# Patient Record
Sex: FEMALE | Race: BLACK OR AFRICAN AMERICAN | Employment: OTHER | URBAN - METROPOLITAN AREA
[De-identification: names, ages, dates, MRNs, and addresses within clinical notes are randomized per-mention and may not be internally consistent; named-entity substitution may affect disease eponyms.]

---

## 2017-01-17 ENCOUNTER — HOSPITAL ENCOUNTER (OUTPATIENT)
Dept: GENERAL RADIOLOGY | Age: 62
Discharge: HOME | End: 2017-01-17
Payer: MEDICAID

## 2017-01-17 DIAGNOSIS — Z01.818 PREOP EXAMINATION: ICD-10-CM

## 2017-01-17 PROCEDURE — 71020 XR CHEST PA LAT: CPT

## 2017-02-07 ENCOUNTER — PATIENT OUTREACH (OUTPATIENT)
Dept: CASE MANAGEMENT | Age: 62
End: 2017-02-07

## 2017-02-07 ENCOUNTER — HOSPITAL ENCOUNTER (OUTPATIENT)
Dept: LAB | Age: 62
Discharge: HOME | End: 2017-02-07
Payer: MEDICAID

## 2017-02-07 DIAGNOSIS — C50.919 MALIGNANT NEOPLASM OF FEMALE BREAST, UNSPECIFIED LATERALITY, UNSPECIFIED SITE OF BREAST: ICD-10-CM

## 2017-02-07 LAB
ALBUMIN SERPL BCP-MCNC: 3.2 G/DL (ref 3.2–4.6)
ALBUMIN/GLOB SERPL: 0.8 {RATIO} (ref 1.2–3.5)
ALP SERPL-CCNC: 99 U/L (ref 50–136)
ALT SERPL-CCNC: 20 U/L (ref 12–65)
ANION GAP BLD CALC-SCNC: 6 MMOL/L (ref 7–16)
AST SERPL W P-5'-P-CCNC: 9 U/L (ref 15–37)
BASOPHILS # BLD AUTO: 0.1 K/UL (ref 0–0.2)
BASOPHILS # BLD: 1 % (ref 0–2)
BILIRUB SERPL-MCNC: 0.2 MG/DL (ref 0.2–1.1)
BUN SERPL-MCNC: 16 MG/DL (ref 8–23)
CALCIUM SERPL-MCNC: 9.4 MG/DL (ref 8.3–10.4)
CHLORIDE SERPL-SCNC: 103 MMOL/L (ref 98–107)
CO2 SERPL-SCNC: 29 MMOL/L (ref 23–32)
CREAT SERPL-MCNC: 1 MG/DL (ref 0.6–1)
DIFFERENTIAL METHOD BLD: ABNORMAL
EOSINOPHIL # BLD: 0.8 K/UL (ref 0–0.8)
EOSINOPHIL NFR BLD: 10 % (ref 0.5–7.8)
ERYTHROCYTE [DISTWIDTH] IN BLOOD BY AUTOMATED COUNT: 13.3 % (ref 11.9–14.6)
GLOBULIN SER CALC-MCNC: 4 G/DL (ref 2.3–3.5)
GLUCOSE SERPL-MCNC: 113 MG/DL (ref 65–100)
HCT VFR BLD AUTO: 40.1 % (ref 35.8–46.3)
HGB BLD-MCNC: 12.8 G/DL (ref 11.7–15.4)
LYMPHOCYTES # BLD AUTO: 25 % (ref 13–44)
LYMPHOCYTES # BLD: 2.1 K/UL (ref 0.5–4.6)
MCH RBC QN AUTO: 27.4 PG (ref 26.1–32.9)
MCHC RBC AUTO-ENTMCNC: 31.9 G/DL (ref 31.4–35)
MCV RBC AUTO: 85.7 FL (ref 79.6–97.8)
MONOCYTES # BLD: 0.7 K/UL (ref 0.1–1.3)
MONOCYTES NFR BLD AUTO: 8 % (ref 4–12)
NEUTS SEG # BLD: 4.7 K/UL (ref 1.7–8.2)
NEUTS SEG NFR BLD AUTO: 56 % (ref 43–78)
NRBC # BLD: 0 K/UL (ref 0–0.2)
NRBC BLD-RTO: 0 PER 100 WBC (ref 0–2)
PLATELET # BLD AUTO: 515 K/UL (ref 150–450)
PMV BLD AUTO: 8.2 FL (ref 10.8–14.1)
POTASSIUM SERPL-SCNC: 3.9 MMOL/L (ref 3.5–5.1)
PROT SERPL-MCNC: 7.2 G/DL (ref 6.3–8.2)
RBC # BLD AUTO: 4.68 M/UL (ref 4.05–5.25)
SODIUM SERPL-SCNC: 138 MMOL/L (ref 136–145)
WBC # BLD AUTO: 8.4 K/UL (ref 4.3–11.1)

## 2017-02-07 PROCEDURE — 36415 COLL VENOUS BLD VENIPUNCTURE: CPT | Performed by: INTERNAL MEDICINE

## 2017-02-07 PROCEDURE — 80053 COMPREHEN METABOLIC PANEL: CPT | Performed by: INTERNAL MEDICINE

## 2017-02-07 PROCEDURE — 85025 COMPLETE CBC W/AUTO DIFF WBC: CPT | Performed by: INTERNAL MEDICINE

## 2017-02-07 NOTE — ACP (ADVANCE CARE PLANNING)
2/7/2017 Saw the patient with Dr Elissa Canseco. She had surgery approximately 3 weeks ago with Dr Sammi Tee. Dr Elissa Canseco discussed with her that we will send Oncotype Dx to see if there would be benefit from chemo. Dr Elissa Canseco would like her to take Arimidex. She requests that she be given something for hot flashes and we will send in Effexor 37.5 for one week and then increase to 75 mg. Navigation will continue to follow.

## 2017-02-14 ENCOUNTER — PATIENT OUTREACH (OUTPATIENT)
Dept: CASE MANAGEMENT | Age: 62
End: 2017-02-14

## 2017-02-28 ENCOUNTER — APPOINTMENT (OUTPATIENT)
Dept: GENERAL RADIOLOGY | Age: 62
End: 2017-02-28
Attending: EMERGENCY MEDICINE
Payer: MEDICAID

## 2017-02-28 ENCOUNTER — APPOINTMENT (OUTPATIENT)
Dept: CT IMAGING | Age: 62
End: 2017-02-28
Attending: EMERGENCY MEDICINE
Payer: MEDICAID

## 2017-02-28 ENCOUNTER — HOSPITAL ENCOUNTER (EMERGENCY)
Age: 62
Discharge: HOME | End: 2017-03-01
Attending: EMERGENCY MEDICINE
Payer: MEDICAID

## 2017-02-28 DIAGNOSIS — R07.89 CHEST WALL PAIN: Primary | ICD-10-CM

## 2017-02-28 LAB
ALBUMIN SERPL BCP-MCNC: 3.5 G/DL (ref 3.2–4.6)
ALBUMIN/GLOB SERPL: 0.7 {RATIO} (ref 1.2–3.5)
ALP SERPL-CCNC: 123 U/L (ref 50–136)
ALT SERPL-CCNC: 24 U/L (ref 12–65)
ANION GAP BLD CALC-SCNC: 10 MMOL/L (ref 7–16)
AST SERPL W P-5'-P-CCNC: 17 U/L (ref 15–37)
BASOPHILS # BLD AUTO: 0 K/UL (ref 0–0.2)
BASOPHILS # BLD: 0 % (ref 0–2)
BILIRUB SERPL-MCNC: 0.4 MG/DL (ref 0.2–1.1)
BUN SERPL-MCNC: 22 MG/DL (ref 8–23)
CALCIUM SERPL-MCNC: 9.4 MG/DL (ref 8.3–10.4)
CHLORIDE SERPL-SCNC: 102 MMOL/L (ref 98–107)
CO2 SERPL-SCNC: 29 MMOL/L (ref 21–32)
CREAT SERPL-MCNC: 1.1 MG/DL (ref 0.6–1)
DIFFERENTIAL METHOD BLD: ABNORMAL
EOSINOPHIL # BLD: 0.5 K/UL (ref 0–0.8)
EOSINOPHIL NFR BLD: 5 % (ref 0.5–7.8)
ERYTHROCYTE [DISTWIDTH] IN BLOOD BY AUTOMATED COUNT: 13.2 % (ref 11.9–14.6)
GLOBULIN SER CALC-MCNC: 4.7 G/DL (ref 2.3–3.5)
GLUCOSE SERPL-MCNC: 94 MG/DL (ref 65–100)
HCT VFR BLD AUTO: 40.5 % (ref 35.8–46.3)
HGB BLD-MCNC: 13.2 G/DL (ref 11.7–15.4)
IMM GRANULOCYTES # BLD: 0 K/UL (ref 0–0.5)
IMM GRANULOCYTES NFR BLD AUTO: 0.3 % (ref 0–5)
LYMPHOCYTES # BLD AUTO: 30 % (ref 13–44)
LYMPHOCYTES # BLD: 3 K/UL (ref 0.5–4.6)
MCH RBC QN AUTO: 27.4 PG (ref 26.1–32.9)
MCHC RBC AUTO-ENTMCNC: 32.6 G/DL (ref 31.4–35)
MCV RBC AUTO: 84.2 FL (ref 79.6–97.8)
MONOCYTES # BLD: 0.9 K/UL (ref 0.1–1.3)
MONOCYTES NFR BLD AUTO: 9 % (ref 4–12)
NEUTS SEG # BLD: 5.5 K/UL (ref 1.7–8.2)
NEUTS SEG NFR BLD AUTO: 56 % (ref 43–78)
PLATELET # BLD AUTO: 451 K/UL (ref 150–450)
PMV BLD AUTO: 9 FL (ref 10.8–14.1)
POTASSIUM SERPL-SCNC: 3.9 MMOL/L (ref 3.5–5.1)
PROT SERPL-MCNC: 8.2 G/DL (ref 6.3–8.2)
RBC # BLD AUTO: 4.81 M/UL (ref 4.05–5.25)
SODIUM SERPL-SCNC: 141 MMOL/L (ref 136–145)
TROPONIN I SERPL-MCNC: <0.02 NG/ML (ref 0.02–0.05)
WBC # BLD AUTO: 9.9 K/UL (ref 4.3–11.1)

## 2017-02-28 PROCEDURE — 71010 XR CHEST PORT: CPT

## 2017-02-28 PROCEDURE — 80053 COMPREHEN METABOLIC PANEL: CPT | Performed by: EMERGENCY MEDICINE

## 2017-02-28 PROCEDURE — 83690 ASSAY OF LIPASE: CPT | Performed by: EMERGENCY MEDICINE

## 2017-02-28 PROCEDURE — 83880 ASSAY OF NATRIURETIC PEPTIDE: CPT | Performed by: EMERGENCY MEDICINE

## 2017-02-28 PROCEDURE — 99284 EMERGENCY DEPT VISIT MOD MDM: CPT | Performed by: EMERGENCY MEDICINE

## 2017-02-28 PROCEDURE — C9113 INJ PANTOPRAZOLE SODIUM, VIA: HCPCS | Performed by: EMERGENCY MEDICINE

## 2017-02-28 PROCEDURE — 96375 TX/PRO/DX INJ NEW DRUG ADDON: CPT | Performed by: EMERGENCY MEDICINE

## 2017-02-28 PROCEDURE — 93005 ELECTROCARDIOGRAM TRACING: CPT | Performed by: EMERGENCY MEDICINE

## 2017-02-28 PROCEDURE — 84484 ASSAY OF TROPONIN QUANT: CPT | Performed by: EMERGENCY MEDICINE

## 2017-02-28 PROCEDURE — 74011636320 HC RX REV CODE- 636/320: Performed by: EMERGENCY MEDICINE

## 2017-02-28 PROCEDURE — 96374 THER/PROPH/DIAG INJ IV PUSH: CPT | Performed by: EMERGENCY MEDICINE

## 2017-02-28 PROCEDURE — 74011000258 HC RX REV CODE- 258: Performed by: EMERGENCY MEDICINE

## 2017-02-28 PROCEDURE — 74011250636 HC RX REV CODE- 250/636: Performed by: EMERGENCY MEDICINE

## 2017-02-28 PROCEDURE — 74011000250 HC RX REV CODE- 250: Performed by: EMERGENCY MEDICINE

## 2017-02-28 PROCEDURE — 74011250637 HC RX REV CODE- 250/637: Performed by: EMERGENCY MEDICINE

## 2017-02-28 PROCEDURE — 71260 CT THORAX DX C+: CPT

## 2017-02-28 PROCEDURE — 85025 COMPLETE CBC W/AUTO DIFF WBC: CPT | Performed by: EMERGENCY MEDICINE

## 2017-02-28 PROCEDURE — 84484 ASSAY OF TROPONIN QUANT: CPT

## 2017-02-28 RX ORDER — SODIUM CHLORIDE 0.9 % (FLUSH) 0.9 %
10 SYRINGE (ML) INJECTION
Status: COMPLETED | OUTPATIENT
Start: 2017-02-28 | End: 2017-02-28

## 2017-02-28 RX ORDER — LIDOCAINE HYDROCHLORIDE 20 MG/ML
15 SOLUTION OROPHARYNGEAL
Status: COMPLETED | OUTPATIENT
Start: 2017-02-28 | End: 2017-02-28

## 2017-02-28 RX ORDER — ONDANSETRON 2 MG/ML
4 INJECTION INTRAMUSCULAR; INTRAVENOUS
Status: COMPLETED | OUTPATIENT
Start: 2017-02-28 | End: 2017-02-28

## 2017-02-28 RX ORDER — GUAIFENESIN 100 MG/5ML
324 LIQUID (ML) ORAL
Status: COMPLETED | OUTPATIENT
Start: 2017-02-28 | End: 2017-02-28

## 2017-02-28 RX ADMIN — SODIUM CHLORIDE 100 ML: 900 INJECTION, SOLUTION INTRAVENOUS at 23:20

## 2017-02-28 RX ADMIN — IOVERSOL 100 ML: 741 INJECTION INTRA-ARTERIAL; INTRAVENOUS at 23:20

## 2017-02-28 RX ADMIN — SODIUM CHLORIDE 40 MG: 9 INJECTION INTRAMUSCULAR; INTRAVENOUS; SUBCUTANEOUS at 23:37

## 2017-02-28 RX ADMIN — Medication 30 ML: at 23:37

## 2017-02-28 RX ADMIN — Medication 10 ML: at 23:20

## 2017-02-28 RX ADMIN — ONDANSETRON 4 MG: 2 INJECTION INTRAMUSCULAR; INTRAVENOUS at 23:37

## 2017-02-28 RX ADMIN — LIDOCAINE HYDROCHLORIDE 15 ML: 20 SOLUTION ORAL; TOPICAL at 23:37

## 2017-02-28 RX ADMIN — ASPIRIN 81 MG 324 MG: 81 TABLET ORAL at 23:37

## 2017-03-01 ENCOUNTER — HOSPITAL ENCOUNTER (OUTPATIENT)
Dept: LAB | Age: 62
Discharge: HOME | End: 2017-03-01
Payer: MEDICAID

## 2017-03-01 VITALS
OXYGEN SATURATION: 97 % | DIASTOLIC BLOOD PRESSURE: 65 MMHG | HEART RATE: 100 BPM | WEIGHT: 288 LBS | HEIGHT: 63 IN | TEMPERATURE: 98.8 F | SYSTOLIC BLOOD PRESSURE: 130 MMHG | RESPIRATION RATE: 15 BRPM | BODY MASS INDEX: 51.03 KG/M2

## 2017-03-01 DIAGNOSIS — C50.919 MALIGNANT NEOPLASM OF FEMALE BREAST, UNSPECIFIED LATERALITY, UNSPECIFIED SITE OF BREAST: ICD-10-CM

## 2017-03-01 LAB
ALBUMIN SERPL BCP-MCNC: 3.3 G/DL (ref 3.2–4.6)
ALBUMIN/GLOB SERPL: 0.8 {RATIO} (ref 1.2–3.5)
ALP SERPL-CCNC: 118 U/L (ref 50–136)
ALT SERPL-CCNC: 23 U/L (ref 12–65)
ANION GAP BLD CALC-SCNC: 7 MMOL/L (ref 7–16)
AST SERPL W P-5'-P-CCNC: 14 U/L (ref 15–37)
ATRIAL RATE: 104 BPM
BASOPHILS # BLD AUTO: 0.1 K/UL (ref 0–0.2)
BASOPHILS # BLD: 1 % (ref 0–2)
BILIRUB SERPL-MCNC: 0.3 MG/DL (ref 0.2–1.1)
BNP SERPL-MCNC: 2 PG/ML
BUN SERPL-MCNC: 18 MG/DL (ref 8–23)
CALCIUM SERPL-MCNC: 8.8 MG/DL (ref 8.3–10.4)
CALCULATED P AXIS, ECG09: 62 DEGREES
CALCULATED R AXIS, ECG10: 27 DEGREES
CALCULATED T AXIS, ECG11: 62 DEGREES
CHLORIDE SERPL-SCNC: 100 MMOL/L (ref 98–107)
CO2 SERPL-SCNC: 33 MMOL/L (ref 23–32)
CREAT SERPL-MCNC: 0.97 MG/DL (ref 0.6–1)
DIAGNOSIS, 93000: NORMAL
DIASTOLIC BP, ECG02: NORMAL MMHG
DIFFERENTIAL METHOD BLD: ABNORMAL
EOSINOPHIL # BLD: 0.5 K/UL (ref 0–0.8)
EOSINOPHIL NFR BLD: 6 % (ref 0.5–7.8)
ERYTHROCYTE [DISTWIDTH] IN BLOOD BY AUTOMATED COUNT: 13 % (ref 11.9–14.6)
GLOBULIN SER CALC-MCNC: 4.1 G/DL (ref 2.3–3.5)
GLUCOSE SERPL-MCNC: 110 MG/DL (ref 65–100)
HCT VFR BLD AUTO: 36.5 % (ref 35.8–46.3)
HGB BLD-MCNC: 11.6 G/DL (ref 11.7–15.4)
LIPASE SERPL-CCNC: 135 U/L (ref 73–393)
LYMPHOCYTES # BLD AUTO: 32 % (ref 13–44)
LYMPHOCYTES # BLD: 2.7 K/UL (ref 0.5–4.6)
MCH RBC QN AUTO: 26.4 PG (ref 26.1–32.9)
MCHC RBC AUTO-ENTMCNC: 31.8 G/DL (ref 31.4–35)
MCV RBC AUTO: 83 FL (ref 79.6–97.8)
MONOCYTES # BLD: 0.8 K/UL (ref 0.1–1.3)
MONOCYTES NFR BLD AUTO: 10 % (ref 4–12)
NEUTS SEG # BLD: 4.2 K/UL (ref 1.7–8.2)
NEUTS SEG NFR BLD AUTO: 51 % (ref 43–78)
NRBC # BLD: 0 K/UL (ref 0–0.2)
P-R INTERVAL, ECG05: 144 MS
PLATELET # BLD AUTO: 445 K/UL (ref 150–450)
PMV BLD AUTO: 8.5 FL (ref 10.8–14.1)
POTASSIUM SERPL-SCNC: 3.5 MMOL/L (ref 3.5–5.1)
PROT SERPL-MCNC: 7.4 G/DL (ref 6.3–8.2)
Q-T INTERVAL, ECG07: 324 MS
QRS DURATION, ECG06: 70 MS
QTC CALCULATION (BEZET), ECG08: 426 MS
RBC # BLD AUTO: 4.4 M/UL (ref 4.05–5.25)
SODIUM SERPL-SCNC: 140 MMOL/L (ref 136–145)
SYSTOLIC BP, ECG01: NORMAL MMHG
TROPONIN I BLD-MCNC: 0 NG/ML (ref 0–0.08)
VENTRICULAR RATE, ECG03: 104 BPM
WBC # BLD AUTO: 8.3 K/UL (ref 4.3–11.1)

## 2017-03-01 PROCEDURE — 80053 COMPREHEN METABOLIC PANEL: CPT | Performed by: INTERNAL MEDICINE

## 2017-03-01 PROCEDURE — 36415 COLL VENOUS BLD VENIPUNCTURE: CPT | Performed by: INTERNAL MEDICINE

## 2017-03-01 PROCEDURE — 85025 COMPLETE CBC W/AUTO DIFF WBC: CPT | Performed by: INTERNAL MEDICINE

## 2017-03-01 RX ORDER — OXYCODONE AND ACETAMINOPHEN 10; 325 MG/1; MG/1
1 TABLET ORAL
Qty: 8 TAB | Refills: 0 | Status: ON HOLD | OUTPATIENT
Start: 2017-03-01 | End: 2017-03-13

## 2017-03-01 NOTE — ED TRIAGE NOTES
Pt c/o left side chest pain x 1 day. Pt states shortness of breath. Pt denies any N/V/D. Pt states she did have a left sided mastectomy last month. Pt alert and oriented x 4. Respirations are even and unlabored. Pt appears in no acute distress at this time.

## 2017-03-01 NOTE — ED NOTES
I have reviewed discharge instructions with the patient. The patient verbalized understanding. Patient walking to lobby with family. avs in chart.

## 2017-03-01 NOTE — DISCHARGE INSTRUCTIONS
Musculoskeletal Chest Pain: Care Instructions  Your Care Instructions  Chest pain is not always a sign that something is wrong with your heart or that you have another serious problem. The doctor thinks your chest pain is caused by strained muscles or ligaments, inflamed chest cartilage, or another problem in your chest, rather than by your heart. You may need more tests to find the cause of your chest pain. Follow-up care is a key part of your treatment and safety. Be sure to make and go to all appointments, and call your doctor if you are having problems. Its also a good idea to know your test results and keep a list of the medicines you take. How can you care for yourself at home? · Take pain medicines exactly as directed. ¨ If the doctor gave you a prescription medicine for pain, take it as prescribed. ¨ If you are not taking a prescription pain medicine, ask your doctor if you can take an over-the-counter medicine. · Rest and protect the sore area. · Stop, change, or take a break from any activity that may be causing your pain or soreness. · Put ice or a cold pack on the sore area for 10 to 20 minutes at a time. Try to do this every 1 to 2 hours for the next 3 days (when you are awake) or until the swelling goes down. Put a thin cloth between the ice and your skin. · After 2 or 3 days, apply a heating pad set on low or a warm cloth to the area that hurts. Some doctors suggest that you go back and forth between hot and cold. · Do not wrap or tape your ribs for support. This may cause you to take smaller breaths, which could increase your risk of lung problems. · Mentholated creams such as Bengay or Icy Hot may soothe sore muscles. Follow the instructions on the package. · Follow your doctor's instructions for exercising. · Gentle stretching and massage may help you get better faster. Stretch slowly to the point just before pain begins, and hold the stretch for at least 15 to 30 seconds.  Do this 3 or 4 times a day. Stretch just after you have applied heat. · As your pain gets better, slowly return to your normal activities. Any increased pain may be a sign that you need to rest a while longer. When should you call for help? Call 911 anytime you think you may need emergency care. For example, call if:  · You have chest pain or pressure. This may occur with:  ¨ Sweating. ¨ Shortness of breath. ¨ Nausea or vomiting. ¨ Pain that spreads from the chest to the neck, jaw, or one or both shoulders or arms. ¨ Dizziness or lightheadedness. ¨ A fast or uneven pulse. After calling 911, chew 1 adult-strength aspirin. Wait for an ambulance. Do not try to drive yourself. · You have sudden chest pain and shortness of breath, or you cough up blood. Call your doctor now or seek immediate medical care if:  · You have any trouble breathing. · Your chest pain gets worse. · Your chest pain occurs consistently with exercise and is relieved by rest.  Watch closely for changes in your health, and be sure to contact your doctor if:  · Your chest pain does not get better after 1 week. Where can you learn more? Go to http://frandy-domonique.info/. Enter V293 in the search box to learn more about \"Musculoskeletal Chest Pain: Care Instructions. \"  Current as of: May 27, 2016  Content Version: 11.1  © 6926-4660 [employer]. Care instructions adapted under license by [employer] (which disclaims liability or warranty for this information). If you have questions about a medical condition or this instruction, always ask your healthcare professional. [place] [address] any warranty or liability for your use of this information.

## 2017-03-01 NOTE — ED PROVIDER NOTES
HPI Comments: Patient with left-sided mastectomy one month ago. Had breast cancer. Is following up with Dr. Solo Murguia for chemotherapy and radiation options. Has a history of high blood pressure and congestive heart failure. Had a heart catheter in November 2014 with minimal coronary artery disease. Has history of fibromyalgia on pain medicine. Started with left-sided sharp chest pain this morning at 10 AM.  Some shortness of breath associated with it. No nausea. No numbness or diaphoresis. Patient is a 64 y.o. female presenting with chest pain. The history is provided by the patient. No  was used. Chest Pain (Angina)    This is a new problem. The current episode started 6 to 12 hours ago. The problem has not changed since onset. The problem occurs constantly. The pain is associated with normal activity. The pain is present in the left side. The pain is moderate. The quality of the pain is described as heavy and sharp. The pain does not radiate. Associated symptoms include nausea and shortness of breath. Pertinent negatives include no abdominal pain, no back pain, no cough, no diaphoresis, no dizziness, no exertional chest pressure, no fever, no headaches, no irregular heartbeat, no leg pain, no lower extremity edema, no malaise/fatigue, no near-syncope, no numbness, no palpitations, no vomiting and no weakness. She has tried nothing for the symptoms. Risk factors include hypertension. Her past medical history is significant for HTN and CHF.        Past Medical History:   Diagnosis Date    Arthritis     everywhere;  DDD    Asthma     inhalers daily  [facility]    CAD (coronary artery disease)     Dr Emmie Butler [facility])     left breast ca dx'ed this month-appt with surgeon 10/12    Chronic pain     generalized from arthritis    Complicated UTI (urinary tract infection) 12/8/2015    Diverticulosis     Heart failure ([facility] [address]     [facility] No significant valvular disease. EF 50-55%    History of prediabetes     monitored by Primary Physician    Hypertension     Shortness of breath     Sleep apnea     bi pap and o2    Thyroid cyst     cyst removed from thyroid       Past Surgical History:   Procedure Laterality Date    BREAST SURGERY PROCEDURE UNLISTED      Mastectomy left side    CARDIAC SURG PROCEDURE UNLIST  [facility]14 [facility]    Minimal CAD in the ostial circumflex and mid ramus (medical management)    HX CARPAL TUNNEL RELEASE Bilateral     HX CHOLECYSTECTOMY      HX CYST REMOVAL      from thyroid    HX LAP CHOLECYSTECTOMY      HX OTHER SURGICAL      abcess where bra strap    NEUROLOGICAL PROCEDURE UNLISTED      \"nerve running to back\"    SINUS SURGERY PROC UNLISTED           Family History:   Problem Relation Age of Onset    Heart Disease Mother     Cancer Mother      lung    Hypertension Mother     Hypertension Father     Sleep Apnea Father     Cancer Father      prostate    Sleep Apnea Brother      states also has two children with sleep apnea    Cancer Brother      pituitary    Hypertension Brother     Breast Cancer Neg Hx        Social History     Social History    Marital status:      Spouse name: N/A    Number of children: N/A    Years of education: N/A     Occupational History    Not on file. 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 ALLERGIES: Bactrim [sulfamethoprim ds]    Review of Systems   Constitutional: Negative for chills, diaphoresis, fever and malaise/fatigue. HENT: Negative for rhinorrhea and sore throat. Eyes: Negative for pain and redness. Respiratory: Positive for shortness of breath. Negative for cough, chest tightness and wheezing. Cardiovascular: Positive for chest pain. Negative for palpitations, leg swelling and near-syncope. Gastrointestinal: Positive for nausea. Negative for abdominal pain, diarrhea and vomiting. Genitourinary: Negative for dysuria and hematuria. Musculoskeletal: Negative for back pain, gait problem, neck pain and neck stiffness. Skin: Negative for color change and rash. Neurological: Negative for dizziness, weakness, numbness and headaches. Vitals:    02/28/17 2034   BP: 123/65   Pulse: (!) 104   Resp: 18   Temp: 99.2 °F (37.3 °C)   SpO2: 97%   Weight: 130.6 kg (288 lb)   Height: 5' 3\" (1.6 m)            Physical Exam   Constitutional: She is oriented to person, place, and time. She appears well-developed and well-nourished. No distress. HENT:   Head: Normocephalic and atraumatic. Neck: Normal range of motion. Neck supple. Cardiovascular: Normal rate and regular rhythm. No murmur heard. Pulmonary/Chest: Effort normal and breath sounds normal. She has no wheezes. She exhibits tenderness (left chest no swelling). Abdominal: Soft. Bowel sounds are normal. There is no tenderness. Musculoskeletal: Normal range of motion. She exhibits no edema. Neurological: She is alert and oriented to person, place, and time. Skin: Skin is warm and dry. She is not diaphoretic. Nursing note and vitals reviewed. MDM  Number of Diagnoses or Management Options  Diagnosis management comments: Chest wall pain. Will discharge. Amount and/or Complexity of Data Reviewed  Clinical lab tests: reviewed and ordered  Tests in the radiology section of CPT®: ordered and reviewed  Tests in the medicine section of CPT®: ordered and reviewed    Patient Progress  Patient progress: stable    ED Course       Procedures      EKG: normal sinus rhythm, nonspecific ST and T waves changes. Rate 104.         XR CHEST PORT (Final result) Result time: 02/28/17 21:28:31     Final result by Kerry Helton MD (02/28/17 21:28:31)     Impression:     Impression: No acute abnormality.     Narrative:     History: chest pain    Two views chest    COMPARISON: 1/17/2017    Findings: The lungs are well expanded and clear. The cardiac silhouette, and  mediastinal contour, and osseous structures are normal.             Results Include:    Recent Results (from the past 24 hour(s))   EKG, 12 LEAD, INITIAL    Collection Time: 02/28/17  8:33 PM   Result Value Ref Range    Systolic BP  mmHg    Diastolic BP  mmHg    Ventricular Rate 104 BPM    Atrial Rate 104 BPM    P-R Interval 144 ms    QRS Duration 70 ms    Q-T Interval 324 ms    QTC Calculation (Bezet) 426 ms    Calculated P Axis 62 degrees    Calculated R Axis 27 degrees    Calculated T Axis 62 degrees    Diagnosis       Sinus tachycardia  Cannot rule out Anterior infarct (cited on or before 04-FEB-2016)  Abnormal ECG  When compared with ECG of 04-FEB-2016 15:16,  No significant change was found     CBC WITH AUTOMATED DIFF    Collection Time: 02/28/17  8:41 PM   Result Value Ref Range    WBC 9.9 4.3 - 11.1 K/uL    RBC 4.81 4.05 - 5.25 M/uL    HGB 13.2 11.7 - 15.4 g/dL    HCT 40.5 35.8 - 46.3 %    MCV 84.2 79.6 - 97.8 FL    MCH 27.4 26.1 - 32.9 PG    MCHC 32.6 31.4 - 35.0 g/dL    RDW 13.2 11.9 - 14.6 %    PLATELET 019 (H) 979 - 450 K/uL    MPV 9.0 (L) 10.8 - 14.1 FL    DF AUTOMATED      NEUTROPHILS 56 43 - 78 %    LYMPHOCYTES 30 13 - 44 %    MONOCYTES 9 4.0 - 12.0 %    EOSINOPHILS 5 0.5 - 7.8 %    BASOPHILS 0 0.0 - 2.0 %    IMMATURE GRANULOCYTES 0.3 0.0 - 5.0 %    ABS. NEUTROPHILS 5.5 1.7 - 8.2 K/UL    ABS. LYMPHOCYTES 3.0 0.5 - 4.6 K/UL    ABS. MONOCYTES 0.9 0.1 - 1.3 K/UL    ABS. EOSINOPHILS 0.5 0.0 - 0.8 K/UL    ABS. BASOPHILS 0.0 0.0 - 0.2 K/UL    ABS. IMM.  GRANS. 0.0 0.0 - 0.5 K/UL   METABOLIC PANEL, COMPREHENSIVE    Collection Time: 02/28/17  8:41 PM   Result Value Ref Range    Sodium 141 136 - 145 mmol/L    Potassium 3.9 3.5 - 5.1 mmol/L    Chloride 102 98 - 107 mmol/L    CO2 29 21 - 32 mmol/L    Anion gap 10 7 - 16 mmol/L    Glucose 94 65 - 100 mg/dL    BUN 22 8 - 23 MG/DL    Creatinine 1.10 (H) 0.6 - 1.0 MG/DL    GFR est AA >60 >60 ml/min/1.73m2    GFR est non-AA 54 (L) >60 ml/min/1.73m2    Calcium 9.4 8.3 - 10.4 MG/DL    Bilirubin, total 0.4 0.2 - 1.1 MG/DL    ALT (SGPT) 24 12 - 65 U/L    AST (SGOT) 17 15 - 37 U/L    Alk. phosphatase 123 50 - 136 U/L    Protein, total 8.2 6.3 - 8.2 g/dL    Albumin 3.5 3.2 - 4.6 g/dL    Globulin 4.7 (H) 2.3 - 3.5 g/dL    A-G Ratio 0.7 (L) 1.2 - 3.5     TROPONIN I    Collection Time: 02/28/17  8:41 PM   Result Value Ref Range    Troponin-I, Qt. <0.02 (L) 0.02 - 0.05 NG/ML   LIPASE    Collection Time: 02/28/17  8:41 PM   Result Value Ref Range    Lipase 135 73 - 393 U/L   POC TROPONIN-I    Collection Time: 02/28/17 11:59 PM   Result Value Ref Range    Troponin-I (POC) 0 0.0 - 0.08 ng/ml        CT CHEST W CONT (Final result) Result time: 03/01/17 00:24:12     Final result by Marielena Parekh MD (03/01/17 00:24:12)     Impression:     IMPRESSION:    1. No pulmonary embolism to the level of the lobar pulmonary arteries. Segmental  pulmonary arteries are not well evaluated. 2. Left lower lobe bronchial thickening, possibly due to bronchitis. No  pulmonary consolidation. 3. Status post left mastectomy. Tissue expander in the left anterior chest wall. Surrounding ill-defined fluid measuring up to 7 cm, likely postoperative seroma.           Narrative:     CT Chest with contrast    INDICATION: Chest pain. Shortness of breath. Evaluate for PE. History of left  mastectomy last month. COMPARISON: None    TECHNIQUE: Contiguous axial images were obtained from the neck base through the  upper abdomen with intravenous contrast, 100 mL Isovue 370.  Radiation dose  reduction techniques were used for this study:  Our CT scanners use one or all  of the following: Automated exposure control, adjustment of the mA and/or kVp  according to patient's size, iterative reconstruction. FINDINGS:    Sensitivity is diminished secondary to image point. Segmental pulmonary arteries  are not well evaluated. There is no evidence of pulmonary embolism to the levels  of the lobar pulmonary arteries. The heart is not enlarged. There is no pericardial effusion. The thoracic aorta  is normal in course and caliber. There is an approximately 10 mm lymph node in  the anterior mediastinum (series 2, image 49). There is an approximately 10 mm  right thyroid lobe nodule. There is no endobronchial lesion. There is left lower lobe bronchial thickening,  possibly related to bronchitis. No focal consolidation, pleural effusion or  pneumothorax. No evidence of pulmonary edema. There is left breast tissue expander. There is adjacent fluid in the left  anterior chest wall, measuring up to 2.7 x 7.1 cm. This is likely postoperative  fluid. Partially visualized upper abdomen demonstrates an approximately 17 mm left  adrenal nodule. This has been stable dating back to CT of April 1, 2010 and  likely benign adenoma.  There are degenerative changes of the spine.

## 2017-03-02 ENCOUNTER — PATIENT OUTREACH (OUTPATIENT)
Dept: CASE MANAGEMENT | Age: 62
End: 2017-03-02

## 2017-03-02 NOTE — ACP (ADVANCE CARE PLANNING)
3/1/17 saw pt today with Dr. Tran Babcock for follow up breast cancer and oncotype review. Oncotype score is 5, no role for chemo. Pt already has arimidex on hand. Potential side effects discussed. Will arrange baseline bone density. She is already established with Dr. Teresa Salter for reconstruction. Survivorship care plan reviewed with pt and instructed to share with PCP. Follow up in 2 months. Navigation will sign off.

## 2017-03-06 ENCOUNTER — HOSPITAL ENCOUNTER (INPATIENT)
Age: 62
LOS: 6 days | Discharge: HOME HEALTH | DRG: 383 | End: 2017-03-13
Attending: SURGERY | Admitting: SURGERY
Payer: MEDICAID

## 2017-03-06 DIAGNOSIS — F41.9 ANXIETY: ICD-10-CM

## 2017-03-06 LAB
ANION GAP BLD CALC-SCNC: 8 MMOL/L (ref 7–16)
BASOPHILS # BLD AUTO: 0.1 K/UL (ref 0–0.2)
BASOPHILS # BLD: 1 % (ref 0–2)
BUN SERPL-MCNC: 14 MG/DL (ref 8–23)
CALCIUM SERPL-MCNC: 9.2 MG/DL (ref 8.3–10.4)
CHLORIDE SERPL-SCNC: 103 MMOL/L (ref 98–107)
CO2 SERPL-SCNC: 29 MMOL/L (ref 21–32)
CREAT SERPL-MCNC: 1.13 MG/DL (ref 0.6–1)
DIFFERENTIAL METHOD BLD: ABNORMAL
EOSINOPHIL # BLD: 0.4 K/UL (ref 0–0.8)
EOSINOPHIL NFR BLD: 3 % (ref 0.5–7.8)
ERYTHROCYTE [DISTWIDTH] IN BLOOD BY AUTOMATED COUNT: 13.2 % (ref 11.9–14.6)
GLUCOSE SERPL-MCNC: 123 MG/DL (ref 65–100)
HCT VFR BLD AUTO: 37.5 % (ref 35.8–46.3)
HGB BLD-MCNC: 12.1 G/DL (ref 11.7–15.4)
IMM GRANULOCYTES # BLD: 0 K/UL (ref 0–0.5)
IMM GRANULOCYTES NFR BLD AUTO: 0.3 % (ref 0–5)
LACTATE SERPL-SCNC: 0.6 MMOL/L (ref 0.4–2)
LYMPHOCYTES # BLD AUTO: 24 % (ref 13–44)
LYMPHOCYTES # BLD: 2.7 K/UL (ref 0.5–4.6)
MCH RBC QN AUTO: 26.8 PG (ref 26.1–32.9)
MCHC RBC AUTO-ENTMCNC: 32.3 G/DL (ref 31.4–35)
MCV RBC AUTO: 83 FL (ref 79.6–97.8)
MONOCYTES # BLD: 0.7 K/UL (ref 0.1–1.3)
MONOCYTES NFR BLD AUTO: 6 % (ref 4–12)
NEUTS SEG # BLD: 7.3 K/UL (ref 1.7–8.2)
NEUTS SEG NFR BLD AUTO: 66 % (ref 43–78)
PLATELET # BLD AUTO: 596 K/UL (ref 150–450)
PMV BLD AUTO: 9 FL (ref 10.8–14.1)
POTASSIUM SERPL-SCNC: 3.8 MMOL/L (ref 3.5–5.1)
PROCALCITONIN SERPL-MCNC: <0.1 NG/ML
RBC # BLD AUTO: 4.52 M/UL (ref 4.05–5.25)
SODIUM SERPL-SCNC: 140 MMOL/L (ref 136–145)
WBC # BLD AUTO: 11.1 K/UL (ref 4.3–11.1)

## 2017-03-06 PROCEDURE — 77030033269 HC SLV COMPR SCD KNE2 CARD -B

## 2017-03-06 PROCEDURE — 94760 N-INVAS EAR/PLS OXIMETRY 1: CPT

## 2017-03-06 PROCEDURE — 80048 BASIC METABOLIC PNL TOTAL CA: CPT | Performed by: SURGERY

## 2017-03-06 PROCEDURE — 83605 ASSAY OF LACTIC ACID: CPT | Performed by: INTERNAL MEDICINE

## 2017-03-06 PROCEDURE — 94640 AIRWAY INHALATION TREATMENT: CPT

## 2017-03-06 PROCEDURE — 74011250637 HC RX REV CODE- 250/637: Performed by: INTERNAL MEDICINE

## 2017-03-06 PROCEDURE — 74011250636 HC RX REV CODE- 250/636: Performed by: SURGERY

## 2017-03-06 PROCEDURE — 74011000250 HC RX REV CODE- 250: Performed by: INTERNAL MEDICINE

## 2017-03-06 PROCEDURE — 36415 COLL VENOUS BLD VENIPUNCTURE: CPT | Performed by: SURGERY

## 2017-03-06 PROCEDURE — 74011000258 HC RX REV CODE- 258: Performed by: SURGERY

## 2017-03-06 PROCEDURE — 85025 COMPLETE CBC W/AUTO DIFF WBC: CPT | Performed by: SURGERY

## 2017-03-06 PROCEDURE — 87040 BLOOD CULTURE FOR BACTERIA: CPT | Performed by: SURGERY

## 2017-03-06 PROCEDURE — 74011250637 HC RX REV CODE- 250/637: Performed by: SURGERY

## 2017-03-06 PROCEDURE — 94664 DEMO&/EVAL PT USE INHALER: CPT

## 2017-03-06 PROCEDURE — 84145 PROCALCITONIN (PCT): CPT | Performed by: SURGERY

## 2017-03-06 PROCEDURE — 99218 HC RM OBSERVATION: CPT

## 2017-03-06 RX ORDER — BUDESONIDE 0.5 MG/2ML
500 INHALANT ORAL
Status: DISCONTINUED | OUTPATIENT
Start: 2017-03-06 | End: 2017-03-08

## 2017-03-06 RX ORDER — HYDROCODONE BITARTRATE AND ACETAMINOPHEN 10; 325 MG/1; MG/1
1 TABLET ORAL
Status: DISCONTINUED | OUTPATIENT
Start: 2017-03-06 | End: 2017-03-06 | Stop reason: SDUPTHER

## 2017-03-06 RX ORDER — MONTELUKAST SODIUM 10 MG/1
10 TABLET ORAL
Status: DISCONTINUED | OUTPATIENT
Start: 2017-03-06 | End: 2017-03-13 | Stop reason: HOSPADM

## 2017-03-06 RX ORDER — VENLAFAXINE HYDROCHLORIDE 37.5 MG/1
37.5 CAPSULE, EXTENDED RELEASE ORAL
Status: DISCONTINUED | OUTPATIENT
Start: 2017-03-07 | End: 2017-03-13 | Stop reason: HOSPADM

## 2017-03-06 RX ORDER — OXYCODONE HYDROCHLORIDE 5 MG/1
5 TABLET ORAL
Status: DISCONTINUED | OUTPATIENT
Start: 2017-03-06 | End: 2017-03-07

## 2017-03-06 RX ORDER — SODIUM CHLORIDE 9 MG/ML
125 INJECTION, SOLUTION INTRAVENOUS CONTINUOUS
Status: DISCONTINUED | OUTPATIENT
Start: 2017-03-06 | End: 2017-03-07

## 2017-03-06 RX ORDER — ONDANSETRON 2 MG/ML
4 INJECTION INTRAMUSCULAR; INTRAVENOUS
Status: DISCONTINUED | OUTPATIENT
Start: 2017-03-06 | End: 2017-03-13 | Stop reason: HOSPADM

## 2017-03-06 RX ORDER — ALBUTEROL SULFATE 0.83 MG/ML
2.5 SOLUTION RESPIRATORY (INHALATION)
Status: DISCONTINUED | OUTPATIENT
Start: 2017-03-06 | End: 2017-03-08

## 2017-03-06 RX ORDER — LISINOPRIL 20 MG/1
20 TABLET ORAL DAILY
Status: DISCONTINUED | OUTPATIENT
Start: 2017-03-07 | End: 2017-03-13 | Stop reason: HOSPADM

## 2017-03-06 RX ORDER — VANCOMYCIN 2 GRAM/500 ML IN 0.9 % SODIUM CHLORIDE INTRAVENOUS
2000 EVERY 12 HOURS
Status: DISCONTINUED | OUTPATIENT
Start: 2017-03-06 | End: 2017-03-07

## 2017-03-06 RX ORDER — ALBUTEROL SULFATE 0.83 MG/ML
2.5 SOLUTION RESPIRATORY (INHALATION)
Status: DISCONTINUED | OUTPATIENT
Start: 2017-03-06 | End: 2017-03-08 | Stop reason: SDUPTHER

## 2017-03-06 RX ORDER — FAMOTIDINE 20 MG/1
20 TABLET, FILM COATED ORAL 2 TIMES DAILY
Status: DISCONTINUED | OUTPATIENT
Start: 2017-03-07 | End: 2017-03-13 | Stop reason: HOSPADM

## 2017-03-06 RX ORDER — DIAZEPAM 5 MG/1
5 TABLET ORAL
Status: DISCONTINUED | OUTPATIENT
Start: 2017-03-06 | End: 2017-03-13 | Stop reason: HOSPADM

## 2017-03-06 RX ORDER — ANASTROZOLE 1 MG/1
1 TABLET ORAL DAILY
Status: DISCONTINUED | OUTPATIENT
Start: 2017-03-07 | End: 2017-03-13 | Stop reason: HOSPADM

## 2017-03-06 RX ORDER — HYDROCODONE BITARTRATE AND ACETAMINOPHEN 10; 325 MG/1; MG/1
2 TABLET ORAL
Status: DISCONTINUED | OUTPATIENT
Start: 2017-03-06 | End: 2017-03-08

## 2017-03-06 RX ADMIN — HYDROCODONE BITARTRATE AND ACETAMINOPHEN 1 TABLET: 10; 325 TABLET ORAL at 20:24

## 2017-03-06 RX ADMIN — BUDESONIDE 500 MCG: 0.5 INHALANT RESPIRATORY (INHALATION) at 22:40

## 2017-03-06 RX ADMIN — MONTELUKAST SODIUM 10 MG: 10 TABLET, FILM COATED ORAL at 22:29

## 2017-03-06 RX ADMIN — VANCOMYCIN HYDROCHLORIDE 2000 MG: 10 INJECTION, POWDER, LYOPHILIZED, FOR SOLUTION INTRAVENOUS at 21:19

## 2017-03-06 RX ADMIN — PIPERACILLIN SODIUM,TAZOBACTAM SODIUM 3.38 G: 3; .375 INJECTION, POWDER, FOR SOLUTION INTRAVENOUS at 22:30

## 2017-03-06 RX ADMIN — ALBUTEROL SULFATE 2.5 MG: 2.5 SOLUTION RESPIRATORY (INHALATION) at 22:40

## 2017-03-06 RX ADMIN — SODIUM CHLORIDE 125 ML/HR: 900 INJECTION, SOLUTION INTRAVENOUS at 19:54

## 2017-03-06 NOTE — PROGRESS NOTES
Pt was a direct admit, with possible cellulitis to left upper extremity aned breast area, post masectomy. Pustule under left armpit, that is painful to touch. Some swelling to left armpit area. Pt says it is painful when she raises her arm. Lungs are clear. HR regular. Abdomen soft and non-tender, BS active X4. Some edema to BLE, +1. sAFETY MAINTAINED WITH GRIPPER SOCKS AND PT INSTRUCTED TO CALL FOR ASSISTANCE.

## 2017-03-07 PROBLEM — L03.319 CELLULITIS AND ABSCESS OF TRUNK: Status: ACTIVE | Noted: 2017-03-07

## 2017-03-07 PROBLEM — L02.219 CELLULITIS AND ABSCESS OF TRUNK: Status: ACTIVE | Noted: 2017-03-07

## 2017-03-07 LAB — BNP SERPL-MCNC: 9 PG/ML

## 2017-03-07 PROCEDURE — 74011250636 HC RX REV CODE- 250/636: Performed by: INTERNAL MEDICINE

## 2017-03-07 PROCEDURE — 74011250637 HC RX REV CODE- 250/637: Performed by: SURGERY

## 2017-03-07 PROCEDURE — 36415 COLL VENOUS BLD VENIPUNCTURE: CPT | Performed by: NURSE PRACTITIONER

## 2017-03-07 PROCEDURE — 74011250637 HC RX REV CODE- 250/637: Performed by: INTERNAL MEDICINE

## 2017-03-07 PROCEDURE — 74011250636 HC RX REV CODE- 250/636: Performed by: NURSE PRACTITIONER

## 2017-03-07 PROCEDURE — 94760 N-INVAS EAR/PLS OXIMETRY 1: CPT

## 2017-03-07 PROCEDURE — 94640 AIRWAY INHALATION TREATMENT: CPT

## 2017-03-07 PROCEDURE — 74011000258 HC RX REV CODE- 258: Performed by: NURSE PRACTITIONER

## 2017-03-07 PROCEDURE — 83880 ASSAY OF NATRIURETIC PEPTIDE: CPT | Performed by: NURSE PRACTITIONER

## 2017-03-07 PROCEDURE — 74011250636 HC RX REV CODE- 250/636: Performed by: SURGERY

## 2017-03-07 PROCEDURE — 65270000029 HC RM PRIVATE

## 2017-03-07 PROCEDURE — 74011000258 HC RX REV CODE- 258: Performed by: SURGERY

## 2017-03-07 PROCEDURE — 74011000250 HC RX REV CODE- 250: Performed by: NURSE PRACTITIONER

## 2017-03-07 PROCEDURE — 99218 HC RM OBSERVATION: CPT

## 2017-03-07 PROCEDURE — 74011000250 HC RX REV CODE- 250: Performed by: INTERNAL MEDICINE

## 2017-03-07 PROCEDURE — 77010033678 HC OXYGEN DAILY

## 2017-03-07 RX ORDER — DOCUSATE SODIUM 100 MG/1
100 CAPSULE, LIQUID FILLED ORAL 2 TIMES DAILY
Status: DISCONTINUED | OUTPATIENT
Start: 2017-03-07 | End: 2017-03-13 | Stop reason: HOSPADM

## 2017-03-07 RX ORDER — POLYETHYLENE GLYCOL 3350 17 G/17G
17 POWDER, FOR SOLUTION ORAL DAILY
Status: DISCONTINUED | OUTPATIENT
Start: 2017-03-08 | End: 2017-03-07

## 2017-03-07 RX ORDER — FACIAL-BODY WIPES
10 EACH TOPICAL DAILY PRN
Status: DISCONTINUED | OUTPATIENT
Start: 2017-03-07 | End: 2017-03-13 | Stop reason: HOSPADM

## 2017-03-07 RX ORDER — FUROSEMIDE 10 MG/ML
40 INJECTION INTRAMUSCULAR; INTRAVENOUS ONCE
Status: COMPLETED | OUTPATIENT
Start: 2017-03-07 | End: 2017-03-07

## 2017-03-07 RX ORDER — HYDROCHLOROTHIAZIDE 25 MG/1
12.5 TABLET ORAL DAILY
Status: DISCONTINUED | OUTPATIENT
Start: 2017-03-08 | End: 2017-03-13 | Stop reason: HOSPADM

## 2017-03-07 RX ORDER — HEPARIN SODIUM 5000 [USP'U]/ML
5000 INJECTION, SOLUTION INTRAVENOUS; SUBCUTANEOUS EVERY 8 HOURS
Status: DISCONTINUED | OUTPATIENT
Start: 2017-03-07 | End: 2017-03-13 | Stop reason: HOSPADM

## 2017-03-07 RX ORDER — METOCLOPRAMIDE HYDROCHLORIDE 5 MG/ML
5 INJECTION INTRAMUSCULAR; INTRAVENOUS
Status: DISCONTINUED | OUTPATIENT
Start: 2017-03-07 | End: 2017-03-13 | Stop reason: HOSPADM

## 2017-03-07 RX ORDER — ENOXAPARIN SODIUM 100 MG/ML
40 INJECTION SUBCUTANEOUS EVERY 12 HOURS
Status: DISCONTINUED | OUTPATIENT
Start: 2017-03-07 | End: 2017-03-07 | Stop reason: ALTCHOICE

## 2017-03-07 RX ORDER — POLYETHYLENE GLYCOL 3350 17 G/17G
17 POWDER, FOR SOLUTION ORAL DAILY
Status: DISCONTINUED | OUTPATIENT
Start: 2017-03-07 | End: 2017-03-13 | Stop reason: HOSPADM

## 2017-03-07 RX ORDER — OXYCODONE HYDROCHLORIDE 15 MG/1
15 TABLET ORAL
Status: DISCONTINUED | OUTPATIENT
Start: 2017-03-07 | End: 2017-03-13 | Stop reason: HOSPADM

## 2017-03-07 RX ORDER — METOPROLOL SUCCINATE 100 MG/1
100 TABLET, EXTENDED RELEASE ORAL DAILY
Status: DISCONTINUED | OUTPATIENT
Start: 2017-03-08 | End: 2017-03-13 | Stop reason: HOSPADM

## 2017-03-07 RX ADMIN — BISACODYL 10 MG: 10 SUPPOSITORY RECTAL at 18:50

## 2017-03-07 RX ADMIN — POLYETHYLENE GLYCOL 3350 17 G: 17 POWDER, FOR SOLUTION ORAL at 17:18

## 2017-03-07 RX ADMIN — METOCLOPRAMIDE 5 MG: 5 INJECTION, SOLUTION INTRAMUSCULAR; INTRAVENOUS at 18:49

## 2017-03-07 RX ADMIN — FUROSEMIDE 40 MG: 10 INJECTION, SOLUTION INTRAMUSCULAR; INTRAVENOUS at 15:38

## 2017-03-07 RX ADMIN — ALBUTEROL SULFATE 2.5 MG: 2.5 SOLUTION RESPIRATORY (INHALATION) at 13:31

## 2017-03-07 RX ADMIN — ONDANSETRON HYDROCHLORIDE 4 MG: 2 SOLUTION INTRAMUSCULAR; INTRAVENOUS at 17:53

## 2017-03-07 RX ADMIN — ONDANSETRON HYDROCHLORIDE 4 MG: 2 SOLUTION INTRAMUSCULAR; INTRAVENOUS at 12:21

## 2017-03-07 RX ADMIN — HYDROCODONE BITARTRATE AND ACETAMINOPHEN 2 TABLET: 10; 325 TABLET ORAL at 08:37

## 2017-03-07 RX ADMIN — ANASTROZOLE 1 MG: 1 TABLET, COATED ORAL at 08:29

## 2017-03-07 RX ADMIN — SODIUM CHLORIDE 125 ML/HR: 900 INJECTION, SOLUTION INTRAVENOUS at 06:08

## 2017-03-07 RX ADMIN — FAMOTIDINE 20 MG: 20 TABLET ORAL at 17:18

## 2017-03-07 RX ADMIN — LISINOPRIL 20 MG: 20 TABLET ORAL at 08:29

## 2017-03-07 RX ADMIN — OXYCODONE HYDROCHLORIDE 5 MG: 5 TABLET ORAL at 06:15

## 2017-03-07 RX ADMIN — DIAZEPAM 5 MG: 5 TABLET ORAL at 00:26

## 2017-03-07 RX ADMIN — VANCOMYCIN HYDROCHLORIDE 2000 MG: 10 INJECTION, POWDER, LYOPHILIZED, FOR SOLUTION INTRAVENOUS at 08:39

## 2017-03-07 RX ADMIN — PIPERACILLIN SODIUM,TAZOBACTAM SODIUM 3.38 G: 3; .375 INJECTION, POWDER, FOR SOLUTION INTRAVENOUS at 13:34

## 2017-03-07 RX ADMIN — VENLAFAXINE HYDROCHLORIDE 37.5 MG: 37.5 CAPSULE, EXTENDED RELEASE ORAL at 08:29

## 2017-03-07 RX ADMIN — ALBUTEROL SULFATE 2.5 MG: 2.5 SOLUTION RESPIRATORY (INHALATION) at 07:59

## 2017-03-07 RX ADMIN — PIPERACILLIN SODIUM,TAZOBACTAM SODIUM 3.38 G: 3; .375 INJECTION, POWDER, FOR SOLUTION INTRAVENOUS at 06:09

## 2017-03-07 RX ADMIN — BUDESONIDE 500 MCG: 0.5 INHALANT RESPIRATORY (INHALATION) at 07:59

## 2017-03-07 RX ADMIN — FAMOTIDINE 20 MG: 20 TABLET ORAL at 08:29

## 2017-03-07 RX ADMIN — OXYCODONE HYDROCHLORIDE 15 MG: 15 TABLET ORAL at 13:34

## 2017-03-07 RX ADMIN — OXYCODONE HYDROCHLORIDE 5 MG: 5 TABLET ORAL at 00:27

## 2017-03-07 RX ADMIN — DIAZEPAM 5 MG: 5 TABLET ORAL at 08:37

## 2017-03-07 RX ADMIN — HEPARIN SODIUM 5000 UNITS: 5000 INJECTION, SOLUTION INTRAVENOUS; SUBCUTANEOUS at 16:01

## 2017-03-07 RX ADMIN — CLINDAMYCIN PHOSPHATE 600 MG: 150 INJECTION, SOLUTION, CONCENTRATE INTRAVENOUS at 15:39

## 2017-03-07 NOTE — PROGRESS NOTES
Pt continues to be nauseous, spoke with Eulalio Woodruff NP and received order for PRN reglan. Warning about reglan and effexor administration reviewed with Cherrie from pharmacy, advised ok to give.

## 2017-03-07 NOTE — PROGRESS NOTES
Pt complaining of abd pain and cramping, advised she hasn't had a BM in 5 days. Molly Steiner NP paged as pt is requesting biscodyl.

## 2017-03-07 NOTE — PROGRESS NOTES
Care Management Interventions  PCP Verified by CM: Yes  Mode of Transport at Discharge: Other (see comment)  Transition of Care Consult (CM Consult): Discharge Planning  Current Support Network: Lives Alone  Confirm Follow Up Transport: Other (see comment)  Plan discussed with Pt/Family/Caregiver: Yes  Freedom of Choice Offered: Yes  Discharge Location  Discharge Placement: Home with home health      Referral per MD.  D/C planning. DANIELITO spoke with pt during care rounds. Pt is A&O. PTA pt living alone. Pt receives services from [facility] ( 3 hrs a day 5 days a week ). Pt admitted with Cellulitis but has hx CHF. DANIELITO discussed Yelena Lax services may be ordered by MD at d/c and pt agreeable.

## 2017-03-07 NOTE — PROGRESS NOTES
S: Did well overnight. Pain improved. No fevers. Still has swelling and stiffness in the arm. Started Vanc/Zosyn. Blood cultures NGTD. C/o constipation. A&O x3  RRR  Resp relatively clear, some exp wheezes  Abd soft  Left chest wall with warmth, diminished induration and erythema. Has a developing furuncle in the left axilla. No palpable fluid collection. Labs:  WBC 11.1 Procalc WNL BMP WNL    Cx - NGTD x 1 day    A: Left chest wall cellulitis with small furuncle unorganized in the axilla, likely source. P:  - Continue broad spectrum iv abx de-escalate based on cultures, response  - Suppository for constipation. Start scheduled colace. - Appreciate medical management by hospitalist service.

## 2017-03-07 NOTE — CONSULTS
HOSPITALIST H&P  NAME:  Izzy Pantoja   Age:  64 y.o.  :   1955   MRN:   [id number]  PCP: Dora Herring MD  Treatment Team: Attending Provider: Ledy Hampton MD; Consulting Provider: Leesa Samano DO; Consulting Provider: Melanie Decker DO    Full code  HPI:   Ms. Susie Cramer is a 65 yo female who underwent a left mastectomy approx 1 month ago. Admitted with cellulitis of left breast.  Reports left breast has been erythematous since surgery however over the last 2 weeks the erythema progressed and her left breast became very painful. Also reports fever and chaills. Hospitalist consulted for medical management. Pt has hx of DOLORES wears CPAP, HTN, Asthma which she reports controlled rarely using rescue inhaler and diastolic HF. Last echo from [facility] everywhere notes showing  EF was 36-22%, grade 1 diastolic dysfunction, moderate concentric LVH. She was started on Vanc and zosyn on admission. Pt c/o constipation. Denies n/v/d, abd pain, CP, SOB. Results summary of Diagnostic Studies/Procedures copied from within [facility] EMR:       Complete ROS done and is as stated in HPI or otherwise negative  Past Medical History:   Diagnosis Date    Arthritis     everywhere;  DDD    Asthma     inhalers daily  [facility]    CAD (coronary artery disease)     Dr Baum Yarsani [facility])     left breast ca dx'ed this month-appt with surgeon 10/12    Chronic pain     generalized from arthritis    Complicated UTI (urinary tract infection) 2015    Diverticulosis     Heart failure ([address]     History of echocardiogram 14 at [facility]    No significant valvular disease.   EF 50-55%    History of prediabetes     monitored by Primary Physician    Hypertension     Shortness of breath     Sleep apnea     bi pap and o2    Thyroid cyst     cyst removed from thyroid      Past Surgical History:   Procedure Laterality Date    BREAST SURGERY PROCEDURE UNLISTED      Mastectomy left side    CARDIAC SURG PROCEDURE UNLIST  Cath 11/18/14 [facility]    Minimal CAD in the ostial circumflex and mid ramus (medical management)    HX CARPAL TUNNEL RELEASE Bilateral     HX CHOLECYSTECTOMY      HX CYST REMOVAL      from thyroid    HX LAP CHOLECYSTECTOMY      HX OTHER SURGICAL      abcess where bra strap    NEUROLOGICAL PROCEDURE UNLISTED      \"nerve running to back\"    SINUS SURGERY PROC UNLISTED        Prior to Admission Medications   Prescriptions Last Dose Informant Patient Reported? Taking? ALPRAZolam (XANAX) 1 mg tablet   No No   Sig: Take 0.5 Tabs by mouth two (2) times daily as needed for Anxiety for up to 30 days. Max Daily Amount: 1 mg. Indications: ANXIETY   Patient taking differently: Take 0.5 mg by mouth two (2) times daily as needed for Anxiety. Ran out of  Indications: ANXIETY   Cetirizine (ZYRTEC) 10 mg cap Unknown at Unknown time  Yes No   Sig: Take  by mouth every morning. OXYGEN-AIR DELIVERY SYSTEMS   Yes No   Sig: by Does Not Apply route. 3 lpm qhs   PROAIR HFA 90 mcg/actuation inhaler   Yes No   Sig: as needed. acetaminophen (TYLENOL EXTRA STRENGTH) 500 mg tablet 2/6/2017 at Unknown time  Yes Yes   Sig: Take  by mouth every six (6) hours as needed for Pain. albuterol (PROVENTIL VENTOLIN) 2.5 mg /3 mL (0.083 %) nebulizer solution 2/6/2017 at Unknown time  No Yes   Sig: Take 3 mL by inhalation every four (4) hours as needed for Wheezing or Shortness of Breath. Patient taking differently: Take  by inhalation every four (4) hours as needed for Wheezing or Shortness of Breath. anastrozole (ARIMIDEX) 1 mg tablet Not Taking at Unknown time  No No   Sig: Take 1 Tab by mouth daily. cefUROXime (CEFTIN) 250 mg tablet Unknown at Unknown time  No No   Sig: Take 1 Tab by mouth two (2) times a day. cephALEXin (KEFLEX) 500 mg capsule Unknown at Unknown time  Yes No   Sig: Take 500 mg by mouth four (4) times daily.    cpap machine kit 3/6/2017 at Unknown time  Yes Yes   Sig: by Does Not Apply route.   diazePAM (VALIUM) 5 mg tablet 3/6/2017 at                                                                                                                                   Yes Yes   Sig: Take 5 mg by mouth every six (6) hours as needed for Anxiety. fluticasone (FLONASE) 50 mcg/actuation nasal spray 2017 at Unknown time  No Yes   Si Sprays by Both Nostrils route daily. fluticasone-salmeterol (ADVAIR DISKUS) 250-50 mcg/dose diskus inhaler   No Yes   Sig: Take 1 Puff by inhalation two (2) times a day. ibuprofen (MOTRIN) 800 mg tablet 3/6/2017 at                                                                                                                                   Yes Yes   Sig: Take  by mouth two (2) times a day. Last taken 16,   Does not always take 2 a day   lisinopril-hydroCHLOROthiazide (PRINZIDE, ZESTORETIC) 20-12.5 mg per tablet 3/5/2017 at Unknown time  No Yes   Sig: TAKE 1 TABLET BY MOUTH DAILY   metoprolol succinate (TOPROL-XL) 100 mg tablet 3/5/2017 at Unknown time  No Yes   Sig: Take 1 Tab by mouth daily. Patient taking differently: Take 100 mg by mouth two (2) times a day. montelukast (SINGULAIR) 10 mg tablet 2017 at Unknown time  No Yes   Sig: Take 1 Tab by mouth nightly. nitroglycerin (NITROSTAT) 0.4 mg SL tablet 2017 at Unknown time  Yes Yes   Sig: by SubLINGual route every five (5) minutes as needed for Chest Pain. oxyCODONE (OXYIR) 5 mg capsule 3/6/2017 at Unknown time  Yes Yes   Sig: Take 5 mg by mouth every four (4) hours as needed. oxyCODONE-acetaminophen (PERCOCET)  mg per tablet   No Yes   Sig: Take 1 Tab by mouth every six (6) hours as needed for Pain. Max Daily Amount: 4 Tabs. predniSONE (DELTASONE) 20 mg tablet   No Yes   Si tabs daily x 5 days, 1 tab daily x 2 days, and then stop   promethazine (PHENERGAN) 25 mg tablet   Yes Yes   Sig: Take 25 mg by mouth every six (6) hours as needed for Nausea.    ranitidine (ZANTAC) 150 mg tablet 3/6/2017 at Unknown time  No Yes   Sig: Take 1 Tab by mouth two (2) times a day. Indications: GASTROESOPHAGEAL REFLUX   venlafaxine-SR (EFFEXOR-XR) 37.5 mg capsule   No Yes   Sig: Take 1 capsule by mouth for 1 week and then increase to 75 mg (2 capsules) after 1 week. Facility-Administered Medications: None     Allergies   Allergen Reactions    Bactrim [Sulfamethoprim Ds] Rash     Pt gets a yeast infection on body       Social History   Substance Use Topics    Smoking status: Never Smoker    Smokeless tobacco: Never Used    Alcohol use No      Family History   Problem Relation Age of Onset    Heart Disease Mother     Cancer Mother      lung    Hypertension Mother     Hypertension Father     Sleep Apnea Father     Cancer Father      prostate    Sleep Apnea Brother      states also has two children with sleep apnea    Cancer Brother      pituitary    Hypertension Brother     Breast Cancer Neg Hx           Objective:     Visit Vitals    /65 (BP 1 Location: Right arm, BP Patient Position: At rest)    Pulse 100    Temp 98.2 °F (36.8 °C)    Resp 18    Ht 5' 2.5\" (1.588 m)    Wt 130.1 kg (286 lb 12.8 oz)    SpO2 98%    Breastfeeding No    BMI 51.62 kg/m2      Temp (24hrs), Av.6 °F (37 °C), Min:97.8 °F (36.6 °C), Max:99.9 °F (37.7 °C)    Oxygen Therapy  O2 Sat (%): 98 % (17 1430)  Pulse via Oximetry: 107 beats per minute (17 1331)  O2 Device: Room air (17 1331)  O2 Flow Rate (L/min): 0 l/min (17 1331)  FIO2 (%): 21 % (17 1331)  Physical Exam:  General:    Alert, cooperative, no distress, appears stated age. Head:   Normocephalic, without obvious abnormality, atraumatic. Nose:  Nares normal. No drainage or sinus tenderness. Lungs:   CTA, resp even and nonlabored  Heart:  S1S2 present without murmurs rubs gallops. RRR. 3+ bilateral pedal edema  Abdomen:   Soft, non-tender. Not distended. Bowel sounds normal.   Extremities: No cyanosis.   Moves ext well  Skin:     Skin to left mastectomy site with erythema, warmth. Neurologic: No focal deficits, A/O X3    Data Review:   Recent Results (from the past 24 hour(s))   CULTURE, BLOOD    Collection Time: 03/06/17  7:35 PM   Result Value Ref Range    Special Requests: LEFT ANTECUBITAL      Culture result: NO GROWTH AFTER 18 HOURS     METABOLIC PANEL, BASIC    Collection Time: 03/06/17  7:35 PM   Result Value Ref Range    Sodium 140 136 - 145 mmol/L    Potassium 3.8 3.5 - 5.1 mmol/L    Chloride 103 98 - 107 mmol/L    CO2 29 21 - 32 mmol/L    Anion gap 8 7 - 16 mmol/L    Glucose 123 (H) 65 - 100 mg/dL    BUN 14 8 - 23 MG/DL    Creatinine 1.13 (H) 0.6 - 1.0 MG/DL    GFR est AA >60 >60 ml/min/1.73m2    GFR est non-AA 52 (L) >60 ml/min/1.73m2    Calcium 9.2 8.3 - 10.4 MG/DL   CBC WITH AUTOMATED DIFF    Collection Time: 03/06/17  7:35 PM   Result Value Ref Range    WBC 11.1 4.3 - 11.1 K/uL    RBC 4.52 4.05 - 5.25 M/uL    HGB 12.1 11.7 - 15.4 g/dL    HCT 37.5 35.8 - 46.3 %    MCV 83.0 79.6 - 97.8 FL    MCH 26.8 26.1 - 32.9 PG    MCHC 32.3 31.4 - 35.0 g/dL    RDW 13.2 11.9 - 14.6 %    PLATELET 551 (H) 287 - 450 K/uL    MPV 9.0 (L) 10.8 - 14.1 FL    DF AUTOMATED      NEUTROPHILS 66 43 - 78 %    LYMPHOCYTES 24 13 - 44 %    MONOCYTES 6 4.0 - 12.0 %    EOSINOPHILS 3 0.5 - 7.8 %    BASOPHILS 1 0.0 - 2.0 %    IMMATURE GRANULOCYTES 0.3 0.0 - 5.0 %    ABS. NEUTROPHILS 7.3 1.7 - 8.2 K/UL    ABS. LYMPHOCYTES 2.7 0.5 - 4.6 K/UL    ABS. MONOCYTES 0.7 0.1 - 1.3 K/UL    ABS. EOSINOPHILS 0.4 0.0 - 0.8 K/UL    ABS. BASOPHILS 0.1 0.0 - 0.2 K/UL    ABS. IMM.  GRANS. 0.0 0.0 - 0.5 K/UL   PROCALCITONIN    Collection Time: 03/06/17  7:35 PM   Result Value Ref Range    Procalcitonin <0.1 ng/mL   CULTURE, BLOOD    Collection Time: 03/06/17  7:43 PM   Result Value Ref Range    Special Requests: RIGHT ANTECUBITAL      Culture result: NO GROWTH AFTER 19 HOURS     LACTIC ACID, PLASMA    Collection Time: 03/06/17 11:05 PM   Result Value Ref Range Lactic acid 0.6 0.4 - 2.0 MMOL/L       Assessment and Plan: Active Hospital Problems    Diagnosis Date Noted    Cellulitis and abscess of trunk 03/07/2017    Mild persistent asthma without complication 99/49/1550    Morbid obesity with BMI of 50.0-59.9, adult ([facility] 11/05/2015    Essential hypertension 09/28/2015    Chronic diastolic heart failure ([facility] 09/09/2015    DOLORES (obstructive sleep apnea) 07/12/2013     HTN: chronic, controlled, continue home regimen    Chronic diastolic HF: check BNP,  Lasix 40mg IV X1, strict I/O, continue coreg, restart metoprolol, lisinopril/hctz. Stop IVF    Asthma: controlled, continue current regimen    DOLORES: has home CPAP at bedside, use for naps and sleep at night      Time spent with pt 30 min  Notes, labs, VS, diagnostic testing reviewed  Case discussed with pt, care team, Dr. Saucedo  Thank you for this consult. We will follow along with you.     Jasmina Shabazz NP

## 2017-03-07 NOTE — CONSULTS
History and Physical    Subjective:     Marylou Knox is a 64 y.o.  female who presents with cellulitis of the breast. She is s/p left masctectomy. Has had fever and chills for the last 2 weeks. Pain in breast area. Hospitalist consulted for \"other\". Abx ordered by primary. Past Medical History:   Diagnosis Date    Arthritis     everywhere;  DDD    Asthma     inhalers daily  [facility]    CAD (coronary artery disease)     Dr [facility])     left breast ca dx'ed this month-appt with surgeon 10/12    Chronic pain     generalized from arthritis    Complicated UTI (urinary tract infection) 12/8/2015    Diverticulosis     Heart failure ([facility]     History of echocardiogram 11/17/14 at [address]    No significant valvular disease.   EF 50-55%    History of prediabetes     monitored by Primary Physician    Hypertension     Shortness of breath     Sleep apnea     bi pap and o2    Thyroid cyst     cyst removed from thyroid      Past Surgical History:   Procedure Laterality Date    BREAST SURGERY PROCEDURE UNLISTED      Mastectomy left side    CARDIAC SURG PROCEDURE UNLIST  [facility]    Minimal CAD in the ostial circumflex and mid ramus (medical management)    HX CARPAL TUNNEL RELEASE Bilateral     HX CHOLECYSTECTOMY      HX CYST REMOVAL      from thyroid    HX LAP CHOLECYSTECTOMY      HX OTHER SURGICAL      abcess where bra strap    NEUROLOGICAL PROCEDURE UNLISTED      \"nerve running to back\"    SINUS SURGERY PROC UNLISTED       Family History   Problem Relation Age of Onset    Heart Disease Mother     Cancer Mother      lung    Hypertension Mother     Hypertension Father     Sleep Apnea Father     Cancer Father      prostate    Sleep Apnea Brother      states also has two children with sleep apnea    Cancer Brother      pituitary    Hypertension Brother     Breast Cancer Neg Hx       Social History   Substance Use Topics    Smoking status: Never Smoker    Smokeless tobacco: Never Used    Alcohol use No       Prior to Admission medications    Medication Sig Start Date End Date Taking? Authorizing Provider   oxyCODONE-acetaminophen (PERCOCET)  mg per tablet Take 1 Tab by mouth every six (6) hours as needed for Pain. Max Daily Amount: 4 Tabs. 3/1/17  Yes Manuel Rubi III, MD   oxyCODONE (OXYIR) 5 mg capsule Take 5 mg by mouth every four (4) hours as needed. Yes Historical Provider   diazePAM (VALIUM) 5 mg tablet Take 5 mg by mouth every six (6) hours as needed for Anxiety. Yes Historical Provider   promethazine (PHENERGAN) 25 mg tablet Take 25 mg by mouth every six (6) hours as needed for Nausea. Yes Historical Provider   venlafaxine-SR [facility] 37.5 mg capsule Take 1 capsule by mouth for 1 week and then increase to 75 mg (2 capsules) after 1 week. 2/7/17  Yes Jason Sharma MD   lisinopril-hydroCHLOROthiazide (PRINZIDE, ZESTORETIC) 20-12.5 mg per tablet TAKE 1 TABLET BY MOUTH DAILY 2/2/17  Yes Moody Rojas MD   predniSONE (DELTASONE) 20 mg tablet 2 tabs daily x 5 days, 1 tab daily x 2 days, and then stop 1/11/17  Yes Laureen Longoria NP   albuterol (PROVENTIL VENTOLIN) 2.5 mg /3 mL (0.083 %) nebulizer solution Take 3 mL by inhalation every four (4) hours as needed for Wheezing or Shortness of Breath. Patient taking differently: Take  by inhalation every four (4) hours as needed for Wheezing or Shortness of Breath. 12/27/16  Yes Patricia Sofia NP   metoprolol succinate (TOPROL-XL) 100 mg tablet Take 1 Tab by mouth daily. Patient taking differently: Take 100 mg by mouth two (2) times a day. 10/21/16  Yes Vu Barnett MD   fluticasone [facility]) 50 mcg/actuation nasal spray 2 Sprays by Both Nostrils route daily. 10/11/16  Yes Moody Rojas MD   fluticasone-salmeterol (ADVAIR DISKUS) 250-50 mcg/dose diskus inhaler Take 1 Puff by inhalation two (2) times a day.  10/10/16  Yes Moody Rojas MD   ibuprofen (MOTRIN) 800 mg tablet Take  by mouth two (2) times a day. Last taken 9/28/16,   Does not always take 2 a day   Yes Historical Provider   acetaminophen (TYLENOL EXTRA STRENGTH) 500 mg tablet Take  by mouth every six (6) hours as needed for Pain. Yes Historical Provider   montelukast (SINGULAIR) 10 mg tablet Take 1 Tab by mouth nightly. 8/16/16  Yes Krish Tirado NP   cpap machine kit by Does Not Apply route. Yes Historical Provider   ranitidine (ZANTAC) 150 mg tablet Take 1 Tab by mouth two (2) times a day. Indications: GASTROESOPHAGEAL REFLUX 7/1/16  Yes Dolores Olmstead MD   nitroglycerin (NITROSTAT) 0.4 mg SL tablet by SubLINGual route every five (5) minutes as needed for Chest Pain. Yes Historical Provider   cephALEXin (KEFLEX) 500 mg capsule Take 500 mg by mouth four (4) times daily. Historical Provider   anastrozole (ARIMIDEX) 1 mg tablet Take 1 Tab by mouth daily. 2/7/17   Parag Acharya MD   cefUROXime (CEFTIN) 250 mg tablet Take 1 Tab by mouth two (2) times a day. 1/11/17   Krish Tirado NP   Cetirizine (ZYRTEC) 10 mg cap Take  by mouth every morning. Historical Provider   OXYGEN-AIR DELIVERY SYSTEMS by Does Not Apply route. 3 lpm qhs    Historical Provider   PROAIR HFA 90 mcg/actuation inhaler as needed. 7/7/16   Historical Provider   ALPRAZolam Brian Brisk) 1 mg tablet Take 0.5 Tabs by mouth two (2) times daily as needed for Anxiety for up to 30 days. Max Daily Amount: 1 mg. Indications: ANXIETY  Patient taking differently: Take 0.5 mg by mouth two (2) times daily as needed for Anxiety. Ran out of  Indications: ANXIETY 11/13/15 12/13/15  Dolores Olmstead MD     Allergies   Allergen Reactions    Bactrim [Sulfamethoprim Ds] Rash     Pt gets a yeast infection on body         Review of Systems:  A comprehensive review of systems was negative except for that written in the History of Present Illness. Objective:      Intake and Output:            Physical Exam:   Visit Vitals    BP (!) 142/92 (BP 1 Location: Left arm, BP Patient Position: At rest)  Comment: primary RN notified    Pulse (!) 116  Comment: Primary RN notified    Temp 99.9 °F (37.7 °C)    Resp 23    Ht 5' 2.5\" (1.588 m)    Wt 130.1 kg (286 lb 12.8 oz)    SpO2 95%    Breastfeeding No    BMI 51.62 kg/m2     General appearance: alert, cooperative, no distress, appears stated age, morbidly obese  Lungs: clear to auscultation bilaterally  Heart: regular rate and rhythm, S1, S2 normal, no murmur, click, rub or gallop  Neurologic: Grossly normal        Data Review:   Recent Results (from the past 24 hour(s))   METABOLIC PANEL, BASIC    Collection Time: 03/06/17  7:35 PM   Result Value Ref Range    Sodium 140 136 - 145 mmol/L    Potassium 3.8 3.5 - 5.1 mmol/L    Chloride 103 98 - 107 mmol/L    CO2 29 21 - 32 mmol/L    Anion gap 8 7 - 16 mmol/L    Glucose 123 (H) 65 - 100 mg/dL    BUN 14 8 - 23 MG/DL    Creatinine 1.13 (H) 0.6 - 1.0 MG/DL    GFR est AA >60 >60 ml/min/1.73m2    GFR est non-AA 52 (L) >60 ml/min/1.73m2    Calcium 9.2 8.3 - 10.4 MG/DL   CBC WITH AUTOMATED DIFF    Collection Time: 03/06/17  7:35 PM   Result Value Ref Range    WBC 11.1 4.3 - 11.1 K/uL    RBC 4.52 4.05 - 5.25 M/uL    HGB 12.1 11.7 - 15.4 g/dL    HCT 37.5 35.8 - 46.3 %    MCV 83.0 79.6 - 97.8 FL    MCH 26.8 26.1 - 32.9 PG    MCHC 32.3 31.4 - 35.0 g/dL    RDW 13.2 11.9 - 14.6 %    PLATELET 937 (H) 956 - 450 K/uL    MPV 9.0 (L) 10.8 - 14.1 FL    DF AUTOMATED      NEUTROPHILS 66 43 - 78 %    LYMPHOCYTES 24 13 - 44 %    MONOCYTES 6 4.0 - 12.0 %    EOSINOPHILS 3 0.5 - 7.8 %    BASOPHILS 1 0.0 - 2.0 %    IMMATURE GRANULOCYTES 0.3 0.0 - 5.0 %    ABS. NEUTROPHILS 7.3 1.7 - 8.2 K/UL    ABS. LYMPHOCYTES 2.7 0.5 - 4.6 K/UL    ABS. MONOCYTES 0.7 0.1 - 1.3 K/UL    ABS. EOSINOPHILS 0.4 0.0 - 0.8 K/UL    ABS. BASOPHILS 0.1 0.0 - 0.2 K/UL    ABS. IMM.  GRANS. 0.0 0.0 - 0.5 K/UL           Assessment:     Active Problems:    Chronic diastolic [facility] failure ([facility] (9/9/2015)      DOLORES (obstructive sleep apnea) (7/12/2013)      Essential hypertension (9/28/2015)      Morbid obesity with BMI of 50.0-59.9, adult ([facility] (11/5/2015)      Mild persistent asthma without complication (9/14/7780)        Plan:     Resume home meds. Abx per primary team. Will sign off.      Signed By: Alecia Ogden DO     March 6, 2017

## 2017-03-07 NOTE — PROGRESS NOTES
Shift assessment completed via flowsheet. Pt A/O x4. Able to voice needs. Respiration even and unlabored with no SOB noted. Lungs sounds clear bilaterally. HR regular with S1, S2 noted on auscultation. Abd soft with active BS on all four quadrants. C/o pain on L breast and med given. Pt has pustule under L ampit and also has swollen on BLE. Pt currently in bed with family member at bedside, bed low locked position, side rails up x3, call light within reach and instructed to call for help.

## 2017-03-07 NOTE — H&P
CC: Left chest wall cellulitis  HPI: 65 y/o s/p Left mastectomy and ALND January 2017 presented to clinic today with complaints of fever, chills, increased redness left chest wall. Associated malaise and increased pain. Past Medical History:   Diagnosis Date    Arthritis     everywhere;  DDD    Asthma     inhalers daily  [facility]    CAD (coronary artery disease)     Dr Manny Damon [facility])     left breast ca dx'ed this month-appt with surgeon 10/12    Chronic pain     generalized from arthritis    Complicated UTI (urinary tract infection) 12/8/2015    Diverticulosis     Heart failure ([address]     [facility]    No significant valvular disease. EF 50-55%    History of prediabetes     monitored by Primary Physician    Hypertension     Shortness of breath     Sleep apnea     bi pap and o2    Thyroid cyst     cyst removed from thyroid     Past Surgical History:   Procedure Laterality Date    BREAST SURGERY PROCEDURE UNLISTED      Mastectomy left side    CARDIAC SURG PROCEDURE UNLIST  [facility]    Minimal CAD in the ostial circumflex and mid ramus (medical management)    HX CARPAL TUNNEL RELEASE Bilateral     HX CHOLECYSTECTOMY      HX CYST REMOVAL      from thyroid    HX LAP CHOLECYSTECTOMY      HX OTHER SURGICAL      abcess where bra strap    NEUROLOGICAL PROCEDURE UNLISTED      \"nerve running to back\"    SINUS SURGERY PROC UNLISTED         ROS:  CONSTITUTIONAL:  No weight loss, fever, chills, weakness or fatigue. HEENT:  Eyes:  No visual loss, blurred vision, double vision or yellow sclerae. Ears, Nose, Throat:  No hearing loss, sneezing, congestion, runny nose or sore throat. SKIN:  No rash or itching. CARDIOVASCULAR:  - Chest pain apart from left chest wall pain  RESPIRATORY:  + SOB - baseline  GASTROINTESTINAL:  No anorexia, nausea, vomiting or diarrhea. No abdominal pain or blood.   GENITOURINARY:  No burning on urination. NEUROLOGICAL:  No headache, dizziness, syncope, paralysis, ataxia, numbness or tingling in the extremities. No change in bowel or bladder control. MUSCULOSKELETAL:  No muscle, back pain, joint pain or stiffness. HEMATOLOGIC:  No anemia, bleeding or bruising. LYMPHATICS:  No enlarged nodes. No history of splenectomy. PSYCHIATRIC:  No history of depression or anxiety. ENDOCRINOLOGIC:  No reports of sweating, cold or heat intolerance. No polyuria or polydipsia. ALLERGIES:  No history of asthma, hives, eczema or rhinitis. PE:  Visit Vitals    /69 (BP 1 Location: Right arm, BP Patient Position: At rest)    Pulse 100    Temp 98.6 °F (37 °C)    Resp 18    Ht 5' 2.5\" (1.588 m)    Wt 130.1 kg (286 lb 12.8 oz)    SpO2 98%    Breastfeeding No    BMI 51.62 kg/m2     A&O x3  RRR  Resp Clear  Abd Soft  Left chest wall with cellulitis. No palpable fluid but exam limited by habitus. No crepitance. Mild edema left arm    Assessment: Left chest wall cellulitis    Plan:  Admit for pain control, broad spectrum abx, will check cultures, CBC, BMP, procalcitonin. Consult Medicine for COPD, CHF, DM mgmt.

## 2017-03-07 NOTE — PROGRESS NOTES
Problem: Interdisciplinary Rounds  Goal: Interdisciplinary Rounds  Interdisciplinary team rounds were held 3/7/2017 with the following team members:Care Management, Nursing, Nurse Practitioner, Nutrition, Pastoral Care and Pharmacy and the patient. Plan of care discussed. See clinical pathway and/or care plan for interventions and desired outcomes.

## 2017-03-07 NOTE — PROGRESS NOTES
Pharmacy Note:    Spoke with pt during interdisciplinary rounds this morning. Pt currently receiving Oxycodone and Norco as needed for pain. Pt had no complaints of uncontrolled pain or nausea at that time. Pt voiced concern about continuation of home meds (Hctz and Toprol XL). Spoke with Marcelle Cogan, GARRETT and let her know pt concerns.      Thank you,    Meryle Oyster, PharmD

## 2017-03-08 PROCEDURE — 74011250637 HC RX REV CODE- 250/637: Performed by: SURGERY

## 2017-03-08 PROCEDURE — 74011000258 HC RX REV CODE- 258: Performed by: NURSE PRACTITIONER

## 2017-03-08 PROCEDURE — 74011250636 HC RX REV CODE- 250/636: Performed by: INTERNAL MEDICINE

## 2017-03-08 PROCEDURE — 74011000250 HC RX REV CODE- 250: Performed by: NURSE PRACTITIONER

## 2017-03-08 PROCEDURE — 74011250637 HC RX REV CODE- 250/637: Performed by: INTERNAL MEDICINE

## 2017-03-08 PROCEDURE — 65270000029 HC RM PRIVATE

## 2017-03-08 PROCEDURE — 74011250636 HC RX REV CODE- 250/636: Performed by: NURSE PRACTITIONER

## 2017-03-08 PROCEDURE — 74011250637 HC RX REV CODE- 250/637: Performed by: NURSE PRACTITIONER

## 2017-03-08 RX ORDER — ALBUTEROL SULFATE 0.83 MG/ML
2.5 SOLUTION RESPIRATORY (INHALATION)
Status: DISCONTINUED | OUTPATIENT
Start: 2017-03-08 | End: 2017-03-13 | Stop reason: HOSPADM

## 2017-03-08 RX ORDER — IBUPROFEN 600 MG/1
600 TABLET ORAL
Status: DISCONTINUED | OUTPATIENT
Start: 2017-03-08 | End: 2017-03-13 | Stop reason: HOSPADM

## 2017-03-08 RX ORDER — HYDROCODONE BITARTRATE AND ACETAMINOPHEN 10; 325 MG/1; MG/1
1 TABLET ORAL
Status: DISCONTINUED | OUTPATIENT
Start: 2017-03-08 | End: 2017-03-13 | Stop reason: HOSPADM

## 2017-03-08 RX ADMIN — CLINDAMYCIN PHOSPHATE 600 MG: 150 INJECTION, SOLUTION, CONCENTRATE INTRAVENOUS at 08:03

## 2017-03-08 RX ADMIN — MONTELUKAST SODIUM 10 MG: 10 TABLET, FILM COATED ORAL at 01:05

## 2017-03-08 RX ADMIN — FAMOTIDINE 20 MG: 20 TABLET ORAL at 08:45

## 2017-03-08 RX ADMIN — DOCUSATE SODIUM 100 MG: 100 CAPSULE, LIQUID FILLED ORAL at 08:46

## 2017-03-08 RX ADMIN — ONDANSETRON HYDROCHLORIDE 4 MG: 2 SOLUTION INTRAMUSCULAR; INTRAVENOUS at 21:50

## 2017-03-08 RX ADMIN — METOCLOPRAMIDE 5 MG: 5 INJECTION, SOLUTION INTRAMUSCULAR; INTRAVENOUS at 01:06

## 2017-03-08 RX ADMIN — METOPROLOL SUCCINATE 100 MG: 100 TABLET, EXTENDED RELEASE ORAL at 08:45

## 2017-03-08 RX ADMIN — ONDANSETRON HYDROCHLORIDE 4 MG: 2 SOLUTION INTRAMUSCULAR; INTRAVENOUS at 08:06

## 2017-03-08 RX ADMIN — OXYCODONE HYDROCHLORIDE 15 MG: 15 TABLET ORAL at 22:02

## 2017-03-08 RX ADMIN — ANASTROZOLE 1 MG: 1 TABLET, COATED ORAL at 07:36

## 2017-03-08 RX ADMIN — METOCLOPRAMIDE 5 MG: 5 INJECTION, SOLUTION INTRAMUSCULAR; INTRAVENOUS at 22:02

## 2017-03-08 RX ADMIN — HYDROCODONE BITARTRATE AND ACETAMINOPHEN 2 TABLET: 10; 325 TABLET ORAL at 01:16

## 2017-03-08 RX ADMIN — HEPARIN SODIUM 5000 UNITS: 5000 INJECTION, SOLUTION INTRAVENOUS; SUBCUTANEOUS at 23:34

## 2017-03-08 RX ADMIN — HEPARIN SODIUM 5000 UNITS: 5000 INJECTION, SOLUTION INTRAVENOUS; SUBCUTANEOUS at 08:00

## 2017-03-08 RX ADMIN — DOCUSATE SODIUM 100 MG: 100 CAPSULE, LIQUID FILLED ORAL at 17:27

## 2017-03-08 RX ADMIN — IBUPROFEN 600 MG: 600 TABLET, FILM COATED ORAL at 14:11

## 2017-03-08 RX ADMIN — MONTELUKAST SODIUM 10 MG: 10 TABLET, FILM COATED ORAL at 21:51

## 2017-03-08 RX ADMIN — HYDROCODONE BITARTRATE AND ACETAMINOPHEN 1 TABLET: 325; 10 TABLET ORAL at 15:07

## 2017-03-08 RX ADMIN — CLINDAMYCIN PHOSPHATE 600 MG: 150 INJECTION, SOLUTION, CONCENTRATE INTRAVENOUS at 15:07

## 2017-03-08 RX ADMIN — ONDANSETRON HYDROCHLORIDE 4 MG: 2 SOLUTION INTRAMUSCULAR; INTRAVENOUS at 01:16

## 2017-03-08 RX ADMIN — FAMOTIDINE 20 MG: 20 TABLET ORAL at 17:27

## 2017-03-08 RX ADMIN — CLINDAMYCIN PHOSPHATE 600 MG: 150 INJECTION, SOLUTION, CONCENTRATE INTRAVENOUS at 01:05

## 2017-03-08 RX ADMIN — HEPARIN SODIUM 5000 UNITS: 5000 INJECTION, SOLUTION INTRAVENOUS; SUBCUTANEOUS at 15:08

## 2017-03-08 RX ADMIN — ONDANSETRON HYDROCHLORIDE 4 MG: 2 SOLUTION INTRAMUSCULAR; INTRAVENOUS at 13:49

## 2017-03-08 RX ADMIN — POLYETHYLENE GLYCOL 3350 17 G: 17 POWDER, FOR SOLUTION ORAL at 09:00

## 2017-03-08 RX ADMIN — VENLAFAXINE HYDROCHLORIDE 37.5 MG: 37.5 CAPSULE, EXTENDED RELEASE ORAL at 08:06

## 2017-03-08 RX ADMIN — CLINDAMYCIN PHOSPHATE 600 MG: 150 INJECTION, SOLUTION, CONCENTRATE INTRAVENOUS at 23:31

## 2017-03-08 RX ADMIN — HYDROCODONE BITARTRATE AND ACETAMINOPHEN 2 TABLET: 10; 325 TABLET ORAL at 08:43

## 2017-03-08 RX ADMIN — LISINOPRIL 20 MG: 20 TABLET ORAL at 08:44

## 2017-03-08 RX ADMIN — HEPARIN SODIUM 5000 UNITS: 5000 INJECTION, SOLUTION INTRAVENOUS; SUBCUTANEOUS at 01:05

## 2017-03-08 RX ADMIN — HYDROCHLOROTHIAZIDE 12.5 MG: 25 TABLET ORAL at 08:46

## 2017-03-08 NOTE — PROGRESS NOTES
Hospitalist Progress Note    3/8/2017  Admit Date: 3/6/2017  5:40 PM   NAME: Diane Perry   :  1955   MRN:  [id number]   Attending: Shilpa Yee MD  PCP:  Cassandra Valladares MD  Treatment Team: Attending Provider: Shilpa Yee MD; Consulting Provider: Cayetano Ahumada, DO; Consulting Provider: Marta Carrasco DO    Full Code  SUBJECTIVE:   Ms. Kirstin Murphy is a 65 yo female who underwent a left mastectomy approx 1 month ago. Admitted with cellulitis of left breast. Reports left breast has been erythematous since surgery however over the last 2 weeks the erythema progressed and her left breast became very painful. Also reports fever and chaills. Hospitalist consulted for medical management. Pt has hx of DOLORES wears CPAP, HTN, Asthma which she reports controlled rarely using rescue inhaler and diastolic HF. Last echo from care everywhere notes showing  EF was 13-54%, grade 1 diastolic dysfunction, moderate concentric LVH. She was started on Vanc and zosyn on admission changed to clindamycin 3/7/17. C/o bilateral LE edema yesterday, received one dose of Lasix IV with improvement of edema. Pt c/o constipation. Denies n/v/d, abd pain, CP, SOB.        Past Medical History:   Diagnosis Date    Arthritis     everywhere;  DDD    Asthma     inhalers daily  [facility]    CAD (coronary artery disease)     Dr Derrek Doe [facility])     left breast ca dx'ed this month-appt with surgeon 10/12    Chronic pain     generalized from arthritis    Complicated UTI (urinary tract infection) 2015    Diverticulosis     Heart failure ([address]     [facility] of [facility] 14 [facility]    No significant valvular disease.   EF 50-55%    History of prediabetes     monitored by Primary Physician    Hypertension     Shortness of breath     Sleep apnea     bi pap and o2    Thyroid cyst     cyst removed from thyroid     Recent Results (from the past 24 hour(s))   BNP    Collection Time: 17  5:09 PM   Result Value Ref Range    BNP 9 pg/mL     Allergies   Allergen Reactions    Bactrim [Sulfamethoprim Ds] Rash     Pt gets a yeast infection on body      Current Facility-Administered Medications   Medication Dose Route Frequency Provider Last Rate Last Dose    albuterol (PROVENTIL VENTOLIN) nebulizer solution 2.5 mg  2.5 mg Nebulization Q6H PRN Sekou Keita DO        oxyCODONE IR (OXY-IR) immediate release tablet 15 mg  15 mg Oral Q6H PRN Devante Moore MD   15 mg at 03/07/17 1334    clindamycin phosphate (CLEOCIN) 600 mg in 0.9% sodium chloride (MBP/ADV) 100 mL ADV  600 mg IntraVENous Q8H Ana Hawk  mL/hr at 03/08/17 0803 600 mg at 03/08/17 0803    metoprolol succinate (TOPROL-XL) tablet 100 mg  100 mg Oral DAILY Ana Hawk NP   100 mg at 03/08/17 0845    hydroCHLOROthiazide (HYDRODIURIL) tablet 12.5 mg  12.5 mg Oral DAILY Ana Hawk NP   12.5 mg at 03/08/17 0846    heparin (porcine) injection 5,000 Units  5,000 Units SubCUTAneous Q8H Sawyer Greene, DO   5,000 Units at 03/08/17 0800    polyethylene glycol (MIRALAX) packet 17 g  17 g Oral DAILY [facility], DO   17 g at 03/08/17 0900    docusate sodium (COLACE) capsule 100 mg  100 mg Oral BID Maggi Mixon MD   100 mg at 03/08/17 0846    bisacodyl (DULCOLAX) suppository 10 mg  10 mg Rectal DAILY PRN Devante Moore MD   10 mg at 03/07/17 1850    metoclopramide HCl (REGLAN) injection 5 mg  5 mg IntraVENous Q6H PRN Ana Hawk NP   5 mg at 03/08/17 0106    influenza vaccine 2016-17 (36mos+)(PF) (FLUZONE/FLUARIX/FLULAVAL QUAD) injection 0.5 mL  0.5 mL IntraMUSCular PRIOR TO DISCHARGE Devante Moore MD        HYDROcodone-acetaminophen [facility])  mg tablet 2 Tab  2 Tab Oral Q4H PRN Devante Moore MD   2 Tab at 03/08/17 8355    anastrozole (ARIMIDEX) tablet 1 mg  1 mg Oral DAILY Sekou Keita DO   1 mg at 03/08/17 0736    diazePAM (VALIUM) tablet 5 mg  5 mg Oral Q6H PRN Sekou Keita DO 5 mg at 17 0837    venlafaxine-SR (EFFEXOR-XR) capsule 37.5 mg  37.5 mg Oral DAILY WITH BREAKFAST Bre Spring, DO   37.5 mg at 17 1708    lisinopril (PRINIVIL, ZESTRIL) tablet 20 mg  20 mg Oral DAILY Bre Spring, DO   20 mg at 17 0844    montelukast (SINGULAIR) tablet 10 mg  10 mg Oral QHS Bre Spring, DO   10 mg at 17 0105    famotidine (PEPCID) tablet 20 mg  20 mg Oral BID Bre Spring, DO   20 mg at 17 0845    ondansetron (ZOFRAN) injection 4 mg  4 mg IntraVENous Q4H PRN Bre Spring, DO   4 mg at 17 2697       Review of Systems negative with exception of pertinent positives noted above  PHYSICAL EXAM     Visit Vitals    /71 (BP 1 Location: Right arm, BP Patient Position: At rest)    Pulse 99    Temp 97.8 °F (36.6 °C)    Resp 18    Ht 5' 2.5\" (1.588 m)    Wt 132.6 kg (292 lb 6.4 oz)    SpO2 94%    Breastfeeding No    BMI 52.63 kg/m2      Temp (24hrs), Av.9 °F (36.6 °C), Min:97.2 °F (36.2 °C), Max:98.6 °F (37 °C)    Oxygen Therapy  O2 Sat (%): 94 % (17 0732)  Pulse via Oximetry: 107 beats per minute (17 1331)  O2 Device: CPAP mask (17 0240)  O2 Flow Rate (L/min): 0 l/min (17 1331)  FIO2 (%): 21 % (17 1331)    Intake/Output Summary (Last 24 hours) at 17 1130  Last data filed at 17 0122   Gross per 24 hour   Intake              120 ml   Output             1250 ml   Net            -1130 ml      General:   Alert, cooperative, no distress, appears stated age. Head:   Normocephalic, without obvious abnormality, atraumatic. Nose:  Nares normal. No drainage or sinus tenderness. Lungs:  CTA, resp even and nonlabored  Heart:  S1S2 present without murmurs rubs gallops. RRR. 1+ bilateral pedal edema  Abdomen:  Soft, non-tender. Not distended. Bowel sounds normal.   Extremities: No cyanosis. Moves ext well  Skin:   Skin to left mastectomy site with erythema, warmth.   Neurologic: No focal deficits, A/O X3       Results summary of Diagnostic Studies/Procedures copied from within [facility] EMR:         Rudi Rausch 96 Problems    Diagnosis Date Noted    Cellulitis and abscess of trunk 03/07/2017    Mild persistent asthma without complication 91/84/3967    Morbid obesity with BMI of 50.0-59.9, adult ([facility] 11/05/2015    Essential hypertension 09/28/2015    Chronic diastolic heart failure ([facility] 09/09/2015    DOLORES (obstructive sleep apnea) 07/12/2013     Plan:    HTN: chronic, controlled, continue home regimen     Chronic diastolic HF: strict I/O, continue coreg, restart metoprolol, lisinopril/hctz.  Hold IVF     Asthma: controlled, continue current regimen     DOLORES: has home CPAP at bedside, use for naps and sleep at night    Cellulitis: continue Clindamycin, follow BC NGTD        Time spent with pt 30 min  Notes, labs, VS, diagnostic testing reviewed    DVT Prophylaxis: heparin sq   Plan of Care Discussed with: Supervising MD Dr. Jovita Roper, care team, pt  Guillermo Mckay, NP

## 2017-03-08 NOTE — PROGRESS NOTES
Pt reported nausea and PRN 4 mg Zofran administered. Then requesting pain medication.  mg Ibuprofen given for inflammation per Gregory Trinh NP recommendation.

## 2017-03-08 NOTE — PROGRESS NOTES
Pt is A/O X 4. Lungs are clear, resp even and breathing unlabored at this time. Abdomen is soft and somewhat distended, due to constipation, although patient has had 3-4 small hard stools since yesterday., Bowel sounds active in 4/Q. Radial and Pedal pulses, palpable and regular. Heart sounds, regular S1, S2 present. No calf tenderness, edema BLE +1. Gripper socks, SCD's are on and safety measures maintained. Pain-minimal at this time. Will take some Ibuprofen after breakfast for left arm pain upon elevation and tender to touch.

## 2017-03-08 NOTE — PROGRESS NOTES
10 mg Norco given PO for pain in the Left breast rated 6/10.  4 mg Zofran given slow IVP for nausea. 5 mg Reglan given slow IVP for nausea, stomach pain. See MAR.

## 2017-03-08 NOTE — PROGRESS NOTES
Pt reported pain had gotten worse, an 8, so Ibuprofen would not work. PRN oxycodone 2 tablets, 10/325 mg given PO.

## 2017-03-08 NOTE — PROGRESS NOTES
Assessment completed via flowsheet. Patient alert and oriented x4. Heart and lung sounds clear, regular, and unlabored. Abd soft, tender with active bowel sounds x4 quadrants, patient reports constipation, receiving Miralax, Colace, and had a suppository just prior to shift change. Patient reports pain is controled at this time. Ambulating to restroom for toileting needs, voiding without difficulty. Instructed to call with needs. Bed low and locked, call light within reach.

## 2017-03-08 NOTE — PROGRESS NOTES
Attempted to given IV reglan for nausea, IV flushed and found to be infiltrated. PM RN to restart IV.   Suppository given per order and patient request.

## 2017-03-08 NOTE — PROGRESS NOTES
No complaints of nausea, stomach pain resolved. Patient resting quietly in bed. Zofran and Reglan effective.

## 2017-03-09 ENCOUNTER — APPOINTMENT (OUTPATIENT)
Dept: ULTRASOUND IMAGING | Age: 62
DRG: 383 | End: 2017-03-09
Attending: SURGERY
Payer: MEDICAID

## 2017-03-09 LAB
ALBUMIN SERPL BCP-MCNC: 2.8 G/DL (ref 3.2–4.6)
ALBUMIN/GLOB SERPL: 0.7 {RATIO} (ref 1.2–3.5)
ALP SERPL-CCNC: 112 U/L (ref 50–136)
ALT SERPL-CCNC: 28 U/L (ref 12–65)
ANION GAP BLD CALC-SCNC: 5 MMOL/L (ref 7–16)
APPEARANCE UR: CLEAR
AST SERPL W P-5'-P-CCNC: 25 U/L (ref 15–37)
BASOPHILS # BLD AUTO: 0 K/UL (ref 0–0.2)
BASOPHILS # BLD: 0 % (ref 0–2)
BILIRUB SERPL-MCNC: 0.5 MG/DL (ref 0.2–1.1)
BILIRUB UR QL: NEGATIVE
BUN SERPL-MCNC: 12 MG/DL (ref 8–23)
CALCIUM SERPL-MCNC: 8.9 MG/DL (ref 8.3–10.4)
CHLORIDE SERPL-SCNC: 103 MMOL/L (ref 98–107)
CO2 SERPL-SCNC: 30 MMOL/L (ref 21–32)
COLOR UR: YELLOW
CREAT SERPL-MCNC: 0.96 MG/DL (ref 0.6–1)
DIFFERENTIAL METHOD BLD: ABNORMAL
EOSINOPHIL # BLD: 0.4 K/UL (ref 0–0.8)
EOSINOPHIL NFR BLD: 8 % (ref 0.5–7.8)
ERYTHROCYTE [DISTWIDTH] IN BLOOD BY AUTOMATED COUNT: 13.3 % (ref 11.9–14.6)
GLOBULIN SER CALC-MCNC: 4 G/DL (ref 2.3–3.5)
GLUCOSE SERPL-MCNC: 96 MG/DL (ref 65–100)
GLUCOSE UR STRIP.AUTO-MCNC: NEGATIVE MG/DL
HCT VFR BLD AUTO: 32.9 % (ref 35.8–46.3)
HGB BLD-MCNC: 10.1 G/DL (ref 11.7–15.4)
HGB UR QL STRIP: NEGATIVE
IMM GRANULOCYTES # BLD: 0 K/UL (ref 0–0.5)
IMM GRANULOCYTES NFR BLD AUTO: 0.4 % (ref 0–5)
KETONES UR QL STRIP.AUTO: NEGATIVE MG/DL
LEUKOCYTE ESTERASE UR QL STRIP.AUTO: NEGATIVE
LYMPHOCYTES # BLD AUTO: 36 % (ref 13–44)
LYMPHOCYTES # BLD: 1.9 K/UL (ref 0.5–4.6)
MCH RBC QN AUTO: 25.9 PG (ref 26.1–32.9)
MCHC RBC AUTO-ENTMCNC: 30.7 G/DL (ref 31.4–35)
MCV RBC AUTO: 84.4 FL (ref 79.6–97.8)
MONOCYTES # BLD: 0.6 K/UL (ref 0.1–1.3)
MONOCYTES NFR BLD AUTO: 11 % (ref 4–12)
NEUTS SEG # BLD: 2.4 K/UL (ref 1.7–8.2)
NEUTS SEG NFR BLD AUTO: 45 % (ref 43–78)
NITRITE UR QL STRIP.AUTO: NEGATIVE
PH UR STRIP: 7 [PH] (ref 5–9)
PLATELET # BLD AUTO: 433 K/UL (ref 150–450)
PMV BLD AUTO: 8.9 FL (ref 10.8–14.1)
POTASSIUM SERPL-SCNC: 3.8 MMOL/L (ref 3.5–5.1)
PROT SERPL-MCNC: 6.8 G/DL (ref 6.3–8.2)
PROT UR STRIP-MCNC: NEGATIVE MG/DL
RBC # BLD AUTO: 3.9 M/UL (ref 4.05–5.25)
SODIUM SERPL-SCNC: 138 MMOL/L (ref 136–145)
SP GR UR REFRACTOMETRY: 1.01 (ref 1–1.02)
UROBILINOGEN UR QL STRIP.AUTO: 1 EU/DL (ref 0.2–1)
WBC # BLD AUTO: 5.3 K/UL (ref 4.3–11.1)

## 2017-03-09 PROCEDURE — 74011000258 HC RX REV CODE- 258: Performed by: SURGERY

## 2017-03-09 PROCEDURE — 94760 N-INVAS EAR/PLS OXIMETRY 1: CPT

## 2017-03-09 PROCEDURE — 81003 URINALYSIS AUTO W/O SCOPE: CPT | Performed by: SURGERY

## 2017-03-09 PROCEDURE — 74011250636 HC RX REV CODE- 250/636: Performed by: INTERNAL MEDICINE

## 2017-03-09 PROCEDURE — 74011250636 HC RX REV CODE- 250/636: Performed by: NURSE PRACTITIONER

## 2017-03-09 PROCEDURE — 76642 ULTRASOUND BREAST LIMITED: CPT

## 2017-03-09 PROCEDURE — 97165 OT EVAL LOW COMPLEX 30 MIN: CPT

## 2017-03-09 PROCEDURE — 74011250637 HC RX REV CODE- 250/637: Performed by: INTERNAL MEDICINE

## 2017-03-09 PROCEDURE — 85025 COMPLETE CBC W/AUTO DIFF WBC: CPT | Performed by: NURSE PRACTITIONER

## 2017-03-09 PROCEDURE — 80053 COMPREHEN METABOLIC PANEL: CPT | Performed by: NURSE PRACTITIONER

## 2017-03-09 PROCEDURE — 74011000250 HC RX REV CODE- 250: Performed by: NURSE PRACTITIONER

## 2017-03-09 PROCEDURE — 36415 COLL VENOUS BLD VENIPUNCTURE: CPT | Performed by: NURSE PRACTITIONER

## 2017-03-09 PROCEDURE — 77010033678 HC OXYGEN DAILY

## 2017-03-09 PROCEDURE — 65270000029 HC RM PRIVATE

## 2017-03-09 PROCEDURE — 74011000258 HC RX REV CODE- 258: Performed by: NURSE PRACTITIONER

## 2017-03-09 PROCEDURE — 74011250637 HC RX REV CODE- 250/637: Performed by: NURSE PRACTITIONER

## 2017-03-09 PROCEDURE — 74011250637 HC RX REV CODE- 250/637: Performed by: SURGERY

## 2017-03-09 PROCEDURE — 74011250636 HC RX REV CODE- 250/636: Performed by: SURGERY

## 2017-03-09 PROCEDURE — 87086 URINE CULTURE/COLONY COUNT: CPT | Performed by: SURGERY

## 2017-03-09 PROCEDURE — 97161 PT EVAL LOW COMPLEX 20 MIN: CPT

## 2017-03-09 RX ORDER — VANCOMYCIN 2 GRAM/500 ML IN 0.9 % SODIUM CHLORIDE INTRAVENOUS
2000 ONCE
Status: COMPLETED | OUTPATIENT
Start: 2017-03-09 | End: 2017-03-09

## 2017-03-09 RX ORDER — VANCOMYCIN 1.75 GRAM/500 ML IN 0.9 % SODIUM CHLORIDE INTRAVENOUS
1750 EVERY 12 HOURS
Status: DISCONTINUED | OUTPATIENT
Start: 2017-03-10 | End: 2017-03-13 | Stop reason: HOSPADM

## 2017-03-09 RX ADMIN — HYDROCHLOROTHIAZIDE 12.5 MG: 25 TABLET ORAL at 08:43

## 2017-03-09 RX ADMIN — ONDANSETRON HYDROCHLORIDE 4 MG: 2 SOLUTION INTRAMUSCULAR; INTRAVENOUS at 13:04

## 2017-03-09 RX ADMIN — CLINDAMYCIN PHOSPHATE 600 MG: 150 INJECTION, SOLUTION, CONCENTRATE INTRAVENOUS at 08:43

## 2017-03-09 RX ADMIN — LISINOPRIL 20 MG: 20 TABLET ORAL at 08:43

## 2017-03-09 RX ADMIN — VANCOMYCIN HYDROCHLORIDE 2000 MG: 10 INJECTION, POWDER, LYOPHILIZED, FOR SOLUTION INTRAVENOUS at 20:07

## 2017-03-09 RX ADMIN — POLYETHYLENE GLYCOL 3350 17 G: 17 POWDER, FOR SOLUTION ORAL at 08:43

## 2017-03-09 RX ADMIN — DOCUSATE SODIUM 100 MG: 100 CAPSULE, LIQUID FILLED ORAL at 08:43

## 2017-03-09 RX ADMIN — HEPARIN SODIUM 5000 UNITS: 5000 INJECTION, SOLUTION INTRAVENOUS; SUBCUTANEOUS at 08:44

## 2017-03-09 RX ADMIN — ANASTROZOLE 1 MG: 1 TABLET, COATED ORAL at 08:41

## 2017-03-09 RX ADMIN — FAMOTIDINE 20 MG: 20 TABLET ORAL at 17:21

## 2017-03-09 RX ADMIN — DIAZEPAM 5 MG: 5 TABLET ORAL at 20:32

## 2017-03-09 RX ADMIN — VENLAFAXINE HYDROCHLORIDE 37.5 MG: 37.5 CAPSULE, EXTENDED RELEASE ORAL at 08:43

## 2017-03-09 RX ADMIN — FAMOTIDINE 20 MG: 20 TABLET ORAL at 08:43

## 2017-03-09 RX ADMIN — CLINDAMYCIN PHOSPHATE 600 MG: 150 INJECTION, SOLUTION, CONCENTRATE INTRAVENOUS at 15:50

## 2017-03-09 RX ADMIN — PIPERACILLIN SODIUM,TAZOBACTAM SODIUM 3.38 G: 3; .375 INJECTION, POWDER, FOR SOLUTION INTRAVENOUS at 20:05

## 2017-03-09 RX ADMIN — HYDROCODONE BITARTRATE AND ACETAMINOPHEN 1 TABLET: 325; 10 TABLET ORAL at 21:46

## 2017-03-09 RX ADMIN — METOPROLOL SUCCINATE 100 MG: 100 TABLET, EXTENDED RELEASE ORAL at 08:44

## 2017-03-09 RX ADMIN — HEPARIN SODIUM 5000 UNITS: 5000 INJECTION, SOLUTION INTRAVENOUS; SUBCUTANEOUS at 15:51

## 2017-03-09 RX ADMIN — MONTELUKAST SODIUM 10 MG: 10 TABLET, FILM COATED ORAL at 21:42

## 2017-03-09 RX ADMIN — OXYCODONE HYDROCHLORIDE 15 MG: 15 TABLET ORAL at 13:00

## 2017-03-09 RX ADMIN — OXYCODONE HYDROCHLORIDE 15 MG: 15 TABLET ORAL at 20:32

## 2017-03-09 NOTE — PROGRESS NOTES
Assessment completed via flowsheet. Patient alert and oriented x4. Heart and lung sounds clear, regular, and unlabored. Patient uses home CPAP at night. Abd obese, soft, non-tender with active bowel sounds x4 quadrants. Patient reports constipation is improving. IV site c/d/i, flushed, patent. Post left mastectomy, pustule noted to left axilla. 1+ edema noted to BLE, pulses present and palpable. Patient denies pain at this time. Instructed to call with needs. Bed low and locked, call light within reach.

## 2017-03-09 NOTE — PROGRESS NOTES
Hospitalist Progress Note    3/9/2017  Admit Date: 3/6/2017  5:40 PM   NAME: Elizabeth Her   :  1955   MRN:  [id number]   Attending: Harris Bernal MD  PCP:  Jamal Andrade MD  Treatment Team: Attending Provider: Harris Bernal MD; Consulting Provider: Jackson Villalba DO; Consulting Provider: Ivy Penny DO      SUBJECTIVE:   Ms. Yuli Holland is a 63 yo female who underwent a left mastectomy approx 1 month ago. Admitted with cellulitis of left breast. Reports left breast has been erythematous since surgery however over the last 2 weeks the erythema progressed and her left breast became very painful. Also reports fever and chills. Hospitalist consulted for medical management. Pt has hx of DOLORES wears CPAP, HTN, Asthma which she reports controlled rarely using rescue inhaler and diastolic HF. Last echo from care everywhere notes showing  EF was 99-08%, grade 1 diastolic dysfunction, moderate concentric LVH. She was started on Vanc and zosyn on admission changed to clindamycin 3/7/17. C/o bilateral LE edema received one dose of Lasix IV with improvement of edema. Pt appears depressed, denies suicidal ideation. Denies n/v/d, abd pain, CP, SOB. Past Medical History:   Diagnosis Date    Arthritis     everywhere;  DDD    Asthma     inhalers daily  [facility]    CAD (coronary artery disease)     Dr Nito Donaldson [facility])     left breast ca dx'ed this month-appt with surgeon 10/12    Chronic pain     generalized from arthritis    Complicated UTI (urinary tract infection) 2015    Diverticulosis     Heart failure ([facility]     [facility]    No significant valvular disease.   EF 50-55%    History of prediabetes     monitored by Primary Physician    Hypertension     Shortness of breath     Sleep apnea     bi pap and o2    Thyroid cyst     cyst removed from thyroid     Recent Results (from the past 24 hour(s))   CBC WITH AUTOMATED DIFF Collection Time: 03/09/17  5:59 AM   Result Value Ref Range    WBC 5.3 4.3 - 11.1 K/uL    RBC 3.90 (L) 4.05 - 5.25 M/uL    HGB 10.1 (L) 11.7 - 15.4 g/dL    HCT 32.9 (L) 35.8 - 46.3 %    MCV 84.4 79.6 - 97.8 FL    MCH 25.9 (L) 26.1 - 32.9 PG    MCHC 30.7 (L) 31.4 - 35.0 g/dL    RDW 13.3 11.9 - 14.6 %    PLATELET 380 910 - 407 K/uL    MPV 8.9 (L) 10.8 - 14.1 FL    DF AUTOMATED      NEUTROPHILS 45 43 - 78 %    LYMPHOCYTES 36 13 - 44 %    MONOCYTES 11 4.0 - 12.0 %    EOSINOPHILS 8 (H) 0.5 - 7.8 %    BASOPHILS 0 0.0 - 2.0 %    IMMATURE GRANULOCYTES 0.4 0.0 - 5.0 %    ABS. NEUTROPHILS 2.4 1.7 - 8.2 K/UL    ABS. LYMPHOCYTES 1.9 0.5 - 4.6 K/UL    ABS. MONOCYTES 0.6 0.1 - 1.3 K/UL    ABS. EOSINOPHILS 0.4 0.0 - 0.8 K/UL    ABS. BASOPHILS 0.0 0.0 - 0.2 K/UL    ABS. IMM. GRANS. 0.0 0.0 - 0.5 K/UL   METABOLIC PANEL, COMPREHENSIVE    Collection Time: 03/09/17  5:59 AM   Result Value Ref Range    Sodium 138 136 - 145 mmol/L    Potassium 3.8 3.5 - 5.1 mmol/L    Chloride 103 98 - 107 mmol/L    CO2 30 21 - 32 mmol/L    Anion gap 5 (L) 7 - 16 mmol/L    Glucose 96 65 - 100 mg/dL    BUN 12 8 - 23 MG/DL    Creatinine 0.96 0.6 - 1.0 MG/DL    GFR est AA >60 >60 ml/min/1.73m2    GFR est non-AA >60 >60 ml/min/1.73m2    Calcium 8.9 8.3 - 10.4 MG/DL    Bilirubin, total 0.5 0.2 - 1.1 MG/DL    ALT (SGPT) 28 12 - 65 U/L    AST (SGOT) 25 15 - 37 U/L    Alk.  phosphatase 112 50 - 136 U/L    Protein, total 6.8 6.3 - 8.2 g/dL    Albumin 2.8 (L) 3.2 - 4.6 g/dL    Globulin 4.0 (H) 2.3 - 3.5 g/dL    A-G Ratio 0.7 (L) 1.2 - 3.5       Allergies   Allergen Reactions    Bactrim [Sulfamethoprim Ds] Rash     Pt gets a yeast infection on body      Current Facility-Administered Medications   Medication Dose Route Frequency Provider Last Rate Last Dose    albuterol (PROVENTIL VENTOLIN) nebulizer solution 2.5 mg  2.5 mg Nebulization Q6H PRN Leonardo Llamas DO        HYDROcodone-acetaminophen (NORCO)  mg tablet 1 Tab  1 Tab Oral Q4H PRN Katty Sinha, NP   1 Tab at 03/08/17 1507    ibuprofen (MOTRIN) tablet 600 mg  600 mg Oral Q6H PRN Jena Gross, NP   600 mg at 03/08/17 1411    oxyCODONE IR (OXY-IR) immediate release tablet 15 mg  15 mg Oral Q6H PRN Anish Kunz MD   15 mg at 03/08/17 2202    clindamycin phosphate (CLEOCIN) 600 mg in 0.9% sodium chloride (MBP/ADV) 100 mL ADV  600 mg IntraVENous Q8H Jena Gross,  mL/hr at 03/09/17 0843 600 mg at 03/09/17 0843    metoprolol succinate (TOPROL-XL) tablet 100 mg  100 mg Oral DAILY Jena Gross NP   100 mg at 03/09/17 0844    hydroCHLOROthiazide (HYDRODIURIL) tablet 12.5 mg  12.5 mg Oral DAILY Jena Gross, NP   12.5 mg at 03/09/17 0843    heparin (porcine) injection 5,000 Units  5,000 Units SubCUTAneous Q8H Vini Manners, DO   5,000 Units at 03/09/17 0844    polyethylene glycol (MIRALAX) packet 17 g  17 g Oral DAILY Vini Manners, DO   17 g at 03/09/17 8661    docusate sodium (COLACE) capsule 100 mg  100 mg Oral BID Anish Kunz MD   100 mg at 03/09/17 0843    bisacodyl (DULCOLAX) suppository 10 mg  10 mg Rectal DAILY PRN Anish Kunz MD   10 mg at 03/07/17 1850    metoclopramide HCl (REGLAN) injection 5 mg  5 mg IntraVENous Q6H PRN Jena Gross NP   5 mg at 03/08/17 2202    influenza vaccine 2016-17 (36mos+)(PF) (FLUZONE/FLUARIX/FLULAVAL QUAD) injection 0.5 mL  0.5 mL IntraMUSCular PRIOR TO DISCHARGE Anish Kunz MD        anastrozole (ARIMIDEX) tablet 1 mg  1 mg Oral DAILY Saad [place], DO   1 mg at 03/09/17 0841    diazePAM (VALIUM) tablet 5 mg  5 mg Oral Q6H PRN Saad Vogel, DO   5 mg at 03/07/17 0837    venlafaxine-SR (EFFEXOR-XR) capsule 37.5 mg  37.5 mg Oral DAILY WITH BREAKFAST Saad [place], DO   37.5 mg at 03/09/17 0843    lisinopril (PRINIVIL, ZESTRIL) tablet 20 mg  20 mg Oral DAILY Saad Vogel, DO   20 mg at 03/09/17 0843    montelukast (SINGULAIR) tablet 10 mg  10 mg Oral QHS Saad [place], DO   10 mg at 03/08/17 2158    famotidine (PEPCID) tablet 20 mg  20 mg Oral BID Cloteal Deaner, DO   20 mg at 17 0843    ondansetron (ZOFRAN) injection 4 mg  4 mg IntraVENous Q4H PRN Cloteal Deaner, DO   4 mg at 17 2150       Review of Systems negative with exception of pertinent positives noted above  PHYSICAL EXAM     Visit Vitals    /68 (BP 1 Location: Left arm, BP Patient Position: At rest;Sitting)    Pulse 91    Temp 96.8 °F (36 °C)    Resp 18    Ht 5' 2.5\" (1.588 m)    Wt 132.6 kg (292 lb 6.4 oz)    SpO2 93%    Breastfeeding No    BMI 52.63 kg/m2      Temp (24hrs), Av.8 °F (36 °C), Min:96 °F (35.6 °C), Max:97.4 °F (36.3 °C)    Oxygen Therapy  O2 Sat (%): 93 % (17)  Pulse via Oximetry: 107 beats per minute (17)  O2 Device: CPAP mask (17)  O2 Flow Rate (L/min): 0 l/min (17)  FIO2 (%): 21 % (17)    Intake/Output Summary (Last 24 hours) at 17  Last data filed at 17   Gross per 24 hour   Intake              100 ml   Output             1900 ml   Net            -1800 ml      General:  Alert, cooperative, no distress, appears stated age. Head:  Normocephalic, without obvious abnormality, atraumatic. Nose:  Nares normal. No drainage or sinus tenderness. Lungs: CTA, resp even and nonlabored  Heart: S1S2 present without murmurs rubs gallops. RRR. trace bilateral pedal edema  Abdomen:  Soft, non-tender. Not distended. Bowel sounds normal.   Extremities: No cyanosis. Moves ext well  Skin: Skin to left mastectomy site with improvement of erythema.   Neurologic: No focal deficits, A/O X3     Results summary of Diagnostic Studies/Procedures copied from within [facility] EMR:         Rudi Kuort 96 Problems    Diagnosis Date Noted    Cellulitis and abscess of trunk 2017    Mild persistent asthma without complication     Morbid [facility] with BMI of 50.0-59.9, [facility]    Essential hypertension 09/28/2015    Chronic diastolic heart failure ([facility] 09/09/2015    DOLORES (obstructive sleep apnea) 07/12/2013     Plan:    HTN: chronic, controlled, continue home regimen      Chronic diastolic HF: strict I/O, continue coreg, metoprolol, lisinopril/hctz. Hold IVF      Asthma: controlled, continue current regimen      DOLORES: has home CPAP at bedside, use for naps and sleep at night     Cellulitis: continue Clindamycin change to PO, follow BC NGTD. Will need 14 day course total abx EOT 3/20/17      Thank you for this consult. We will sign off. Call with questions.      Time spent with pt 30 min  Notes, labs, VS, diagnostic testing reviewed     DVT Prophylaxis: heparin sq   Plan of Care Discussed with: Supervising MD Dr. Karishma Gordillo, care team, pt  Jose Manuel Pickard, NP

## 2017-03-09 NOTE — PROGRESS NOTES
S: Pain diminished but still present with activity. 5/10 max. Sleeping with CPAP. Small BM. Nausea without vomitting. Taking Zofran. O:   Visit Vitals    /60 (BP 1 Location: Left arm, BP Patient Position: At rest)    Pulse 90    Temp 96 °F (35.6 °C)    Resp 18    Ht 5' 2.5\" (1.588 m)    Wt 132.6 kg (292 lb 6.4 oz)    SpO2 93%    Breastfeeding No    BMI 52.63 kg/m2     A&O x3  RRR  Resp clear  Abd soft, non tender. No rebound  Left chest wall with minimal erythema. Some induration. Tolerates deep palpation. No evident seroma or hematoma but habitus limits exam.        Cellulitis - On IV clinda. Continues to improve. Blood cultures negative. Will check U/S for occult fluid given habitus. Cellulitis improved to the point would consider aspiration. DOLORES- CPAP  Constipation - bowel reg  Activity - increase no limits PT/OT ordered  Mgmt per medicine - appreciate recommendations. Continue inpatient stay, IV abx, pain control. Still concern for implant seeding, loss.

## 2017-03-09 NOTE — PROGRESS NOTES
Problem: Mobility Impaired (Adult and Pediatric)  Goal: *Acute Goals and Plan of Care (Insert Text)  STG:  (1.)Ms. Nadja Mondragon will move from supine to sit and sit to supine with INDEPENDENCE within 1-3 day(s). (2.)Ms. Nadja Mondragon will transfer from bed to chair and chair to bed with INDEPENDENCE using the least restrictive device within 1-3 day(s). (3.)Ms. Nadja Mondragon will ambulate with MODIFIED INDEPENDENCE for 50 feet with the least restrictive device within 1-3 day(s). _________________________________________________________________________________       PHYSICAL THERAPY: INITIAL ASSESSMENT, TREATMENT DAY: DAY OF ASSESSMENT, AM 3/9/2017  INPATIENT: Hospital Day: 4  Payor: MEDICAID [facility] / Plan: [place] MEDICAID [facility] / Product Type: Medicaid /      NAME/AGE/GENDER: Junior Panda is a 64 y.o. female           PRIMARY DIAGNOSIS: cellulitis  Cellulitis and abscess of trunk Cellulitis and abscess of trunk Cellulitis and abscess of trunk        ICD-10: Treatment Diagnosis:       · Generalized Muscle Weakness (M62.81)  · Difficulty in walking, Not elsewhere classified (R26.2)   Precaution/Allergies:  Bactrim [sulfamethoprim ds]       ASSESSMENT:      Ms. Nadja Mondragon presents with generalized weakness and decreased functional mobility and decreased tolerance for out of bed activity. When she walked out of the bathroom, she needed to sit down before doing anything else. Pt at time of eval is at a gross stand by assist level for her mobility. She lives alone and has an aide that comes for 3 hours a day, 5 days a week to assist her with errands and household chores. Feel she will benefit from a few physical therapy sessions while she is here in the hospital to ensure her safety and maximize independence with mobility. This section established at most recent assessment   PROBLEM LIST (Impairments causing functional limitations):  1. Decreased ADL/Functional Activities  2.  Decreased Transfer Abilities  3. Decreased Ambulation Ability/Technique  4. Decreased Activity Tolerance  5. Decreased Pacing Skills    INTERVENTIONS PLANNED: (Benefits and precautions of physical therapy have been discussed with the patient.)  1. Bed Mobility  2. Gait Training  3. Home Exercise Program (HEP)  4. Therapeutic Activites  5. Therapeutic Exercise/Strengthening  6. Transfer Training      TREATMENT PLAN: Frequency/Duration: daily for duration of hospital stay  Rehabilitation Potential For Stated Goals: GOOD      RECOMMENDED REHABILITATION/EQUIPMENT: (at time of discharge pending progress): Continue Skilled Therapy and Home Health: Physical Therapy. HISTORY:   History of Present Injury/Illness (Reason for Referral):  PER MD NOTE: 63 y/o s/p Left mastectomy and ALND January 2017 presented to clinic today with complaints of fever, chills, increased redness left chest wall. Associated malaise and increased pain. Past Medical History/Comorbidities:   Ms. Milton Chauhan  has a past medical history of Arthritis; Asthma; CAD (coronary artery disease); Cancer ([facility] [address] Chronic pain; Complicated UTI (urinary tract infection) (12/8/2015); Diverticulosis; Heart failure ([facility] [address] History of echocardiogram (11/17/14 at [facility]); History of prediabetes; Hypertension; Shortness of breath; Sleep apnea; and Thyroid cyst.  Ms. Milton Chauhan  has a past surgical history that includes carpal tunnel release (Bilateral); cyst removal; cardiac surg procedure unlist (Cath 11/18/14 [facility]); lap cholecystectomy; sinus surgery proc unlisted; other surgical; neurological procedure unlisted; cholecystectomy; and breast surgery procedure unlisted.   Social History/Living Environment:   Home Environment: Apartment  # Steps to Enter: 6  Rails to Enter: Yes  [employer] : Bilateral  One/Two Story Residence: One story  # of Interior Steps: 6  Height of Each Step (in): 9 inches  Interior Rails: Both  Lift Chair Available: No  Living Alone: Yes  Support Systems: Child(cindy)  Patient Expects to be Discharged to[de-identified] Apartment  Current DME Used/Available at Home: Cane, straight, Commode, bedside, Shower chair  Tub or Shower Type: Tub/Shower combination  Prior Level of Function/Work/Activity:  Pt living at home, independent with mobility with a cane, independent with self care. Had an aide come to the house to assist with errands and household chores. States she did not drive. Number of Personal Factors/Comorbidities that affect the Plan of Care: 0: LOW COMPLEXITY   EXAMINATION:   Most Recent Physical Functioning:   Gross Assessment:  AROM: Within functional limits (lower extremities)  Strength: Within functional limits (lower extremities)               Posture:  Posture (WDL): Within defined limits  Balance:  Sitting: Intact  Sitting - Static: Good (unsupported)  Sitting - Dynamic: Good (unsupported)  Standing: Intact  Standing - Static: Good  Standing - Dynamic : Good Bed Mobility:  Supine to Sit:  (pt up in bathroom on contact)  Sit to Supine: Stand-by asssistance; Additional time  Scooting: Stand-by asssistance; Additional time  Wheelchair Mobility:     Transfers:  Sit to Stand: Stand-by asssistance; Additional time  Stand to Sit: Stand-by asssistance; Additional time  Gait:     Base of Support: Widened  Speed/Samantha: Pace decreased (<100 feet/min); Shuffled  Step Length: Left shortened;Right shortened  Gait Abnormalities: Decreased step clearance;Shuffling gait  Distance (ft): 15 Feet (ft) (and another 10)  Ambulation - Level of Assistance: Stand-by asssistance       Body Structures Involved:  1. Muscles Body Functions Affected:  1. Movement Related Activities and Participation Affected:  1. Mobility  2.  Self Care   Number of elements that affect the Plan of Care: 4+: HIGH COMPLEXITY   CLINICAL PRESENTATION:   Presentation: Stable and uncomplicated: LOW COMPLEXITY   CLINICAL DECISION MAKIN [facility] [address] [id number] AM-PAC 6 Clicks   Basic Mobility Inpatient Short Form  How much difficulty does the patient currently have. .. Unable A Lot A Little None   1. Turning over in bed (including adjusting bedclothes, sheets and blankets)? [ ] 1   [ ] 2   [X] 3   [ ] 4   2. Sitting down on and standing up from a chair with arms ( e.g., wheelchair, bedside commode, etc.)   [ ] 1   [ ] 2   [X] 3   [ ] 4   3. Moving from lying on back to sitting on the side of the bed? [ ] 1   [ ] 2   [X] 3   [ ] 4   How much help from another person does the patient currently need. .. Total A Lot A Little None   4. Moving to and from a bed to a chair (including a wheelchair)? [ ] 1   [ ] 2   [X] 3   [ ] 4   5. Need to walk in hospital room? [ ] 1   [ ] 2   [X] 3   [ ] 4   6. Climbing 3-5 steps with a railing? [ ] 1   [ ] 2   [X] 3   [ ] 4   © 2007, Trustees of [address] [place] [address], under license to [employer]. All rights reserved    Score:  Initial: 18 Most Recent: X (Date: -- )     Interpretation of Tool:  Represents activities that are increasingly more difficult (i.e. Bed mobility, Transfers, Gait). Score 24 23 22-20 19-15 14-10 9-7 6       Modifier CH CI CJ CK CL CM CN         · Mobility - Walking and Moving Around:               - CURRENT STATUS:    CK - 40%-59% impaired, limited or restricted               - GOAL STATUS:           CK - 40%-59% impaired, limited or restricted               - D/C STATUS:                       ---------------To be determined---------------  Payor[place] / [place] / Product Type: Medicaid /       Medical Necessity:     · Patient is expected to demonstrate progress in strength, balance and functional technique to decrease assistance required with functional mobility. Reason for Services/Other Comments:  · Patient continues to require skilled intervention due to inability to complete functional mobility independently.    Use of outcome tool(s) and clinical judgement create a POC that gives a: Clear prediction of patient's progress: LOW COMPLEXITY                 TREATMENT:   (In addition to Assessment/Re-Assessment sessions the following treatments were rendered)   Pre-treatment Symptoms/Complaints:  Some left breast pain with activity  Pain: Initial:   Pain Intensity 1: 5  Post Session:  5/10      Assessment/Reassessment only, no treatment provided today     Braces/Orthotics/Lines/Etc:   · O2 Device: Room air  Treatment/Session Assessment:    · Response to Treatment:  Pt motivated and cooperative with therapy, after eval pt got on stretcher to go to ultrasound  · Interdisciplinary Collaboration:  · Occupational Therapist  · Registered Nurse  · After treatment position/precautions:  · pt on stretcher with transport to go to ultrasound  · Compliance with Program/Exercises: compliant all of the time. · Recommendations/Intent for next treatment session: \"Next visit will focus on advancements to more challenging activities and reduction in assistance provided\".   Total Treatment Duration:  PT Patient Time In/Time Out  Time In: 0955  Time Out: [address], PT

## 2017-03-09 NOTE — PROGRESS NOTES
Problem: Self Care Deficits Care Plan (Adult)  Goal: *Acute Goals and Plan of Care (Insert Text)  1. Patient will perform grooming with supervision/mod I.  2. Patient will perform Upper body dressing with supervision/Mod I  3. Patient will perform lower body dressing with supervision/Mod I.  4. Patient will perform upper and lower body bathing with supervision/mod I.  5. Patient will perform toilet transfers with supervision/mod I.  6. Patient will perform shower transfer with supervision/mod I.  7. Patient will participate in 30 + minutes of ADL/ therapeutic exercise/therapeutic activity with mod/min rest breaks to increase activity tolerance for self care. 8. Patient will perform ADL functional mobility in room with supervision/mod I.    Goals to be achieved in 7 days      OCCUPATIONAL THERAPY: Initial Assessment and AM 3/9/2017  INPATIENT: Hospital Day: 4  Payor: [address] [address] / Plan: [address] [place] [address] / Product Type: Medicaid /      NAME/AGE/GENDER: Gabriele Childers is a 64 y.o. female           PRIMARY DIAGNOSIS:  cellulitis  Cellulitis and abscess of trunk Cellulitis and abscess of trunk Cellulitis and abscess of trunk        ICD-10: Treatment Diagnosis:        · Pain in Left Shoulder (M25.512)  · Stiffness of Left Shoulder, Not elsewhere classified (M25.612)  · Generalized Muscle Weakness (M62.81)   Precautions/Allergies:         Bactrim [sulfamethoprim ds]       ASSESSMENT:      Ms. Trina Linn presents in bathroom toileting with IV pole in tow. She completed toileting and ambulated back to bed with supervision. She reported she was fatigued after these tasks, and that she has been feeling that way prior to January. Earlier this am patient was in the bathroom and performed her sponge bath. She has used adaptive techniques to perform ADLs mod I and reported that she completed her bath and dressing without difficulty or assistance.  Ms. Trina Linn lives in an apartment alone and has a home health aide 5 x week for 3 hours a day. The aide assists her with heavy house work and takes her out for errands. Ms. Lyle Rivera would benefit from OT services to facilitate an increase in self care and functional mobility. Will follow. This section established at most recent assessment   PROBLEM LIST (Impairments causing functional limitations):  1. Decreased Strength  2. Decreased ADL/Functional Activities  3. Decreased Transfer Abilities  4. Decreased Ambulation Ability/Technique  5. Decreased Balance  6. Decreased Activity Tolerance  7. Decreased Pacing Skills  8. Decreased Work Simplification/Energy Conservation Techniques  9. Decreased Flexibility/Joint Mobility    INTERVENTIONS PLANNED: (Benefits and precautions of occupational therapy have been discussed with the patient.)  1. Activities of daily living training  2. Adaptive equipment training  3. Balance training  4. Clothing management  5. Donning&doffing training  6. [employer] tech training  7. Neuromuscular re-eduation  8. Theraputic activity  9. Theraputic exercise  10. Home safety and DME recommendations      TREATMENT PLAN: Frequency/Duration: Follow patient 2 x week to address above goals. Rehabilitation Potential For Stated Goals: GOOD      RECOMMENDED REHABILITATION/EQUIPMENT: (at time of discharge pending progress): Continue Skilled Therapy and Home Health: Physical Therapy. OCCUPATIONAL PROFILE AND HISTORY:   History of Present Injury/Illness (Reason for Referral):  Decreased self care and functional mobility, decrease L UE use due to cellulitis  Past Medical History/Comorbidities:   Ms. Lyle Rivera  has a past medical history of Arthritis; Asthma; CAD (coronary artery disease); Cancer ([facility] [address] Chronic pain; Complicated UTI (urinary tract infection) (12/8/2015); Diverticulosis; Heart failure ([facility] [address] History of echocardiogram (11/17/14 at [facility]); History of prediabetes; Hypertension;  Shortness of breath; Sleep apnea; and Thyroid cyst.  Ms. Ana M Meneses  has a past surgical history that includes carpal tunnel release (Bilateral); cyst removal; cardiac surg procedure unlist (Cath 11/18/14 [facility]); lap cholecystectomy; sinus surgery proc unlisted; other surgical; neurological procedure unlisted; cholecystectomy; and breast surgery procedure unlisted. Social History/Living Environment:   Home Environment: Apartment  # Steps to Enter: 6  Rails to Enter: Yes  [employer] : Bilateral  One/Two Story Residence: One story  # of Interior Steps: 6  Height of Each Step (in): 9 inches  Interior Rails: Both  Lift Chair Available: No  Living Alone: Yes  Support Systems: Child(cindy) (home health aide 3 hours a day 5 days a week)  Patient Expects to be Discharged to[de-identified] Apartment  Current DME Used/Available at Home: Cane, straight, Commode, bedside, Shower chair  Tub or Shower Type: Tub/Shower combination  Prior Level of Function/Work/Activity:  Mod I and lives alone, HHA 5 x week 3 hours day      Number of Personal Factors/Comorbidities that affect the Plan of Care: Brief history (0):  LOW COMPLEXITY   ASSESSMENT OF OCCUPATIONAL PERFORMANCE[de-identified]   Activities of Daily Living:           Basic ADLs (From Assessment) Complex ADLs (From Assessment)   Basic ADL  Feeding: Setup  Oral Facial Hygiene/Grooming: Supervision  Bathing: Stand-by assistance, Minimum assistance  Upper Body Dressing: Supervision  Lower Body Dressing: Stand-by assistance, Minimum assistance  Toileting: Stand by assistance     Grooming/Bathing/Dressing Activities of Daily Living     Cognitive Retraining  Safety/Judgement: Awareness of environment; Fall prevention                 Functional Transfers  Toilet Transfer : Stand-by asssistance  Shower Transfer: Stand-by asssistance                Most Recent Physical Functioning:   Gross Assessment:                  Posture:     Balance:    Bed Mobility:     Wheelchair Mobility:     Transfers:                       Patient Vitals for the past 6 hrs:       BP BP Patient Position SpO2 Pulse   17 0722 125/68 At rest;Sitting 93 % 91   17 0900 - - 93 % -        Mental Status  Neurologic State: Alert, Appropriate for age  Orientation Level: Appropriate for age  Cognition: Appropriate decision making, Appropriate for age attention/concentration, Follows commands  Perception: Appears intact  Perseveration: No perseveration noted  Safety/Judgement: Awareness of environment, Fall prevention                               Physical Skills Involved:  1. Range of Motion  2. Balance  3. Mobility  4. Endurance  5. Fine or Gross Motor Coordination Cognitive Skills Affected (resulting in the inability to perform in a timely and safe manner):  1. Problem Solving  2. Remembering Psychosocial Skills Affected:  1. Routines and Behaviors  2. Environmental Adaptations   Number of elements that affect the Plan of Care: 5+:  HIGH COMPLEXITY   CLINICAL DECISION MAKIN [facility] Inpatient Short Form  How much help from another person does the patient currently need. .. Total A Lot A Little None   1. Putting on and taking off regular lower body clothing?   [ ] 1   [ ] 2   [X] 3   [ ] 4   2. Bathing (including washing, rinsing, drying)? [ ] 1   [ ] 2   [X] 3   [ ] 4   3. Toileting, which includes using toilet, bedpan or urinal?   [ ] 1   [ ] 2   [ ] 3   [X] 4   4. Putting on and taking off regular upper body clothing?   [ ] 1   [ ] 2   [X] 3   [ ] 4   5. Taking care of personal grooming such as brushing teeth? [ ] 1   [ ] 2   [ ] 3   [X] 4   6. Eating meals? [ ] 1   [ ] 2   [ ] 3   [X] 4   © , Trustees of [address] [place] [address] [address] [address], under license to [employer]. All rights reserved    Score:  Initial: 21 Most Recent: X (Date: -- )     Interpretation of Tool:  Represents activities that are increasingly more difficult (i.e. Bed mobility, Transfers, Gait).        Score 24 23 22-20 19-15 14-10 9-7 6       Modifier CH CI CJ CK CL CM CN         · Self Care:               - CURRENT STATUS:    CJ - 20%-39% impaired, limited or restricted               - GOAL STATUS:           CI - 1%-19% impaired, limited or restricted               - D/C STATUS:                       ---------------To be determined---------------  Payor: [facility] [employer] / Plan:  [employer] / Product Type: Medicaid /       Medical Necessity:     · Patient is expected to demonstrate progress in balance, coordination and functional technique to decrease assistance required with self care and functional mobility and improve safety during self care and functional mobility. Reason for Services/Other Comments:  · Patient continues to require skilled intervention due to decreased self care and functional mobility. Use of outcome tool(s) and clinical judgement create a POC that gives a: LOW COMPLEXITY             TREATMENT:   (In addition to Assessment/Re-Assessment sessions the following treatments were rendered)      Pre-treatment Symptoms/Complaints: Tolerated well  Pain: Initial:   Pain Intensity 1: 5  Pain Location 1: Breast  Pain Orientation 1: Left  Pain Intervention(s) 1: Rest  Post Session:  5/10      Assessment/Reassessment only, no treatment provided today     Braces/Orthotics/Lines/Etc:   · O2 Device: Room air  Treatment/Session Assessment:    · Response to Treatment: cooperative  · Interdisciplinary Collaboration:  · Physical Therapist  · Occupational Therapist  · Registered Nurse  · transport came to take patient for test  · After treatment position/precautions:  · on gurney going with transport for a test  · Compliance with Program/Exercises: Will assess as treatment progresses. · Recommendations/Intent for next treatment session: \"Next visit will focus on advancements to more challenging activities and reduction in assistance provided\".   Total Treatment Duration:  OT Patient Time In/Time Out  Time In: 0945  Time Out: [address] [facility] Monica Mendoza

## 2017-03-09 NOTE — PROGRESS NOTES
15 mg Oxy IR given PO as ordered for pain in the left breast rated 6/10.  5 mg Reglan given slow IVP as requested for abd pain. 4 mg Zofran given slow IVP for nausea.      See [facility]

## 2017-03-09 NOTE — PROGRESS NOTES
Assessment done via doc flowsheet. Pt ambulatory, alert & oriented x3, resp easy & regular, denies pain. Left breast with incision dry & intact from previous mastectomy, some redness noted on left breast, axilla w/ some lesions, not draining noted. Bed low & locked, side rails x3 up with call light within reach, instruced to call for assistance as needed.

## 2017-03-09 NOTE — PROGRESS NOTES
Pharmacokinetic Consult to Pharmacist    Talha Ramo is a 64 y.o. female being treated for cellulitis with Vancomycin and Zosyn. @JULIETTE24)@  @ESTEBAN(45)@  Lab Results   Component Value Date/Time    BUN 12 03/09/2017 05:59 AM    Creatinine 0.96 03/09/2017 05:59 AM    WBC 5.3 03/09/2017 05:59 AM    Procalcitonin <0.1 03/06/2017 07:35 PM      Estimated Creatinine Clearance: 81.2 mL/min (based on Cr of 0.96). CULTURES:  All Micro Results       Procedure Component Value Units Date/Time      CULTURE, URINE [[id number]] Collected:  03/09/17 1610    Order Status:  Completed Specimen:  Urine from Clean catch Updated:  03/09/17 1619    CULTURE, BLOOD [[id number]] Collected:  03/06/17 1943    Order Status:  Completed Specimen:  Blood from Blood Updated:  03/09/17 0557     Special Requests: RIGHT ANTECUBITAL        Culture result: NO GROWTH 3 DAYS       CULTURE, BLOOD [[id number]] Collected:  03/06/17 1935    Order Status:  Completed Specimen:  Blood from Blood Updated:  03/09/17 0557     Special Requests: LEFT ANTECUBITAL        Culture result: NO GROWTH 3 DAYS                 Day 1 of vancomycin. Goal trough is 12 - 18. Vancomycin dose initiated at 2 grams IV now, then 1750 mg IV every 12 hours. Will continue to follow patient.       Thank you,  Claudette BrownD, BCPS

## 2017-03-10 ENCOUNTER — APPOINTMENT (OUTPATIENT)
Dept: ULTRASOUND IMAGING | Age: 62
DRG: 383 | End: 2017-03-10
Attending: SURGERY
Payer: MEDICAID

## 2017-03-10 ENCOUNTER — ANESTHESIA (OUTPATIENT)
Dept: SURGERY | Age: 62
DRG: 383 | End: 2017-03-10
Payer: MEDICAID

## 2017-03-10 ENCOUNTER — SURGERY (OUTPATIENT)
Age: 62
End: 2017-03-10

## 2017-03-10 ENCOUNTER — ANESTHESIA EVENT (OUTPATIENT)
Dept: SURGERY | Age: 62
DRG: 383 | End: 2017-03-10
Payer: MEDICAID

## 2017-03-10 PROCEDURE — 76210000016 HC OR PH I REC 1 TO 1.5 HR: Performed by: SURGERY

## 2017-03-10 PROCEDURE — 74011250636 HC RX REV CODE- 250/636: Performed by: ANESTHESIOLOGY

## 2017-03-10 PROCEDURE — 97110 THERAPEUTIC EXERCISES: CPT

## 2017-03-10 PROCEDURE — 77030002933 HC SUT MCRYL J&J -A: Performed by: SURGERY

## 2017-03-10 PROCEDURE — 87205 SMEAR GRAM STAIN: CPT | Performed by: SURGERY

## 2017-03-10 PROCEDURE — 74011000258 HC RX REV CODE- 258: Performed by: SURGERY

## 2017-03-10 PROCEDURE — 74011250636 HC RX REV CODE- 250/636: Performed by: NURSE PRACTITIONER

## 2017-03-10 PROCEDURE — 76010000149 HC OR TIME 1 TO 1.5 HR: Performed by: SURGERY

## 2017-03-10 PROCEDURE — 77030018846 HC SOL IRR STRL H20 ICUM -A

## 2017-03-10 PROCEDURE — 77030002987 HC SUT PROL J&J -B: Performed by: SURGERY

## 2017-03-10 PROCEDURE — 74011250636 HC RX REV CODE- 250/636: Performed by: INTERNAL MEDICINE

## 2017-03-10 PROCEDURE — C1713 ANCHOR/SCREW BN/BN,TIS/BN: HCPCS | Performed by: SURGERY

## 2017-03-10 PROCEDURE — 77030008477 HC STYL SATN SLP COVD -A: Performed by: ANESTHESIOLOGY

## 2017-03-10 PROCEDURE — 74011250636 HC RX REV CODE- 250/636: Performed by: SURGERY

## 2017-03-10 PROCEDURE — 65270000029 HC RM PRIVATE

## 2017-03-10 PROCEDURE — 87075 CULTR BACTERIA EXCEPT BLOOD: CPT | Performed by: SURGERY

## 2017-03-10 PROCEDURE — 3E01329 INTRODUCTION OF OTHER ANTI-INFECTIVE INTO SUBCUTANEOUS TISSUE, PERCUTANEOUS APPROACH: ICD-10-PCS | Performed by: SURGERY

## 2017-03-10 PROCEDURE — 74011000250 HC RX REV CODE- 250: Performed by: SURGERY

## 2017-03-10 PROCEDURE — 0W9800Z DRAINAGE OF CHEST WALL WITH DRAINAGE DEVICE, OPEN APPROACH: ICD-10-PCS | Performed by: SURGERY

## 2017-03-10 PROCEDURE — 74011250637 HC RX REV CODE- 250/637: Performed by: NURSE PRACTITIONER

## 2017-03-10 PROCEDURE — 77030012406 HC DRN WND PENRS BARD -A: Performed by: SURGERY

## 2017-03-10 PROCEDURE — 74011250637 HC RX REV CODE- 250/637: Performed by: SURGERY

## 2017-03-10 PROCEDURE — 74011250637 HC RX REV CODE- 250/637: Performed by: INTERNAL MEDICINE

## 2017-03-10 PROCEDURE — 74011250636 HC RX REV CODE- 250/636

## 2017-03-10 PROCEDURE — 94760 N-INVAS EAR/PLS OXIMETRY 1: CPT

## 2017-03-10 PROCEDURE — 74011000250 HC RX REV CODE- 250: Performed by: ANESTHESIOLOGY

## 2017-03-10 PROCEDURE — 77030008703 HC TU ET UNCUF COVD -A: Performed by: ANESTHESIOLOGY

## 2017-03-10 PROCEDURE — 77030021678 HC GLIDESCP STAT DISP VERT -B: Performed by: ANESTHESIOLOGY

## 2017-03-10 PROCEDURE — 74011000250 HC RX REV CODE- 250

## 2017-03-10 PROCEDURE — 97116 GAIT TRAINING THERAPY: CPT

## 2017-03-10 PROCEDURE — 74011250637 HC RX REV CODE- 250/637

## 2017-03-10 PROCEDURE — 77030031139 HC SUT VCRL2 J&J -A: Performed by: SURGERY

## 2017-03-10 PROCEDURE — 76060000033 HC ANESTHESIA 1 TO 1.5 HR: Performed by: SURGERY

## 2017-03-10 DEVICE — STIMULAN® RAPID CURE PROVIDED STERILE FOR SINGLE PATIENT USE. STIMULAN® RAPID CURE CONTAINS CALCIUM SULFATE POWDER AND MIXING SOLUTION IN PRE-MEASURED QUANTITIES SO THAT WHEN MIXED TOGETHER IN A STERILE MIXING BOWL, THE RESULTANT PASTE IS TO BE DIGITALLY PACKED INTO OPEN BONE VOID/GAP TO SET INSITU OR PLACED INTO THE MOULD PROVIDED, THE MIXTURE SETS TO FORM BEADS. THE BIODEGRADABLE, RADIOPAQUE BEADS ARE RESORBED IN APPROXIMATELY 30 – 60 DAYS WHEN USED IN ACCORDANCE WITH THE DEVICE LABELLING. STIMULAN® RAPID CURE IS MANUFACTURED FROM SYNTHETIC IMPLANT GRADE CALCIUM SULFATE DIHYDRATE(CASO4.2H2O) THAT RESORBS AND IS REPLACED WITH BONE DURING THE HEALING PROCESS. ALSO, AS THE BONE VOID FILLER BEADS ARE BIODEGRADABLE AND BIOCOMPATIBLE, THEY MAY BE USED AT AN INFECTED SITE.
Type: IMPLANTABLE DEVICE | Status: FUNCTIONAL
Brand: STIMULAN® RAPID CURE

## 2017-03-10 RX ORDER — MIDAZOLAM HYDROCHLORIDE 1 MG/ML
2 INJECTION, SOLUTION INTRAMUSCULAR; INTRAVENOUS ONCE
Status: CANCELLED | OUTPATIENT
Start: 2017-03-10 | End: 2017-03-10

## 2017-03-10 RX ORDER — ALBUTEROL SULFATE 90 UG/1
AEROSOL, METERED RESPIRATORY (INHALATION) AS NEEDED
Status: DISCONTINUED | OUTPATIENT
Start: 2017-03-10 | End: 2017-03-10 | Stop reason: HOSPADM

## 2017-03-10 RX ORDER — SODIUM CHLORIDE 0.9 % (FLUSH) 0.9 %
5-10 SYRINGE (ML) INJECTION AS NEEDED
Status: CANCELLED | OUTPATIENT
Start: 2017-03-10

## 2017-03-10 RX ORDER — SODIUM CHLORIDE 0.9 % (FLUSH) 0.9 %
5-10 SYRINGE (ML) INJECTION AS NEEDED
Status: DISCONTINUED | OUTPATIENT
Start: 2017-03-10 | End: 2017-03-10 | Stop reason: HOSPADM

## 2017-03-10 RX ORDER — SODIUM CHLORIDE, SODIUM LACTATE, POTASSIUM CHLORIDE, CALCIUM CHLORIDE 600; 310; 30; 20 MG/100ML; MG/100ML; MG/100ML; MG/100ML
50 INJECTION, SOLUTION INTRAVENOUS CONTINUOUS
Status: DISCONTINUED | OUTPATIENT
Start: 2017-03-10 | End: 2017-03-10 | Stop reason: HOSPADM

## 2017-03-10 RX ORDER — SODIUM CHLORIDE 0.9 % (FLUSH) 0.9 %
5-10 SYRINGE (ML) INJECTION EVERY 8 HOURS
Status: CANCELLED | OUTPATIENT
Start: 2017-03-10

## 2017-03-10 RX ORDER — FENTANYL CITRATE 50 UG/ML
INJECTION, SOLUTION INTRAMUSCULAR; INTRAVENOUS AS NEEDED
Status: DISCONTINUED | OUTPATIENT
Start: 2017-03-10 | End: 2017-03-10 | Stop reason: HOSPADM

## 2017-03-10 RX ORDER — VANCOMYCIN HYDROCHLORIDE 1 G/20ML
INJECTION, POWDER, LYOPHILIZED, FOR SOLUTION INTRAVENOUS AS NEEDED
Status: DISCONTINUED | OUTPATIENT
Start: 2017-03-10 | End: 2017-03-10 | Stop reason: HOSPADM

## 2017-03-10 RX ORDER — ROCURONIUM BROMIDE 10 MG/ML
INJECTION, SOLUTION INTRAVENOUS AS NEEDED
Status: DISCONTINUED | OUTPATIENT
Start: 2017-03-10 | End: 2017-03-10 | Stop reason: HOSPADM

## 2017-03-10 RX ORDER — ALBUTEROL SULFATE 0.83 MG/ML
2.5 SOLUTION RESPIRATORY (INHALATION) AS NEEDED
Status: DISCONTINUED | OUTPATIENT
Start: 2017-03-10 | End: 2017-03-10 | Stop reason: HOSPADM

## 2017-03-10 RX ORDER — SODIUM CHLORIDE, SODIUM LACTATE, POTASSIUM CHLORIDE, CALCIUM CHLORIDE 600; 310; 30; 20 MG/100ML; MG/100ML; MG/100ML; MG/100ML
75 INJECTION, SOLUTION INTRAVENOUS CONTINUOUS
Status: DISCONTINUED | OUTPATIENT
Start: 2017-03-10 | End: 2017-03-10 | Stop reason: HOSPADM

## 2017-03-10 RX ORDER — HYDROMORPHONE HYDROCHLORIDE 2 MG/ML
0.5 INJECTION, SOLUTION INTRAMUSCULAR; INTRAVENOUS; SUBCUTANEOUS
Status: COMPLETED | OUTPATIENT
Start: 2017-03-10 | End: 2017-03-10

## 2017-03-10 RX ORDER — MIDAZOLAM HYDROCHLORIDE 1 MG/ML
2 INJECTION, SOLUTION INTRAMUSCULAR; INTRAVENOUS
Status: DISCONTINUED | OUTPATIENT
Start: 2017-03-10 | End: 2017-03-10 | Stop reason: HOSPADM

## 2017-03-10 RX ORDER — SUCCINYLCHOLINE CHLORIDE 20 MG/ML
INJECTION INTRAMUSCULAR; INTRAVENOUS AS NEEDED
Status: DISCONTINUED | OUTPATIENT
Start: 2017-03-10 | End: 2017-03-10 | Stop reason: HOSPADM

## 2017-03-10 RX ORDER — PROPOFOL 10 MG/ML
INJECTION, EMULSION INTRAVENOUS AS NEEDED
Status: DISCONTINUED | OUTPATIENT
Start: 2017-03-10 | End: 2017-03-10 | Stop reason: HOSPADM

## 2017-03-10 RX ORDER — CELECOXIB 200 MG/1
200 CAPSULE ORAL
Status: CANCELLED | OUTPATIENT
Start: 2017-03-10

## 2017-03-10 RX ORDER — BACITRACIN 50000 [IU]/1
INJECTION, POWDER, FOR SOLUTION INTRAMUSCULAR AS NEEDED
Status: DISCONTINUED | OUTPATIENT
Start: 2017-03-10 | End: 2017-03-10 | Stop reason: HOSPADM

## 2017-03-10 RX ORDER — LIDOCAINE HYDROCHLORIDE 10 MG/ML
0.1 INJECTION INFILTRATION; PERINEURAL AS NEEDED
Status: CANCELLED | OUTPATIENT
Start: 2017-03-10

## 2017-03-10 RX ORDER — LIDOCAINE HYDROCHLORIDE 20 MG/ML
INJECTION, SOLUTION EPIDURAL; INFILTRATION; INTRACAUDAL; PERINEURAL AS NEEDED
Status: DISCONTINUED | OUTPATIENT
Start: 2017-03-10 | End: 2017-03-10 | Stop reason: HOSPADM

## 2017-03-10 RX ORDER — FENTANYL CITRATE 50 UG/ML
100 INJECTION, SOLUTION INTRAMUSCULAR; INTRAVENOUS ONCE
Status: CANCELLED | OUTPATIENT
Start: 2017-03-10 | End: 2017-03-10

## 2017-03-10 RX ORDER — OXYCODONE HYDROCHLORIDE 5 MG/1
5 TABLET ORAL
Status: DISCONTINUED | OUTPATIENT
Start: 2017-03-10 | End: 2017-03-10 | Stop reason: HOSPADM

## 2017-03-10 RX ADMIN — PIPERACILLIN SODIUM,TAZOBACTAM SODIUM 3.38 G: 3; .375 INJECTION, POWDER, FOR SOLUTION INTRAVENOUS at 11:43

## 2017-03-10 RX ADMIN — ONDANSETRON HYDROCHLORIDE 4 MG: 2 SOLUTION INTRAMUSCULAR; INTRAVENOUS at 07:22

## 2017-03-10 RX ADMIN — SODIUM CHLORIDE, SODIUM LACTATE, POTASSIUM CHLORIDE, AND CALCIUM CHLORIDE: 600; 310; 30; 20 INJECTION, SOLUTION INTRAVENOUS at 16:47

## 2017-03-10 RX ADMIN — LISINOPRIL 20 MG: 20 TABLET ORAL at 09:04

## 2017-03-10 RX ADMIN — PIPERACILLIN SODIUM,TAZOBACTAM SODIUM 3.38 G: 3; .375 INJECTION, POWDER, FOR SOLUTION INTRAVENOUS at 04:36

## 2017-03-10 RX ADMIN — PROPOFOL 170 MG: 10 INJECTION, EMULSION INTRAVENOUS at 17:00

## 2017-03-10 RX ADMIN — HYDROMORPHONE HYDROCHLORIDE 0.5 MG: 2 INJECTION, SOLUTION INTRAMUSCULAR; INTRAVENOUS; SUBCUTANEOUS at 18:50

## 2017-03-10 RX ADMIN — HYDROMORPHONE HYDROCHLORIDE 0.5 MG: 2 INJECTION, SOLUTION INTRAMUSCULAR; INTRAVENOUS; SUBCUTANEOUS at 18:30

## 2017-03-10 RX ADMIN — LIDOCAINE HYDROCHLORIDE 80 MG: 20 INJECTION, SOLUTION EPIDURAL; INFILTRATION; INTRACAUDAL; PERINEURAL at 16:59

## 2017-03-10 RX ADMIN — SODIUM CHLORIDE, SODIUM LACTATE, POTASSIUM CHLORIDE, AND CALCIUM CHLORIDE: 600; 310; 30; 20 INJECTION, SOLUTION INTRAVENOUS at 17:57

## 2017-03-10 RX ADMIN — METOPROLOL SUCCINATE 100 MG: 100 TABLET, EXTENDED RELEASE ORAL at 09:04

## 2017-03-10 RX ADMIN — FENTANYL CITRATE 100 MCG: 50 INJECTION, SOLUTION INTRAMUSCULAR; INTRAVENOUS at 16:58

## 2017-03-10 RX ADMIN — BACITRACIN 50000 UNITS: 5000 INJECTION, POWDER, FOR SOLUTION INTRAMUSCULAR at 17:50

## 2017-03-10 RX ADMIN — FAMOTIDINE 20 MG: 20 TABLET ORAL at 09:04

## 2017-03-10 RX ADMIN — VANCOMYCIN HYDROCHLORIDE 1.2 G: 1 INJECTION, POWDER, LYOPHILIZED, FOR SOLUTION INTRAVENOUS at 17:49

## 2017-03-10 RX ADMIN — ONDANSETRON HYDROCHLORIDE 4 MG: 2 SOLUTION INTRAMUSCULAR; INTRAVENOUS at 15:29

## 2017-03-10 RX ADMIN — SODIUM CHLORIDE 3.25 MG: 9 INJECTION INTRAMUSCULAR; INTRAVENOUS; SUBCUTANEOUS at 18:54

## 2017-03-10 RX ADMIN — HYDROMORPHONE HYDROCHLORIDE 0.5 MG: 2 INJECTION, SOLUTION INTRAMUSCULAR; INTRAVENOUS; SUBCUTANEOUS at 18:39

## 2017-03-10 RX ADMIN — ONDANSETRON HYDROCHLORIDE 4 MG: 2 SOLUTION INTRAMUSCULAR; INTRAVENOUS at 23:47

## 2017-03-10 RX ADMIN — HYDROMORPHONE HYDROCHLORIDE 0.5 MG: 2 INJECTION, SOLUTION INTRAMUSCULAR; INTRAVENOUS; SUBCUTANEOUS at 18:44

## 2017-03-10 RX ADMIN — HEPARIN SODIUM 5000 UNITS: 5000 INJECTION, SOLUTION INTRAVENOUS; SUBCUTANEOUS at 23:47

## 2017-03-10 RX ADMIN — ANASTROZOLE 1 MG: 1 TABLET, COATED ORAL at 08:36

## 2017-03-10 RX ADMIN — PIPERACILLIN SODIUM,TAZOBACTAM SODIUM 3.38 G: 3; .375 INJECTION, POWDER, FOR SOLUTION INTRAVENOUS at 20:38

## 2017-03-10 RX ADMIN — SUCCINYLCHOLINE CHLORIDE 160 MG: 20 INJECTION INTRAMUSCULAR; INTRAVENOUS at 17:00

## 2017-03-10 RX ADMIN — ROCURONIUM BROMIDE 10 MG: 10 INJECTION, SOLUTION INTRAVENOUS at 16:59

## 2017-03-10 RX ADMIN — ALBUTEROL SULFATE 3 PUFF: 90 AEROSOL, METERED RESPIRATORY (INHALATION) at 18:01

## 2017-03-10 RX ADMIN — VANCOMYCIN HYDROCHLORIDE 1750 MG: 10 INJECTION, POWDER, LYOPHILIZED, FOR SOLUTION INTRAVENOUS at 08:36

## 2017-03-10 RX ADMIN — OXYCODONE HYDROCHLORIDE 15 MG: 15 TABLET ORAL at 15:24

## 2017-03-10 RX ADMIN — VANCOMYCIN HYDROCHLORIDE 1 G: 1 INJECTION, POWDER, LYOPHILIZED, FOR SOLUTION INTRAVENOUS at 17:50

## 2017-03-10 RX ADMIN — HYDROCODONE BITARTRATE AND ACETAMINOPHEN 1 TABLET: 325; 10 TABLET ORAL at 21:07

## 2017-03-10 RX ADMIN — VANCOMYCIN HYDROCHLORIDE 1750 MG: 10 INJECTION, POWDER, LYOPHILIZED, FOR SOLUTION INTRAVENOUS at 20:45

## 2017-03-10 RX ADMIN — OXYCODONE HYDROCHLORIDE 15 MG: 15 TABLET ORAL at 07:22

## 2017-03-10 RX ADMIN — ONDANSETRON HYDROCHLORIDE 4 MG: 2 SOLUTION INTRAMUSCULAR; INTRAVENOUS at 11:43

## 2017-03-10 RX ADMIN — MIDAZOLAM HYDROCHLORIDE 2 MG: 1 INJECTION, SOLUTION INTRAMUSCULAR; INTRAVENOUS at 16:37

## 2017-03-10 NOTE — PROGRESS NOTES
Pt assessment completed. Alert and oriented. Lungs CTA diminished in bases with even and unlabored respirations. Heart sounds regular. Abdomen soft and non tender with active bowel sounds. IV present and infusing without difficulty. Pain needs met at this time. Pustule present under left axilla to be drained today. Pt aware to call for assistance, all needs met at this time. Will continue to monitor.

## 2017-03-10 NOTE — PROGRESS NOTES
Pt c/o 7/10 pain administered Roxicodone 15 mg PO pt also c/o nausea administered Zofran IV per MD order, will continue to monitor.

## 2017-03-10 NOTE — PROGRESS NOTES
S: Continued pain in axilla, chest wall improved from yesterday. Had BM. Decreased urinary pain, odor. No fever or chills     O:  Visit Vitals    /84 (BP 1 Location: Left arm, BP Patient Position: At rest;Supine)    Pulse 96    Temp 98.4 °F (36.9 °C)    Resp 16    Ht 5' 2.5\" (1.588 m)    Wt 132 kg (291 lb 0.1 oz)    SpO2 98%    Breastfeeding No    BMI 52.38 kg/m2        A&O x3  RRR  Resp Clear  Abd obese, soft  Left chest wall still warm, erythematous. No palpable fluid collection despite US. Habitus limiting.        UA - clear  Urine culture - pending  Blood culture NGTD  U/S loculated fluid collection    A:   Left chest wall cellulitis, seroma     P:  OR today U/S guided evac, washout, possible implant removal. Patient marked.

## 2017-03-10 NOTE — PROGRESS NOTES
Shift assessment complete. Pt alert and oriented x4, respirations present, even and unlabored, HOB elevated, pt denies any SOB at this time, S1&S2 auscultated, HR regular, abd soft, and nontender, obese, active BS in all 4 quadrants, pt is up ad ole to the bathroom, voiding, SCDs in place and functioning, IVF infusing without difficulty, No pressure ulcers or edema noted, pt has pustule under left axilla, pt rates pain 9/10 at this time, pain medication given, pt instructed to call for assistance, pt verbalizes understanding, bed low and locked, side rails x3, call light within reach.

## 2017-03-10 NOTE — PROGRESS NOTES
Problem: Nutrition Deficit  Goal: *Optimize nutritional status  Nutrition  Reason for assessment: Length of stay day 3. Assessment:   Diet order(s): 3/07-3/09:  Consistent CHO; 3/10 NPO  Food/Nutrition Patient History:  Pt s/p left mastectomy ~ 1 month ago; currently with cellulitis and abscess of trunk; s/p surgical procedure today. No nutrition risk factors identified on nursing admission malnutrition screening tool. Anthropometrics:Height: 5' 2.5\" (158.8 cm),  Weight: 132 kg (291 lb 0.1 oz), Weight source not specified, Body mass index is 52.38 kg/(m^2). BMI class of Extreme Obesity 111. Macronutrient needs:  EER:  1819-6200 kcal /day (10-15 kcal/kg BW)  EPR:  63-79 grams protein/day (1.2-1.5 grams/kg IBW-52.7 kg)  Intake/Comparative Standards: NPO status does not meet estimated kcal or protein needs. Noted 1 recorded intake of 25% when patient was on a Consistent CHO diet. Nutrition Diagnosis: Inadequate oral intake r/t decreased ability to consume sufficient oral intake as evidenced by NPO status and estimates of insufficient intake of energy or high quality protein from diet when compared to requirements (limited data based on 1 recorded intake). Intervention:  Meals and snacks: Continue NPO; advance per MD recommendations.   Omid Landeros, [address] [place] [address], MPH  [phone]

## 2017-03-10 NOTE — PROGRESS NOTES
S: U/S completed today. Continued pain in axilla, chest wall. Small BM. Additional complaint of urinary pain, urgency for the last week. O:  Visit Vitals    /53 (BP 1 Location: Right arm, BP Patient Position: At rest)    Pulse 97    Temp 98.6 °F (37 °C)    Resp 18    Ht 5' 2.5\" (1.588 m)    Wt 132 kg (291 lb 0.1 oz)    SpO2 95%    Breastfeeding No    BMI 52.38 kg/m2     A&O x3  RRR  Resp Clear  Abd obese, soft  Left chest wall still warm, erythematous. No palpable fluid collection despite US. Habitus limiting. A:   Left chest wall cellulitis    P:  OR tomorrow for U/S guided evac, washout, possible implant removal. NPO. Hold heparin.

## 2017-03-10 NOTE — PERIOP NOTES
TRANSFER - IN REPORT:    Verbal report received from Rush Nunez on David Markham  being received from med surg (unit) for routine progression of care      Report consisted of patients Situation, Background, Assessment and   Recommendations(SBAR). Information from the following report(s) SBAR, Procedure Summary, MAR and Recent Results was reviewed with the receiving nurse. Opportunity for questions and clarification was provided. Assessment completed upon patients arrival to unit and care assumed.

## 2017-03-10 NOTE — PROGRESS NOTES
Pt requested something for nausea along with her pain medication to prevent an upset stomach, administered zofran IV per MD order,will continue to monitor.

## 2017-03-10 NOTE — PROGRESS NOTES
Problem: Mobility Impaired (Adult and Pediatric)  Goal: *Acute Goals and Plan of Care (Insert Text)  STG:  (1.)Ms. Aston Rendon will move from supine to sit and sit to supine with INDEPENDENCE within 1-3 day(s). (2.)Ms. Aston Rendon will transfer from bed to chair and chair to bed with INDEPENDENCE using the least restrictive device within 1-3 day(s). (3.)Ms. Aston Rendon will ambulate with MODIFIED INDEPENDENCE for 50 feet with the least restrictive device within 1-3 day(s). _________________________________________________________________________________       PHYSICAL THERAPY: Daily Note, Treatment Day: 1st and AM 3/10/2017  INPATIENT: Hospital Day: 5  Payor: MEDICAID [place] / Plan: [place] / Product Type: Medicaid /      NAME/AGE/GENDER: Christin Garvey is a 64 y.o. female           PRIMARY DIAGNOSIS: Seroma of breast [N64.89] Cellulitis and abscess of trunk Cellulitis and abscess of trunk  Procedure(s) (LRB):  EVACUATION OF SEROMA OF LEFT BREAST - SURGEON IS AVAILABLE FROM 11-1 OR AFTER 4:30PM REQUESTING LIPOSUCTION CANNULAS AND NEEDS ULTRASOUND GUIDANCE  (Left)     ICD-10: Treatment Diagnosis:       · Generalized Muscle Weakness (M62.81)  · Difficulty in walking, Not elsewhere classified (R26.2)   Precaution/Allergies:  Bactrim [sulfamethoprim ds]       ASSESSMENT:      Ms. Aston Rendon presents with generalized weakness and decreased functional mobility and decreased tolerance for out of bed activity. When she walked out of the bathroom, she needed to sit down before doing anything else. Pt at time of eval is at a gross stand by assist level for her mobility. She lives alone and has an aide that comes for 3 hours a day, 5 days a week to assist her with errands and household chores. Feel she will benefit from a few physical therapy sessions while she is here in the hospital to ensure her safety and maximize independence with mobility.    She is in the restroom upon arrival.  She increase her distance without AD. Therapist and pt performs L shoulder exercises. This section established at most recent assessment   PROBLEM LIST (Impairments causing functional limitations):  1. Decreased ADL/Functional Activities  2. Decreased Transfer Abilities  3. Decreased Ambulation Ability/Technique  4. Decreased Activity Tolerance  5. Decreased Pacing Skills    INTERVENTIONS PLANNED: (Benefits and precautions of physical therapy have been discussed with the patient.)  1. Bed Mobility  2. Gait Training  3. Home Exercise Program (HEP)  4. Therapeutic Activites  5. Therapeutic Exercise/Strengthening  6. Transfer Training      TREATMENT PLAN: Frequency/Duration: daily for duration of hospital stay  Rehabilitation Potential For Stated Goals: GOOD      RECOMMENDED REHABILITATION/EQUIPMENT: (at time of discharge pending progress): Home Health: Physical Therapy. HISTORY:   History of Present Injury/Illness (Reason for Referral):  PER MD NOTE: 65 y/o s/p Left mastectomy and ALND January 2017 presented to clinic today with complaints of fever, chills, increased redness left chest wall. Associated malaise and increased pain. Past Medical History/Comorbidities:   Ms. Keyur Child  has a past medical history of Arthritis; Asthma; CAD (coronary artery disease); Cancer ([facility] [address] Chronic pain; Complicated UTI (urinary tract infection) (12/8/2015); Diverticulosis; Heart failure ([facility] [address] History of echocardiogram ([facility]/17/14 at [facility]); History of prediabetes; Hypertension; Shortness of breath; Sleep apnea; and Thyroid cyst.  Ms. Keyur Child  has a past surgical history that includes carpal tunnel release (Bilateral); cyst removal; cardiac surg procedure unlist (Cath 11/18/14 [facility]); lap cholecystectomy; sinus surgery proc unlisted; other surgical; neurological procedure unlisted; cholecystectomy; and breast surgery procedure unlisted.   Social History/Living Environment:   Home Environment: Apartment  # Steps to Enter: 6  Rails to Enter: Yes  Hand Rails : Bilateral  One/Two Story Residence: One story  # of Interior Steps: 6  Height of Each Step (in): 9 inches  Interior Rails: Both  Lift Chair Available: No  Living Alone: Yes  Support Systems: Child(cindy)  Patient Expects to be Discharged to[de-identified] Apartment  Current DME Used/Available at Home: Cane, straight, Commode, bedside, Shower chair  Tub or Shower Type: Tub/Shower combination  Prior Level of Function/Work/Activity:  Pt living at home, independent with mobility with a cane, independent with self care. Had an aide come to the house to assist with errands and household chores. States she did not drive. Number of Personal Factors/Comorbidities that affect the Plan of Care: 0: LOW COMPLEXITY   EXAMINATION:   Most Recent Physical Functioning:   Gross Assessment:                  Posture:     Balance:    Bed Mobility:  Sit to Supine: Stand-by asssistance  Scooting: Stand-by asssistance  Wheelchair Mobility:     Transfers:  Sit to Stand: Stand-by asssistance  Stand to Sit: Stand-by asssistance  Gait:     Base of Support: Widened  Speed/Samantha: Pace decreased (<100 feet/min)  Step Length: Left shortened;Right shortened  Gait Abnormalities: Decreased step clearance  Distance (ft): 200 Feet (ft)  Ambulation - Level of Assistance: Stand-by asssistance  Duration: 15 Minutes       Body Structures Involved:  1. Muscles Body Functions Affected:  1. Movement Related Activities and Participation Affected:  1. Mobility  2. Self Care   Number of elements that affect the Plan of Care: 4+: HIGH COMPLEXITY   CLINICAL PRESENTATION:   Presentation: Stable and uncomplicated: LOW COMPLEXITY   CLINICAL DECISION MAKIN [facility] [address] [id number] AM-PAC 6 Clicks   Basic Mobility Inpatient Short Form  How much difficulty does the patient currently have. .. Unable A Lot A Little None   1. Turning over in bed (including adjusting bedclothes, sheets and blankets)? [ ] 1   [ ] 2   [X] 3   [ ] 4   2.   Sitting down on and standing up from a chair with arms ( e.g., wheelchair, bedside commode, etc.)   [ ] 1   [ ] 2   [X] 3   [ ] 4   3. Moving from lying on back to sitting on the side of the bed? [ ] 1   [ ] 2   [X] 3   [ ] 4   How much help from another person does the patient currently need. .. Total A Lot A Little None   4. Moving to and from a bed to a chair (including a wheelchair)? [ ] 1   [ ] 2   [X] 3   [ ] 4   5. Need to walk in hospital room? [ ] 1   [ ] 2   [X] 3   [ ] 4   6. Climbing 3-5 steps with a railing? [ ] 1   [ ] 2   [X] 3   [ ] 4   © 2007, Trustees of [address] [place] [address] [address] [address], under license to [employer]. All rights reserved    Score:  Initial: 18 Most Recent: X (Date: -- )     Interpretation of Tool:  Represents activities that are increasingly more difficult (i.e. Bed mobility, Transfers, Gait). Score 24 23 22-20 19-15 14-10 9-7 6       Modifier CH CI CJ CK CL CM CN         · Mobility - Walking and Moving Around:               - CURRENT STATUS:    CK - 40%-59% impaired, limited or restricted               - GOAL STATUS:           CK - 40%-59% impaired, limited or restricted               - D/C STATUS:                       ---------------To be determined---------------  Payor: [place] / [place] / Product Type: Medicaid /       Medical Necessity:     · Patient is expected to demonstrate progress in strength, balance and functional technique to decrease assistance required with functional mobility. Reason for Services/Other Comments:  · Patient continues to require skilled intervention due to inability to complete functional mobility independently.    Use of outcome tool(s) and clinical judgement create a POC that gives a: Clear prediction of patient's progress: LOW COMPLEXITY                 TREATMENT:   (In addition to Assessment/Re-Assessment sessions the following treatments were rendered)   Pre-treatment Symptoms/Complaints:  Some left breast pain with activity  Pain: Initial:      Post Session:        Gait Training (15 Minutes):  Gait training to improve and/or restore physical functioning as related to strength. Ambulated 200 Feet (ft) with Stand-by asssistance   Therapeutic Exercise: (8 Minutes):  Exercises per grid below to improve strength. Required minimal visual cues to promote proper body alignment. Progressed resistance as indicated. Date:  3/10 Date:   Date:     Activity/Exercise Parameters Parameters Parameters   Shoulder flex/ext 6     Shoulder flex 6     Shoulder abd/add 6     Shoulder ER/IR 6                           Assessment/Reassessment only, no treatment provided today     Braces/Orthotics/Lines/Etc:   · O2 Device: Room air  Treatment/Session Assessment:    · Response to Treatment:  Pt motivated and cooperative with therapy, after eval pt got on stretcher to go to ultrasound  · Interdisciplinary Collaboration:  · Occupational Therapist  · Registered Nurse  · After treatment position/precautions:  · pt on stretcher with transport to go to ultrasound  · Compliance with Program/Exercises: compliant all of the time. · Recommendations/Intent for next treatment session: \"Next visit will focus on advancements to more challenging activities and reduction in assistance provided\".   Total Treatment Duration:  PT Patient Time In/Time Out  Time In: 1000  Time Out: Torres Rabago, MAURISIO

## 2017-03-10 NOTE — PROGRESS NOTES
TRANSFER - OUT REPORT:    Verbal report given to [facility] RN(name) on Bolivar Love  being transferred to Pre Op(unit) for ordered procedure       Report consisted of patients Situation, Background, Assessment and   Recommendations(SBAR). Information from the following report(s) SBAR, Kardex, Intake/Output, MAR and Recent Results was reviewed with the receiving nurse. Lines:   Peripheral IV 12/08/15 Right Hand (Active)       Peripheral IV 08/01/16 Right Antecubital (Active)       Peripheral IV 03/09/17 Right Wrist (Active)   Site Assessment Clean, dry, & intact 3/10/2017  7:30 AM   Phlebitis Assessment 0 3/10/2017  7:30 AM   Infiltration Assessment 0 3/10/2017  7:30 AM   Dressing Status Clean, dry, & intact 3/10/2017  7:30 AM   Dressing Type Tape;Transparent 3/10/2017  7:30 AM   Hub Color/Line Status Infusing 3/10/2017  7:30 AM   Alcohol Cap Used No 3/10/2017  3:45 AM        Opportunity for questions and clarification was provided.       Patient transported with:   SleepOut

## 2017-03-10 NOTE — PROGRESS NOTES
Pt requests medication for pain rated at 9/10, see MAR for administration of ROXICODONE 15 mg PO      Pt requests medication for her \"nerves\", see MAR for administration of VALIUM 5 mg PO

## 2017-03-10 NOTE — ANESTHESIA PREPROCEDURE EVALUATION
Anesthetic History     Increased risk of difficult airway (unsuccessful DL by CRNA and MD- used glidescope without difficulty)          Review of Systems / Medical History  Patient summary reviewed, nursing notes reviewed and pertinent labs reviewed    Pulmonary        Sleep apnea: BiPAP    Asthma : well controlled       Neuro/Psych   Within defined limits           Cardiovascular    Hypertension        Dysrhythmias : sinus tachycardia  CAD (mild non-obstructive)    Exercise tolerance: <4 METS  Comments: EF 55%   GI/Hepatic/Renal     GERD: well controlled           Endo/Other      Hypothyroidism: well controlled  Morbid obesity and arthritis     Other Findings   Comments: S/p mastectomy and expander placement in January 2017 at [address]- now with seroma on left breast           Physical Exam    Airway  Mallampati: III  TM Distance: 4 - 6 cm  Neck ROM: normal range of motion   Mouth opening: Normal     Cardiovascular  Regular rate and rhythm,  S1 and S2 normal,  no murmur, click, rub, or gallop  Rhythm: regular  Rate: normal         Dental    Dentition: Caps/crowns and Bridges     Pulmonary  Breath sounds clear to auscultation               Abdominal         Other Findings            Anesthetic Plan    ASA: 3  Anesthesia type: general          Induction: Intravenous  Anesthetic plan and risks discussed with: Patient      Plan glidescope ETT

## 2017-03-11 LAB
BACTERIA SPEC CULT: NORMAL
BACTERIA SPEC CULT: NORMAL
SERVICE CMNT-IMP: NORMAL
SERVICE CMNT-IMP: NORMAL
VANCOMYCIN TROUGH SERPL-MCNC: 17.3 UG/ML (ref 5–20)

## 2017-03-11 PROCEDURE — 74011250637 HC RX REV CODE- 250/637: Performed by: INTERNAL MEDICINE

## 2017-03-11 PROCEDURE — 97530 THERAPEUTIC ACTIVITIES: CPT

## 2017-03-11 PROCEDURE — 80202 ASSAY OF VANCOMYCIN: CPT | Performed by: SURGERY

## 2017-03-11 PROCEDURE — 97110 THERAPEUTIC EXERCISES: CPT

## 2017-03-11 PROCEDURE — 74011250636 HC RX REV CODE- 250/636: Performed by: INTERNAL MEDICINE

## 2017-03-11 PROCEDURE — 74011250636 HC RX REV CODE- 250/636: Performed by: NURSE PRACTITIONER

## 2017-03-11 PROCEDURE — 74011250636 HC RX REV CODE- 250/636: Performed by: SURGERY

## 2017-03-11 PROCEDURE — 36415 COLL VENOUS BLD VENIPUNCTURE: CPT | Performed by: SURGERY

## 2017-03-11 PROCEDURE — 74011250637 HC RX REV CODE- 250/637: Performed by: SURGERY

## 2017-03-11 PROCEDURE — 65270000029 HC RM PRIVATE

## 2017-03-11 PROCEDURE — 74011000258 HC RX REV CODE- 258: Performed by: SURGERY

## 2017-03-11 PROCEDURE — 74011250637 HC RX REV CODE- 250/637: Performed by: NURSE PRACTITIONER

## 2017-03-11 PROCEDURE — 97535 SELF CARE MNGMENT TRAINING: CPT

## 2017-03-11 RX ADMIN — HEPARIN SODIUM 5000 UNITS: 5000 INJECTION, SOLUTION INTRAVENOUS; SUBCUTANEOUS at 16:29

## 2017-03-11 RX ADMIN — POLYETHYLENE GLYCOL 3350 17 G: 17 POWDER, FOR SOLUTION ORAL at 08:45

## 2017-03-11 RX ADMIN — PIPERACILLIN SODIUM,TAZOBACTAM SODIUM 3.38 G: 3; .375 INJECTION, POWDER, FOR SOLUTION INTRAVENOUS at 04:19

## 2017-03-11 RX ADMIN — HYDROCODONE BITARTRATE AND ACETAMINOPHEN 1 TABLET: 325; 10 TABLET ORAL at 06:22

## 2017-03-11 RX ADMIN — VANCOMYCIN HYDROCHLORIDE 1750 MG: 10 INJECTION, POWDER, LYOPHILIZED, FOR SOLUTION INTRAVENOUS at 21:01

## 2017-03-11 RX ADMIN — LISINOPRIL 20 MG: 20 TABLET ORAL at 08:44

## 2017-03-11 RX ADMIN — OXYCODONE HYDROCHLORIDE 15 MG: 15 TABLET ORAL at 14:07

## 2017-03-11 RX ADMIN — PIPERACILLIN SODIUM,TAZOBACTAM SODIUM 3.38 G: 3; .375 INJECTION, POWDER, FOR SOLUTION INTRAVENOUS at 11:40

## 2017-03-11 RX ADMIN — OXYCODONE HYDROCHLORIDE 15 MG: 15 TABLET ORAL at 00:01

## 2017-03-11 RX ADMIN — MONTELUKAST SODIUM 10 MG: 10 TABLET, FILM COATED ORAL at 00:01

## 2017-03-11 RX ADMIN — VENLAFAXINE HYDROCHLORIDE 37.5 MG: 37.5 CAPSULE, EXTENDED RELEASE ORAL at 08:44

## 2017-03-11 RX ADMIN — FAMOTIDINE 20 MG: 20 TABLET ORAL at 17:47

## 2017-03-11 RX ADMIN — ANASTROZOLE 1 MG: 1 TABLET, COATED ORAL at 08:45

## 2017-03-11 RX ADMIN — HYDROCHLOROTHIAZIDE 12.5 MG: 25 TABLET ORAL at 08:44

## 2017-03-11 RX ADMIN — ONDANSETRON HYDROCHLORIDE 4 MG: 2 SOLUTION INTRAMUSCULAR; INTRAVENOUS at 21:11

## 2017-03-11 RX ADMIN — FAMOTIDINE 20 MG: 20 TABLET ORAL at 08:45

## 2017-03-11 RX ADMIN — VANCOMYCIN HYDROCHLORIDE 1750 MG: 10 INJECTION, POWDER, LYOPHILIZED, FOR SOLUTION INTRAVENOUS at 09:20

## 2017-03-11 RX ADMIN — ONDANSETRON HYDROCHLORIDE 4 MG: 2 SOLUTION INTRAMUSCULAR; INTRAVENOUS at 06:30

## 2017-03-11 RX ADMIN — HYDROCODONE BITARTRATE AND ACETAMINOPHEN 1 TABLET: 325; 10 TABLET ORAL at 21:11

## 2017-03-11 RX ADMIN — PIPERACILLIN SODIUM,TAZOBACTAM SODIUM 3.38 G: 3; .375 INJECTION, POWDER, FOR SOLUTION INTRAVENOUS at 21:00

## 2017-03-11 RX ADMIN — ONDANSETRON HYDROCHLORIDE 4 MG: 2 SOLUTION INTRAMUSCULAR; INTRAVENOUS at 14:07

## 2017-03-11 RX ADMIN — METOPROLOL SUCCINATE 100 MG: 100 TABLET, EXTENDED RELEASE ORAL at 08:45

## 2017-03-11 RX ADMIN — HEPARIN SODIUM 5000 UNITS: 5000 INJECTION, SOLUTION INTRAVENOUS; SUBCUTANEOUS at 08:45

## 2017-03-11 RX ADMIN — MONTELUKAST SODIUM 10 MG: 10 TABLET, FILM COATED ORAL at 22:32

## 2017-03-11 NOTE — PERIOP NOTES
TRANSFER - OUT REPORT:    Verbal report given to [facility], operated by [facility] RN on Bolivar Love  being transferred to [facility] surg room 344 for routine progression of care       Report consisted of patients Situation, Background, Assessment and   Recommendations(SBAR). Information from the following report(s) SBAR, OR Summary and MAR was reviewed with the receiving nurse. Opportunity for questions and clarification was provided.       Patient transported with:   O2 @ 3 liters  Registered Nurse

## 2017-03-11 NOTE — PROGRESS NOTES
Shift assessment complete via doc flow sheet. Patient is alert and oriented x 4. Returned to unit from surgery. Respirations even and unlabored on oxygen at 3 L/min via nasal cannula. Lung sounds CTA bilaterally. Heart sounds S1, S2 auscultated and regular. Abdomen soft and non tender. Bowel sounds active to all 4 quadrants. Patient reported last BM was this morning. IV fluids infusing without difficulty. Patient ambulates to bathroom as needed with minimal assistance. Patient has been voided large amount of urine post surgery. Patient c/o left breast pain that she rated at 7/10. No other needs voiced at this time. Bed is locked and in low position. Bed rails x 3. Patient is encouraged to call for assistance. Call light within reach.

## 2017-03-11 NOTE — ANESTHESIA POSTPROCEDURE EVALUATION
Post-Anesthesia Evaluation and Assessment    Patient: Dago Pablo MRN: [id number]  SSN: [id number]    YOB: 1955  Age: 64 y.o. Sex: female       Cardiovascular Function/Vital Signs  Visit Vitals    /77 (BP 1 Location: Right arm, BP Patient Position: At rest)    Pulse 91    Temp 36.3 °C (97.4 °F)    Resp 18    Ht 5' 2.5\" (1.588 m)    Wt 132 kg (291 lb 0.1 oz)    SpO2 97%    Breastfeeding No    BMI 52.38 kg/m2       Patient is status post general anesthesia for Procedure(s):  EVACUATION OF SEROMA OF LEFT BREAST . Nausea/Vomiting: None    Postoperative hydration reviewed and adequate. Pain:  Pain Scale 1: Numeric (0 - 10) (03/10/17 1851)  Pain Intensity 1: 7 (03/10/17 1851)   Managed    Neurological Status:   Neuro (WDL): Exceptions to WDL (03/10/17 1811)  Neuro  Neurologic State: Drowsy; Eyes open to voice (03/10/17 1811)  Orientation Level: Oriented to person;Oriented to place;Oriented to situation (03/10/17 1811)  Cognition: Follows commands (03/10/17 1811)  Speech: Clear (03/10/17 1811)  LUE Motor Response: Purposeful (03/10/17 1811)  LLE Motor Response: Purposeful (03/10/17 1811)  RUE Motor Response: Purposeful (03/10/17 1811)  RLE Motor Response: Purposeful (03/10/17 1811)   At baseline    Mental Status and Level of Consciousness: Arousable    Pulmonary Status:   O2 Device: Nasal cannula (03/10/17 1851)   Adequate oxygenation and airway patent    Complications related to anesthesia: None    Post-anesthesia assessment completed.  No concerns    Signed By: Jah Alfred MD     March 10, 2017

## 2017-03-11 NOTE — PROGRESS NOTES
Pt c/o 9/10 pain administered roxicodone 15 mg PO per MD order, pt also c/o nausea administered zofran IV per MD order, will continue to monitor.

## 2017-03-11 NOTE — OP NOTES
[facility] 65   OPERATIVE REPORT       Name:  Sharon Seaman   MR#:  [id number]   :  1955   Account #:  [de-identified]   Date of Adm:  2017       DATE OF SURGERY: 03/10/2017    PREOPERATIVE DIAGNOSIS: Left chest wall cellulitis and seroma. POSTOPERATIVE DIAGNOSES: Left chest wall cellulitis and   seroma. NAME OF PROCEDURE   1. Incision and drainage of left chest wall seroma with   ultrasound guidance. 2. Placement of drug-eluting antibiotic beads, left chest wall. ANESTHESIA: General.    INDICATIONS FOR PROCEDURE: Ms. Melvina Luciano is a 40-year-old lady with   previous history of left breast cancer, status post mastectomy   and tissue expander placement. She was noted on ultrasound to   have fluid collection with overlying cellulitis and presents for   drainage. DESCRIPTION OF PROCEDURE: Prior to the procedure, the patient   was met in her room. Risks, benefits, and alternatives were   discussed with her again, including the possibility of needing   to remove her expander. She agreed to proceed with surgery. She was taken to the operating room and surgical time-out was   performed. General endotracheal anesthesia was induced. The left   chest wall was prepped with Betadine and draped in the usual   sterile fashion. The extent of the seroma, as well as fluid   collection were marked using ultrasound. With ultrasound   guidance, the fluid was noted to be above the pectoralis muscle,   distinct from the pericapsular cavity. A linear incision was   made through a previous scar directly over this fluid collection   and blunt dissection used to access the fluid. The fluid was   noted to be straw-colored without sign of previous hematoma and   approximately 5 mL of this was sent for culture and sensitivity. The cavity was then copiously irrigated. There were some fine   loculations that were taken down.  After irrigation with   antibiotic solution tobramycin and gentamicin antibiotic beads   were prepared and these were similarly placed in the cavity. A   10 flat BISI drain was placed and taken out laterally. Superficial   closure was completed with interrupted 3-0 Monocryl and running   4-0 nylon in the skin. A compressive dressing was applied over   this. ESTIMATED BLOOD LOSS: 5 mL. COMPLICATIONS: None.         MD LIYAH See / UTE   D:  03/11/2017   11:46   T:  03/11/2017   14:02   Job #:  [id number]

## 2017-03-11 NOTE — PROGRESS NOTES
S: Incisional pain at the axilla but overall chest wall pain improved. No sequelae from surgery. Still some nausea, improving.     3/10 - OR for evacuation of seroma. Implant unaffected. Seroma in prepectoral space with intact implant coverage. Antibiotic beads placed as well. O:  Visit Vitals    /51 (BP 1 Location: Right arm, BP Patient Position: At rest)    Pulse 93    Temp 97.9 °F (36.6 °C)    Resp 18    Ht 5' 2.5\" (1.588 m)    Wt 132 kg (291 lb 0.1 oz)    SpO2 99%    Breastfeeding No    BMI 52.38 kg/m2       A&O x3  RRR  Resp Clear  Abd obese, soft  Left chest wall mildly  warm, erythematous. Drain serous.          UA - clear  Urine culture - pending  Blood culture NGTD  U/S loculated fluid collection  Fluid cultures - pending     A:   Left chest wall cellulitis, seroma      P:  Continue broad spectrum abx pending OR cultures transition to PO based on OR cultures. Likely D/C tomorrow or Monday depending on cultures  Nausea - post surgical. Continue zofran. Bowel Regimen  PT - No limitations on activity. OOBTC, walk.   DVT prophy - SCD's, resume anticoagulation

## 2017-03-11 NOTE — PROGRESS NOTES
Problem: Mobility Impaired (Adult and Pediatric)  Goal: *Acute Goals and Plan of Care (Insert Text)  STG:  (1.)Ms. Juan Brannon will move from supine to sit and sit to supine with INDEPENDENCE within 1-3 day(s). (2.)Ms. Juan Brannon will transfer from bed to chair and chair to bed with INDEPENDENCE using the least restrictive device within 1-3 day(s). (3.)Ms. Juan Brannon will ambulate with MODIFIED INDEPENDENCE for 50 feet with the least restrictive device within 1-3 day(s). _________________________________________________________________________________       PHYSICAL THERAPY: Daily Note, Treatment Day: 2nd and AM 3/11/2017  INPATIENT: Hospital Day: 6  Payor: MEDICAID OF [place] / Plan: [place] / Product Type: Medicaid /      NAME/AGE/GENDER: Talha Ralph is a 64 y.o. female           PRIMARY DIAGNOSIS: Seroma of breast [N64.89] Cellulitis and abscess of trunk Cellulitis and abscess of trunk  Procedure(s) (LRB):  EVACUATION OF SEROMA OF LEFT BREAST  (Left)  1 Day Post-Op  ICD-10: Treatment Diagnosis:       · Generalized Muscle Weakness (M62.81)  · Difficulty in walking, Not elsewhere classified (R26.2)   Precaution/Allergies:  Bactrim [sulfamethoprim ds]       ASSESSMENT:      Ms. Juan Brannon presents with generalized weakness and decreased functional mobility and decreased tolerance for out of bed activity. When she walked out of the bathroom, she needed to sit down before doing anything else. Pt at time of eval is at a gross stand by assist level for her mobility. She lives alone and has an aide that comes for 3 hours a day, 5 days a week to assist her with errands and household chores. Feel she will benefit from a few physical therapy sessions while she is here in the hospital to ensure her safety and maximize independence with mobility. Patient able to ambulate increased distance today with supervision only.   Patient's mobility limited prior to surgery by CHF and orthopaedics issues (back, hip, and knee pain). Unsure of any ROM restrictions with L UE secondary to drain placement. Patient demonstrating good shoulder abduction/flexion to 90-degrees. Patient already has a referral to outpatient oncology rehab but unable to schedule when contacted secondary to hospitalization. Encouraged patient to follow-up with referral at discharge. Will clarify ROM restrictions prior to more aggressive UE exercises. Plan to follow patient for one additional visit. MD clarified no restrictions following session. This section established at most recent assessment   PROBLEM LIST (Impairments causing functional limitations):  1. Decreased ADL/Functional Activities  2. Decreased Transfer Abilities  3. Decreased Ambulation Ability/Technique  4. Decreased Activity Tolerance  5. Decreased Pacing Skills    INTERVENTIONS PLANNED: (Benefits and precautions of physical therapy have been discussed with the patient.)  1. Bed Mobility  2. Gait Training  3. Home Exercise Program (HEP)  4. Therapeutic Activites  5. Therapeutic Exercise/Strengthening  6. Transfer Training      TREATMENT PLAN: Frequency/Duration: daily for duration of hospital stay  Rehabilitation Potential For Stated Goals: GOOD      RECOMMENDED REHABILITATION/EQUIPMENT: (at time of discharge pending progress): Home Health: Physical Therapy. HISTORY:   History of Present Injury/Illness (Reason for Referral):  PER MD NOTE: 65 y/o s/p Left mastectomy and ALND January 2017 presented to clinic today with complaints of fever, chills, increased redness left chest wall. Associated malaise and increased pain. Past Medical History/Comorbidities:   Ms. Gris Chaudhary  has a past medical history of Arthritis; Asthma; CAD (coronary artery disease); Cancer ([facility] [address] Chronic pain; Complicated UTI (urinary tract infection) (12/8/2015); Diverticulosis; Heart failure ([address] History of echocardiogram (11/17/14 at [facility]); History of prediabetes;  Hypertension; Shortness of breath; Sleep apnea; and Thyroid cyst.  Ms. Melvina Luciano  has a past surgical history that includes carpal tunnel release (Bilateral); cyst removal; cardiac surg procedure unlist (Cath 11/18/14 [facility]); lap cholecystectomy; sinus surgery proc unlisted; other surgical; neurological procedure unlisted; cholecystectomy; and breast surgery procedure unlisted. Social History/Living Environment:   Home Environment: Apartment  # Steps to Enter: 6  Rails to Enter: Yes  [employer] : Bilateral  One/Two Story Residence: One story  # of Interior Steps: 6  Height of Each Step (in): 9 inches  Interior Rails: Both  Lift Chair Available: No  Living Alone: Yes  Support Systems: Child(cindy)  Patient Expects to be Discharged to[de-identified] Apartment  Current DME Used/Available at Home: Cane, straight, Commode, bedside, Shower chair  Tub or Shower Type: Tub/Shower combination  Prior Level of Function/Work/Activity:  Pt living at home, independent with mobility with a cane, independent with self care. Had an aide come to the house to assist with errands and household chores. States she did not drive. Number of Personal Factors/Comorbidities that affect the Plan of Care: 0: LOW COMPLEXITY   EXAMINATION:   Most Recent Physical Functioning:   Gross Assessment:  AROM: Generally decreased, functional (R UE and B LEs)  Strength: Generally decreased, functional (R UE and B LEs)               Posture:     Balance:  Sitting: Intact  Standing - Static: Good  Standing - Dynamic : Fair Bed Mobility:     Wheelchair Mobility:     Transfers:  Sit to Stand: Supervision  Stand to Sit: Supervision  Bed to Chair: Supervision  Gait:     Base of Support: Widened  Speed/Samantha: Pace decreased (<100 feet/min)  Step Length: Left shortened;Right shortened  Gait Abnormalities: Trunk sway increased  Distance (ft): 120 Feet (ft) (x 2)  Ambulation - Level of Assistance: Supervision       Body Structures Involved:  1. Muscles Body Functions Affected:  1.  Movement Related Activities and Participation Affected:  1. Mobility  2. Self Care   Number of elements that affect the Plan of Care: 4+: HIGH COMPLEXITY   CLINICAL PRESENTATION:   Presentation: Stable and uncomplicated: LOW COMPLEXITY   CLINICAL DECISION MAKIN [facility] [address] [id number] AM-PAC 6 Clicks   Basic Mobility Inpatient Short Form  How much difficulty does the patient currently have. .. Unable A Lot A Little None   1. Turning over in bed (including adjusting bedclothes, sheets and blankets)? [ ] 1   [ ] 2   [X] 3   [ ] 4   2. Sitting down on and standing up from a chair with arms ( e.g., wheelchair, bedside commode, etc.)   [ ] 1   [ ] 2   [X] 3   [ ] 4   3. Moving from lying on back to sitting on the side of the bed? [ ] 1   [ ] 2   [X] 3   [ ] 4   How much help from another person does the patient currently need. .. Total A Lot A Little None   4. Moving to and from a bed to a chair (including a wheelchair)? [ ] 1   [ ] 2   [X] 3   [ ] 4   5. Need to walk in hospital room? [ ] 1   [ ] 2   [X] 3   [ ] 4   6. Climbing 3-5 steps with a railing? [ ] 1   [ ] 2   [X] 3   [ ] 4   © , Trustees of [address] [place] [address] [address] [address], under license to [employer]. All rights reserved    Score:  Initial: 18 Most Recent: X (Date: -- )     Interpretation of Tool:  Represents activities that are increasingly more difficult (i.e. Bed mobility, Transfers, Gait).        Score 24 23 22-20 19-15 14-10 9-7 6       Modifier CH CI CJ CK CL CM CN         · Mobility - Walking and Moving Around:               - CURRENT STATUS:    CK - 40%-59% impaired, limited or restricted               - GOAL STATUS:           CK - 40%-59% impaired, limited or restricted               - D/C STATUS:                       ---------------To be determined---------------  Payor[place] / [place] / Product Type: Medicaid /       Medical Necessity:     · Patient is expected to demonstrate progress in strength, balance and functional technique to decrease assistance required with functional mobility. Reason for Services/Other Comments:  · Patient continues to require skilled intervention due to inability to complete functional mobility independently. Use of outcome tool(s) and clinical judgement create a POC that gives a: Clear prediction of patient's progress: LOW COMPLEXITY                 TREATMENT:   (In addition to Assessment/Re-Assessment sessions the following treatments were rendered)   Pre-treatment Symptoms/Complaints:  Patient with no new complaints and agreeable to therapy. Pain: Initial:   Pain Intensity 1: 5  Pain Location 1: Breast  Pain Orientation 1: Left  Post Session:        Therapeutic Activity: (    20 minutes): Therapeutic activities including Bed transfers, Chair transfers and Ambulation on level ground to improve mobility, strength, balance and coordination. Required minimal verbal cues   to promote static and dynamic balance in standing. Date:  3/10 Date:   Date:     Activity/Exercise Parameters Parameters Parameters   Shoulder flex/ext 6     Shoulder flex 6     Shoulder abd/add 6     Shoulder ER/IR 6                         Braces/Orthotics/Lines/Etc:   · drain  Treatment/Session Assessment:    · Response to Treatment:  Patient tolerated well. Increased mobility today. Will follow for one additional visit. · Interdisciplinary Collaboration:  · Registered Nurse  · After treatment position/precautions:  · Up in chair, Bed/Chair-wheels locked and Call light within reach  · Compliance with Program/Exercises: compliant all of the time. · Recommendations/Intent for next treatment session: \"Next visit will focus on advancements to more challenging activities and reduction in assistance provided\".   Total Treatment Duration:  PT Patient Time In/Time Out  Time In: 1030  Time Out: [facility]

## 2017-03-11 NOTE — PROGRESS NOTES
Problem: Self Care Deficits Care Plan (Adult)  Goal: *Acute Goals and Plan of Care (Insert Text)  1. Patient will perform grooming with supervision/mod I.  2. Patient will perform Upper body dressing with supervision/Mod I  3. Patient will perform lower body dressing with supervision/Mod I.  4. Patient will perform upper and lower body bathing with supervision/mod I.  5. Patient will perform toilet transfers with supervision/mod I.  6. Patient will perform shower transfer with supervision/mod I.  7. Patient will participate in 30 + minutes of ADL/ therapeutic exercise/therapeutic activity with mod/min rest breaks to increase activity tolerance for self care. 8. Patient will perform ADL functional mobility in room with supervision/mod I.    Goals to be achieved in 7 days      OCCUPATIONAL THERAPY: Daily Note, Treatment Day: 1st and PM 3/11/2017  INPATIENT: Hospital Day: 6  Payor: [id number] / Plan: [address] [place] [address] / Product Type: Medicaid /      NAME/AGE/GENDER: Kash Carney is a 64 y.o. female           PRIMARY DIAGNOSIS:  Seroma of breast [N64.89] Cellulitis and abscess of trunk Cellulitis and abscess of trunk  Procedure(s) (LRB):  EVACUATION OF SEROMA OF LEFT BREAST  (Left)  1 Day Post-Op  ICD-10: Treatment Diagnosis:        · Pain in Left Shoulder (M25.512)  · Stiffness of Left Shoulder, Not elsewhere classified (M25.612)  · Generalized Muscle Weakness (M62.81)   Precautions/Allergies:         Bactrim [sulfamethoprim ds]       ASSESSMENT:      Ms. Jose David Gallegos presents sitting on edge of bed finishing lunch. Pt completed functional mobility in room and hallway with supervision. Pt sat edge of bed and completed the exercises below on B UE's. Pt completed grooming and toileting with the assistance listed below. Ms. Jose David Gallegos would benefit from OT services to facilitate an increase in self care and functional mobility. Will follow.       This section established at most recent assessment PROBLEM LIST (Impairments causing functional limitations):  1. Decreased Strength  2. Decreased ADL/Functional Activities  3. Decreased Transfer Abilities  4. Decreased Ambulation Ability/Technique  5. Decreased Balance  6. Decreased Activity Tolerance  7. Decreased Pacing Skills  8. Decreased Work Simplification/Energy Conservation Techniques  9. Decreased Flexibility/Joint Mobility    INTERVENTIONS PLANNED: (Benefits and precautions of occupational therapy have been discussed with the patient.)  1. Activities of daily living training  2. Adaptive equipment training  3. Balance training  4. Clothing management  5. Donning&doffing training  6. Candido tech training  7. Neuromuscular re-eduation  8. Theraputic activity  9. Theraputic exercise  10. Home safety and DME recommendations      TREATMENT PLAN: Frequency/Duration: Follow patient 2 x week to address above goals. Rehabilitation Potential For Stated Goals: GOOD      RECOMMENDED REHABILITATION/EQUIPMENT: (at time of discharge pending progress): Continue Skilled Therapy and Home Health: Physical Therapy. OCCUPATIONAL PROFILE AND HISTORY:   History of Present Injury/Illness (Reason for Referral):  Decreased self care and functional mobility, decrease L UE use due to cellulitis  Past Medical History/Comorbidities:   Ms. Beba Garsia  has a past medical history of Arthritis; Asthma; CAD (coronary artery disease); Cancer ([facility] [address] Chronic pain; Complicated UTI (urinary tract infection) (12/8/2015); Diverticulosis; Heart failure ([address] History of echocardiogram (11/17/14 at [facility]); History of prediabetes; Hypertension;  Shortness of breath; Sleep apnea; and Thyroid cyst.  Ms. Beba Garsia  has a past surgical history that includes carpal tunnel release (Bilateral); cyst removal; cardiac surg procedure unlist (Cath 11/18/14 [facility]); lap cholecystectomy; sinus surgery proc unlisted; other surgical; neurological procedure unlisted; cholecystectomy; and breast surgery procedure unlisted.   Social History/Living Environment:   Home Environment: Apartment  # Steps to Enter: 6  Rails to Enter: Yes  [employer] : Bilateral  One/Two Story Residence: One story  # of Interior Steps: 6  Height of Each Step (in): 9 inches  Interior Rails: Both  Lift Chair Available: No  Living Alone: Yes  Support Systems: Child(cindy)  Patient Expects to be Discharged to[de-identified] Apartment  Current DME Used/Available at Home: Cane, straight, Commode, bedside, Shower chair  Tub or Shower Type: Tub/Shower combination  Prior Level of Function/Work/Activity:  Mod I and lives alone, HHA 5 x week 3 hours day      Number of Personal Factors/Comorbidities that affect the Plan of Care: Brief history (0):  LOW COMPLEXITY   ASSESSMENT OF OCCUPATIONAL PERFORMANCE[de-identified]   Activities of Daily Living:           Basic ADLs (From Assessment) Complex ADLs (From Assessment)   Basic ADL  Feeding: Setup  Oral Facial Hygiene/Grooming: Supervision  Bathing: Stand-by assistance, Minimum assistance  Upper Body Dressing: Supervision  Lower Body Dressing: Stand-by assistance, Minimum assistance  Toileting: Stand by assistance     Grooming/Bathing/Dressing Activities of Daily Living   Grooming  Washing Hands: Supervision/set-up Cognitive Retraining  Safety/Judgement: Fall prevention           Toileting  Toileting Assistance: Modified independent  Bladder Hygiene: Independent  Adaptive Equipment: Elevated seat     Functional Transfers  Toilet Transfer : Supervision     Bed/Mat Mobility  Sit to Stand: Supervision          Most Recent Physical Functioning:   Gross Assessment:                  Posture:  Posture (WDL): Within defined limits  Balance:  Sitting: Intact  Standing: Intact Bed Mobility:     Wheelchair Mobility:     Transfers:  Sit to Stand: Supervision                    Patient Vitals for the past 6 hrs:   BP BP Patient Position SpO2 O2 Flow Rate (L/min) Pulse   03/11/17 0738 - - - 3 l/min -   03/11/17 1114 127/51 At rest 99 % - 93 Mental Status  Neurologic State: Alert  Orientation Level: Oriented X4  Cognition: Appropriate decision making, Appropriate for age attention/concentration  Perception: Appears intact  Perseveration: No perseveration noted  Safety/Judgement: Fall prevention                               Physical Skills Involved:  1. Range of Motion  2. Balance  3. Mobility  4. Endurance  5. Fine or Gross Motor Coordination Cognitive Skills Affected (resulting in the inability to perform in a timely and safe manner):  1. Problem Solving  2. Remembering Psychosocial Skills Affected:  1. Routines and Behaviors  2. Environmental Adaptations   Number of elements that affect the Plan of Care: 5+:  HIGH COMPLEXITY   CLINICAL DECISION MAKIN [facility] Mobility Inpatient Short Form  How much help from another person does the patient currently need. .. Total A Lot A Little None   1. Putting on and taking off regular lower body clothing?   [ ] 1   [ ] 2   [X] 3   [ ] 4   2. Bathing (including washing, rinsing, drying)? [ ] 1   [ ] 2   [X] 3   [ ] 4   3. Toileting, which includes using toilet, bedpan or urinal?   [ ] 1   [ ] 2   [ ] 3   [X] 4   4. Putting on and taking off regular upper body clothing?   [ ] 1   [ ] 2   [X] 3   [ ] 4   5. Taking care of personal grooming such as brushing teeth? [ ] 1   [ ] 2   [ ] 3   [X] 4   6. Eating meals? [ ] 1   [ ] 2   [ ] 3   [X] 4   © , Trustees of [address] [place] [address], under license to [employer]. All rights reserved    Score:  Initial: 21 Most Recent: X (Date: -- )     Interpretation of Tool:  Represents activities that are increasingly more difficult (i.e. Bed mobility, Transfers, Gait).        Score 24 23 22-20 19-15 14-10 9-7 6       Modifier CH CI CJ CK CL CM CN         · Self Care:               - CURRENT STATUS:    CJ - 20%-39% impaired, limited or restricted               - GOAL STATUS:           CI - 1%-19% impaired, limited or restricted               - D/C STATUS:                       ---------------To be determined---------------  Payor: [address] [place], [place] [address] / Plan: [place] MEDICA[place] [facility] / Product Type: Medicaid /       Medical Necessity:     · Patient is expected to demonstrate progress in balance, coordination and functional technique to decrease assistance required with self care and functional mobility and improve safety during self care and functional mobility. Reason for Services/Other Comments:  · Patient continues to require skilled intervention due to decreased self care and functional mobility. Use of outcome tool(s) and clinical judgement create a POC that gives a: LOW COMPLEXITY             TREATMENT:   (In addition to Assessment/Re-Assessment sessions the following treatments were rendered)      Pre-treatment Symptoms/Complaints: Tolerated well  Pain: Initial:   Pain Intensity 1: 3 (before and after treatment )  Pain Location 1: Shoulder, Breast  Pain Intervention(s) 1: Repositioned  Post Session:  5/10      Self Care: (15): Procedure(s) (per grid) utilized to improve and/or restore self-care/home management as related to toileting and grooming. Required min verbal cueing to facilitate activities of daily living skills. Therapeutic Exercise: ( 15):  Exercises per grid below to improve mobility, strength and balance. Required min verbal cues to promote proper body posture and promote proper body mechanics. Progressed repetitions as indicated.     ROM on B UE's with limited range on L UE   Date:  1/11/17 Date:   Date:     Activity/Exercise   Parameters Parameters Parameters   Shoulder flexion/extension  10 reps      Shoulder abduction/adduction  10 reps     Elbow flexion/extenions  10 reps     Punches  10 reps                              Braces/Orthotics/Lines/Etc:   · IV  Treatment/Session Assessment:    · Response to Treatment: cooperative  · Interdisciplinary Collaboration:  · Physical Therapist, Certified Occupational Therapy Assistant and Registered Nurse  · After treatment position/precautions:  · Up in chair  · Compliance with Program/Exercises: Will assess as treatment progresses. · Recommendations/Intent for next treatment session: \"Next visit will focus on advancements to more challenging activities and reduction in assistance provided\".   Total Treatment Duration:  OT Patient Time In/Time Out  Time In: 1250  Time Out: [address]

## 2017-03-11 NOTE — PROGRESS NOTES
TRANSFER - IN REPORT:    Verbal report received from [address] [place] [address] (name) on Talha Ralph  being received from PACU (unit) for routine progression of care      Report consisted of patients Situation, Background, Assessment and   Recommendations(SBAR). Information from the following report(s) SBAR, OR Summary, Intake/Output and MAR was reviewed with the receiving nurse. Opportunity for questions and clarification was provided. Assessment completed upon patients arrival to unit and care assumed.

## 2017-03-11 NOTE — PROGRESS NOTES
Pt assessment completed. Alert and oriented. Lungs CTA diminished in bases with even and unlabored respirations. Heart sounds regular. Abdomen soft and non tender with active bowel sounds. IV present and infusing without difficulty. Dry dressing present to left breast, clean dry and intact. BISI drain present to left breast, patent and draining. All needs met at this time. will continue to monitor.

## 2017-03-12 LAB
BACTERIA SPEC CULT: NORMAL
SERVICE CMNT-IMP: NORMAL

## 2017-03-12 PROCEDURE — 74011000258 HC RX REV CODE- 258: Performed by: SURGERY

## 2017-03-12 PROCEDURE — 74011250637 HC RX REV CODE- 250/637: Performed by: INTERNAL MEDICINE

## 2017-03-12 PROCEDURE — 76937 US GUIDE VASCULAR ACCESS: CPT

## 2017-03-12 PROCEDURE — 74011250636 HC RX REV CODE- 250/636: Performed by: NURSE PRACTITIONER

## 2017-03-12 PROCEDURE — 02HV33Z INSERTION OF INFUSION DEVICE INTO SUPERIOR VENA CAVA, PERCUTANEOUS APPROACH: ICD-10-PCS | Performed by: SURGERY

## 2017-03-12 PROCEDURE — 77030018786 HC NDL GD F/USND BARD -B

## 2017-03-12 PROCEDURE — 74011250636 HC RX REV CODE- 250/636: Performed by: SURGERY

## 2017-03-12 PROCEDURE — 74011250636 HC RX REV CODE- 250/636: Performed by: INTERNAL MEDICINE

## 2017-03-12 PROCEDURE — 74011250637 HC RX REV CODE- 250/637: Performed by: NURSE PRACTITIONER

## 2017-03-12 PROCEDURE — C1751 CATH, INF, PER/CENT/MIDLINE: HCPCS

## 2017-03-12 PROCEDURE — 97530 THERAPEUTIC ACTIVITIES: CPT

## 2017-03-12 PROCEDURE — 74011250637 HC RX REV CODE- 250/637: Performed by: SURGERY

## 2017-03-12 PROCEDURE — 77030018719 HC DRSG PTCH ANTIMIC J&J -A

## 2017-03-12 PROCEDURE — 65270000029 HC RM PRIVATE

## 2017-03-12 PROCEDURE — 36569 INSJ PICC 5 YR+ W/O IMAGING: CPT | Performed by: SURGERY

## 2017-03-12 RX ORDER — HEPARIN 100 UNIT/ML
300 SYRINGE INTRAVENOUS AS NEEDED
Status: DISCONTINUED | OUTPATIENT
Start: 2017-03-12 | End: 2017-03-13 | Stop reason: HOSPADM

## 2017-03-12 RX ORDER — SODIUM CHLORIDE 0.9 % (FLUSH) 0.9 %
10 SYRINGE (ML) INJECTION EVERY 8 HOURS
Status: DISCONTINUED | OUTPATIENT
Start: 2017-03-12 | End: 2017-03-13 | Stop reason: HOSPADM

## 2017-03-12 RX ORDER — SODIUM CHLORIDE 0.9 % (FLUSH) 0.9 %
10 SYRINGE (ML) INJECTION AS NEEDED
Status: DISCONTINUED | OUTPATIENT
Start: 2017-03-12 | End: 2017-03-13 | Stop reason: HOSPADM

## 2017-03-12 RX ORDER — HEPARIN 100 UNIT/ML
300 SYRINGE INTRAVENOUS EVERY 8 HOURS
Status: DISCONTINUED | OUTPATIENT
Start: 2017-03-12 | End: 2017-03-13 | Stop reason: HOSPADM

## 2017-03-12 RX ADMIN — VENLAFAXINE HYDROCHLORIDE 37.5 MG: 37.5 CAPSULE, EXTENDED RELEASE ORAL at 08:16

## 2017-03-12 RX ADMIN — HEPARIN SODIUM 5000 UNITS: 5000 INJECTION, SOLUTION INTRAVENOUS; SUBCUTANEOUS at 08:18

## 2017-03-12 RX ADMIN — METOPROLOL SUCCINATE 100 MG: 100 TABLET, EXTENDED RELEASE ORAL at 08:16

## 2017-03-12 RX ADMIN — FAMOTIDINE 20 MG: 20 TABLET ORAL at 17:40

## 2017-03-12 RX ADMIN — PIPERACILLIN SODIUM,TAZOBACTAM SODIUM 3.38 G: 3; .375 INJECTION, POWDER, FOR SOLUTION INTRAVENOUS at 12:05

## 2017-03-12 RX ADMIN — FAMOTIDINE 20 MG: 20 TABLET ORAL at 08:18

## 2017-03-12 RX ADMIN — PIPERACILLIN SODIUM,TAZOBACTAM SODIUM 3.38 G: 3; .375 INJECTION, POWDER, FOR SOLUTION INTRAVENOUS at 19:54

## 2017-03-12 RX ADMIN — HYDROCODONE BITARTRATE AND ACETAMINOPHEN 1 TABLET: 325; 10 TABLET ORAL at 08:15

## 2017-03-12 RX ADMIN — HYDROCODONE BITARTRATE AND ACETAMINOPHEN 1 TABLET: 325; 10 TABLET ORAL at 21:01

## 2017-03-12 RX ADMIN — ONDANSETRON HYDROCHLORIDE 4 MG: 2 SOLUTION INTRAMUSCULAR; INTRAVENOUS at 16:04

## 2017-03-12 RX ADMIN — Medication 300 UNITS: at 18:00

## 2017-03-12 RX ADMIN — VANCOMYCIN HYDROCHLORIDE 1750 MG: 10 INJECTION, POWDER, LYOPHILIZED, FOR SOLUTION INTRAVENOUS at 08:21

## 2017-03-12 RX ADMIN — Medication 10 ML: at 21:01

## 2017-03-12 RX ADMIN — ONDANSETRON HYDROCHLORIDE 4 MG: 2 SOLUTION INTRAMUSCULAR; INTRAVENOUS at 08:16

## 2017-03-12 RX ADMIN — VANCOMYCIN HYDROCHLORIDE 1750 MG: 10 INJECTION, POWDER, LYOPHILIZED, FOR SOLUTION INTRAVENOUS at 21:01

## 2017-03-12 RX ADMIN — ONDANSETRON HYDROCHLORIDE 4 MG: 2 SOLUTION INTRAMUSCULAR; INTRAVENOUS at 21:01

## 2017-03-12 RX ADMIN — PIPERACILLIN SODIUM,TAZOBACTAM SODIUM 3.38 G: 3; .375 INJECTION, POWDER, FOR SOLUTION INTRAVENOUS at 04:34

## 2017-03-12 RX ADMIN — OXYCODONE HYDROCHLORIDE 15 MG: 15 TABLET ORAL at 00:12

## 2017-03-12 RX ADMIN — HEPARIN SODIUM 5000 UNITS: 5000 INJECTION, SOLUTION INTRAVENOUS; SUBCUTANEOUS at 00:07

## 2017-03-12 RX ADMIN — HYDROCHLOROTHIAZIDE 12.5 MG: 25 TABLET ORAL at 08:16

## 2017-03-12 RX ADMIN — ANASTROZOLE 1 MG: 1 TABLET, COATED ORAL at 08:13

## 2017-03-12 RX ADMIN — LISINOPRIL 20 MG: 20 TABLET ORAL at 08:16

## 2017-03-12 RX ADMIN — POLYETHYLENE GLYCOL 3350 17 G: 17 POWDER, FOR SOLUTION ORAL at 08:18

## 2017-03-12 RX ADMIN — MONTELUKAST SODIUM 10 MG: 10 TABLET, FILM COATED ORAL at 21:22

## 2017-03-12 RX ADMIN — HEPARIN SODIUM 5000 UNITS: 5000 INJECTION, SOLUTION INTRAVENOUS; SUBCUTANEOUS at 16:04

## 2017-03-12 RX ADMIN — HYDROCODONE BITARTRATE AND ACETAMINOPHEN 1 TABLET: 325; 10 TABLET ORAL at 16:04

## 2017-03-12 NOTE — PROGRESS NOTES
Problem: Mobility Impaired (Adult and Pediatric)  Goal: *Acute Goals and Plan of Care (Insert Text)  STG:  (1.)Ms. Ana M Meneses will move from supine to sit and sit to supine with INDEPENDENCE within 1-3 day(s). Met 3/12  (2.)Ms. Ana M Meneses will transfer from bed to chair and chair to bed with INDEPENDENCE using the least restrictive device within 1-3 day(s). (3.)Ms. Ana M Meneses will ambulate with MODIFIED INDEPENDENCE for 50 feet with the least restrictive device within 1-3 day(s). _________________________________________________________________________________       PHYSICAL THERAPY: Daily Note, Discharge and AM 3/12/2017  INPATIENT: Hospital Day: 7  Payor: [address] [place], [place] [address]y [address] / Plan: [address] [place] [address] / Product Type: Medicaid /      NAME/AGE/GENDER: Ebonie Garcia is a 64 y.o. female           PRIMARY DIAGNOSIS: Seroma of breast [N64.89] Cellulitis and abscess of trunk Cellulitis and abscess of trunk  Procedure(s) (LRB):  EVACUATION OF SEROMA OF LEFT BREAST  (Left)  2 Days Post-Op  ICD-10: Treatment Diagnosis:       · Generalized Muscle Weakness (M62.81)  · Difficulty in walking, Not elsewhere classified (R26.2)   Precaution/Allergies:  Bactrim [sulfamethoprim ds]       ASSESSMENT:      Ms. Ana M Meneses presents with generalized weakness and decreased functional mobility and decreased tolerance for out of bed activity. When she walked out of the bathroom, she needed to sit down before doing anything else. Pt at time of eval is at a gross stand by assist level for her mobility. She lives alone and has an aide that comes for 3 hours a day, 5 days a week to assist her with errands and household chores. Feel she will benefit from a few physical therapy sessions while she is here in the hospital to ensure her safety and maximize independence with mobility. Patient awoken for therapy but agreeable.   Patient able to transfer self to sitting on the edge of the be and to the bathroom without assistance with IV dior.  Patient then instructed in L UE ROM exercises as no ROM restrictions per Dr. Fiona Cosetllo. Patient able to perform correctly and HEP left in room. Patient does not require additional skilled therapy while in the hospital and she is able to perform transfers/mobility without assistance. Patient is appropriate for outpatient oncology rehab and there is already an order in the system. Patient was contacted while in the hospital and stressed the importance of following up with referral once discharged. Contact information for oncology rehab provided. This section established at most recent assessment   PROBLEM LIST (Impairments causing functional limitations):  1. Decreased ADL/Functional Activities  2. Decreased Transfer Abilities  3. Decreased Ambulation Ability/Technique  4. Decreased Activity Tolerance  5. Decreased Pacing Skills    INTERVENTIONS PLANNED: (Benefits and precautions of physical therapy have been discussed with the patient.)  1. Bed Mobility  2. Gait Training  3. Home Exercise Program (HEP)  4. Therapeutic Activites  5. Therapeutic Exercise/Strengthening  6. Transfer Training      TREATMENT PLAN: Frequency/Duration: daily for duration of hospital stay  Rehabilitation Potential For Stated Goals: GOOD      RECOMMENDED REHABILITATION/EQUIPMENT: (at time of discharge pending progress): Outpatient: Physical Therapy and oncology rehab. HISTORY:   History of Present Injury/Illness (Reason for Referral):  PER MD NOTE: 63 y/o s/p Left mastectomy and ALND January 2017 presented to clinic today with complaints of fever, chills, increased redness left chest wall. Associated malaise and increased pain. Past Medical History/Comorbidities:   Ms. TRUJILLO [facility]  has a past medical history of Arthritis; Asthma; CAD (coronary artery disease); Cancer ([facility] [address] Chronic pain; Complicated UTI (urinary tract infection) (12/8/2015); Diverticulosis; Heart failure ([facility] [address]  History of echocardiogram ([facility]/17/14 at [facility]); History of prediabetes; Hypertension; Shortness of breath; Sleep apnea; and Thyroid cyst.  Ms. Bk Hampton  has a past surgical history that includes carpal tunnel release (Bilateral); cyst removal; cardiac surg procedure unlist (Cath 11/18/14 [facility]); lap cholecystectomy; sinus surgery proc unlisted; other surgical; neurological procedure unlisted; cholecystectomy; and breast surgery procedure unlisted. Social History/Living Environment:   Home Environment: Apartment  # Steps to Enter: 6  Rails to Enter: Yes  [employer] : Bilateral  One/Two Story Residence: One story  # of Interior Steps: 6  Height of Each Step (in): 9 inches  Interior Rails: Both  Lift Chair Available: No  Living Alone: Yes  Support Systems: Child(cindy)  Patient Expects to be Discharged to[de-identified] Apartment  Current DME Used/Available at Home: Cane, straight, Commode, bedside, Shower chair  Tub or Shower Type: Tub/Shower combination  Prior Level of Function/Work/Activity:  Pt living at home, independent with mobility with a cane, independent with self care. Had an aide come to the house to assist with errands and household chores. States she did not drive. Number of Personal Factors/Comorbidities that affect the Plan of Care: 0: LOW COMPLEXITY   EXAMINATION:   Most Recent Physical Functioning:   Gross Assessment:  AROM: Generally decreased, functional (L UE and B LEs)  Strength: Generally decreased, functional (L UE and B LEs)               Posture:     Balance:  Sitting: Intact  Standing: Intact Bed Mobility:  Supine to Sit: Independent  Sit to Supine: Independent  Wheelchair Mobility:     Transfers:  Sit to Stand: Independent  Stand to Sit: Independent  Bed to Chair: Modified independent  Gait:     Base of Support: Widened  Speed/Samantha: Pace decreased (<100 feet/min)  Step Length: Left shortened;Right shortened  Distance (ft): 20 Feet (ft)  Ambulation - Level of Assistance: Modified independent       Body Structures Involved:  1.  Muscles Body Functions Affected:  1. Movement Related Activities and Participation Affected:  1. Mobility  2. Self Care   Number of elements that affect the Plan of Care: 4+: HIGH COMPLEXITY   CLINICAL PRESENTATION:   Presentation: Stable and uncomplicated: LOW COMPLEXITY   CLINICAL DECISION MAKIN [facility] [address] [address] AM-PAC 6 Clicks   Basic Mobility Inpatient Short Form  How much difficulty does the patient currently have. .. Unable A Lot A Little None   1. Turning over in bed (including adjusting bedclothes, sheets and blankets)? [ ] 1   [ ] 2   [X] 3   [ ] 4   2. Sitting down on and standing up from a chair with arms ( e.g., wheelchair, bedside commode, etc.)   [ ] 1   [ ] 2   [X] 3   [ ] 4   3. Moving from lying on back to sitting on the side of the bed? [ ] 1   [ ] 2   [X] 3   [ ] 4   How much help from another person does the patient currently need. .. Total A Lot A Little None   4. Moving to and from a bed to a chair (including a wheelchair)? [ ] 1   [ ] 2   [X] 3   [ ] 4   5. Need to walk in hospital room? [ ] 1   [ ] 2   [X] 3   [ ] 4   6. Climbing 3-5 steps with a railing? [ ] 1   [ ] 2   [X] 3   [ ] 4   © , Trustees of [address] [facility] [address] [address], under license to [employer]. All rights reserved    Score:  Initial: 18 Most Recent: X (Date: -- )     Interpretation of Tool:  Represents activities that are increasingly more difficult (i.e. Bed mobility, Transfers, Gait).        Score 24 23 22-20 19-15 14-10 9-7 6       Modifier CH CI CJ CK CL CM CN         · Mobility - Walking and Moving Around:               - CURRENT STATUS:    CK - 40%-59% impaired, limited or restricted               - GOAL STATUS:           CK - 40%-59% impaired, limited or restricted               - D/C STATUS:                       ---------------To be determined---------------  Pay[place] / Plan: [place] / Product Type: Medicaid /       Medical Necessity:     · Patient is expected to demonstrate progress in strength, balance and functional technique to decrease assistance required with functional mobility. Reason for Services/Other Comments:  · Patient continues to require skilled intervention due to inability to complete functional mobility independently. Use of outcome tool(s) and clinical judgement create a POC that gives a: Clear prediction of patient's progress: LOW COMPLEXITY                 TREATMENT:   (In addition to Assessment/Re-Assessment sessions the following treatments were rendered)   Pre-treatment Symptoms/Complaints:  Patient with no new complaints and agreeable to therapy. Pain: Initial:   Pain Intensity 1: 6  Pain Location 1: Breast  Pain Orientation 1: Left  Post Session:  6/10      Therapeutic Activity: (    15 minutes): Therapeutic activities including Bed transfers, Chair transfers and Ambulation on level ground to improve mobility, strength, balance and coordination. Required minimal verbal cues   to promote static and dynamic balance in standing. Date:  3/10 Date:  3/12 Date:     Activity/Exercise Parameters Parameters Parameters   Shoulder flex/ext 6 5 L with 5 sec hold at door    Shoulder flex 6     Shoulder abd/add 6 5 L with 5 sec hold at door    Shoulder ER/IR 6 5 with 5 sec hold with hands behind head and lower trunk rotation to R                        Braces/Orthotics/Lines/Etc:   · IV and drain  Treatment/Session Assessment:    · Response to Treatment:  Patient tolerated well. Performing out of bed activities without assistance. Able to demonstrate HEP correctly and copy left in the room. · Interdisciplinary Collaboration:  · Registered Nurse  · After treatment position/precautions:  · Bed/Chair-wheels locked, Call light within reach and sitting on side of bed  · Compliance with Program/Exercises: compliant all of the time.   · Recommendations/Intent for next treatment session:  Patient to be discharged from skilled therapy at this time.  Total Treatment Duration:  PT Patient Time In/Time Out  Time In: 1050  Time Out: 100 [facility] [place]

## 2017-03-12 NOTE — PROGRESS NOTES
PICC Placement Note    PRE-PROCEDURE VERIFICATION  Correct Procedure: yes  Correct Site:  yes  Temperature: Temp: 97.8 °F (36.6 °C), Temperature Source: Temp Source: Oral  No results for input(s): BUN, CREA, PLT, INR, WBC, PLTEXT in the last 72 hours. No lab exists for component: APTHR, INREXT  Allergies: Bactrim [sulfamethoprim ds]    PROCEDURE DETAIL  A single lumen PICC line was started for antibiotic therapy. The following documentation is in addition to the PICC properties in the lines/airways flowsheet :  Lot #: [id number]  xylocaine used: yes  Mid-Arm Circumference: 44 (cm)  Internal Catheter Length: 41 (cm)  Internal Catheter Total Length: 41 (cm)  Vein Selection for PICC:right cephalic  Central Line Bundle followed yes  Complication Related to Insertion: none    The placement was verified by ECG SAPIENS: yes. The ECG SAPIENS results state the tip location is on the right side and the tip overlies the lower superior vena cava.      Line is okay to use: yes    Debbie Dalal RN

## 2017-03-12 NOTE — PROGRESS NOTES
Shift assessment complete via doc flow sheet. Patient is alert and oriented x 4. Respirations even and unlabored on oxygen at 3 L/min via nasal cannula. Lung sounds CTA bilaterally. Heart sounds S1, S2 auscultated and regular. Abdomen soft but obese. Bowel sounds active to all 4 quadrants. Patient reported several BM today. IV fluids infiltrated. Patient ambulates to bathroom ad ole. Patient c/o left breast pain that she rated at 8/10. No other needs voiced at this time. Bed is locked and in low position. Bed rails x 3. Patient is encouraged to call for assistance.  Call light within reach.

## 2017-03-12 NOTE — PROGRESS NOTES
Assessment done via doc flow sheet. Pt sitting up in bed, alert & oriented x3, resp easy & regular. Dressing to left breast intact, hemovac patent, draining serosanguinous output. Pt c/o pain to left breast, 8/10 and nausea. Norco 10 mg 1 tab po given for pain and Zofran 4 mg IVP for nausea.

## 2017-03-12 NOTE — PROGRESS NOTES
S: Incisional pain at the axilla but overall chest wall pain improved. Upset due to IV issues. Nausea improving.      3/10 - OR for evacuation of seroma. Implant unaffected. Seroma in prepectoral space with intact implant coverage. Antibiotic beads placed as well.      O:  Visit Vitals    /78 (BP 1 Location: Right arm, BP Patient Position: At rest;Supine)    Pulse 92    Temp 97.8 °F (36.6 °C)    Resp 18    Ht 5' 2.5\" (1.588 m)    Wt 131.4 kg (289 lb 9.6 oz)    SpO2 95%    Breastfeeding No    BMI 52.12 kg/m2        A&O x3  RRR  Resp Clear  Abd obese, soft  Left chest wall mildly  warm, erythematous. Drain serous.         UA - clear  Urine culture - pending  Blood culture NGTD  U/S loculated fluid collection  Fluid cultures - pending, 0-15 wbc's      A:   Left chest wall cellulitis, seroma      P:  Wound - Continued cellulitis. De-escalating to oral abx risks implant loss so will plan to place PICC line for home IV abx. Plan  For D/C tomorrow. Nausea - post surgical. Continue zofran. Bowel Regimen  PT - No limitations on activity. OOBTC, walk.   DVT prophy - SCD's, resume anticoagulation

## 2017-03-13 ENCOUNTER — HOME HEALTH ADMISSION (OUTPATIENT)
Dept: HOME HEALTH SERVICES | Facility: HOME HEALTH | Age: 62
End: 2017-03-13
Payer: MEDICAID

## 2017-03-13 ENCOUNTER — HOME CARE VISIT (OUTPATIENT)
Dept: SCHEDULING | Facility: HOME HEALTH | Age: 62
End: 2017-03-13
Payer: MEDICAID

## 2017-03-13 VITALS
OXYGEN SATURATION: 92 % | RESPIRATION RATE: 18 BRPM | DIASTOLIC BLOOD PRESSURE: 70 MMHG | BODY MASS INDEX: 51.31 KG/M2 | TEMPERATURE: 98.1 F | WEIGHT: 289.6 LBS | HEIGHT: 63 IN | SYSTOLIC BLOOD PRESSURE: 165 MMHG | HEART RATE: 82 BPM

## 2017-03-13 VITALS
OXYGEN SATURATION: 98 % | DIASTOLIC BLOOD PRESSURE: 70 MMHG | RESPIRATION RATE: 15 BRPM | HEART RATE: 66 BPM | TEMPERATURE: 98 F | SYSTOLIC BLOOD PRESSURE: 140 MMHG

## 2017-03-13 LAB
EST. AVERAGE GLUCOSE BLD GHB EST-MCNC: 140 MG/DL
HBA1C MFR BLD: 6.5 % (ref 4.8–6)

## 2017-03-13 PROCEDURE — 74011250636 HC RX REV CODE- 250/636: Performed by: INTERNAL MEDICINE

## 2017-03-13 PROCEDURE — 74011000258 HC RX REV CODE- 258: Performed by: SURGERY

## 2017-03-13 PROCEDURE — 400013 HH SOC

## 2017-03-13 PROCEDURE — 94760 N-INVAS EAR/PLS OXIMETRY 1: CPT

## 2017-03-13 PROCEDURE — G0299 HHS/HOSPICE OF RN EA 15 MIN: HCPCS

## 2017-03-13 PROCEDURE — 74011250636 HC RX REV CODE- 250/636: Performed by: NURSE PRACTITIONER

## 2017-03-13 PROCEDURE — 74011250637 HC RX REV CODE- 250/637: Performed by: INTERNAL MEDICINE

## 2017-03-13 PROCEDURE — 90471 IMMUNIZATION ADMIN: CPT

## 2017-03-13 PROCEDURE — 74011250636 HC RX REV CODE- 250/636: Performed by: SURGERY

## 2017-03-13 PROCEDURE — 90686 IIV4 VACC NO PRSV 0.5 ML IM: CPT | Performed by: SURGERY

## 2017-03-13 PROCEDURE — 74011250637 HC RX REV CODE- 250/637: Performed by: NURSE PRACTITIONER

## 2017-03-13 PROCEDURE — 83036 HEMOGLOBIN GLYCOSYLATED A1C: CPT | Performed by: SURGERY

## 2017-03-13 RX ORDER — FACIAL-BODY WIPES
10 EACH TOPICAL 2 TIMES DAILY
Qty: 60 EACH | Refills: 2 | Status: SHIPPED | OUTPATIENT
Start: 2017-03-13 | End: 2017-05-08

## 2017-03-13 RX ORDER — POLYETHYLENE GLYCOL 3350 17 G/17G
17 POWDER, FOR SOLUTION ORAL DAILY
Qty: 30 PACKET | Refills: 2 | Status: SHIPPED | OUTPATIENT
Start: 2017-03-13 | End: 2017-05-08

## 2017-03-13 RX ORDER — OXYCODONE AND ACETAMINOPHEN 10; 325 MG/1; MG/1
1 TABLET ORAL
Qty: 60 TAB | Refills: 0 | Status: SHIPPED | OUTPATIENT
Start: 2017-03-13 | End: 2018-01-25 | Stop reason: ALTCHOICE

## 2017-03-13 RX ORDER — DIAZEPAM 5 MG/1
5 TABLET ORAL
Qty: 60 TAB | Refills: 0 | Status: SHIPPED | OUTPATIENT
Start: 2017-03-13 | End: 2017-05-08

## 2017-03-13 RX ORDER — VANCOMYCIN 1.75 GRAM/500 ML IN 0.9 % SODIUM CHLORIDE INTRAVENOUS
1750 EVERY 12 HOURS
Qty: 5000 ML | Refills: 1 | Status: ON HOLD | OUTPATIENT
Start: 2017-03-13 | End: 2017-04-30

## 2017-03-13 RX ADMIN — PIPERACILLIN SODIUM,TAZOBACTAM SODIUM 3.38 G: 3; .375 INJECTION, POWDER, FOR SOLUTION INTRAVENOUS at 07:49

## 2017-03-13 RX ADMIN — HYDROCODONE BITARTRATE AND ACETAMINOPHEN 1 TABLET: 325; 10 TABLET ORAL at 08:02

## 2017-03-13 RX ADMIN — Medication 300 UNITS: at 05:02

## 2017-03-13 RX ADMIN — HYDROCODONE BITARTRATE AND ACETAMINOPHEN 1 TABLET: 325; 10 TABLET ORAL at 03:16

## 2017-03-13 RX ADMIN — HEPARIN SODIUM 5000 UNITS: 5000 INJECTION, SOLUTION INTRAVENOUS; SUBCUTANEOUS at 01:04

## 2017-03-13 RX ADMIN — HYDROCODONE BITARTRATE AND ACETAMINOPHEN 1 TABLET: 325; 10 TABLET ORAL at 14:06

## 2017-03-13 RX ADMIN — VANCOMYCIN HYDROCHLORIDE 1750 MG: 10 INJECTION, POWDER, LYOPHILIZED, FOR SOLUTION INTRAVENOUS at 08:52

## 2017-03-13 RX ADMIN — POLYETHYLENE GLYCOL 3350 17 G: 17 POWDER, FOR SOLUTION ORAL at 09:00

## 2017-03-13 RX ADMIN — VENLAFAXINE HYDROCHLORIDE 37.5 MG: 37.5 CAPSULE, EXTENDED RELEASE ORAL at 07:52

## 2017-03-13 RX ADMIN — HEPARIN SODIUM 5000 UNITS: 5000 INJECTION, SOLUTION INTRAVENOUS; SUBCUTANEOUS at 07:49

## 2017-03-13 RX ADMIN — HYDROCHLOROTHIAZIDE 12.5 MG: 25 TABLET ORAL at 08:05

## 2017-03-13 RX ADMIN — Medication 300 UNITS: at 14:09

## 2017-03-13 RX ADMIN — FAMOTIDINE 20 MG: 20 TABLET ORAL at 08:05

## 2017-03-13 RX ADMIN — LISINOPRIL 20 MG: 20 TABLET ORAL at 08:05

## 2017-03-13 RX ADMIN — ANASTROZOLE 1 MG: 1 TABLET, COATED ORAL at 08:02

## 2017-03-13 RX ADMIN — ONDANSETRON HYDROCHLORIDE 4 MG: 2 SOLUTION INTRAMUSCULAR; INTRAVENOUS at 14:06

## 2017-03-13 RX ADMIN — ONDANSETRON HYDROCHLORIDE 4 MG: 2 SOLUTION INTRAMUSCULAR; INTRAVENOUS at 03:17

## 2017-03-13 RX ADMIN — PIPERACILLIN SODIUM,TAZOBACTAM SODIUM 3.38 G: 3; .375 INJECTION, POWDER, FOR SOLUTION INTRAVENOUS at 03:02

## 2017-03-13 RX ADMIN — ONDANSETRON HYDROCHLORIDE 4 MG: 2 SOLUTION INTRAMUSCULAR; INTRAVENOUS at 08:02

## 2017-03-13 RX ADMIN — METOPROLOL SUCCINATE 100 MG: 100 TABLET, EXTENDED RELEASE ORAL at 08:05

## 2017-03-13 RX ADMIN — INFLUENZA VIRUS VACCINE 0.5 ML: 15; 15; 15; 15 SUSPENSION INTRAMUSCULAR at 12:24

## 2017-03-13 RX ADMIN — Medication 10 ML: at 05:02

## 2017-03-13 NOTE — PROGRESS NOTES
Per MD pt stable for d/c.  MD Serena Mixon) orders IV ABX. DANIELITO spoke with pt who is A&O. Pt was aware of MD plans. SW explained process of receiving IV ABX at home. Pt offered choices of HH, infusion provider. Pt agreeable to [facility] and [facility]. DANIELITO made referral to [facility] with [facility]. DANIELITO faxed clinical, orders and spoke with Jacqueline Martinez at [facility]. Jacqueline Martinez came to the hospital to provide pt education. Pt will be seen at home tonight by either [facility] or [facility], they are coordinating amongst themselves. DANIELITO spoke with [facility] with [facility] and she will f/u with [facility].

## 2017-03-13 NOTE — DISCHARGE INSTRUCTIONS
DISCHARGE SUMMARY from Nurse    The following personal items are in your possession at time of discharge:    Dental Appliances: None  Visual Aid: None     Home Medications: None  Jewelry: None  Clothing: At bedside  Other Valuables: Cell Phone  Personal Items Sent to Safe: none          PATIENT INSTRUCTIONS:    After general anesthesia or intravenous sedation, for 24 hours or while taking prescription Narcotics:  · Limit your activities  · Do not drive and operate hazardous machinery  · Do not make important personal or business decisions  · Do  not drink alcoholic beverages  · If you have not urinated within 8 hours after discharge, please contact your surgeon on call. Report the following to your surgeon:  · Excessive pain, swelling, redness or odor of or around the surgical area  · Temperature over 100.5  · Nausea and vomiting lasting longer than 4 hours or if unable to take medications  · Any signs of decreased circulation or nerve impairment to extremity: change in color, persistent  numbness, tingling, coldness or increase pain  · Any questions        What to do at Home:  Recommended activity: Activity as tolerated, per MD    If you experience any of the following symptoms fever>101, pain unrelieved with medication, nausea/vomiting, shortness of breath, dizziness/fainting, chest pain. , please follow up with your doctor. *  Please give a list of your current medications to your Primary Care Provider. *  Please update this list whenever your medications are discontinued, doses are      changed, or new medications (including over-the-counter products) are added. *  Please carry medication information at all times in case of emergency situations. These are general instructions for a healthy lifestyle:    No smoking/ No tobacco products/ Avoid exposure to second hand smoke    Surgeon General's Warning:  Quitting smoking now greatly reduces serious risk to your health.     Obesity, smoking, and sedentary lifestyle greatly increases your risk for illness    A healthy diet, regular physical exercise & weight monitoring are important for maintaining a healthy lifestyle    You may be retaining fluid if you have a history of heart failure or if you experience any of the following symptoms:  Weight gain of 3 pounds or more overnight or 5 pounds in a week, increased swelling in our hands or feet or shortness of breath while lying flat in bed. Please call your doctor as soon as you notice any of these symptoms; do not wait until your next office visit. Recognize signs and symptoms of STROKE:    F-face looks uneven    A-arms unable to move or move unevenly    S-speech slurred or non-existent    T-time-call 911 as soon as signs and symptoms begin-DO NOT go       Back to bed or wait to see if you get better-TIME IS BRAIN. Warning Signs of HEART ATTACK     Call 911 if you have these symptoms:   Chest discomfort. Most heart attacks involve discomfort in the center of the chest that lasts more than a few minutes, or that goes away and comes back. It can feel like uncomfortable pressure, squeezing, fullness, or pain.  Discomfort in other areas of the upper body. Symptoms can include pain or discomfort in one or both arms, the back, neck, jaw, or stomach.  Shortness of breath with or without chest discomfort.  Other signs may include breaking out in a cold sweat, nausea, or lightheadedness. Don't wait more than five minutes to call 911 - MINUTES MATTER! Fast action can save your life. Calling 911 is almost always the fastest way to get lifesaving treatment. Emergency Medical Services staff can begin treatment when they arrive -- up to an hour sooner than if someone gets to the hospital by car. The discharge information has been reviewed with the patient. The patient verbalized understanding.     Discharge medications reviewed with the patient and appropriate educational materials and side effects teaching were provided. Heart Failure: Care Instructions  Your Care Instructions    Heart failure occurs when your heart does not pump as much blood as the body needs. Failure does not mean that the heart has stopped pumping but rather that it is not pumping as well as it should. Over time, this causes fluid buildup in your lungs and other parts of your body. Fluid buildup can cause shortness of breath, fatigue, swollen ankles, and other problems. By taking medicines regularly, reducing sodium (salt) in your diet, checking your weight every day, and making lifestyle changes, you can feel better and live longer. Follow-up care is a key part of your treatment and safety. Be sure to make and go to all appointments, and call your doctor if you are having problems. It's also a good idea to know your test results and keep a list of the medicines you take. How can you care for yourself at home? Medicines  · Be safe with medicines. Take your medicines exactly as prescribed. Call your doctor if you think you are having a problem with your medicine. · Do not take any vitamins, over-the-counter medicine, or herbal products without talking to your doctor first. Ammy Viveros not take ibuprofen (Advil or Motrin) and naproxen (Aleve) without talking to your doctor first. They could make your heart failure worse. · You may be taking some of the following medicine. ¨ Beta-blockers can slow heart rate, decrease blood pressure, and improve your condition. Taking a beta-blocker may lower your chance of needing to be hospitalized. ¨ Angiotensin-converting enzyme inhibitors (ACEIs) reduce the heart's workload, lower blood pressure, and reduce swelling. Taking an ACEI may lower your chance of needing to be hospitalized again. ¨ Angiotensin II receptor blockers (ARBs) work like ACEIs. Your doctor may prescribe them instead of ACEIs. ¨ Diuretics, also called water pills, reduce swelling.   ¨ Potassium supplements replace this important mineral, which is sometimes lost with diuretics. ¨ Aspirin and other blood thinners prevent blood clots, which can cause a stroke or heart attack. You will get more details on the specific medicines your doctor prescribes. Diet  · Your doctor may suggest that you limit sodium to 2,000 milligrams (mg) a day or less. That is less than 1 teaspoon of salt a day, including all the salt you eat in cooking or in packaged foods. People get most of their sodium from processed foods. Fast food and restaurant meals also tend to be very high in sodium. · Ask your doctor how much liquid you can drink each day. You may have to limit liquids. Weight  · Weigh yourself without clothing at the same time each day. Record your weight. Call your doctor if you gain more than 3 pounds in 2 to 3 days. A sudden weight gain may mean that your heart failure is getting worse. Activity level  · Start light exercise (if your doctor says it is okay). Even if you can only do a small amount, exercise will help you get stronger, have more energy, and manage your weight and your stress. Walking is an easy way to get exercise. Start out by walking a little more than you did before. Bit by bit, increase the amount you walk. · When you exercise, watch for signs that your heart is working too hard. You are pushing yourself too hard if you cannot talk while you are exercising. If you become short of breath or dizzy or have chest pain, stop, sit down, and rest.  · If you feel \"wiped out\" the day after you exercise, walk slower or for a shorter distance until you can work up to a better pace. · Get enough rest at night. Sleeping with 1 or 2 pillows under your upper body and head may help you breathe easier. Lifestyle changes  · Do not smoke. Smoking can make a heart condition worse. If you need help quitting, talk to your doctor about stop-smoking programs and medicines. These can increase your chances of quitting for good.  Quitting smoking may be the most important step you can take to protect your heart. · Limit alcohol to 2 drinks a day for men and 1 drink a day for women. Too much alcohol can cause health problems. · Avoid getting sick from colds and the flu. Get a pneumococcal vaccine shot. If you have had one before, ask your doctor whether you need another dose. Get a flu shot each year. If you must be around people with colds or the flu, wash your hands often. When should you call for help? Call 911 if you have symptoms of sudden heart failure such as:  · You have severe trouble breathing. · You cough up pink, foamy mucus. · You have a new irregular or rapid heartbeat. Call your doctor now or seek immediate medical care if:  · You have new or increased shortness of breath. · You are dizzy or lightheaded, or you feel like you may faint. · You have sudden weight gain, such as 3 pounds or more in 2 to 3 days. · You have increased swelling in your legs, ankles, or feet. · You are suddenly so tired or weak that you cannot do your usual activities. Watch closely for changes in your health, and be sure to contact your doctor if:  · You develop new symptoms. Where can you learn more? Go to http://frandy-domonique.info/. Enter F307 in the search box to learn more about \"Heart Failure: Care Instructions. \"  Current as of: January 27, 2016  Content Version: 11.1  © 8268-1848 [employer]. Care instructions adapted under license by [employer] (which disclaims liability or warranty for this information). If you have questions about a medical condition or this instruction, always ask your healthcare professional. [place] [address] any warranty or liability for your use of this information. Cellulitis: Care Instructions  Your Care Instructions    Cellulitis is a skin infection.  It often occurs after a break in the skin from a scrape, cut, bite, or puncture, or after a rash.  The doctor has checked you carefully, but problems can develop later. If you notice any problems or new symptoms, get medical treatment right away. Follow-up care is a key part of your treatment and safety. Be sure to make and go to all appointments, and call your doctor if you are having problems. It's also a good idea to know your test results and keep a list of the medicines you take. How can you care for yourself at home? · Take your antibiotics as directed. Do not stop taking them just because you feel better. You need to take the full course of antibiotics. · Prop up the infected area on pillows to reduce pain and swelling. Try to keep the area above the level of your heart as often as you can. · If your doctor told you how to care for your wound, follow your doctor's instructions. If you did not get instructions, follow this general advice:  ¨ Wash the wound with clean water 2 times a day. Don't use hydrogen peroxide or alcohol, which can slow healing. ¨ You may cover the wound with a thin layer of petroleum jelly, such as Vaseline, and a nonstick bandage. ¨ Apply more petroleum jelly and replace the bandage as needed. · Be safe with medicines. Take pain medicines exactly as directed. ¨ If the doctor gave you a prescription medicine for pain, take it as prescribed. ¨ If you are not taking a prescription pain medicine, ask your doctor if you can take an over-the-counter medicine. To prevent cellulitis in the future  · Try to prevent cuts, scrapes, or other injuries to your skin. Cellulitis most often occurs where there is a break in the skin. · If you get a scrape, cut, mild burn, or bite, wash the wound with clean water as soon as you can to help avoid infection. Don't use hydrogen peroxide or alcohol, which can slow healing. · If you have swelling in your legs (edema), support stockings and good skin care may help prevent leg sores and cellulitis.   · Take care of your feet, especially if you have diabetes or other conditions that increase the risk of infection. Wear shoes and socks. Do not go barefoot. If you have athlete's foot or other skin problems on your feet, talk to your doctor about how to treat them. When should you call for help? Call your doctor now or seek immediate medical care if:  · You have signs that your infection is getting worse, such as:  ¨ Increased pain, swelling, warmth, or redness. ¨ Red streaks leading from the area. ¨ Pus draining from the area. ¨ A fever. · You get a rash. Watch closely for changes in your health, and be sure to contact your doctor if:  · You are not getting better after 1 day (24 hours). · You do not get better as expected. Where can you learn more? Go to http://frandy-domonique.info/. Mickey Vargas in the search box to learn more about \"Cellulitis: Care Instructions. \"  Current as of: February 5, 2016  Content Version: 11.1  © [id number] [employer]. Care instructions adapted under license by [employer] (which disclaims liability or warranty for this information). If you have questions about a medical condition or this instruction, always ask your healthcare professional. [address] any warranty or liability for your use of this information.

## 2017-03-13 NOTE — PROGRESS NOTES
[address]  Face to Face Encounter    Patients Name: Junior Panda    YOB: 1955    Ordering Physician:  Reilly Verduzco    Primary Diagnosis: Seroma of breast [N64.89], CHF    Date of Face to Face:   3/13/2017                                  Face to Face Encounter findings are related to primary reason for home care:   yes. 1. I certify that the patient needs intermittent care as follows: skilled nursing care:  PICC care and antibiotic administration    2. I certify that this patient is homebound, that is: 1) patient requires the use of a walker device, special transportation, or assistance of another to leave the home; or 2) patient's condition makes leaving the home medically contraindicated; and 3) patient has a normal inability to leave the home and leaving the home requires considerable and taxing effort. Patient may leave the home for infrequent and short duration for medical reasons, and occasional absences for non-medical reasons. Homebound status is due to the following functional limitations: Patient currently under activity restrictions secondary to recent surgical procedure, this hinders their ability to safely leave the home. 3. I certify that this patient is under my care and that I, or a nurse practitioner or  [id number], or clinical nurse specialist, or certified nurse midwife, working with me, had a Face-to-Face Encounter that meets the physician Face-to-Face Encounter requirements. The following are the clinical findings from the [address] [place] [address] encounter that support the need for skilled services and is a summary of the encounter:   See Progress Notes     See attached progess note      Jessica Heredia, JENAE  3/13/2017      THE FOLLOWING TO BE COMPLETED BY THE COMMUNITY PHYSICIAN:    I concur with the findings described above from the [facility] encounter that this patient is homebound and in need of a skilled service.     Certifying Physician: _____________________________________      Printed Certifying Physician Name: _____________________________________    Date: _________________

## 2017-03-13 NOTE — PROGRESS NOTES
Pt assessment completed. Alert and oriented. Lungs CTA diminished in bases. Heart sounds regular. Abdomen obese and non tender with active bowel sounds. Right PICC line single lumen present and infusing without difficulty, dressing clean dry and intact. Dry dressing present to left breast with hemovac, clean dry and intact. All needs met at this time. Will continue to monitor.

## 2017-03-13 NOTE — PROGRESS NOTES
Pharmacokinetic Consult to Pharmacist    Lupillo Cuauhtemoc is a 64 y.o. female being treated for cellulitis with Vancomycin and Zosyn. Height: 5' 2.5\" (158.8 cm)  Weight: 131.4 kg (289 lb 9.6 oz)  Lab Results   Component Value Date/Time    BUN 12 03/09/2017 05:59 AM    Creatinine 0.96 03/09/2017 05:59 AM    WBC 5.3 03/09/2017 05:59 AM    Procalcitonin <0.1 03/06/2017 07:35 PM      Estimated Creatinine Clearance: 80.9 mL/min (based on Cr of 0.96). CULTURES:  All Micro Results     Procedure Component Value Units Date/Time    CULTURE, BODY FLUID Jasmine Jock STAIN [[id number]] Collected:  03/10/17 1740    Order Status:  Completed Specimen:  Fluid Updated:  03/12/17 1232     Special Requests: LEFT BREAST WALL SEROME     GRAM STAIN 0 TO 15 WBC'S/OIF      NO DEFINITE ORGANISM SEEN        Culture result:         NO GROWTH AFTER SHORT PERIOD OF INCUBATION. FURTHER RESULTS TO FOLLOW AFTER OVERNIGHT INCUBATION. CULTURE, URINE [[id number]] Collected:  03/09/17 1610    Order Status:  Completed Specimen:  Urine from Clean catch Updated:  03/12/17 0806     Special Requests: NO SPECIAL REQUESTS        Culture result: NO GROWTH 2 DAYS       CULTURE, BLOOD [[id number]] Collected:  03/06/17 1935    Order Status:  Completed Specimen:  Blood from Blood Updated:  03/11/17 1031     Special Requests: LEFT ANTECUBITAL        Culture result: NO GROWTH 5 DAYS       CULTURE, BLOOD [[id number]] Collected:  03/06/17 1943    Order Status:  Completed Specimen:  Blood from Blood Updated:  03/11/17 1031     Special Requests: RIGHT ANTECUBITAL        Culture result: NO GROWTH 5 DAYS       CULTURE, ANAEROBIC [[id number]] Collected:  03/10/17 1740    Order Status:  Completed Specimen: Body fld Updated:  03/11/17 0111            Lab Results   Component Value Date/Time    Vancomycin,trough 17.3 03/11/2017 07:05 AM       Day 7 of vancomycin. Would continue Vancomycin 1750 mg IV every 12 hours for a total of 14 days. Will continue to follow patient. Thank you,  Asad Roland PharmD, BCPS

## 2017-03-13 NOTE — PROGRESS NOTES
PT c/o 9/10 pain and nausea, administered norco 10 mg PO and zofran IV per MD order, will continue to monitor.

## 2017-03-13 NOTE — PROGRESS NOTES
Pt has discharge orders. Awaiting Dr. Michael Bell office to call with an appt. Her nurse needs to draw some blood from PICC line before she can leave.

## 2017-03-14 LAB
BACTERIA SPEC CULT: NORMAL
GRAM STN SPEC: NORMAL
GRAM STN SPEC: NORMAL
SERVICE CMNT-IMP: NORMAL

## 2017-03-15 ENCOUNTER — HOME CARE VISIT (OUTPATIENT)
Dept: SCHEDULING | Facility: HOME HEALTH | Age: 62
End: 2017-03-15
Payer: MEDICAID

## 2017-03-15 VITALS — HEART RATE: 107 BPM | TEMPERATURE: 100.2 F | RESPIRATION RATE: 20 BRPM

## 2017-03-15 PROCEDURE — G0299 HHS/HOSPICE OF RN EA 15 MIN: HCPCS

## 2017-03-16 ENCOUNTER — HOME CARE VISIT (OUTPATIENT)
Dept: SCHEDULING | Facility: HOME HEALTH | Age: 62
End: 2017-03-16
Payer: MEDICAID

## 2017-03-16 ENCOUNTER — HOSPITAL ENCOUNTER (OUTPATIENT)
Dept: LAB | Age: 62
Discharge: HOME | End: 2017-03-16
Payer: MEDICAID

## 2017-03-16 VITALS — RESPIRATION RATE: 20 BRPM | HEART RATE: 99 BPM | TEMPERATURE: 99.8 F

## 2017-03-16 LAB
CREAT SERPL-MCNC: 2.13 MG/DL (ref 0.6–1)
VANCOMYCIN TROUGH SERPL-MCNC: 34.6 UG/ML (ref 5–20)

## 2017-03-16 PROCEDURE — 82565 ASSAY OF CREATININE: CPT | Performed by: SURGERY

## 2017-03-16 PROCEDURE — G0299 HHS/HOSPICE OF RN EA 15 MIN: HCPCS

## 2017-03-16 PROCEDURE — 80202 ASSAY OF VANCOMYCIN: CPT | Performed by: SURGERY

## 2017-03-17 ENCOUNTER — HOSPITAL ENCOUNTER (OUTPATIENT)
Dept: LAB | Age: 62
Discharge: HOME | End: 2017-03-17
Payer: MEDICAID

## 2017-03-17 ENCOUNTER — HOME CARE VISIT (OUTPATIENT)
Dept: SCHEDULING | Facility: HOME HEALTH | Age: 62
End: 2017-03-17
Payer: MEDICAID

## 2017-03-17 VITALS — TEMPERATURE: 97.4 F | HEART RATE: 82 BPM

## 2017-03-17 LAB
ANION GAP BLD CALC-SCNC: 11 MMOL/L (ref 7–16)
APPEARANCE UR: ABNORMAL
BACTERIA URNS QL MICRO: ABNORMAL /HPF
BILIRUB UR QL: NEGATIVE
BUN SERPL-MCNC: 21 MG/DL (ref 8–23)
CALCIUM SERPL-MCNC: 8.3 MG/DL (ref 8.3–10.4)
CASTS URNS QL MICRO: ABNORMAL /LPF
CHLORIDE SERPL-SCNC: 105 MMOL/L (ref 98–107)
CO2 SERPL-SCNC: 28 MMOL/L (ref 21–32)
COLOR UR: YELLOW
CREAT SERPL-MCNC: 2.75 MG/DL (ref 0.6–1)
EPI CELLS #/AREA URNS HPF: ABNORMAL /HPF
GLUCOSE SERPL-MCNC: 93 MG/DL (ref 65–100)
GLUCOSE UR STRIP.AUTO-MCNC: NEGATIVE MG/DL
HGB UR QL STRIP: NEGATIVE
KETONES UR QL STRIP.AUTO: ABNORMAL MG/DL
LEUKOCYTE ESTERASE UR QL STRIP.AUTO: ABNORMAL
MUCOUS THREADS URNS QL MICRO: ABNORMAL /LPF
NITRITE UR QL STRIP.AUTO: NEGATIVE
OTHER OBSERVATIONS,UCOM: ABNORMAL
PH UR STRIP: 5.5 [PH] (ref 5–9)
POTASSIUM SERPL-SCNC: 4.1 MMOL/L (ref 3.5–5.1)
PROT UR STRIP-MCNC: ABNORMAL MG/DL
RBC #/AREA URNS HPF: ABNORMAL /HPF
SODIUM SERPL-SCNC: 144 MMOL/L (ref 136–145)
SP GR UR REFRACTOMETRY: 1.02 (ref 1–1.02)
UROBILINOGEN UR QL STRIP.AUTO: 0.2 EU/DL (ref 0.2–1)
VANCOMYCIN SERPL-MCNC: 28.6 UG/ML
WBC URNS QL MICRO: ABNORMAL /HPF

## 2017-03-17 PROCEDURE — G0299 HHS/HOSPICE OF RN EA 15 MIN: HCPCS

## 2017-03-17 PROCEDURE — 80048 BASIC METABOLIC PNL TOTAL CA: CPT | Performed by: SURGERY

## 2017-03-17 PROCEDURE — 80202 ASSAY OF VANCOMYCIN: CPT | Performed by: SURGERY

## 2017-03-17 PROCEDURE — 81001 URINALYSIS AUTO W/SCOPE: CPT | Performed by: SURGERY

## 2017-03-17 PROCEDURE — 87086 URINE CULTURE/COLONY COUNT: CPT | Performed by: SURGERY

## 2017-03-18 NOTE — DISCHARGE SUMMARY
Physician Discharge Summary     Patient ID:  Bolivar Love  [id number]  88 y.o.  1955    Admit date: 3/6/2017    Discharge date: 3/18/2017      Admitting Physician: Kaitlyn Pond MD     Discharge Physician: Kaitlyn Pond MD    * Admission Diagnoses: Seroma of breast [N64.89]    * Discharge Diagnoses:   Hospital Problems as of 3/13/2017  Date Reviewed: 3/7/2017          Codes Class Noted - Resolved POA    * (Principal)Cellulitis and abscess of trunk ICD-10-CM: L03.319, L02.219  ICD-9-CM: 682.2  3/7/2017 - Present Yes        Mild persistent asthma without complication (Chronic) MUM-80-CD: J45.30  ICD-9-CM: 493.90  8/16/2016 - Present Yes        Morbid obesity with BMI of 50.0-59.9, adult ([facility] [address] ICD-10-CM: E66.01, Z68.43  ICD-9-CM: 278.01, V85.43  11/5/2015 - Present Yes        Essential hypertension ICD-10-CM: I10  ICD-9-CM: 401.9  9/28/2015 - Present Yes        Chronic diastolic heart failure (HCC) (Chronic) ICD-10-CM: I50.32  ICD-9-CM: 428.32  9/9/2015 - Present Yes        DOLORES (obstructive sleep apnea) (Chronic) ICD-10-CM: G47.33  ICD-9-CM: 327.23  7/12/2013 - Present Yes              * Procedures for this admission: Procedure(s):  EVACUATION OF SEROMA OF LEFT BREAST       * Discharged Condition: Good    [facility] Course: Admitted from office for left chest wall cellulitis and implant salvage. Started on broad spectrum abx. Medicine consulted for management of multiple medical issues. U/S left chest showed seroma and was taken to OR for evacuation under ultrasound guidance. OR  Procedure showed intact implant capsule and seroma in the pre pectoral space. Evacuated and drain placed. Continue to improve clinically on IV abx. PICC line placed for home IV abx and discharged home. Blood, urine and seroma cultures negative throughout stay. Consults: Medicine    Significant Diagnostic Studies: U/S left breast        Discharge Exam:   A&O x3  RRR  Resp Clear  Abd soft  Left chest wall soft, no crepitance. Drain serous    * Disposition: home    Discharge Medications:   Cannot display discharge medications since this patient is not currently admitted. * Follow-up Care/Patient Instructions:   Activity: As tolerated  Diet: Regular      Follow-up Information     Follow up With Details Comments Ul. Tete Calvillo MD On 3/16/2017 @[address] [place] [id number]  [phone]      Cherly Harada, [address] [facility] [address]  [phone]            Signed:  Lauro Schulz MD  3/18/2017  1:21 PM

## 2017-03-20 ENCOUNTER — HOME CARE VISIT (OUTPATIENT)
Dept: SCHEDULING | Facility: HOME HEALTH | Age: 62
End: 2017-03-20
Payer: MEDICAID

## 2017-03-20 LAB
BACTERIA SPEC CULT: NORMAL
BACTERIA SPEC CULT: NORMAL
SERVICE CMNT-IMP: NORMAL
SERVICE CMNT-IMP: NORMAL

## 2017-03-20 PROCEDURE — G0299 HHS/HOSPICE OF RN EA 15 MIN: HCPCS

## 2017-03-21 ENCOUNTER — HOSPITAL ENCOUNTER (OUTPATIENT)
Dept: MAMMOGRAPHY | Age: 62
Discharge: HOME | End: 2017-03-21
Attending: INTERNAL MEDICINE
Payer: MEDICAID

## 2017-03-21 VITALS — RESPIRATION RATE: 18 BRPM | TEMPERATURE: 97.9 F | HEART RATE: 90 BPM

## 2017-03-21 DIAGNOSIS — C50.912 MALIGNANT NEOPLASM OF LEFT FEMALE BREAST, UNSPECIFIED SITE OF BREAST: ICD-10-CM

## 2017-03-21 PROCEDURE — 77080 DXA BONE DENSITY AXIAL: CPT

## 2017-03-23 ENCOUNTER — HOME CARE VISIT (OUTPATIENT)
Dept: HOME HEALTH SERVICES | Facility: HOME HEALTH | Age: 62
End: 2017-03-23
Payer: MEDICAID

## 2017-03-24 ENCOUNTER — HOME CARE VISIT (OUTPATIENT)
Dept: HOME HEALTH SERVICES | Facility: HOME HEALTH | Age: 62
End: 2017-03-24

## 2017-03-24 ENCOUNTER — HOME CARE VISIT (OUTPATIENT)
Dept: SCHEDULING | Facility: HOME HEALTH | Age: 62
End: 2017-03-24
Payer: MEDICAID

## 2017-03-24 PROCEDURE — G0299 HHS/HOSPICE OF RN EA 15 MIN: HCPCS

## 2017-03-25 VITALS
RESPIRATION RATE: 18 BRPM | HEART RATE: 87 BPM | TEMPERATURE: 98 F | SYSTOLIC BLOOD PRESSURE: 109 MMHG | DIASTOLIC BLOOD PRESSURE: 90 MMHG

## 2017-03-28 ENCOUNTER — HOME CARE VISIT (OUTPATIENT)
Dept: SCHEDULING | Facility: HOME HEALTH | Age: 62
End: 2017-03-28
Payer: MEDICAID

## 2017-03-28 VITALS
SYSTOLIC BLOOD PRESSURE: 128 MMHG | RESPIRATION RATE: 18 BRPM | DIASTOLIC BLOOD PRESSURE: 80 MMHG | HEART RATE: 94 BPM | TEMPERATURE: 98 F

## 2017-03-28 PROCEDURE — G0299 HHS/HOSPICE OF RN EA 15 MIN: HCPCS

## 2017-03-31 ENCOUNTER — HOSPITAL ENCOUNTER (OUTPATIENT)
Dept: LAB | Age: 62
Discharge: HOME | End: 2017-03-31
Payer: MEDICAID

## 2017-03-31 ENCOUNTER — HOME CARE VISIT (OUTPATIENT)
Dept: SCHEDULING | Facility: HOME HEALTH | Age: 62
End: 2017-03-31
Payer: MEDICAID

## 2017-03-31 VITALS
TEMPERATURE: 98 F | SYSTOLIC BLOOD PRESSURE: 101 MMHG | HEART RATE: 76 BPM | DIASTOLIC BLOOD PRESSURE: 60 MMHG | RESPIRATION RATE: 20 BRPM

## 2017-03-31 LAB — CREAT SERPL-MCNC: 1.35 MG/DL (ref 0.6–1)

## 2017-03-31 PROCEDURE — G0299 HHS/HOSPICE OF RN EA 15 MIN: HCPCS

## 2017-03-31 PROCEDURE — 82565 ASSAY OF CREATININE: CPT | Performed by: SURGERY

## 2017-04-04 ENCOUNTER — HOME CARE VISIT (OUTPATIENT)
Dept: HOME HEALTH SERVICES | Facility: HOME HEALTH | Age: 62
End: 2017-04-04
Payer: MEDICAID

## 2017-04-11 ENCOUNTER — HOME CARE VISIT (OUTPATIENT)
Dept: HOME HEALTH SERVICES | Facility: HOME HEALTH | Age: 62
End: 2017-04-11
Payer: MEDICAID

## 2017-04-11 VITALS
HEART RATE: 80 BPM | SYSTOLIC BLOOD PRESSURE: 138 MMHG | DIASTOLIC BLOOD PRESSURE: 85 MMHG | TEMPERATURE: 98.5 F | RESPIRATION RATE: 18 BRPM

## 2017-04-11 PROCEDURE — G0299 HHS/HOSPICE OF RN EA 15 MIN: HCPCS

## 2017-04-18 ENCOUNTER — HOSPITAL ENCOUNTER (OUTPATIENT)
Dept: ONCOLOGY | Age: 62
Discharge: HOME | End: 2017-04-18

## 2017-04-18 ENCOUNTER — HOME CARE VISIT (OUTPATIENT)
Dept: SCHEDULING | Facility: HOME HEALTH | Age: 62
End: 2017-04-18
Payer: MEDICAID

## 2017-04-18 VITALS
SYSTOLIC BLOOD PRESSURE: 127 MMHG | DIASTOLIC BLOOD PRESSURE: 89 MMHG | RESPIRATION RATE: 18 BRPM | TEMPERATURE: 97.9 F | HEART RATE: 89 BPM

## 2017-04-18 PROCEDURE — G0299 HHS/HOSPICE OF RN EA 15 MIN: HCPCS

## 2017-04-25 ENCOUNTER — HOME CARE VISIT (OUTPATIENT)
Dept: SCHEDULING | Facility: HOME HEALTH | Age: 62
End: 2017-04-25
Payer: MEDICAID

## 2017-04-25 PROCEDURE — G0299 HHS/HOSPICE OF RN EA 15 MIN: HCPCS

## 2017-04-26 VITALS
DIASTOLIC BLOOD PRESSURE: 75 MMHG | HEART RATE: 110 BPM | RESPIRATION RATE: 20 BRPM | TEMPERATURE: 97.6 F | SYSTOLIC BLOOD PRESSURE: 121 MMHG

## 2017-04-29 ENCOUNTER — APPOINTMENT (OUTPATIENT)
Dept: CT IMAGING | Age: 62
DRG: 248 | End: 2017-04-29
Attending: EMERGENCY MEDICINE
Payer: MEDICAID

## 2017-04-29 ENCOUNTER — HOSPITAL ENCOUNTER (INPATIENT)
Age: 62
LOS: 9 days | Discharge: HOME | DRG: 248 | End: 2017-05-08
Attending: EMERGENCY MEDICINE | Admitting: HOSPITALIST
Payer: MEDICAID

## 2017-04-29 DIAGNOSIS — K57.32 DIVERTICULITIS OF LARGE INTESTINE WITHOUT PERFORATION OR ABSCESS WITHOUT BLEEDING: Primary | ICD-10-CM

## 2017-04-29 DIAGNOSIS — N39.0 URINARY TRACT INFECTION WITHOUT HEMATURIA, SITE UNSPECIFIED: ICD-10-CM

## 2017-04-29 DIAGNOSIS — R11.2 INTRACTABLE VOMITING WITH NAUSEA, UNSPECIFIED VOMITING TYPE: ICD-10-CM

## 2017-04-29 PROBLEM — K57.92 ACUTE DIVERTICULITIS: Status: ACTIVE | Noted: 2017-04-29

## 2017-04-29 LAB
ALBUMIN SERPL BCP-MCNC: 3.2 G/DL (ref 3.2–4.6)
ALBUMIN/GLOB SERPL: 0.7 {RATIO} (ref 1.2–3.5)
ALP SERPL-CCNC: 127 U/L (ref 50–136)
ALT SERPL-CCNC: 95 U/L (ref 12–65)
ANION GAP BLD CALC-SCNC: 7 MMOL/L (ref 7–16)
AST SERPL W P-5'-P-CCNC: 51 U/L (ref 15–37)
BILIRUB SERPL-MCNC: 0.5 MG/DL (ref 0.2–1.1)
BUN SERPL-MCNC: 13 MG/DL (ref 8–23)
CALCIUM SERPL-MCNC: 8.9 MG/DL (ref 8.3–10.4)
CHLORIDE SERPL-SCNC: 108 MMOL/L (ref 98–107)
CO2 SERPL-SCNC: 26 MMOL/L (ref 21–32)
CREAT SERPL-MCNC: 0.8 MG/DL (ref 0.6–1)
ERYTHROCYTE [DISTWIDTH] IN BLOOD BY AUTOMATED COUNT: 14.7 % (ref 11.9–14.6)
GLOBULIN SER CALC-MCNC: 4.9 G/DL (ref 2.3–3.5)
GLUCOSE SERPL-MCNC: 133 MG/DL (ref 65–100)
HCT VFR BLD AUTO: 34.5 % (ref 35.8–46.3)
HGB BLD-MCNC: 11.4 G/DL (ref 11.7–15.4)
MCH RBC QN AUTO: 26.3 PG (ref 26.1–32.9)
MCHC RBC AUTO-ENTMCNC: 33 G/DL (ref 31.4–35)
MCV RBC AUTO: 79.5 FL (ref 79.6–97.8)
PLATELET # BLD AUTO: 487 K/UL (ref 150–450)
PMV BLD AUTO: 8.8 FL (ref 10.8–14.1)
POTASSIUM SERPL-SCNC: 4.3 MMOL/L (ref 3.5–5.1)
PROT SERPL-MCNC: 8.1 G/DL (ref 6.3–8.2)
RBC # BLD AUTO: 4.34 M/UL (ref 4.05–5.25)
SODIUM SERPL-SCNC: 141 MMOL/L (ref 136–145)
WBC # BLD AUTO: 11.5 K/UL (ref 4.3–11.1)

## 2017-04-29 PROCEDURE — 65270000029 HC RM PRIVATE

## 2017-04-29 PROCEDURE — 74011636320 HC RX REV CODE- 636/320: Performed by: EMERGENCY MEDICINE

## 2017-04-29 PROCEDURE — 74177 CT ABD & PELVIS W/CONTRAST: CPT

## 2017-04-29 PROCEDURE — 96361 HYDRATE IV INFUSION ADD-ON: CPT | Performed by: EMERGENCY MEDICINE

## 2017-04-29 PROCEDURE — 99285 EMERGENCY DEPT VISIT HI MDM: CPT | Performed by: EMERGENCY MEDICINE

## 2017-04-29 PROCEDURE — 96374 THER/PROPH/DIAG INJ IV PUSH: CPT | Performed by: EMERGENCY MEDICINE

## 2017-04-29 PROCEDURE — 80053 COMPREHEN METABOLIC PANEL: CPT | Performed by: EMERGENCY MEDICINE

## 2017-04-29 PROCEDURE — 74011250636 HC RX REV CODE- 250/636: Performed by: EMERGENCY MEDICINE

## 2017-04-29 PROCEDURE — 81003 URINALYSIS AUTO W/O SCOPE: CPT | Performed by: EMERGENCY MEDICINE

## 2017-04-29 PROCEDURE — 96375 TX/PRO/DX INJ NEW DRUG ADDON: CPT | Performed by: EMERGENCY MEDICINE

## 2017-04-29 PROCEDURE — 74011000258 HC RX REV CODE- 258: Performed by: EMERGENCY MEDICINE

## 2017-04-29 PROCEDURE — 74011250637 HC RX REV CODE- 250/637: Performed by: EMERGENCY MEDICINE

## 2017-04-29 PROCEDURE — 85027 COMPLETE CBC AUTOMATED: CPT | Performed by: EMERGENCY MEDICINE

## 2017-04-29 RX ORDER — PROMETHAZINE HYDROCHLORIDE 25 MG/1
25 TABLET ORAL
Status: COMPLETED | OUTPATIENT
Start: 2017-04-29 | End: 2017-04-29

## 2017-04-29 RX ORDER — HYDROMORPHONE HYDROCHLORIDE 1 MG/ML
1 INJECTION, SOLUTION INTRAMUSCULAR; INTRAVENOUS; SUBCUTANEOUS
Status: COMPLETED | OUTPATIENT
Start: 2017-04-29 | End: 2017-04-29

## 2017-04-29 RX ORDER — MORPHINE SULFATE 2 MG/ML
2 INJECTION, SOLUTION INTRAMUSCULAR; INTRAVENOUS
Status: DISCONTINUED | OUTPATIENT
Start: 2017-04-29 | End: 2017-05-08 | Stop reason: HOSPADM

## 2017-04-29 RX ORDER — IBUPROFEN 200 MG
1 TABLET ORAL
Status: DISCONTINUED | OUTPATIENT
Start: 2017-04-29 | End: 2017-05-08 | Stop reason: HOSPADM

## 2017-04-29 RX ORDER — DIPHENHYDRAMINE HCL 25 MG
25 CAPSULE ORAL
Status: DISCONTINUED | OUTPATIENT
Start: 2017-04-29 | End: 2017-05-04

## 2017-04-29 RX ORDER — DIAZEPAM 5 MG/1
5 TABLET ORAL
Status: DISCONTINUED | OUTPATIENT
Start: 2017-04-29 | End: 2017-05-08 | Stop reason: HOSPADM

## 2017-04-29 RX ORDER — HYDROCODONE BITARTRATE AND ACETAMINOPHEN 10; 325 MG/1; MG/1
1 TABLET ORAL
Status: COMPLETED | OUTPATIENT
Start: 2017-04-29 | End: 2017-04-29

## 2017-04-29 RX ORDER — SODIUM CHLORIDE 0.9 % (FLUSH) 0.9 %
5-10 SYRINGE (ML) INJECTION AS NEEDED
Status: DISCONTINUED | OUTPATIENT
Start: 2017-04-29 | End: 2017-05-08 | Stop reason: HOSPADM

## 2017-04-29 RX ORDER — SODIUM CHLORIDE 0.9 % (FLUSH) 0.9 %
5-10 SYRINGE (ML) INJECTION EVERY 8 HOURS
Status: DISCONTINUED | OUTPATIENT
Start: 2017-04-30 | End: 2017-05-08 | Stop reason: HOSPADM

## 2017-04-29 RX ORDER — SODIUM CHLORIDE 9 MG/ML
100 INJECTION, SOLUTION INTRAVENOUS CONTINUOUS
Status: DISPENSED | OUTPATIENT
Start: 2017-04-30 | End: 2017-05-01

## 2017-04-29 RX ORDER — METOPROLOL SUCCINATE 100 MG/1
100 TABLET, EXTENDED RELEASE ORAL 2 TIMES DAILY
Status: DISCONTINUED | OUTPATIENT
Start: 2017-04-30 | End: 2017-05-08 | Stop reason: HOSPADM

## 2017-04-29 RX ORDER — ENOXAPARIN SODIUM 100 MG/ML
40 INJECTION SUBCUTANEOUS EVERY 12 HOURS
Status: DISCONTINUED | OUTPATIENT
Start: 2017-04-30 | End: 2017-05-04

## 2017-04-29 RX ORDER — HYDRALAZINE HYDROCHLORIDE 20 MG/ML
10 INJECTION INTRAMUSCULAR; INTRAVENOUS
Status: DISCONTINUED | OUTPATIENT
Start: 2017-04-29 | End: 2017-05-08 | Stop reason: HOSPADM

## 2017-04-29 RX ORDER — HYDROCODONE BITARTRATE AND ACETAMINOPHEN 7.5; 325 MG/1; MG/1
1 TABLET ORAL
Status: DISCONTINUED | OUTPATIENT
Start: 2017-04-29 | End: 2017-05-08 | Stop reason: HOSPADM

## 2017-04-29 RX ORDER — SODIUM CHLORIDE 0.9 % (FLUSH) 0.9 %
10 SYRINGE (ML) INJECTION
Status: COMPLETED | OUTPATIENT
Start: 2017-04-29 | End: 2017-04-29

## 2017-04-29 RX ORDER — ONDANSETRON 2 MG/ML
4 INJECTION INTRAMUSCULAR; INTRAVENOUS
Status: COMPLETED | OUTPATIENT
Start: 2017-04-29 | End: 2017-04-29

## 2017-04-29 RX ORDER — VENLAFAXINE HYDROCHLORIDE 37.5 MG/1
37.5 CAPSULE, EXTENDED RELEASE ORAL
Status: DISCONTINUED | OUTPATIENT
Start: 2017-04-30 | End: 2017-05-08 | Stop reason: HOSPADM

## 2017-04-29 RX ORDER — IPRATROPIUM BROMIDE AND ALBUTEROL SULFATE 2.5; .5 MG/3ML; MG/3ML
3 SOLUTION RESPIRATORY (INHALATION)
Status: DISCONTINUED | OUTPATIENT
Start: 2017-04-29 | End: 2017-05-08 | Stop reason: HOSPADM

## 2017-04-29 RX ORDER — ACETAMINOPHEN 325 MG/1
650 TABLET ORAL
Status: DISCONTINUED | OUTPATIENT
Start: 2017-04-29 | End: 2017-05-08 | Stop reason: HOSPADM

## 2017-04-29 RX ORDER — METRONIDAZOLE 500 MG/100ML
500 INJECTION, SOLUTION INTRAVENOUS EVERY 8 HOURS
Status: DISCONTINUED | OUTPATIENT
Start: 2017-04-30 | End: 2017-04-30

## 2017-04-29 RX ORDER — ANASTROZOLE 1 MG/1
1 TABLET ORAL DAILY
Status: DISCONTINUED | OUTPATIENT
Start: 2017-04-30 | End: 2017-05-08 | Stop reason: HOSPADM

## 2017-04-29 RX ORDER — METRONIDAZOLE 500 MG/1
500 TABLET ORAL
Status: COMPLETED | OUTPATIENT
Start: 2017-04-29 | End: 2017-04-29

## 2017-04-29 RX ORDER — CLONIDINE HYDROCHLORIDE 0.2 MG/1
0.2 TABLET ORAL
Status: DISCONTINUED | OUTPATIENT
Start: 2017-04-29 | End: 2017-05-04

## 2017-04-29 RX ORDER — PROMETHAZINE HYDROCHLORIDE 25 MG/1
25 TABLET ORAL
Status: DISCONTINUED | OUTPATIENT
Start: 2017-04-29 | End: 2017-05-02 | Stop reason: SDUPTHER

## 2017-04-29 RX ORDER — CIPROFLOXACIN 500 MG/1
500 TABLET ORAL ONCE
Status: COMPLETED | OUTPATIENT
Start: 2017-04-29 | End: 2017-04-29

## 2017-04-29 RX ADMIN — HYDROCODONE BITARTRATE AND ACETAMINOPHEN 1 TABLET: 325; 10 TABLET ORAL at 21:28

## 2017-04-29 RX ADMIN — SODIUM CHLORIDE 1000 ML: 900 INJECTION, SOLUTION INTRAVENOUS at 20:21

## 2017-04-29 RX ADMIN — METRONIDAZOLE 500 MG: 500 TABLET ORAL at 21:28

## 2017-04-29 RX ADMIN — IOVERSOL 100 ML: 741 INJECTION INTRA-ARTERIAL; INTRAVENOUS at 20:30

## 2017-04-29 RX ADMIN — SODIUM CHLORIDE 100 ML: 900 INJECTION, SOLUTION INTRAVENOUS at 20:30

## 2017-04-29 RX ADMIN — Medication 10 ML: at 20:30

## 2017-04-29 RX ADMIN — CEFTRIAXONE SODIUM 1 G: 1 INJECTION, POWDER, FOR SOLUTION INTRAMUSCULAR; INTRAVENOUS at 23:14

## 2017-04-29 RX ADMIN — CIPROFLOXACIN HYDROCHLORIDE 500 MG: 500 TABLET, FILM COATED ORAL at 21:28

## 2017-04-29 RX ADMIN — HYDROMORPHONE HYDROCHLORIDE 1 MG: 1 INJECTION, SOLUTION INTRAMUSCULAR; INTRAVENOUS; SUBCUTANEOUS at 20:21

## 2017-04-29 RX ADMIN — PROMETHAZINE HYDROCHLORIDE 25 MG: 25 TABLET ORAL at 21:28

## 2017-04-29 RX ADMIN — ONDANSETRON 4 MG: 2 INJECTION INTRAMUSCULAR; INTRAVENOUS at 20:21

## 2017-04-29 RX ADMIN — SODIUM CHLORIDE 1000 ML: 900 INJECTION, SOLUTION INTRAVENOUS at 23:14

## 2017-04-29 NOTE — IP AVS SNAPSHOT
Current Discharge Medication List  
  
START taking these medications Dose & Instructions Dispensing Information Comments Morning Noon Evening Bedtime  
 nicotine 21 mg/24 hr  
Commonly known as:  Jerl Frame Your next dose is:  5/9 Dose:  1 Patch 1 Patch by TransDERmal route daily as needed for Other (patient request) for up to 30 days. Quantity:  30 Patch Refills:  0  
     
   
   
   
  
 ondansetron 4 mg disintegrating tablet Commonly known as:  ZOFRAN ODT Your next dose is:  As needed Dose:  4 mg Take 1 Tab by mouth every six (6) hours as needed for Nausea. Quantity:  60 Tab Refills:  0 Saccharomyces boulardii 250 mg capsule Commonly known as:  Jamison Rebolledo Your next dose is: Take today with supper Dose:  250 mg Take 1 Cap by mouth two (2) times a day for 7 days. Quantity:  30 Cap Refills:  0  
     
   
   
  
   
  
 vancomycin 50 mg/mL oral solution (compounded) Your next dose is: Take today at 6pm  
   
 Dose:  500 mg Take 10 mL by mouth every six (6) hours for 9 days. Quantity:  360 mL Refills:  0 CONTINUE these medications which have CHANGED Dose & Instructions Dispensing Information Comments Morning Noon Evening Bedtime  
 albuterol 2.5 mg /3 mL (0.083 %) nebulizer solution Commonly known as:  PROVENTIL VENTOLIN What changed:   
- how much to take - Another medication with the same name was removed. Continue taking this medication, and follow the directions you see here. Your next dose is:  As needed Dose:  2.5 mg Take 3 mL by inhalation every four (4) hours as needed for Wheezing or Shortness of Breath. Quantity:  24 Each Refills:  5  
     
   
   
   
  
 metoprolol succinate 100 mg tablet Commonly known as:  TOPROL-XL What changed:  when to take this Your next dose is:  5/9 Dose:  100 mg Take 1 Tab by mouth daily.   
 Quantity: 30 Tab Refills:  11 VALIUM 5 mg tablet Generic drug:  diazePAM  
What changed:  Another medication with the same name was removed. Continue taking this medication, and follow the directions you see here. Your next dose is:  As needed Dose:  5 mg Take 5 mg by mouth every six (6) hours as needed for Anxiety. Refills:  0 CONTINUE these medications which have NOT CHANGED Dose & Instructions Dispensing Information Comments Morning Noon Evening Bedtime  
 anastrozole 1 mg tablet Commonly known as:  ARIMIDEX Your next dose is:  5/9 Dose:  1 mg Take 1 Tab by mouth daily. Quantity:  30 Tab Refills:  2  
     
   
   
   
  
 cpap machine kit  
   
 by Does Not Apply route. Refills:  0  
     
   
   
   
  
 fluticasone 50 mcg/actuation nasal spray Commonly known as:  Katherne Fix Your next dose is:  5/9 Dose:  2 Spray 2 Sprays by Both Nostrils route daily. Quantity:  1 Bottle Refills:  2  
     
   
   
   
  
 fluticasone-salmeterol 250-50 mcg/dose diskus inhaler Commonly known as:  ADVAIR DISKUS Your next dose is: Today before supper Dose:  1 Puff Take 1 Puff by inhalation two (2) times a day. Quantity:  1 Inhaler Refills:  11  
     
   
   
  
   
  
 montelukast 10 mg tablet Commonly known as:  SINGULAIR Your next dose is: Today before bedtime Dose:  10 mg Take 1 Tab by mouth nightly. Quantity:  30 Tab Refills:  5  
     
   
   
   
  
  
 nitroglycerin 0.4 mg SL tablet Commonly known as:  NITROSTAT Your next dose is:  As needed  
   
 by SubLINGual route every five (5) minutes as needed for Chest Pain. Refills:  0  
     
   
   
   
  
 oxyCODONE-acetaminophen  mg per tablet Commonly known as:  PERCOCET Your next dose is:  As needed Dose:  1 Tab Take 1 Tab by mouth every six (6) hours as needed for Pain. Max Daily Amount: 4 Tabs.   
 Quantity: 60 Tab Refills:  0 OXYGEN-AIR DELIVERY SYSTEMS  
   
 by Does Not Apply route. 3 lpm qhs Refills:  0  
     
   
   
   
  
 raNITIdine 150 mg tablet Commonly known as:  ZANTAC Your next dose is: Today before supper Dose:  150 mg Take 1 Tab by mouth two (2) times a day. Indications: GASTROESOPHAGEAL REFLUX Quantity:  60 Tab Refills:  3  
     
   
   
  
   
  
 TYLENOL EXTRA STRENGTH 500 mg tablet Generic drug:  acetaminophen Your next dose is:  As needed Take  by mouth every six (6) hours as needed for Pain. Refills:  0  
     
   
   
   
  
 venlafaxine-SR 37.5 mg capsule Commonly known as:  EFFEXOR-XR Your next dose is:  5/9 Take 1 capsule by mouth for 1 week and then increase to 75 mg (2 capsules) after 1 week. Quantity:  60 Cap Refills:  0 ZyrTEC 10 mg Cap Generic drug:  Cetirizine Your next dose is:  5/9 Take  by mouth every morning. Refills:  0 STOP taking these medications   
 bisacodyl 10 mg suppository Commonly known as:  DULCOLAX  
   
  
 ibuprofen 800 mg tablet Commonly known as:  MOTRIN  
   
  
 lisinopril-hydroCHLOROthiazide 20-12.5 mg per tablet Commonly known as:  PRINZIDE, ZESTORETIC  
   
  
 polyethylene glycol 17 gram packet Commonly known as:  MIRALAX  
   
  
 predniSONE 20 mg tablet Commonly known as:  DELTASONE  
   
  
 promethazine 25 mg tablet Commonly known as:  PHENERGAN Where to Get Your Medications Information on where to get these meds will be given to you by the nurse or doctor. ! Ask your nurse or doctor about these medications  
  nicotine 21 mg/24 hr  
 ondansetron 4 mg disintegrating tablet Saccharomyces boulardii 250 mg capsule  
 vancomycin 50 mg/mL oral solution (compounded)

## 2017-04-29 NOTE — IP AVS SNAPSHOT
Marvin Haynes 
 
 
 [address]  [address] [facility] 
[phone] Patient: Efraín Chacon MRN: [id number] LTP:[id number] You are allergic to the following Allergen Reactions Bactrim (Sulfamethoprim Ds) Rash Pt gets a yeast infection on body Recent Documentation Height Weight BMI OB Status Smoking Status 1.588 m 128.4 kg 50.94 kg/m2 Postmenopausal Never Smoker Emergency Contacts Name Discharge Info Relation Home Work Mobile Jey Martinez DISCHARGE CAREGIVER [3] Son [22] [phone] About your hospitalization You were admitted on:  April 29, 2017 You last received care in the:  [facility] 6 MED SURG You were discharged on: May 8, 2017 Unit phone number:  [phone] Why you were hospitalized Your primary diagnosis was:  C. Difficile Colitis Your diagnoses also included:  Acute Diverticulitis, Acute Lower Uti, Morbid Obesity With Bmi Of 50.0-59.9, Adult ([facility]) Providers Seen During Your Hospitalizations Provider Role Specialty Primary office phone Linda Whitfield MD Attending Provider Emergency Medicine [phone] Merlinda Piano, MD Attending Provider Internal Medicine [phone] Your Primary Care Physician (PCP) Primary Care Physician Office Phone Office Fax Kelley Solomon [phone] Follow-up Information Follow up With Details Comments Contact Info [address] [address] 
[phone] Dahlia Anderson MD On 5/16/2017 at 2:00 [address] [place] [id number] 
[phone] Your Appointments Tuesday May 16, 2017  2:00 PM EDT Follow Up with Dahlia Anderson MD  
[address] [place] ([address]) [address] [place] [id number] [phone] Current Discharge Medication List  
  
START taking these medications Dose & Instructions Dispensing Information Comments Morning Noon Evening Bedtime  
 nicotine 21 mg/24 hr  
Commonly known as:  Lona Sethi Your next dose is:  5/9 Dose:  1 Patch 1 Patch by TransDERmal route daily as needed for Other (patient request) for up to 30 days. Quantity:  30 Patch Refills:  0  
     
   
   
   
  
 ondansetron 4 mg disintegrating tablet Commonly known as:  ZOFRAN ODT Your next dose is:  As needed Dose:  4 mg Take 1 Tab by mouth every six (6) hours as needed for Nausea. Quantity:  60 Tab Refills:  0 Saccharomyces boulardii 250 mg capsule Commonly known as:  Jamison Rebolledo Your next dose is: Take today with supper Dose:  250 mg Take 1 Cap by mouth two (2) times a day for 7 days. Quantity:  30 Cap Refills:  0  
     
   
   
  
   
  
 vancomycin 50 mg/mL oral solution (compounded) Your next dose is: Take today at 6pm  
   
 Dose:  500 mg Take 10 mL by mouth every six (6) hours for 9 days. Quantity:  360 mL Refills:  0 CONTINUE these medications which have CHANGED Dose & Instructions Dispensing Information Comments Morning Noon Evening Bedtime  
 albuterol 2.5 mg /3 mL (0.083 %) nebulizer solution Commonly known as:  PROVENTIL VENTOLIN What changed:   
- how much to take - Another medication with the same name was removed. Continue taking this medication, and follow the directions you see here. Your next dose is:  As needed Dose:  2.5 mg Take 3 mL by inhalation every four (4) hours as needed for Wheezing or Shortness of Breath. Quantity:  24 Each Refills:  5  
     
   
   
   
  
 metoprolol succinate 100 mg tablet Commonly known as:  TOPROL-XL What changed:  when to take this Your next dose is:  5/9 Dose:  100 mg Take 1 Tab by mouth daily.   
 Quantity: 30 Tab Refills:  11 VALIUM 5 mg tablet Generic drug:  diazePAM  
What changed:  Another medication with the same name was removed. Continue taking this medication, and follow the directions you see here. Your next dose is:  As needed Dose:  5 mg Take 5 mg by mouth every six (6) hours as needed for Anxiety. Refills:  0 CONTINUE these medications which have NOT CHANGED Dose & Instructions Dispensing Information Comments Morning Noon Evening Bedtime  
 anastrozole 1 mg tablet Commonly known as:  ARIMIDEX Your next dose is:  5/9 Dose:  1 mg Take 1 Tab by mouth daily. Quantity:  30 Tab Refills:  2  
     
   
   
   
  
 cpap machine kit  
   
 by Does Not Apply route. Refills:  0  
     
   
   
   
  
 fluticasone 50 mcg/actuation nasal spray Commonly known as:  Malathi Tran Your next dose is:  5/9 Dose:  2 Spray 2 Sprays by Both Nostrils route daily. Quantity:  1 Bottle Refills:  2  
     
   
   
   
  
 fluticasone-salmeterol 250-50 mcg/dose diskus inhaler Commonly known as:  ADVAIR DISKUS Your next dose is: Today before supper Dose:  1 Puff Take 1 Puff by inhalation two (2) times a day. Quantity:  1 Inhaler Refills:  11  
     
   
   
  
   
  
 montelukast 10 mg tablet Commonly known as:  SINGULAIR Your next dose is: Today before bedtime Dose:  10 mg Take 1 Tab by mouth nightly. Quantity:  30 Tab Refills:  5  
     
   
   
   
  
  
 nitroglycerin 0.4 mg SL tablet Commonly known as:  NITROSTAT Your next dose is:  As needed  
   
 by SubLINGual route every five (5) minutes as needed for Chest Pain. Refills:  0  
     
   
   
   
  
 oxyCODONE-acetaminophen  mg per tablet Commonly known as:  PERCOCET Your next dose is:  As needed Dose:  1 Tab Take 1 Tab by mouth every six (6) hours as needed for Pain. Max Daily Amount: 4 Tabs.   
 Quantity: 60 Tab Refills:  0 OXYGEN-AIR DELIVERY SYSTEMS  
   
 by Does Not Apply route. 3 lpm qhs Refills:  0  
     
   
   
   
  
 raNITIdine 150 mg tablet Commonly known as:  ZANTAC Your next dose is: Today before supper Dose:  150 mg Take 1 Tab by mouth two (2) times a day. Indications: GASTROESOPHAGEAL REFLUX Quantity:  60 Tab Refills:  3  
     
   
   
  
   
  
 TYLENOL EXTRA STRENGTH 500 mg tablet Generic drug:  acetaminophen Your next dose is:  As needed Take  by mouth every six (6) hours as needed for Pain. Refills:  0  
     
   
   
   
  
 venlafaxine-SR 37.5 mg capsule Commonly known as:  EFFEXOR-XR Your next dose is:  5/9 Take 1 capsule by mouth for 1 week and then increase to 75 mg (2 capsules) after 1 week. Quantity:  60 Cap Refills:  0 ZyrTEC 10 mg Cap Generic drug:  Cetirizine Your next dose is:  5/9 Take  by mouth every morning. Refills:  0 STOP taking these medications   
 bisacodyl 10 mg suppository Commonly known as:  DULCOLAX  
   
  
 ibuprofen 800 mg tablet Commonly known as:  MOTRIN  
   
  
 lisinopril-hydroCHLOROthiazide 20-12.5 mg per tablet Commonly known as:  PRINZIDE, ZESTORETIC  
   
  
 polyethylene glycol 17 gram packet Commonly known as:  MIRALAX  
   
  
 predniSONE 20 mg tablet Commonly known as:  DELTASONE  
   
  
 promethazine 25 mg tablet Commonly known as:  PHENERGAN Where to Get Your Medications Information on where to get these meds will be given to you by the nurse or doctor. ! Ask your nurse or doctor about these medications  
  nicotine 21 mg/24 hr  
 ondansetron 4 mg disintegrating tablet Saccharomyces boulardii 250 mg capsule  
 vancomycin 50 mg/mL oral solution (compounded) Discharge Instructions DISCHARGE SUMMARY from Nurse The following personal items are in your possession at time of discharge: 
 
Dental Appliances: None Home Medications: None Jewelry: None Clothing: Shirt, Pants, Undergarments, Footwear Other Valuables: Cell Phone PATIENT INSTRUCTIONS: 
 
 
F-face looks uneven A-arms unable to move or move unevenly S-speech slurred or non-existent T-time-call 911 as soon as signs and symptoms begin-DO NOT go Back to bed or wait to see if you get better-TIME IS BRAIN. Warning Signs of HEART ATTACK Call 911 if you have these symptoms:  Chest discomfort. Most heart attacks involve discomfort in the center of the chest that lasts more than a few minutes, or that goes away and comes back. It can feel like uncomfortable pressure, squeezing, fullness, or pain.  Discomfort in other areas of the upper body. Symptoms can include pain or discomfort in one or both arms, the back, neck, jaw, or stomach.  Shortness of breath with or without chest discomfort.  Other signs may include breaking out in a cold sweat, nausea, or lightheadedness. Don't wait more than five minutes to call [address]! Fast action can save your life. Calling 911 is almost always the fastest way to get lifesaving treatment. Emergency Medical Services staff can begin treatment when they arrive  up to an hour sooner than if someone gets to the hospital by car. The discharge information has been reviewed with the patient. The patient verbalized understanding. Discharge medications reviewed with the patient and appropriate educational materials and side effects teaching were provided. Clostridium Difficile Colitis: Care Instructions Your Care Instructions Clostridium difficile (also called C. difficile) are bacteria that can cause swelling and irritation of the large intestine, or colon. This inflammation is also called colitis. It can cause diarrhea, fever, and belly cramps. You may get C. difficile colitis if you take antibiotics. The infection is most common in people who are taking antibiotics while in the hospital. It is also common in older people in hospitals and nursing homes. Severe disease could cause the colon to swell to many times its normal size (toxic megacolon). This can cause death and needs emergency treatment. You may have a swollen belly that is painful or tender, a rapid heartbeat, and a fever. Follow-up care is a key part of your treatment and safety. Be sure to make and go to all appointments, and call your doctor if you are having problems. It's also a good idea to know your test results and keep a list of the medicines you take. How can you care for yourself at home? · Your doctor may give you antibiotics to treat C. difficile colitis. If your doctor prescribes an antibiotic, he or she will give you a different antibiotic than the one that caused your infection. Take your antibiotics as directed. Do not stop taking them just because you feel better. You need to take the full course of antibiotics. · To prevent dehydration, drink plenty of fluids, enough so that your urine is light yellow or clear like water. Choose water and other caffeine-free clear liquids until you feel better. If you have kidney, heart, or liver disease and have to limit fluids, talk with your doctor before you increase the amount of fluids you drink. · Begin eating small amounts of mild foods, if you feel like it. Try yogurt that has live cultures of lactobacillus (check the label). ¨ Avoid spicy foods, fruits, alcohol, and caffeine until 48 hours after all symptoms go away. ¨ Avoid chewing gum that contains sorbitol.  
¨ Avoid dairy products (except for yogurt with lactobacillus) while you have diarrhea and for 3 days after symptoms go away. · To prevent the spread of C. difficile, practice good hygiene. Keep your hands clean by washing them well and often with soap and clean, running water. Alcohol-based hand sanitizers do not kill C. difficile. When should you call for help? Call 911 if: 
· You passed out (lost consciousness). Call your doctor now or seek immediate medical care if: 
· You have a fever over 101°F or shaking chills. · You feel lightheaded or have a fast heart rate. · You pass stools that are almost always bloody. · You have signs of needing more fluids. You have sunken eyes and a dry mouth, and you pass only a little dark urine. · You have severe belly pain with or without bloating. · You have severe vomiting and cannot keep down liquids. · You are not passing any stools or gas. Watch closely for changes in your health, and be sure to contact your doctor if: 
· You do not get better as expected. Where can you learn more? Go to http://frandy-domonique.info/. Enter ([phone] 7384-4737 in the search box to learn more about \"Clostridium Difficile Colitis: Care Instructions. \" Current as of: May 24, 2016 Content Version: 11.2 © 0066-2548 [employer]. Care instructions adapted under license by [employer] (which disclaims liability or warranty for this information). If you have questions about a medical condition or this instruction, always ask your healthcare professional. [place] [address] any warranty or liability for your use of this information. Discharge Orders None [facility] Announcement We are excited to announce that we are making your provider's discharge notes available to you in [employer]. You will see these notes when they are completed and signed by the physician that discharged you from your recent hospital stay.   If you have any questions or concerns about any information you see in [employer], please call the Health Information Department where you were seen or reach out to your Primary Care Provider for more information about your plan of care. Introducing [facility]! Dear Boy Jefferson: 
Thank you for requesting a [employer] account. Our records indicate that you already have an active [employer] account. You can access your account anytime at [email or web address] Did you know that you can access your hospital and ER discharge instructions at any time in [employer]? You can also review all of your test results from your hospital stay or ER visit. Additional Information If you have questions, please visit the Frequently Asked Questions section of the [employer] website at [email or web address] Remember, [employer] is NOT to be used for urgent needs. For medical emergencies, dial 911. Now available from your iPhone and Android! General Information Please provide this summary of care documentation to your next provider. Patient Signature:  ____________________________________________________________ Date:  ____________________________________________________________  
  
Baron Starr Provider Signature:  ____________________________________________________________ Date:  ____________________________________________________________

## 2017-04-29 NOTE — ED TRIAGE NOTES
Pt arrive via EMS coming from [facility] urgent care c/o LLQ abd pain x2 weeks with n/v since this morning. Urgent care gave 4mg zofran IM and 40 mg depo-medrol IM @1703. Pt currently being treated for UTI x2 weeks. Hx of diverticulitis. /108, , temp 99.6, O2 sat 97, .

## 2017-04-30 PROBLEM — R19.7 DIARRHEA OF PRESUMED INFECTIOUS ORIGIN: Status: ACTIVE | Noted: 2017-04-30

## 2017-04-30 PROBLEM — A04.72 C. DIFFICILE COLITIS: Status: ACTIVE | Noted: 2017-04-30

## 2017-04-30 LAB
BACTERIA SPEC CULT: ABNORMAL
BACTERIA SPEC CULT: ABNORMAL
BACTERIA SPEC CULT: POSITIVE
GLUCOSE BLD STRIP.AUTO-MCNC: 103 MG/DL (ref 65–100)
GLUCOSE BLD STRIP.AUTO-MCNC: 106 MG/DL (ref 65–100)
GLUCOSE BLD STRIP.AUTO-MCNC: 121 MG/DL (ref 65–100)
GLUCOSE BLD STRIP.AUTO-MCNC: 127 MG/DL (ref 65–100)
SERVICE CMNT-IMP: ABNORMAL

## 2017-04-30 PROCEDURE — 87493 C DIFF AMPLIFIED PROBE: CPT | Performed by: INTERNAL MEDICINE

## 2017-04-30 PROCEDURE — 74011250637 HC RX REV CODE- 250/637: Performed by: INTERNAL MEDICINE

## 2017-04-30 PROCEDURE — 65270000029 HC RM PRIVATE

## 2017-04-30 PROCEDURE — 74011000250 HC RX REV CODE- 250: Performed by: INTERNAL MEDICINE

## 2017-04-30 PROCEDURE — 74011250636 HC RX REV CODE- 250/636: Performed by: INTERNAL MEDICINE

## 2017-04-30 PROCEDURE — 74011250636 HC RX REV CODE- 250/636: Performed by: HOSPITALIST

## 2017-04-30 PROCEDURE — 74011000250 HC RX REV CODE- 250: Performed by: HOSPITALIST

## 2017-04-30 PROCEDURE — 74011000258 HC RX REV CODE- 258: Performed by: HOSPITALIST

## 2017-04-30 PROCEDURE — 74011250637 HC RX REV CODE- 250/637: Performed by: HOSPITALIST

## 2017-04-30 PROCEDURE — 82962 GLUCOSE BLOOD TEST: CPT

## 2017-04-30 RX ORDER — METRONIDAZOLE 500 MG/100ML
500 INJECTION, SOLUTION INTRAVENOUS EVERY 8 HOURS
Status: DISCONTINUED | OUTPATIENT
Start: 2017-04-30 | End: 2017-05-06

## 2017-04-30 RX ORDER — ONDANSETRON 2 MG/ML
4 INJECTION INTRAMUSCULAR; INTRAVENOUS
Status: DISCONTINUED | OUTPATIENT
Start: 2017-04-30 | End: 2017-04-30

## 2017-04-30 RX ORDER — SAME BUTANEDISULFONATE/BETAINE 400-600 MG
250 POWDER IN PACKET (EA) ORAL 2 TIMES DAILY
Status: DISCONTINUED | OUTPATIENT
Start: 2017-04-30 | End: 2017-05-08 | Stop reason: HOSPADM

## 2017-04-30 RX ADMIN — HYDROCODONE BITARTRATE AND ACETAMINOPHEN 1 TABLET: 7.5; 325 TABLET ORAL at 08:07

## 2017-04-30 RX ADMIN — VANCOMYCIN HYDROCHLORIDE 125 MG: 1 INJECTION, POWDER, LYOPHILIZED, FOR SOLUTION INTRAVENOUS at 14:33

## 2017-04-30 RX ADMIN — Medication 10 ML: at 00:00

## 2017-04-30 RX ADMIN — SODIUM CHLORIDE 100 ML/HR: 900 INJECTION, SOLUTION INTRAVENOUS at 00:00

## 2017-04-30 RX ADMIN — PROMETHAZINE HYDROCHLORIDE 25 MG: 25 TABLET ORAL at 03:53

## 2017-04-30 RX ADMIN — ENOXAPARIN SODIUM 40 MG: 40 INJECTION SUBCUTANEOUS at 08:07

## 2017-04-30 RX ADMIN — HYDROCODONE BITARTRATE AND ACETAMINOPHEN 1 TABLET: 7.5; 325 TABLET ORAL at 14:25

## 2017-04-30 RX ADMIN — METRONIDAZOLE 500 MG: 500 INJECTION, SOLUTION INTRAVENOUS at 06:46

## 2017-04-30 RX ADMIN — Medication 2 MG: at 22:21

## 2017-04-30 RX ADMIN — METRONIDAZOLE 500 MG: 500 INJECTION, SOLUTION INTRAVENOUS at 17:11

## 2017-04-30 RX ADMIN — Medication 10 ML: at 14:25

## 2017-04-30 RX ADMIN — HYDROCODONE BITARTRATE AND ACETAMINOPHEN 1 TABLET: 7.5; 325 TABLET ORAL at 03:50

## 2017-04-30 RX ADMIN — PROMETHAZINE HYDROCHLORIDE 25 MG: 25 TABLET ORAL at 09:59

## 2017-04-30 RX ADMIN — RDII 250 MG CAPSULE 250 MG: at 17:11

## 2017-04-30 RX ADMIN — Medication 10 ML: at 21:48

## 2017-04-30 RX ADMIN — Medication 2 MG: at 15:22

## 2017-04-30 RX ADMIN — SODIUM CHLORIDE 100 ML/HR: 900 INJECTION, SOLUTION INTRAVENOUS at 17:11

## 2017-04-30 RX ADMIN — Medication 2 MG: at 11:16

## 2017-04-30 RX ADMIN — Medication 10 ML: at 06:00

## 2017-04-30 RX ADMIN — ENOXAPARIN SODIUM 40 MG: 40 INJECTION SUBCUTANEOUS at 21:47

## 2017-04-30 RX ADMIN — SODIUM CHLORIDE 25 MG: 9 INJECTION INTRAMUSCULAR; INTRAVENOUS; SUBCUTANEOUS at 22:21

## 2017-04-30 RX ADMIN — SODIUM CHLORIDE 25 MG: 9 INJECTION INTRAMUSCULAR; INTRAVENOUS; SUBCUTANEOUS at 17:12

## 2017-04-30 RX ADMIN — METOPROLOL SUCCINATE 100 MG: 100 TABLET, EXTENDED RELEASE ORAL at 17:11

## 2017-04-30 RX ADMIN — ONDANSETRON 4 MG: 2 INJECTION INTRAMUSCULAR; INTRAVENOUS at 14:25

## 2017-04-30 RX ADMIN — CEFTRIAXONE 2 G: 2 INJECTION, POWDER, FOR SOLUTION INTRAMUSCULAR; INTRAVENOUS at 08:09

## 2017-04-30 NOTE — PROGRESS NOTES
Notified RT, Amandeep June, of orders for pt to be on bi-pap. RT states she is in the middle of transferring a pt to ICU but will come hook pt up to bi-pap afterwards. PT never hooked up to bi-pap but O2 SAT is 98-99 on 3L O2 via n/c. Will continue to monitor.

## 2017-04-30 NOTE — PROGRESS NOTES
Zofran given for nausea pt has not vomited since this am pt states she vomited about, \"6 or 7 Ounces\".   Alternating Morphine 2mg and Narco for pain

## 2017-04-30 NOTE — ED NOTES
TRANSFER - OUT REPORT:    Verbal report given to KATHY Moreno(name) on Ari Jerome  being transferred to [facility](unit) for routine progression of care       Report consisted of patients Situation, Background, Assessment and   Recommendations(SBAR). Information from the following report(s) SBAR, ED Summary, [facility] and Recent Results was reviewed with the receiving nurse. Lines:                     Peripheral IV 04/28/17 Right Antecubital (Active)        Opportunity for questions and clarification was provided.

## 2017-04-30 NOTE — PROGRESS NOTES
TRANSFER - IN REPORT:    Verbal report received from Trina Preston RN(name) on Asa Koroma  being received from ED(unit) for routine progression of care      Report consisted of patients Situation, Background, Assessment and   Recommendations(SBAR). Information from the following report(s) SBAR, Kardex, [facility] and Recent Results was reviewed with the receiving nurse. Opportunity for questions and clarification was provided. Assessment completed upon patients arrival to unit and care assumed.

## 2017-04-30 NOTE — H&P
INTERNAL MEDICINE H&P/CONSULT    Subjective:     Patient is a 64years old female with history of diverticulosis presents with RLQ pain, vomiting and diarrhea w/ white stools for last 3-4 days. No fever or bleeding per rectum. She was treated twice in last week for UTI w/ Rocephin iv in clinic. No urinary spx reported. Past Medical History:   Diagnosis Date    Arthritis     everywhere;  DDD    Asthma     inhalers daily  [facility]    CAD (coronary artery disease)     Dr [facility])     left breast ca dx'ed this month-appt with surgeon 10/12    Chronic pain     generalized from arthritis    Complicated UTI (urinary tract infection) 12/8/2015    Diverticulosis     Heart failure ([address]     [facility]    No significant valvular disease. EF 50-55%    History of prediabetes     monitored by Primary Physician    Hypertension     Shortness of breath     Sleep apnea     bi pap and o2    Thyroid cyst     cyst removed from thyroid      Past Surgical History:   Procedure Laterality Date    BREAST SURGERY PROCEDURE UNLISTED      Mastectomy left side    CARDIAC SURG PROCEDURE UNLIST  [facility]    Minimal CAD in the ostial circumflex and mid ramus (medical management)    HX CARPAL TUNNEL RELEASE Bilateral     HX CHOLECYSTECTOMY      HX CYST REMOVAL      from thyroid    HX LAP CHOLECYSTECTOMY      HX OTHER SURGICAL      abcess where bra strap    NEUROLOGICAL PROCEDURE UNLISTED      \"nerve running to back\"    SINUS SURGERY PROC UNLISTED        Prior to Admission medications    Medication Sig Start Date End Date Taking? Authorizing Provider   lisinopril-hydroCHLOROthiazide (PRINZIDE, ZESTORETIC) 20-12.5 mg per tablet Take 1 Tab by mouth daily. 4/6/17  Yes Sangeetha Ren MD   diazePAM (VALIUM) 5 mg tablet Take 1 Tab by mouth every six (6) hours as needed for Anxiety.  Max Daily Amount: 20 mg. 3/13/17  Yes Kailee Sheldon MD oxyCODONE-acetaminophen (PERCOCET)  mg per tablet Take 1 Tab by mouth every six (6) hours as needed for Pain. Max Daily Amount: 4 Tabs. 3/13/17  Yes Destinee Yoo MD   polyethylene glycol (MIRALAX) 17 gram packet Take 1 Packet by mouth daily. 3/13/17  Yes Destinee Yoo MD   bisacodyl (DULCOLAX) 10 mg suppository Insert 10 mg into rectum two (2) times a day. 3/13/17  Yes Destinee Yoo MD   diazePAM (VALIUM) 5 mg tablet Take 5 mg by mouth every six (6) hours as needed for Anxiety. Yes Historical Provider   promethazine (PHENERGAN) 25 mg tablet Take 25 mg by mouth every six (6) hours as needed for Nausea. Yes Historical Provider   anastrozole (ARIMIDEX) 1 mg tablet Take 1 Tab by mouth daily. 2/7/17  Yes Vanessa Espinoza MD   albuterol (PROVENTIL VENTOLIN) 2.5 mg /3 mL (0.083 %) nebulizer solution Take 3 mL by inhalation every four (4) hours as needed for Wheezing or Shortness of Breath. Patient taking differently: Take  by inhalation every four (4) hours as needed for Wheezing or Shortness of Breath. 12/27/16  Yes Jnonie Subramanian NP   metoprolol succinate (TOPROL-XL) 100 mg tablet Take 1 Tab by mouth daily. Patient taking differently: Take 100 mg by mouth two (2) times a day. 10/21/16  Yes Carlotta Torres MD   fluticasone [facility]) 50 mcg/actuation nasal spray 2 Sprays by Both Nostrils route daily. 10/11/16  Yes Honey Tong MD   fluticasone-salmeterol (ADVAIR DISKUS) 250-50 mcg/dose diskus inhaler Take 1 Puff by inhalation two (2) times a day. 10/10/16  Yes Honey Tong MD   ibuprofen (MOTRIN) 800 mg tablet Take  by mouth two (2) times a day. Last taken 9/28/16,   Does not always take 2 a day   Yes Historical Provider   Cetirizine (ZYRTEC) 10 mg cap Take  by mouth every morning. Yes Historical Provider   acetaminophen (TYLENOL EXTRA STRENGTH) 500 mg tablet Take  by mouth every six (6) hours as needed for Pain.    Yes Historical Provider   montelukast (SINGULAIR) 10 mg tablet Take 1 Tab by mouth nightly. 8/16/16  Yes Vena Saliva, NP   OXYGEN-AIR DELIVERY SYSTEMS by Does Not Apply route. 3 lpm qhs   Yes Historical Provider   ranitidine (ZANTAC) 150 mg tablet Take 1 Tab by mouth two (2) times a day. Indications: GASTROESOPHAGEAL REFLUX 7/1/16  Yes Kristen Steiner MD   nitroglycerin (NITROSTAT) 0.4 mg SL tablet by SubLINGual route every five (5) minutes as needed for Chest Pain. Yes Historical Provider   vancomycin in 0.9% Sodium Cl (VANCOCIN) 1.75 gram/500 mL soln 500 mL by IntraVENous route every twelve (12) hours every twelve (12) hours. 3/13/17   Renetta Pandya MD   venlafaxine-SR [facility] 37.5 mg capsule Take 1 capsule by mouth for 1 week and then increase to 75 mg (2 capsules) after 1 week. 2/7/17   Jade Tipton MD   predniSONE (DELTASONE) 20 mg tablet 2 tabs daily x 5 days, 1 tab daily x 2 days, and then stop 1/11/17   Malena Saliva, NP   cpap machine kit by Does Not Apply route. Historical Provider   PROAIR HFA 90 mcg/actuation inhaler as needed. 7/7/16   Historical Provider     Allergies   Allergen Reactions    Bactrim [Sulfamethoprim Ds] Rash     Pt gets a yeast infection on body       Social History   Substance Use Topics    Smoking status: Never Smoker    Smokeless tobacco: Never Used    Alcohol use No        Family History:  HTN    Review of Systems   A comprehensive review of systems was negative except for that written in the HPI. Objective: Intake / Output:          Physical Exam:  Visit Vitals    /79    Pulse 99    Temp 98 °F (36.7 °C)    Resp 18    Ht 5' 2.5\" (1.588 m)    Wt 128.4 kg (283 lb)    SpO2 99%    BMI 50.94 kg/m2     General appearance: awake, alert, cooperative, moderate distress, appears stated age -Dry mucous membranes, obese  Head: Normocephalic, without obvious abnormality, atraumatic  Back: symmetric, no curvature. ROM normal. No CVA tenderness.   Lungs: clear to auscultation bilaterally   Heart: regular rate and rhythm, S1, S2 normal, no murmur, click, rub or gallop. Abdomen: soft, RLQ tenderness, no distension, normal bowel sound, no masses, no organomegaly  Extremities: atraumatic, no cyanosis - Bilateral lower limbs edema none. Skin: No rashes or ulceration. Neurologic: Grossly intact     ECG: sinus rhythm     Data Review (Labs):   Recent Results (from the past 24 hour(s))   METABOLIC PANEL, COMPREHENSIVE    Collection Time: 04/29/17  5:45 PM   Result Value Ref Range    Sodium 141 136 - 145 mmol/L    Potassium 4.3 3.5 - 5.1 mmol/L    Chloride 108 (H) 98 - 107 mmol/L    CO2 26 21 - 32 mmol/L    Anion gap 7 7 - 16 mmol/L    Glucose 133 (H) 65 - 100 mg/dL    BUN 13 8 - 23 MG/DL    Creatinine 0.80 0.6 - 1.0 MG/DL    GFR est AA >60 >60 ml/min/1.73m2    GFR est non-AA >60 >60 ml/min/1.73m2    Calcium 8.9 8.3 - 10.4 MG/DL    Bilirubin, total 0.5 0.2 - 1.1 MG/DL    ALT (SGPT) 95 (H) 12 - 65 U/L    AST (SGOT) 51 (H) 15 - 37 U/L    Alk. phosphatase 127 50 - 136 U/L    Protein, total 8.1 6.3 - 8.2 g/dL    Albumin 3.2 3.2 - 4.6 g/dL    Globulin 4.9 (H) 2.3 - 3.5 g/dL    A-G Ratio 0.7 (L) 1.2 - 3.5     CBC W/O DIFF    Collection Time: 04/29/17  7:48 PM   Result Value Ref Range    WBC 11.5 (H) 4.3 - 11.1 K/uL    RBC 4.34 4.05 - 5.25 M/uL    HGB 11.4 (L) 11.7 - 15.4 g/dL    HCT 34.5 (L) 35.8 - 46.3 %    MCV 79.5 (L) 79.6 - 97.8 FL    MCH 26.3 26.1 - 32.9 PG    MCHC 33.0 31.4 - 35.0 g/dL    RDW 14.7 (H) 11.9 - 14.6 %    PLATELET 846 (H) 098 - 450 K/uL    MPV 8.8 (L) 10.8 - 14.1 FL       Assessment:     Principal Problem:    Acute diverticulitis (4/29/2017)    Active Problems:    Acute lower UTI (11/29/2015) grew Morganella    Vomiting, diarrhea and dehydration    Plan:     Rocephin + Flagyl IV  Follow cultures  IVF hydration  Blood pressure control  AM lab as needed  Continue essential home medications. Patient is full code. Further management depends on patient progress.   Thank you for the oppourtinity to contribute in the care of your patient. Will follow the patient with you as needed. Time 35 minutes.     Signed By: Agatha Norman MD     April 30, 2017

## 2017-04-30 NOTE — PROGRESS NOTES
Hospitalist Progress Note    2017  Admit Date: 2017  7:36 PM   NAME: Starr Del Rio   :  1955   MRN:  [id number]   Attending: Pineda Anne MD  PCP:  Honey Tong MD    SUBJECTIVE:   Audra Brown is a 63 yo F admitted 17 with diverticulitis and UTI. She had been on several outpatient antibiotics over the last month for a UTI. Today, she reports significant abdominal pain and nausea. She has had 5 episodes of diarrhea/day since Tuesday. Has had a few episodes of diarrhea since being here. Review of Systems negative with exception of pertinent positives noted above  PHYSICAL EXAM     Visit Vitals    /67 (BP 1 Location: Right arm, BP Patient Position: At rest)    Pulse (!) 108    Temp 98.6 °F (37 °C)    Resp 20    Ht 5' 2.5\" (1.588 m)    Wt 128.4 kg (283 lb)    SpO2 98%    BMI 50.94 kg/m2      Temp (24hrs), Av.6 °F (37 °C), Min:98 °F (36.7 °C), Max:99.7 °F (37.6 °C)    Oxygen Therapy  O2 Sat (%): 98 % (17 0742)  Pulse via Oximetry: 108 beats per minute (17 2202)  O2 Device: Room air (17 1742)  No intake or output data in the 24 hours ending 17 0914   General: Appears uncomfortable, morbidly obese   Lungs:  CTA Bilaterally.    Heart:  Regular rate and rhythm,  No murmur, rub, or gallop  Abdomen: + lower abdominal tenderness to palpation  Extremities: No cyanosis, clubbing or edema  Neurologic:  No focal deficits    Recent Results (from the past 24 hour(s))   METABOLIC PANEL, COMPREHENSIVE    Collection Time: 17  5:45 PM   Result Value Ref Range    Sodium 141 136 - 145 mmol/L    Potassium 4.3 3.5 - 5.1 mmol/L    Chloride 108 (H) 98 - 107 mmol/L    CO2 26 21 - 32 mmol/L    Anion gap 7 7 - 16 mmol/L    Glucose 133 (H) 65 - 100 mg/dL    BUN 13 8 - 23 MG/DL    Creatinine 0.80 0.6 - 1.0 MG/DL    GFR est AA >60 >60 ml/min/1.73m2    GFR est non-AA >60 >60 ml/min/1.73m2    Calcium 8.9 8.3 - 10.4 MG/DL    Bilirubin, total 0.5 0.2 - 1.1 MG/DL ALT (SGPT) 95 (H) 12 - 65 U/L    AST (SGOT) 51 (H) 15 - 37 U/L    Alk. phosphatase 127 50 - 136 U/L    Protein, total 8.1 6.3 - 8.2 g/dL    Albumin 3.2 3.2 - 4.6 g/dL    Globulin 4.9 (H) 2.3 - 3.5 g/dL    A-G Ratio 0.7 (L) 1.2 - 3.5     CBC W/O DIFF    Collection Time: 04/29/17  7:48 PM   Result Value Ref Range    WBC 11.5 (H) 4.3 - 11.1 K/uL    RBC 4.34 4.05 - 5.25 M/uL    HGB 11.4 (L) 11.7 - 15.4 g/dL    HCT 34.5 (L) 35.8 - 46.3 %    MCV 79.5 (L) 79.6 - 97.8 FL    MCH 26.3 26.1 - 32.9 PG    MCHC 33.0 31.4 - 35.0 g/dL    RDW 14.7 (H) 11.9 - 14.6 %    PLATELET 697 (H) 539 - 450 K/uL    MPV 8.8 (L) 10.8 - 14.1 FL   GLUCOSE, POC    Collection Time: 04/30/17  7:41 AM   Result Value Ref Range    Glucose (POC) 106 (H) 65 - 100 mg/dL     CT ABD PELV W CONT   Final Result   IMPRESSION:      1. Positive for sigmoid diverticulitis. No associated abscess or free air. 2. Hepatomegaly, stable. ASSESSMENT      Active Hospital Problems    Diagnosis Date Noted    Diarrhea of presumed infectious origin 04/30/2017    Acute diverticulitis 04/29/2017    Acute lower UTI 11/29/2015    Morbid obesity with BMI of 50.0-59.9, adult ([facility] [address] 11/05/2015     Plan:  · Diverticulitis- continue rocephin and flagyl for now. Due to frequent episodes of diarrhea and recent abx use, will check C diff. · UTI- rocephin. DVT Prophylaxis: Lovenox    Signed By: Arabella Holbrook MD     April 30, 2017

## 2017-04-30 NOTE — ED PROVIDER NOTES
HPI Comments: Patient presents with abdominal pain for 2 weeks, acutely worse today with nausea and vomiting. Patient tried by mouth Phenergan, continue vomiting. Went to the urgent care and received IM Zofran. Patient currently being treated for UTI, her culture on April 19 grew out Morganella morganii which was multidrug resistant . Patient relates she got a shot of Rocephin on Monday, and on Wednesday, but none in between, and none since. She states she asked them why she did not get anything on Tuesday, patient states that the nurse said \"that shot last a week\". Patient with increasing abdominal pain today, she has a history of diverticulosis. Sounds like she's had bleeding complications from this in the past, and is not entirely sure if it is never progressed to diverticulitis before. Patient is a 64 y.o. female presenting with abdominal pain. The history is provided by the patient. Abdominal Pain    This is a new problem. Associated symptoms include nausea, vomiting, dysuria, frequency and back pain. Pertinent negatives include no fever, no headaches and no chest pain. Past Medical History:   Diagnosis Date    Arthritis     everywhere;  DDD    Asthma     inhalers daily  [facility]    CAD (coronary artery disease)     [facility])     left breast ca dx'ed this month-appt with surgeon 10/12    Chronic pain     generalized from arthritis    Complicated UTI (urinary tract infection) 12/8/2015    Diverticulosis     Heart failure ([address]     [facility]    No significant valvular disease.   EF 50-55%    History of prediabetes     monitored by Primary Physician    Hypertension     Shortness of breath     Sleep apnea     bi pap and o2    Thyroid cyst     cyst removed from thyroid       Past Surgical History:   Procedure Laterality Date    BREAST SURGERY PROCEDURE UNLISTED      Mastectomy left side    CARDIAC SURG PROCEDURE UNLIST  [facility]    Minimal CAD in the ostial circumflex and mid ramus (medical management)    HX CARPAL TUNNEL RELEASE Bilateral     HX CHOLECYSTECTOMY      HX CYST REMOVAL      from thyroid    HX LAP CHOLECYSTECTOMY      HX OTHER SURGICAL      abcess where bra strap    NEUROLOGICAL PROCEDURE UNLISTED      \"nerve running to back\"    SINUS SURGERY PROC UNLISTED           Family History:   Problem Relation Age of Onset    Heart Disease Mother     Cancer Mother      lung    Hypertension Mother     Hypertension Father     Sleep Apnea Father     Cancer Father      prostate    Sleep Apnea Brother      states also has two children with sleep apnea    Cancer Brother      pituitary    Hypertension Brother     Breast Cancer Neg Hx        Social History     Social History    Marital status:      Spouse name: N/A    Number of children: N/A    Years of education: N/A     Occupational History    Not on file. 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 ALLERGIES: Bactrim [sulfamethoprim ds]    Review of Systems   Constitutional: Positive for chills and fatigue. Negative for fever. HENT: Negative for rhinorrhea and sore throat. Eyes: Negative for discharge and redness. Respiratory: Negative for cough and shortness of breath. Cardiovascular: Negative for chest pain. Gastrointestinal: Positive for abdominal pain, nausea and vomiting. Genitourinary: Positive for dysuria and frequency. Musculoskeletal: Positive for back pain. Skin: Negative for rash. Neurological: Negative for dizziness and headaches. All other systems reviewed and are negative.       Vitals:    04/29/17 1742 04/29/17 2054   BP: (!) 178/92    Pulse: (!) 119 (!) 111 Resp: 24    Temp: 99.7 °F (37.6 °C)    SpO2: 98% 98%   Weight: 128.4 kg (283 lb)    Height: 5' 2.5\" (1.588 m)             Physical Exam   Constitutional: She is oriented to person, place, and time. She appears well-developed and well-nourished. She appears distressed. Uncomfortable, morbidly obese   HENT:   Head: Normocephalic and atraumatic. Eyes: Conjunctivae are normal. Pupils are equal, round, and reactive to light. Right eye exhibits no discharge. Left eye exhibits no discharge. No scleral icterus. Neck: Normal range of motion. Neck supple. Cardiovascular: Normal rate, regular rhythm and normal heart sounds. Exam reveals no gallop. No murmur heard. Pulmonary/Chest: Effort normal and breath sounds normal. No respiratory distress. She has no wheezes. She has no rales. Abdominal: Soft. Bowel sounds are normal. There is tenderness in the right upper quadrant, suprapubic area and left lower quadrant. There is no guarding. Musculoskeletal: Normal range of motion. She exhibits no edema. Neurological: She is alert and oriented to person, place, and time. She exhibits normal muscle tone. cni 2-12 grossly   Skin: Skin is warm and dry. She is not diaphoretic. Psychiatric: She has a normal mood and affect. Her behavior is normal.   Nursing note and vitals reviewed. MDM  Number of Diagnoses or Management Options  Diverticulitis of large intestine without perforation or abscess without bleeding:   Intractable vomiting with nausea, unspecified vomiting type:   Urinary tract infection without hematuria, site unspecified:   Diagnosis management comments: Medical decision making note:  Diverticulitis on CAT scan  Multidrug-resistant urinary tract infection  Ongoing nausea vomiting despite Phenergan at home and Zofran IM at the clinic  She warrants admission  D/w hospitalist  This concludes the \"medical decision making note\" part of this emergency department visit note.       ED Course Procedures

## 2017-05-01 LAB
ALBUMIN SERPL BCP-MCNC: 2.8 G/DL (ref 3.2–4.6)
ALBUMIN/GLOB SERPL: 0.7 {RATIO} (ref 1.2–3.5)
ALP SERPL-CCNC: 104 U/L (ref 50–136)
ALT SERPL-CCNC: 51 U/L (ref 12–65)
ANION GAP BLD CALC-SCNC: 11 MMOL/L (ref 7–16)
AST SERPL W P-5'-P-CCNC: 15 U/L (ref 15–37)
BASOPHILS # BLD AUTO: 0.1 K/UL (ref 0–0.2)
BASOPHILS # BLD: 1 % (ref 0–2)
BILIRUB SERPL-MCNC: 0.2 MG/DL (ref 0.2–1.1)
BUN SERPL-MCNC: 10 MG/DL (ref 8–23)
CALCIUM SERPL-MCNC: 8.3 MG/DL (ref 8.3–10.4)
CHLORIDE SERPL-SCNC: 109 MMOL/L (ref 98–107)
CO2 SERPL-SCNC: 24 MMOL/L (ref 21–32)
CREAT SERPL-MCNC: 0.73 MG/DL (ref 0.6–1)
DIFFERENTIAL METHOD BLD: ABNORMAL
EOSINOPHIL # BLD: 0.1 K/UL (ref 0–0.8)
EOSINOPHIL NFR BLD: 1 % (ref 0.5–7.8)
ERYTHROCYTE [DISTWIDTH] IN BLOOD BY AUTOMATED COUNT: 14.7 % (ref 11.9–14.6)
GLOBULIN SER CALC-MCNC: 4.2 G/DL (ref 2.3–3.5)
GLUCOSE SERPL-MCNC: 105 MG/DL (ref 65–100)
HCT VFR BLD AUTO: 31.1 % (ref 35.8–46.3)
HGB BLD-MCNC: 10.2 G/DL (ref 11.7–15.4)
IMM GRANULOCYTES # BLD: 0.4 K/UL (ref 0–0.5)
IMM GRANULOCYTES NFR BLD AUTO: 4.6 % (ref 0–5)
LYMPHOCYTES # BLD AUTO: 24 % (ref 13–44)
LYMPHOCYTES # BLD: 2.3 K/UL (ref 0.5–4.6)
MCH RBC QN AUTO: 25.9 PG (ref 26.1–32.9)
MCHC RBC AUTO-ENTMCNC: 32.8 G/DL (ref 31.4–35)
MCV RBC AUTO: 78.9 FL (ref 79.6–97.8)
MONOCYTES # BLD: 1.4 K/UL (ref 0.1–1.3)
MONOCYTES NFR BLD AUTO: 15 % (ref 4–12)
NEUTS SEG # BLD: 5.1 K/UL (ref 1.7–8.2)
NEUTS SEG NFR BLD AUTO: 54 % (ref 43–78)
PLATELET # BLD AUTO: 477 K/UL (ref 150–450)
PMV BLD AUTO: 8.4 FL (ref 10.8–14.1)
POTASSIUM SERPL-SCNC: 3.6 MMOL/L (ref 3.5–5.1)
PROT SERPL-MCNC: 7 G/DL (ref 6.3–8.2)
RBC # BLD AUTO: 3.94 M/UL (ref 4.05–5.25)
SODIUM SERPL-SCNC: 144 MMOL/L (ref 136–145)
WBC # BLD AUTO: 9.3 K/UL (ref 4.3–11.1)

## 2017-05-01 PROCEDURE — 85025 COMPLETE CBC W/AUTO DIFF WBC: CPT | Performed by: INTERNAL MEDICINE

## 2017-05-01 PROCEDURE — 74011250637 HC RX REV CODE- 250/637: Performed by: HOSPITALIST

## 2017-05-01 PROCEDURE — 74011250636 HC RX REV CODE- 250/636: Performed by: HOSPITALIST

## 2017-05-01 PROCEDURE — 77030018846 HC SOL IRR STRL H20 ICUM -A

## 2017-05-01 PROCEDURE — 65270000029 HC RM PRIVATE

## 2017-05-01 PROCEDURE — 36415 COLL VENOUS BLD VENIPUNCTURE: CPT | Performed by: INTERNAL MEDICINE

## 2017-05-01 PROCEDURE — 80053 COMPREHEN METABOLIC PANEL: CPT | Performed by: INTERNAL MEDICINE

## 2017-05-01 PROCEDURE — 74011250637 HC RX REV CODE- 250/637: Performed by: INTERNAL MEDICINE

## 2017-05-01 PROCEDURE — 74011000250 HC RX REV CODE- 250: Performed by: INTERNAL MEDICINE

## 2017-05-01 PROCEDURE — 74011250636 HC RX REV CODE- 250/636: Performed by: INTERNAL MEDICINE

## 2017-05-01 RX ADMIN — METRONIDAZOLE 500 MG: 500 INJECTION, SOLUTION INTRAVENOUS at 17:00

## 2017-05-01 RX ADMIN — Medication 2 MG: at 13:54

## 2017-05-01 RX ADMIN — SODIUM CHLORIDE 25 MG: 9 INJECTION INTRAMUSCULAR; INTRAVENOUS; SUBCUTANEOUS at 03:46

## 2017-05-01 RX ADMIN — METOPROLOL SUCCINATE 100 MG: 100 TABLET, EXTENDED RELEASE ORAL at 18:38

## 2017-05-01 RX ADMIN — SODIUM CHLORIDE 100 ML/HR: 900 INJECTION, SOLUTION INTRAVENOUS at 21:00

## 2017-05-01 RX ADMIN — VENLAFAXINE HYDROCHLORIDE 37.5 MG: 37.5 CAPSULE, EXTENDED RELEASE ORAL at 11:12

## 2017-05-01 RX ADMIN — Medication 10 ML: at 14:05

## 2017-05-01 RX ADMIN — METRONIDAZOLE 500 MG: 500 INJECTION, SOLUTION INTRAVENOUS at 11:12

## 2017-05-01 RX ADMIN — ENOXAPARIN SODIUM 40 MG: 40 INJECTION SUBCUTANEOUS at 21:42

## 2017-05-01 RX ADMIN — RDII 250 MG CAPSULE 250 MG: at 11:12

## 2017-05-01 RX ADMIN — Medication 2 MG: at 09:28

## 2017-05-01 RX ADMIN — ANASTROZOLE 1 MG: 1 TABLET, COATED ORAL at 11:12

## 2017-05-01 RX ADMIN — ENOXAPARIN SODIUM 40 MG: 40 INJECTION SUBCUTANEOUS at 11:12

## 2017-05-01 RX ADMIN — SODIUM CHLORIDE 25 MG: 9 INJECTION INTRAMUSCULAR; INTRAVENOUS; SUBCUTANEOUS at 23:00

## 2017-05-01 RX ADMIN — Medication 2 MG: at 21:42

## 2017-05-01 RX ADMIN — Medication 10 ML: at 03:47

## 2017-05-01 RX ADMIN — SODIUM CHLORIDE 25 MG: 9 INJECTION INTRAMUSCULAR; INTRAVENOUS; SUBCUTANEOUS at 13:54

## 2017-05-01 RX ADMIN — Medication 2 MG: at 03:46

## 2017-05-01 RX ADMIN — DIAZEPAM 5 MG: 5 TABLET ORAL at 00:54

## 2017-05-01 RX ADMIN — Medication 10 ML: at 21:43

## 2017-05-01 RX ADMIN — METOPROLOL SUCCINATE 100 MG: 100 TABLET, EXTENDED RELEASE ORAL at 11:12

## 2017-05-01 RX ADMIN — HYDROCODONE BITARTRATE AND ACETAMINOPHEN 1 TABLET: 7.5; 325 TABLET ORAL at 16:52

## 2017-05-01 RX ADMIN — HYDROCODONE BITARTRATE AND ACETAMINOPHEN 1 TABLET: 7.5; 325 TABLET ORAL at 06:17

## 2017-05-01 RX ADMIN — SODIUM CHLORIDE 100 ML/HR: 900 INJECTION, SOLUTION INTRAVENOUS at 06:11

## 2017-05-01 RX ADMIN — METRONIDAZOLE 500 MG: 500 INJECTION, SOLUTION INTRAVENOUS at 00:42

## 2017-05-01 RX ADMIN — PROMETHAZINE HYDROCHLORIDE 25 MG: 25 TABLET ORAL at 16:52

## 2017-05-01 NOTE — PROGRESS NOTES
Hospitalist Progress Note    2017  Admit Date: 2017  7:36 PM   NAME: Jose Loomis   :  1955   MRN:  [id number]   Attending: Kalani Mao MD  PCP:  Ella Thomson MD    SUBJECTIVE:   Katty Lowery is a 65 yo F admitted 17 with diverticulitis and UTI. She had been on several outpatient antibiotics over the last month for a UTI. Today, she reports significant abdominal pain and nausea. She has had 5 episodes of diarrhea/day since Tuesday. Has had a few episodes of diarrhea since being here. 17- sitting in chair on evaluation. She reports she had a pretty rough night last night, but feels better this morning. Still with nausea. ~4 episodes of diarrhea over the last 24 hours. Last dose of phenergan at 3 AM.  Still has required several doses of morphine/norco.  Tolerated clear liquids so far today. Review of Systems negative with exception of pertinent positives noted above  PHYSICAL EXAM     Visit Vitals    /84 (BP 1 Location: Right arm, BP Patient Position: Sitting)    Pulse 97    Temp 97.9 °F (36.6 °C)    Resp 18    Ht 5' 2.5\" (1.588 m)    Wt 128.4 kg (283 lb)    SpO2 98%    BMI 50.94 kg/m2      Temp (24hrs), Av °F (36.7 °C), Min:97.5 °F (36.4 °C), Max:98.9 °F (37.2 °C)    Oxygen Therapy  O2 Sat (%): 98 % (17 1139)  Pulse via Oximetry: 108 beats per minute (17 2202)  O2 Device: Room air (17 1742)  No intake or output data in the 24 hours ending 17 1202   General: Appears uncomfortable, morbidly obese   Lungs:  CTA Bilaterally.    Heart:  Regular rate and rhythm,  No murmur, rub, or gallop  Abdomen: Mild tenderness but overall improved, + bowel sounds  Extremities: No cyanosis, clubbing or edema  Neurologic:  No focal deficits    Recent Results (from the past 24 hour(s))   GLUCOSE, POC    Collection Time: 17  4:23 PM   Result Value Ref Range    Glucose (POC) 121 (H) 65 - 100 mg/dL   GLUCOSE, POC    Collection Time: 17 8:49 PM   Result Value Ref Range    Glucose (POC) 127 (H) 65 - 100 mg/dL     CT ABD PELV W CONT   Final Result   IMPRESSION:      1. Positive for sigmoid diverticulitis. No associated abscess or free air. 2. Hepatomegaly, stable. ASSESSMENT      Active Hospital Problems    Diagnosis Date Noted    C. difficile colitis 04/30/2017    Acute diverticulitis 04/29/2017    Acute lower UTI 11/29/2015    Morbid obesity with BMI of 50.0-59.9, adult ([facility] [address] 11/05/2015     Plan:  · C diff colitis- improving with flagyl. Continue IV flagyl for now. Rocephin discontinued yesterday. Diarrhea slowing down. · Nausea/vomiting- treat with phenergan. · Abdominal pain- controlled with pain meds. · UTI- has had over 3 days of rocephin. Discontinued yesterday. Dispo- Hopefully home 5/1/17 if she continues to improve. DVT Prophylaxis: Lovenox    Signed By: Dayron Reeves.  Jamel Flores MD     May 1, 2017

## 2017-05-01 NOTE — PROGRESS NOTES
Pt has required Morphine and phenergan throughout the night. When pt was awake, she continually moaned and cried out to \"GOD\". No emesis noted.   Stated she had one BM during the night

## 2017-05-01 NOTE — INTERDISCIPLINARY ROUNDS
Interdisciplinary team rounds were held 5/1/2017 with the following team members:Care Management, Nursing, Pastoral Care and Physician. Plan of care discussed. See clinical pathway and/or care plan for interventions and desired outcomes. Anticipating discharge to home tomorrow.

## 2017-05-02 ENCOUNTER — HOME CARE VISIT (OUTPATIENT)
Dept: HOME HEALTH SERVICES | Facility: HOME HEALTH | Age: 62
End: 2017-05-02
Payer: MEDICAID

## 2017-05-02 LAB
BASOPHILS # BLD AUTO: 0.1 K/UL (ref 0–0.2)
BASOPHILS # BLD: 1 % (ref 0–2)
DIFFERENTIAL METHOD BLD: ABNORMAL
EOSINOPHIL # BLD: 0.2 K/UL (ref 0–0.8)
EOSINOPHIL NFR BLD: 2 % (ref 0.5–7.8)
ERYTHROCYTE [DISTWIDTH] IN BLOOD BY AUTOMATED COUNT: 14.2 % (ref 11.9–14.6)
HCT VFR BLD AUTO: 33.8 % (ref 35.8–46.3)
HGB BLD-MCNC: 10.7 G/DL (ref 11.7–15.4)
IMM GRANULOCYTES # BLD: 0.2 K/UL (ref 0–0.5)
LYMPHOCYTES # BLD AUTO: 23 % (ref 13–44)
LYMPHOCYTES # BLD: 1.8 K/UL (ref 0.5–4.6)
MCH RBC QN AUTO: 25.4 PG (ref 26.1–32.9)
MCHC RBC AUTO-ENTMCNC: 31.7 G/DL (ref 31.4–35)
MCV RBC AUTO: 80.3 FL (ref 79.6–97.8)
MONOCYTES # BLD: 0.9 K/UL (ref 0.1–1.3)
MONOCYTES NFR BLD AUTO: 12 % (ref 4–12)
NEUTS SEG # BLD: 4.9 K/UL (ref 1.7–8.2)
NEUTS SEG NFR BLD AUTO: 62 % (ref 43–78)
PLATELET # BLD AUTO: 520 K/UL (ref 150–450)
PLATELET COMMENTS,PCOM: SLIGHT
PMV BLD AUTO: 8.7 FL (ref 10.8–14.1)
RBC # BLD AUTO: 4.21 M/UL (ref 4.05–5.25)
RBC MORPH BLD: ABNORMAL
WBC # BLD AUTO: 7.9 K/UL (ref 4.3–11.1)
WBC MORPH BLD: ABNORMAL

## 2017-05-02 PROCEDURE — 74011250636 HC RX REV CODE- 250/636: Performed by: INTERNAL MEDICINE

## 2017-05-02 PROCEDURE — 36415 COLL VENOUS BLD VENIPUNCTURE: CPT | Performed by: INTERNAL MEDICINE

## 2017-05-02 PROCEDURE — 74011250636 HC RX REV CODE- 250/636: Performed by: HOSPITALIST

## 2017-05-02 PROCEDURE — 85025 COMPLETE CBC W/AUTO DIFF WBC: CPT | Performed by: INTERNAL MEDICINE

## 2017-05-02 PROCEDURE — 74011000258 HC RX REV CODE- 258: Performed by: INTERNAL MEDICINE

## 2017-05-02 PROCEDURE — 74011250637 HC RX REV CODE- 250/637: Performed by: HOSPITALIST

## 2017-05-02 PROCEDURE — 74011000250 HC RX REV CODE- 250: Performed by: INTERNAL MEDICINE

## 2017-05-02 PROCEDURE — 74011250637 HC RX REV CODE- 250/637: Performed by: INTERNAL MEDICINE

## 2017-05-02 PROCEDURE — 65270000029 HC RM PRIVATE

## 2017-05-02 RX ORDER — SODIUM CHLORIDE 450 MG/100ML
25 INJECTION, SOLUTION INTRAVENOUS CONTINUOUS
Status: DISCONTINUED | OUTPATIENT
Start: 2017-05-02 | End: 2017-05-06

## 2017-05-02 RX ORDER — DICYCLOMINE HYDROCHLORIDE 20 MG/1
20 TABLET ORAL
Status: DISCONTINUED | OUTPATIENT
Start: 2017-05-02 | End: 2017-05-04

## 2017-05-02 RX ORDER — PROMETHAZINE HYDROCHLORIDE 25 MG/1
25 TABLET ORAL
Status: DISCONTINUED | OUTPATIENT
Start: 2017-05-02 | End: 2017-05-02

## 2017-05-02 RX ORDER — ONDANSETRON 2 MG/ML
8 INJECTION INTRAMUSCULAR; INTRAVENOUS
Status: DISCONTINUED | OUTPATIENT
Start: 2017-05-02 | End: 2017-05-03

## 2017-05-02 RX ADMIN — Medication 2 MG: at 13:29

## 2017-05-02 RX ADMIN — ENOXAPARIN SODIUM 40 MG: 40 INJECTION SUBCUTANEOUS at 08:54

## 2017-05-02 RX ADMIN — DICYCLOMINE HYDROCHLORIDE 20 MG: 20 TABLET ORAL at 18:09

## 2017-05-02 RX ADMIN — Medication 2 MG: at 08:54

## 2017-05-02 RX ADMIN — Medication 2 MG: at 21:39

## 2017-05-02 RX ADMIN — Medication 2 MG: at 03:57

## 2017-05-02 RX ADMIN — Medication 10 ML: at 14:00

## 2017-05-02 RX ADMIN — SODIUM CHLORIDE 25 MG: 9 INJECTION INTRAMUSCULAR; INTRAVENOUS; SUBCUTANEOUS at 03:57

## 2017-05-02 RX ADMIN — DICYCLOMINE HYDROCHLORIDE 20 MG: 20 TABLET ORAL at 21:41

## 2017-05-02 RX ADMIN — ENOXAPARIN SODIUM 40 MG: 40 INJECTION SUBCUTANEOUS at 21:39

## 2017-05-02 RX ADMIN — HYDROCODONE BITARTRATE AND ACETAMINOPHEN 1 TABLET: 7.5; 325 TABLET ORAL at 06:39

## 2017-05-02 RX ADMIN — Medication 2 MG: at 00:47

## 2017-05-02 RX ADMIN — PROMETHAZINE HYDROCHLORIDE 25 MG: 25 TABLET ORAL at 13:29

## 2017-05-02 RX ADMIN — METRONIDAZOLE 500 MG: 500 INJECTION, SOLUTION INTRAVENOUS at 00:47

## 2017-05-02 RX ADMIN — METOPROLOL SUCCINATE 100 MG: 100 TABLET, EXTENDED RELEASE ORAL at 08:53

## 2017-05-02 RX ADMIN — Medication 2 MG: at 18:09

## 2017-05-02 RX ADMIN — METOPROLOL SUCCINATE 100 MG: 100 TABLET, EXTENDED RELEASE ORAL at 18:09

## 2017-05-02 RX ADMIN — Medication 10 ML: at 06:39

## 2017-05-02 RX ADMIN — SODIUM CHLORIDE 75 ML/HR: 450 INJECTION, SOLUTION INTRAVENOUS at 18:09

## 2017-05-02 RX ADMIN — METRONIDAZOLE 500 MG: 500 INJECTION, SOLUTION INTRAVENOUS at 18:09

## 2017-05-02 RX ADMIN — METRONIDAZOLE 500 MG: 500 INJECTION, SOLUTION INTRAVENOUS at 08:55

## 2017-05-02 RX ADMIN — ONDANSETRON 8 MG: 2 INJECTION INTRAMUSCULAR; INTRAVENOUS at 19:17

## 2017-05-02 RX ADMIN — Medication 10 ML: at 21:39

## 2017-05-02 NOTE — PROGRESS NOTES
Pt requiring IV medication for nausea and pain. Continually moaning and crying out during the night.

## 2017-05-02 NOTE — PROGRESS NOTES
Pt's IV infiltrated. Pt requesting a PICC line because she is tired of being stuck. Dr. Angi Giordano aware. New orders for peripheral IV by vascular access team.  If this does not work, a midline or PICC may be placed. Notified vascular access team.      Pt has new IV to R arm. Pt requesting nausea medication. Pt's IV access is limited and phenergan IV on backorder, per nightshift RN, so avoiding IV phenergan. Spoke with Dr. Angi Giordano, new orders for IV zofran. Pt given zofran.

## 2017-05-02 NOTE — PROGRESS NOTES
Hospitalist Progress Note    2017  Admit Date: 2017  7:36 PM   NAME: Loy Almanza   :  1955   MRN:  [id number]   Attending: Santiago Louie MD  PCP:  Mignon Mensah MD    SUBJECTIVE:   Modesto Mahoney is a 65 yo F admitted 17 with diverticulitis and UTI. She had been on several outpatient antibiotics over the last month for a UTI. Today, she reports significant abdominal pain and nausea. She has had 5 episodes of diarrhea/day since Tuesday. Has had a few episodes of diarrhea since being here. 17- sitting in chair on evaluation. She reports she had a pretty rough night last night, but feels better this morning. Still with nausea. ~4 episodes of diarrhea over the last 24 hours. Last dose of phenergan at 3 AM.  Still has required several doses of morphine/norco.  Tolerated clear liquids so far today. 17- still reporting significant abdominal cramping and nausea. Has vomited a small amount a few times after eating or meds. Diarrhea slowing and has had 2 episodes over last 24 hours. Review of Systems negative with exception of pertinent positives noted above  PHYSICAL EXAM     Visit Vitals    /70 (BP 1 Location: Right arm, BP Patient Position: At rest)    Pulse 75    Temp 97.9 °F (36.6 °C)    Resp 18    Ht 5' 2.5\" (1.588 m)    Wt 128.4 kg (283 lb)    SpO2 96%    BMI 50.94 kg/m2      Temp (24hrs), Av.8 °F (36.6 °C), Min:96.9 °F (36.1 °C), Max:98.2 °F (36.8 °C)    Oxygen Therapy  O2 Sat (%): 96 % (17 1150)  Pulse via Oximetry: 108 beats per minute (17 2202)  O2 Device: Room air (17 1742)    Intake/Output Summary (Last 24 hours) at 17 1614  Last data filed at 17 0404   Gross per 24 hour   Intake             2688 ml   Output                0 ml   Net             2688 ml      General: Appears uncomfortable, morbidly obese   Lungs:  CTA Bilaterally.    Heart:  Regular rate and rhythm,  No murmur, rub, or gallop  Abdomen: Mild tenderness but overall improved, + bowel sounds  Extremities: No cyanosis, clubbing or edema  Neurologic:  No focal deficits    Recent Results (from the past 24 hour(s))   CBC WITH AUTOMATED DIFF    Collection Time: 05/02/17  7:41 AM   Result Value Ref Range    WBC 7.9 4.3 - 11.1 K/uL    RBC 4.21 4.05 - 5.25 M/uL    HGB 10.7 (L) 11.7 - 15.4 g/dL    HCT 33.8 (L) 35.8 - 46.3 %    MCV 80.3 79.6 - 97.8 FL    MCH 25.4 (L) 26.1 - 32.9 PG    MCHC 31.7 31.4 - 35.0 g/dL    RDW 14.2 11.9 - 14.6 %    PLATELET 369 (H) 310 - 450 K/uL    MPV 8.7 (L) 10.8 - 14.1 FL    NEUTROPHILS 62 43 - 78 %    LYMPHOCYTES 23 13 - 44 %    MONOCYTES 12 4.0 - 12.0 %    EOSINOPHILS 2 0.5 - 7.8 %    BASOPHILS 1 0.0 - 2.0 %    ABS. NEUTROPHILS 4.9 1.7 - 8.2 K/UL    ABS. LYMPHOCYTES 1.8 0.5 - 4.6 K/UL    ABS. MONOCYTES 0.9 0.1 - 1.3 K/UL    ABS. EOSINOPHILS 0.2 0.0 - 0.8 K/UL    ABS. BASOPHILS 0.1 0.0 - 0.2 K/UL    ABS. IMM. GRANS. 0.2 0.0 - 0.5 K/UL    RBC COMMENTS SLIGHT  ANISOCYTOSIS        WBC COMMENTS Result Confirmed By Smear      PLATELET COMMENTS SLIGHT      DF AUTOMATED       CT ABD PELV W CONT   Final Result   IMPRESSION:      1. Positive for sigmoid diverticulitis. No associated abscess or free air. 2. Hepatomegaly, stable. ASSESSMENT      Active Hospital Problems    Diagnosis Date Noted    C. difficile colitis 04/30/2017    Acute diverticulitis 04/29/2017    Acute lower UTI 11/29/2015    Morbid obesity with BMI of 50.0-59.9, adult ([facility] [address] 11/05/2015     Plan:  · C diff colitis- improving with flagyl. Continue IV flagyl for now as she is not tolerating PO consistently. Rocephin discontinued 4/30/17. Diarrhea slowing down. · Nausea/vomiting- treat with phenergan. As it is continuing, make her NPO for now. Start 0.45 NS fluid. · Abdominal pain- cramping in nature. Try Bentyl and continue prn pain medication. · UTI- received over 3 days of rocephin. Discontinued 4/30/17.     Dispo- Hopefully home in the next few days when nausea/abdominal discomfort controlled and she can tolerate PO. DVT Prophylaxis: Lovenox    Signed By: Renee Thompson.  Tony Macdonald MD     May 2, 2017

## 2017-05-03 LAB
BASOPHILS # BLD AUTO: 0.1 K/UL (ref 0–0.2)
BASOPHILS # BLD: 1 % (ref 0–2)
DIFFERENTIAL METHOD BLD: ABNORMAL
EOSINOPHIL # BLD: 0.2 K/UL (ref 0–0.8)
EOSINOPHIL NFR BLD: 3 % (ref 0.5–7.8)
ERYTHROCYTE [DISTWIDTH] IN BLOOD BY AUTOMATED COUNT: 14.3 % (ref 11.9–14.6)
HCT VFR BLD AUTO: 34.1 % (ref 35.8–46.3)
HGB BLD-MCNC: 11.2 G/DL (ref 11.7–15.4)
IMM GRANULOCYTES # BLD: 0.3 K/UL (ref 0–0.5)
IMM GRANULOCYTES NFR BLD AUTO: 4.1 % (ref 0–5)
LYMPHOCYTES # BLD AUTO: 34 % (ref 13–44)
LYMPHOCYTES # BLD: 2.3 K/UL (ref 0.5–4.6)
MCH RBC QN AUTO: 25.6 PG (ref 26.1–32.9)
MCHC RBC AUTO-ENTMCNC: 32.8 G/DL (ref 31.4–35)
MCV RBC AUTO: 78 FL (ref 79.6–97.8)
MONOCYTES # BLD: 0.8 K/UL (ref 0.1–1.3)
MONOCYTES NFR BLD AUTO: 12 % (ref 4–12)
NEUTS SEG # BLD: 3.1 K/UL (ref 1.7–8.2)
NEUTS SEG NFR BLD AUTO: 46 % (ref 43–78)
PLATELET # BLD AUTO: 456 K/UL (ref 150–450)
PMV BLD AUTO: 8.6 FL (ref 10.8–14.1)
RBC # BLD AUTO: 4.37 M/UL (ref 4.05–5.25)
WBC # BLD AUTO: 6.6 K/UL (ref 4.3–11.1)

## 2017-05-03 PROCEDURE — 77030018846 HC SOL IRR STRL H20 ICUM -A

## 2017-05-03 PROCEDURE — 74011000250 HC RX REV CODE- 250: Performed by: PHYSICIAN ASSISTANT

## 2017-05-03 PROCEDURE — 85025 COMPLETE CBC W/AUTO DIFF WBC: CPT | Performed by: INTERNAL MEDICINE

## 2017-05-03 PROCEDURE — 74011250637 HC RX REV CODE- 250/637: Performed by: PHYSICIAN ASSISTANT

## 2017-05-03 PROCEDURE — 74011250636 HC RX REV CODE- 250/636: Performed by: HOSPITALIST

## 2017-05-03 PROCEDURE — 74011250636 HC RX REV CODE- 250/636: Performed by: PHYSICIAN ASSISTANT

## 2017-05-03 PROCEDURE — 74011250636 HC RX REV CODE- 250/636: Performed by: INTERNAL MEDICINE

## 2017-05-03 PROCEDURE — 74011250637 HC RX REV CODE- 250/637: Performed by: INTERNAL MEDICINE

## 2017-05-03 PROCEDURE — 74011000258 HC RX REV CODE- 258: Performed by: INTERNAL MEDICINE

## 2017-05-03 PROCEDURE — 65270000029 HC RM PRIVATE

## 2017-05-03 PROCEDURE — 74011000250 HC RX REV CODE- 250: Performed by: INTERNAL MEDICINE

## 2017-05-03 PROCEDURE — 74011250637 HC RX REV CODE- 250/637: Performed by: HOSPITALIST

## 2017-05-03 PROCEDURE — 36415 COLL VENOUS BLD VENIPUNCTURE: CPT | Performed by: INTERNAL MEDICINE

## 2017-05-03 RX ORDER — FAMOTIDINE 10 MG/ML
20 INJECTION INTRAVENOUS EVERY 12 HOURS
Status: DISCONTINUED | OUTPATIENT
Start: 2017-05-03 | End: 2017-05-08 | Stop reason: HOSPADM

## 2017-05-03 RX ORDER — ONDANSETRON 2 MG/ML
6 INJECTION INTRAMUSCULAR; INTRAVENOUS
Status: DISCONTINUED | OUTPATIENT
Start: 2017-05-03 | End: 2017-05-08 | Stop reason: HOSPADM

## 2017-05-03 RX ADMIN — METRONIDAZOLE 500 MG: 500 INJECTION, SOLUTION INTRAVENOUS at 08:50

## 2017-05-03 RX ADMIN — FAMOTIDINE 20 MG: 10 INJECTION, SOLUTION INTRAVENOUS at 13:04

## 2017-05-03 RX ADMIN — Medication 2 MG: at 08:52

## 2017-05-03 RX ADMIN — ENOXAPARIN SODIUM 40 MG: 40 INJECTION SUBCUTANEOUS at 08:50

## 2017-05-03 RX ADMIN — Medication 2 MG: at 13:04

## 2017-05-03 RX ADMIN — Medication 2 MG: at 01:30

## 2017-05-03 RX ADMIN — Medication 10 ML: at 14:00

## 2017-05-03 RX ADMIN — Medication 10 ML: at 06:39

## 2017-05-03 RX ADMIN — METRONIDAZOLE 500 MG: 500 INJECTION, SOLUTION INTRAVENOUS at 18:00

## 2017-05-03 RX ADMIN — DICYCLOMINE HYDROCHLORIDE 20 MG: 20 TABLET ORAL at 06:39

## 2017-05-03 RX ADMIN — Medication 2 MG: at 23:00

## 2017-05-03 RX ADMIN — METRONIDAZOLE 500 MG: 500 INJECTION, SOLUTION INTRAVENOUS at 01:30

## 2017-05-03 RX ADMIN — ONDANSETRON 6 MG: 2 INJECTION INTRAMUSCULAR; INTRAVENOUS at 18:01

## 2017-05-03 RX ADMIN — ONDANSETRON 8 MG: 2 INJECTION INTRAMUSCULAR; INTRAVENOUS at 04:30

## 2017-05-03 RX ADMIN — ONDANSETRON 4 MG: 2 INJECTION INTRAMUSCULAR; INTRAVENOUS at 13:03

## 2017-05-03 RX ADMIN — METOPROLOL SUCCINATE 100 MG: 100 TABLET, EXTENDED RELEASE ORAL at 08:50

## 2017-05-03 RX ADMIN — SODIUM CHLORIDE 75 ML/HR: 450 INJECTION, SOLUTION INTRAVENOUS at 15:48

## 2017-05-03 RX ADMIN — ENOXAPARIN SODIUM 40 MG: 40 INJECTION SUBCUTANEOUS at 22:45

## 2017-05-03 RX ADMIN — Medication 10 ML: at 23:33

## 2017-05-03 RX ADMIN — FAMOTIDINE 20 MG: 10 INJECTION, SOLUTION INTRAVENOUS at 22:45

## 2017-05-03 RX ADMIN — VANCOMYCIN HYDROCHLORIDE 500 MG: 1 INJECTION, POWDER, LYOPHILIZED, FOR SOLUTION INTRAVENOUS at 18:00

## 2017-05-03 NOTE — CONSULTS
[facility] Consult Note       Consulting GI Physician: Sarah Jimenez MD  Referring Physician:  Sam Nur MD    Consult Date:  5/3/2017  Admit Date:  4/29/2017    Chief Complaint:  C diff & diverticulitis. Subjective:     History of Present Illness:  Patient is a 64 y.o. female with PMHx outlined below who is seen in consultation at the request of Dr. Angi Giordano for further recommendation / management of C diff, diverticulitis, & persistent N/V. Patient presented to the ED on 4/29 with a 4 day hx of N/V/D & diffuse abdominal pain, more prominent in the lower abdomen. In the ER, she was found to have an elevated WBC of 11k, A/P CT scan showed acute, uncomplicated sigmoid diverticulitis. (This is her 1st episode of diverticulitis). Stools studies were positive for C diff on 4/30 & was started on IV Flagyl 500mg q8 hrs. No prior hx of C diff. Patient has a hx hx of recurrent UTIs & has been treated recently with Rocephin & remains on this medication here in the hospital. N/V & diarrhea improved somewhat; however, today she has had worsening diarrhea (5 nonbloody loose stools this morning) & states that nausea is worse with oral intake & unable to tolerate any oral intake. Nausea responds to IV Zofran, but requires this around the clock. Abdominal cramping has improved with Bentyl. She reports worsening indigestion since admission. EGD in 9/2016 with Dr. Moni Davila for nausea was normal. Screening colonoscopy with Dr. Esme Castellanos in 2013 showed pan diverticulosis & ascending TA colon polyp. She was told to have a repeat colonoscopy in 2018. No fever or chills.      PMH:  Past Medical History:   Diagnosis Date    Arthritis     everywhere;  DDD    Asthma     inhalers daily  [facility]    CAD (coronary artery disease)     Dr Stephan Lundborg [facility])     left breast ca dx'ed this month-appt with surgeon 10/12    Chronic pain     generalized from arthritis    Complicated UTI (urinary tract infection) 12/8/2015    Diverticulosis     Heart failure ([facility] [address]     [facility] of echocardi[facility]17/14 [facility]    No significant valvular disease. EF 50-55%    History of prediabetes     monitored by Primary Physician    Hypertension     Shortness of breath     Sleep apnea     bi pap and o2    Thyroid cyst     cyst removed from thyroid       PSH:  Past Surgical History:   Procedure Laterality Date    BREAST SURGERY PROCEDURE UNLISTED      Mastectomy left side    CARDIAC SURG PROCEDURE UNLIST  Cath [facility]/14 [facility]    Minimal CAD in the ostial circumflex and mid ramus (medical management)    HX CARPAL TUNNEL RELEASE Bilateral     HX CHOLECYSTECTOMY      HX CYST REMOVAL      from thyroid    HX LAP CHOLECYSTECTOMY      HX OTHER SURGICAL      abcess where bra strap    NEUROLOGICAL PROCEDURE UNLISTED      \"nerve running to back\"    SINUS SURGERY PROC UNLISTED         Allergies: Allergies   Allergen Reactions    Bactrim [Sulfamethoprim Ds] Rash     Pt gets a yeast infection on body        Home Medications:  Prior to Admission medications    Medication Sig Start Date End Date Taking? Authorizing Provider   lisinopril-hydroCHLOROthiazide (PRINZIDE, ZESTORETIC) 20-12.5 mg per tablet Take 1 Tab by mouth daily. 4/6/17  Yes Nancy Corley MD   diazePAM (VALIUM) 5 mg tablet Take 1 Tab by mouth every six (6) hours as needed for Anxiety. Max Daily Amount: 20 mg. 3/13/17  Yes Matt Johnston MD   oxyCODONE-acetaminophen (PERCOCET)  mg per tablet Take 1 Tab by mouth every six (6) hours as needed for Pain. Max Daily Amount: 4 Tabs. 3/13/17  Yes Matt Johnston MD   polyethylene glycol (MIRALAX) 17 gram packet Take 1 Packet by mouth daily. 3/13/17  Yes Matt Johnston MD   bisacodyl (DULCOLAX) 10 mg suppository Insert 10 mg into rectum two (2) times a day. 3/13/17  Yes Matt Johnston MD   diazePAM (VALIUM) 5 mg tablet Take 5 mg by mouth every six (6) hours as needed for Anxiety.    Yes Historical Provider promethazine (PHENERGAN) 25 mg tablet Take 25 mg by mouth every six (6) hours as needed for Nausea. Yes Historical Provider   anastrozole (ARIMIDEX) 1 mg tablet Take 1 Tab by mouth daily. 2/7/17  Yes Yazmin Ortiz MD   albuterol (PROVENTIL VENTOLIN) 2.5 mg /3 mL (0.083 %) nebulizer solution Take 3 mL by inhalation every four (4) hours as needed for Wheezing or Shortness of Breath. Patient taking differently: Take  by inhalation every four (4) hours as needed for Wheezing or Shortness of Breath. 12/27/16  Yes Sunday GARRETT Bailey   metoprolol succinate (TOPROL-XL) 100 mg tablet Take 1 Tab by mouth daily. Patient taking differently: Take 100 mg by mouth two (2) times a day. 10/21/16  Yes Daphnie Mak MD   fluticasone [facility]) 50 mcg/actuation nasal spray 2 Sprays by Both Nostrils route daily. 10/11/16  Yes Sangeetha Ren MD   fluticasone-salmeterol (ADVAIR DISKUS) 250-50 mcg/dose diskus inhaler Take 1 Puff by inhalation two (2) times a day. 10/10/16  Yes Sangeetha Ren MD   ibuprofen (MOTRIN) 800 mg tablet Take  by mouth two (2) times a day. Last taken 9/28/16,   Does not always take 2 a day   Yes Historical Provider   Cetirizine (ZYRTEC) 10 mg cap Take  by mouth every morning. Yes Historical Provider   acetaminophen (TYLENOL EXTRA STRENGTH) 500 mg tablet Take  by mouth every six (6) hours as needed for Pain. Yes Historical Provider   montelukast (SINGULAIR) 10 mg tablet Take 1 Tab by mouth nightly. 8/16/16  Yes Dafne Gruber NP   OXYGEN-AIR DELIVERY SYSTEMS by Does Not Apply route. 3 lpm qhs   Yes Historical Provider   ranitidine (ZANTAC) 150 mg tablet Take 1 Tab by mouth two (2) times a day. Indications: GASTROESOPHAGEAL REFLUX 7/1/16  Yes Sangeetha Ren MD   nitroglycerin (NITROSTAT) 0.4 mg SL tablet by SubLINGual route every five (5) minutes as needed for Chest Pain.    Yes Historical Provider   venlafaxine-SR [facility]) 37.5 mg capsule Take 1 capsule by mouth for 1 week and then increase to 75 mg (2 capsules) after 1 week. 2/7/17   Diane Bah MD   predniSONE (DELTASONE) 20 mg tablet 2 tabs daily x 5 days, 1 tab daily x 2 days, and then stop 1/11/17   Julio Hazel NP   cpap machine kit by Does Not Apply route. Historical Provider   PROAIR HFA 90 mcg/actuation inhaler as needed.  7/7/16   Historical Provider       Hospital Medications:  Current Facility-Administered Medications   Medication Dose Route Frequency    0.45% sodium chloride infusion  75 mL/hr IntraVENous CONTINUOUS    dicyclomine (BENTYL) tablet 20 mg  20 mg Oral AC&HS    ondansetron (ZOFRAN) injection 8 mg  8 mg IntraVENous Q8H PRN    Saccharomyces boulardii (FLORASTOR) capsule 250 mg  250 mg Oral BID    metroNIDAZOLE (FLAGYL) IVPB premix 500 mg  500 mg IntraVENous Q8H    anastrozole (ARIMIDEX) tablet 1 mg  1 mg Oral DAILY    diazePAM (VALIUM) tablet 5 mg  5 mg Oral Q6H PRN    metoprolol succinate (TOPROL-XL) tablet 100 mg  100 mg Oral BID    venlafaxine-SR (EFFEXOR-XR) capsule 37.5 mg  37.5 mg Oral DAILY WITH BREAKFAST    sodium chloride (NS) flush 5-10 mL  5-10 mL IntraVENous Q8H    sodium chloride (NS) flush 5-10 mL  5-10 mL IntraVENous PRN    acetaminophen (TYLENOL) tablet 650 mg  650 mg Oral Q4H PRN    HYDROcodone-acetaminophen (NORCO) 7.5-325 mg per tablet 1 Tab  1 Tab Oral Q4H PRN    morphine injection 2 mg  2 mg IntraVENous Q4H PRN    diphenhydrAMINE (BENADRYL) capsule 25 mg  25 mg Oral Q4H PRN    nicotine (NICODERM CQ) 21 mg/24 hr patch 1 Patch  1 Patch TransDERmal DAILY PRN    enoxaparin (LOVENOX) injection 40 mg  40 mg SubCUTAneous Q12H    albuterol-ipratropium (DUO-NEB) 2.5 MG-0.5 MG/3 ML  3 mL Nebulization Q4H PRN    hydrALAZINE (APRESOLINE) 20 mg/mL injection 10 mg  10 mg IntraVENous Q6H PRN    cloNIDine HCl (CATAPRES) tablet 0.2 mg  0.2 mg Oral BID PRN       Social History:  Social History   Substance Use Topics    Smoking status: Never Smoker    Smokeless tobacco: Never Used    Alcohol use No         Family History:  Family History   Problem Relation Age of Onset    Heart Disease Mother     Cancer Mother      lung    Hypertension Mother     Hypertension Father     Sleep Apnea Father     Cancer Father      prostate    Sleep Apnea Brother      states also has two children with sleep apnea    Cancer Brother      pituitary    Hypertension Brother     Breast Cancer Neg Hx        Review of Systems:  A detailed 10 system ROS is obtained, with pertinent positives as listed above. All others are negative. Diet:  NPO except for sips of clear    Objective:     Physical Exam:  Vitals:  Visit Vitals    /84    Pulse 93    Temp 98.3 °F (36.8 °C)    Resp 18    Ht 5' 2.5\" (1.588 m)    Wt 128.4 kg (283 lb)    SpO2 100%    BMI 50.94 kg/m2     Gen:  Pt is alert, cooperative, no acute distress  Skin:  Extremities and face reveal no rashes. HEENT: Sclerae anicteric. Extra-occular muscles are intact. No oral ulcers. No abnormal pigmentation of the lips. The neck is supple. Cardiovascular: Regular rate and rhythm. No murmurs, gallops, or rubs. Respiratory:  Comfortable breathing with no accessory muscle use. Clear breath sounds anteriorly with no wheezes, rales, or rhonchi. GI:  Abdomen is OBESE, ND with NABS. TTP DIFFUSELY but greater in th LOWER ABD. Rectal:  Deferred  Musculoskeletal:  No pitting edema of the lower legs. Neurological:  Gross memory appears intact. Patient is alert and oriented. Psychiatric:  Mood appears appropriate with judgement intact. Lymphatic:  No cervical or supraclavicular adenopathy.     Laboratory:    Recent Labs      05/03/17   0726  05/02/17   0741  05/01/17   1350   WBC  6.6  7.9  9.3   HGB  11.2*  10.7*  10.2*   HCT  34.1*  33.8*  31.1*   PLT  456*  520*  477*   MCV  78.0*  80.3  78.9*   NA   --    --   144   K   --    --   3.6   CL   --    --   109*   CO2   --    --   24   BUN   --    --   10   CREA   --    --   0.73   CA   --    -- 8.3   GLU   --    --   105*   AP   --    --   104   SGOT   --    --   15   ALT   --    --   51   TBILI   --    --   0.2   ALB   --    --   2.8*   TP   --    --   7.0          Assessment:     Principal Problem:  C. difficile colitis (4/30/2017)    Active Problems: Morbid obesity with BMI of 50.0-59.9, adult ([facility] [address] (11/5/2015)  Acute lower UTI (11/29/2015)  Acute diverticulitis (4/29/2017)    63 y/o female w/ MMP outlined above who presents with recent acute onset of N/V/D & abdominal pain. A/P CT scan on 4/29 showed acute, uncomplicated sigmoid diverticulitis. C diff positive 4/30 & started on IV Flagyl 500mg q8. She remains of Rocephin for recurrent UTI. She has had persistent N/V, abdominal pain, & diarrhea since admission / starting therapy mentioned above. She had a normal EGD in 9/2016 for nausea. Colonoscopy in 2013 with Dr. Stephanie Espinoza showed pantics & TA colon polyps. Plan:     Patient on adequate treatment for acute diverticulitis - continue IV Flagyl q8 & IV Rocephin. Needs C diff coverage -- ADD oral Vanc 500 q 6 hr. To be timed with IV Zofran. Defer EGD for now. Monitor response to treatment. If sxs persist, may consider EGD for further evaluation. Reports increased indigestion since admission. Defer PPI for now since she has acute C diff. Instead, IV Pepcid 20mg bid. Following. Patient is seen and examined in collaboration with Dr. Rena Givens. Assessment and plan as per Dr. Sandee Tao. Patricia Miles PA-C    Patient seen and examined. Agree with above. Discussed assessment and plans with KALEB and patient. Kevin Tao MD

## 2017-05-03 NOTE — PROGRESS NOTES
Hospitalist Progress Note    5/3/2017  Admit Date: 2017  7:36 PM   NAME: Silas Callejas   :  1955   MRN:  [id number]   Attending: Eduardo Reyes MD  PCP:  Vaibhav Capone MD    SUBJECTIVE:   Khris Whitman is a 65 yo F admitted 17 with diverticulitis and UTI. She had been on several outpatient antibiotics over the last month for a UTI. Today, she reports significant abdominal pain and nausea. She has had 5 episodes of diarrhea/day since Tuesday. Has had a few episodes of diarrhea since being here. 17- sitting in chair on evaluation. She reports she had a pretty rough night last night, but feels better this morning. Still with nausea. ~4 episodes of diarrhea over the last 24 hours. Last dose of phenergan at 3 AM.  Still has required several doses of morphine/norco.  Tolerated clear liquids so far today. 17- still reporting significant abdominal cramping and nausea. Has vomited a small amount a few times after eating or meds. Diarrhea slowing and has had 2 episodes over last 24 hours. 5/3/17- 5 episodes of diarrhea over last 24 hours and still complaining of persistent nausea. States bentyl helped with the abdominal pain/cramping, but made her 'feel full.'  She was tried on oral vanco earlier in hospitalization due to C diff, but she vomited immediately after taking and was transitioned back to IV flagyl.       Review of Systems negative with exception of pertinent positives noted above  PHYSICAL EXAM     Visit Vitals    /69    Pulse 84    Temp 98.2 °F (36.8 °C)    Resp 18    Ht 5' 2.5\" (1.588 m)    Wt 128.4 kg (283 lb)    SpO2 100%    BMI 50.94 kg/m2      Temp (24hrs), Av.1 °F (36.7 °C), Min:97.1 °F (36.2 °C), Max:98.9 °F (37.2 °C)    Oxygen Therapy  O2 Sat (%): 100 % (17 0746)  Pulse via Oximetry: 99 beats per minute (17 0035)  O2 Device: CPAP mask (17)  No intake or output data in the 24 hours ending 17 1058   General: Appears uncomfortable, morbidly obese   Lungs:  CTA Bilaterally. Heart:  Regular rate and rhythm,  No murmur, rub, or gallop  Abdomen: Mild tenderness but overall improved, + bowel sounds  Extremities: No cyanosis, clubbing or edema  Neurologic:  No focal deficits    Recent Results (from the past 24 hour(s))   CBC WITH AUTOMATED DIFF    Collection Time: 05/03/17  7:26 AM   Result Value Ref Range    WBC 6.6 4.3 - 11.1 K/uL    RBC 4.37 4.05 - 5.25 M/uL    HGB 11.2 (L) 11.7 - 15.4 g/dL    HCT 34.1 (L) 35.8 - 46.3 %    MCV 78.0 (L) 79.6 - 97.8 FL    MCH 25.6 (L) 26.1 - 32.9 PG    MCHC 32.8 31.4 - 35.0 g/dL    RDW 14.3 11.9 - 14.6 %    PLATELET 428 (H) 534 - 450 K/uL    MPV 8.6 (L) 10.8 - 14.1 FL    DF AUTOMATED      NEUTROPHILS 46 43 - 78 %    LYMPHOCYTES 34 13 - 44 %    MONOCYTES 12 4.0 - 12.0 %    EOSINOPHILS 3 0.5 - 7.8 %    BASOPHILS 1 0.0 - 2.0 %    IMMATURE GRANULOCYTES 4.1 0.0 - 5.0 %    ABS. NEUTROPHILS 3.1 1.7 - 8.2 K/UL    ABS. LYMPHOCYTES 2.3 0.5 - 4.6 K/UL    ABS. MONOCYTES 0.8 0.1 - 1.3 K/UL    ABS. EOSINOPHILS 0.2 0.0 - 0.8 K/UL    ABS. BASOPHILS 0.1 0.0 - 0.2 K/UL    ABS. IMM. GRANS. 0.3 0.0 - 0.5 K/UL     CT ABD PELV W CONT   Final Result   IMPRESSION:      1. Positive for sigmoid diverticulitis. No associated abscess or free air. 2. Hepatomegaly, stable. ASSESSMENT      Active Hospital Problems    Diagnosis Date Noted    C. difficile colitis 04/30/2017    Acute diverticulitis 04/29/2017    Acute lower UTI 11/29/2015    Morbid obesity with BMI of 50.0-59.9, adult ([facility] [address] 11/05/2015     Plan:  · C diff colitis- improving with flagyl. Continue IV flagyl for now as she is not tolerating PO consistently. Rocephin discontinued 4/30/17. Diarrhea slowing down. Attempted to use PO vancomycin a few days ago, but she immediately vomited afterwards. · Nausea/vomiting- Continue NPO. IV zofran.   I question if nausea is related to the flagyl, but she did not tolerate the oral vancomycin either. Will consult GI for further recs and possible EGD to assess for other cause of nausea/vomiting. Start 0.45 NS fluid. · Abdominal pain- cramping in nature. Improved with Bentyl, but she states Bentyl made her 'feel full.' Continue prn pain meds. · UTI- received over 3 days of rocephin. Discontinued 4/30/17. Dispo- Hopefully home in next few days pending clinical course. DVT Prophylaxis: Lovenox    Signed By: Alicia Gray.  Raj Keita MD     May 3, 2017

## 2017-05-04 PROCEDURE — 74011250637 HC RX REV CODE- 250/637: Performed by: PHYSICIAN ASSISTANT

## 2017-05-04 PROCEDURE — 74011000250 HC RX REV CODE- 250: Performed by: INTERNAL MEDICINE

## 2017-05-04 PROCEDURE — 65270000029 HC RM PRIVATE

## 2017-05-04 PROCEDURE — 74011250636 HC RX REV CODE- 250/636: Performed by: PHYSICIAN ASSISTANT

## 2017-05-04 PROCEDURE — 74011000250 HC RX REV CODE- 250: Performed by: PHYSICIAN ASSISTANT

## 2017-05-04 PROCEDURE — 74011250636 HC RX REV CODE- 250/636: Performed by: HOSPITALIST

## 2017-05-04 PROCEDURE — 74011000258 HC RX REV CODE- 258: Performed by: INTERNAL MEDICINE

## 2017-05-04 PROCEDURE — 74011250637 HC RX REV CODE- 250/637: Performed by: INTERNAL MEDICINE

## 2017-05-04 PROCEDURE — 74011250637 HC RX REV CODE- 250/637: Performed by: HOSPITALIST

## 2017-05-04 RX ORDER — ENOXAPARIN SODIUM 100 MG/ML
40 INJECTION SUBCUTANEOUS EVERY 12 HOURS
Status: DISCONTINUED | OUTPATIENT
Start: 2017-05-05 | End: 2017-05-08 | Stop reason: HOSPADM

## 2017-05-04 RX ADMIN — METRONIDAZOLE 500 MG: 500 INJECTION, SOLUTION INTRAVENOUS at 17:00

## 2017-05-04 RX ADMIN — METRONIDAZOLE 500 MG: 500 INJECTION, SOLUTION INTRAVENOUS at 09:00

## 2017-05-04 RX ADMIN — VANCOMYCIN HYDROCHLORIDE 500 MG: 1 INJECTION, POWDER, LYOPHILIZED, FOR SOLUTION INTRAVENOUS at 01:07

## 2017-05-04 RX ADMIN — ONDANSETRON 6 MG: 2 INJECTION INTRAMUSCULAR; INTRAVENOUS at 06:11

## 2017-05-04 RX ADMIN — VANCOMYCIN HYDROCHLORIDE 500 MG: 1 INJECTION, POWDER, LYOPHILIZED, FOR SOLUTION INTRAVENOUS at 13:11

## 2017-05-04 RX ADMIN — ENOXAPARIN SODIUM 40 MG: 40 INJECTION SUBCUTANEOUS at 08:59

## 2017-05-04 RX ADMIN — RDII 250 MG CAPSULE 250 MG: at 09:00

## 2017-05-04 RX ADMIN — Medication 10 ML: at 14:00

## 2017-05-04 RX ADMIN — SODIUM CHLORIDE 75 ML/HR: 450 INJECTION, SOLUTION INTRAVENOUS at 12:30

## 2017-05-04 RX ADMIN — DICYCLOMINE HYDROCHLORIDE 20 MG: 20 TABLET ORAL at 09:00

## 2017-05-04 RX ADMIN — Medication 10 ML: at 21:43

## 2017-05-04 RX ADMIN — Medication 2 MG: at 06:10

## 2017-05-04 RX ADMIN — ONDANSETRON 6 MG: 2 INJECTION INTRAMUSCULAR; INTRAVENOUS at 00:22

## 2017-05-04 RX ADMIN — VENLAFAXINE HYDROCHLORIDE 37.5 MG: 37.5 CAPSULE, EXTENDED RELEASE ORAL at 09:00

## 2017-05-04 RX ADMIN — RDII 250 MG CAPSULE 250 MG: at 17:08

## 2017-05-04 RX ADMIN — METRONIDAZOLE 500 MG: 500 INJECTION, SOLUTION INTRAVENOUS at 01:42

## 2017-05-04 RX ADMIN — ONDANSETRON 6 MG: 2 INJECTION INTRAMUSCULAR; INTRAVENOUS at 18:19

## 2017-05-04 RX ADMIN — ONDANSETRON 6 MG: 2 INJECTION INTRAMUSCULAR; INTRAVENOUS at 12:21

## 2017-05-04 RX ADMIN — VANCOMYCIN HYDROCHLORIDE 500 MG: 1 INJECTION, POWDER, LYOPHILIZED, FOR SOLUTION INTRAVENOUS at 18:54

## 2017-05-04 RX ADMIN — VANCOMYCIN HYDROCHLORIDE 500 MG: 1 INJECTION, POWDER, LYOPHILIZED, FOR SOLUTION INTRAVENOUS at 07:19

## 2017-05-04 RX ADMIN — Medication 10 ML: at 05:41

## 2017-05-04 RX ADMIN — FAMOTIDINE 20 MG: 10 INJECTION, SOLUTION INTRAVENOUS at 09:00

## 2017-05-04 RX ADMIN — METOPROLOL SUCCINATE 100 MG: 100 TABLET, EXTENDED RELEASE ORAL at 17:08

## 2017-05-04 RX ADMIN — CLONIDINE HYDROCHLORIDE 0.2 MG: 0.2 TABLET ORAL at 06:11

## 2017-05-04 RX ADMIN — METOPROLOL SUCCINATE 100 MG: 100 TABLET, EXTENDED RELEASE ORAL at 09:00

## 2017-05-04 NOTE — PROGRESS NOTES
PROGRESS NOTE   Lenin Livingston   MRN # [id number]   64 y.o. female   Date of service:05/04/17   Hospital day # LOS: 5 days      Subjective      PATIENT SUMMARY     SUBJECTIVE   Chief Complaint: follow up for C. Diff Colitis     HPI  She feels her diarrhea has improved significantly since admission     ROS Denies chest pain and dyspnea     Objective    INPATIENT MEDS     Current Facility-Administered Medications:     [START ON 5/5/2017] enoxaparin (LOVENOX) injection 40 mg, 40 mg, SubCUTAneous, Q12H, Bethany Grider MD    vancomycin 50 mg/mL oral solution (compounded) 500 mg, 500 mg, Oral, Q6H, CHET Evans, 500 mg at 05/04/17 1311    famotidine (PF) (PEPCID) injection 20 mg, 20 mg, IntraVENous, Q12H, CHET Evans, 20 mg at 05/04/17 0900    ondansetron (ZOFRAN) injection 6 mg, 6 mg, IntraVENous, Q6H PRN, CHET Ley, 6 mg at 05/04/17 1221    0.45% sodium chloride infusion, 25 mL/hr, IntraVENous, CONTINUOUS, Bethany Grider MD, Last Rate: 25 mL/hr at 05/04/17 1248, 25 mL/hr at 05/04/17 1248    Saccharomyces boulardii (FLORASTOR) capsule 250 mg, 250 mg, Oral, BID, Kylie Jessica. Palmira Alaniz MD, 250 mg at 05/04/17 0900    metroNIDAZOLE (FLAGYL) IVPB premix 500 mg, 500 mg, IntraVENous, Q8H, Kylie Alaniz MD, Last Rate: 100 mL/hr at 05/04/17 0900, 500 mg at 05/04/17 0900    anastrozole (ARIMIDEX) tablet 1 mg, 1 mg, Oral, DAILY, Kathy Chan MD, Stopped at 05/04/17 0900    diazePAM (VALIUM) tablet 5 mg, 5 mg, Oral, Q6H PRN, Kathy Chan MD, 5 mg at 05/01/17 0054    metoprolol succinate (TOPROL-XL) tablet 100 mg, 100 mg, Oral, BID, Kathy Chan MD, 100 mg at 05/04/17 0900    venlafaxine-SR (EFFEXOR-XR) capsule 37.5 mg, 37.5 mg, Oral, DAILY WITH BREAKFAST, Kathy Chan MD, 37.5 mg at 05/04/17 0900    sodium chloride (NS) flush 5-10 mL, 5-10 mL, IntraVENous, Q8H, Kathy Chan MD, 10 mL at 05/04/17 1400    sodium chloride (NS) flush 5-10 mL, 5-10 mL, IntraVENous, PRN, Kathy Chan, MD    acetaminophen (TYLENOL) tablet 650 mg, 650 mg, Oral, Q4H PRN, Miguel Dias MD    HYDROcodone-acetaminophen [facility]) 7.5-325 mg per tablet 1 Tab, 1 Tab, Oral, Q4H PRN, Miguel Dias MD, 1 Tab at 05/02/17 6945    morphine injection 2 mg, 2 mg, IntraVENous, Q4H PRN, Miguel Dias MD, 2 mg at 05/04/17 0610    nicotine (NICODERM CQ) 21 mg/24 hr patch 1 Patch, 1 Patch, TransDERmal, DAILY PRN, Miguel Dias MD    albuterol-ipratropium (DUO-NEB) 2.5 MG-0.5 MG/3 ML, 3 mL, Nebulization, Q4H PRN, Miguel Dias MD    hydrALAZINE (APRESOLINE) 20 mg/mL injection 10 mg, 10 mg, IntraVENous, Q6H PRN, Miguel Dias MD  PHYSICAL EXAM   Vitals:    05/04/17 0427 05/04/17 0741 05/04/17 1316 05/04/17 1537   BP: 165/78 124/71 120/75 107/61   Pulse: (!) 108 98 91 77   Resp: 18 20 18 20   Temp: 97.4 °F (36.3 °C) 98.5 °F (36.9 °C) 98.5 °F (36.9 °C) 97.9 °F (36.6 °C)   SpO2:  100% 97% 99%   Weight:       Height:          No intake or output data in the 24 hours ending 05/04/17 1614     General appearance: NAD, conversant   Lungs: Clear to auscultation bilaterally, Negative for use of accessory muscles   CV: RRR, no MRGs   Abdomen: Soft, nontender, normal active bowel sounds   Extremities: No peripheral edema or cyanosis   Skin: negative for rash     LABS   Recent Labs      05/03/17   0726  05/02/17   0741   WBC  6.6  7.9   HGB  11.2*  10.7*   HCT  34.1*  33.8*   PLT  456*  520*      No results for input(s): NA, K, CL, CO2, BUN in the last 72 hours. No lab exists for component: CREAT, GLUC   Lab Results   Component Value Date    MG 1.6 (L) 09/15/2015      No results for input(s): ALT, ALBUMIN in the last 72 hours. No lab exists for component: BILITOT, ALKPHOS, AST   No components found for: TROPONIN   No results for input(s): BNP in the last 72 hours.    Lab Results   Component Value Date/Time    INR 1.0 04/01/2010 12:40 PM      No components found for: HGBA1C   Lab Results   Component Value Date/Time    WBC 10-20 03/17/2017 06:37 PM        ASSESSMENT/PLAN    IMPRESSION   Principal Problem:    C. difficile colitis     Active Problems:    Acute diverticulitis     Nausea and Vomiting     Acute cystitis without hematuria     Morbid obesity with BMI of 50.0-59.9, adult     PLAN   -GI is managing her antibiotics   -d/c bentyl avoid meds that can slow down gi transit time in the setting of c.diff   -prn zofran   -currently on liquid diet, advance as tolerated   -completed treatment with rocephin     DVT prophlaxis - lovenox     Heydi Allison MD 5/4/2017 4:14 PM

## 2017-05-04 NOTE — PROGRESS NOTES
Problem: Nutrition Deficit  Goal: *Optimize nutritional status  Nutrition:  Day 5 NPO/Clear liquid diet status identified as per standard of care monitoring  Assessment:   Diet order(s): Clear Liquids  Food/Nutrition Patient History:  Patient with no nutrition risk factors identified per admission malnutrition screening tool. NPO/Clear liquid diet status is r/t C. Diff and diverticulitis. Patient reports that she feels much better and is ready to eat. Patient denies any po difficulties at this time. Patient does reports that she did have some nausea and abdominal pain prior to admission. States that her diarrhea has improved and that her last BM was yesterday. Patient denies any recent weight loss and reports a UBW of around 285 lbs. Anthropometrics:Height: 5' 2.5\" (158.8 cm),  Weight: 128.4 kg (283 lb),  Body mass index is 50.94 kg/(m^2). BMI class of morbid obesity class III. Macronutrient needs:  EER:  3714-1692 kcal /day (12-14 kcal/kg actual BW)  EPR:   grams protein/day (1.3-1.5 grams/kg IBW)  Intake/Comparative Standards: Current Clear liquid diet does not meet estimated needs even when consumed at 100%     Nutrition Diagnosis: Inadequate oral intake related to altered gi function as evidenced by patient currently with C. Diff and diverticulitis and current clear liquid diet order does not meet estimated needs. Intervention:  Meals and snacks: Advance diet as medically appropriate. It is reasonable to wait 7-10 days before initiating TPN in a patient with no or low nutrition risk. Expect po diet will be able to be started within this timeframe. Will await start or progression of po diet as medical condition dictates. Otherwise, F/U will be per standard of care or by MD consult if prior. Nutrition Supplement Therapy: Patient declines at this time. Leonora Garcia González [address], [address],  [phone]

## 2017-05-04 NOTE — PROGRESS NOTES
Pt rested throughout the night. Pt was able to tolerate Vancomycin after administering Zofran 6mg iv first.  Needs met at this time.

## 2017-05-04 NOTE — PROGRESS NOTES
GI DAILY PROGRESS NOTE    Admit Date:  4/29/2017    Today's Date:  5/4/2017    CC:  C diff & diverticulitis    Subjective:     Patient feel a little bit better today. Reports mild nausea this morning. No vomiting. Abdominal pain much improved. No BMs this morning, states that she had a tiny loose stool last night.   NPO. Wants to eat.      Medications:   Current Facility-Administered Medications   Medication Dose Route Frequency    vancomycin 50 mg/mL oral solution (compounded) 500 mg  500 mg Oral Q6H    famotidine (PF) (PEPCID) injection 20 mg  20 mg IntraVENous Q12H    ondansetron (ZOFRAN) injection 6 mg  6 mg IntraVENous Q6H PRN    0.45% sodium chloride infusion  75 mL/hr IntraVENous CONTINUOUS    dicyclomine (BENTYL) tablet 20 mg  20 mg Oral AC&HS    Saccharomyces boulardii (FLORASTOR) capsule 250 mg  250 mg Oral BID    metroNIDAZOLE (FLAGYL) IVPB premix 500 mg  500 mg IntraVENous Q8H    anastrozole (ARIMIDEX) tablet 1 mg  1 mg Oral DAILY    diazePAM (VALIUM) tablet 5 mg  5 mg Oral Q6H PRN    metoprolol succinate (TOPROL-XL) tablet 100 mg  100 mg Oral BID    venlafaxine-SR (EFFEXOR-XR) capsule 37.5 mg  37.5 mg Oral DAILY WITH BREAKFAST    sodium chloride (NS) flush 5-10 mL  5-10 mL IntraVENous Q8H    sodium chloride (NS) flush 5-10 mL  5-10 mL IntraVENous PRN    acetaminophen (TYLENOL) tablet 650 mg  650 mg Oral Q4H PRN    HYDROcodone-acetaminophen (NORCO) 7.5-325 mg per tablet 1 Tab  1 Tab Oral Q4H PRN    morphine injection 2 mg  2 mg IntraVENous Q4H PRN    diphenhydrAMINE (BENADRYL) capsule 25 mg  25 mg Oral Q4H PRN    nicotine (NICODERM CQ) 21 mg/24 hr patch 1 Patch  1 Patch TransDERmal DAILY PRN    enoxaparin (LOVENOX) injection 40 mg  40 mg SubCUTAneous Q12H    albuterol-ipratropium (DUO-NEB) 2.5 MG-0.5 MG/3 ML  3 mL Nebulization Q4H PRN    hydrALAZINE (APRESOLINE) 20 mg/mL injection 10 mg  10 mg IntraVENous Q6H PRN    cloNIDine HCl (CATAPRES) tablet 0.2 mg  0.2 mg Oral BID PRN Review of Systems:  ROS was obtained, with pertinent positives as listed above. No chest pain or SOB. Diet:  NPO    Objective:   Vitals:  Visit Vitals    /71    Pulse 98    Temp 98.5 °F (36.9 °C)    Resp 20    Ht 5' 2.5\" (1.588 m)    Wt 128.4 kg (283 lb)    SpO2 100%    BMI 50.94 kg/m2     Intake/Output:        Exam:  General appearance: alert, cooperative, NAD  Lungs: CTA ant  Heart: RRR  Abdomen: Soft, OBESE, ND with HYPOACTIVE BOWEL SOUNDS. NONTENDER. Extremities: extremities normal, atraumatic, no cyanosis or edema  Neuro:  alert and oriented    Data Review (Labs):    Recent Labs      05/03/17   0726  05/02/17   0741  05/01/17   1350   WBC  6.6  7.9  9.3   HGB  11.2*  10.7*  10.2*   HCT  34.1*  33.8*  31.1*   PLT  456*  520*  477*   MCV  78.0*  80.3  78.9*   NA   --    --   144   K   --    --   3.6   CL   --    --   109*   CO2   --    --   24   BUN   --    --   10   CREA   --    --   0.73   CA   --    --   8.3   GLU   --    --   105*   AP   --    --   104   SGOT   --    --   15   ALT   --    --   51   TBILI   --    --   0.2   ALB   --    --   2.8*   TP   --    --   7.0       Assessment:     Principal Problem:  C. difficile colitis (4/30/2017)    Active Problems: Morbid obesity with BMI of 50.0-59.9, adult ([facility] [address] (11/5/2015)  Acute lower UTI (11/29/2015)  Acute diverticulitis (4/29/2017)        65 y/o female w/ MMP outlined above who presents with recent acute onset of N/V/D & abdominal pain. A/P CT scan on 4/29 showed acute, uncomplicated sigmoid diverticulitis. C diff positive 4/30 & started on IV Flagyl 500mg q8. She remains of Rocephin for recurrent UTI. She has had persistent N/V, abdominal pain, & diarrhea since admission / starting therapy mentioned above. She had a normal EGD in 9/2016 for nausea. Colonoscopy in 2013 with Dr. Viv Chapin showed pantics & TA colon polyps. N/V/D & diarrhea - improving.      Plan:      Continue IV Flagyl q8 & IV Rocephin for acute diverticulitis.   Continue Vanc 500 q 6 hr for C diff. To be timed with IV Zofran. Defer EGD for now. Monitor response to treatment. If sxs persist, may consider EGD for further evaluation. Continue IV Pepcid 20mg bid for indigestion. CLD.     Nuno Lisa PA-C

## 2017-05-04 NOTE — PROGRESS NOTES
Pt requested note be placed in chart stating she was on Vancomycin in March and it made her Creatinine levels increase.

## 2017-05-05 LAB
ANION GAP BLD CALC-SCNC: 10 MMOL/L (ref 7–16)
BUN SERPL-MCNC: 11 MG/DL (ref 8–23)
CALCIUM SERPL-MCNC: 8.8 MG/DL (ref 8.3–10.4)
CHLORIDE SERPL-SCNC: 107 MMOL/L (ref 98–107)
CO2 SERPL-SCNC: 27 MMOL/L (ref 21–32)
CREAT SERPL-MCNC: 0.76 MG/DL (ref 0.6–1)
GLUCOSE BLD STRIP.AUTO-MCNC: 86 MG/DL (ref 65–100)
GLUCOSE SERPL-MCNC: 85 MG/DL (ref 65–100)
MAGNESIUM SERPL-MCNC: 1.9 MG/DL (ref 1.8–2.4)
POTASSIUM SERPL-SCNC: 3 MMOL/L (ref 3.5–5.1)
SODIUM SERPL-SCNC: 144 MMOL/L (ref 136–145)

## 2017-05-05 PROCEDURE — 82962 GLUCOSE BLOOD TEST: CPT

## 2017-05-05 PROCEDURE — 74011250637 HC RX REV CODE- 250/637: Performed by: INTERNAL MEDICINE

## 2017-05-05 PROCEDURE — 77030018846 HC SOL IRR STRL H20 ICUM -A

## 2017-05-05 PROCEDURE — 74011250637 HC RX REV CODE- 250/637: Performed by: HOSPITALIST

## 2017-05-05 PROCEDURE — 74011250636 HC RX REV CODE- 250/636: Performed by: INTERNAL MEDICINE

## 2017-05-05 PROCEDURE — 74011250637 HC RX REV CODE- 250/637: Performed by: PHYSICIAN ASSISTANT

## 2017-05-05 PROCEDURE — 83735 ASSAY OF MAGNESIUM: CPT | Performed by: INTERNAL MEDICINE

## 2017-05-05 PROCEDURE — 80048 BASIC METABOLIC PNL TOTAL CA: CPT | Performed by: INTERNAL MEDICINE

## 2017-05-05 PROCEDURE — 65270000029 HC RM PRIVATE

## 2017-05-05 PROCEDURE — 36415 COLL VENOUS BLD VENIPUNCTURE: CPT | Performed by: INTERNAL MEDICINE

## 2017-05-05 PROCEDURE — 74011250636 HC RX REV CODE- 250/636: Performed by: PHYSICIAN ASSISTANT

## 2017-05-05 PROCEDURE — 77030018836 HC SOL IRR NACL ICUM -A

## 2017-05-05 PROCEDURE — 74011000250 HC RX REV CODE- 250: Performed by: INTERNAL MEDICINE

## 2017-05-05 RX ORDER — POTASSIUM CHLORIDE 20 MEQ/1
20 TABLET, EXTENDED RELEASE ORAL ONCE
Status: COMPLETED | OUTPATIENT
Start: 2017-05-05 | End: 2017-05-05

## 2017-05-05 RX ORDER — POTASSIUM CHLORIDE 20 MEQ/1
40 TABLET, EXTENDED RELEASE ORAL
Status: COMPLETED | OUTPATIENT
Start: 2017-05-05 | End: 2017-05-05

## 2017-05-05 RX ADMIN — Medication 10 ML: at 22:17

## 2017-05-05 RX ADMIN — ONDANSETRON 6 MG: 2 INJECTION INTRAMUSCULAR; INTRAVENOUS at 01:03

## 2017-05-05 RX ADMIN — VANCOMYCIN HYDROCHLORIDE 500 MG: 1 INJECTION, POWDER, LYOPHILIZED, FOR SOLUTION INTRAVENOUS at 08:24

## 2017-05-05 RX ADMIN — Medication 10 ML: at 05:31

## 2017-05-05 RX ADMIN — METRONIDAZOLE 500 MG: 500 INJECTION, SOLUTION INTRAVENOUS at 01:45

## 2017-05-05 RX ADMIN — ONDANSETRON 6 MG: 2 INJECTION INTRAMUSCULAR; INTRAVENOUS at 13:06

## 2017-05-05 RX ADMIN — VANCOMYCIN HYDROCHLORIDE 500 MG: 1 INJECTION, POWDER, LYOPHILIZED, FOR SOLUTION INTRAVENOUS at 13:28

## 2017-05-05 RX ADMIN — RDII 250 MG CAPSULE 250 MG: at 08:27

## 2017-05-05 RX ADMIN — METRONIDAZOLE 500 MG: 500 INJECTION, SOLUTION INTRAVENOUS at 08:28

## 2017-05-05 RX ADMIN — VANCOMYCIN HYDROCHLORIDE 500 MG: 1 INJECTION, POWDER, LYOPHILIZED, FOR SOLUTION INTRAVENOUS at 01:29

## 2017-05-05 RX ADMIN — ENOXAPARIN SODIUM 40 MG: 40 INJECTION SUBCUTANEOUS at 08:26

## 2017-05-05 RX ADMIN — Medication: at 16:07

## 2017-05-05 RX ADMIN — RDII 250 MG CAPSULE 250 MG: at 18:36

## 2017-05-05 RX ADMIN — VANCOMYCIN HYDROCHLORIDE 500 MG: 1 INJECTION, POWDER, LYOPHILIZED, FOR SOLUTION INTRAVENOUS at 19:04

## 2017-05-05 RX ADMIN — METRONIDAZOLE 500 MG: 500 INJECTION, SOLUTION INTRAVENOUS at 16:36

## 2017-05-05 RX ADMIN — METOPROLOL SUCCINATE 100 MG: 100 TABLET, EXTENDED RELEASE ORAL at 18:36

## 2017-05-05 RX ADMIN — ONDANSETRON 6 MG: 2 INJECTION INTRAMUSCULAR; INTRAVENOUS at 18:30

## 2017-05-05 RX ADMIN — POTASSIUM CHLORIDE 20 MEQ: 20 TABLET, EXTENDED RELEASE ORAL at 16:08

## 2017-05-05 RX ADMIN — METOPROLOL SUCCINATE 100 MG: 100 TABLET, EXTENDED RELEASE ORAL at 08:26

## 2017-05-05 RX ADMIN — ENOXAPARIN SODIUM 40 MG: 40 INJECTION SUBCUTANEOUS at 22:12

## 2017-05-05 RX ADMIN — Medication 10 ML: at 13:28

## 2017-05-05 RX ADMIN — ONDANSETRON 6 MG: 2 INJECTION INTRAMUSCULAR; INTRAVENOUS at 07:21

## 2017-05-05 RX ADMIN — POTASSIUM CHLORIDE 40 MEQ: 20 TABLET, EXTENDED RELEASE ORAL at 10:37

## 2017-05-05 NOTE — PROGRESS NOTES
PROGRESS NOTE   Chayito Hall   MRN # [id number]   64 y.o. female   Date of [address] day # LOS: 6 days      Subjective      PATIENT SUMMARY     SUBJECTIVE   Chief Complaint: follow up for C. Diff Colitis     HPI  Still having diarrhea but says abdominal pain is improving      ROS Denies chest pain and dyspnea     Objective    INPATIENT MEDS     Current Facility-Administered Medications:     potassium chloride (K-DUR, KLOR-CON) SR tablet 20 mEq, 20 mEq, Oral, ONCE, April Chavira MD    lip protectant (BLISTEX) ointment, , Topical, PRN, April Chavira MD    enoxaparin (LOVENOX) injection 40 mg, 40 mg, SubCUTAneous, Q12H, April Chavira MD, 40 mg at 05/05/17 0826    vancomycin 50 mg/mL oral solution (compounded) 500 mg, 500 mg, Oral, Q6H, CHET Suazo, 500 mg at 05/05/17 1328    famotidine (PF) (PEPCID) injection 20 mg, 20 mg, IntraVENous, Q12H, Buckner Bernheim, PA, Stopped at 05/05/17 0900    ondansetron (ZOFRAN) injection 6 mg, 6 mg, IntraVENous, Q6H PRN, Buckner Bernheim, PA, 6 mg at 05/05/17 1306    0.45% sodium chloride infusion, 25 mL/hr, IntraVENous, CONTINUOUS, April Chavira MD, Last Rate: 25 mL/hr at 05/04/17 1248, 25 mL/hr at 05/04/17 1248    Saccharomyces boulardii (FLORASTOR) capsule 250 mg, 250 mg, Oral, BID, Ghada Lizet. Dora Garrido MD, 250 mg at 05/05/17 0827    metroNIDAZOLE (FLAGYL) IVPB premix 500 mg, 500 mg, IntraVENous, Q8H, Ghada Hinds.  Dora Garrido MD, Last Rate: 100 mL/hr at 05/05/17 0828, 500 mg at 05/05/17 2969    anastrozole (ARIMIDEX) tablet 1 mg, 1 mg, Oral, DAILY, Addy Craig MD, Stopped at 05/04/17 0900    diazePAM (VALIUM) tablet 5 mg, 5 mg, Oral, Q6H PRN, Addy Craig MD, 5 mg at 05/01/17 0054    metoprolol succinate (TOPROL-XL) tablet 100 mg, 100 mg, Oral, BID, Addy Craig MD, 100 mg at 05/05/17 9971    venlafaxine-SR (EFFEXOR-XR) capsule 37.5 mg, 37.5 mg, Oral, DAILY WITH BREAKFAST, Addy Craig MD, Stopped at 05/05/17 0800    sodium chloride (NS) flush 5-10 mL, 5-10 mL, IntraVENous, Q8H, Amber Murray MD, 10 mL at 05/05/17 1328    sodium chloride (NS) flush 5-10 mL, 5-10 mL, IntraVENous, PRN, Amber Murray MD    acetaminophen (TYLENOL) tablet 650 mg, 650 mg, Oral, Q4H PRN, Amber Murray MD    HYDROcodone-acetaminophen [facility]) 7.5-325 mg per tablet 1 Tab, 1 Tab, Oral, Q4H PRN, Amber Murray MD, 1 Tab at 05/02/17 5551    morphine injection 2 mg, 2 mg, IntraVENous, Q4H PRN, Amber Murray MD, 2 mg at 05/04/17 0610    nicotine (NICODERM CQ) 21 mg/24 hr patch 1 Patch, 1 Patch, TransDERmal, DAILY PRN, Amber Murray MD    albuterol-ipratropium (DUO-NEB) 2.5 MG-0.5 MG/3 ML, 3 mL, Nebulization, Q4H PRN, Amber Murray MD    hydrALAZINE (APRESOLINE) 20 mg/mL injection 10 mg, 10 mg, IntraVENous, Q6H PRN, Amber Murray MD     PHYSICAL EXAM   Vitals:    05/04/17 2321 05/05/17 0343 05/05/17 0743 05/05/17 1531   BP: 121/67 121/68 112/62 111/69   Pulse: 84 74 78 84   Resp: 18 18 20 20   Temp: 98.3 °F (36.8 °C) 98 °F (36.7 °C) 98.2 °F (36.8 °C) 98.8 °F (37.1 °C)   SpO2: 97% 97% 95%    Weight:       Height:            Intake/Output Summary (Last 24 hours) at 05/05/17 1553  Last data filed at 05/05/17 1357   Gross per 24 hour   Intake              480 ml   Output                0 ml   Net              480 ml        General appearance: NAD, conversant   Lungs: Clear to auscultation bilaterally, Negative for use of accessory muscles   CV: RRR, no MRGs   Abdomen: Soft, nontender, normal active bowel sounds   Extremities: No peripheral edema or cyanosis   Skin: negative for rash     LABS   Recent Labs      05/03/17   0726   WBC  6.6   HGB  11.2*   HCT  34.1*   PLT  456*      Recent Labs      05/05/17   0747   NA  144   K  3.0*   CL  107   CO2  27   BUN  11      Lab Results   Component Value Date    MG 1.9 05/05/2017      No results for input(s): ALT, ALBUMIN in the last 72 hours.     No lab exists for component: BILITOT, ALKPHOS, AST   No components found for: TROPONIN No results for input(s): BNP in the last 72 hours.    Lab Results   Component Value Date/Time    INR 1.0 04/01/2010 12:40 PM      No components found for: HGBA1C   Lab Results   Component Value Date/Time    WBC 10-20 03/17/2017 06:37 PM        ASSESSMENT/PLAN    IMPRESSION   Principal Problem:    C. difficile colitis     Active Problems:    Acute diverticulitis     Nausea and Vomiting     Acute cystitis without hematuria     Morbid obesity with BMI of 50.0-59.9, adult     PLAN   -GI is managing her antibiotics   -prn zofran   -advance to mech soft diet   -completed treatment with rocephin     DVT prophlaxis - lovenox     Britton Kennedy MD 5/5/2017 4:14 PM

## 2017-05-05 NOTE — PROGRESS NOTES
GI DAILY PROGRESS NOTE    Admit Date:  4/29/2017    Today's Date:  5/5/2017    CC:  C diff & diverticulitis    Subjective: Woke up nauseated this morning. No vomiting. Zofran still helps with N/V. Tolerates CLD. Wants to advance diet. Abdominal pain resolved. Reports 2 loose BMs this morning. Feels that her diarrhea is overall improved. Medications:   Current Facility-Administered Medications   Medication Dose Route Frequency    potassium chloride (K-DUR, KLOR-CON) SR tablet 20 mEq  20 mEq Oral ONCE    enoxaparin (LOVENOX) injection 40 mg  40 mg SubCUTAneous Q12H    vancomycin 50 mg/mL oral solution (compounded) 500 mg  500 mg Oral Q6H    famotidine (PF) (PEPCID) injection 20 mg  20 mg IntraVENous Q12H    ondansetron (ZOFRAN) injection 6 mg  6 mg IntraVENous Q6H PRN    0.45% sodium chloride infusion  25 mL/hr IntraVENous CONTINUOUS    Saccharomyces boulardii (FLORASTOR) capsule 250 mg  250 mg Oral BID    metroNIDAZOLE (FLAGYL) IVPB premix 500 mg  500 mg IntraVENous Q8H    anastrozole (ARIMIDEX) tablet 1 mg  1 mg Oral DAILY    diazePAM (VALIUM) tablet 5 mg  5 mg Oral Q6H PRN    metoprolol succinate (TOPROL-XL) tablet 100 mg  100 mg Oral BID    venlafaxine-SR (EFFEXOR-XR) capsule 37.5 mg  37.5 mg Oral DAILY WITH BREAKFAST    sodium chloride (NS) flush 5-10 mL  5-10 mL IntraVENous Q8H    sodium chloride (NS) flush 5-10 mL  5-10 mL IntraVENous PRN    acetaminophen (TYLENOL) tablet 650 mg  650 mg Oral Q4H PRN    HYDROcodone-acetaminophen (NORCO) 7.5-325 mg per tablet 1 Tab  1 Tab Oral Q4H PRN    morphine injection 2 mg  2 mg IntraVENous Q4H PRN    nicotine (NICODERM CQ) 21 mg/24 hr patch 1 Patch  1 Patch TransDERmal DAILY PRN    albuterol-ipratropium (DUO-NEB) 2.5 MG-0.5 MG/3 ML  3 mL Nebulization Q4H PRN    hydrALAZINE (APRESOLINE) 20 mg/mL injection 10 mg  10 mg IntraVENous Q6H PRN       Review of Systems:  ROS was obtained, with pertinent positives as listed above.   No chest pain or SOB.    Diet:  CLD. Objective:   Vitals:  Visit Vitals    /62    Pulse 78    Temp 98.2 °F (36.8 °C)    Resp 20    Ht 5' 2.5\" (1.588 m)    Wt 128.4 kg (283 lb)    SpO2 95%    BMI 50.94 kg/m2     Intake/Output:     05/03 1901 - 05/05 0700  In: 360 [P.O.:360]  Out: -   Exam:  General appearance: alert, cooperative, NAD  Lungs: CTA ant  Heart: RRR  Abdomen: Soft, OBESE, ND with HYPOACTIVE BOWEL SOUNDS. NONTENDER. Extremities: extremities normal, atraumatic, no cyanosis or edema  Neuro:  alert and oriented    Data Review (Labs):    Recent Labs      05/05/17   0747  05/03/17   0726   WBC   --   6.6   HGB   --   11.2*   HCT   --   34.1*   PLT   --   456*   MCV   --   78.0*   NA  144   --    K  3.0*   --    CL  107   --    CO2  27   --    BUN  11   --    CREA  0.76   --    CA  8.8   --    MG  1.9   --    GLU  85   --        Assessment:     Principal Problem:  C. difficile colitis (4/30/2017)    Active Problems: Morbid obesity with BMI of 50.0-59.9, adult ([facility] [address] (11/5/2015)  Acute lower UTI (11/29/2015)  Acute diverticulitis (4/29/2017)        63 y/o female w/ MMP outlined above who presents with recent acute onset of N/V/D & abdominal pain. A/P CT scan on 4/29 showed acute, uncomplicated sigmoid diverticulitis. C diff positive 4/30 & started on IV Flagyl 500mg q8. She remains of Rocephin for recurrent UTI. She has had persistent N/V, abdominal pain, & diarrhea since admission / starting therapy mentioned above. She had a normal EGD in 9/2016 for nausea. Colonoscopy in 2013 with Dr. Lennox Diamond showed pantics & TA colon polyps. N/V/D & diarrhea - improving.      Plan:      Continue IV Flagyl q8 & IV Rocephin for acute diverticulitis. Continue Vanc 500 q 6 hr for C diff. To be timed with IV Zofran. Will ask Dr. Criss Allred on length of abx therapy. No plans for EGD unless N/V worsens. Continue IV Pepcid 20mg bid for indigestion. Full liquid diet. Advance as tolerated.    Hypo K+ - will defer to primary to replete    Carmelo Finders PA-C

## 2017-05-06 LAB
ANION GAP BLD CALC-SCNC: 11 MMOL/L (ref 7–16)
BUN SERPL-MCNC: 7 MG/DL (ref 8–23)
CALCIUM SERPL-MCNC: 9.1 MG/DL (ref 8.3–10.4)
CHLORIDE SERPL-SCNC: 111 MMOL/L (ref 98–107)
CO2 SERPL-SCNC: 25 MMOL/L (ref 21–32)
CREAT SERPL-MCNC: 0.74 MG/DL (ref 0.6–1)
GLUCOSE SERPL-MCNC: 92 MG/DL (ref 65–100)
MAGNESIUM SERPL-MCNC: 1.8 MG/DL (ref 1.8–2.4)
POTASSIUM SERPL-SCNC: 3.4 MMOL/L (ref 3.5–5.1)
SODIUM SERPL-SCNC: 147 MMOL/L (ref 136–145)

## 2017-05-06 PROCEDURE — 74011250637 HC RX REV CODE- 250/637: Performed by: INTERNAL MEDICINE

## 2017-05-06 PROCEDURE — 74011000250 HC RX REV CODE- 250: Performed by: PHYSICIAN ASSISTANT

## 2017-05-06 PROCEDURE — 76937 US GUIDE VASCULAR ACCESS: CPT

## 2017-05-06 PROCEDURE — 83735 ASSAY OF MAGNESIUM: CPT | Performed by: INTERNAL MEDICINE

## 2017-05-06 PROCEDURE — 80048 BASIC METABOLIC PNL TOTAL CA: CPT | Performed by: INTERNAL MEDICINE

## 2017-05-06 PROCEDURE — 74011250637 HC RX REV CODE- 250/637: Performed by: PHYSICIAN ASSISTANT

## 2017-05-06 PROCEDURE — 36415 COLL VENOUS BLD VENIPUNCTURE: CPT | Performed by: INTERNAL MEDICINE

## 2017-05-06 PROCEDURE — C1751 CATH, INF, PER/CENT/MIDLINE: HCPCS

## 2017-05-06 PROCEDURE — 74011250637 HC RX REV CODE- 250/637: Performed by: HOSPITALIST

## 2017-05-06 PROCEDURE — 77030018786 HC NDL GD F/USND BARD -B

## 2017-05-06 PROCEDURE — 74011000258 HC RX REV CODE- 258: Performed by: INTERNAL MEDICINE

## 2017-05-06 PROCEDURE — 74011000250 HC RX REV CODE- 250: Performed by: INTERNAL MEDICINE

## 2017-05-06 PROCEDURE — 74011250636 HC RX REV CODE- 250/636: Performed by: PHYSICIAN ASSISTANT

## 2017-05-06 PROCEDURE — 74011250636 HC RX REV CODE- 250/636: Performed by: INTERNAL MEDICINE

## 2017-05-06 PROCEDURE — 65270000029 HC RM PRIVATE

## 2017-05-06 RX ORDER — SODIUM CHLORIDE 0.9 % (FLUSH) 0.9 %
10 SYRINGE (ML) INJECTION AS NEEDED
Status: DISCONTINUED | OUTPATIENT
Start: 2017-05-06 | End: 2017-05-08 | Stop reason: HOSPADM

## 2017-05-06 RX ORDER — ONDANSETRON 4 MG/1
4 TABLET, ORALLY DISINTEGRATING ORAL ONCE
Status: COMPLETED | OUTPATIENT
Start: 2017-05-06 | End: 2017-05-06

## 2017-05-06 RX ORDER — POTASSIUM CHLORIDE 20MEQ/15ML
20 LIQUID (ML) ORAL
Status: COMPLETED | OUTPATIENT
Start: 2017-05-06 | End: 2017-05-06

## 2017-05-06 RX ORDER — DEXTROSE MONOHYDRATE 50 MG/ML
25 INJECTION, SOLUTION INTRAVENOUS CONTINUOUS
Status: DISCONTINUED | OUTPATIENT
Start: 2017-05-06 | End: 2017-05-08

## 2017-05-06 RX ORDER — SODIUM CHLORIDE 0.9 % (FLUSH) 0.9 %
10 SYRINGE (ML) INJECTION EVERY 8 HOURS
Status: DISCONTINUED | OUTPATIENT
Start: 2017-05-06 | End: 2017-05-08 | Stop reason: HOSPADM

## 2017-05-06 RX ADMIN — ONDANSETRON 6 MG: 2 INJECTION INTRAMUSCULAR; INTRAVENOUS at 11:48

## 2017-05-06 RX ADMIN — SODIUM CHLORIDE 25 ML/HR: 450 INJECTION, SOLUTION INTRAVENOUS at 06:28

## 2017-05-06 RX ADMIN — DEXTROSE MONOHYDRATE 25 ML/HR: 5 INJECTION, SOLUTION INTRAVENOUS at 15:47

## 2017-05-06 RX ADMIN — VANCOMYCIN HYDROCHLORIDE 500 MG: 1 INJECTION, POWDER, LYOPHILIZED, FOR SOLUTION INTRAVENOUS at 18:00

## 2017-05-06 RX ADMIN — ENOXAPARIN SODIUM 40 MG: 40 INJECTION SUBCUTANEOUS at 09:00

## 2017-05-06 RX ADMIN — METRONIDAZOLE 500 MG: 500 INJECTION, SOLUTION INTRAVENOUS at 00:44

## 2017-05-06 RX ADMIN — ONDANSETRON 6 MG: 2 INJECTION INTRAMUSCULAR; INTRAVENOUS at 17:57

## 2017-05-06 RX ADMIN — ONDANSETRON 4 MG: 4 TABLET, ORALLY DISINTEGRATING ORAL at 15:41

## 2017-05-06 RX ADMIN — METOPROLOL SUCCINATE 100 MG: 100 TABLET, EXTENDED RELEASE ORAL at 17:57

## 2017-05-06 RX ADMIN — METRONIDAZOLE 500 MG: 500 INJECTION, SOLUTION INTRAVENOUS at 08:45

## 2017-05-06 RX ADMIN — HYDROCODONE BITARTRATE AND ACETAMINOPHEN 1 TABLET: 7.5; 325 TABLET ORAL at 15:41

## 2017-05-06 RX ADMIN — METOPROLOL SUCCINATE 100 MG: 100 TABLET, EXTENDED RELEASE ORAL at 08:45

## 2017-05-06 RX ADMIN — ONDANSETRON 6 MG: 2 INJECTION INTRAMUSCULAR; INTRAVENOUS at 06:22

## 2017-05-06 RX ADMIN — FAMOTIDINE 20 MG: 10 INJECTION, SOLUTION INTRAVENOUS at 09:44

## 2017-05-06 RX ADMIN — POTASSIUM CHLORIDE 20 MEQ: 20 SOLUTION ORAL at 15:41

## 2017-05-06 RX ADMIN — Medication 10 ML: at 18:00

## 2017-05-06 RX ADMIN — VANCOMYCIN HYDROCHLORIDE 500 MG: 1 INJECTION, POWDER, LYOPHILIZED, FOR SOLUTION INTRAVENOUS at 00:29

## 2017-05-06 RX ADMIN — ONDANSETRON 6 MG: 2 INJECTION INTRAMUSCULAR; INTRAVENOUS at 00:38

## 2017-05-06 RX ADMIN — RDII 250 MG CAPSULE 250 MG: at 17:57

## 2017-05-06 RX ADMIN — VANCOMYCIN HYDROCHLORIDE 500 MG: 1 INJECTION, POWDER, LYOPHILIZED, FOR SOLUTION INTRAVENOUS at 12:00

## 2017-05-06 RX ADMIN — VANCOMYCIN HYDROCHLORIDE 500 MG: 1 INJECTION, POWDER, LYOPHILIZED, FOR SOLUTION INTRAVENOUS at 06:32

## 2017-05-06 RX ADMIN — Medication 10 ML: at 07:05

## 2017-05-06 RX ADMIN — RDII 250 MG CAPSULE 250 MG: at 08:45

## 2017-05-06 NOTE — PROGRESS NOTES
GI DAILY PROGRESS / SIGN-OFF NOTE    Admit Date:  4/29/2017    Today's Date:  5/6/2017    CC:  C diff & diverticulitis    Subjective:     Zofran helps with N/V. Tolerating diet and po Vanco without vomiting. Abdominal pain resolved. Reports 2 loose BMs this morning. Feels that her diarrhea is slowly improving. Medications:   Current Facility-Administered Medications   Medication Dose Route Frequency    dextrose 5% infusion  25 mL/hr IntraVENous CONTINUOUS    lip protectant (BLISTEX) ointment   Topical PRN    enoxaparin (LOVENOX) injection 40 mg  40 mg SubCUTAneous Q12H    vancomycin 50 mg/mL oral solution (compounded) 500 mg  500 mg Oral Q6H    famotidine (PF) (PEPCID) injection 20 mg  20 mg IntraVENous Q12H    ondansetron (ZOFRAN) injection 6 mg  6 mg IntraVENous Q6H PRN    Saccharomyces boulardii (FLORASTOR) capsule 250 mg  250 mg Oral BID    anastrozole (ARIMIDEX) tablet 1 mg  1 mg Oral DAILY    diazePAM (VALIUM) tablet 5 mg  5 mg Oral Q6H PRN    metoprolol succinate (TOPROL-XL) tablet 100 mg  100 mg Oral BID    venlafaxine-SR (EFFEXOR-XR) capsule 37.5 mg  37.5 mg Oral DAILY WITH BREAKFAST    sodium chloride (NS) flush 5-10 mL  5-10 mL IntraVENous Q8H    sodium chloride (NS) flush 5-10 mL  5-10 mL IntraVENous PRN    acetaminophen (TYLENOL) tablet 650 mg  650 mg Oral Q4H PRN    HYDROcodone-acetaminophen (NORCO) 7.5-325 mg per tablet 1 Tab  1 Tab Oral Q4H PRN    morphine injection 2 mg  2 mg IntraVENous Q4H PRN    nicotine (NICODERM CQ) 21 mg/24 hr patch 1 Patch  1 Patch TransDERmal DAILY PRN    albuterol-ipratropium (DUO-NEB) 2.5 MG-0.5 MG/3 ML  3 mL Nebulization Q4H PRN    hydrALAZINE (APRESOLINE) 20 mg/mL injection 10 mg  10 mg IntraVENous Q6H PRN       Review of Systems:  ROS was obtained, with pertinent positives as listed above. No chest pain or SOB.     Diet:  GI soft    Objective:   Vitals:  Visit Vitals    /73    Pulse 78    Temp 98.5 °F (36.9 °C)    Resp 18    Ht 5' 2.5\" (1.588 m)    Wt 128.4 kg (283 lb)    SpO2 97%    BMI 50.94 kg/m2     Intake/Output:  05/06 0701 - 05/06 1900  In: 2836 [P.O.:360; I.V.:2476]  Out: -   05/04 1901 - 05/06 0700  In: 480 [P.O.:480]  Out: -   Exam:  General appearance: alert, cooperative, NAD, obese  Lungs: CTA ant  Heart: RRR  Abdomen: Soft, OBESE, non-distended with normal bowel sounds. NONTENDER. Extremities: no cyanosis or edema  Neuro:  alert and oriented    Data Review (Labs):    Recent Labs      05/06/17   1113  05/05/17   0747   NA  147*  144   K  3.4*  3.0*   CL  111*  107   CO2  25  27   BUN  7*  11   CREA  0.74  0.76   CA  9.1  8.8   MG  1.8  1.9   GLU  92  85       Assessment:     Principal Problem:  C. difficile colitis (4/30/2017)    Active Problems: Morbid obesity with BMI of 50.0-59.9, adult ([facility] [address] (11/5/2015)  Acute lower UTI (11/29/2015)  Acute diverticulitis (4/29/2017)    63 y/o female w/ MMP outlined above who presents with recent acute onset of N/V/D & abdominal pain. A/P CT scan on 4/29 showed acute, uncomplicated sigmoid diverticulitis. C diff positive 4/30 & started on IV Flagyl 500mg q8. She has completed Rocephin for UTI. She had a normal EGD in 9/2016 for nausea. Colonoscopy in 2013 with Dr. Arturo Nation showed pantics & TA colon polyps. N/V controlled with Zofran & diarrhea - improving on current therapy with PO Vanco started 5/3/17.      Plan:      Continue Vanc 500 q 6 hr for C diff for total 14 days till 5/17/17. To be timed with IV / po Zofran as needed for nausea. Can stop iv Flagyl - uncertain if she had diverticulitis and is completley non-tender on today's exam.  Continue IV or po Pepcid 20mg bid for indigestion. Diet advanced to regular. Home soon with continued improvement. A/P discussed with patient including risk of relapse and need to avoid unnecessary antibiotics. Will sign off. Please call or re-consult as needed.      Leda Kong MD

## 2017-05-06 NOTE — PROGRESS NOTES
PROGRESS NOTE   Starr Del Rio   MRN # [id number]   64 y.o. female   Date of service:05/06/17   Hospital day # LOS: 7 days      Subjective      PATIENT SUMMARY     SUBJECTIVE   Chief Complaint: follow up for C. Diff Colitis     HPI  Diarrhea is getting better but stools are still not formed. ROS Denies chest pain and dyspnea     Objective    INPATIENT MEDS     Current Facility-Administered Medications:     lip protectant (BLISTEX) ointment, , Topical, PRN, Shay Orr MD    enoxaparin (LOVENOX) injection 40 mg, 40 mg, SubCUTAneous, Q12H, Shay Orr MD, 40 mg at 05/06/17 0900    vancomycin 50 mg/mL oral solution (compounded) 500 mg, 500 mg, Oral, Q6H, CHET Multani, 500 mg at 05/06/17 7675    famotidine (PF) (PEPCID) injection 20 mg, 20 mg, IntraVENous, Q12H, CHET Multani, 20 mg at 05/06/17 0944    ondansetron (ZOFRAN) injection 6 mg, 6 mg, IntraVENous, Q6H PRN, CHET Anand, 6 mg at 05/06/17 0622    0.45% sodium chloride infusion, 25 mL/hr, IntraVENous, CONTINUOUS, Shay Orr MD, Last Rate: 25 mL/hr at 05/06/17 0628, 25 mL/hr at 05/06/17 0628    Saccharomyces boulardii (FLORASTOR) capsule 250 mg, 250 mg, Oral, BID, Catia Meza. Delgado Suarez MD, 250 mg at 05/06/17 0845    metroNIDAZOLE (FLAGYL) IVPB premix 500 mg, 500 mg, IntraVENous, Q8H, Catia Meza.  Delgado Suarez MD, Last Rate: 100 mL/hr at 05/06/17 0845, 500 mg at 05/06/17 0845    anastrozole (ARIMIDEX) tablet 1 mg, 1 mg, Oral, DAILY, Pineda Anne MD, Stopped at 05/04/17 0900    diazePAM (VALIUM) tablet 5 mg, 5 mg, Oral, Q6H PRN, Pineda Anne MD, 5 mg at 05/01/17 0054    metoprolol succinate (TOPROL-XL) tablet 100 mg, 100 mg, Oral, BID, Pineda Anne MD, 100 mg at 05/06/17 0845    venlafaxine-SR (EFFEXOR-XR) capsule 37.5 mg, 37.5 mg, Oral, DAILY WITH BREAKFAST, Pineda Anne MD, Stopped at 05/05/17 0800    sodium chloride (NS) flush 5-10 mL, 5-10 mL, IntraVENous, Q8H, Pineda Anne MD, 10 mL at 05/06/17 0705    sodium chloride (NS) flush 5-10 mL, 5-10 mL, IntraVENous, PRN, Julian Blevins MD    acetaminophen (TYLENOL) tablet 650 mg, 650 mg, Oral, Q4H PRN, Julian Blevins MD    HYDROcodone-acetaminophen [facility]) 7.5-325 mg per tablet 1 Tab, 1 Tab, Oral, Q4H PRN, Julian Blevins MD, 1 Tab at 05/02/17 2384    morphine injection 2 mg, 2 mg, IntraVENous, Q4H PRN, Julian Blevins MD, 2 mg at 05/04/17 0610    nicotine (NICODERM CQ) 21 mg/24 hr patch 1 Patch, 1 Patch, TransDERmal, DAILY PRN, Julian Blevins MD    albuterol-ipratropium (DUO-NEB) 2.5 MG-0.5 MG/3 ML, 3 mL, Nebulization, Q4H PRN, Julian Blevins MD    hydrALAZINE (APRESOLINE) 20 mg/mL injection 10 mg, 10 mg, IntraVENous, Q6H PRN, Julian Blevins MD     PHYSICAL EXAM   Vitals:    05/05/17 2329 05/06/17 0347 05/06/17 0703 05/06/17 1106   BP: 117/64 125/68 131/69 123/73   Pulse: 87 83 82 78   Resp: 22 18 18 18   Temp: 98.3 °F (36.8 °C) 97.5 °F (36.4 °C) 98.5 °F (36.9 °C) 98.5 °F (36.9 °C)   SpO2: 96% 98% 98% 97%   Weight:       Height:            Intake/Output Summary (Last 24 hours) at 05/06/17 1114  Last data filed at 05/06/17 5814   Gross per 24 hour   Intake              360 ml   Output                0 ml   Net              360 ml        General appearance: NAD, conversant   Lungs: Clear to auscultation bilaterally, Negative for use of accessory muscles   CV: RRR, no MRGs   Abdomen: Soft, nontender, normal active bowel sounds   Extremities: No peripheral edema or cyanosis   Skin: negative for rash     LABS   No results for input(s): WBC, HGB, HCT, PLT, HGBEXT, HCTEXT, PLTEXT, HGBEXT, HCTEXT, PLTEXT in the last 72 hours. Recent Labs      05/05/17   0747   NA  144   K  3.0*   CL  107   CO2  27   BUN  11      Lab Results   Component Value Date    MG 1.9 05/05/2017      No results for input(s): ALT, ALBUMIN in the last 72 hours. No lab exists for component: BILITOT, ALKPHOS, AST   No components found for: TROPONIN   No results for input(s): BNP in the last 72 hours.    Lab Results   Component Value Date/Time    INR 1.0 04/01/2010 12:40 PM      No components found for: HGBA1C   Lab Results   Component Value Date/Time    WBC 10-20 03/17/2017 06:37 PM        ASSESSMENT/PLAN    IMPRESSION   Principal Problem:    C. difficile colitis     Active Problems:    Acute diverticulitis     Nausea and Vomiting     Acute cystitis without hematuria     Morbid obesity with BMI of 50.0-59.9, adult     PLAN   -GI is managing her antibiotics   -prn zofran   -advanced to mech soft diet   -completed treatment with rocephin     DVT prophlaxis - lovenox     Noelle Brumfield MD 5/6/2017 4:14 PM

## 2017-05-06 NOTE — PROGRESS NOTES
MIDLINE Placement Note    PRE-PROCEDURE VERIFICATION  PROCEDURE DETAIL  Time out completed all person present in agreement with time out. Rudy Valdes rn in agreement with time out. A single lumen Midline was started for vascular access. The following documentation is in addition to the Midline properties in the lines/airways flowsheet :  Lot #: [id number]  Xylocaine used: yes  Mid-Arm Circumference: 47 (cm)  Internal Catheter Length: 10 (cm)  Internal Catheter Total Length: 10 (cm)  Vein Selection for Midline:right cephalic    Line is okay to use: yes.

## 2017-05-07 LAB
ANION GAP BLD CALC-SCNC: 9 MMOL/L (ref 7–16)
BUN SERPL-MCNC: 6 MG/DL (ref 8–23)
CALCIUM SERPL-MCNC: 9.1 MG/DL (ref 8.3–10.4)
CHLORIDE SERPL-SCNC: 111 MMOL/L (ref 98–107)
CO2 SERPL-SCNC: 25 MMOL/L (ref 21–32)
CREAT SERPL-MCNC: 0.78 MG/DL (ref 0.6–1)
GLUCOSE BLD STRIP.AUTO-MCNC: 112 MG/DL (ref 65–100)
GLUCOSE SERPL-MCNC: 104 MG/DL (ref 65–100)
POTASSIUM SERPL-SCNC: 4.2 MMOL/L (ref 3.5–5.1)
SODIUM SERPL-SCNC: 145 MMOL/L (ref 136–145)

## 2017-05-07 PROCEDURE — 74011250637 HC RX REV CODE- 250/637: Performed by: PHYSICIAN ASSISTANT

## 2017-05-07 PROCEDURE — 82962 GLUCOSE BLOOD TEST: CPT

## 2017-05-07 PROCEDURE — 74011000250 HC RX REV CODE- 250: Performed by: PHYSICIAN ASSISTANT

## 2017-05-07 PROCEDURE — 80048 BASIC METABOLIC PNL TOTAL CA: CPT | Performed by: INTERNAL MEDICINE

## 2017-05-07 PROCEDURE — 74011250637 HC RX REV CODE- 250/637: Performed by: INTERNAL MEDICINE

## 2017-05-07 PROCEDURE — 65270000029 HC RM PRIVATE

## 2017-05-07 PROCEDURE — 74011250637 HC RX REV CODE- 250/637: Performed by: HOSPITALIST

## 2017-05-07 PROCEDURE — 74011250636 HC RX REV CODE- 250/636: Performed by: PHYSICIAN ASSISTANT

## 2017-05-07 PROCEDURE — 74011250636 HC RX REV CODE- 250/636: Performed by: INTERNAL MEDICINE

## 2017-05-07 RX ADMIN — Medication 10 ML: at 00:25

## 2017-05-07 RX ADMIN — ONDANSETRON 6 MG: 2 INJECTION INTRAMUSCULAR; INTRAVENOUS at 06:22

## 2017-05-07 RX ADMIN — ONDANSETRON 6 MG: 2 INJECTION INTRAMUSCULAR; INTRAVENOUS at 13:04

## 2017-05-07 RX ADMIN — ENOXAPARIN SODIUM 40 MG: 40 INJECTION SUBCUTANEOUS at 10:50

## 2017-05-07 RX ADMIN — ONDANSETRON 6 MG: 2 INJECTION INTRAMUSCULAR; INTRAVENOUS at 00:18

## 2017-05-07 RX ADMIN — VANCOMYCIN HYDROCHLORIDE 500 MG: 1 INJECTION, POWDER, LYOPHILIZED, FOR SOLUTION INTRAVENOUS at 18:27

## 2017-05-07 RX ADMIN — METOPROLOL SUCCINATE 100 MG: 100 TABLET, EXTENDED RELEASE ORAL at 10:48

## 2017-05-07 RX ADMIN — Medication 10 ML: at 14:00

## 2017-05-07 RX ADMIN — FAMOTIDINE 20 MG: 10 INJECTION, SOLUTION INTRAVENOUS at 21:32

## 2017-05-07 RX ADMIN — ONDANSETRON 6 MG: 2 INJECTION INTRAMUSCULAR; INTRAVENOUS at 18:05

## 2017-05-07 RX ADMIN — FAMOTIDINE 20 MG: 10 INJECTION, SOLUTION INTRAVENOUS at 10:49

## 2017-05-07 RX ADMIN — ENOXAPARIN SODIUM 40 MG: 40 INJECTION SUBCUTANEOUS at 21:32

## 2017-05-07 RX ADMIN — Medication 10 ML: at 21:32

## 2017-05-07 RX ADMIN — VANCOMYCIN HYDROCHLORIDE 500 MG: 1 INJECTION, POWDER, LYOPHILIZED, FOR SOLUTION INTRAVENOUS at 06:42

## 2017-05-07 RX ADMIN — Medication 10 ML: at 10:50

## 2017-05-07 RX ADMIN — VANCOMYCIN HYDROCHLORIDE 500 MG: 1 INJECTION, POWDER, LYOPHILIZED, FOR SOLUTION INTRAVENOUS at 00:25

## 2017-05-07 RX ADMIN — ENOXAPARIN SODIUM 40 MG: 40 INJECTION SUBCUTANEOUS at 01:30

## 2017-05-07 RX ADMIN — VANCOMYCIN HYDROCHLORIDE 500 MG: 1 INJECTION, POWDER, LYOPHILIZED, FOR SOLUTION INTRAVENOUS at 13:37

## 2017-05-07 RX ADMIN — RDII 250 MG CAPSULE 250 MG: at 18:05

## 2017-05-07 RX ADMIN — METOPROLOL SUCCINATE 100 MG: 100 TABLET, EXTENDED RELEASE ORAL at 18:05

## 2017-05-07 RX ADMIN — RDII 250 MG CAPSULE 250 MG: at 10:47

## 2017-05-07 NOTE — PROGRESS NOTES
PROGRESS NOTE   Zach Kauffman   MRN # [id number]   64 y.o. female   Date of service:05/07/17   Hospital day # LOS: 8 days      Subjective      PATIENT SUMMARY   Patient presented with nausea, vomiting, diarrhea and abdominal pain. Workup revealed diverticulitis and UTI. She also tested positive for c. Diff. GI was consulted, they helped with her antibiotic management. She completed treatment for her diverticulitis and uti. She is currently on oral vancomycin. SUBJECTIVE   Chief Complaint: follow up for C. Diff Colitis     HPI  Diarrhea is getting better but stools are still not formed. Appetite is fair. ROS Denies chest pain and dyspnea     Objective    INPATIENT MEDS     Current Facility-Administered Medications:     dextrose 5% infusion, 25 mL/hr, IntraVENous, CONTINUOUS, Anant Crawley MD, Last Rate: 25 mL/hr at 05/06/17 1547, 25 mL/hr at 05/06/17 1547    sodium chloride (NS) flush 10 mL, 10 mL, InterCATHeter, Q8H, Kevin Cavanaugh MD, 10 mL at 05/07/17 1050    sodium chloride (NS) flush 10 mL, 10 mL, InterCATHeter, PRN, Jessy Trotter MD    lip protectant (BLISTEX) ointment, , Topical, PRN, Anant Crawley MD    enoxaparin (LOVENOX) injection 40 mg, 40 mg, SubCUTAneous, Q12H, Anant Crawley MD, 40 mg at 05/07/17 1050    vancomycin 50 mg/mL oral solution (compounded) 500 mg, 500 mg, Oral, Q6H, CHET Nagy, 500 mg at 05/07/17 0642    famotidine (PF) (PEPCID) injection 20 mg, 20 mg, IntraVENous, Q12H, CHET Nagy, 20 mg at 05/07/17 1049    ondansetron (ZOFRAN) injection 6 mg, 6 mg, IntraVENous, Q6H PRN, CHET Welch, 6 mg at 05/07/17 0622    Saccharomyces boulardii (FLORASTOR) capsule 250 mg, 250 mg, Oral, BID, Crane Kida.  Annamaria Jaquez MD, 250 mg at 05/07/17 1047    anastrozole (ARIMIDEX) tablet 1 mg, 1 mg, Oral, DAILY, Kelsey Erickson MD, Stopped at 05/04/17 0900    diazePAM (VALIUM) tablet 5 mg, 5 mg, Oral, Q6H PRN, Kelsey Erickson MD, 5 mg at 05/01/17 0054    metoprolol succinate (TOPROL-XL) tablet 100 mg, 100 mg, Oral, BID, Kelsey Erickson MD, 100 mg at 05/07/17 1048    venlafaxine-SR (EFFEXOR-XR) capsule 37.5 mg, 37.5 mg, Oral, DAILY WITH BREAKFAST, Kelsey Erickson MD, Stopped at 05/05/17 0800    sodium chloride (NS) flush 5-10 mL, 5-10 mL, IntraVENous, Q8H, Kelsey Erickson MD, 10 mL at 05/07/17 1050    sodium chloride (NS) flush 5-10 mL, 5-10 mL, IntraVENous, PRN, Kelsey Erickson MD    acetaminophen (TYLENOL) tablet 650 mg, 650 mg, Oral, Q4H PRN, Kelsey Erickson MD    HYDROcodone-acetaminophen [facility]) 7.5-325 mg per tablet 1 Tab, 1 Tab, Oral, Q4H PRN, Kelsey Erickson MD, 1 Tab at 05/06/17 1541    morphine injection 2 mg, 2 mg, IntraVENous, Q4H PRN, Kelsey Erickson MD, 2 mg at 05/04/17 0610    nicotine (NICODERM CQ) 21 mg/24 hr patch 1 Patch, 1 Patch, TransDERmal, DAILY PRN, Kelsey Erickson MD    albuterol-ipratropium (DUO-NEB) 2.5 MG-0.5 MG/3 ML, 3 mL, Nebulization, Q4H PRN, Kelsey Erickson MD    hydrALAZINE (APRESOLINE) 20 mg/mL injection 10 mg, 10 mg, IntraVENous, Q6H PRN, Kelsey Erickson MD     PHYSICAL EXAM   Vitals:    05/06/17 2358 05/07/17 0343 05/07/17 0738 05/07/17 1149   BP: 115/52 129/70 139/68 154/74   Pulse: 71 79 79 88   Resp: 20 20 18 17   Temp: 97.8 °F (36.6 °C) 98.7 °F (37.1 °C) 98 °F (36.7 °C) 98.4 °F (36.9 °C)   SpO2: 100% 99% 96% 93%   Weight:       Height:            Intake/Output Summary (Last 24 hours) at 05/07/17 1154  Last data filed at 05/06/17 1759   Gross per 24 hour   Intake             2956 ml   Output                0 ml   Net             2956 ml        General appearance: NAD, conversant   Lungs: Clear to auscultation bilaterally, Negative for use of accessory muscles   CV: RRR, no MRGs   Abdomen: Soft, nontender, normal active bowel sounds   Extremities: No peripheral edema or cyanosis   Skin: negative for rash     LABS   No results for input(s): WBC, HGB, HCT, PLT, HGBEXT, HCTEXT, PLTEXT, HGBEXT, HCTEXT, PLTEXT in the last 72 hours.    Recent Labs 05/06/17   1113  05/05/17   0747   NA  147*  144   K  3.4*  3.0*   CL  111*  107   CO2  25  27   BUN  7*  11      Lab Results   Component Value Date    MG 1.8 05/06/2017      No results for input(s): ALT, ALBUMIN in the last 72 hours. No lab exists for component: BILITOT, ALKPHOS, AST   No components found for: TROPONIN   No results for input(s): BNP in the last 72 hours. Lab Results   Component Value Date/Time    INR 1.0 04/01/2010 12:40 PM      No components found for: HGBA1C   Lab Results   Component Value Date/Time    WBC 10-20 03/17/2017 06:37 PM        ASSESSMENT/PLAN    IMPRESSION   Principal Problem:    C. difficile colitis     Active Problems:    Acute diverticulitis     Nausea and Vomiting     Acute cystitis without hematuria     Morbid obesity with BMI of 50.0-59.9, adult     Hypernatremia     Hypokalemia     PLAN   -GI is managing her antibiotics (on vancomycin only, completed diverticulitis and uti directed antibiotics)  -correct electrolytes (hypernatremia and hypokalemia)  -prn zofran   -advanced to mech soft diet     DVT prophlaxis - lovenox   Dispo:d/c in the next 24-48 hours pending further improvement in her diarrhea.      Umang Portillo MD 5/7/2017 4:14 PM

## 2017-05-08 VITALS
SYSTOLIC BLOOD PRESSURE: 160 MMHG | OXYGEN SATURATION: 98 % | HEIGHT: 63 IN | WEIGHT: 283 LBS | TEMPERATURE: 99.4 F | DIASTOLIC BLOOD PRESSURE: 86 MMHG | HEART RATE: 81 BPM | RESPIRATION RATE: 18 BRPM | BODY MASS INDEX: 50.14 KG/M2

## 2017-05-08 PROCEDURE — 74011250637 HC RX REV CODE- 250/637: Performed by: INTERNAL MEDICINE

## 2017-05-08 PROCEDURE — 74011000250 HC RX REV CODE- 250: Performed by: PHYSICIAN ASSISTANT

## 2017-05-08 PROCEDURE — 74011250637 HC RX REV CODE- 250/637: Performed by: HOSPITALIST

## 2017-05-08 PROCEDURE — 74011250637 HC RX REV CODE- 250/637: Performed by: PHYSICIAN ASSISTANT

## 2017-05-08 PROCEDURE — 74011250636 HC RX REV CODE- 250/636: Performed by: INTERNAL MEDICINE

## 2017-05-08 PROCEDURE — 74011250636 HC RX REV CODE- 250/636: Performed by: PHYSICIAN ASSISTANT

## 2017-05-08 RX ORDER — ONDANSETRON 4 MG/1
4 TABLET, ORALLY DISINTEGRATING ORAL
Status: DISCONTINUED | OUTPATIENT
Start: 2017-05-08 | End: 2017-05-08 | Stop reason: HOSPADM

## 2017-05-08 RX ORDER — ONDANSETRON 4 MG/1
4 TABLET, ORALLY DISINTEGRATING ORAL
Qty: 60 TAB | Refills: 0 | Status: SHIPPED | OUTPATIENT
Start: 2017-05-08 | End: 2018-01-25 | Stop reason: ALTCHOICE

## 2017-05-08 RX ORDER — IBUPROFEN 200 MG
1 TABLET ORAL
Qty: 30 PATCH | Refills: 0 | Status: SHIPPED | OUTPATIENT
Start: 2017-05-08 | End: 2017-06-07

## 2017-05-08 RX ORDER — SAME BUTANEDISULFONATE/BETAINE 400-600 MG
250 POWDER IN PACKET (EA) ORAL 2 TIMES DAILY
Qty: 30 CAP | Refills: 0 | Status: SHIPPED | OUTPATIENT
Start: 2017-05-08 | End: 2017-05-15

## 2017-05-08 RX ADMIN — ONDANSETRON 6 MG: 2 INJECTION INTRAMUSCULAR; INTRAVENOUS at 06:48

## 2017-05-08 RX ADMIN — ENOXAPARIN SODIUM 40 MG: 40 INJECTION SUBCUTANEOUS at 08:03

## 2017-05-08 RX ADMIN — ONDANSETRON 6 MG: 2 INJECTION INTRAMUSCULAR; INTRAVENOUS at 00:53

## 2017-05-08 RX ADMIN — VANCOMYCIN HYDROCHLORIDE 500 MG: 1 INJECTION, POWDER, LYOPHILIZED, FOR SOLUTION INTRAVENOUS at 06:49

## 2017-05-08 RX ADMIN — VANCOMYCIN HYDROCHLORIDE 500 MG: 1 INJECTION, POWDER, LYOPHILIZED, FOR SOLUTION INTRAVENOUS at 11:54

## 2017-05-08 RX ADMIN — VANCOMYCIN HYDROCHLORIDE 500 MG: 1 INJECTION, POWDER, LYOPHILIZED, FOR SOLUTION INTRAVENOUS at 00:47

## 2017-05-08 RX ADMIN — RDII 250 MG CAPSULE 250 MG: at 08:03

## 2017-05-08 RX ADMIN — ONDANSETRON 4 MG: 4 TABLET, ORALLY DISINTEGRATING ORAL at 11:54

## 2017-05-08 RX ADMIN — METOPROLOL SUCCINATE 100 MG: 100 TABLET, EXTENDED RELEASE ORAL at 08:03

## 2017-05-08 RX ADMIN — DEXTROSE MONOHYDRATE 25 ML/HR: 5 INJECTION, SOLUTION INTRAVENOUS at 09:35

## 2017-05-08 RX ADMIN — Medication 10 ML: at 05:59

## 2017-05-08 RX ADMIN — FAMOTIDINE 20 MG: 10 INJECTION, SOLUTION INTRAVENOUS at 08:03

## 2017-05-08 NOTE — PROGRESS NOTES
*ATTENTION:  This note has been created by a medical student for educational purposes only. Please do not refer to the content of this note for clinical decision-making, billing, or other purposes. Please see attending physicians note to obtain clinical information on this patient. *     Hospitalist Progress Note    2017 9:47 AM  Admit Date: 2017  7:36 PM   NAME: Dayana Villegas   :  1955   MRN:  [id number]   Attending: Denise Carbajal MD  PCP:  Jose C Mesa MD  Treatment Team: Attending Provider: Denise Carbajal MD; Care Manager: Azalia Bamberger; Utilization Review: Rusty Franco RN  SUBJECTIVE:   Patient is a 57 yo female who presented with nausea, vomiting, diarrhea and abdominal pain. Workup revealed diverticulitis and UTI. She also tested positive for c. Diff. GI was consulted, they helped with her antibiotic management. She completed treatment for her diverticulitis and uti. She is currently on oral vancomycin. :  Diarrhea is getting better and stools are loosely formed.       ROS:  Denies CP and SOB  Denies any abdominal pain or fullness      Recent Results (from the past 24 hour(s))   METABOLIC PANEL, BASIC    Collection Time: 17 11:45 AM   Result Value Ref Range    Sodium 145 136 - 145 mmol/L    Potassium 4.2 3.5 - 5.1 mmol/L    Chloride 111 (H) 98 - 107 mmol/L    CO2 25 21 - 32 mmol/L    Anion gap 9 7 - 16 mmol/L    Glucose 104 (H) 65 - 100 mg/dL    BUN 6 (L) 8 - 23 MG/DL    Creatinine 0.78 0.6 - 1.0 MG/DL    GFR est AA >60 >60 ml/min/1.73m2    GFR est non-AA >60 >60 ml/min/1.73m2    Calcium 9.1 8.3 - 10.4 MG/DL       Allergies   Allergen Reactions    Bactrim [Sulfamethoprim Ds] Rash     Pt gets a yeast infection on body      Current Facility-Administered Medications   Medication Dose Route Frequency Provider Last Rate Last Dose    dextrose 5% infusion  25 mL/hr IntraVENous CONTINUOUS Vini Obregon MD 25 mL/hr at 17 0935 25 mL/hr at 17 0935    sodium chloride (NS) flush 10 mL  10 mL InterCATHeter Q8H Kevin Cavanaugh MD   10 mL at 05/07/17 1400    sodium chloride (NS) flush 10 mL  10 mL InterCATHeter PRN Meghana Valiente MD        lip protectant (BLISTEX) ointment   Topical PRN Shay Orr MD        enoxaparin (LOVENOX) injection 40 mg  40 mg SubCUTAneous Q12H Shay Orr MD   40 mg at 05/08/17 0803    vancomycin 50 mg/mL oral solution (compounded) 500 mg  500 mg Oral Q6H Cheli PeoCHET Gant   500 mg at 05/08/17 0649    famotidine (PF) (PEPCID) injection 20 mg  20 mg IntraVENous Q12H CHET Anand   20 mg at 05/08/17 0803    ondansetron (ZOFRAN) injection 6 mg  6 mg IntraVENous Q6H PRN CHET Anand   6 mg at 05/08/17 0293    Saccharomyces boulardii (FLORASTOR) capsule 250 mg  250 mg Oral BID Catia Meza.  Delgado Suarez MD   250 mg at 05/08/17 0803    anastrozole (ARIMIDEX) tablet 1 mg  1 mg Oral DAILY Pineda Anne MD   Stopped at 05/04/17 0900    diazePAM (VALIUM) tablet 5 mg  5 mg Oral Q6H PRN Pineda Anne MD   5 mg at 05/01/17 0054    metoprolol succinate (TOPROL-XL) tablet 100 mg  100 mg Oral BID Pineda Anne MD   100 mg at 05/08/17 0803    venlafaxine-SR (EFFEXOR-XR) capsule 37.5 mg  37.5 mg Oral DAILY WITH BREAKFAST Pineda Anne MD   Stopped at 05/05/17 0800    sodium chloride (NS) flush 5-10 mL  5-10 mL IntraVENous Q8H Pineda Anne MD   10 mL at 05/08/17 0559    sodium chloride (NS) flush 5-10 mL  5-10 mL IntraVENous PRN Pineda Anne MD        acetaminophen (TYLENOL) tablet 650 mg  650 mg Oral Q4H PRN Pineda Anne MD        HYDROcodone-acetaminophen [facility]) 7.5-325 mg per tablet 1 Tab  1 Tab Oral Q4H PRN Pineda Anne MD   1 Tab at 05/06/17 1541    morphine injection 2 mg  2 mg IntraVENous Q4H PRN Pineda Anne MD   2 mg at 05/04/17 0610    nicotine (NICODERM CQ) 21 mg/24 hr patch 1 Patch  1 Patch TransDERmal DAILY PRN Pineda Anne MD        albuterol-ipratropium (DUO-NEB) 2.5 MG-0.5 MG/3 ML  3 mL Nebulization Q4H PRN Bala Petersen MD        hydrALAZINE (APRESOLINE) 20 mg/mL injection 10 mg  10 mg IntraVENous Q6H PRN Bala Petersen MD               Review of Systems as noted above in HPI   PHYSICAL EXAM     Visit Vitals    /59    Pulse 73    Temp 98.1 °F (36.7 °C)    Resp 18    Ht 5' 2.5\" (1.588 m)    Wt 128.4 kg (283 lb)    SpO2 97%    BMI 50.94 kg/m2      Temp (24hrs), Av.8 °F (36.6 °C), Min:96.4 °F (35.8 °C), Max:98.6 °F (37 °C)    Oxygen Therapy  O2 Sat (%): 97 % (17 07)  Pulse via Oximetry: 94 beats per minute (17)  O2 Device: Room air (17)    Intake/Output Summary (Last 24 hours) at 17 0946  Last data filed at 17 0850   Gross per 24 hour   Intake             1177 ml   Output                0 ml   Net             1177 ml      General: No acute distress,conversive  Lungs:  CTAB, good effort   Heart:  Regular rate and rhythm, no murmur, no edema   Abdomen: Soft, nontender and nondistended, diminished bowel sounds x4  Extremities: Warm and dry         DIAGNOSTIC STUDIES      Labs and Studies from previous 24 hours have been personally reviewed by myself           Full Code    ASSESSMENT / Dalila Thomas 169 Problems    Diagnosis Date Noted    C. difficile colitis 2017    Acute diverticulitis 2017    Acute lower UTI 2015    Morbid obesity with BMI of 50.0-59.9, adult ([facility] [address] 2015         FEN:  Hypernatremia and hypokalemia have resolved. Pt tolerating mechanical soft diet. DVT Prophylaxis: Levenox  Disposition:  Consider discharge today or tomorrow as bowels continue to form.   -GI is managing her antibiotics (on vancomycin only, completed diverticulitis and uti directed antibiotics)  -prn zofran to take with the vancomycin      Signed By: Blake Martinez     May 8, 2017

## 2017-05-08 NOTE — PROGRESS NOTES
Had company much of the day. Was sitting up on side of bed eating potato chips. Requested zofran 'at least' 15 mins prior to oral vanc administration. Reported 1 BM at start of shift that she reported as brown and semi-formed (not loose).

## 2017-05-08 NOTE — DISCHARGE INSTRUCTIONS
DISCHARGE SUMMARY from Nurse    The following personal items are in your possession at time of discharge:    Dental Appliances: None        Home Medications: None  Jewelry: None  Clothing: Shirt, Pants, Undergarments, Footwear  Other Valuables: Cell Phone             PATIENT INSTRUCTIONS:    After general anesthesia or intravenous sedation, for 24 hours or while taking prescription Narcotics:  · Limit your activities  · Do not drive and operate hazardous machinery  · Do not make important personal or business decisions  · Do  not drink alcoholic beverages  · If you have not urinated within 8 hours after discharge, please contact your surgeon on call. Report the following to your surgeon:  · Excessive pain, swelling, redness or odor of or around the surgical area  · Temperature over 100.5  · Nausea and vomiting lasting longer than 4 hours or if unable to take medications  · Any signs of decreased circulation or nerve impairment to extremity: change in color, persistent  numbness, tingling, coldness or increase pain  · Any questions        What to do at Home:  Recommended activity: Activity as tolerated, regular diet    If you experience any of the following symptoms increased diarrhea, fever greater than 101, or nausea/vomiting, please follow up with your primary care physician. *  Please give a list of your current medications to your Primary Care Provider. *  Please update this list whenever your medications are discontinued, doses are      changed, or new medications (including over-the-counter products) are added. *  Please carry medication information at all times in case of emergency situations. These are general instructions for a healthy lifestyle:    No smoking/ No tobacco products/ Avoid exposure to second hand smoke    Surgeon General's Warning:  Quitting smoking now greatly reduces serious risk to your health.     Obesity, smoking, and sedentary lifestyle greatly increases your risk for illness    A healthy diet, regular physical exercise & weight monitoring are important for maintaining a healthy lifestyle    You may be retaining fluid if you have a history of heart failure or if you experience any of the following symptoms:  Weight gain of 3 pounds or more overnight or 5 pounds in a week, increased swelling in our hands or feet or shortness of breath while lying flat in bed. Please call your doctor as soon as you notice any of these symptoms; do not wait until your next office visit. Recognize signs and symptoms of STROKE:    F-face looks uneven    A-arms unable to move or move unevenly    S-speech slurred or non-existent    T-time-call 911 as soon as signs and symptoms begin-DO NOT go       Back to bed or wait to see if you get better-TIME IS BRAIN. Warning Signs of HEART ATTACK     Call 911 if you have these symptoms:   Chest discomfort. Most heart attacks involve discomfort in the center of the chest that lasts more than a few minutes, or that goes away and comes back. It can feel like uncomfortable pressure, squeezing, fullness, or pain.  Discomfort in other areas of the upper body. Symptoms can include pain or discomfort in one or both arms, the back, neck, jaw, or stomach.  Shortness of breath with or without chest discomfort.  Other signs may include breaking out in a cold sweat, nausea, or lightheadedness. Don't wait more than five minutes to call 911 - MINUTES MATTER! Fast action can save your life. Calling 911 is almost always the fastest way to get lifesaving treatment. Emergency Medical Services staff can begin treatment when they arrive -- up to an hour sooner than if someone gets to the hospital by car. The discharge information has been reviewed with the patient. The patient verbalized understanding. Discharge medications reviewed with the patient and appropriate educational materials and side effects teaching were provided. Clostridium Difficile Colitis: Care Instructions  Your Care Instructions  Clostridium difficile (also called C. difficile) are bacteria that can cause swelling and irritation of the large intestine, or colon. This inflammation is also called colitis. It can cause diarrhea, fever, and belly cramps. You may get C. difficile colitis if you take antibiotics. The infection is most common in people who are taking antibiotics while in the hospital. It is also common in older people in hospitals and nursing homes. Severe disease could cause the colon to swell to many times its normal size (toxic megacolon). This can cause death and needs emergency treatment. You may have a swollen belly that is painful or tender, a rapid heartbeat, and a fever. Follow-up care is a key part of your treatment and safety. Be sure to make and go to all appointments, and call your doctor if you are having problems. It's also a good idea to know your test results and keep a list of the medicines you take. How can you care for yourself at home? · Your doctor may give you antibiotics to treat C. difficile colitis. If your doctor prescribes an antibiotic, he or she will give you a different antibiotic than the one that caused your infection. Take your antibiotics as directed. Do not stop taking them just because you feel better. You need to take the full course of antibiotics. · To prevent dehydration, drink plenty of fluids, enough so that your urine is light yellow or clear like water. Choose water and other caffeine-free clear liquids until you feel better. If you have kidney, heart, or liver disease and have to limit fluids, talk with your doctor before you increase the amount of fluids you drink. · Begin eating small amounts of mild foods, if you feel like it. Try yogurt that has live cultures of lactobacillus (check the label). ¨ Avoid spicy foods, fruits, alcohol, and caffeine until 48 hours after all symptoms go away.   ¨ Avoid chewing gum that contains sorbitol. ¨ Avoid dairy products (except for yogurt with lactobacillus) while you have diarrhea and for 3 days after symptoms go away. · To prevent the spread of C. difficile, practice good hygiene. Keep your hands clean by washing them well and often with soap and clean, running water. Alcohol-based hand sanitizers do not kill C. difficile. When should you call for help? Call 911 if:  · You passed out (lost consciousness). Call your doctor now or seek immediate medical care if:  · You have a fever over 101°F or shaking chills. · You feel lightheaded or have a fast heart rate. · You pass stools that are almost always bloody. · You have signs of needing more fluids. You have sunken eyes and a dry mouth, and you pass only a little dark urine. · You have severe belly pain with or without bloating. · You have severe vomiting and cannot keep down liquids. · You are not passing any stools or gas. Watch closely for changes in your health, and be sure to contact your doctor if:  · You do not get better as expected. Where can you learn more? Go to http://frandy-domonique.info/. Enter ([phone] in the search box to learn more about \"Clostridium Difficile Colitis: Care Instructions. \"  Current as of: May 24, 2016  Content Version: 11.2  © 6106-2523 [employer]. Care instructions adapted under license by [employer] (which disclaims liability or warranty for this information). If you have questions about a medical condition or this instruction, always ask your healthcare professional. [place] [address] any warranty or liability for your use of this information.

## 2017-05-08 NOTE — DISCHARGE SUMMARY
Physician Discharge Summary     Patient: Jessica Feng MRN: [id number]  SSN: [id number]    YOB: 1955  Age: 58 y.o. Sex: female       Admit Date: 4/29/2017    Discharge Date: 5/8/2017    Admission Diagnoses: Acute diverticulitis    Discharge Diagnoses:   Problem List as of 5/8/2017  Date Reviewed: 4/19/2017          Codes Class Noted - Resolved    * (Principal)C. difficile colitis ICD-10-CM: A04.7  ICD-9-CM: 008.45  4/30/2017 - Present        Acute diverticulitis ICD-10-CM: K57.92  ICD-9-CM: 562.11  4/29/2017 - Present        Cellulitis and abscess of trunk ICD-10-CM: L03.319, L02.219  ICD-9-CM: 682.2  3/7/2017 - Present        Preop cardiovascular exam ICD-10-CM: Z01.810  ICD-9-CM: V72.81  10/21/2016 - Present        Arthritis ICD-10-CM: M19.90  ICD-9-CM: 716.90  Unknown - Present    Overview Signed 10/18/2016  9:16 AM by Jenniffer Raza     everywhere;  DDD             Mild persistent asthma without complication (Chronic) [id number]: J45.30  ICD-9-CM: 493.90  8/16/2016 - Present        Skin lesions ICD-10-CM: L98.9  ICD-9-CM: 709.9  5/24/2016 - Present        Palpitations ICD-10-CM: R00.2  ICD-9-CM: 785.1  12/22/2015 - Present    Overview Addendum 10/18/2016  8:56 AM by Jakub Hdz     Event recorder rare PVC. Symptoms correlate with NSR, rate 90's  Patient still feels her heart beating fast at times. I gave her some diastolic. She cannot recall if it helped. She ended up being hospitalized soon after she started with a urinary tract infection. She currently takes uncertain dose of carvedilol once a day. She was referred to the ER last week. She went to her primary care office. They thought her heart was fast and did an EKG and referred her to the ER. Previous monitoring we did not detect any arrhythmias other than sinus tachycardia.                  Tachycardia ICD-10-CM: R00.0  ICD-9-CM: 785.0  11/29/2015 - Present        Acute lower UTI ICD-10-CM: N39.0  ICD-9-CM: 599.0 11/29/2015 - Present        Anxiety ICD-10-CM: F41.9  ICD-9-CM: 300.00  11/13/2015 - Present        Morbid obesity with BMI of 50.0-59.9, adult [facility]) ICD-10-CM: E66.01, Z68.43  ICD-9-CM: 278.01, V85.43  11/5/2015 - Present        Gastroesophageal reflux disease without esophagitis (Chronic) ICD-10-CM: K21.9  ICD-9-CM: 530.81  10/7/2015 - Present        Essential hypertension ICD-10-CM: I10  ICD-9-CM: 401.9  9/28/2015 - Present        Chronic diastolic heart failure (HCC) (Chronic) ICD-10-CM: I50.32  ICD-9-CM: 428.32  9/9/2015 - Present        Hyperlipidemia (Chronic) ICD-10-CM: E78.5  ICD-9-CM: 272.4  9/9/2015 - Present        DOLORES (obstructive sleep apnea) (Chronic) ICD-10-CM: G47.33  ICD-9-CM: 327.23  7/12/2013 - Present        RESOLVED: Chest pain ICD-10-CM: R07.9  ICD-9-CM: 786.50  12/22/2015 - 6/3/2016        RESOLVED: Nausea ICD-10-CM: R11.0  ICD-9-CM: 787.02  12/16/2015 - 4/11/2016        RESOLVED: Complicated UTI (urinary tract infection) ICD-10-CM: N39.0  ICD-9-CM: 599.0  12/8/2015 - 1/17/2017        RESOLVED: Vomiting ICD-10-CM: R11.10  ICD-9-CM: 787.03  12/8/2015 - 12/16/2015        RESOLVED: Dehydration ICD-10-CM: E86.0  ICD-9-CM: 276.51  12/8/2015 - 12/16/2015        RESOLVED: Abdominal pain ICD-10-CM: R10.9  ICD-9-CM: 789.00  12/8/2015 - 4/11/2016        RESOLVED: Hypokalemia ICD-10-CM: E87.6  ICD-9-CM: 276.8  12/8/2015 - 12/16/2015        RESOLVED: Dysuria ICD-10-CM: R30.0  ICD-9-CM: 788.1  11/29/2015 - 12/16/2015        RESOLVED: Right flank pain ICD-10-CM: R10.9  ICD-9-CM: 789.09  11/29/2015 - 12/16/2015        RESOLVED: Hypoxemia ICD-10-CM: R09.02  ICD-9-CM: 799.02  11/5/2015 - 12/16/2015        RESOLVED: Type 2 diabetes mellitus ([facility] ICD-10-CM: E11.9  ICD-9-CM: 250.00  9/9/2015 - 10/7/2015    Overview Signed 9/28/2015  1:35 PM by Miguel Grant MD     Last Assessment & Plan:   Pt. Has not seen her PCP in 1.5 years.   She needs to be seen ASAP by [facility] for folllow up of her numerous medical problems. RESOLVED: Morbid obesity ([facility] ICD-10-CM: E66.01  ICD-9-CM: 278.01  9/9/2015 - 11/5/2015        RESOLVED: Benign essential hypertension ICD-10-CM: I10  ICD-9-CM: 401.1  7/12/2013 - 4/28/2016    Overview Signed 3/18/2016  2:06 PM by Libertad Lobato MD     Last Assessment & Plan:   Pt. Is uncontrolled today. Pt. Previously on 2 medications to control  Stressed the importance of better control. Will add coreg. Today. Pt. Needs to follow up with PCP ASAP. Discharge Condition: Stable    Hospital Course:  Ms. Rey Castrejon is a 57 yo AAF with PMH of asthma, CAD, tobacco use, breast cancer, admitted with UTI diagnosed prior to arrival and managed with IM rocephin x 2 as well as diarrhea. Diagnosed with CTAP showing sigmoid diverticulitis and cdiff, on day 5/14 oral vancomycin that was chosen for intolerance to flagyl. She has some nausea with oral vancomycin and is prescribed zofran prior to taking medication. Otherwise she is now with 2-3 loose stools daily and diet tolerant. She currently lacks UTI complaints. She was seen by GI who agrees with present management. Plans are for ongoing antibiotics for 14 days total and PCP followup. Primary Care Physician:  Vaibhav Capone MD     Consults: GI    Significant Diagnostic Studies:     CT abdomen and pelvis with contrast      COMPARISON: July 31.     INDICATION: Left lower quadrant abdominal pain for 2 weeks. History of  diverticulosis. .     TECHNIQUE: CT imaging was performed of the abdomen and pelvis following the  uncomplicated administration of intravenous contrast (Isovue 370, 100 mL). Oral  contrast was administered.  Radiation dose reduction techniques were used for  this study: Our CT scanners use one or all of the following: Automated exposure  control, adjustment of the mA and/or kVp according to patient's size, iterative  reconstruction.     FINDINGS:     Lung bases are unremarkable.     Abdomen findings:     Liver is enlarged measuring up to 20 cm in the clinical information. Gallbladder  is surgically absent. The pancreas, spleen, right adrenal gland, and the kidneys  are unremarkable. There is a 13 mm left adrenal nodule, stable dating back to  2010 study and most likely benign adenoma. No hydronephrosis. Abdominal aorta is  normal in course and caliber with some atherosclerotic calcifications.     Stomach is normal in contour. Small bowel loops are of normal caliber. No small  bowel obstruction. No evidence of lymphadenopathy.     Pelvic findings:     There is sigmoid diverticulosis. There is mild inflammatory stranding  surrounding the sigmoid colon, consistent with diverticulitis. No associated  abscess or evidence of perforation. No large bowel obstruction. No evidence of  appendicitis. Urinary bladder is underdistended.     There is no free air or free fluid. There are degenerative changes of the spine.     IMPRESSION  IMPRESSION:     1. Positive for sigmoid diverticulitis. No associated abscess or free air.     2. Hepatomegaly, stable.         Discharge Exam:  Visit Vitals    /59    Pulse 73    Temp 98.1 °F (36.7 °C)    Resp 18    Ht 5' 2.5\" (1.588 m)    Wt 128.4 kg (283 lb)    SpO2 97%    BMI 50.94 kg/m2     General appearance: alert, cooperative, no distress, appears stated age  Lungs: clear to auscultation bilaterally  Heart: regular rate and rhythm, S1, S2 normal, no murmur, click, rub or gallop, no edema  Abdomen: soft, non-tender. Bowel sounds decreased ,  No masses,  no organomegaly  Skin: Skin color, texture, turgor normal. No rashes or lesions    Disposition: Home    Discharge Medications:   Current Discharge Medication List      START taking these medications    Details   nicotine (NICODERM CQ) 21 mg/24 hr 1 Patch by TransDERmal route daily as needed for Other (patient request) for up to 30 days.   Qty: 30 Patch, Refills: 0      Saccharomyces boulardii (FLORASTOR) 250 mg capsule Take 1 Cap by mouth two (2) times a day for 7 days. Qty: 30 Cap, Refills: 0      vancomycin 50 mg/mL oral solution (compounded) Take 10 mL by mouth every six (6) hours for 9 days. Qty: 360 mL, Refills: 0      ondansetron (ZOFRAN ODT) 4 mg disintegrating tablet Take 1 Tab by mouth every six (6) hours as needed for Nausea. Qty: 60 Tab, Refills: 0         CONTINUE these medications which have NOT CHANGED    Details   oxyCODONE-acetaminophen (PERCOCET)  mg per tablet Take 1 Tab by mouth every six (6) hours as needed for Pain. Max Daily Amount: 4 Tabs. Qty: 60 Tab, Refills: 0      diazePAM (VALIUM) 5 mg tablet Take 5 mg by mouth every six (6) hours as needed for Anxiety. Associated Diagnoses: Malignant neoplasm of female breast, unspecified laterality, unspecified site of breast (HCC)      anastrozole (ARIMIDEX) 1 mg tablet Take 1 Tab by mouth daily. Qty: 30 Tab, Refills: 2    Associated Diagnoses: Malignant neoplasm of female breast, unspecified laterality, unspecified site of breast (HCC)      albuterol (PROVENTIL VENTOLIN) 2.5 mg /3 mL (0.083 %) nebulizer solution Take 3 mL by inhalation every four (4) hours as needed for Wheezing or Shortness of Breath. Qty: 24 Each, Refills: 5    Associated Diagnoses: Shortness of breath; Wheezing; Mild persistent asthma without complication      metoprolol succinate (TOPROL-XL) 100 mg tablet Take 1 Tab by mouth daily. Qty: 30 Tab, Refills: 11      fluticasone (FLONASE) 50 mcg/actuation nasal spray 2 Sprays by Both Nostrils route daily. Qty: 1 Bottle, Refills: 2      fluticasone-salmeterol (ADVAIR DISKUS) 250-50 mcg/dose diskus inhaler Take 1 Puff by inhalation two (2) times a day. Qty: 1 Inhaler, Refills: 11    Associated Diagnoses: Mild intermittent asthma without complication      Cetirizine (ZYRTEC) 10 mg cap Take  by mouth every morning.       acetaminophen (TYLENOL EXTRA STRENGTH) 500 mg tablet Take  by mouth every six (6) hours as needed for Pain.      montelukast (SINGULAIR) 10 mg tablet Take 1 Tab by mouth nightly. Qty: 30 Tab, Refills: 5    Associated Diagnoses: Mild persistent asthma without complication      OXYGEN-AIR DELIVERY SYSTEMS by Does Not Apply route. 3 lpm qhs      ranitidine (ZANTAC) 150 mg tablet Take 1 Tab by mouth two (2) times a day. Indications: GASTROESOPHAGEAL REFLUX  Qty: 60 Tab, Refills: 3    Associated Diagnoses: Gastroesophageal reflux disease without esophagitis      nitroglycerin (NITROSTAT) 0.4 mg SL tablet by SubLINGual route every five (5) minutes as needed for Chest Pain. venlafaxine-SR (EFFEXOR-XR) 37.5 mg capsule Take 1 capsule by mouth for 1 week and then increase to 75 mg (2 capsules) after 1 week. Qty: 60 Cap, Refills: 0    Associated Diagnoses: Malignant neoplasm of female breast, unspecified laterality, unspecified site of breast ([facility] [address] [facility]      cpap machine kit by Does Not Apply route. STOP taking these medications       lisinopril-hydroCHLOROthiazide (PRINZIDE, ZESTORETIC) 20-12.5 mg per tablet Comments:   Reason for Stopping:         polyethylene glycol (MIRALAX) 17 gram packet Comments:   Reason for Stopping:         bisacodyl (DULCOLAX) 10 mg suppository Comments:   Reason for Stopping:         promethazine (PHENERGAN) 25 mg tablet Comments:   Reason for Stopping:         ibuprofen (MOTRIN) 800 mg tablet Comments:   Reason for Stopping:         predniSONE (DELTASONE) 20 mg tablet Comments:   Reason for Stopping:         PROAIR HFA 90 mcg/actuation inhaler Comments:   Reason for Stopping:               Activity: Activity as tolerated    Diet: Regular Diet    Wound Care: None needed    Follow-up Appointments   Procedures    FOLLOW UP VISIT Appointment in: One Week 1 week PCP thanks     1 week PCP thanks     Standing Status:   Standing     Number of Occurrences:   1     Order Specific Question:   Appointment in     Answer:    One Week        Time to discharge: 30 minutes     Signed By: Dov Jimenez MD     May 8, 2017

## 2017-05-09 ENCOUNTER — HOME CARE VISIT (OUTPATIENT)
Dept: HOME HEALTH SERVICES | Facility: HOME HEALTH | Age: 62
End: 2017-05-09
Payer: MEDICAID

## 2017-05-09 PROCEDURE — G0299 HHS/HOSPICE OF RN EA 15 MIN: HCPCS

## 2017-05-09 NOTE — PROGRESS NOTES
SW called patient this morning who advised SW that she was unable to obtain her vancomycin at the [address] [place] [address] at [address] [facility]. Upon further research it was determined that physician will need to complete and fax prior authorization form for Medicaid in order for patient to be able to obtain medication. SW submitted copy of form for Medicaid that needs to be completed by physician, along with copy of Medicaid Card/Face Sheet to the [facility] staff this date who agreed to have physician covering for D/C physician is off call to complete. SW will remain available if any additional assistance is needed.

## 2017-05-10 NOTE — PROGRESS NOTES
Prior Authorization for Vancomycin Solution faxed to [employer]/ [phone] this date.

## 2017-05-30 ENCOUNTER — HOSPITAL ENCOUNTER (EMERGENCY)
Age: 62
Discharge: HOME | End: 2017-05-30
Attending: EMERGENCY MEDICINE
Payer: MEDICAID

## 2017-05-30 VITALS
TEMPERATURE: 98.4 F | OXYGEN SATURATION: 99 % | SYSTOLIC BLOOD PRESSURE: 140 MMHG | HEART RATE: 82 BPM | HEIGHT: 63 IN | WEIGHT: 274 LBS | RESPIRATION RATE: 18 BRPM | DIASTOLIC BLOOD PRESSURE: 62 MMHG | BODY MASS INDEX: 48.55 KG/M2

## 2017-05-30 DIAGNOSIS — A09 DIARRHEA OF INFECTIOUS ORIGIN: Primary | ICD-10-CM

## 2017-05-30 LAB
ALBUMIN SERPL BCP-MCNC: 3.2 G/DL (ref 3.2–4.6)
ALBUMIN/GLOB SERPL: 0.7 {RATIO} (ref 1.2–3.5)
ALP SERPL-CCNC: 93 U/L (ref 50–136)
ALT SERPL-CCNC: 35 U/L (ref 12–65)
ANION GAP BLD CALC-SCNC: 10 MMOL/L (ref 7–16)
AST SERPL W P-5'-P-CCNC: 29 U/L (ref 15–37)
BASOPHILS # BLD AUTO: 0 K/UL (ref 0–0.2)
BASOPHILS # BLD: 1 % (ref 0–2)
BILIRUB SERPL-MCNC: 0.6 MG/DL (ref 0.2–1.1)
BUN SERPL-MCNC: 12 MG/DL (ref 8–23)
CALCIUM SERPL-MCNC: 9.4 MG/DL (ref 8.3–10.4)
CHLORIDE SERPL-SCNC: 109 MMOL/L (ref 98–107)
CO2 SERPL-SCNC: 25 MMOL/L (ref 21–32)
CREAT SERPL-MCNC: 0.79 MG/DL (ref 0.6–1)
DIFFERENTIAL METHOD BLD: ABNORMAL
EOSINOPHIL # BLD: 0.2 K/UL (ref 0–0.8)
EOSINOPHIL NFR BLD: 4 % (ref 0.5–7.8)
ERYTHROCYTE [DISTWIDTH] IN BLOOD BY AUTOMATED COUNT: 16.7 % (ref 11.9–14.6)
GLOBULIN SER CALC-MCNC: 4.8 G/DL (ref 2.3–3.5)
GLUCOSE SERPL-MCNC: 86 MG/DL (ref 65–100)
HCT VFR BLD AUTO: 38.2 % (ref 35.8–46.3)
HGB BLD-MCNC: 12.2 G/DL (ref 11.7–15.4)
IMM GRANULOCYTES # BLD: 0 K/UL (ref 0–0.5)
IMM GRANULOCYTES NFR BLD AUTO: 0.4 % (ref 0–5)
LYMPHOCYTES # BLD AUTO: 36 % (ref 13–44)
LYMPHOCYTES # BLD: 1.7 K/UL (ref 0.5–4.6)
MCH RBC QN AUTO: 26 PG (ref 26.1–32.9)
MCHC RBC AUTO-ENTMCNC: 32 G/DL (ref 31.4–35)
MCV RBC AUTO: 81.3 FL (ref 79.6–97.8)
MONOCYTES # BLD: 0.4 K/UL (ref 0.1–1.3)
MONOCYTES NFR BLD AUTO: 8 % (ref 4–12)
NEUTS SEG # BLD: 2.4 K/UL (ref 1.7–8.2)
NEUTS SEG NFR BLD AUTO: 51 % (ref 43–78)
PLATELET # BLD AUTO: 380 K/UL (ref 150–450)
PMV BLD AUTO: 9.1 FL (ref 10.8–14.1)
POTASSIUM SERPL-SCNC: 4.8 MMOL/L (ref 3.5–5.1)
PROT SERPL-MCNC: 8 G/DL (ref 6.3–8.2)
RBC # BLD AUTO: 4.7 M/UL (ref 4.05–5.25)
SODIUM SERPL-SCNC: 144 MMOL/L (ref 136–145)
WBC # BLD AUTO: 5.2 K/UL (ref 4.3–11.1)

## 2017-05-30 PROCEDURE — 80053 COMPREHEN METABOLIC PANEL: CPT | Performed by: PHYSICIAN ASSISTANT

## 2017-05-30 PROCEDURE — 85025 COMPLETE CBC W/AUTO DIFF WBC: CPT | Performed by: PHYSICIAN ASSISTANT

## 2017-05-30 PROCEDURE — 99283 EMERGENCY DEPT VISIT LOW MDM: CPT | Performed by: EMERGENCY MEDICINE

## 2017-05-30 NOTE — ED PROVIDER NOTES
HPI Comments: 70-year-old -American female was recently admitted to Hospital secondary to C. Difficile infection. She was discharged home on vancomycin which she has finished.  3 days ago the diarrhea returned. At approximately 20-25 bowel movements within the first 24 hours. She did resume taking probiotics and overall she says the diarrhea does seem to be improving. No vomiting or fever. No abdominal pain. She believes the C. Difficile infections related to being on multiple antibiotics following breast surgery in January of this year. Patient is a 58 y.o. female presenting with diarrhea. The history is provided by the patient. Diarrhea    Associated symptoms include diarrhea. Pertinent negatives include no fever, no nausea, no vomiting, no dysuria, no headaches, no chest pain and no back pain. Past Medical History:   Diagnosis Date    Arthritis     everywhere;  DDD    Asthma     inhalers daily  [facility]    CAD (coronary artery disease)     Dr [facility])     left breast ca dx'ed this month-appt with surgeon 10/12    Chronic pain     generalized from arthritis    Complicated UTI (urinary tract infection) 12/8/2015    Diverticulosis     Heart failure ([address]     [facility]    No significant valvular disease.   EF 50-55%    History of prediabetes     monitored by Primary Physician    Hypertension     Shortness of breath     Sleep apnea     bi pap and o2    Thyroid cyst     cyst removed from thyroid       Past Surgical History:   Procedure Laterality Date    BREAST SURGERY PROCEDURE UNLISTED      Mastectomy left side    CARDIAC SURG PROCEDURE UNLIST  Cath 11/18/14 [facility]    Minimal CAD in the ostial circumflex and mid ramus (medical management)    HX CARPAL TUNNEL RELEASE Bilateral     HX CHOLECYSTECTOMY      HX CYST REMOVAL      from thyroid    HX LAP CHOLECYSTECTOMY      HX OTHER SURGICAL      abcess where bra strap    NEUROLOGICAL PROCEDURE UNLISTED      \"nerve running to back\"    SINUS SURGERY PROC UNLISTED           Family History:   Problem Relation Age of Onset    Heart Disease Mother     Cancer Mother      lung    Hypertension Mother     Hypertension Father     Sleep Apnea Father     Cancer Father      prostate    Sleep Apnea Brother      states also has two children with sleep apnea    Cancer Brother      pituitary    Hypertension Brother     Breast Cancer Neg Hx        Social History     Social History    Marital status:      Spouse name: N/A    Number of children: N/A    Years of education: N/A     Occupational History    Not on file. 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 ALLERGIES: Bactrim [sulfamethoprim ds]    Review of Systems   Constitutional: Negative for fever. HENT: Negative for congestion. Respiratory: Negative for cough and shortness of breath. Cardiovascular: Negative for chest pain. Gastrointestinal: Positive for diarrhea. Negative for abdominal pain, nausea and vomiting. Genitourinary: Negative for dysuria. Musculoskeletal: Negative for back pain and neck pain. Skin: Negative for rash. Neurological: Negative for headaches. Vitals:    05/30/17 1308   BP: 142/64   Pulse: 88   Resp: 16   Temp: 98.7 °F (37.1 °C)   SpO2: 98%   Weight: 124.3 kg (274 lb)   Height: 5' 2.5\" (1.588 m)            Physical Exam   Constitutional: She is oriented to person, place, and time. She appears well-developed and well-nourished. No distress. HENT:   Head: Normocephalic and atraumatic. Mouth/Throat: Oropharynx is clear and moist.   Eyes: Conjunctivae are normal. Pupils are equal, round, and reactive to light.    Neck: Normal range of motion. Neck supple. Cardiovascular: Normal rate and regular rhythm. Pulmonary/Chest: Effort normal and breath sounds normal.   Abdominal: Soft. She exhibits no distension. There is no tenderness. Musculoskeletal: Normal range of motion. She exhibits no edema. Neurological: She is alert and oriented to person, place, and time. Skin: Skin is warm and dry. Psychiatric: She has a normal mood and affect. Nursing note and vitals reviewed. MDM  Number of Diagnoses or Management Options  Diarrhea of infectious origin:   Diagnosis management comments: Blood work is unremarkable. Vital signs are unremarkable. She has a nonsurgical abdominal exam.  As she was recently diagnosed with C. Difficile I'm concerned that this may be a recurrence. We will resume oral vancomycin for 1 more week. Patient is to follow up with primary care later this week for recheck.        Amount and/or Complexity of Data Reviewed  Clinical lab tests: ordered and reviewed    Risk of Complications, Morbidity, and/or Mortality  Presenting problems: moderate  Diagnostic procedures: moderate  Management options: moderate      ED Course       Procedures

## 2017-05-30 NOTE — ED TRIAGE NOTES
Pt. Discharged from the hospital on May 8 for cdiff. States she took all of her meds as directed. Pt. States Saturday she began to have diarrhea again and believes her cdiff has come back.

## 2017-05-30 NOTE — DISCHARGE INSTRUCTIONS
Clostridium Difficile Colitis: Care Instructions  Your Care Instructions  Clostridium difficile (also called C. difficile) are bacteria that can cause swelling and irritation of the large intestine, or colon. This inflammation is also called colitis. It can cause diarrhea, fever, and belly cramps. You may get C. difficile colitis if you take antibiotics. The infection is most common in people who are taking antibiotics while in the hospital. It is also common in older people in hospitals and nursing homes. Severe disease could cause the colon to swell to many times its normal size (toxic megacolon). This can cause death and needs emergency treatment. You may have a swollen belly that is painful or tender, a rapid heartbeat, and a fever. Follow-up care is a key part of your treatment and safety. Be sure to make and go to all appointments, and call your doctor if you are having problems. It's also a good idea to know your test results and keep a list of the medicines you take. How can you care for yourself at home? · Your doctor may give you antibiotics to treat C. difficile colitis. If your doctor prescribes an antibiotic, he or she will give you a different antibiotic than the one that caused your infection. Take your antibiotics as directed. Do not stop taking them just because you feel better. You need to take the full course of antibiotics. · To prevent dehydration, drink plenty of fluids, enough so that your urine is light yellow or clear like water. Choose water and other caffeine-free clear liquids until you feel better. If you have kidney, heart, or liver disease and have to limit fluids, talk with your doctor before you increase the amount of fluids you drink. · Begin eating small amounts of mild foods, if you feel like it. Try yogurt that has live cultures of lactobacillus (check the label). ¨ Avoid spicy foods, fruits, alcohol, and caffeine until 48 hours after all symptoms go away.   ¨ Avoid chewing gum that contains sorbitol. ¨ Avoid dairy products (except for yogurt with lactobacillus) while you have diarrhea and for 3 days after symptoms go away. · To prevent the spread of C. difficile, practice good hygiene. Keep your hands clean by washing them well and often with soap and clean, running water. Alcohol-based hand sanitizers do not kill C. difficile. When should you call for help? Call 911 if:  · You passed out (lost consciousness). Call your doctor now or seek immediate medical care if:  · You have a fever over 101°F or shaking chills. · You feel lightheaded or have a fast heart rate. · You pass stools that are almost always bloody. · You have signs of needing more fluids. You have sunken eyes and a dry mouth, and you pass only a little dark urine. · You have severe belly pain with or without bloating. · You have severe vomiting and cannot keep down liquids. · You are not passing any stools or gas. Watch closely for changes in your health, and be sure to contact your doctor if:  · You do not get better as expected. Where can you learn more? Go to http://frandy-domonique.info/. Enter ([phone] in the search box to learn more about \"Clostridium Difficile Colitis: Care Instructions. \"  Current as of: May 24, 2016  Content Version: 11.2  © 9151-7418 [employer]. Care instructions adapted under license by [employer] (which disclaims liability or warranty for this information). If you have questions about a medical condition or this instruction, always ask your healthcare professional. [place] [address] any warranty or liability for your use of this information.

## 2017-06-07 ENCOUNTER — HOSPITAL ENCOUNTER (OUTPATIENT)
Dept: LAB | Age: 62
Discharge: HOME | End: 2017-06-07
Payer: MEDICAID

## 2017-06-07 DIAGNOSIS — C50.919 MALIGNANT NEOPLASM OF FEMALE BREAST, UNSPECIFIED LATERALITY, UNSPECIFIED SITE OF BREAST: ICD-10-CM

## 2017-06-07 LAB
ALBUMIN SERPL BCP-MCNC: 3.7 G/DL (ref 3.2–4.6)
ALBUMIN/GLOB SERPL: 0.9 {RATIO} (ref 1.2–3.5)
ALP SERPL-CCNC: 96 U/L (ref 50–136)
ALT SERPL-CCNC: 34 U/L (ref 12–65)
ANION GAP BLD CALC-SCNC: 8 MMOL/L (ref 7–16)
AST SERPL W P-5'-P-CCNC: 16 U/L (ref 15–37)
BASOPHILS # BLD AUTO: 0.1 K/UL (ref 0–0.2)
BASOPHILS # BLD: 1 % (ref 0–2)
BILIRUB SERPL-MCNC: 0.4 MG/DL (ref 0.2–1.1)
BUN SERPL-MCNC: 17 MG/DL (ref 8–23)
CALCIUM SERPL-MCNC: 9.3 MG/DL (ref 8.3–10.4)
CHLORIDE SERPL-SCNC: 109 MMOL/L (ref 98–107)
CO2 SERPL-SCNC: 25 MMOL/L (ref 21–32)
CREAT SERPL-MCNC: 0.72 MG/DL (ref 0.6–1)
DIFFERENTIAL METHOD BLD: ABNORMAL
EOSINOPHIL # BLD: 0.2 K/UL (ref 0–0.8)
EOSINOPHIL NFR BLD: 3 % (ref 0.5–7.8)
ERYTHROCYTE [DISTWIDTH] IN BLOOD BY AUTOMATED COUNT: 16.7 % (ref 11.9–14.6)
GLOBULIN SER CALC-MCNC: 4.2 G/DL (ref 2.3–3.5)
GLUCOSE SERPL-MCNC: 95 MG/DL (ref 65–100)
HCT VFR BLD AUTO: 37.9 % (ref 35.8–46.3)
HGB BLD-MCNC: 12.2 G/DL (ref 11.7–15.4)
LYMPHOCYTES # BLD AUTO: 27 % (ref 13–44)
LYMPHOCYTES # BLD: 2.2 K/UL (ref 0.5–4.6)
MCH RBC QN AUTO: 26.5 PG (ref 26.1–32.9)
MCHC RBC AUTO-ENTMCNC: 32.2 G/DL (ref 31.4–35)
MCV RBC AUTO: 82.4 FL (ref 79.6–97.8)
MONOCYTES # BLD: 0.8 K/UL (ref 0.1–1.3)
MONOCYTES NFR BLD AUTO: 9 % (ref 4–12)
NEUTS SEG # BLD: 5.1 K/UL (ref 1.7–8.2)
NEUTS SEG NFR BLD AUTO: 61 % (ref 43–78)
NRBC # BLD: 0 K/UL (ref 0–0.2)
PLATELET # BLD AUTO: 405 K/UL (ref 150–450)
PMV BLD AUTO: 8.6 FL (ref 10.8–14.1)
POTASSIUM SERPL-SCNC: 4.1 MMOL/L (ref 3.5–5.1)
PROT SERPL-MCNC: 7.9 G/DL (ref 6.3–8.2)
RBC # BLD AUTO: 4.6 M/UL (ref 4.05–5.25)
SODIUM SERPL-SCNC: 142 MMOL/L (ref 136–145)
WBC # BLD AUTO: 8.4 K/UL (ref 4.3–11.1)

## 2017-06-07 PROCEDURE — 80053 COMPREHEN METABOLIC PANEL: CPT | Performed by: INTERNAL MEDICINE

## 2017-06-07 PROCEDURE — 85025 COMPLETE CBC W/AUTO DIFF WBC: CPT | Performed by: INTERNAL MEDICINE

## 2017-06-07 PROCEDURE — 36415 COLL VENOUS BLD VENIPUNCTURE: CPT | Performed by: INTERNAL MEDICINE

## 2017-06-13 ENCOUNTER — HOSPITAL ENCOUNTER (OUTPATIENT)
Dept: PHYSICAL THERAPY | Age: 62
Discharge: HOME | End: 2017-06-13
Payer: MEDICAID

## 2017-06-13 PROCEDURE — G8990 OTHER PT/OT CURRENT STATUS: HCPCS

## 2017-06-13 PROCEDURE — 97166 OT EVAL MOD COMPLEX 45 MIN: CPT

## 2017-06-13 PROCEDURE — G8991 OTHER PT/OT GOAL STATUS: HCPCS

## 2017-06-13 PROCEDURE — 97140 MANUAL THERAPY 1/> REGIONS: CPT

## 2017-06-13 NOTE — PROGRESS NOTES
Ambulatory/Rehab Services H2 Model Falls Risk Assessment    Risk Factor Pts. ·   Confusion/Disorientation/Impulsivity  []    4 ·   Symptomatic Depression  []   2 ·   Altered Elimination  []   1 ·   Dizziness/Vertigo  []   1 ·   Gender (Male)  []   1 ·   Any administered antiepileptics (anticonvulsants):  []   2 ·   Any administered benzodiazepines:  []   1 ·   Visual Impairment (specify):  []   1 ·   Portable Oxygen Use  []   1 ·   Orthostatic ? BP  []   1 ·   History of Recent Falls (within 3 mos.)  []   5     Ability to Rise from Chair (choose one) Pts. ·   Ability to rise in a single movement  []   0 ·   Pushes up, successful in one attempt  [x]   1 ·   Multiple attempts, but successful  []   3 ·   Unable to rise without assistance  []   4   Total: (5 or greater = High Risk) 1     Falls Prevention Plan:   []                Physical Limitations to Exercise (specify):   []                Mobility Assistance Device (type):   []                Exercise/Equipment Adaptation (specify):    ©2010 [facility] [address] Patent #[id number].  Federal Law prohibits the replication, distribution or use without written permission from [facility] [employer]

## 2017-06-13 NOTE — PROGRESS NOTES
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 5 = High Risk)  MD Orders: Evaluate and treat left arm lymphedema, s/p mastectomy MEDICAL/REFERRING DIAGNOSIS:   Lymphedema, not elsewhere classified [I89.0]   DATE OF ONSET: 2017   REFERRING PHYSICIAN: Chayo Mixon MD  RETURN PHYSICIAN APPOINTMENT: 2 weeks     INITIAL ASSESSMENT:  Ms. Walter Reis presents with swelling in her left arm and breast, s/p left mastectomy in 2017. Pt has expander in place. She reports having had C-diff 2x, requiring 9 day hospitalization with first incident. Pt reports shooting pain from armpit to sternum across breast/chest at times 9/10, limb heaviness, pitting edema elbow to shoulder, surgical scar adhesions, decreased active range of motion left shoulder with upper trapezius pain at end ranges. PLAN OF CARE:   PROBLEM LIST:  1. Decreased Strength  2. Increased Pain  3. Decreased Flexibility/Joint Mobility  4. Edema/Girth INTERVENTIONS PLANNED:  1. Hygiene training; skin integrity management  2. Manual therapy training; manual lymphatic drainage  3. Therapeutic exercise  4. Multilayer bandaging  5. Compression management  6. Kineseotaping; compression pump; prn   TREATMENT PLAN:  Effective Dates: 17 TO 17. Frequency/Duration:  three times a week for 12 weeks; upon reassessment will adjust frequency and duration as necessary. GOALS: (Goals have been discussed and agreed upon with patient.)  Short-Term Functional Goals: Time Frame: 6 weeks  1. Patient will verbalize understanding of .longlymphedema precautions  2.   Patient will be independent in skin care to reduce risk of cellulitis  3. Patient will be independent with home exercise program  4. Patient will tolerate multilayer compression bandaging to aid in edema reduction  Discharge Goals: Time Frame: 12 weeks  1. Pt will have reduction and stabilization of circumferencial volumetric graph measurements left upper extremity  3. Patient is independent with donning and doffing compression garments for long term management  3. Patient is independent with home management of lymphedema  Rehabilitation Potential For Stated Goals: Good  Regarding Tery Lesch Cannon's therapy, I certify that the treatment plan above will be carried out by a therapist or under their direction. Thank you for this referral,  Zehra Ortiz OT     Referring Physician Signature: José Miguel Welch MD _________________________  Date _________            The information in this section was collected on 6/13/2017   (except where otherwise noted). OCCUPATIONAL PROFILE & HISTORY:   History of Present Injury/Illness (Reason for Referral):   Ms. Neville Brannon presents with swelling in her left arm and breast, s/p left mastectomy in January 2017. Pt has expander in place. She reports having had C-diff 2x, requiring 9 day hospitalization with first incident. Pt reports shooting pain from armpit to sternum across breast/chest at times 9/10, limb heaviness, pitting edema elbow to shoulder, surgical scar adhesions, decreased active range of motion left shoulder with upper trapezius pain at end ranges. Pt was referred to occupational therapy for education, management and reduction of swelling left upper extremity. Past Medical History/Comorbidities:   Ms. Neville Brannon  has a past medical history of Arthritis; Asthma; CAD (coronary artery disease); Cancer ([address] Chronic pain; Complicated UTI (urinary tract infection) (12/8/2015); Diverticulosis; Heart failure ([address] History of echocardiogram (11/17/14 at [facility]); History of prediabetes; Hypertension;  Shortness of breath; Sleep apnea; and Thyroid cyst.  Ms. Jennifer Yusuf  has a past surgical history that includes carpal tunnel release (Bilateral); cyst removal; cardiac surg procedure unlist (Cath 11/18/14 [facility]); lap cholecystectomy; sinus surgery proc unlisted; other surgical; neurological procedure unlisted; cholecystectomy; and breast surgery procedure unlisted. Social History/Living Environment:    Pt lives alone  Prior Level of Function/Work/Activity:  Pt is retired. Current Medications:    Current Outpatient Prescriptions:     letrozole (FEMARA) 2.5 mg tablet, Take 1 Tab by mouth daily. , Disp: 30 Tab, Rfl: 0    lisinopril-hydroCHLOROthiazide (PRINZIDE, ZESTORETIC) 20-12.5 mg per tablet, TK 1 T PO D, Disp: , Rfl: 3    ondansetron (ZOFRAN ODT) 4 mg disintegrating tablet, Take 1 Tab by mouth every six (6) hours as needed for Nausea., Disp: 60 Tab, Rfl: 0    oxyCODONE-acetaminophen (PERCOCET)  mg per tablet, Take 1 Tab by mouth every six (6) hours as needed for Pain. Max Daily Amount: 4 Tabs., Disp: 60 Tab, Rfl: 0    diazePAM (VALIUM) 5 mg tablet, Take 5 mg by mouth every six (6) hours as needed for Anxiety. , Disp: , Rfl:     venlafaxine-SR (EFFEXOR-XR) 37.5 mg capsule, Take 1 capsule by mouth for 1 week and then increase to 75 mg (2 capsules) after 1 week., Disp: 60 Cap, Rfl: 0    albuterol (PROVENTIL VENTOLIN) 2.5 mg /3 mL (0.083 %) nebulizer solution, Take 3 mL by inhalation every four (4) hours as needed for Wheezing or Shortness of Breath. (Patient taking differently: Take  by inhalation every four (4) hours as needed for Wheezing or Shortness of Breath.), Disp: 24 Each, Rfl: 5    metoprolol succinate (TOPROL-XL) 100 mg tablet, Take 1 Tab by mouth daily. (Patient taking differently: Take 100 mg by mouth two (2) times a day.), Disp: 30 Tab, Rfl: 11    fluticasone (FLONASE) 50 mcg/actuation nasal spray, 2 Sprays by Both Nostrils route daily. , Disp: 1 Bottle, Rfl: 2    fluticasone-salmeterol (ADVAIR DISKUS) 250-50 mcg/dose diskus inhaler, Take 1 Puff by inhalation two (2) times a day., Disp: 1 Inhaler, Rfl: 11    Cetirizine (ZYRTEC) 10 mg cap, Take  by mouth every morning., Disp: , Rfl:     acetaminophen (TYLENOL EXTRA STRENGTH) 500 mg tablet, Take  by mouth every six (6) hours as needed for Pain., Disp: , Rfl:     montelukast (SINGULAIR) 10 mg tablet, Take 1 Tab by mouth nightly., Disp: 30 Tab, Rfl: 5    cpap machine kit, by Does Not Apply route., Disp: , Rfl:     OXYGEN-AIR DELIVERY SYSTEMS, by Does Not Apply route. 3 lpm qhs, Disp: , Rfl:     ranitidine (ZANTAC) 150 mg tablet, Take 1 Tab by mouth two (2) times a day. Indications: GASTROESOPHAGEAL REFLUX, Disp: 60 Tab, Rfl: 3    nitroglycerin (NITROSTAT) 0.4 mg SL tablet, by SubLINGual route every five (5) minutes as needed for Chest Pain., Disp: , Rfl:             Date Last Reviewed:  6/13/2017     Complexity Level : Expanded review of therapy/medical records (1-2):  MODERATE COMPLEXITY   ASSESSMENT OF OCCUPATIONAL PERFORMANCE:   Palpation:          Pitting edema with skin folds elbow to shoulder. Elbow to hand minimal (1-/5)   Expander in place left breast.  Pt with scar adhesions lateral breast.  Pocket of edema below axilla on chest wall. Skin Integrity:          Skin integrity intact; no signs of cellulitis or fibrosis. Edema/Girth:  2+ and pitting    Left Right    Initial Most Recent Initial Most Recent   Upper  Extremity  217.0 cm  (190.0 cm graph)         Lower  Extremity               Physical Skills Involved:  1. Range of Motion  2. Pain (acute)  3. Edema  4. Skin Integrity Cognitive Skills Affected (resulting in the inability to perform in a timely and safe manner):  1. Perception Psychosocial Skills Affected:  1. Habits/Routines   Number of elements that affect the Plan of Care: 3-5:  MODERATE COMPLEXITY   CLINICAL DECISION MAKING:   Outcome Measure:    Tool Used: Lymphedema Life Impact Scale   Score:  Initial: 41 Most Recent: X (Date: -- ) Interpretation of Score: The Lymphedema Life Impact Scale (LLIS) is a validated instrument that measures the physical, functional, and psychosocial concerns pertinent to patients with extremity lymphedema. The Scale's questionnaire is administered to patients to gauge impairments, activity limitations, and participation restrictions resulting from their lymphedema. Score 0 1-13 14-26 27-40 41-54 55-67 68   Modifier CH CI CJ CK CL CM CN     ? Other PT/OT Primary Functional Limitations:     - CURRENT STATUS: CL - 60%-79% impaired, limited or restricted    - GOAL STATUS: CK - 40%-59% impaired, limited or restricted    - D/C STATUS:  ---------------To be determined---------------  Medical Necessity:   · Patient is expected to demonstrate progress in lymphedema management to decrease pain, swelling, reduce scar adhesions, self-manage lymphedema. Reason for Services/Other Comments:  · Patient continues to require skilled intervention due to left arm/chest lymphedema. Clinical Decision-Making Assessment:  Patient referred to OT for post mastectomy lymphedema management. Pt has histrory of chronic back pain which reduces mobility. Pt will need home exercise program to regain full AROM and improve strength and endurance in addition to lymphedema management/complete decongestive therapy. Assessment process, impact of co-morbidities, assessment modification\need for assistance, and selection of interventions: Analytical Complexity:MODERATE COMPLEXITY   TREATMENT:   (In addition to Assessment/Re-Assessment sessions the following treatments were rendered)    Pre-treatment Symptoms/Complaints:   Ms. Tommy Manuel presents with swelling in her left arm and breast, s/p left mastectomy in January 2017. Pt has expander in place. Pain: Initial:   Pain Intensity 1: 5  Post Session:  4   Occupational Therapy Evaluation (x) OT eval completed.   Pt received information on lymphedema and risk reduction/self management practices as outlined by the National Lymphedema Network. Therapeutic Exercise: ( ):      Manual Therapy: (30 min): Therapist performed scar massage and instructed patient in self scar massage to mobilize tissue and decrease adhesions. Pt with increased adhesions below axilla on lateral breast/chest wall. Decreased adhesions over expander. Pt with upper trapezius pain with shoulder flexion and adduction; 75% active supine ROM and tolerates full AROM with assistance. Fascial stretching left neck and upper trapezius to decrease pain. Pt is holding arm in protective positioning and is not actively using arm due to pain and swelling. Multilayer bandaging using 5\" surgigrip, 3 short stretch bandages from hand to shoulder. Pt instructed to leave bandages on as long as able. When removing bandages, replace with surgigrip until next treatment session. Instructed in AROM shoulder flexion, extension, circumduction, horizontal abd/adduction 5-10 reps each. Treatment/Session Assessment:    · Response to Treatment:  Tolerated treatment without complication. · Compliance with Program/Exercises: Will assess as treatment progresses. · Recommendations/Intent for next treatment session: \"Next visit will focus on complete decongestive therapy\".   Total Treatment Duration:  OT Patient Time In/Time Out  Time In: 0300  Time Out: Popeye Jack OT

## 2017-06-22 VITALS — RESPIRATION RATE: 18 BRPM | SYSTOLIC BLOOD PRESSURE: 116 MMHG | HEART RATE: 70 BPM | DIASTOLIC BLOOD PRESSURE: 70 MMHG

## 2017-06-26 ENCOUNTER — HOSPITAL ENCOUNTER (OUTPATIENT)
Dept: PHYSICAL THERAPY | Age: 62
Discharge: HOME | End: 2017-06-26
Payer: MEDICAID

## 2017-06-26 PROCEDURE — 97140 MANUAL THERAPY 1/> REGIONS: CPT

## 2017-06-26 PROCEDURE — 97110 THERAPEUTIC EXERCISES: CPT

## 2017-06-26 NOTE — PROGRESS NOTES
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 5 = High Risk)  MD Orders: Evaluate and treat left arm lymphedema, s/p mastectomy MEDICAL/REFERRING DIAGNOSIS:   Lymphedema, not elsewhere classified [I89.0]   DATE OF ONSET: 2017   REFERRING PHYSICIAN: Moister, Gilford Fothergill, MD  RETURN PHYSICIAN APPOINTMENT: 2 weeks     INITIAL ASSESSMENT:  Ms. Valentine Farrell presents with swelling in her left arm and breast, s/p left mastectomy in 2017. Pt has expander in place. She reports having had C-diff 2x, requiring 9 day hospitalization with first incident. Pt reports shooting pain from armpit to sternum across breast/chest at times 9/10, limb heaviness, pitting edema elbow to shoulder, surgical scar adhesions, decreased active range of motion left shoulder with upper trapezius pain at end ranges. PLAN OF CARE:   PROBLEM LIST:  1. Decreased Strength  2. Increased Pain  3. Decreased Flexibility/Joint Mobility  4. Edema/Girth INTERVENTIONS PLANNED:  1. Hygiene training; skin integrity management  2. Manual therapy training; manual lymphatic drainage  3. Therapeutic exercise  4. Multilayer bandaging  5. Compression management  6. Kineseotaping; compression pump; prn   TREATMENT PLAN:  Effective Dates: 17 TO 17. Frequency/Duration:  three times a week for 12 weeks; upon reassessment will adjust frequency and duration as necessary. GOALS: (Goals have been discussed and agreed upon with patient.)  Short-Term Functional Goals: Time Frame: 6 weeks  1. Patient will verbalize understanding of .longlymphedema precautions  2.   Patient will be independent in skin care to reduce risk of cellulitis  3. Patient will be independent with home exercise program  4. Patient will tolerate multilayer compression bandaging to aid in edema reduction  Discharge Goals: Time Frame: 12 weeks  1. Pt will have reduction and stabilization of circumferencial volumetric graph measurements left upper extremity  3. Patient is independent with donning and doffing compression garments for long term management  3. Patient is independent with home management of lymphedema  Rehabilitation Potential For Stated Goals: Good  Regarding Charley Lovett's therapy, I certify that the treatment plan above will be carried out by a therapist or under their direction. Thank you for this referral,  Billy Boothe, OT     Referring Physician Signature: Yumi Faustin MD _________________________  Date _________            The information in this section was collected on 6/26/2017   (except where otherwise noted). OCCUPATIONAL PROFILE & HISTORY:   History of Present Injury/Illness (Reason for Referral):   Ms. Orlin Quispe presents with swelling in her left arm and breast, s/p left mastectomy in January 2017. Pt has expander in place. She reports having had C-diff 2x, requiring 9 day hospitalization with first incident. Pt reports shooting pain from armpit to sternum across breast/chest at times 9/10, limb heaviness, pitting edema elbow to shoulder, surgical scar adhesions, decreased active range of motion left shoulder with upper trapezius pain at end ranges. Pt was referred to occupational therapy for education, management and reduction of swelling left upper extremity. Past Medical History/Comorbidities:   Ms. Orlin Quispe  has a past medical history of Arthritis; Asthma; CAD (coronary artery disease); Cancer ([address] Chronic pain; Complicated UTI (urinary tract infection) (12/8/2015); Diverticulosis; Heart failure ([address] History of echocardiogram (11/17/14 at [address]); History of prediabetes; Hypertension;  Shortness of breath; Sleep apnea; and Thyroid cyst.  Ms. Valentine Farrell  has a past surgical history that includes carpal tunnel release (Bilateral); cyst removal; cardiac surg procedure unlist (Cath 11/18/14 [facility]); lap cholecystectomy; sinus surgery proc unlisted; other surgical; neurological procedure unlisted; cholecystectomy; and breast surgery procedure unlisted. Social History/Living Environment:    Pt lives alone  Prior Level of Function/Work/Activity:  Pt is retired. Current Medications:    Current Outpatient Prescriptions:     metoprolol succinate (TOPROL-XL) 100 mg tablet, Take 1 Tab by mouth daily. , Disp: 30 Tab, Rfl: 11    letrozole (FEMARA) 2.5 mg tablet, Take 1 Tab by mouth daily. , Disp: 30 Tab, Rfl: 0    lisinopril-hydroCHLOROthiazide (PRINZIDE, ZESTORETIC) 20-12.5 mg per tablet, TK 1 T PO D, Disp: , Rfl: 3    ondansetron (ZOFRAN ODT) 4 mg disintegrating tablet, Take 1 Tab by mouth every six (6) hours as needed for Nausea., Disp: 60 Tab, Rfl: 0    oxyCODONE-acetaminophen (PERCOCET)  mg per tablet, Take 1 Tab by mouth every six (6) hours as needed for Pain. Max Daily Amount: 4 Tabs., Disp: 60 Tab, Rfl: 0    diazePAM (VALIUM) 5 mg tablet, Take 5 mg by mouth every six (6) hours as needed for Anxiety. , Disp: , Rfl:     venlafaxine-SR (EFFEXOR-XR) 37.5 mg capsule, Take 1 capsule by mouth for 1 week and then increase to 75 mg (2 capsules) after 1 week., Disp: 60 Cap, Rfl: 0    albuterol (PROVENTIL VENTOLIN) 2.5 mg /3 mL (0.083 %) nebulizer solution, Take 3 mL by inhalation every four (4) hours as needed for Wheezing or Shortness of Breath. (Patient taking differently: Take  by inhalation every four (4) hours as needed for Wheezing or Shortness of Breath.), Disp: 24 Each, Rfl: 5    fluticasone (FLONASE) 50 mcg/actuation nasal spray, 2 Sprays by Both Nostrils route daily. , Disp: 1 Bottle, Rfl: 2    fluticasone-salmeterol (ADVAIR DISKUS) 250-50 mcg/dose diskus inhaler, Take 1 Puff by inhalation two (2) times a day., Disp: 1 Inhaler, Rfl: 11    Cetirizine (ZYRTEC) 10 mg cap, Take  by mouth every morning., Disp: , Rfl:     acetaminophen (TYLENOL EXTRA STRENGTH) 500 mg tablet, Take  by mouth every six (6) hours as needed for Pain., Disp: , Rfl:     montelukast (SINGULAIR) 10 mg tablet, Take 1 Tab by mouth nightly., Disp: 30 Tab, Rfl: 5    cpap machine kit, by Does Not Apply route., Disp: , Rfl:     OXYGEN-AIR DELIVERY SYSTEMS, by Does Not Apply route. 3 lpm qhs, Disp: , Rfl:     ranitidine (ZANTAC) 150 mg tablet, Take 1 Tab by mouth two (2) times a day. Indications: GASTROESOPHAGEAL REFLUX, Disp: 60 Tab, Rfl: 3    nitroglycerin (NITROSTAT) 0.4 mg SL tablet, by SubLINGual route every five (5) minutes as needed for Chest Pain., Disp: , Rfl:             Date Last Reviewed:  6/26/2017     Complexity Level : Expanded review of therapy/medical records (1-2):  MODERATE COMPLEXITY   ASSESSMENT OF OCCUPATIONAL PERFORMANCE:   Palpation:          Pitting edema with skin folds elbow to shoulder. Elbow to hand minimal (1-/5)   Expander in place left breast.  Pt with scar adhesions lateral breast.  Pocket of edema below axilla on chest wall. Skin Integrity:          Skin integrity intact; no signs of cellulitis or fibrosis. Edema/Girth:  2+ and pitting    Left Right    Initial Most Recent Initial Most Recent   Upper  Extremity  217.0 cm  (190.0 cm graph)         Lower  Extremity               Physical Skills Involved:  1. Range of Motion  2. Pain (acute)  3. Edema  4. Skin Integrity Cognitive Skills Affected (resulting in the inability to perform in a timely and safe manner):  1. Perception Psychosocial Skills Affected:  1. Habits/Routines   Number of elements that affect the Plan of Care: 3-5:  MODERATE COMPLEXITY   CLINICAL DECISION MAKING:   Outcome Measure: Tool Used: Lymphedema Life Impact Scale   Score:  Initial: 41 Most Recent: X (Date: -- )   Interpretation of Score:  The Lymphedema Life Impact Scale (LLIS) is a validated instrument that measures the physical, functional, and psychosocial concerns pertinent to patients with extremity lymphedema. The Scale's questionnaire is administered to patients to gauge impairments, activity limitations, and participation restrictions resulting from their lymphedema. Score 0 1-13 14-26 27-40 41-54 55-67 68   Modifier CH CI CJ CK CL CM CN     ? Other PT/OT Primary Functional Limitations:     - CURRENT STATUS: CL - 60%-79% impaired, limited or restricted    - GOAL STATUS: CK - 40%-59% impaired, limited or restricted    - D/C STATUS:  ---------------To be determined---------------  Medical Necessity:   · Patient is expected to demonstrate progress in lymphedema management to decrease pain, swelling, reduce scar adhesions, self-manage lymphedema. Reason for Services/Other Comments:  · Patient continues to require skilled intervention due to left arm/chest lymphedema. Clinical Decision-Making Assessment:  Patient referred to OT for post mastectomy lymphedema management. Pt has histrory of chronic back pain which reduces mobility. Pt will need home exercise program to regain full AROM and improve strength and endurance in addition to lymphedema management/complete decongestive therapy. Assessment process, impact of co-morbidities, assessment modification\need for assistance, and selection of interventions: Analytical Complexity:MODERATE COMPLEXITY   TREATMENT:   (In addition to Assessment/Re-Assessment sessions the following treatments were rendered)    Pre-treatment Symptoms/Complaints:   Ms. Jesus Haines presents with swelling in her left arm and breast, s/p left mastectomy in January 2017. Pt has expander in place. Patient reports bandages stayed in place for 2-3 days and her arm felt better with them on. Pain: Initial:   Pain Intensity 1: 5  Post Session:  4   Occupational Therapy Evaluation (x) OT eval completed.   Pt received information on lymphedema and risk reduction/self management practices as outlined by the National Lymphedema Network. Therapeutic Exercise: ( :15 minutes):  PROM/stretching shoulder flexion/ABD/IR/ER in supine followed by dowel exercises for flexion for 20 reps. Patient instructed on lymphatic exercises with bandages on to aid in promoting lymphatic flow - grasp/release, wrist/elbow flexion/extension and shoulder flexion. She will perform daily. Manual Therapy: (45 min): Therapist performed scar massage and instructed patient in self scar massage to mobilize tissue and decrease adhesions - she reports she is performing most days. MLD to [place] after stimulation of cervical,axillary, abdominal and inguinal nodes. Multilayer bandaging using  3 short stretch bandages from hand to shoulder. Pt instructed to leave bandages on as long as able. Contact made with [employer] for funding for a custom compression sleeve. They will most likely cover cost of sleeve once estimate for cost reviewed. Treatment/Session Assessment:    · Response to Treatment:  Tolerated treatment without complication. · Compliance with Program/Exercises: Will assess as treatment progresses, but seems good to date  · Recommendations/Intent for next treatment session: \"Next visit will focus on complete decongestive therapy\".   Total Treatment Duration:  OT Patient Time In/Time Out  Time In: 1100  Time Out: [address],

## 2017-06-30 ENCOUNTER — HOSPITAL ENCOUNTER (OUTPATIENT)
Dept: PHYSICAL THERAPY | Age: 62
Discharge: HOME | End: 2017-06-30
Payer: MEDICAID

## 2017-06-30 PROCEDURE — 97140 MANUAL THERAPY 1/> REGIONS: CPT

## 2017-06-30 PROCEDURE — 97110 THERAPEUTIC EXERCISES: CPT

## 2017-06-30 NOTE — PROGRESS NOTES
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 5 = High Risk)  MD Orders: Evaluate and treat left arm lymphedema, s/p mastectomy MEDICAL/REFERRING DIAGNOSIS:   Lymphedema, not elsewhere classified [I89.0]   DATE OF ONSET: 2017   REFERRING PHYSICIAN: Alexis Mixon MD  RETURN PHYSICIAN APPOINTMENT: 2 weeks     INITIAL ASSESSMENT:  Ms. Rogelio Snow presents with swelling in her left arm and breast, s/p left mastectomy in 2017. Pt has expander in place. She reports having had C-diff 2x, requiring 9 day hospitalization with first incident. Pt reports shooting pain from armpit to sternum across breast/chest at times 9/10, limb heaviness, pitting edema elbow to shoulder, surgical scar adhesions, decreased active range of motion left shoulder with upper trapezius pain at end ranges. PLAN OF CARE:   PROBLEM LIST:  1. Decreased Strength  2. Increased Pain  3. Decreased Flexibility/Joint Mobility  4. Edema/Girth INTERVENTIONS PLANNED:  1. Hygiene training; skin integrity management  2. Manual therapy training; manual lymphatic drainage  3. Therapeutic exercise  4. Multilayer bandaging  5. Compression management  6. Kineseotaping; compression pump; prn   TREATMENT PLAN:  Effective Dates: 17 TO 17. Frequency/Duration:  three times a week for 12 weeks; upon reassessment will adjust frequency and duration as necessary. GOALS: (Goals have been discussed and agreed upon with patient.)  Short-Term Functional Goals: Time Frame: 6 weeks  1. Patient will verbalize understanding of .longlymphedema precautions  2.   Patient will be independent in skin care to reduce risk of cellulitis  3. Patient will be independent with home exercise program  4. Patient will tolerate multilayer compression bandaging to aid in edema reduction  Discharge Goals: Time Frame: 12 weeks  1. Pt will have reduction and stabilization of circumferencial volumetric graph measurements left upper extremity  3. Patient is independent with donning and doffing compression garments for long term management  3. Patient is independent with home management of lymphedema  Rehabilitation Potential For Stated Goals: Good  Regarding Mandy Lovett's therapy, I certify that the treatment plan above will be carried out by a therapist or under their direction. Thank you for this referral,  Momo Stock OT     Referring Physician Signature: Valere Bernheim, MD _________________________  Date _________            The information in this section was collected on 6/30/2017   (except where otherwise noted). OCCUPATIONAL PROFILE & HISTORY:   History of Present Injury/Illness (Reason for Referral):   Ms. Pacheco Leahy presents with swelling in her left arm and breast, s/p left mastectomy in January 2017. Pt has expander in place. She reports having had C-diff 2x, requiring 9 day hospitalization with first incident. Pt reports shooting pain from armpit to sternum across breast/chest at times 9/10, limb heaviness, pitting edema elbow to shoulder, surgical scar adhesions, decreased active range of motion left shoulder with upper trapezius pain at end ranges. Pt was referred to occupational therapy for education, management and reduction of swelling left upper extremity. Past Medical History/Comorbidities:   Ms. Pacheco Leahy  has a past medical history of Arthritis; Asthma; CAD (coronary artery disease); Cancer ([address] Chronic pain; Complicated UTI (urinary tract infection) (12/8/2015); Diverticulosis; Heart failure ([address] History of echocardiogram (11/17/14 at [facility]); History of prediabetes; Hypertension;  Shortness of breath; Sleep apnea; and Thyroid cyst.  Ms. Miner Brothmonty  has a past surgical history that includes carpal tunnel release (Bilateral); cyst removal; cardiac surg procedure unlist (Cath 11/18/14 [facility]); lap cholecystectomy; sinus surgery proc unlisted; other surgical; neurological procedure unlisted; cholecystectomy; and breast surgery procedure unlisted. Social History/Living Environment:    Pt lives alone  Prior Level of Function/Work/Activity:  Pt is retired. Current Medications:    Current Outpatient Prescriptions:     metoprolol succinate (TOPROL-XL) 100 mg tablet, Take 1 Tab by mouth daily. , Disp: 30 Tab, Rfl: 11    letrozole (FEMARA) 2.5 mg tablet, Take 1 Tab by mouth daily. , Disp: 30 Tab, Rfl: 0    lisinopril-hydroCHLOROthiazide (PRINZIDE, ZESTORETIC) 20-12.5 mg per tablet, TK 1 T PO D, Disp: , Rfl: 3    ondansetron (ZOFRAN ODT) 4 mg disintegrating tablet, Take 1 Tab by mouth every six (6) hours as needed for Nausea., Disp: 60 Tab, Rfl: 0    oxyCODONE-acetaminophen (PERCOCET)  mg per tablet, Take 1 Tab by mouth every six (6) hours as needed for Pain. Max Daily Amount: 4 Tabs., Disp: 60 Tab, Rfl: 0    diazePAM (VALIUM) 5 mg tablet, Take 5 mg by mouth every six (6) hours as needed for Anxiety. , Disp: , Rfl:     venlafaxine-SR (EFFEXOR-XR) 37.5 mg capsule, Take 1 capsule by mouth for 1 week and then increase to 75 mg (2 capsules) after 1 week., Disp: 60 Cap, Rfl: 0    albuterol (PROVENTIL VENTOLIN) 2.5 mg /3 mL (0.083 %) nebulizer solution, Take 3 mL by inhalation every four (4) hours as needed for Wheezing or Shortness of Breath. (Patient taking differently: Take  by inhalation every four (4) hours as needed for Wheezing or Shortness of Breath.), Disp: 24 Each, Rfl: 5    fluticasone (FLONASE) 50 mcg/actuation nasal spray, 2 Sprays by Both Nostrils route daily. , Disp: 1 Bottle, Rfl: 2    fluticasone-salmeterol (ADVAIR DISKUS) 250-50 mcg/dose diskus inhaler, Take 1 Puff by inhalation two (2) times a day., Disp: 1 Inhaler, Rfl: 11    Cetirizine (ZYRTEC) 10 mg cap, Take  by mouth every morning., Disp: , Rfl:     acetaminophen (TYLENOL EXTRA STRENGTH) 500 mg tablet, Take  by mouth every six (6) hours as needed for Pain., Disp: , Rfl:     montelukast (SINGULAIR) 10 mg tablet, Take 1 Tab by mouth nightly., Disp: 30 Tab, Rfl: 5    cpap machine kit, by Does Not Apply route., Disp: , Rfl:     OXYGEN-AIR DELIVERY SYSTEMS, by Does Not Apply route. 3 lpm qhs, Disp: , Rfl:     ranitidine (ZANTAC) 150 mg tablet, Take 1 Tab by mouth two (2) times a day. Indications: GASTROESOPHAGEAL REFLUX, Disp: 60 Tab, Rfl: 3    nitroglycerin (NITROSTAT) 0.4 mg SL tablet, by SubLINGual route every five (5) minutes as needed for Chest Pain., Disp: , Rfl:             Date Last Reviewed:  6/30/2017     Complexity Level : Expanded review of therapy/medical records (1-2):  MODERATE COMPLEXITY   ASSESSMENT OF OCCUPATIONAL PERFORMANCE:   Palpation:          Pitting edema with skin folds elbow to shoulder. Elbow to hand minimal (1-/5)   Expander in place left breast.  Pt with scar adhesions lateral breast.  Pocket of edema below axilla on chest wall. Skin Integrity:          Skin integrity intact; no signs of cellulitis or fibrosis. Edema/Girth:  2+ and pitting    Left Right    Initial Most Recent Initial Most Recent   Upper  Extremity  217.0 cm  (190.0 cm graph)         Lower  Extremity               Physical Skills Involved:  1. Range of Motion  2. Pain (acute)  3. Edema  4. Skin Integrity Cognitive Skills Affected (resulting in the inability to perform in a timely and safe manner):  1. Perception Psychosocial Skills Affected:  1. Habits/Routines   Number of elements that affect the Plan of Care: 3-5:  MODERATE COMPLEXITY   CLINICAL DECISION MAKING:   Outcome Measure: Tool Used: Lymphedema Life Impact Scale   Score:  Initial: 41 Most Recent: X (Date: -- )   Interpretation of Score:  The Lymphedema Life Impact Scale (LLIS) is a validated instrument that measures the physical, functional, and psychosocial concerns pertinent to patients with extremity lymphedema. The Scale's questionnaire is administered to patients to gauge impairments, activity limitations, and participation restrictions resulting from their lymphedema. Score 0 1-13 14-26 27-40 41-54 55-67 68   Modifier CH CI CJ CK CL CM CN     ? Other PT/OT Primary Functional Limitations:     - CURRENT STATUS: CL - 60%-79% impaired, limited or restricted    - GOAL STATUS: CK - 40%-59% impaired, limited or restricted    - D/C STATUS:  ---------------To be determined---------------  Medical Necessity:   · Patient is expected to demonstrate progress in lymphedema management to decrease pain, swelling, reduce scar adhesions, self-manage lymphedema. Reason for Services/Other Comments:  · Patient continues to require skilled intervention due to left arm/chest lymphedema. Clinical Decision-Making Assessment:  Patient referred to OT for post mastectomy lymphedema management. Pt has histrory of chronic back pain which reduces mobility. Pt will need home exercise program to regain full AROM and improve strength and endurance in addition to lymphedema management/complete decongestive therapy. Assessment process, impact of co-morbidities, assessment modification\need for assistance, and selection of interventions: Analytical Complexity:MODERATE COMPLEXITY   TREATMENT:   (In addition to Assessment/Re-Assessment sessions the following treatments were rendered)    Pre-treatment Symptoms/Complaints:   Ms. Nadiya Grider presents with swelling in her left arm and breast, s/p left mastectomy in January 2017. Pt has expander in place. Patient reports bandages stayed in place for 2-3 days and her arm felt better with them on. Pain: Initial:   Pain Intensity 1: 3  Post Session:  4   Occupational Therapy Evaluation (x) OT eval completed.   Pt received information on lymphedema and risk reduction/self management practices as outlined by the National Lymphedema Network. Therapeutic Exercise: ( :15 minutes): Instructed and pt returned demonstration of abdominal breathing and neck, shoulder, hand AROM, and dowel exercises. She will perform daily. Manual Therapy: (45 min): Therapist performed scar massage and instructed patient in self scar massage to mobilize tissue and decrease adhesions - she reports she is performing most days. MLD to [place] after stimulation of cervical,axillary, abdominal and inguinal nodes, AAI, TONO, AAA anastamosies, facilitation of vaso vasorum. Multilayer bandaging using  4 short stretch bandages from hand to shoulder. Pt instructed to leave bandages on as long as able. Contact made with [employer] [place] for funding for a custom compression sleeve. They will most likely cover cost of sleeve once estimate for cost reviewed. Treatment/Session Assessment:    · Response to Treatment:  Tolerated treatment without complication. · Compliance with Program/Exercises: Will assess as treatment progresses, but seems good to date  · Recommendations/Intent for next treatment session: \"Next visit will focus on complete decongestive therapy\".   Total Treatment Duration:  OT Patient Time In/Time Out  Time In: 0100  Time Out: 3[facility], OT

## 2017-07-06 ENCOUNTER — HOSPITAL ENCOUNTER (OUTPATIENT)
Dept: PHYSICAL THERAPY | Age: 62
Discharge: HOME | End: 2017-07-06
Payer: MEDICAID

## 2017-07-06 PROCEDURE — 97140 MANUAL THERAPY 1/> REGIONS: CPT

## 2017-07-06 NOTE — PROGRESS NOTES
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 5 = High Risk)  MD Orders: Evaluate and treat left arm lymphedema, s/p mastectomy MEDICAL/REFERRING DIAGNOSIS:   Lymphedema, not elsewhere classified [I89.0]   DATE OF ONSET: 2017   REFERRING PHYSICIAN: Willam Mixon MD  RETURN PHYSICIAN APPOINTMENT: 2 weeks     INITIAL ASSESSMENT:  Ms. Isela Resendiz presents with swelling in her left arm and breast, s/p left mastectomy in 2017. Pt has expander in place. She reports having had C-diff 2x, requiring 9 day hospitalization with first incident. Pt reports shooting pain from armpit to sternum across breast/chest at times 9/10, limb heaviness, pitting edema elbow to shoulder, surgical scar adhesions, decreased active range of motion left shoulder with upper trapezius pain at end ranges. PLAN OF CARE:   PROBLEM LIST:  1. Decreased Strength  2. Increased Pain  3. Decreased Flexibility/Joint Mobility  4. Edema/Girth INTERVENTIONS PLANNED:  1. Hygiene training; skin integrity management  2. Manual therapy training; manual lymphatic drainage  3. Therapeutic exercise  4. Multilayer bandaging  5. Compression management  6. Kineseotaping; compression pump; prn   TREATMENT PLAN:  Effective Dates: 17 TO 17. Frequency/Duration:  three times a week for 12 weeks; upon reassessment will adjust frequency and duration as necessary. GOALS: (Goals have been discussed and agreed upon with patient.)  Short-Term Functional Goals: Time Frame: 6 weeks  1. Patient will verbalize understanding of .longlymphedema precautions  2.   Patient will be independent in skin care to reduce risk of cellulitis  3. Patient will be independent with home exercise program  4. Patient will tolerate multilayer compression bandaging to aid in edema reduction  Discharge Goals: Time Frame: 12 weeks  1. Pt will have reduction and stabilization of circumferencial volumetric graph measurements left upper extremity  3. Patient is independent with donning and doffing compression garments for long term management  3. Patient is independent with home management of lymphedema  Rehabilitation Potential For Stated Goals: Good  Regarding Nicolasa Lovett's therapy, I certify that the treatment plan above will be carried out by a therapist or under their direction. Thank you for this referral,  Dominick Louis OT     Referring Physician Signature: Елена Gaspar MD _________________________  Date _________            The information in this section was collected on 7/6/2017   (except where otherwise noted). OCCUPATIONAL PROFILE & HISTORY:   History of Present Injury/Illness (Reason for Referral):   Ms. Rogelio Snow presents with swelling in her left arm and breast, s/p left mastectomy in January 2017. Pt has expander in place. She reports having had C-diff 2x, requiring 9 day hospitalization with first incident. Pt reports shooting pain from armpit to sternum across breast/chest at times 9/10, limb heaviness, pitting edema elbow to shoulder, surgical scar adhesions, decreased active range of motion left shoulder with upper trapezius pain at end ranges. Pt was referred to occupational therapy for education, management and reduction of swelling left upper extremity. Past Medical History/Comorbidities:   Ms. Rogelio Snow  has a past medical history of Arthritis; Asthma; CAD (coronary artery disease); Cancer ([address] Chronic pain; Complicated UTI (urinary tract infection) (12/8/2015); Diverticulosis; Heart failure ([address] History of echocardiogram (11/17/14 at [address]); History of prediabetes; Hypertension;  Shortness of breath; Sleep apnea; and Thyroid cyst.  Ms. Aracelis Jeter  has a past surgical history that includes carpal tunnel release (Bilateral); cyst removal; cardiac surg procedure unlist (Cath 11/18/14 [facility]); lap cholecystectomy; sinus surgery proc unlisted; other surgical; neurological procedure unlisted; cholecystectomy; and breast surgery procedure unlisted. Social History/Living Environment:    Pt lives alone  Prior Level of Function/Work/Activity:  Pt is retired. Current Medications:    Current Outpatient Prescriptions:     metoprolol succinate (TOPROL-XL) 100 mg tablet, Take 1 Tab by mouth daily. , Disp: 30 Tab, Rfl: 11    letrozole (FEMARA) 2.5 mg tablet, Take 1 Tab by mouth daily. , Disp: 30 Tab, Rfl: 0    lisinopril-hydroCHLOROthiazide (PRINZIDE, ZESTORETIC) 20-12.5 mg per tablet, TK 1 T PO D, Disp: , Rfl: 3    ondansetron (ZOFRAN ODT) 4 mg disintegrating tablet, Take 1 Tab by mouth every six (6) hours as needed for Nausea., Disp: 60 Tab, Rfl: 0    oxyCODONE-acetaminophen (PERCOCET)  mg per tablet, Take 1 Tab by mouth every six (6) hours as needed for Pain. Max Daily Amount: 4 Tabs., Disp: 60 Tab, Rfl: 0    diazePAM (VALIUM) 5 mg tablet, Take 5 mg by mouth every six (6) hours as needed for Anxiety. , Disp: , Rfl:     venlafaxine-SR (EFFEXOR-XR) 37.5 mg capsule, Take 1 capsule by mouth for 1 week and then increase to 75 mg (2 capsules) after 1 week., Disp: 60 Cap, Rfl: 0    albuterol (PROVENTIL VENTOLIN) 2.5 mg /3 mL (0.083 %) nebulizer solution, Take 3 mL by inhalation every four (4) hours as needed for Wheezing or Shortness of Breath. (Patient taking differently: Take  by inhalation every four (4) hours as needed for Wheezing or Shortness of Breath.), Disp: 24 Each, Rfl: 5    fluticasone (FLONASE) 50 mcg/actuation nasal spray, 2 Sprays by Both Nostrils route daily. , Disp: 1 Bottle, Rfl: 2    fluticasone-salmeterol (ADVAIR DISKUS) 250-50 mcg/dose diskus inhaler, Take 1 Puff by inhalation two (2) times a day., Disp: 1 Inhaler, Rfl: 11    Cetirizine (ZYRTEC) 10 mg cap, Take  by mouth every morning., Disp: , Rfl:     acetaminophen (TYLENOL EXTRA STRENGTH) 500 mg tablet, Take  by mouth every six (6) hours as needed for Pain., Disp: , Rfl:     montelukast (SINGULAIR) 10 mg tablet, Take 1 Tab by mouth nightly., Disp: 30 Tab, Rfl: 5    cpap machine kit, by Does Not Apply route., Disp: , Rfl:     OXYGEN-AIR DELIVERY SYSTEMS, by Does Not Apply route. 3 lpm qhs, Disp: , Rfl:     ranitidine (ZANTAC) 150 mg tablet, Take 1 Tab by mouth two (2) times a day. Indications: GASTROESOPHAGEAL REFLUX, Disp: 60 Tab, Rfl: 3    nitroglycerin (NITROSTAT) 0.4 mg SL tablet, by SubLINGual route every five (5) minutes as needed for Chest Pain., Disp: , Rfl:             Date Last Reviewed:  7/6/2017     Complexity Level : Expanded review of therapy/medical records (1-2):  MODERATE COMPLEXITY   ASSESSMENT OF OCCUPATIONAL PERFORMANCE:   Palpation:          Pitting edema with skin folds elbow to shoulder. Elbow to hand minimal (1-/5)   Expander in place left breast.  Pt with scar adhesions lateral breast.  Pocket of edema below axilla on chest wall. Skin Integrity:          Skin integrity intact; no signs of cellulitis or fibrosis. Edema/Girth:  2+ and pitting    Left Right    Initial Most Recent Initial Most Recent   Upper  Extremity  217.0 cm  (190.0 cm graph)         Lower  Extremity               Physical Skills Involved:  1. Range of Motion  2. Pain (acute)  3. Edema  4. Skin Integrity Cognitive Skills Affected (resulting in the inability to perform in a timely and safe manner):  1. Perception Psychosocial Skills Affected:  1. Habits/Routines   Number of elements that affect the Plan of Care: 3-5:  MODERATE COMPLEXITY   CLINICAL DECISION MAKING:   Outcome Measure: Tool Used: Lymphedema Life Impact Scale   Score:  Initial: 41 Most Recent: X (Date: -- )   Interpretation of Score:  The Lymphedema Life Impact Scale (LLIS) is a validated instrument that measures the physical, functional, and psychosocial concerns pertinent to patients with extremity lymphedema. The Scale's questionnaire is administered to patients to gauge impairments, activity limitations, and participation restrictions resulting from their lymphedema. Score 0 1-13 14-26 27-40 41-54 55-67 68   Modifier CH CI CJ CK CL CM CN     ? Other PT/OT Primary Functional Limitations:     - CURRENT STATUS: CL - 60%-79% impaired, limited or restricted    - GOAL STATUS: CK - 40%-59% impaired, limited or restricted    - D/C STATUS:  ---------------To be determined---------------  Medical Necessity:   · Patient is expected to demonstrate progress in lymphedema management to decrease pain, swelling, reduce scar adhesions, self-manage lymphedema. Reason for Services/Other Comments:  · Patient continues to require skilled intervention due to left arm/chest lymphedema. Clinical Decision-Making Assessment:  Patient referred to OT for post mastectomy lymphedema management. Pt has histrory of chronic back pain which reduces mobility. Pt will need home exercise program to regain full AROM and improve strength and endurance in addition to lymphedema management/complete decongestive therapy. Assessment process, impact of co-morbidities, assessment modification\need for assistance, and selection of interventions: Analytical Complexity:MODERATE COMPLEXITY   TREATMENT:   (In addition to Assessment/Re-Assessment sessions the following treatments were rendered)    Pre-treatment Symptoms/Complaints:   Ms. Brant Kaur presents with swelling in her left arm and breast, s/p left mastectomy in January 2017. Pt has expander in place. Patient reports bandages stayed in place for 2-3 days and her arm felt better with them on. She also reports she can wash \"under L arm (axilla) with greater ease. \"   Pain: Initial:   Pain Intensity 1: 3  Post Session:  4   Occupational Therapy Evaluation (x) OT eval completed. Pt received information on lymphedema and risk reduction/self management practices as outlined by the National Lymphedema Network. Therapeutic Exercise: ( : minutes): Instructed and pt returned demonstration of abdominal breathing and neck, shoulder, hand AROM, and dowel exercises. She will perform daily. Manual Therapy: (60 min): Therapist performed scar massage and instructed patient in self scar massage to mobilize tissue and decrease adhesions - she reports she is performing most days. MLD to Jhonatananthtiffaniecarlosn after stimulation of cervical,axillary, abdominal and inguinal nodes, AAI, TONO, AAA anastamosies, facilitation of vaso vasorum. Multilayer bandaging using  4 short stretch bandages from hand to shoulder. Pt instructed to leave bandages on as long as able. Contact made with [employer] [place] for funding for a custom compression sleeve. They will most likely cover cost of sleeve once estimate for cost reviewed. Prior to bandaging LUE measured from the palm to axilla and since therapy began she has lost 5.5cm in LUE limb size. Tissue is softening up in upper arm. Discussed wearing a compression bra to provide pressure at L breast to decrease fibrotic tissue. She will explore. Treatment/Session Assessment:    · Response to Treatment:  Tolerated treatment without complication. She is responding to MLD and multi layer bandaging as evidenced by 5.5cm loss in LUE limb size. She also reports she can wash \"under L arm (axilla) with greater ease. \"   · Compliance with Program/Exercises:  good to date  · Recommendations/Intent for next treatment session: \"Next visit will focus on complete decongestive therapy\".   Total Treatment Duration:  OT Patient Time In/Time Out  Time In: 0200  Time Out: [id number] Snehal Hernandez OT

## 2017-07-10 ENCOUNTER — HOSPITAL ENCOUNTER (OUTPATIENT)
Dept: PHYSICAL THERAPY | Age: 62
Discharge: HOME | End: 2017-07-10
Payer: MEDICAID

## 2017-07-10 PROCEDURE — 97110 THERAPEUTIC EXERCISES: CPT

## 2017-07-10 PROCEDURE — 97140 MANUAL THERAPY 1/> REGIONS: CPT

## 2017-07-10 NOTE — PROGRESS NOTES
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 5 = High Risk)  MD Orders: Evaluate and treat left arm lymphedema, s/p mastectomy MEDICAL/REFERRING DIAGNOSIS:   Lymphedema, not elsewhere classified [I89.0]   DATE OF ONSET: 2017   REFERRING PHYSICIAN: Melany Mixon MD  RETURN PHYSICIAN APPOINTMENT: 2 weeks     INITIAL ASSESSMENT:  Ms. Broderick Gandhi presents with swelling in her left arm and breast, s/p left mastectomy in 2017. Pt has expander in place. She reports having had C-diff 2x, requiring 9 day hospitalization with first incident. Pt reports shooting pain from armpit to sternum across breast/chest at times 9/10, limb heaviness, pitting edema elbow to shoulder, surgical scar adhesions, decreased active range of motion left shoulder with upper trapezius pain at end ranges. PLAN OF CARE:   PROBLEM LIST:  1. Decreased Strength  2. Increased Pain  3. Decreased Flexibility/Joint Mobility  4. Edema/Girth INTERVENTIONS PLANNED:  1. Hygiene training; skin integrity management  2. Manual therapy training; manual lymphatic drainage  3. Therapeutic exercise  4. Multilayer bandaging  5. Compression management  6. Kineseotaping; compression pump; prn   TREATMENT PLAN:  Effective Dates: 17 TO 17. Frequency/Duration:  three times a week for 12 weeks; upon reassessment will adjust frequency and duration as necessary. GOALS: (Goals have been discussed and agreed upon with patient.)  Short-Term Functional Goals: Time Frame: 6 weeks  1. Patient will verbalize understanding of .longlymphedema precautions  2.   Patient will be independent in skin care to reduce risk of cellulitis  3. Patient will be independent with home exercise program  4. Patient will tolerate multilayer compression bandaging to aid in edema reduction  Discharge Goals: Time Frame: 12 weeks  1. Pt will have reduction and stabilization of circumferencial volumetric graph measurements left upper extremity  3. Patient is independent with donning and doffing compression garments for long term management  3. Patient is independent with home management of lymphedema  Rehabilitation Potential For Stated Goals: Good  Regarding Bishop Lovett's therapy, I certify that the treatment plan above will be carried out by a therapist or under their direction. Thank you for this referral,  Ayaka Nolasco OT     Referring Physician Signature: Gayle Engle MD _________________________  Date _________            The information in this section was collected on 7/10/2017   (except where otherwise noted). OCCUPATIONAL PROFILE & HISTORY:   History of Present Injury/Illness (Reason for Referral):   Ms. Raghu Wilkins presents with swelling in her left arm and breast, s/p left mastectomy in January 2017. Pt has expander in place. She reports having had C-diff 2x, requiring 9 day hospitalization with first incident. Pt reports shooting pain from armpit to sternum across breast/chest at times 9/10, limb heaviness, pitting edema elbow to shoulder, surgical scar adhesions, decreased active range of motion left shoulder with upper trapezius pain at end ranges. Pt was referred to occupational therapy for education, management and reduction of swelling left upper extremity. Past Medical History/Comorbidities:   Ms. Raghu Wilkins  has a past medical history of Arthritis; Asthma; CAD (coronary artery disease); Cancer ([address] Chronic pain; Complicated UTI (urinary tract infection) (12/8/2015); Diverticulosis; Heart failure ([address] History of echocardiogram (11/17/14 at [facility]); History of prediabetes; Hypertension;  Shortness of breath; Sleep apnea; and Thyroid cyst.  Ms. Qiana Hills  has a past surgical history that includes carpal tunnel release (Bilateral); cyst removal; cardiac surg procedure unlist (Cath 11/18/14 [facility]); lap cholecystectomy; sinus surgery proc unlisted; other surgical; neurological procedure unlisted; cholecystectomy; and breast surgery procedure unlisted. Social History/Living Environment:    Pt lives alone  Prior Level of Function/Work/Activity:  Pt is retired. Current Medications:    Current Outpatient Prescriptions:     metoprolol succinate (TOPROL-XL) 100 mg tablet, Take 1 Tab by mouth daily. , Disp: 30 Tab, Rfl: 11    letrozole (FEMARA) 2.5 mg tablet, Take 1 Tab by mouth daily. , Disp: 30 Tab, Rfl: 0    lisinopril-hydroCHLOROthiazide (PRINZIDE, ZESTORETIC) 20-12.5 mg per tablet, TK 1 T PO D, Disp: , Rfl: 3    ondansetron (ZOFRAN ODT) 4 mg disintegrating tablet, Take 1 Tab by mouth every six (6) hours as needed for Nausea., Disp: 60 Tab, Rfl: 0    oxyCODONE-acetaminophen (PERCOCET)  mg per tablet, Take 1 Tab by mouth every six (6) hours as needed for Pain. Max Daily Amount: 4 Tabs., Disp: 60 Tab, Rfl: 0    diazePAM (VALIUM) 5 mg tablet, Take 5 mg by mouth every six (6) hours as needed for Anxiety. , Disp: , Rfl:     venlafaxine-SR (EFFEXOR-XR) 37.5 mg capsule, Take 1 capsule by mouth for 1 week and then increase to 75 mg (2 capsules) after 1 week., Disp: 60 Cap, Rfl: 0    albuterol (PROVENTIL VENTOLIN) 2.5 mg /3 mL (0.083 %) nebulizer solution, Take 3 mL by inhalation every four (4) hours as needed for Wheezing or Shortness of Breath. (Patient taking differently: Take  by inhalation every four (4) hours as needed for Wheezing or Shortness of Breath.), Disp: 24 Each, Rfl: 5    fluticasone (FLONASE) 50 mcg/actuation nasal spray, 2 Sprays by Both Nostrils route daily. , Disp: 1 Bottle, Rfl: 2    fluticasone-salmeterol (ADVAIR DISKUS) 250-50 mcg/dose diskus inhaler, Take 1 Puff by inhalation two (2) times a day., Disp: 1 Inhaler, Rfl: 11    Cetirizine (ZYRTEC) 10 mg cap, Take  by mouth every morning., Disp: , Rfl:     acetaminophen (TYLENOL EXTRA STRENGTH) 500 mg tablet, Take  by mouth every six (6) hours as needed for Pain., Disp: , Rfl:     montelukast (SINGULAIR) 10 mg tablet, Take 1 Tab by mouth nightly., Disp: 30 Tab, Rfl: 5    cpap machine kit, by Does Not Apply route., Disp: , Rfl:     OXYGEN-AIR DELIVERY SYSTEMS, by Does Not Apply route. 3 lpm qhs, Disp: , Rfl:     ranitidine (ZANTAC) 150 mg tablet, Take 1 Tab by mouth two (2) times a day. Indications: GASTROESOPHAGEAL REFLUX, Disp: 60 Tab, Rfl: 3    nitroglycerin (NITROSTAT) 0.4 mg SL tablet, by SubLINGual route every five (5) minutes as needed for Chest Pain., Disp: , Rfl:             Date Last Reviewed:  7/10/2017     Complexity Level : Expanded review of therapy/medical records (1-2):  MODERATE COMPLEXITY   ASSESSMENT OF OCCUPATIONAL PERFORMANCE:   Palpation:          Pitting edema with skin folds elbow to shoulder. Elbow to hand minimal (1-/5)   Expander in place left breast.  Pt with scar adhesions lateral breast.  Pocket of edema below axilla on chest wall. Skin Integrity:          Skin integrity intact; no signs of cellulitis or fibrosis. Edema/Girth:  2+ and pitting    Left Right    Initial Most Recent Initial Most Recent   Upper  Extremity  217.0 cm  (190.0 cm graph)         Lower  Extremity               Physical Skills Involved:  1. Range of Motion  2. Pain (acute)  3. Edema  4. Skin Integrity Cognitive Skills Affected (resulting in the inability to perform in a timely and safe manner):  1. Perception Psychosocial Skills Affected:  1. Habits/Routines   Number of elements that affect the Plan of Care: 3-5:  MODERATE COMPLEXITY   CLINICAL DECISION MAKING:   Outcome Measure: Tool Used: Lymphedema Life Impact Scale   Score:  Initial: 41 Most Recent: X (Date: -- )   Interpretation of Score:  The Lymphedema Life Impact Scale (LLIS) is a validated instrument that measures the physical, functional, and psychosocial concerns pertinent to patients with extremity lymphedema. The Scale's questionnaire is administered to patients to gauge impairments, activity limitations, and participation restrictions resulting from their lymphedema. Score 0 1-13 14-26 27-40 41-54 55-67 68   Modifier CH CI CJ CK CL CM CN     ? Other PT/OT Primary Functional Limitations:     - CURRENT STATUS: CL - 60%-79% impaired, limited or restricted    - GOAL STATUS: CK - 40%-59% impaired, limited or restricted    - D/C STATUS:  ---------------To be determined---------------  Medical Necessity:   · Patient is expected to demonstrate progress in lymphedema management to decrease pain, swelling, reduce scar adhesions, self-manage lymphedema. Reason for Services/Other Comments:  · Patient continues to require skilled intervention due to left arm/chest lymphedema. Clinical Decision-Making Assessment:  Patient referred to OT for post mastectomy lymphedema management. Pt has histrory of chronic back pain which reduces mobility. Pt will need home exercise program to regain full AROM and improve strength and endurance in addition to lymphedema management/complete decongestive therapy. Assessment process, impact of co-morbidities, assessment modification\need for assistance, and selection of interventions: Analytical Complexity:MODERATE COMPLEXITY   TREATMENT:   (In addition to Assessment/Re-Assessment sessions the following treatments were rendered)    Pre-treatment Symptoms/Complaints:   Ms. Gadiel Mckay presents with swelling in her left arm and breast, s/p left mastectomy in January 2017. Pt has expander in place. Patient reports bandages stayed in place for 2-3 days and her arm felt better with them on. She also reports she can wash \"under L arm (axilla) with greater ease. \" Pt with increased pain over expander on left breast and felt she has more swelling in left axilla area today.   Pain: Initial:   Pain Intensity 1: 5  Post Session:  4   Occupational Therapy Evaluation (x) OT eval completed. Pt received information on lymphedema and risk reduction/self management practices as outlined by the National Lymphedema Network. Therapeutic Exercise: ( 20 minutes): Instructed and pt returned demonstration of UE Exercises including abdominal breathing and neck, shoulder, hand AROM, ball and dowel exercises. She will perform daily. Provided written HEP with instructions for all exercises to be completed 5 reps each. Manual Therapy: (60 min): Therapist performed scar massage and instructed patient in self scar massage to mobilize tissue and decrease adhesions - she reports she is performing most days. MLD to [place] after stimulation of cervical,axillary, abdominal and inguinal nodes, AAI, TONO, AAA anastamosies, facilitation of vaso vasorum. Multilayer bandaging using  4 short stretch bandages from hand to shoulder and lymphsoft foam pad at radial wrist and thenar eminance due to increased pain and sensitivity in that area. Pt instructed to leave bandages on as long as able. Contact made with [employer] [place] for funding for a custom compression sleeve. They will most likely cover cost of sleeve once estimate for cost reviewed. Prior to bandaging LUE measured from the palm to axilla and since therapy began she has lost 5.5cm in LUE limb size. Tissue is softening up in upper arm. Discussed wearing a compression bra to provide pressure at L breast to decrease fibrotic tissue. She will explore. Treatment/Session Assessment:    · Response to Treatment:  Tolerated treatment without complication. She is responding to MLD and multi layer bandaging as evidenced by 5.5cm loss in LUE limb size. She also reports she can wash \"under L arm (axilla) with greater ease. \"   · Compliance with Program/Exercises:  good to date  · Recommendations/Intent for next treatment session:  \"Next visit will focus on complete decongestive therapy\".   Total Treatment Duration:  OT Patient Time In/Time Out  Time In: 0200  Time Out: 345 Primo Alex, OT

## 2017-07-13 ENCOUNTER — APPOINTMENT (OUTPATIENT)
Dept: PHYSICAL THERAPY | Age: 62
End: 2017-07-13
Payer: MEDICAID

## 2017-07-18 ENCOUNTER — HOSPITAL ENCOUNTER (OUTPATIENT)
Dept: PHYSICAL THERAPY | Age: 62
Discharge: HOME | End: 2017-07-18
Payer: MEDICAID

## 2017-07-18 PROCEDURE — 97140 MANUAL THERAPY 1/> REGIONS: CPT

## 2017-07-18 NOTE — PROGRESS NOTES
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 5 = High Risk)  MD Orders: Evaluate and treat left arm lymphedema, s/p mastectomy MEDICAL/REFERRING DIAGNOSIS:   Lymphedema, not elsewhere classified [I89.0]   DATE OF ONSET: 2017   REFERRING PHYSICIAN: Dada Mixon MD  RETURN PHYSICIAN APPOINTMENT: 2 weeks     INITIAL ASSESSMENT:  Ms. Preethi Pena presents with swelling in her left arm and breast, s/p left mastectomy in 2017. Pt has expander in place. She reports having had C-diff 2x, requiring 9 day hospitalization with first incident. Pt reports shooting pain from armpit to sternum across breast/chest at times 9/10, limb heaviness, pitting edema elbow to shoulder, surgical scar adhesions, decreased active range of motion left shoulder with upper trapezius pain at end ranges. PLAN OF CARE:   PROBLEM LIST:  1. Decreased Strength  2. Increased Pain  3. Decreased Flexibility/Joint Mobility  4. Edema/Girth INTERVENTIONS PLANNED:  1. Hygiene training; skin integrity management  2. Manual therapy training; manual lymphatic drainage  3. Therapeutic exercise  4. Multilayer bandaging  5. Compression management  6. Kineseotaping; compression pump; prn   TREATMENT PLAN:  Effective Dates: 17 TO 17. Frequency/Duration:  three times a week for 12 weeks; upon reassessment will adjust frequency and duration as necessary. GOALS: (Goals have been discussed and agreed upon with patient.)  Short-Term Functional Goals: Time Frame: 6 weeks  1. Patient will verbalize understanding of .longlymphedema precautions  2.   Patient will be independent in skin care to reduce risk of cellulitis  3. Patient will be independent with home exercise program  4. Patient will tolerate multilayer compression bandaging to aid in edema reduction  Discharge Goals: Time Frame: 12 weeks  1. Pt will have reduction and stabilization of circumferencial volumetric graph measurements left upper extremity  3. Patient is independent with donning and doffing compression garments for long term management  3. Patient is independent with home management of lymphedema  Rehabilitation Potential For Stated Goals: Good  Regarding Phuc Lovett's therapy, I certify that the treatment plan above will be carried out by a therapist or under their direction. Thank you for this referral,  Zoraida Russell, OT     Referring Physician Signature: Agustin Garces MD _________________________  Date _________            The information in this section was collected on 7/18/2017   (except where otherwise noted). OCCUPATIONAL PROFILE & HISTORY:   History of Present Injury/Illness (Reason for Referral):   Ms. Gonzalo Chowdhury presents with swelling in her left arm and breast, s/p left mastectomy in January 2017. Pt has expander in place. She reports having had C-diff 2x, requiring 9 day hospitalization with first incident. Pt reports shooting pain from armpit to sternum across breast/chest at times 9/10, limb heaviness, pitting edema elbow to shoulder, surgical scar adhesions, decreased active range of motion left shoulder with upper trapezius pain at end ranges. Pt was referred to occupational therapy for education, management and reduction of swelling left upper extremity. Past Medical History/Comorbidities:   Ms. Gonzalo Chowdhury  has a past medical history of Arthritis; Asthma; CAD (coronary artery disease); Cancer ([address] Chronic pain; Complicated UTI (urinary tract infection) (12/8/2015); Diverticulosis; Heart failure ([address] History of echocardiogram (11/17/14 at [facility]); History of prediabetes; Hypertension;  Shortness of breath; Sleep apnea; and Thyroid cyst.  Ms. Marcelo   has a past surgical history that includes carpal tunnel release (Bilateral); cyst removal; cardiac surg procedure unlist (Cath 14 [facility]); lap cholecystectomy; sinus surgery proc unlisted; other surgical; neurological procedure unlisted; cholecystectomy; and breast surgery procedure unlisted. Social History/Living Environment:    Pt lives alone  Prior Level of Function/Work/Activity:  Pt is retired. Current Medications:    Current Outpatient Prescriptions:     letrozole (FEMARA) 2.5 mg tablet, Take 1 Tab by mouth daily. , Disp: 30 Tab, Rfl: 12    metoprolol succinate (TOPROL-XL) 100 mg tablet, Take 1 Tab by mouth daily. , Disp: 30 Tab, Rfl: 11    lisinopril-hydroCHLOROthiazide (PRINZIDE, ZESTORETIC) 20-12.5 mg per tablet, TK 1 T PO D, Disp: , Rfl: 3    ondansetron (ZOFRAN ODT) 4 mg disintegrating tablet, Take 1 Tab by mouth every six (6) hours as needed for Nausea., Disp: 60 Tab, Rfl: 0    oxyCODONE-acetaminophen (PERCOCET)  mg per tablet, Take 1 Tab by mouth every six (6) hours as needed for Pain. Max Daily Amount: 4 Tabs., Disp: 60 Tab, Rfl: 0    diazePAM (VALIUM) 5 mg tablet, Take 5 mg by mouth every six (6) hours as needed for Anxiety. , Disp: , Rfl:     venlafaxine-SR (EFFEXOR-XR) 37.5 mg capsule, Take 1 capsule by mouth for 1 week and then increase to 75 mg (2 capsules) after 1 week., Disp: 60 Cap, Rfl: 0    albuterol (PROVENTIL VENTOLIN) 2.5 mg /3 mL (0.083 %) nebulizer solution, Take 3 mL by inhalation every four (4) hours as needed for Wheezing or Shortness of Breath. (Patient taking differently: Take  by inhalation every four (4) hours as needed for Wheezing or Shortness of Breath.), Disp: 24 Each, Rfl: 5    fluticasone (FLONASE) 50 mcg/actuation nasal spray, 2 Sprays by Both Nostrils route daily. , Disp: 1 Bottle, Rfl: 2    fluticasone-salmeterol (ADVAIR DISKUS) 250-50 mcg/dose diskus inhaler, Take 1 Puff by inhalation two (2) times a day., Disp: 1 Inhaler, Rfl: 11    Cetirizine (ZYRTEC) 10 mg cap, Take  by mouth every morning., Disp: , Rfl:     acetaminophen (TYLENOL EXTRA STRENGTH) 500 mg tablet, Take  by mouth every six (6) hours as needed for Pain., Disp: , Rfl:     montelukast (SINGULAIR) 10 mg tablet, Take 1 Tab by mouth nightly., Disp: 30 Tab, Rfl: 5    cpap machine kit, by Does Not Apply route., Disp: , Rfl:     OXYGEN-AIR DELIVERY SYSTEMS, by Does Not Apply route. 3 lpm qhs, Disp: , Rfl:     ranitidine (ZANTAC) 150 mg tablet, Take 1 Tab by mouth two (2) times a day. Indications: GASTROESOPHAGEAL REFLUX, Disp: 60 Tab, Rfl: 3    nitroglycerin (NITROSTAT) 0.4 mg SL tablet, by SubLINGual route every five (5) minutes as needed for Chest Pain., Disp: , Rfl:             Date Last Reviewed:  7/18/2017     Complexity Level : Expanded review of therapy/medical records (1-2):  MODERATE COMPLEXITY   ASSESSMENT OF OCCUPATIONAL PERFORMANCE:   Palpation:          Pitting edema with skin folds elbow to shoulder. Elbow to hand minimal (1-/5)   Expander in place left breast.  Pt with scar adhesions lateral breast.  Pocket of edema below axilla on chest wall. Skin Integrity:          Skin integrity intact; no signs of cellulitis or fibrosis. Edema/Girth:  2+ and pitting    Left Right    Initial Most Recent Initial Most Recent   Upper  Extremity  217.0 cm  (190.0 cm graph)         Lower  Extremity               Physical Skills Involved:  1. Range of Motion  2. Pain (acute)  3. Edema  4. Skin Integrity Cognitive Skills Affected (resulting in the inability to perform in a timely and safe manner):  1. Perception Psychosocial Skills Affected:  1. Habits/Routines   Number of elements that affect the Plan of Care: 3-5:  MODERATE COMPLEXITY   CLINICAL DECISION MAKING:   Outcome Measure: Tool Used: Lymphedema Life Impact Scale   Score:  Initial: 41 Most Recent: X (Date: -- )   Interpretation of Score:  The Lymphedema Life Impact Scale (LLIS) is a validated instrument that measures the physical, functional, and psychosocial concerns pertinent to patients with extremity lymphedema. The Scale's questionnaire is administered to patients to gauge impairments, activity limitations, and participation restrictions resulting from their lymphedema. Score 0 1-13 14-26 27-40 41-54 55-67 68   Modifier CH CI CJ CK CL CM CN     ? Other PT/OT Primary Functional Limitations:     - CURRENT STATUS: CL - 60%-79% impaired, limited or restricted    - GOAL STATUS: CK - 40%-59% impaired, limited or restricted    - D/C STATUS:  ---------------To be determined---------------  Medical Necessity:   · Patient is expected to demonstrate progress in lymphedema management to decrease pain, swelling, reduce scar adhesions, self-manage lymphedema. Reason for Services/Other Comments:  · Patient continues to require skilled intervention due to left arm/chest lymphedema. Clinical Decision-Making Assessment:  Patient referred to OT for post mastectomy lymphedema management. Pt has histrory of chronic back pain which reduces mobility. Pt will need home exercise program to regain full AROM and improve strength and endurance in addition to lymphedema management/complete decongestive therapy. Assessment process, impact of co-morbidities, assessment modification\need for assistance, and selection of interventions: Analytical Complexity:MODERATE COMPLEXITY   TREATMENT:   (In addition to Assessment/Re-Assessment sessions the following treatments were rendered)    Pre-treatment Symptoms/Complaints:   Ms. Preethi Pena presents with swelling in her left arm and breast, s/p left mastectomy in January 2017. Pt has expander in place. Patient reports bandages stayed in place for 1 day and her arm felt better with them on. She also reports she can wash \"under L arm (axilla) with greater ease. \" Pt with increased pain over expander on left breast and felt she has more swelling in left axilla area today.   She also reports an increased feeling of SOB and that her \"chest feels swimmy over the last few days. \"  Pain: Initial:   Pain Intensity 1: 6  Post Session:  6   Occupational Therapy Evaluation (x) OT abbie completed. Pt received information on lymphedema and risk reduction/self management practices as outlined by the National Lymphedema Network. Therapeutic Exercise: (  minutes): Instructed and pt returned demonstration of UE Exercises including abdominal breathing and neck, shoulder, hand AROM, ball and dowel exercises. She will perform daily. Provided written HEP with instructions for all exercises to be completed 5 reps each. Manual Therapy: (55 min): Patient arrived today with increased SOB and reports chest to feel \"swimmy\". /100, O2 98% and HR 85. Contacted MD and he expressed best for patient to go to Urgent Care and not perform MLD today. Patient did request LUE to be bandaged due to it making LUE feel better. Multilayer bandaging using  3 short stretch bandages from hand to shoulder and lymphsoft foam pad at radial wrist and thenar eminance due to increased pain and sensitivity in that area. Pt instructed to leave bandages on as long as able. Contact made with [employer] [place] for funding for a custom compression sleeve. They will most likely cover cost of sleeve once estimate for cost reviewed. Prior to bandaging LUE measured from the palm to axilla and since therapy began she has lost 5.5cm in LUE limb size. Tissue is softening up in upper arm. Discussed wearing a compression bra to provide pressure at L breast to decrease fibrotic tissue. She will explore. Treatment/Session Assessment:    · Response to Treatment:    She is responding to MLD and multi layer bandaging as evidenced by 5.5cm loss in LUE limb size. She also reports she can wash \"under L arm (axilla) with greater ease. \"   No MLD performed today per MD request secondary to increasing SOB and heavy feeling in patient's chest.  She left to go to Urgent Cre after this appointment. · Compliance with Program/Exercises:  good to date  · Recommendations/Intent for next treatment session: \"Next visit will focus on complete decongestive therapy\".   Total Treatment Duration:  OT Patient Time In/Time Out  Time In: 0200  Time Out: Eloise 60, OT

## 2017-07-20 ENCOUNTER — HOSPITAL ENCOUNTER (OUTPATIENT)
Dept: PHYSICAL THERAPY | Age: 62
Discharge: HOME | End: 2017-07-20
Payer: MEDICAID

## 2017-07-20 NOTE — PROGRESS NOTES
OUTPATIENT DAILY NOTE    NAME/AGE/GENDER: Kia Aly is a 58 y.o. female. DATE: 7/20/2017    Patient cancelled for appointment today due to still not feeling well. Per patient her BP remains high and she was going to Urgent Care today. .  Will plan to follow up on next scheduled visit.     Abida Terrell OT

## 2017-07-25 ENCOUNTER — HOSPITAL ENCOUNTER (OUTPATIENT)
Dept: PHYSICAL THERAPY | Age: 62
Discharge: HOME | End: 2017-07-25
Payer: MEDICAID

## 2017-07-28 ENCOUNTER — HOSPITAL ENCOUNTER (OUTPATIENT)
Dept: PHYSICAL THERAPY | Age: 62
Discharge: HOME | End: 2017-07-28
Payer: MEDICAID

## 2017-07-28 PROCEDURE — 97110 THERAPEUTIC EXERCISES: CPT

## 2017-07-28 PROCEDURE — 97140 MANUAL THERAPY 1/> REGIONS: CPT

## 2017-07-28 NOTE — PROGRESS NOTES
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 5 = High Risk)  MD Orders: Evaluate and treat left arm lymphedema, s/p mastectomy MEDICAL/REFERRING DIAGNOSIS:   Lymphedema, not elsewhere classified [I89.0]   DATE OF ONSET: 2017   REFERRING PHYSICIAN: Alexis Mixon MD  RETURN PHYSICIAN APPOINTMENT: 2 weeks     INITIAL ASSESSMENT:  Ms. Rogelio Snow presents with swelling in her left arm and breast, s/p left mastectomy in 2017. Pt has expander in place. She reports having had C-diff 2x, requiring 9 day hospitalization with first incident. Pt reports shooting pain from armpit to sternum across breast/chest at times 9/10, limb heaviness, pitting edema elbow to shoulder, surgical scar adhesions, decreased active range of motion left shoulder with upper trapezius pain at end ranges. PLAN OF CARE:   PROBLEM LIST:  1. Decreased Strength  2. Increased Pain  3. Decreased Flexibility/Joint Mobility  4. Edema/Girth INTERVENTIONS PLANNED:  1. Hygiene training; skin integrity management  2. Manual therapy training; manual lymphatic drainage  3. Therapeutic exercise  4. Multilayer bandaging  5. Compression management  6. Kineseotaping; compression pump; prn   TREATMENT PLAN:  Effective Dates: 17 TO 17. Frequency/Duration:  three times a week for 12 weeks; upon reassessment will adjust frequency and duration as necessary. GOALS: (Goals have been discussed and agreed upon with patient.)  Short-Term Functional Goals: Time Frame: 6 weeks  1. Patient will verbalize understanding of .longlymphedema precautions  2.   Patient will be independent in skin care to reduce risk of cellulitis  3. Patient will be independent with home exercise program  4. Patient will tolerate multilayer compression bandaging to aid in edema reduction  Discharge Goals: Time Frame: 12 weeks  1. Pt will have reduction and stabilization of circumferencial volumetric graph measurements left upper extremity  3. Patient is independent with donning and doffing compression garments for long term management  3. Patient is independent with home management of lymphedema  Rehabilitation Potential For Stated Goals: Good  Regarding Carlen Schirmer Cannon's therapy, I certify that the treatment plan above will be carried out by a therapist or under their direction. Thank you for this referral,  Panfilo Amezcua OT     Referring Physician Signature: Mitchell Joaquin MD _________________________  Date _________            The information in this section was collected on 7/28/2017   (except where otherwise noted). OCCUPATIONAL PROFILE & HISTORY:   History of Present Injury/Illness (Reason for Referral):   Ms. Alia Gutierrez presents with swelling in her left arm and breast, s/p left mastectomy in January 2017. Pt has expander in place. She reports having had C-diff 2x, requiring 9 day hospitalization with first incident. Pt reports shooting pain from armpit to sternum across breast/chest at times 9/10, limb heaviness, pitting edema elbow to shoulder, surgical scar adhesions, decreased active range of motion left shoulder with upper trapezius pain at end ranges. Pt was referred to occupational therapy for education, management and reduction of swelling left upper extremity. Past Medical History/Comorbidities:   Ms. Alia Gutierrez  has a past medical history of Arthritis; Asthma; CAD (coronary artery disease); Cancer ([address] Chronic pain; Complicated UTI (urinary tract infection) (12/8/2015); Diverticulosis; Heart failure ([address] History of echocardiogram (11/17/14 at [facility]); History of prediabetes; Hypertension;  Shortness of breath; Sleep apnea; and Thyroid cyst.  Ms. Ivana Montgomery  has a past surgical history that includes carpal tunnel release (Bilateral); cyst removal; cardiac surg procedure unlist (Cath 11/18/14 [facility]); lap cholecystectomy; sinus surgery proc unlisted; other surgical; neurological procedure unlisted; cholecystectomy; and breast surgery procedure unlisted. Social History/Living Environment:    Pt lives alone  Prior Level of Function/Work/Activity:  Pt is retired. Current Medications:    Current Outpatient Prescriptions:     letrozole (FEMARA) 2.5 mg tablet, Take 1 Tab by mouth daily. , Disp: 30 Tab, Rfl: 12    metoprolol succinate (TOPROL-XL) 100 mg tablet, Take 1 Tab by mouth daily. , Disp: 30 Tab, Rfl: 11    lisinopril-hydroCHLOROthiazide (PRINZIDE, ZESTORETIC) 20-12.5 mg per tablet, TK 1 T PO D, Disp: , Rfl: 3    ondansetron (ZOFRAN ODT) 4 mg disintegrating tablet, Take 1 Tab by mouth every six (6) hours as needed for Nausea., Disp: 60 Tab, Rfl: 0    oxyCODONE-acetaminophen (PERCOCET)  mg per tablet, Take 1 Tab by mouth every six (6) hours as needed for Pain. Max Daily Amount: 4 Tabs., Disp: 60 Tab, Rfl: 0    diazePAM (VALIUM) 5 mg tablet, Take 5 mg by mouth every six (6) hours as needed for Anxiety. , Disp: , Rfl:     venlafaxine-SR (EFFEXOR-XR) 37.5 mg capsule, Take 1 capsule by mouth for 1 week and then increase to 75 mg (2 capsules) after 1 week., Disp: 60 Cap, Rfl: 0    albuterol (PROVENTIL VENTOLIN) 2.5 mg /3 mL (0.083 %) nebulizer solution, Take 3 mL by inhalation every four (4) hours as needed for Wheezing or Shortness of Breath. (Patient taking differently: Take  by inhalation every four (4) hours as needed for Wheezing or Shortness of Breath.), Disp: 24 Each, Rfl: 5    fluticasone (FLONASE) 50 mcg/actuation nasal spray, 2 Sprays by Both Nostrils route daily. , Disp: 1 Bottle, Rfl: 2    fluticasone-salmeterol (ADVAIR DISKUS) 250-50 mcg/dose diskus inhaler, Take 1 Puff by inhalation two (2) times a day., Disp: 1 Inhaler, Rfl: 11    Cetirizine (ZYRTEC) 10 mg cap, Take  by mouth every morning., Disp: , Rfl:     acetaminophen (TYLENOL EXTRA STRENGTH) 500 mg tablet, Take  by mouth every six (6) hours as needed for Pain., Disp: , Rfl:     montelukast (SINGULAIR) 10 mg tablet, Take 1 Tab by mouth nightly., Disp: 30 Tab, Rfl: 5    cpap machine kit, by Does Not Apply route., Disp: , Rfl:     OXYGEN-AIR DELIVERY SYSTEMS, by Does Not Apply route. 3 lpm qhs, Disp: , Rfl:     ranitidine (ZANTAC) 150 mg tablet, Take 1 Tab by mouth two (2) times a day. Indications: GASTROESOPHAGEAL REFLUX, Disp: 60 Tab, Rfl: 3    nitroglycerin (NITROSTAT) 0.4 mg SL tablet, by SubLINGual route every five (5) minutes as needed for Chest Pain., Disp: , Rfl:             Date Last Reviewed:  7/28/2017     Complexity Level : Expanded review of therapy/medical records (1-2):  MODERATE COMPLEXITY   ASSESSMENT OF OCCUPATIONAL PERFORMANCE:   Palpation:          Pitting edema with skin folds elbow to shoulder. Elbow to hand minimal (1-/5)   Expander in place left breast.  Pt with scar adhesions lateral breast.  Pocket of edema below axilla on chest wall. Skin Integrity:          Skin integrity intact; no signs of cellulitis or fibrosis. Edema/Girth:  2+ and pitting    Left Right    Initial Most Recent Initial Most Recent   Upper  Extremity  217.0 cm  (190.0 cm graph)         Lower  Extremity               Physical Skills Involved:  1. Range of Motion  2. Pain (acute)  3. Edema  4. Skin Integrity Cognitive Skills Affected (resulting in the inability to perform in a timely and safe manner):  1. Perception Psychosocial Skills Affected:  1. Habits/Routines   Number of elements that affect the Plan of Care: 3-5:  MODERATE COMPLEXITY   CLINICAL DECISION MAKING:   Outcome Measure: Tool Used: Lymphedema Life Impact Scale   Score:  Initial: 41 Most Recent: X (Date: -- )   Interpretation of Score:  The Lymphedema Life Impact Scale (LLIS) is a validated instrument that measures the physical, functional, and psychosocial concerns pertinent to patients with extremity lymphedema. The Scale's questionnaire is administered to patients to gauge impairments, activity limitations, and participation restrictions resulting from their lymphedema. Score 0 1-13 14-26 27-40 41-54 55-67 68   Modifier CH CI CJ CK CL CM CN     ? Other PT/OT Primary Functional Limitations:     - CURRENT STATUS: CL - 60%-79% impaired, limited or restricted    - GOAL STATUS: CK - 40%-59% impaired, limited or restricted    - D/C STATUS:  ---------------To be determined---------------  Medical Necessity:   · Patient is expected to demonstrate progress in lymphedema management to decrease pain, swelling, reduce scar adhesions, self-manage lymphedema. Reason for Services/Other Comments:  · Patient continues to require skilled intervention due to left arm/chest lymphedema. Clinical Decision-Making Assessment:  Patient referred to OT for post mastectomy lymphedema management. Pt has histrory of chronic back pain which reduces mobility. Pt will need home exercise program to regain full AROM and improve strength and endurance in addition to lymphedema management/complete decongestive therapy. Assessment process, impact of co-morbidities, assessment modification\need for assistance, and selection of interventions: Analytical Complexity:MODERATE COMPLEXITY   TREATMENT:   (In addition to Assessment/Re-Assessment sessions the following treatments were rendered)    Pre-treatment Symptoms/Complaints:  Patient missed last appointments secondary to issues with BP and SOB. She went to MD and issues have resolved. Swelling at L expander site remain persistent. Pain: Initial:   Pain Intensity 1: 5  Post Session:  5   Occupational Therapy Evaluation (x) OT eval completed.   Pt received information on lymphedema and risk reduction/self management practices as outlined by the National Lymphedema Network. Therapeutic Exercise: (  15 minutes): ROM exercises in supine for shoulder flexion/IR/ER for 10 reps each followed by dowel exercises - patient will perform 2x/day. Reviewed additional UE Exercises including abdominal breathing and neck, shoulder, hand AROM, ball and dowel exercises. .    Manual Therapy: (60 min): Patient arrived with increased swelling noted at expander site. She received MLD to [place] with emphasis on L breast and upper ar. Following MLD she received trial of flexitouch pump for full cycle. Following MLD and use of flexitouch tissue at expander site was much softer. Multilayer bandaging using  4 short stretch bandages from hand to shoulder and lymphsoft foam pad at radial wrist and thenar eminance due to increased pain and sensitivity in that area. Pt instructed to leave bandages on as long as able. Contact made with [employer] [place] for funding for a custom compression sleeve. They will most likely cover cost of sleeve once estimate for cost reviewed. Since therapy began she has lost 5.5cm in LUE limb size. Tissue is softening up in upper arm. Discussed wearing a compression bra to provide pressure at L breast to decrease fibrotic tissue. She will explore. Treatment/Session Assessment:    · Response to Treatment:    She is responding to MLD and multi layer bandaging as evidenced by 5.5cm loss in LUE limb size. She also reports she can wash \"under L arm (axilla) with greater ease. \"     · Compliance with Program/Exercises:  good to date  · Recommendations/Intent for next treatment session: \"Next visit will focus on complete decongestive therapy\".   Total Treatment Duration:  OT Patient Time In/Time Out  Time In: 1100  Time Out: [address] [facility],

## 2017-08-01 ENCOUNTER — HOSPITAL ENCOUNTER (OUTPATIENT)
Dept: PHYSICAL THERAPY | Age: 62
Discharge: HOME | End: 2017-08-01
Payer: MEDICAID

## 2017-08-01 PROCEDURE — 97140 MANUAL THERAPY 1/> REGIONS: CPT

## 2017-08-01 NOTE — PROGRESS NOTES
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 5 = High Risk)  MD Orders: Evaluate and treat left arm lymphedema, s/p mastectomy MEDICAL/REFERRING DIAGNOSIS:   Lymphedema, not elsewhere classified [I89.0]   DATE OF ONSET: 2017   REFERRING PHYSICIAN: Chanelle Mixon MD  RETURN PHYSICIAN APPOINTMENT: 2 weeks     INITIAL ASSESSMENT:  Ms. Nadiya Grider presents with swelling in her left arm and breast, s/p left mastectomy in 2017. Pt has expander in place. She reports having had C-diff 2x, requiring 9 day hospitalization with first incident. Pt reports shooting pain from armpit to sternum across breast/chest at times 9/10, limb heaviness, pitting edema elbow to shoulder, surgical scar adhesions, decreased active range of motion left shoulder with upper trapezius pain at end ranges. PLAN OF CARE:   PROBLEM LIST:  1. Decreased Strength  2. Increased Pain  3. Decreased Flexibility/Joint Mobility  4. Edema/Girth INTERVENTIONS PLANNED:  1. Hygiene training; skin integrity management  2. Manual therapy training; manual lymphatic drainage  3. Therapeutic exercise  4. Multilayer bandaging  5. Compression management  6. Kineseotaping; compression pump; prn   TREATMENT PLAN:  Effective Dates: 17 TO 17. Frequency/Duration:  three times a week for 12 weeks; upon reassessment will adjust frequency and duration as necessary. GOALS: (Goals have been discussed and agreed upon with patient.)  Short-Term Functional Goals: Time Frame: 6 weeks  1. Patient will verbalize understanding of .longlymphedema precautions  2.   Patient will be independent in skin care to reduce risk of cellulitis  3. Patient will be independent with home exercise program  4. Patient will tolerate multilayer compression bandaging to aid in edema reduction  Discharge Goals: Time Frame: 12 weeks  1. Pt will have reduction and stabilization of circumferencial volumetric graph measurements left upper extremity  3. Patient is independent with donning and doffing compression garments for long term management  3. Patient is independent with home management of lymphedema  Rehabilitation Potential For Stated Goals: Good  Regarding Tabitha Lovett's therapy, I certify that the treatment plan above will be carried out by a therapist or under their direction. Thank you for this referral,  Jose Angel Grigsby OT     Referring Physician Signature: Jeanine Espinoza MD _________________________  Date _________            The information in this section was collected on 8/1/2017   (except where otherwise noted). OCCUPATIONAL PROFILE & HISTORY:   History of Present Injury/Illness (Reason for Referral):   Ms. Amauri Gonzalez presents with swelling in her left arm and breast, s/p left mastectomy in January 2017. Pt has expander in place. She reports having had C-diff 2x, requiring 9 day hospitalization with first incident. Pt reports shooting pain from armpit to sternum across breast/chest at times 9/10, limb heaviness, pitting edema elbow to shoulder, surgical scar adhesions, decreased active range of motion left shoulder with upper trapezius pain at end ranges. Pt was referred to occupational therapy for education, management and reduction of swelling left upper extremity. Past Medical History/Comorbidities:   Ms. Amauri Gonzalez  has a past medical history of Arthritis; Asthma; CAD (coronary artery disease); Cancer ([address] Chronic pain; Complicated UTI (urinary tract infection) (12/8/2015); Diverticulosis; Heart failure ([address] History of echocardiogram (11/17/14 at [facility]); History of prediabetes; Hypertension;  Shortness of breath; Sleep apnea; and Thyroid cyst.  Ms. Rogelio Snow  has a past surgical history that includes carpal tunnel release (Bilateral); cyst removal; cardiac surg procedure unlist (Cath 11/18/14 [facility]); lap cholecystectomy; sinus surgery proc unlisted; other surgical; neurological procedure unlisted; cholecystectomy; and breast surgery procedure unlisted. Social History/Living Environment:    Pt lives alone  Prior Level of Function/Work/Activity:  Pt is retired. Current Medications:    Current Outpatient Prescriptions:     letrozole (FEMARA) 2.5 mg tablet, Take 1 Tab by mouth daily. , Disp: 30 Tab, Rfl: 12    metoprolol succinate (TOPROL-XL) 100 mg tablet, Take 1 Tab by mouth daily. , Disp: 30 Tab, Rfl: 11    lisinopril-hydroCHLOROthiazide (PRINZIDE, ZESTORETIC) 20-12.5 mg per tablet, TK 1 T PO D, Disp: , Rfl: 3    ondansetron (ZOFRAN ODT) 4 mg disintegrating tablet, Take 1 Tab by mouth every six (6) hours as needed for Nausea., Disp: 60 Tab, Rfl: 0    oxyCODONE-acetaminophen (PERCOCET)  mg per tablet, Take 1 Tab by mouth every six (6) hours as needed for Pain. Max Daily Amount: 4 Tabs., Disp: 60 Tab, Rfl: 0    diazePAM (VALIUM) 5 mg tablet, Take 5 mg by mouth every six (6) hours as needed for Anxiety. , Disp: , Rfl:     venlafaxine-SR (EFFEXOR-XR) 37.5 mg capsule, Take 1 capsule by mouth for 1 week and then increase to 75 mg (2 capsules) after 1 week., Disp: 60 Cap, Rfl: 0    albuterol (PROVENTIL VENTOLIN) 2.5 mg /3 mL (0.083 %) nebulizer solution, Take 3 mL by inhalation every four (4) hours as needed for Wheezing or Shortness of Breath. (Patient taking differently: Take  by inhalation every four (4) hours as needed for Wheezing or Shortness of Breath.), Disp: 24 Each, Rfl: 5    fluticasone (FLONASE) 50 mcg/actuation nasal spray, 2 Sprays by Both Nostrils route daily. , Disp: 1 Bottle, Rfl: 2    fluticasone-salmeterol (ADVAIR DISKUS) 250-50 mcg/dose diskus inhaler, Take 1 Puff by inhalation two (2) times a day., Disp: 1 Inhaler, Rfl: 11    Cetirizine (ZYRTEC) 10 mg cap, Take  by mouth every morning., Disp: , Rfl:     acetaminophen (TYLENOL EXTRA STRENGTH) 500 mg tablet, Take  by mouth every six (6) hours as needed for Pain., Disp: , Rfl:     montelukast (SINGULAIR) 10 mg tablet, Take 1 Tab by mouth nightly., Disp: 30 Tab, Rfl: 5    cpap machine kit, by Does Not Apply route., Disp: , Rfl:     OXYGEN-AIR DELIVERY SYSTEMS, by Does Not Apply route. 3 lpm qhs, Disp: , Rfl:     ranitidine (ZANTAC) 150 mg tablet, Take 1 Tab by mouth two (2) times a day. Indications: GASTROESOPHAGEAL REFLUX, Disp: 60 Tab, Rfl: 3    nitroglycerin (NITROSTAT) 0.4 mg SL tablet, by SubLINGual route every five (5) minutes as needed for Chest Pain., Disp: , Rfl:             Date Last Reviewed:  8/1/2017     Complexity Level : Expanded review of therapy/medical records (1-2):  MODERATE COMPLEXITY   ASSESSMENT OF OCCUPATIONAL PERFORMANCE:   Palpation:          Pitting edema with skin folds elbow to shoulder. Elbow to hand minimal (1-/5)   Expander in place left breast.  Pt with scar adhesions lateral breast.  Pocket of edema below axilla on chest wall. Skin Integrity:          Skin integrity intact; no signs of cellulitis or fibrosis. Edema/Girth:  2+ and pitting    Left Right    Initial Most Recent Initial Most Recent   Upper  Extremity  217.0 cm  (190.0 cm graph)         Lower  Extremity               Physical Skills Involved:  1. Range of Motion  2. Pain (acute)  3. Edema  4. Skin Integrity Cognitive Skills Affected (resulting in the inability to perform in a timely and safe manner):  1. Perception Psychosocial Skills Affected:  1. Habits/Routines   Number of elements that affect the Plan of Care: 3-5:  MODERATE COMPLEXITY   CLINICAL DECISION MAKING:   Outcome Measure: Tool Used: Lymphedema Life Impact Scale   Score:  Initial: 41 Most Recent: X (Date: -- )   Interpretation of Score:  The Lymphedema Life Impact Scale (LLIS) is a validated instrument that measures the physical, functional, and psychosocial concerns pertinent to patients with extremity lymphedema. The Scale's questionnaire is administered to patients to gauge impairments, activity limitations, and participation restrictions resulting from their lymphedema. Score 0 1-13 14-26 27-40 41-54 55-67 68   Modifier CH CI CJ CK CL CM CN     ? Other PT/OT Primary Functional Limitations:     - CURRENT STATUS: CL - 60%-79% impaired, limited or restricted    - GOAL STATUS: CK - 40%-59% impaired, limited or restricted    - D/C STATUS:  ---------------To be determined---------------  Medical Necessity:   · Patient is expected to demonstrate progress in lymphedema management to decrease pain, swelling, reduce scar adhesions, self-manage lymphedema. Reason for Services/Other Comments:  · Patient continues to require skilled intervention due to left arm/chest lymphedema. Clinical Decision-Making Assessment:  Patient referred to OT for post mastectomy lymphedema management. Pt has histrory of chronic back pain which reduces mobility. Pt will need home exercise program to regain full AROM and improve strength and endurance in addition to lymphedema management/complete decongestive therapy. Assessment process, impact of co-morbidities, assessment modification\need for assistance, and selection of interventions: Analytical Complexity:MODERATE COMPLEXITY   TREATMENT:   (In addition to Assessment/Re-Assessment sessions the following treatments were rendered)    Pre-treatment Symptoms/Complaints:  Patient has no new complaints. Swelling at L expander site remains persistent. Pain: Initial:   Pain Intensity 1: 5  Post Session:  5   Occupational Therapy Evaluation (x) OT eval completed. Pt received information on lymphedema and risk reduction/self management practices as outlined by the National Lymphedema Network.     Therapeutic Exercise: (   minutes): ROM exercises in supine for shoulder flexion/IR/ER for 10 reps each followed by dowel exercises - patient will perform 2x/day. Reviewed additional UE Exercises including abdominal breathing and neck, shoulder, hand AROM, ball and dowel exercises. .    Manual Therapy: (75 min): Patient arrived with increased swelling noted at expander site. She received MLD to [place] with emphasis on L breast and upper ar. Following MLD she received trial of flexitouch pump for full cycle. Following MLD and use of flexitouch tissue at expander site was much softer. Multilayer bandaging using  4 short stretch bandages from hand to shoulder and lymphsoft foam pad at radial wrist and thenar eminance due to increased pain and sensitivity in that area. Pt instructed to leave bandages on as long as able. Contact made with [employer] [place] for funding for a custom compression sleeve. They will most likely cover cost of sleeve once estimate for cost reviewed. Since therapy began she has lost 5.5cm in LUE limb size. Tissue is softening up in upper arm. Discussed wearing a compression bra to provide pressure at L breast to decrease fibrotic tissue. She will explore. Treatment/Session Assessment:    · Response to Treatment:    She is responding to MLD and multi layer bandaging as evidenced by 5.5cm loss in LUE limb size. She also reports she can wash \"under L arm (axilla) with greater ease. \"     · Compliance with Program/Exercises:  good to date  · Recommendations/Intent for next treatment session: \"Next visit will focus on complete decongestive therapy\".   Total Treatment Duration:  OT Patient Time In/Time Out  Time In: 1000  Time Out: [address], OT

## 2017-08-04 ENCOUNTER — HOSPITAL ENCOUNTER (OUTPATIENT)
Dept: PHYSICAL THERAPY | Age: 62
Discharge: HOME | End: 2017-08-04
Payer: MEDICAID

## 2017-08-04 PROCEDURE — 97140 MANUAL THERAPY 1/> REGIONS: CPT

## 2017-08-04 NOTE — PROGRESS NOTES
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 5 = High Risk)  MD Orders: Evaluate and treat left arm lymphedema, s/p mastectomy MEDICAL/REFERRING DIAGNOSIS:   Lymphedema, not elsewhere classified [I89.0]   DATE OF ONSET: 2017   REFERRING PHYSICIAN: Nico Mixon MD  RETURN PHYSICIAN APPOINTMENT: 2 weeks     INITIAL ASSESSMENT:  Ms. Damari Galdamez presents with swelling in her left arm and breast, s/p left mastectomy in 2017. Pt has expander in place. She reports having had C-diff 2x, requiring 9 day hospitalization with first incident. Pt reports shooting pain from armpit to sternum across breast/chest at times 9/10, limb heaviness, pitting edema elbow to shoulder, surgical scar adhesions, decreased active range of motion left shoulder with upper trapezius pain at end ranges. PLAN OF CARE:   PROBLEM LIST:  1. Decreased Strength  2. Increased Pain  3. Decreased Flexibility/Joint Mobility  4. Edema/Girth INTERVENTIONS PLANNED:  1. Hygiene training; skin integrity management  2. Manual therapy training; manual lymphatic drainage  3. Therapeutic exercise  4. Multilayer bandaging  5. Compression management  6. Kineseotaping; compression pump; prn   TREATMENT PLAN:  Effective Dates: 17 TO 17. Frequency/Duration:  three times a week for 12 weeks; upon reassessment will adjust frequency and duration as necessary. GOALS: (Goals have been discussed and agreed upon with patient.)  Short-Term Functional Goals: Time Frame: 6 weeks  1. Patient will verbalize understanding of .longlymphedema precautions  2.   Patient will be independent in skin care to reduce risk of cellulitis  3. Patient will be independent with home exercise program  4. Patient will tolerate multilayer compression bandaging to aid in edema reduction  Discharge Goals: Time Frame: 12 weeks  1. Pt will have reduction and stabilization of circumferencial volumetric graph measurements left upper extremity  3. Patient is independent with donning and doffing compression garments for long term management  3. Patient is independent with home management of lymphedema  Rehabilitation Potential For Stated Goals: Good  Regarding Alphia Nissen Cannon's therapy, I certify that the treatment plan above will be carried out by a therapist or under their direction. Thank you for this referral,  Eda Sousa OT     Referring Physician Signature: Laxmi Fairchild MD _________________________  Date _________            The information in this section was collected on 8/4/2017   (except where otherwise noted). OCCUPATIONAL PROFILE & HISTORY:   History of Present Injury/Illness (Reason for Referral):   Ms. Pauline Hayes presents with swelling in her left arm and breast, s/p left mastectomy in January 2017. Pt has expander in place. She reports having had C-diff 2x, requiring 9 day hospitalization with first incident. Pt reports shooting pain from armpit to sternum across breast/chest at times 9/10, limb heaviness, pitting edema elbow to shoulder, surgical scar adhesions, decreased active range of motion left shoulder with upper trapezius pain at end ranges. Pt was referred to occupational therapy for education, management and reduction of swelling left upper extremity. Past Medical History/Comorbidities:   Ms. Pauline Hayes  has a past medical history of Arthritis; Asthma; CAD (coronary artery disease); Cancer ([address] Chronic pain; Complicated UTI (urinary tract infection) (12/8/2015); Diverticulosis; Heart failure ([address] History of echocardiogram (11/17/14 at [facility]); History of prediabetes; Hypertension;  Shortness of breath; Sleep apnea; and Thyroid cyst.  Ms. Pattie Albert  has a past surgical history that includes carpal tunnel release (Bilateral); cyst removal; cardiac surg procedure unlist (Cath 11/18/14 [facility]); lap cholecystectomy; sinus surgery proc unlisted; other surgical; neurological procedure unlisted; cholecystectomy; and breast surgery procedure unlisted. Social History/Living Environment:    Pt lives alone  Prior Level of Function/Work/Activity:  Pt is retired. Current Medications:    Current Outpatient Prescriptions:     albuterol (PROAIR HFA) 90 mcg/actuation inhaler, Take 1 Puff by inhalation every six (6) hours as needed for Wheezing., Disp: 1 Inhaler, Rfl: 2    lisinopril-hydroCHLOROthiazide (PRINZIDE, ZESTORETIC) 20-12.5 mg per tablet, TAKE 1 TABLET BY MOUTH DAILY, Disp: 30 Tab, Rfl: 0    letrozole (FEMARA) 2.5 mg tablet, Take 1 Tab by mouth daily. , Disp: 30 Tab, Rfl: 12    metoprolol succinate (TOPROL-XL) 100 mg tablet, Take 1 Tab by mouth daily. , Disp: 30 Tab, Rfl: 11    ondansetron (ZOFRAN ODT) 4 mg disintegrating tablet, Take 1 Tab by mouth every six (6) hours as needed for Nausea., Disp: 60 Tab, Rfl: 0    oxyCODONE-acetaminophen (PERCOCET)  mg per tablet, Take 1 Tab by mouth every six (6) hours as needed for Pain. Max Daily Amount: 4 Tabs., Disp: 60 Tab, Rfl: 0    diazePAM (VALIUM) 5 mg tablet, Take 5 mg by mouth every six (6) hours as needed for Anxiety. , Disp: , Rfl:     venlafaxine-SR (EFFEXOR-XR) 37.5 mg capsule, Take 1 capsule by mouth for 1 week and then increase to 75 mg (2 capsules) after 1 week., Disp: 60 Cap, Rfl: 0    fluticasone (FLONASE) 50 mcg/actuation nasal spray, 2 Sprays by Both Nostrils route daily. , Disp: 1 Bottle, Rfl: 2    fluticasone-salmeterol (ADVAIR DISKUS) 250-50 mcg/dose diskus inhaler, Take 1 Puff by inhalation two (2) times a day., Disp: 1 Inhaler, Rfl: 11    Cetirizine (ZYRTEC) 10 mg cap, Take  by mouth every morning., Disp: , Rfl:     acetaminophen (TYLENOL EXTRA STRENGTH) 500 mg tablet, Take  by mouth every six (6) hours as needed for Pain., Disp: , Rfl:     montelukast (SINGULAIR) 10 mg tablet, Take 1 Tab by mouth nightly., Disp: 30 Tab, Rfl: 5    cpap machine kit, by Does Not Apply route., Disp: , Rfl:     OXYGEN-AIR DELIVERY SYSTEMS, by Does Not Apply route. 3 lpm qhs, Disp: , Rfl:     ranitidine (ZANTAC) 150 mg tablet, Take 1 Tab by mouth two (2) times a day. Indications: GASTROESOPHAGEAL REFLUX, Disp: 60 Tab, Rfl: 3    nitroglycerin (NITROSTAT) 0.4 mg SL tablet, by SubLINGual route every five (5) minutes as needed for Chest Pain., Disp: , Rfl:             Date Last Reviewed:  8/4/2017     Complexity Level : Expanded review of therapy/medical records (1-2):  MODERATE COMPLEXITY   ASSESSMENT OF OCCUPATIONAL PERFORMANCE:   Palpation:          Pitting edema with skin folds elbow to shoulder. Elbow to hand minimal (1-/5)   Expander in place left breast.  Pt with scar adhesions lateral breast.  Pocket of edema below axilla on chest wall. Skin Integrity:          Skin integrity intact; no signs of cellulitis or fibrosis. Edema/Girth:  2+ and pitting    Left Right    Initial Most Recent Initial Most Recent   Upper  Extremity  217.0 cm  (190.0 cm graph)         Lower  Extremity               Physical Skills Involved:  1. Range of Motion  2. Pain (acute)  3. Edema  4. Skin Integrity Cognitive Skills Affected (resulting in the inability to perform in a timely and safe manner):  1. Perception Psychosocial Skills Affected:  1. Habits/Routines   Number of elements that affect the Plan of Care: 3-5:  MODERATE COMPLEXITY   CLINICAL DECISION MAKING:   Outcome Measure: Tool Used: Lymphedema Life Impact Scale   Score:  Initial: 41 Most Recent: X (Date: -- )   Interpretation of Score: The Lymphedema Life Impact Scale (LLIS) is a validated instrument that measures the physical, functional, and psychosocial concerns pertinent to patients with extremity lymphedema.   The Scale's questionnaire is administered to patients to gauge impairments, activity limitations, and participation restrictions resulting from their lymphedema. Score 0 1-13 14-26 27-40 41-54 55-67 68   Modifier CH CI CJ CK CL CM CN     ? Other PT/OT Primary Functional Limitations:     - CURRENT STATUS: CL - 60%-79% impaired, limited or restricted    - GOAL STATUS: CK - 40%-59% impaired, limited or restricted    - D/C STATUS:  ---------------To be determined---------------  Medical Necessity:   · Patient is expected to demonstrate progress in lymphedema management to decrease pain, swelling, reduce scar adhesions, self-manage lymphedema. Reason for Services/Other Comments:  · Patient continues to require skilled intervention due to left arm/chest lymphedema. Clinical Decision-Making Assessment:  Patient referred to OT for post mastectomy lymphedema management. Pt has histrory of chronic back pain which reduces mobility. Pt will need home exercise program to regain full AROM and improve strength and endurance in addition to lymphedema management/complete decongestive therapy. Assessment process, impact of co-morbidities, assessment modification\need for assistance, and selection of interventions: Analytical Complexity:MODERATE COMPLEXITY   TREATMENT:   (In addition to Assessment/Re-Assessment sessions the following treatments were rendered)    Pre-treatment Symptoms/Complaints:  Patient has no new complaints. Swelling at L expander site seems softer and more mobile since her last session. Pain: Initial:   Pain Intensity 1: 5  Post Session:  5   Occupational Therapy Evaluation (x) OT eval completed. Pt received information on lymphedema and risk reduction/self management practices as outlined by the National Lymphedema Network. Therapeutic Exercise: (   minutes): ROM exercises in supine for shoulder flexion/IR/ER for 10 reps each followed by dowel exercises - patient will perform 2x/day.    Reviewed additional UE Exercises including abdominal breathing and neck, shoulder, hand AROM, ball and dowel exercises. .    Manual Therapy: (75 min):   Patient received MLD to [place] with emphasis on L breast and upper arm. Following MLD she received trial of flexitouch pump for full cycle. Following MLD and use of flexitouch tissue at expander site was much softer. Multilayer bandaging using  4 short stretch bandages from hand to shoulder and lymphsoft foam pad at radial wrist and thenar eminance due to increased pain and sensitivity in that area. Pt instructed to leave bandages on as long as able. Contact made with [employer] [place] for funding for a custom compression sleeve. They will most likely cover cost of sleeve once estimate for cost reviewed - patient has an appt with [employer] representative at this office on 8/17/2017 for fitting of garment. Since therapy began she has lost 5.5cm in LUE limb size. Tissue is softening up in upper arm. Discussed wearing a compression bra to provide pressure at L breast to decrease fibrotic tissue. She will explore. Treatment/Session Assessment:    · Response to Treatment:    She is responding to MLD and multi layer bandaging as evidenced by 5.5cm loss in LUE limb size. She also reports she can wash \"under L arm (axilla) with greater ease. \"   She has a fitting for a custom sleeve on 8/17/2017 with [employer] representative. · Compliance with Program/Exercises:  good to date  · Recommendations/Intent for next treatment session: \"Next visit will focus on complete decongestive therapy\".   Total Treatment Duration:  OT Patient Time In/Time Out  Time In: 0100  Time Out: [facility],

## 2017-08-08 ENCOUNTER — HOSPITAL ENCOUNTER (OUTPATIENT)
Dept: PHYSICAL THERAPY | Age: 62
Discharge: HOME | End: 2017-08-08
Payer: MEDICAID

## 2017-08-08 PROCEDURE — 97140 MANUAL THERAPY 1/> REGIONS: CPT

## 2017-08-08 NOTE — PROGRESS NOTES
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 5 = High Risk)  MD Orders: Evaluate and treat left arm lymphedema, s/p mastectomy MEDICAL/REFERRING DIAGNOSIS:   Lymphedema, not elsewhere classified [I89.0]   DATE OF ONSET: 2017   REFERRING PHYSICIAN: Willam Mixon MD  RETURN PHYSICIAN APPOINTMENT: 2 weeks     INITIAL ASSESSMENT:  Ms. Isela Resendiz presents with swelling in her left arm and breast, s/p left mastectomy in 2017. Pt has expander in place. She reports having had C-diff 2x, requiring 9 day hospitalization with first incident. Pt reports shooting pain from armpit to sternum across breast/chest at times 9/10, limb heaviness, pitting edema elbow to shoulder, surgical scar adhesions, decreased active range of motion left shoulder with upper trapezius pain at end ranges. PLAN OF CARE:   PROBLEM LIST:  1. Decreased Strength  2. Increased Pain  3. Decreased Flexibility/Joint Mobility  4. Edema/Girth INTERVENTIONS PLANNED:  1. Hygiene training; skin integrity management  2. Manual therapy training; manual lymphatic drainage  3. Therapeutic exercise  4. Multilayer bandaging  5. Compression management  6. Kineseotaping; compression pump; prn   TREATMENT PLAN:  Effective Dates: 17 TO 17. Frequency/Duration:  three times a week for 12 weeks; upon reassessment will adjust frequency and duration as necessary. GOALS: (Goals have been discussed and agreed upon with patient.)  Short-Term Functional Goals: Time Frame: 6 weeks  1. Patient will verbalize understanding of .longlymphedema precautions  2.   Patient will be independent in skin care to reduce risk of cellulitis  3. Patient will be independent with home exercise program  4. Patient will tolerate multilayer compression bandaging to aid in edema reduction  Discharge Goals: Time Frame: 12 weeks  1. Pt will have reduction and stabilization of circumferencial volumetric graph measurements left upper extremity  3. Patient is independent with donning and doffing compression garments for long term management  3. Patient is independent with home management of lymphedema  Rehabilitation Potential For Stated Goals: Good  Regarding Mandy Lovett's therapy, I certify that the treatment plan above will be carried out by a therapist or under their direction. Thank you for this referral,  Momo Stock OT     Referring Physician Signature: Valere Bernheim, MD _________________________  Date _________            The information in this section was collected on 8/8/2017   (except where otherwise noted). OCCUPATIONAL PROFILE & HISTORY:   History of Present Injury/Illness (Reason for Referral):   Ms. Pacheco Leahy presents with swelling in her left arm and breast, s/p left mastectomy in January 2017. Pt has expander in place. She reports having had C-diff 2x, requiring 9 day hospitalization with first incident. Pt reports shooting pain from armpit to sternum across breast/chest at times 9/10, limb heaviness, pitting edema elbow to shoulder, surgical scar adhesions, decreased active range of motion left shoulder with upper trapezius pain at end ranges. Pt was referred to occupational therapy for education, management and reduction of swelling left upper extremity. Past Medical History/Comorbidities:   Ms. Pacheco Leahy  has a past medical history of Arthritis; Asthma; CAD (coronary artery disease); Cancer ([address] Chronic pain; Complicated UTI (urinary tract infection) (12/8/2015); Diverticulosis; Heart failure ([address] History of echocardiogram (11/17/14 at [facility]); History of prediabetes; Hypertension;  Shortness of breath; Sleep apnea; and Thyroid cyst.  Ms. Kailyn laurent  has a past surgical history that includes carpal tunnel release (Bilateral); cyst removal; cardiac surg procedure unlist (Cath 11/18/14 [facility]); lap cholecystectomy; sinus surgery proc unlisted; other surgical; neurological procedure unlisted; cholecystectomy; and breast surgery procedure unlisted. Social History/Living Environment:    Pt lives alone  Prior Level of Function/Work/Activity:  Pt is retired. Current Medications:    Current Outpatient Prescriptions:     albuterol (PROAIR HFA) 90 mcg/actuation inhaler, Take 1 Puff by inhalation every six (6) hours as needed for Wheezing., Disp: 1 Inhaler, Rfl: 2    lisinopril-hydroCHLOROthiazide (PRINZIDE, ZESTORETIC) 20-12.5 mg per tablet, TAKE 1 TABLET BY MOUTH DAILY, Disp: 30 Tab, Rfl: 0    letrozole (FEMARA) 2.5 mg tablet, Take 1 Tab by mouth daily. , Disp: 30 Tab, Rfl: 12    metoprolol succinate (TOPROL-XL) 100 mg tablet, Take 1 Tab by mouth daily. , Disp: 30 Tab, Rfl: 11    ondansetron (ZOFRAN ODT) 4 mg disintegrating tablet, Take 1 Tab by mouth every six (6) hours as needed for Nausea., Disp: 60 Tab, Rfl: 0    oxyCODONE-acetaminophen (PERCOCET)  mg per tablet, Take 1 Tab by mouth every six (6) hours as needed for Pain. Max Daily Amount: 4 Tabs., Disp: 60 Tab, Rfl: 0    diazePAM (VALIUM) 5 mg tablet, Take 5 mg by mouth every six (6) hours as needed for Anxiety. , Disp: , Rfl:     venlafaxine-SR (EFFEXOR-XR) 37.5 mg capsule, Take 1 capsule by mouth for 1 week and then increase to 75 mg (2 capsules) after 1 week., Disp: 60 Cap, Rfl: 0    fluticasone (FLONASE) 50 mcg/actuation nasal spray, 2 Sprays by Both Nostrils route daily. , Disp: 1 Bottle, Rfl: 2    fluticasone-salmeterol (ADVAIR DISKUS) 250-50 mcg/dose diskus inhaler, Take 1 Puff by inhalation two (2) times a day., Disp: 1 Inhaler, Rfl: 11    Cetirizine (ZYRTEC) 10 mg cap, Take  by mouth every morning., Disp: , Rfl:     acetaminophen (TYLENOL EXTRA STRENGTH) 500 mg tablet, Take  by mouth every six (6) hours as needed for Pain., Disp: , Rfl:     montelukast (SINGULAIR) 10 mg tablet, Take 1 Tab by mouth nightly., Disp: 30 Tab, Rfl: 5    cpap machine kit, by Does Not Apply route., Disp: , Rfl:     OXYGEN-AIR DELIVERY SYSTEMS, by Does Not Apply route. 3 lpm qhs, Disp: , Rfl:     ranitidine (ZANTAC) 150 mg tablet, Take 1 Tab by mouth two (2) times a day. Indications: GASTROESOPHAGEAL REFLUX, Disp: 60 Tab, Rfl: 3    nitroglycerin (NITROSTAT) 0.4 mg SL tablet, by SubLINGual route every five (5) minutes as needed for Chest Pain., Disp: , Rfl:             Date Last Reviewed:  8/8/2017     Complexity Level : Expanded review of therapy/medical records (1-2):  MODERATE COMPLEXITY   ASSESSMENT OF OCCUPATIONAL PERFORMANCE:   Palpation:          Pitting edema with skin folds elbow to shoulder. Elbow to hand minimal (1-/5)   Expander in place left breast.  Pt with scar adhesions lateral breast.  Pocket of edema below axilla on chest wall. Skin Integrity:          Skin integrity intact; no signs of cellulitis or fibrosis. Edema/Girth:  2+ and pitting    Left Right    Initial Most Recent Initial Most Recent   Upper  Extremity  217.0 cm  (190.0 cm graph)         Lower  Extremity               Physical Skills Involved:  1. Range of Motion  2. Pain (acute)  3. Edema  4. Skin Integrity Cognitive Skills Affected (resulting in the inability to perform in a timely and safe manner):  1. Perception Psychosocial Skills Affected:  1. Habits/Routines   Number of elements that affect the Plan of Care: 3-5:  MODERATE COMPLEXITY   CLINICAL DECISION MAKING:   Outcome Measure: Tool Used: Lymphedema Life Impact Scale   Score:  Initial: 41 Most Recent: X (Date: -- )   Interpretation of Score: The Lymphedema Life Impact Scale (LLIS) is a validated instrument that measures the physical, functional, and psychosocial concerns pertinent to patients with extremity lymphedema.   The Scale's questionnaire is administered to patients to gauge impairments, activity limitations, and participation restrictions resulting from their lymphedema. Score 0 1-13 14-26 27-40 41-54 55-67 68   Modifier CH CI CJ CK CL CM CN     ? Other PT/OT Primary Functional Limitations:     - CURRENT STATUS: CL - 60%-79% impaired, limited or restricted    - GOAL STATUS: CK - 40%-59% impaired, limited or restricted    - D/C STATUS:  ---------------To be determined---------------  Medical Necessity:   · Patient is expected to demonstrate progress in lymphedema management to decrease pain, swelling, reduce scar adhesions, self-manage lymphedema. Reason for Services/Other Comments:  · Patient continues to require skilled intervention due to left arm/chest lymphedema. Clinical Decision-Making Assessment:  Patient referred to OT for post mastectomy lymphedema management. Pt has histrory of chronic back pain which reduces mobility. Pt will need home exercise program to regain full AROM and improve strength and endurance in addition to lymphedema management/complete decongestive therapy. Assessment process, impact of co-morbidities, assessment modification\need for assistance, and selection of interventions: Analytical Complexity:MODERATE COMPLEXITY   TREATMENT:   (In addition to Assessment/Re-Assessment sessions the following treatments were rendered)    Pre-treatment Symptoms/Complaints:  Patient with recurrent diarrhea. To have fecal implant due to chronic C-diff August 25th. Deferred pump today due to diarrhea. Pain: Initial:   Pain Intensity 1: 5  Post Session:  5   Occupational Therapy Evaluation (x) OT eval completed. Pt received information on lymphedema and risk reduction/self management practices as outlined by the National Lymphedema Network. Therapeutic Exercise: (   minutes): ROM exercises in supine for shoulder flexion/IR/ER for 10 reps each followed by dowel exercises - patient will perform 2x/day.    Reviewed additional UE Exercises including abdominal breathing and neck, shoulder, hand AROM, ball and dowel exercises. .    Manual Therapy: (60 min):   Patient received MLD to [place] with emphasis on L breast and upper arm. Multilayer bandaging using  3 short stretch bandages from hand to shoulder without lymphsoft foam pad as patient forgot it at home. Pt instructed to leave bandages on as long as able. Contact made with [employer] [place] for funding for a custom compression sleeve. They will most likely cover cost of sleeve once estimate for cost reviewed - patient has an appt with [facility] representative at this office on 8/17/2017 for fitting of garment; therapist emailed the [employer] rep to confirm the appointment. Since therapy began she has lost 5.5cm in LUE limb size. Tissue is softening up in upper arm. Discussed wearing a compression bra to provide pressure at L breast to decrease fibrotic tissue. She will explore. Treatment/Session Assessment:    · Response to Treatment:    She is responding to MLD and multi layer bandaging as evidenced by 5.5cm loss in LUE limb size. She also reports she can wash \"under L arm (axilla) with greater ease. \"   She has a fitting for a custom sleeve on 8/17/2017 with [facility] representative. · Compliance with Program/Exercises:  good to date  · Recommendations/Intent for next treatment session: \"Next visit will focus on complete decongestive therapy\".   Total Treatment Duration:  OT Patient Time In/Time Out  Time In: 0100  Time Out: [address],

## 2017-08-10 ENCOUNTER — HOSPITAL ENCOUNTER (OUTPATIENT)
Dept: PHYSICAL THERAPY | Age: 62
Discharge: HOME | End: 2017-08-10
Payer: MEDICAID

## 2017-08-10 PROCEDURE — 97140 MANUAL THERAPY 1/> REGIONS: CPT

## 2017-08-10 NOTE — PROGRESS NOTES
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 5 = High Risk)  MD Orders: Evaluate and treat left arm lymphedema, s/p mastectomy MEDICAL/REFERRING DIAGNOSIS:   Lymphedema, not elsewhere classified [I89.0]   DATE OF ONSET: 2017   REFERRING PHYSICIAN: Deyanira Mixon MD  RETURN PHYSICIAN APPOINTMENT: 2 weeks     INITIAL ASSESSMENT:  Ms. Marnie Coto presents with swelling in her left arm and breast, s/p left mastectomy in 2017. Pt has expander in place. She reports having had C-diff 2x, requiring 9 day hospitalization with first incident. Pt reports shooting pain from armpit to sternum across breast/chest at times 9/10, limb heaviness, pitting edema elbow to shoulder, surgical scar adhesions, decreased active range of motion left shoulder with upper trapezius pain at end ranges. PLAN OF CARE:   PROBLEM LIST:  1. Decreased Strength  2. Increased Pain  3. Decreased Flexibility/Joint Mobility  4. Edema/Girth INTERVENTIONS PLANNED:  1. Hygiene training; skin integrity management  2. Manual therapy training; manual lymphatic drainage  3. Therapeutic exercise  4. Multilayer bandaging  5. Compression management  6. Kineseotaping; compression pump; prn   TREATMENT PLAN:  Effective Dates: 17 TO 17. Frequency/Duration:  three times a week for 12 weeks; upon reassessment will adjust frequency and duration as necessary. GOALS: (Goals have been discussed and agreed upon with patient.)  Short-Term Functional Goals: Time Frame: 6 weeks  1. Patient will verbalize understanding of .longlymphedema precautions  2.   Patient will be independent in skin care to reduce risk of cellulitis  3. Patient will be independent with home exercise program  4. Patient will tolerate multilayer compression bandaging to aid in edema reduction  Discharge Goals: Time Frame: 12 weeks  1. Pt will have reduction and stabilization of circumferencial volumetric graph measurements left upper extremity  3. Patient is independent with donning and doffing compression garments for long term management  3. Patient is independent with home management of lymphedema  Rehabilitation Potential For Stated Goals: Good  Regarding Tabitha Lovett's therapy, I certify that the treatment plan above will be carried out by a therapist or under their direction. Thank you for this referral,  Rebeca Ma OT     Referring Physician Signature: Jeanine Espinoza MD _________________________  Date _________            The information in this section was collected on 8/10/2017   (except where otherwise noted). OCCUPATIONAL PROFILE & HISTORY:   History of Present Injury/Illness (Reason for Referral):   Ms. Amauri Gonzalez presents with swelling in her left arm and breast, s/p left mastectomy in January 2017. Pt has expander in place. She reports having had C-diff 2x, requiring 9 day hospitalization with first incident. Pt reports shooting pain from armpit to sternum across breast/chest at times 9/10, limb heaviness, pitting edema elbow to shoulder, surgical scar adhesions, decreased active range of motion left shoulder with upper trapezius pain at end ranges. Pt was referred to occupational therapy for education, management and reduction of swelling left upper extremity. Past Medical History/Comorbidities:   Ms. Amauri Gonzalez  has a past medical history of Arthritis; Asthma; CAD (coronary artery disease); Cancer ([address] Chronic pain; Complicated UTI (urinary tract infection) (12/8/2015); Diverticulosis; Heart failure ([address] History of echocardiogram (11/17/14 at [facility]); History of prediabetes; Hypertension;  Shortness of breath; Sleep apnea; and Thyroid cyst.  Ms. Isela Resendiz  has a past surgical history that includes carpal tunnel release (Bilateral); cyst removal; cardiac surg procedure unlist (Cath 11/18/14 [facility]); lap cholecystectomy; sinus surgery proc unlisted; other surgical; neurological procedure unlisted; cholecystectomy; and breast surgery procedure unlisted. Social History/Living Environment:    Pt lives alone  Prior Level of Function/Work/Activity:  Pt is retired. Current Medications:    Current Outpatient Prescriptions:     albuterol (PROAIR HFA) 90 mcg/actuation inhaler, Take 1 Puff by inhalation every six (6) hours as needed for Wheezing., Disp: 1 Inhaler, Rfl: 2    lisinopril-hydroCHLOROthiazide (PRINZIDE, ZESTORETIC) 20-12.5 mg per tablet, TAKE 1 TABLET BY MOUTH DAILY, Disp: 30 Tab, Rfl: 0    letrozole (FEMARA) 2.5 mg tablet, Take 1 Tab by mouth daily. , Disp: 30 Tab, Rfl: 12    metoprolol succinate (TOPROL-XL) 100 mg tablet, Take 1 Tab by mouth daily. , Disp: 30 Tab, Rfl: 11    ondansetron (ZOFRAN ODT) 4 mg disintegrating tablet, Take 1 Tab by mouth every six (6) hours as needed for Nausea., Disp: 60 Tab, Rfl: 0    oxyCODONE-acetaminophen (PERCOCET)  mg per tablet, Take 1 Tab by mouth every six (6) hours as needed for Pain. Max Daily Amount: 4 Tabs., Disp: 60 Tab, Rfl: 0    diazePAM (VALIUM) 5 mg tablet, Take 5 mg by mouth every six (6) hours as needed for Anxiety. , Disp: , Rfl:     venlafaxine-SR (EFFEXOR-XR) 37.5 mg capsule, Take 1 capsule by mouth for 1 week and then increase to 75 mg (2 capsules) after 1 week., Disp: 60 Cap, Rfl: 0    fluticasone (FLONASE) 50 mcg/actuation nasal spray, 2 Sprays by Both Nostrils route daily. , Disp: 1 Bottle, Rfl: 2    fluticasone-salmeterol (ADVAIR DISKUS) 250-50 mcg/dose diskus inhaler, Take 1 Puff by inhalation two (2) times a day., Disp: 1 Inhaler, Rfl: 11    Cetirizine (ZYRTEC) 10 mg cap, Take  by mouth every morning., Disp: , Rfl:     acetaminophen (TYLENOL EXTRA STRENGTH) 500 mg tablet, Take  by mouth every six (6) hours as needed for Pain., Disp: , Rfl:     montelukast (SINGULAIR) 10 mg tablet, Take 1 Tab by mouth nightly., Disp: 30 Tab, Rfl: 5    cpap machine kit, by Does Not Apply route., Disp: , Rfl:     OXYGEN-AIR DELIVERY SYSTEMS, by Does Not Apply route. 3 lpm qhs, Disp: , Rfl:     ranitidine (ZANTAC) 150 mg tablet, Take 1 Tab by mouth two (2) times a day. Indications: GASTROESOPHAGEAL REFLUX, Disp: 60 Tab, Rfl: 3    nitroglycerin (NITROSTAT) 0.4 mg SL tablet, by SubLINGual route every five (5) minutes as needed for Chest Pain., Disp: , Rfl:             Date Last Reviewed:  8/10/2017     Complexity Level : Expanded review of therapy/medical records (1-2):  MODERATE COMPLEXITY   ASSESSMENT OF OCCUPATIONAL PERFORMANCE:   Palpation:          Pitting edema with skin folds elbow to shoulder. Elbow to hand minimal (1-/5)   Expander in place left breast.  Pt with scar adhesions lateral breast.  Pocket of edema below axilla on chest wall. Skin Integrity:          Skin integrity intact; no signs of cellulitis or fibrosis. Edema/Girth:  2+ and pitting    Left Right    Initial Most Recent Initial Most Recent   Upper  Extremity  217.0 cm  (190.0 cm graph)         Lower  Extremity               Physical Skills Involved:  1. Range of Motion  2. Pain (acute)  3. Edema  4. Skin Integrity Cognitive Skills Affected (resulting in the inability to perform in a timely and safe manner):  1. Perception Psychosocial Skills Affected:  1. Habits/Routines   Number of elements that affect the Plan of Care: 3-5:  MODERATE COMPLEXITY   CLINICAL DECISION MAKING:   Outcome Measure: Tool Used: Lymphedema Life Impact Scale   Score:  Initial: 41 Most Recent: X (Date: -- )   Interpretation of Score: The Lymphedema Life Impact Scale (LLIS) is a validated instrument that measures the physical, functional, and psychosocial concerns pertinent to patients with extremity lymphedema.   The Scale's questionnaire is administered to patients to gauge impairments, activity limitations, and participation restrictions resulting from their lymphedema. Score 0 1-13 14-26 27-40 41-54 55-67 68   Modifier CH CI CJ CK CL CM CN     ? Other PT/OT Primary Functional Limitations:     - CURRENT STATUS: CL - 60%-79% impaired, limited or restricted    - GOAL STATUS: CK - 40%-59% impaired, limited or restricted    - D/C STATUS:  ---------------To be determined---------------  Medical Necessity:   · Patient is expected to demonstrate progress in lymphedema management to decrease pain, swelling, reduce scar adhesions, self-manage lymphedema. Reason for Services/Other Comments:  · Patient continues to require skilled intervention due to left arm/chest lymphedema. Clinical Decision-Making Assessment:  Patient referred to OT for post mastectomy lymphedema management. Pt has histrory of chronic back pain which reduces mobility. Pt will need home exercise program to regain full AROM and improve strength and endurance in addition to lymphedema management/complete decongestive therapy. Assessment process, impact of co-morbidities, assessment modification\need for assistance, and selection of interventions: Analytical Complexity:MODERATE COMPLEXITY   TREATMENT:   (In addition to Assessment/Re-Assessment sessions the following treatments were rendered)    Pre-treatment Symptoms/Complaints:  Patient with recurrent diarrhea. To have fecal implant due to chronic C-diff August 25th. Deferred pump again today due to diarrhea otherwise no new problems. Continues to tolerate bandages well and aid assists with adjusting/donning as needed. Pain: Initial:   Pain Intensity 1: 5  Post Session:  5   Occupational Therapy Evaluation (x) OT eval completed. Pt received information on lymphedema and risk reduction/self management practices as outlined by the National Lymphedema Network.     Therapeutic Exercise: (   minutes): ROM exercises in supine for shoulder flexion/IR/ER for 10 reps each followed by dowel exercises - patient will perform 2x/day. Reviewed additional UE Exercises including abdominal breathing and neck, shoulder, hand AROM, ball and dowel exercises. .    Manual Therapy: (60 min):   Patient received MLD to [place] with emphasis on L breast and upper arm. Multilayer bandaging using  3 short stretch bandages from hand to shoulder without lymphsoft foam pad as patient forgot it at home. Pt instructed to leave bandages on as long as able. Contact made with [employer] for funding for a custom compression sleeve. They will most likely cover cost of sleeve once estimate for cost reviewed - patient has an appt with [facility] representative at this office on 8/17/2017 for fitting of garment; therapist emailed the [employer] rep to confirm the appointment. Since therapy began she has lost 5.5cm in LUE limb size. Tissue is softening up in upper arm. Discussed wearing a compression bra to provide pressure at L breast to decrease fibrotic tissue. She will explore -she states she tried a sports bra but did not feel it gave her enough support so went back to wearing normal bra. Treatment/Session Assessment:    · Response to Treatment:    She is responding to MLD and multi layer bandaging as evidenced by 5.5cm loss in LUE limb size. She also reports she can wash \"under L arm (axilla) with greater ease. \"   She has a fitting for a custom sleeve on 8/17/2017 with [employer] representative. Pt has good support system in home health aid. · Compliance with Program/Exercises:  good to date  · Recommendations/Intent for next treatment session: \"Next visit will focus on complete decongestive therapy\".   Total Treatment Duration:  OT Patient Time In/Time Out  Time In: 0100  Time Out: [address], OT

## 2017-08-14 ENCOUNTER — HOSPITAL ENCOUNTER (OUTPATIENT)
Dept: PHYSICAL THERAPY | Age: 62
Discharge: HOME | End: 2017-08-14
Payer: MEDICAID

## 2017-08-14 ENCOUNTER — HOSPITAL ENCOUNTER (OUTPATIENT)
Dept: LAB | Age: 62
Discharge: HOME | End: 2017-08-14
Payer: MEDICAID

## 2017-08-14 DIAGNOSIS — C50.919 MALIGNANT NEOPLASM OF FEMALE BREAST, UNSPECIFIED LATERALITY, UNSPECIFIED SITE OF BREAST: ICD-10-CM

## 2017-08-14 LAB
ALBUMIN SERPL BCP-MCNC: 3.6 G/DL (ref 3.2–4.6)
ALBUMIN/GLOB SERPL: 0.8 {RATIO} (ref 1.2–3.5)
ALP SERPL-CCNC: 152 U/L (ref 50–136)
ALT SERPL-CCNC: 67 U/L (ref 12–65)
ANION GAP BLD CALC-SCNC: 4 MMOL/L (ref 7–16)
AST SERPL W P-5'-P-CCNC: 34 U/L (ref 15–37)
BASOPHILS # BLD AUTO: 0.1 K/UL (ref 0–0.2)
BASOPHILS # BLD: 1 % (ref 0–2)
BILIRUB SERPL-MCNC: 0.4 MG/DL (ref 0.2–1.1)
BUN SERPL-MCNC: 21 MG/DL (ref 8–23)
CALCIUM SERPL-MCNC: 9.3 MG/DL (ref 8.3–10.4)
CHLORIDE SERPL-SCNC: 105 MMOL/L (ref 98–107)
CO2 SERPL-SCNC: 28 MMOL/L (ref 21–32)
CREAT SERPL-MCNC: 1.03 MG/DL (ref 0.6–1)
DIFFERENTIAL METHOD BLD: ABNORMAL
EOSINOPHIL # BLD: 0.1 K/UL (ref 0–0.8)
EOSINOPHIL NFR BLD: 2 % (ref 0.5–7.8)
ERYTHROCYTE [DISTWIDTH] IN BLOOD BY AUTOMATED COUNT: 13.5 % (ref 11.9–14.6)
GLOBULIN SER CALC-MCNC: 4.3 G/DL (ref 2.3–3.5)
GLUCOSE SERPL-MCNC: 93 MG/DL (ref 65–100)
HCT VFR BLD AUTO: 39.8 % (ref 35.8–46.3)
HGB BLD-MCNC: 12.7 G/DL (ref 11.7–15.4)
LYMPHOCYTES # BLD AUTO: 31 % (ref 13–44)
LYMPHOCYTES # BLD: 1.9 K/UL (ref 0.5–4.6)
MCH RBC QN AUTO: 26.3 PG (ref 26.1–32.9)
MCHC RBC AUTO-ENTMCNC: 31.9 G/DL (ref 31.4–35)
MCV RBC AUTO: 82.6 FL (ref 79.6–97.8)
MONOCYTES # BLD: 0.5 K/UL (ref 0.1–1.3)
MONOCYTES NFR BLD AUTO: 8 % (ref 4–12)
NEUTS SEG # BLD: 3.5 K/UL (ref 1.7–8.2)
NEUTS SEG NFR BLD AUTO: 58 % (ref 43–78)
NRBC # BLD: 0 K/UL (ref 0–0.2)
PLATELET # BLD AUTO: 380 K/UL (ref 150–450)
PMV BLD AUTO: 8.8 FL (ref 10.8–14.1)
POTASSIUM SERPL-SCNC: 4.2 MMOL/L (ref 3.5–5.1)
PROT SERPL-MCNC: 7.9 G/DL (ref 6.3–8.2)
RBC # BLD AUTO: 4.82 M/UL (ref 4.05–5.25)
SODIUM SERPL-SCNC: 137 MMOL/L (ref 136–145)
WBC # BLD AUTO: 6 K/UL (ref 4.3–11.1)

## 2017-08-14 PROCEDURE — 97140 MANUAL THERAPY 1/> REGIONS: CPT

## 2017-08-14 PROCEDURE — 97110 THERAPEUTIC EXERCISES: CPT

## 2017-08-14 PROCEDURE — 80053 COMPREHEN METABOLIC PANEL: CPT | Performed by: INTERNAL MEDICINE

## 2017-08-14 PROCEDURE — 85025 COMPLETE CBC W/AUTO DIFF WBC: CPT | Performed by: INTERNAL MEDICINE

## 2017-08-14 PROCEDURE — 36415 COLL VENOUS BLD VENIPUNCTURE: CPT | Performed by: INTERNAL MEDICINE

## 2017-08-14 NOTE — PROGRESS NOTES
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 5 = High Risk)  MD Orders: Evaluate and treat left arm lymphedema, s/p mastectomy MEDICAL/REFERRING DIAGNOSIS:   Lymphedema, not elsewhere classified [I89.0]   DATE OF ONSET: 2017   REFERRING PHYSICIAN: Shana Mixon MD  RETURN PHYSICIAN APPOINTMENT: 2 weeks     INITIAL ASSESSMENT:  Ms. Aracelis Jeter presents with swelling in her left arm and breast, s/p left mastectomy in 2017. Pt has expander in place. She reports having had C-diff 2x, requiring 9 day hospitalization with first incident. Pt reports shooting pain from armpit to sternum across breast/chest at times 9/10, limb heaviness, pitting edema elbow to shoulder, surgical scar adhesions, decreased active range of motion left shoulder with upper trapezius pain at end ranges. PLAN OF CARE:   PROBLEM LIST:  1. Decreased Strength  2. Increased Pain  3. Decreased Flexibility/Joint Mobility  4. Edema/Girth INTERVENTIONS PLANNED:  1. Hygiene training; skin integrity management  2. Manual therapy training; manual lymphatic drainage  3. Therapeutic exercise  4. Multilayer bandaging  5. Compression management  6. Kineseotaping; compression pump; prn   TREATMENT PLAN:  Effective Dates: 17 TO 17. Frequency/Duration:  three times a week for 12 weeks; upon reassessment will adjust frequency and duration as necessary. GOALS: (Goals have been discussed and agreed upon with patient.)  Short-Term Functional Goals: Time Frame: 6 weeks  1. Patient will verbalize understanding of .longlymphedema precautions  2.   Patient will be independent in skin care to reduce risk of cellulitis  3. Patient will be independent with home exercise program  4. Patient will tolerate multilayer compression bandaging to aid in edema reduction  Discharge Goals: Time Frame: 12 weeks  1. Pt will have reduction and stabilization of circumferencial volumetric graph measurements left upper extremity  3. Patient is independent with donning and doffing compression garments for long term management  3. Patient is independent with home management of lymphedema  Rehabilitation Potential For Stated Goals: Good  Regarding Venkat Lovett's therapy, I certify that the treatment plan above will be carried out by a therapist or under their direction. Thank you for this referral,  Aaron Apodaca, REENA     Referring Physician Signature: Adali Canseco MD _________________________  Date _________            The information in this section was collected on 8/14/2017   (except where otherwise noted). OCCUPATIONAL PROFILE & HISTORY:   History of Present Injury/Illness (Reason for Referral):   Ms. Keron Hernandez presents with swelling in her left arm and breast, s/p left mastectomy in January 2017. Pt has expander in place. She reports having had C-diff 2x, requiring 9 day hospitalization with first incident. Pt reports shooting pain from armpit to sternum across breast/chest at times 9/10, limb heaviness, pitting edema elbow to shoulder, surgical scar adhesions, decreased active range of motion left shoulder with upper trapezius pain at end ranges. Pt was referred to occupational therapy for education, management and reduction of swelling left upper extremity. Past Medical History/Comorbidities:   Ms. Keron Hernandez  has a past medical history of Arthritis; Asthma; CAD (coronary artery disease); Cancer ([address] Chronic pain; Complicated UTI (urinary tract infection) (12/8/2015); Diverticulosis; Heart failure ([address] History of echocardiogram (11/17/14 at [facility]); History of prediabetes; Hypertension;  Shortness of breath; Sleep apnea; and Thyroid cyst.  Ms. Joaquin Morjeon  has a past surgical history that includes carpal tunnel release (Bilateral); cyst removal; cardiac surg procedure unlist (Cath 11/18/14 [facility]); lap cholecystectomy; sinus surgery proc unlisted; other surgical; neurological procedure unlisted; cholecystectomy; and breast surgery procedure unlisted. Social History/Living Environment:    Pt lives alone  Prior Level of Function/Work/Activity:  Pt is retired. Current Medications:    Current Outpatient Prescriptions:     diazePAM (VALIUM) 5 mg tablet, Take 1 Tab by mouth every six (6) hours as needed for Anxiety. Max Daily Amount: 20 mg., Disp: 60 Tab, Rfl: 0    albuterol (PROAIR HFA) 90 mcg/actuation inhaler, Take 1 Puff by inhalation every six (6) hours as needed for Wheezing., Disp: 1 Inhaler, Rfl: 2    lisinopril-hydroCHLOROthiazide (PRINZIDE, ZESTORETIC) 20-12.5 mg per tablet, TAKE 1 TABLET BY MOUTH DAILY, Disp: 30 Tab, Rfl: 0    letrozole (FEMARA) 2.5 mg tablet, Take 1 Tab by mouth daily. , Disp: 30 Tab, Rfl: 12    metoprolol succinate (TOPROL-XL) 100 mg tablet, Take 1 Tab by mouth daily. , Disp: 30 Tab, Rfl: 11    ondansetron (ZOFRAN ODT) 4 mg disintegrating tablet, Take 1 Tab by mouth every six (6) hours as needed for Nausea., Disp: 60 Tab, Rfl: 0    oxyCODONE-acetaminophen (PERCOCET)  mg per tablet, Take 1 Tab by mouth every six (6) hours as needed for Pain. Max Daily Amount: 4 Tabs., Disp: 60 Tab, Rfl: 0    venlafaxine-SR (EFFEXOR-XR) 37.5 mg capsule, Take 1 capsule by mouth for 1 week and then increase to 75 mg (2 capsules) after 1 week., Disp: 60 Cap, Rfl: 0    fluticasone (FLONASE) 50 mcg/actuation nasal spray, 2 Sprays by Both Nostrils route daily. , Disp: 1 Bottle, Rfl: 2    fluticasone-salmeterol (ADVAIR DISKUS) 250-50 mcg/dose diskus inhaler, Take 1 Puff by inhalation two (2) times a day., Disp: 1 Inhaler, Rfl: 11    Cetirizine (ZYRTEC) 10 mg cap, Take  by mouth every morning., Disp: , Rfl:    acetaminophen (TYLENOL EXTRA STRENGTH) 500 mg tablet, Take  by mouth every six (6) hours as needed for Pain., Disp: , Rfl:     montelukast (SINGULAIR) 10 mg tablet, Take 1 Tab by mouth nightly., Disp: 30 Tab, Rfl: 5    cpap machine kit, by Does Not Apply route., Disp: , Rfl:     OXYGEN-AIR DELIVERY SYSTEMS, by Does Not Apply route. 3 lpm qhs, Disp: , Rfl:     ranitidine (ZANTAC) 150 mg tablet, Take 1 Tab by mouth two (2) times a day. Indications: GASTROESOPHAGEAL REFLUX, Disp: 60 Tab, Rfl: 3    nitroglycerin (NITROSTAT) 0.4 mg SL tablet, by SubLINGual route every five (5) minutes as needed for Chest Pain., Disp: , Rfl:             Date Last Reviewed:  8/14/2017     Complexity Level : Expanded review of therapy/medical records (1-2):  MODERATE COMPLEXITY   ASSESSMENT OF OCCUPATIONAL PERFORMANCE:   Palpation:          Pitting edema with skin folds elbow to shoulder. Elbow to hand minimal (1-/5)   Expander in place left breast.  Pt with scar adhesions lateral breast.  Pocket of edema below axilla on chest wall. Skin Integrity:          Skin integrity intact; no signs of cellulitis or fibrosis. Edema/Girth:  2+ and pitting    Left Right    Initial Most Recent Initial Most Recent   Upper  Extremity  217.0 cm  (190.0 cm graph)         Lower  Extremity               Physical Skills Involved:  1. Range of Motion  2. Pain (acute)  3. Edema  4. Skin Integrity Cognitive Skills Affected (resulting in the inability to perform in a timely and safe manner):  1. Perception Psychosocial Skills Affected:  1. Habits/Routines   Number of elements that affect the Plan of Care: 3-5:  MODERATE COMPLEXITY   CLINICAL DECISION MAKING:   Outcome Measure: Tool Used: Lymphedema Life Impact Scale   Score:  Initial: 41 Most Recent: X (Date: -- )   Interpretation of Score:  The Lymphedema Life Impact Scale (LLIS) is a validated instrument that measures the physical, functional, and psychosocial concerns pertinent to patients with extremity lymphedema. The Scale's questionnaire is administered to patients to gauge impairments, activity limitations, and participation restrictions resulting from their lymphedema. Score 0 1-13 14-26 27-40 41-54 55-67 68   Modifier CH CI CJ CK CL CM CN     ? Other PT/OT Primary Functional Limitations:     - CURRENT STATUS: CL - 60%-79% impaired, limited or restricted    - GOAL STATUS: CK - 40%-59% impaired, limited or restricted    - D/C STATUS:  ---------------To be determined---------------  Medical Necessity:   · Patient is expected to demonstrate progress in lymphedema management to decrease pain, swelling, reduce scar adhesions, self-manage lymphedema. Reason for Services/Other Comments:  · Patient continues to require skilled intervention due to left arm/chest lymphedema. Clinical Decision-Making Assessment:  Patient referred to OT for post mastectomy lymphedema management. Pt has histrory of chronic back pain which reduces mobility. Pt will need home exercise program to regain full AROM and improve strength and endurance in addition to lymphedema management/complete decongestive therapy. Assessment process, impact of co-morbidities, assessment modification\need for assistance, and selection of interventions: Analytical Complexity:MODERATE COMPLEXITY   TREATMENT:   (In addition to Assessment/Re-Assessment sessions the following treatments were rendered)    Pre-treatment Symptoms/Complaints:  Patient with recurrent diarrhea. To have fecal implant due to chronic C-diff August 25th. Deferred pump again today due to diarrhea otherwise no new problems. Continues to tolerate bandages well and aid assists with adjusting/donning as needed. Pain: Initial:   Pain Intensity 1: 5  Post Session:  5   Occupational Therapy Evaluation (x) OT eval completed. Pt received information on lymphedema and risk reduction/self management practices as outlined by the National Lymphedema Network.     Therapeutic Exercise: ( 15  minutes): ROM exercises in supine for shoulder flexion/IR/ER for 10 reps each followed by dowel exercises - patient will perform 2x/day. Reviewed additional UE Exercises including abdominal breathing and neck, shoulder, hand AROM, ball and dowel exercises, completing 3-5 reps each sitting up in chair. Manual Therapy: (45 min):   Patient received MLD to [place] with emphasis on L breast and upper arm. Multilayer bandaging using  3 short stretch bandages from hand to shoulder without lymphsoft foam pad as patient forgot it at home. Pt instructed to leave bandages on as long as able. Contact made with [employer] [place] for funding for a custom compression sleeve. They will most likely cover cost of sleeve once estimate for cost reviewed - patient has an appt with [facility] representative at this office on 8/17/2017 for fitting of garment; therapist emailed the [employer] rep to confirm the appointment. Since therapy began she has lost 5.5cm in LUE limb size. Tissue is softening up in upper arm. Discussed wearing a compression bra to provide pressure at L breast to decrease fibrotic tissue. She will explore -she states she tried a sports bra but did not feel it gave her enough support so went back to wearing normal bra. Treatment/Session Assessment:    · Response to Treatment:    She is responding to MLD and multi layer bandaging as evidenced by 5.5cm loss in LUE limb size. She also reports she can wash \"under L arm (axilla) with greater ease. \"   She has a fitting for a custom sleeve on 8/17/2017 with [employer] representative. Pt has good support system in home health aid. · Compliance with Program/Exercises:  good to date  · Recommendations/Intent for next treatment session: \"Next visit will focus on complete decongestive therapy\".   Total Treatment Duration:  OT Patient Time In/Time Out  Time In: 0200  Time Out: [address], OT

## 2017-08-17 ENCOUNTER — HOSPITAL ENCOUNTER (OUTPATIENT)
Dept: PHYSICAL THERAPY | Age: 62
Discharge: HOME | End: 2017-08-17
Payer: MEDICAID

## 2017-08-17 PROCEDURE — 97140 MANUAL THERAPY 1/> REGIONS: CPT

## 2017-08-17 NOTE — PROGRESS NOTES
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 5 = High Risk)  MD Orders: Evaluate and treat left arm lymphedema, s/p mastectomy MEDICAL/REFERRING DIAGNOSIS:   Lymphedema, not elsewhere classified [I89.0]   DATE OF ONSET: 2017   REFERRING PHYSICIAN: Norma Mixon MD  RETURN PHYSICIAN APPOINTMENT: 2 weeks     INITIAL ASSESSMENT:  Ms. Andi Anderson presents with swelling in her left arm and breast, s/p left mastectomy in 2017. Pt has expander in place. She reports having had C-diff 2x, requiring 9 day hospitalization with first incident. Pt reports shooting pain from armpit to sternum across breast/chest at times 9/10, limb heaviness, pitting edema elbow to shoulder, surgical scar adhesions, decreased active range of motion left shoulder with upper trapezius pain at end ranges. PLAN OF CARE:   PROBLEM LIST:  1. Decreased Strength  2. Increased Pain  3. Decreased Flexibility/Joint Mobility  4. Edema/Girth INTERVENTIONS PLANNED:  1. Hygiene training; skin integrity management  2. Manual therapy training; manual lymphatic drainage  3. Therapeutic exercise  4. Multilayer bandaging  5. Compression management  6. Kineseotaping; compression pump; prn   TREATMENT PLAN:  Effective Dates: 17 TO 17. Frequency/Duration:  three times a week for 12 weeks; upon reassessment will adjust frequency and duration as necessary. GOALS: (Goals have been discussed and agreed upon with patient.)  Short-Term Functional Goals: Time Frame: 6 weeks  1. Patient will verbalize understanding of .longlymphedema precautions  2.   Patient will be independent in skin care to reduce risk of cellulitis  3. Patient will be independent with home exercise program  4. Patient will tolerate multilayer compression bandaging to aid in edema reduction  Discharge Goals: Time Frame: 12 weeks  1. Pt will have reduction and stabilization of circumferencial volumetric graph measurements left upper extremity  3. Patient is independent with donning and doffing compression garments for long term management  3. Patient is independent with home management of lymphedema  Rehabilitation Potential For Stated Goals: Good  Regarding Alphia Nissen Cannon's therapy, I certify that the treatment plan above will be carried out by a therapist or under their direction. Thank you for this referral,  Eda Sousa OT     Referring Physician Signature: Laxmi Fairchild MD _________________________  Date _________            The information in this section was collected on 8/17/2017   (except where otherwise noted). OCCUPATIONAL PROFILE & HISTORY:   History of Present Injury/Illness (Reason for Referral):   Ms. Pauline Hayes presents with swelling in her left arm and breast, s/p left mastectomy in January 2017. Pt has expander in place. She reports having had C-diff 2x, requiring 9 day hospitalization with first incident. Pt reports shooting pain from armpit to sternum across breast/chest at times 9/10, limb heaviness, pitting edema elbow to shoulder, surgical scar adhesions, decreased active range of motion left shoulder with upper trapezius pain at end ranges. Pt was referred to occupational therapy for education, management and reduction of swelling left upper extremity. Past Medical History/Comorbidities:   Ms. Pauline Hayes  has a past medical history of Arthritis; Asthma; CAD (coronary artery disease); Cancer ([address] Chronic pain; Complicated UTI (urinary tract infection) (12/8/2015); Diverticulosis; Heart failure ([address] History of echocardiogram (11/17/14 at [facility]); History of prediabetes; Hypertension;  Shortness of breath; Sleep apnea; and Thyroid cyst.  Ms. Mak Sharma  has a past surgical history that includes carpal tunnel release (Bilateral); cyst removal; cardiac surg procedure unlist (Cath 11/18/14 [facility]); lap cholecystectomy; sinus surgery proc unlisted; other surgical; neurological procedure unlisted; cholecystectomy; and breast surgery procedure unlisted. Social History/Living Environment:    Pt lives alone  Prior Level of Function/Work/Activity:  Pt is retired. Current Medications:    Current Outpatient Prescriptions:     diazePAM (VALIUM) 5 mg tablet, Take 1 Tab by mouth every six (6) hours as needed for Anxiety. Max Daily Amount: 20 mg., Disp: 60 Tab, Rfl: 0    albuterol (PROAIR HFA) 90 mcg/actuation inhaler, Take 1 Puff by inhalation every six (6) hours as needed for Wheezing., Disp: 1 Inhaler, Rfl: 2    lisinopril-hydroCHLOROthiazide (PRINZIDE, ZESTORETIC) 20-12.5 mg per tablet, TAKE 1 TABLET BY MOUTH DAILY, Disp: 30 Tab, Rfl: 0    letrozole (FEMARA) 2.5 mg tablet, Take 1 Tab by mouth daily. , Disp: 30 Tab, Rfl: 12    metoprolol succinate (TOPROL-XL) 100 mg tablet, Take 1 Tab by mouth daily. , Disp: 30 Tab, Rfl: 11    ondansetron (ZOFRAN ODT) 4 mg disintegrating tablet, Take 1 Tab by mouth every six (6) hours as needed for Nausea., Disp: 60 Tab, Rfl: 0    oxyCODONE-acetaminophen (PERCOCET)  mg per tablet, Take 1 Tab by mouth every six (6) hours as needed for Pain. Max Daily Amount: 4 Tabs., Disp: 60 Tab, Rfl: 0    venlafaxine-SR (EFFEXOR-XR) 37.5 mg capsule, Take 1 capsule by mouth for 1 week and then increase to 75 mg (2 capsules) after 1 week., Disp: 60 Cap, Rfl: 0    fluticasone (FLONASE) 50 mcg/actuation nasal spray, 2 Sprays by Both Nostrils route daily. , Disp: 1 Bottle, Rfl: 2    fluticasone-salmeterol (ADVAIR DISKUS) 250-50 mcg/dose diskus inhaler, Take 1 Puff by inhalation two (2) times a day., Disp: 1 Inhaler, Rfl: 11    Cetirizine (ZYRTEC) 10 mg cap, Take  by mouth every morning., Disp: , Rfl:    acetaminophen (TYLENOL EXTRA STRENGTH) 500 mg tablet, Take  by mouth every six (6) hours as needed for Pain., Disp: , Rfl:     montelukast (SINGULAIR) 10 mg tablet, Take 1 Tab by mouth nightly., Disp: 30 Tab, Rfl: 5    cpap machine kit, by Does Not Apply route., Disp: , Rfl:     OXYGEN-AIR DELIVERY SYSTEMS, by Does Not Apply route. 3 lpm qhs, Disp: , Rfl:     ranitidine (ZANTAC) 150 mg tablet, Take 1 Tab by mouth two (2) times a day. Indications: GASTROESOPHAGEAL REFLUX, Disp: 60 Tab, Rfl: 3    nitroglycerin (NITROSTAT) 0.4 mg SL tablet, by SubLINGual route every five (5) minutes as needed for Chest Pain., Disp: , Rfl:             Date Last Reviewed:  8/17/2017     Complexity Level : Expanded review of therapy/medical records (1-2):  MODERATE COMPLEXITY   ASSESSMENT OF OCCUPATIONAL PERFORMANCE:   Palpation:          Pitting edema with skin folds elbow to shoulder. Elbow to hand minimal (1-/5)   Expander in place left breast.  Pt with scar adhesions lateral breast.  Pocket of edema below axilla on chest wall. Skin Integrity:          Skin integrity intact; no signs of cellulitis or fibrosis. Edema/Girth:  2+ and pitting    Left Right    Initial Most Recent Initial Most Recent   Upper  Extremity  217.0 cm  (190.0 cm graph)         Lower  Extremity               Physical Skills Involved:  1. Range of Motion  2. Pain (acute)  3. Edema  4. Skin Integrity Cognitive Skills Affected (resulting in the inability to perform in a timely and safe manner):  1. Perception Psychosocial Skills Affected:  1. Habits/Routines   Number of elements that affect the Plan of Care: 3-5:  MODERATE COMPLEXITY   CLINICAL DECISION MAKING:   Outcome Measure: Tool Used: Lymphedema Life Impact Scale   Score:  Initial: 41 Most Recent: X (Date: -- )   Interpretation of Score:  The Lymphedema Life Impact Scale (LLIS) is a validated instrument that measures the physical, functional, and psychosocial concerns pertinent to patients with extremity lymphedema. The Scale's questionnaire is administered to patients to gauge impairments, activity limitations, and participation restrictions resulting from their lymphedema. Score 0 1-13 14-26 27-40 41-54 55-67 68   Modifier CH CI CJ CK CL CM CN     ? Other PT/OT Primary Functional Limitations:     - CURRENT STATUS: CL - 60%-79% impaired, limited or restricted    - GOAL STATUS: CK - 40%-59% impaired, limited or restricted    - D/C STATUS:  ---------------To be determined---------------  Medical Necessity:   · Patient is expected to demonstrate progress in lymphedema management to decrease pain, swelling, reduce scar adhesions, self-manage lymphedema. Reason for Services/Other Comments:  · Patient continues to require skilled intervention due to left arm/chest lymphedema. Clinical Decision-Making Assessment:  Patient referred to OT for post mastectomy lymphedema management. Pt has histrory of chronic back pain which reduces mobility. Pt will need home exercise program to regain full AROM and improve strength and endurance in addition to lymphedema management/complete decongestive therapy. Assessment process, impact of co-morbidities, assessment modification\need for assistance, and selection of interventions: Analytical Complexity:MODERATE COMPLEXITY   TREATMENT:   (In addition to Assessment/Re-Assessment sessions the following treatments were rendered)    Pre-treatment Symptoms/Complaints:  Patient with recurrent diarrhea. To have fecal implant due to chronic C-diff August 25th. Deferred pump again today due to diarrhea otherwise no new problems. Continues to tolerate bandages well and aid assists with adjusting/donning as needed. Pain: Initial:   Pain Intensity 1: 5  Post Session:  5   Occupational Therapy Evaluation (x) OT eval completed. Pt received information on lymphedema and risk reduction/self management practices as outlined by the National Lymphedema Network.     Therapeutic Exercise: (   minutes): ROM exercises in supine for shoulder flexion/IR/ER for 10 reps each followed by dowel exercises - patient will perform 2x/day. Reviewed additional UE Exercises including abdominal breathing and neck, shoulder, hand AROM, ball and dowel exercises, completing 3-5 reps each sitting up in chair. Manual Therapy: (60 min):   Patient measured today for custom arm sleeve/gauntlet with assist from michelle gates [facility] [employer]) and providing [employer] in hopes they can assist with payment, also requesting a breast pad to aid in softening up of fibrotic breast tissue. Contact made with [employer] [place] for funding for a custom compression sleeve. They will most likely cover cost of sleeve once estimate for cost reviewed - patient has an appt with [employer] representative at this office on 8/17/2017 for fitting of garment; therapist emailed the Med[employer] rep to confirm the appointment. Since therapy began she has lost 5.5cm in LUE limb size. Tissue is softening up in upper arm. Discussed wearing a compression bra to provide pressure at L breast to decrease fibrotic tissue. She will explore -she states she tried a sports bra but did not feel it gave her enough support so went back to wearing normal bra. Fabricated a breast pad to wear in bra to aid in breaking fibrotic tissue of breast up. LUE multi layer bandaged using 3 short stretch bandages from hand to axilla. Treatment/Session Assessment:    · Response to Treatment:    She is responding to MLD and multi layer bandaging as evidenced by 5.5cm loss in LUE limb size. She also reports she can wash \"under L arm (axilla) with greater ease. \"   She had a fitting for a custom sleeve today with michelle talbert. Pt has good support system in home health aid. See manual section above. · Compliance with Program/Exercises:  good to date  · Recommendations/Intent for next treatment session:  \"Next visit will focus on complete decongestive therapy\".   Total Treatment Duration:  OT Patient Time In/Time Out  Time In: 0200  Time Out: [address], OT

## 2017-08-22 ENCOUNTER — HOSPITAL ENCOUNTER (OUTPATIENT)
Dept: PHYSICAL THERAPY | Age: 62
Discharge: HOME | End: 2017-08-22
Payer: MEDICAID

## 2017-08-22 PROCEDURE — 97110 THERAPEUTIC EXERCISES: CPT

## 2017-08-22 PROCEDURE — 97140 MANUAL THERAPY 1/> REGIONS: CPT

## 2017-08-22 NOTE — PROGRESS NOTES
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 5 = High Risk)  MD Orders: Evaluate and treat left arm lymphedema, s/p mastectomy MEDICAL/REFERRING DIAGNOSIS:   Lymphedema, not elsewhere classified [I89.0]   DATE OF ONSET: 2017   REFERRING PHYSICIAN: Delgado Mixon MD  RETURN PHYSICIAN APPOINTMENT: 2 weeks     INITIAL ASSESSMENT:  Ms. Gadiel Mckay presents with swelling in her left arm and breast, s/p left mastectomy in 2017. Pt has expander in place. She reports having had C-diff 2x, requiring 9 day hospitalization with first incident. Pt reports shooting pain from armpit to sternum across breast/chest at times 9/10, limb heaviness, pitting edema elbow to shoulder, surgical scar adhesions, decreased active range of motion left shoulder with upper trapezius pain at end ranges. PLAN OF CARE:   PROBLEM LIST:  1. Decreased Strength  2. Increased Pain  3. Decreased Flexibility/Joint Mobility  4. Edema/Girth INTERVENTIONS PLANNED:  1. Hygiene training; skin integrity management  2. Manual therapy training; manual lymphatic drainage  3. Therapeutic exercise  4. Multilayer bandaging  5. Compression management  6. Kineseotaping; compression pump; prn   TREATMENT PLAN:  Effective Dates: 17 TO 17. Frequency/Duration:  three times a week for 12 weeks; upon reassessment will adjust frequency and duration as necessary. GOALS: (Goals have been discussed and agreed upon with patient.)  Short-Term Functional Goals: Time Frame: 6 weeks  1. Patient will verbalize understanding of .longlymphedema precautions  2.   Patient will be independent in skin care to reduce risk of cellulitis  3. Patient will be independent with home exercise program  4. Patient will tolerate multilayer compression bandaging to aid in edema reduction  Discharge Goals: Time Frame: 12 weeks  1. Pt will have reduction and stabilization of circumferencial volumetric graph measurements left upper extremity  3. Patient is independent with donning and doffing compression garments for long term management  3. Patient is independent with home management of lymphedema  Rehabilitation Potential For Stated Goals: Good  Regarding Judah Lovett's therapy, I certify that the treatment plan above will be carried out by a therapist or under their direction. Thank you for this referral,  Isaac Gray OT     Referring Physician Signature: Mylene Davies MD _________________________  Date _________            The information in this section was collected on 8/22/2017   (except where otherwise noted). OCCUPATIONAL PROFILE & HISTORY:   History of Present Injury/Illness (Reason for Referral):   Ms. Mak Sharma presents with swelling in her left arm and breast, s/p left mastectomy in January 2017. Pt has expander in place. She reports having had C-diff 2x, requiring 9 day hospitalization with first incident. Pt reports shooting pain from armpit to sternum across breast/chest at times 9/10, limb heaviness, pitting edema elbow to shoulder, surgical scar adhesions, decreased active range of motion left shoulder with upper trapezius pain at end ranges. Pt was referred to occupational therapy for education, management and reduction of swelling left upper extremity. Past Medical History/Comorbidities:   Ms. Mak Sharma  has a past medical history of Arthritis; Asthma; CAD (coronary artery disease); Cancer ([address] Chronic pain; Complicated UTI (urinary tract infection) (12/8/2015); Diverticulosis; Heart failure ([address] History of echocardiogram (11/17/14 at [facility]); History of prediabetes; Hypertension;  Shortness of breath; Sleep apnea; and Thyroid cyst.  Ms. Damari Galdamez  has a past surgical history that includes carpal tunnel release (Bilateral); cyst removal; cardiac surg procedure unlist (Cath 11/18/14 [facility]); lap cholecystectomy; sinus surgery proc unlisted; other surgical; neurological procedure unlisted; cholecystectomy; and breast surgery procedure unlisted. Social History/Living Environment:    Pt lives alone  Prior Level of Function/Work/Activity:  Pt is retired. Current Medications:    Current Outpatient Prescriptions:     diazePAM (VALIUM) 5 mg tablet, Take 1 Tab by mouth every six (6) hours as needed for Anxiety. Max Daily Amount: 20 mg., Disp: 60 Tab, Rfl: 0    albuterol (PROAIR HFA) 90 mcg/actuation inhaler, Take 1 Puff by inhalation every six (6) hours as needed for Wheezing., Disp: 1 Inhaler, Rfl: 2    lisinopril-hydroCHLOROthiazide (PRINZIDE, ZESTORETIC) 20-12.5 mg per tablet, TAKE 1 TABLET BY MOUTH DAILY, Disp: 30 Tab, Rfl: 0    letrozole (FEMARA) 2.5 mg tablet, Take 1 Tab by mouth daily. , Disp: 30 Tab, Rfl: 12    metoprolol succinate (TOPROL-XL) 100 mg tablet, Take 1 Tab by mouth daily. , Disp: 30 Tab, Rfl: 11    ondansetron (ZOFRAN ODT) 4 mg disintegrating tablet, Take 1 Tab by mouth every six (6) hours as needed for Nausea., Disp: 60 Tab, Rfl: 0    oxyCODONE-acetaminophen (PERCOCET)  mg per tablet, Take 1 Tab by mouth every six (6) hours as needed for Pain. Max Daily Amount: 4 Tabs., Disp: 60 Tab, Rfl: 0    venlafaxine-SR (EFFEXOR-XR) 37.5 mg capsule, Take 1 capsule by mouth for 1 week and then increase to 75 mg (2 capsules) after 1 week., Disp: 60 Cap, Rfl: 0    fluticasone (FLONASE) 50 mcg/actuation nasal spray, 2 Sprays by Both Nostrils route daily. , Disp: 1 Bottle, Rfl: 2    fluticasone-salmeterol (ADVAIR DISKUS) 250-50 mcg/dose diskus inhaler, Take 1 Puff by inhalation two (2) times a day., Disp: 1 Inhaler, Rfl: 11    Cetirizine (ZYRTEC) 10 mg cap, Take  by mouth every morning., Disp: , Rfl:    acetaminophen (TYLENOL EXTRA STRENGTH) 500 mg tablet, Take  by mouth every six (6) hours as needed for Pain., Disp: , Rfl:     montelukast (SINGULAIR) 10 mg tablet, Take 1 Tab by mouth nightly., Disp: 30 Tab, Rfl: 5    cpap machine kit, by Does Not Apply route., Disp: , Rfl:     OXYGEN-AIR DELIVERY SYSTEMS, by Does Not Apply route. 3 lpm qhs, Disp: , Rfl:     ranitidine (ZANTAC) 150 mg tablet, Take 1 Tab by mouth two (2) times a day. Indications: GASTROESOPHAGEAL REFLUX, Disp: 60 Tab, Rfl: 3    nitroglycerin (NITROSTAT) 0.4 mg SL tablet, by SubLINGual route every five (5) minutes as needed for Chest Pain., Disp: , Rfl:             Date Last Reviewed:  8/22/2017     Complexity Level : Expanded review of therapy/medical records (1-2):  MODERATE COMPLEXITY   ASSESSMENT OF OCCUPATIONAL PERFORMANCE:   Palpation:          Pitting edema with skin folds elbow to shoulder. Elbow to hand minimal (1-/5)   Expander in place left breast.  Pt with scar adhesions lateral breast.  Pocket of edema below axilla on chest wall. Skin Integrity:          Skin integrity intact; no signs of cellulitis or fibrosis. Edema/Girth:  2+ and pitting    Left Right    Initial Most Recent Initial Most Recent   Upper  Extremity  217.0 cm  Palm to axilla  202.5cm  Palm to axilla   8/22/2017       Lower  Extremity               Physical Skills Involved:  1. Range of Motion  2. Pain (acute)  3. Edema  4. Skin Integrity Cognitive Skills Affected (resulting in the inability to perform in a timely and safe manner):  1. Perception Psychosocial Skills Affected:  1. Habits/Routines   Number of elements that affect the Plan of Care: 3-5:  MODERATE COMPLEXITY   CLINICAL DECISION MAKING:   Outcome Measure: Tool Used: Lymphedema Life Impact Scale   Score:  Initial: 41 Most Recent: X (Date: -- )   Interpretation of Score:  The Lymphedema Life Impact Scale (LLIS) is a validated instrument that measures the physical, functional, and psychosocial concerns pertinent to patients with extremity lymphedema. The Scale's questionnaire is administered to patients to gauge impairments, activity limitations, and participation restrictions resulting from their lymphedema. Score 0 1-13 14-26 27-40 41-54 55-67 68   Modifier CH CI CJ CK CL CM CN     ? Other PT/OT Primary Functional Limitations:     - CURRENT STATUS: CL - 60%-79% impaired, limited or restricted    - GOAL STATUS: CK - 40%-59% impaired, limited or restricted    - D/C STATUS:  ---------------To be determined---------------  Medical Necessity:   · Patient is expected to demonstrate progress in lymphedema management to decrease pain, swelling, reduce scar adhesions, self-manage lymphedema. Reason for Services/Other Comments:  · Patient continues to require skilled intervention due to left arm/chest lymphedema. Clinical Decision-Making Assessment:  Patient referred to OT for post mastectomy lymphedema management. Pt has histrory of chronic back pain which reduces mobility. Pt will need home exercise program to regain full AROM and improve strength and endurance in addition to lymphedema management/complete decongestive therapy. Assessment process, impact of co-morbidities, assessment modification\need for assistance, and selection of interventions: Analytical Complexity:MODERATE COMPLEXITY   TREATMENT:   (In addition to Assessment/Re-Assessment sessions the following treatments were rendered)    Pre-treatment Symptoms/Complaints:  Patient with recurrent diarrhea. To have fecal implant due to chronic C-diff August 25th. Continues to tolerate bandages well and aid assists with adjusting/donning as needed. She reports breast pad is helping to soften fibrotic tissue at the L breast - she is wearing at night,  Patient met with plastic surgeon last week and once she has fecal implant and C diff resolved implant surgery will procede.   Pain: Initial:   Pain Intensity 1: 5  Post Session:  5 Occupational Therapy Evaluation (x) OT eval completed. Pt received information on lymphedema and risk reduction/self management practices as outlined by the National Lymphedema Network. Therapeutic Exercise: (  15 minutes): ROM exercises in supine for shoulder flexion/IR/ER for 30 reps each followed by dowel exercises - patient  Performing dowel exercises most days at home. Patient also completed overhead pulleys for flexion/ABD and to aid in promoting lymphatic flow. Reviewed additional UE Exercises including abdominal breathing and neck, shoulder, hand AROM, ball and dowel exercises, completing 3-5 reps each sitting up in chair. Manual Therapy: (60 min):   Patient measured  for custom arm sleeve/gauntlet with assist from medi rep [facility] [employer]) and providing [address] in hopes they can assist with payment, also requesting a breast pad to aid in softening up of fibrotic breast tissue. Contact made with [employer] [place] for funding for a custom compression sleeve. They will most likely cover cost of sleeve once estimate for cost reviewed. LUE measured from the palm to axilla and since therapy began she has lost 14.5cm in LUE limb size. Tissue is softening up in upper arm. Discussed wearing a compression bra to provide pressure at L breast to decrease fibrotic tissue. She will explore -she states she tried a sports bra but did not feel it gave her enough support so went back to wearing normal bra. Patient is wearing breast pad  in bra to aid in breaking fibrotic tissue of breast up. LUE multi layer bandaged using 3 short stretch bandages from hand to axilla. Treatment/Session Assessment:    · Response to Treatment:    She is responding to MLD and multi layer bandaging as evidenced by 14.5cm loss in LUE limb size. She also reports she can wash \"under L arm (axilla) with greater ease. \"   She had a fitting for a custom sleeve  with medi representative.  Pt has good support system in home health aid. See manual section above. · Compliance with Program/Exercises:  good to date  · Recommendations/Intent for next treatment session: \"Next visit will focus on complete decongestive therapy\".   Total Treatment Duration:  OT Patient Time In/Time Out  Time In: 0100  Time Out: 194 [facility],

## 2017-08-24 ENCOUNTER — HOSPITAL ENCOUNTER (OUTPATIENT)
Dept: PHYSICAL THERAPY | Age: 62
Discharge: HOME | End: 2017-08-24
Payer: MEDICAID

## 2017-08-31 ENCOUNTER — HOSPITAL ENCOUNTER (OUTPATIENT)
Dept: PHYSICAL THERAPY | Age: 62
Discharge: HOME | End: 2017-08-31
Payer: MEDICAID

## 2017-09-05 ENCOUNTER — HOSPITAL ENCOUNTER (OUTPATIENT)
Dept: PHYSICAL THERAPY | Age: 62
Discharge: HOME | End: 2017-09-05
Payer: MEDICAID

## 2017-09-05 PROCEDURE — 97140 MANUAL THERAPY 1/> REGIONS: CPT

## 2017-09-05 NOTE — PROGRESS NOTES
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 5 = High Risk)  MD Orders: Evaluate and treat left arm lymphedema, s/p mastectomy MEDICAL/REFERRING DIAGNOSIS:   Lymphedema, not elsewhere classified [I89.0]   DATE OF ONSET: 2017   REFERRING PHYSICIAN: Chanelle Mixon MD  RETURN PHYSICIAN APPOINTMENT: 2 weeks     INITIAL ASSESSMENT:  Ms. Nadiya Grider presents with swelling in her left arm and breast, s/p left mastectomy in 2017. Pt has expander in place. She reports having had C-diff 2x, requiring 9 day hospitalization with first incident. Pt reports shooting pain from armpit to sternum across breast/chest at times 9/10, limb heaviness, pitting edema elbow to shoulder, surgical scar adhesions, decreased active range of motion left shoulder with upper trapezius pain at end ranges. PLAN OF CARE:   PROBLEM LIST:  1. Decreased Strength  2. Increased Pain  3. Decreased Flexibility/Joint Mobility  4. Edema/Girth INTERVENTIONS PLANNED:  1. Hygiene training; skin integrity management  2. Manual therapy training; manual lymphatic drainage  3. Therapeutic exercise  4. Multilayer bandaging  5. Compression management  6. Kineseotaping; compression pump; prn   TREATMENT PLAN:  Effective Dates: 17 TO 17. Frequency/Duration:  three times a week for 12 weeks; upon reassessment will adjust frequency and duration as necessary. GOALS: (Goals have been discussed and agreed upon with patient.)  Short-Term Functional Goals: Time Frame: 6 weeks  1. Patient will verbalize understanding of .longlymphedema precautions  2.   Patient will be independent in skin care to reduce risk of cellulitis  3. Patient will be independent with home exercise program  4. Patient will tolerate multilayer compression bandaging to aid in edema reduction  Discharge Goals: Time Frame: 12 weeks  1. Pt will have reduction and stabilization of circumferencial volumetric graph measurements left upper extremity  3. Patient is independent with donning and doffing compression garments for long term management  3. Patient is independent with home management of lymphedema  Rehabilitation Potential For Stated Goals: Good  Regarding Nasreen Lovett's therapy, I certify that the treatment plan above will be carried out by a therapist or under their direction. Thank you for this referral,  Beltran Walter OT     Referring Physician Signature: Danny Pineda MD _________________________  Date _________            The information in this section was collected on 9/5/2017   (except where otherwise noted). OCCUPATIONAL PROFILE & HISTORY:   History of Present Injury/Illness (Reason for Referral):   Ms. Carlito Garcia presents with swelling in her left arm and breast, s/p left mastectomy in January 2017. Pt has expander in place. She reports having had C-diff 2x, requiring 9 day hospitalization with first incident. Pt reports shooting pain from armpit to sternum across breast/chest at times 9/10, limb heaviness, pitting edema elbow to shoulder, surgical scar adhesions, decreased active range of motion left shoulder with upper trapezius pain at end ranges. Pt was referred to occupational therapy for education, management and reduction of swelling left upper extremity. Past Medical History/Comorbidities:   Ms. Carlito Garcia  has a past medical history of Arthritis; Asthma; CAD (coronary artery disease); Cancer ([address] Chronic pain; Complicated UTI (urinary tract infection) (12/8/2015); Diverticulosis; Heart failure ([facility] [address] History of echocardiogram (11/17/14 at [facility]); History of prediabetes; Hypertension;  Shortness of breath; Sleep apnea; and Thyroid cyst.  Ms. Aarti Jackson  has a past surgical history that includes carpal tunnel release (Bilateral); cyst removal; cardiac surg procedure unlist (Cath 11/18/14 [facility]); lap cholecystectomy; sinus surgery proc unlisted; other surgical; neurological procedure unlisted; cholecystectomy; and breast surgery procedure unlisted. Social History/Living Environment:    Pt lives alone  Prior Level of Function/Work/Activity:  Pt is retired. Current Medications:    Current Outpatient Prescriptions:     fluticasone (FLONASE) 50 mcg/actuation nasal spray, SHAKE LIQUID AND USE 2 SPRAYS IN EACH NOSTRIL DAILY, Disp: 1 Bottle, Rfl: 2    diazePAM (VALIUM) 5 mg tablet, Take 1 Tab by mouth every six (6) hours as needed for Anxiety. Max Daily Amount: 20 mg., Disp: 60 Tab, Rfl: 0    albuterol (PROAIR HFA) 90 mcg/actuation inhaler, Take 1 Puff by inhalation every six (6) hours as needed for Wheezing., Disp: 1 Inhaler, Rfl: 2    lisinopril-hydroCHLOROthiazide (PRINZIDE, ZESTORETIC) 20-12.5 mg per tablet, TAKE 1 TABLET BY MOUTH DAILY, Disp: 30 Tab, Rfl: 0    letrozole (FEMARA) 2.5 mg tablet, Take 1 Tab by mouth daily. , Disp: 30 Tab, Rfl: 12    metoprolol succinate (TOPROL-XL) 100 mg tablet, Take 1 Tab by mouth daily. , Disp: 30 Tab, Rfl: 11    ondansetron (ZOFRAN ODT) 4 mg disintegrating tablet, Take 1 Tab by mouth every six (6) hours as needed for Nausea., Disp: 60 Tab, Rfl: 0    oxyCODONE-acetaminophen (PERCOCET)  mg per tablet, Take 1 Tab by mouth every six (6) hours as needed for Pain.  Max Daily Amount: 4 Tabs., Disp: 60 Tab, Rfl: 0    venlafaxine-SR (EFFEXOR-XR) 37.5 mg capsule, Take 1 capsule by mouth for 1 week and then increase to 75 mg (2 capsules) after 1 week., Disp: 60 Cap, Rfl: 0    fluticasone-salmeterol (ADVAIR DISKUS) 250-50 mcg/dose diskus inhaler, Take 1 Puff by inhalation two (2) times a day., Disp: 1 Inhaler, Rfl: 11    Cetirizine (ZYRTEC) 10 mg cap, Take  by mouth every morning., Disp: , Rfl:     acetaminophen (TYLENOL EXTRA STRENGTH) 500 mg tablet, Take  by mouth every six (6) hours as needed for Pain., Disp: , Rfl:     montelukast (SINGULAIR) 10 mg tablet, Take 1 Tab by mouth nightly., Disp: 30 Tab, Rfl: 5    cpap machine kit, by Does Not Apply route., Disp: , Rfl:     OXYGEN-AIR DELIVERY SYSTEMS, by Does Not Apply route. 3 lpm qhs, Disp: , Rfl:     ranitidine (ZANTAC) 150 mg tablet, Take 1 Tab by mouth two (2) times a day. Indications: GASTROESOPHAGEAL REFLUX, Disp: 60 Tab, Rfl: 3    nitroglycerin (NITROSTAT) 0.4 mg SL tablet, by SubLINGual route every five (5) minutes as needed for Chest Pain., Disp: , Rfl:             Date Last Reviewed:  9/5/2017     Complexity Level : Expanded review of therapy/medical records (1-2):  MODERATE COMPLEXITY   ASSESSMENT OF OCCUPATIONAL PERFORMANCE:   Palpation:          Pitting edema with skin folds elbow to shoulder. Elbow to hand minimal (1-/5)   Expander in place left breast.  Pt with scar adhesions lateral breast.  Pocket of edema below axilla on chest wall. Skin Integrity:          Skin integrity intact; no signs of cellulitis or fibrosis. Edema/Girth:  2+ and pitting    Left Right    Initial Most Recent Initial Most Recent   Upper  Extremity  217.0 cm  Palm to axilla  202.5cm  Palm to axilla   8/22/2017       Lower  Extremity               Physical Skills Involved:  1. Range of Motion  2. Pain (acute)  3. Edema  4. Skin Integrity Cognitive Skills Affected (resulting in the inability to perform in a timely and safe manner):  1. Perception Psychosocial Skills Affected:  1. Habits/Routines   Number of elements that affect the Plan of Care: 3-5:  MODERATE COMPLEXITY   CLINICAL DECISION MAKING:   Outcome Measure: Tool Used: Lymphedema Life Impact Scale   Score:  Initial: 41 Most Recent: X (Date: -- )   Interpretation of Score:  The Lymphedema Life Impact Scale (LLIS) is a validated instrument that measures the physical, functional, and psychosocial concerns pertinent to patients with extremity lymphedema. The Scale's questionnaire is administered to patients to gauge impairments, activity limitations, and participation restrictions resulting from their lymphedema. Score 0 1-13 14-26 27-40 41-54 55-67 68   Modifier CH CI CJ CK CL CM CN     ? Other PT/OT Primary Functional Limitations:     - CURRENT STATUS: CL - 60%-79% impaired, limited or restricted    - GOAL STATUS: CK - 40%-59% impaired, limited or restricted    - D/C STATUS:  ---------------To be determined---------------  Medical Necessity:   · Patient is expected to demonstrate progress in lymphedema management to decrease pain, swelling, reduce scar adhesions, self-manage lymphedema. Reason for Services/Other Comments:  · Patient continues to require skilled intervention due to left arm/chest lymphedema. Clinical Decision-Making Assessment:  Patient referred to OT for post mastectomy lymphedema management. Pt has histrory of chronic back pain which reduces mobility. Pt will need home exercise program to regain full AROM and improve strength and endurance in addition to lymphedema management/complete decongestive therapy. Assessment process, impact of co-morbidities, assessment modification\need for assistance, and selection of interventions: Analytical Complexity:MODERATE COMPLEXITY   TREATMENT:   (In addition to Assessment/Re-Assessment sessions the following treatments were rendered)    Pre-treatment Symptoms/Complaints:  Patient had fecal implant due to chronic C-diff August 25th. Pain: Initial:   Pain Intensity 1: 3  Post Session:  2   Occupational Therapy Evaluation (x) OT eval completed. Pt received information on lymphedema and risk reduction/self management practices as outlined by the National Lymphedema Network.     Therapeutic Exercise: (): ROM exercises in supine for shoulder flexion/IR/ER for 30 reps each followed by dowel exercises - patient      Manual Therapy: (60 min): Opened cervical, AAA, JAIDA, AAI, TONO anastamosies and facilitation of upper quadrant nodes prior to decongestion of L UE. Application of Eucerin lotion. Multilayer compression bandaging with cotton stockinette and 3 short stretch bandages. Pt is waiting for custom garment. Treatment/Session Assessment:    · Response to Treatment:    She is responding to MLD and multi layer bandaging as evidenced by 14.5cm loss in LUE limb size. She also reports she can wash \"under L arm (axilla) with greater ease. \"   She had a fitting for a custom sleeve  with [employer] representative. Pt has good support system in home health aid. See manual section above. · Compliance with Program/Exercises:  good to date  · Recommendations/Intent for next treatment session: \"Next visit will focus on complete decongestive therapy\".   Total Treatment Duration:  OT Patient Time In/Time Out  Time In: 1200  Time Out: Vij[address], OT

## 2017-09-07 ENCOUNTER — HOSPITAL ENCOUNTER (OUTPATIENT)
Dept: PHYSICAL THERAPY | Age: 62
Discharge: HOME | End: 2017-09-07
Payer: MEDICAID

## 2017-09-07 PROCEDURE — 97140 MANUAL THERAPY 1/> REGIONS: CPT

## 2017-09-07 NOTE — PROGRESS NOTES
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 5 = High Risk)  MD Orders: Evaluate and treat left arm lymphedema, s/p mastectomy MEDICAL/REFERRING DIAGNOSIS:   Lymphedema, not elsewhere classified [I89.0]   DATE OF ONSET: 2017   REFERRING PHYSICIAN: Janel Mixon MD  RETURN PHYSICIAN APPOINTMENT: 2 weeks     INITIAL ASSESSMENT:  Ms. Alia Gutierrez presents with swelling in her left arm and breast, s/p left mastectomy in 2017. Pt has expander in place. She reports having had C-diff 2x, requiring 9 day hospitalization with first incident. Pt reports shooting pain from armpit to sternum across breast/chest at times 9/10, limb heaviness, pitting edema elbow to shoulder, surgical scar adhesions, decreased active range of motion left shoulder with upper trapezius pain at end ranges. Progress update - Patient has been seen for 16 visits since her initial evaluation. She has made good gains in her LUE limb size reduction (14.5cm palm to axilla reduction) and has been fitted for a custom class I sleeve/gauntlet. She should receive her garment in the next week and will begin wearing for day compression. Patient does continue with swelling at her L breast expander site and wears a foam insert in her bra for compression. Patient is independent in her HEP for ROM of the LUE and now is able to to fasten her bra using BUE's independently. Once patient receives her garments she will transition toward discharge. PLAN OF CARE:   PROBLEM LIST:  1. Decreased Strength  2. Increased Pain  3. Decreased Flexibility/Joint Mobility  4.  Edema/Girth INTERVENTIONS PLANNED:  1. Hygiene training; skin integrity management  2. Manual therapy training; manual lymphatic drainage  3. Therapeutic exercise  4. Multilayer bandaging  5. Compression management  6. Kineseotaping; compression pump; prn   TREATMENT PLAN:  Effective Dates: 9/7/17 TO 12/3//17. Frequency/Duration:  2 times a week for 12 weeks; upon reassessment will adjust frequency and duration as necessary. GOALS: (Goals have been discussed and agreed upon with patient.)  Short-Term Functional Goals: Time Frame: 6 weeks STG MET  1. Patient will verbalize understanding of .longlymphedema precautions  2. Patient will be independent in skin care to reduce risk of cellulitis  3. Patient will be independent with home exercise program  4. Patient will tolerate multilayer compression bandaging to aid in edema reduction  Discharge Goals: Time Frame: 12 weeks LTG PROGRESSING  1. Pt will have reduction and stabilization of circumferencial volumetric graph measurements left upper extremity  3. Patient is independent with donning and doffing compression garments for long term management  3. Patient is independent with home management of lymphedema  Rehabilitation Potential For Stated Goals: Good  Regarding Cleo Lovett's therapy, I certify that the treatment plan above will be carried out by a therapist or under their direction. Thank you for this referral,  Veronique Gonzalez OT     Referring Physician Signature: Mabel Salguero MD _________________________  Date _________            The information in this section was collected on 9/7/2017   (except where otherwise noted). OCCUPATIONAL PROFILE & HISTORY:   History of Present Injury/Illness (Reason for Referral):   Ms. Brant Kaur presents with swelling in her left arm and breast, s/p left mastectomy in January 2017. Pt has expander in place. She reports having had C-diff 2x, requiring 9 day hospitalization with first incident.   Pt reports shooting pain from armpit to sternum across breast/chest at times 9/10, limb heaviness, pitting edema elbow to shoulder, surgical scar adhesions, decreased active range of motion left shoulder with upper trapezius pain at end ranges. Pt was referred to occupational therapy for education, management and reduction of swelling left upper extremity. Past Medical History/Comorbidities:   Ms. Andi Anderson  has a past medical history of Arthritis; Asthma; CAD (coronary artery disease); Cancer ([facility] [address] Chronic pain; Complicated UTI (urinary tract infection) (12/8/2015); Diverticulosis; Heart failure ([facility] [address] History of echocardiogram (11/17/14 at [address]); History of prediabetes; Hypertension; Shortness of breath; Sleep apnea; and Thyroid cyst.  Ms. Andi Anderson  has a past surgical history that includes carpal tunnel release (Bilateral); cyst removal; cardiac surg procedure unlist (Cath 11/18/14 [facility]); lap cholecystectomy; sinus surgery proc unlisted; other surgical; neurological procedure unlisted; cholecystectomy; and breast surgery procedure unlisted. Social History/Living Environment:    Pt lives alone  Prior Level of Function/Work/Activity:  Pt is retired. Current Medications:    Current Outpatient Prescriptions:     fluticasone (FLONASE) 50 mcg/actuation nasal spray, SHAKE LIQUID AND USE 2 SPRAYS IN EACH NOSTRIL DAILY, Disp: 1 Bottle, Rfl: 2    diazePAM (VALIUM) 5 mg tablet, Take 1 Tab by mouth every six (6) hours as needed for Anxiety. Max Daily Amount: 20 mg., Disp: 60 Tab, Rfl: 0    albuterol (PROAIR HFA) 90 mcg/actuation inhaler, Take 1 Puff by inhalation every six (6) hours as needed for Wheezing., Disp: 1 Inhaler, Rfl: 2    lisinopril-hydroCHLOROthiazide (PRINZIDE, ZESTORETIC) 20-12.5 mg per tablet, TAKE 1 TABLET BY MOUTH DAILY, Disp: 30 Tab, Rfl: 0    letrozole (FEMARA) 2.5 mg tablet, Take 1 Tab by mouth daily. , Disp: 30 Tab, Rfl: 12    metoprolol succinate (TOPROL-XL) 100 mg tablet, Take 1 Tab by mouth daily. , Disp: 30 Tab, Rfl: 11   ondansetron (ZOFRAN ODT) 4 mg disintegrating tablet, Take 1 Tab by mouth every six (6) hours as needed for Nausea., Disp: 60 Tab, Rfl: 0    oxyCODONE-acetaminophen (PERCOCET)  mg per tablet, Take 1 Tab by mouth every six (6) hours as needed for Pain. Max Daily Amount: 4 Tabs., Disp: 60 Tab, Rfl: 0    venlafaxine-SR (EFFEXOR-XR) 37.5 mg capsule, Take 1 capsule by mouth for 1 week and then increase to 75 mg (2 capsules) after 1 week., Disp: 60 Cap, Rfl: 0    fluticasone-salmeterol (ADVAIR DISKUS) 250-50 mcg/dose diskus inhaler, Take 1 Puff by inhalation two (2) times a day., Disp: 1 Inhaler, Rfl: 11    Cetirizine (ZYRTEC) 10 mg cap, Take  by mouth every morning., Disp: , Rfl:     acetaminophen (TYLENOL EXTRA STRENGTH) 500 mg tablet, Take  by mouth every six (6) hours as needed for Pain., Disp: , Rfl:     montelukast (SINGULAIR) 10 mg tablet, Take 1 Tab by mouth nightly., Disp: 30 Tab, Rfl: 5    cpap machine kit, by Does Not Apply route., Disp: , Rfl:     OXYGEN-AIR DELIVERY SYSTEMS, by Does Not Apply route. 3 lpm qhs, Disp: , Rfl:     ranitidine (ZANTAC) 150 mg tablet, Take 1 Tab by mouth two (2) times a day. Indications: GASTROESOPHAGEAL REFLUX, Disp: 60 Tab, Rfl: 3    nitroglycerin (NITROSTAT) 0.4 mg SL tablet, by SubLINGual route every five (5) minutes as needed for Chest Pain., Disp: , Rfl:             Date Last Reviewed:  9/7/2017     Complexity Level : Expanded review of therapy/medical records (1-2):  MODERATE COMPLEXITY   ASSESSMENT OF OCCUPATIONAL PERFORMANCE:   Palpation:          Pitting edema with skin folds elbow to shoulder. Elbow to hand minimal (1-/5)   Expander in place left breast.  Pt with scar adhesions lateral breast.  Pocket of edema below axilla on chest wall. Skin Integrity:          Skin integrity intact; no signs of cellulitis or fibrosis.   Edema/Girth:  2+ and pitting    Left Right    Initial Most Recent Initial Most Recent   Upper  Extremity  217.0 cm  Palm to axilla 202.5cm  Palm to axilla   9/7/2017       Lower  Extremity               Physical Skills Involved:  1. Range of Motion  2. Pain (acute)  3. Edema  4. Skin Integrity Cognitive Skills Affected (resulting in the inability to perform in a timely and safe manner):  1. Perception Psychosocial Skills Affected:  1. Habits/Routines   Number of elements that affect the Plan of Care: 3-5:  MODERATE COMPLEXITY   CLINICAL DECISION MAKING:   Outcome Measure: Tool Used: Lymphedema Life Impact Scale   Score:  Initial: 41 Most Recent: X (Date: -- )   Interpretation of Score: The Lymphedema Life Impact Scale (LLIS) is a validated instrument that measures the physical, functional, and psychosocial concerns pertinent to patients with extremity lymphedema. The Scale's questionnaire is administered to patients to gauge impairments, activity limitations, and participation restrictions resulting from their lymphedema. Score 0 1-13 14-26 27-40 41-54 55-67 68   Modifier CH CI CJ CK CL CM CN     ? Other PT/OT Primary Functional Limitations:     - CURRENT STATUS: CL - 60%-79% impaired, limited or restricted    - GOAL STATUS: CK - 40%-59% impaired, limited or restricted    - D/C STATUS:  ---------------To be determined---------------  Medical Necessity:   · Patient is expected to demonstrate progress in lymphedema management to decrease pain, swelling, reduce scar adhesions, self-manage lymphedema. Reason for Services/Other Comments:  · Patient continues to require skilled intervention due to left arm/chest lymphedema. Clinical Decision-Making Assessment:  Patient referred to OT for post mastectomy lymphedema management. Pt has histrory of chronic back pain which reduces mobility. Pt will need home exercise program to regain full AROM and improve strength and endurance in addition to lymphedema management/complete decongestive therapy.     Assessment process, impact of co-morbidities, assessment modification\need for assistance, and selection of interventions: Analytical Complexity:MODERATE COMPLEXITY   TREATMENT:   (In addition to Assessment/Re-Assessment sessions the following treatments were rendered)    Pre-treatment Symptoms/Complaints:  Patient had fecal implant due to chronic C-diff August 25th. Pain: Initial:   Pain Intensity 1: 3  Post Session:  2   Occupational Therapy Evaluation (x) OT eval completed. Pt received information on lymphedema and risk reduction/self management practices as outlined by the National Lymphedema Network. Therapeutic Exercise: (): ROM exercises in supine for shoulder flexion/IR/ER for 30 reps each followed by dowel exercises - patient      Manual Therapy: (60 min): Opened cervical, AAA, JAIDA, AAI, TONO anastamosies and facilitation of upper quadrant nodes prior to decongestion of L UE. Application of Eucerin lotion. Multilayer compression bandaging with cotton stockinette and 4 short stretch bandages. Pt is waiting for custom garment - it has been shipped. Treatment/Session Assessment:    · Response to Treatment:    She is responding to MLD and multi layer bandaging as evidenced by 14.5cm loss in LUE limb size. She also reports she can wash \"under L arm (axilla) with greater ease and is now able to fasten her brra using BU's. .\"   Garments have been shipped. Pt has good support system in home health aid. See manual section above. · Compliance with Program/Exercises:  good to date  · Recommendations/Intent for next treatment session: \"Next visit will focus on complete decongestive therapy\".   Total Treatment Duration:  OT Patient Time In/Time Out  Time In: 0100  Time Out: [address] [place] [address] Angela, OT

## 2017-09-12 ENCOUNTER — HOSPITAL ENCOUNTER (OUTPATIENT)
Dept: MAMMOGRAPHY | Age: 62
Discharge: HOME | End: 2017-09-12
Attending: NURSE PRACTITIONER
Payer: MEDICAID

## 2017-09-12 DIAGNOSIS — C50.912 MALIGNANT NEOPLASM OF LEFT FEMALE BREAST, UNSPECIFIED SITE OF BREAST: ICD-10-CM

## 2017-09-12 PROCEDURE — 77067 SCR MAMMO BI INCL CAD: CPT

## 2017-09-14 ENCOUNTER — HOSPITAL ENCOUNTER (OUTPATIENT)
Dept: PHYSICAL THERAPY | Age: 62
Discharge: HOME | End: 2017-09-14
Payer: MEDICAID

## 2017-09-14 PROCEDURE — 97140 MANUAL THERAPY 1/> REGIONS: CPT

## 2017-09-14 NOTE — PROGRESS NOTES
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 5 = High Risk)  MD Orders: Evaluate and treat left arm lymphedema, s/p mastectomy MEDICAL/REFERRING DIAGNOSIS:   Lymphedema, not elsewhere classified [I89.0]   DATE OF ONSET: 2017   REFERRING PHYSICIAN: Clarence Mixon MD  RETURN PHYSICIAN APPOINTMENT: 2 weeks     INITIAL ASSESSMENT:  Ms. Rickey Soares presents with swelling in her left arm and breast, s/p left mastectomy in 2017. Pt has expander in place. She reports having had C-diff 2x, requiring 9 day hospitalization with first incident. Pt reports shooting pain from armpit to sternum across breast/chest at times 9/10, limb heaviness, pitting edema elbow to shoulder, surgical scar adhesions, decreased active range of motion left shoulder with upper trapezius pain at end ranges. Progress update - Patient has been seen for 16 visits since her initial evaluation. She has made good gains in her LUE limb size reduction (14.5cm palm to axilla reduction) and has been fitted for a custom class I sleeve/gauntlet. She should receive her garment in the next week and will begin wearing for day compression. Patient does continue with swelling at her L breast expander site and wears a foam insert in her bra for compression. Patient is independent in her HEP for ROM of the LUE and now is able to to fasten her bra using BUE's independently. Once patient receives her garments she will transition toward discharge. PLAN OF CARE:   PROBLEM LIST:  1. Decreased Strength  2. Increased Pain  3. Decreased Flexibility/Joint Mobility  4.  Edema/Girth INTERVENTIONS PLANNED:  1. Hygiene training; skin integrity management  2. Manual therapy training; manual lymphatic drainage  3. Therapeutic exercise  4. Multilayer bandaging  5. Compression management  6. Kineseotaping; compression pump; prn   TREATMENT PLAN:  Effective Dates: 9/7/17 TO 12/3//17. Frequency/Duration:  2 times a week for 12 weeks; upon reassessment will adjust frequency and duration as necessary. GOALS: (Goals have been discussed and agreed upon with patient.)  Short-Term Functional Goals: Time Frame: 6 weeks STG MET  1. Patient will verbalize understanding of .longlymphedema precautions  2. Patient will be independent in skin care to reduce risk of cellulitis  3. Patient will be independent with home exercise program  4. Patient will tolerate multilayer compression bandaging to aid in edema reduction  Discharge Goals: Time Frame: 12 weeks LTG PROGRESSING  1. Pt will have reduction and stabilization of circumferencial volumetric graph measurements left upper extremity  3. Patient is independent with donning and doffing compression garments for long term management  3. Patient is independent with home management of lymphedema  Rehabilitation Potential For Stated Goals: Good  Regarding Melbourneanthony Lovett's therapy, I certify that the treatment plan above will be carried out by a therapist or under their direction. Thank you for this referral,  Wayne Ivory OT     Referring Physician Signature: Valere Bernheim, MD _________________________  Date _________            The information in this section was collected on 9/14/2017   (except where otherwise noted). OCCUPATIONAL PROFILE & HISTORY:   History of Present Injury/Illness (Reason for Referral):   Ms. Pacheco Leahy presents with swelling in her left arm and breast, s/p left mastectomy in January 2017. Pt has expander in place. She reports having had C-diff 2x, requiring 9 day hospitalization with first incident.   Pt reports shooting pain from armpit to sternum across breast/chest at times 9/10, limb heaviness, pitting edema elbow to shoulder, surgical scar adhesions, decreased active range of motion left shoulder with upper trapezius pain at end ranges. Pt was referred to occupational therapy for education, management and reduction of swelling left upper extremity. Past Medical History/Comorbidities:   Ms. Mak Sharma  has a past medical history of Arthritis; Asthma; Breast cancer ([facility] [address] (09/2016); CAD (coronary artery disease); Cancer ([facility] [address] Chronic pain; Complicated UTI (urinary tract infection) (12/8/2015); Diverticulosis; Heart failure ([facility] [address] History of echocardiogram (11/17/14 at [facility]); History of prediabetes; Hypertension; Shortness of breath; Sleep apnea; and Thyroid cyst.  Ms. Mak Sharma  has a past surgical history that includes carpal tunnel release (Bilateral); cyst removal; cardiac surg procedure unlist (Cath 11/18/14 [facility]); lap cholecystectomy; sinus surgery proc unlisted; other surgical; neurological procedure unlisted; cholecystectomy; breast surgery procedure unlisted; mastectomy (Left, 01/2017); and insert tissue expander(s) (Left, 01/2017). Social History/Living Environment:    Pt lives alone  Prior Level of Function/Work/Activity:  Pt is retired. Current Medications:    Current Outpatient Prescriptions:     fluticasone (FLONASE) 50 mcg/actuation nasal spray, SHAKE LIQUID AND USE 2 SPRAYS IN EACH NOSTRIL DAILY, Disp: 1 Bottle, Rfl: 2    diazePAM (VALIUM) 5 mg tablet, Take 1 Tab by mouth every six (6) hours as needed for Anxiety. Max Daily Amount: 20 mg., Disp: 60 Tab, Rfl: 0    albuterol (PROAIR HFA) 90 mcg/actuation inhaler, Take 1 Puff by inhalation every six (6) hours as needed for Wheezing., Disp: 1 Inhaler, Rfl: 2    lisinopril-hydroCHLOROthiazide (PRINZIDE, ZESTORETIC) 20-12.5 mg per tablet, TAKE 1 TABLET BY MOUTH DAILY, Disp: 30 Tab, Rfl: 0    letrozole (FEMARA) 2.5 mg tablet, Take 1 Tab by mouth daily. , Disp: 30 Tab, Rfl: 12    metoprolol succinate (TOPROL-XL) 100 mg tablet, Take 1 Tab by mouth daily. , Disp: 30 Tab, Rfl: 11    ondansetron (ZOFRAN ODT) 4 mg disintegrating tablet, Take 1 Tab by mouth every six (6) hours as needed for Nausea., Disp: 60 Tab, Rfl: 0    oxyCODONE-acetaminophen (PERCOCET)  mg per tablet, Take 1 Tab by mouth every six (6) hours as needed for Pain. Max Daily Amount: 4 Tabs., Disp: 60 Tab, Rfl: 0    venlafaxine-SR (EFFEXOR-XR) 37.5 mg capsule, Take 1 capsule by mouth for 1 week and then increase to 75 mg (2 capsules) after 1 week., Disp: 60 Cap, Rfl: 0    fluticasone-salmeterol (ADVAIR DISKUS) 250-50 mcg/dose diskus inhaler, Take 1 Puff by inhalation two (2) times a day., Disp: 1 Inhaler, Rfl: 11    Cetirizine (ZYRTEC) 10 mg cap, Take  by mouth every morning., Disp: , Rfl:     acetaminophen (TYLENOL EXTRA STRENGTH) 500 mg tablet, Take  by mouth every six (6) hours as needed for Pain., Disp: , Rfl:     montelukast (SINGULAIR) 10 mg tablet, Take 1 Tab by mouth nightly., Disp: 30 Tab, Rfl: 5    cpap machine kit, by Does Not Apply route., Disp: , Rfl:     OXYGEN-AIR DELIVERY SYSTEMS, by Does Not Apply route. 3 lpm qhs, Disp: , Rfl:     ranitidine (ZANTAC) 150 mg tablet, Take 1 Tab by mouth two (2) times a day. Indications: GASTROESOPHAGEAL REFLUX, Disp: 60 Tab, Rfl: 3    nitroglycerin (NITROSTAT) 0.4 mg SL tablet, by SubLINGual route every five (5) minutes as needed for Chest Pain., Disp: , Rfl:             Date Last Reviewed:  9/14/2017     Complexity Level : Expanded review of therapy/medical records (1-2):  MODERATE COMPLEXITY   ASSESSMENT OF OCCUPATIONAL PERFORMANCE:   Palpation:          Pitting edema with skin folds elbow to shoulder. Elbow to hand minimal (1-/5)   Expander in place left breast.  Pt with scar adhesions lateral breast.  Pocket of edema below axilla on chest wall. Skin Integrity:          Skin integrity intact; no signs of cellulitis or fibrosis.   Edema/Girth:  2+ and pitting    Left Right Initial Most Recent Initial Most Recent   Upper  Extremity  217.0 cm  Palm to axilla  202.5cm  Palm to axilla   9/7/2017       Lower  Extremity               Physical Skills Involved:  1. Range of Motion  2. Pain (acute)  3. Edema  4. Skin Integrity Cognitive Skills Affected (resulting in the inability to perform in a timely and safe manner):  1. Perception Psychosocial Skills Affected:  1. Habits/Routines   Number of elements that affect the Plan of Care: 3-5:  MODERATE COMPLEXITY   CLINICAL DECISION MAKING:   Outcome Measure: Tool Used: Lymphedema Life Impact Scale   Score:  Initial: 41 Most Recent: X (Date: -- )   Interpretation of Score: The Lymphedema Life Impact Scale (LLIS) is a validated instrument that measures the physical, functional, and psychosocial concerns pertinent to patients with extremity lymphedema. The Scale's questionnaire is administered to patients to gauge impairments, activity limitations, and participation restrictions resulting from their lymphedema. Score 0 1-13 14-26 27-40 41-54 55-67 68   Modifier CH CI CJ CK CL CM CN     ? Other PT/OT Primary Functional Limitations:     - CURRENT STATUS: CL - 60%-79% impaired, limited or restricted    - GOAL STATUS: CK - 40%-59% impaired, limited or restricted    - D/C STATUS:  ---------------To be determined---------------  Medical Necessity:   · Patient is expected to demonstrate progress in lymphedema management to decrease pain, swelling, reduce scar adhesions, self-manage lymphedema. Reason for Services/Other Comments:  · Patient continues to require skilled intervention due to left arm/chest lymphedema. Clinical Decision-Making Assessment:  Patient referred to OT for post mastectomy lymphedema management. Pt has histrory of chronic back pain which reduces mobility. Pt will need home exercise program to regain full AROM and improve strength and endurance in addition to lymphedema management/complete decongestive therapy. Assessment process, impact of co-morbidities, assessment modification\need for assistance, and selection of interventions: Analytical Complexity:MODERATE COMPLEXITY   TREATMENT:   (In addition to Assessment/Re-Assessment sessions the following treatments were rendered)    Pre-treatment Symptoms/Complaints:  Patient had fecal implant due to chronic C-diff August 25th. No new complaints. Pain: Initial:   Pain Intensity 1: 3  Post Session:  3   Occupational Therapy Evaluation (x) OT eval completed. Pt received information on lymphedema and risk reduction/self management practices as outlined by the [employer]. Therapeutic Exercise: (): ROM exercises in supine for shoulder flexion/IR/ER for 30 reps each followed by dowel exercises - patient      Manual Therapy: (60 min): Opened cervical, AAA, JAIDA, AAI, TONO anastamosies and facilitation of upper quadrant nodes prior to decongestion of L UE. Application of Eucerin lotion. Patient issued new custom arm sleeve today only to discover it did not fit properly. New measurements were made and will be faxed to [facility] along with photos showing how garment did not fit. New sitter was instructed on multilayer compression bandaging with cotton stockinette and 3 short stretch bandages from wrist to axilla. New custom gauntlet does fit and patient will wear with bandages to allow her to remove for hand washing. Sitter demonstrated ability to bandage LUE and will change bandages prn until new sleeve arrives. Treatment/Session Assessment:    · Response to Treatment:    She is responding to MLD and multi layer bandaging as evidenced by 14.5cm loss in LUE limb size. She also reports she can wash \"under L arm (axilla) with greater ease and is now able to fasten her brra using BUE's. .\"  New custom sleeve does not fit and measurements made to have revisions completed. Pt has good support system in home health aid.  See manual section above.  · Compliance with Program/Exercises:  good to date  · Recommendations/Intent for next treatment session: \"Next visit will focus on complete decongestive therapy\".   Total Treatment Duration:  OT Patient Time In/Time Out  Time In: 0100  Time Out: [address], OT

## 2017-09-15 ENCOUNTER — HOSPITAL ENCOUNTER (OUTPATIENT)
Dept: PHYSICAL THERAPY | Age: 62
Discharge: HOME | End: 2017-09-15
Payer: MEDICAID

## 2017-09-15 PROCEDURE — 97140 MANUAL THERAPY 1/> REGIONS: CPT

## 2017-09-15 PROCEDURE — 97110 THERAPEUTIC EXERCISES: CPT

## 2017-09-15 NOTE — PROGRESS NOTES
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 5 = High Risk)  MD Orders: Evaluate and treat left arm lymphedema, s/p mastectomy MEDICAL/REFERRING DIAGNOSIS:   Lymphedema, not elsewhere classified [I89.0]   DATE OF ONSET: 2017   REFERRING PHYSICIAN: Janel Mixon MD  RETURN PHYSICIAN APPOINTMENT: 2 weeks     INITIAL ASSESSMENT:  Ms. Alia Gutierrez presents with swelling in her left arm and breast, s/p left mastectomy in 2017. Pt has expander in place. She reports having had C-diff 2x, requiring 9 day hospitalization with first incident. Pt reports shooting pain from armpit to sternum across breast/chest at times 9/10, limb heaviness, pitting edema elbow to shoulder, surgical scar adhesions, decreased active range of motion left shoulder with upper trapezius pain at end ranges. Progress update - Patient has been seen for 16 visits since her initial evaluation. She has made good gains in her LUE limb size reduction (14.5cm palm to axilla reduction) and has been fitted for a custom class I sleeve/gauntlet. She should receive her garment in the next week and will begin wearing for day compression. Patient does continue with swelling at her L breast expander site and wears a foam insert in her bra for compression. Patient is independent in her HEP for ROM of the LUE and now is able to to fasten her bra using BUE's independently. Once patient receives her garments she will transition toward discharge. PLAN OF CARE:   PROBLEM LIST:  1. Decreased Strength  2. Increased Pain  3. Decreased Flexibility/Joint Mobility  4.  Edema/Girth INTERVENTIONS PLANNED:  1. Hygiene training; skin integrity management  2. Manual therapy training; manual lymphatic drainage  3. Therapeutic exercise  4. Multilayer bandaging  5. Compression management  6. Kineseotaping; compression pump; prn   TREATMENT PLAN:  Effective Dates: 9/7/17 TO 12/3//17. Frequency/Duration:  2 times a week for 12 weeks; upon reassessment will adjust frequency and duration as necessary. GOALS: (Goals have been discussed and agreed upon with patient.)  Short-Term Functional Goals: Time Frame: 6 weeks STG MET  1. Patient will verbalize understanding of .longlymphedema precautions  2. Patient will be independent in skin care to reduce risk of cellulitis  3. Patient will be independent with home exercise program  4. Patient will tolerate multilayer compression bandaging to aid in edema reduction  Discharge Goals: Time Frame: 12 weeks LTG PROGRESSING  1. Pt will have reduction and stabilization of circumferencial volumetric graph measurements left upper extremity  3. Patient is independent with donning and doffing compression garments for long term management  3. Patient is independent with home management of lymphedema  Rehabilitation Potential For Stated Goals: Good  Regarding Emmiejose Ojedaon's therapy, I certify that the treatment plan above will be carried out by a therapist or under their direction. Thank you for this referral,  Phil Hernandes OT     Referring Physician Signature: Michelle Loo MD _________________________  Date _________            The information in this section was collected on 9/15/2017   (except where otherwise noted). OCCUPATIONAL PROFILE & HISTORY:   History of Present Injury/Illness (Reason for Referral):   Ms. Aarti Jackson presents with swelling in her left arm and breast, s/p left mastectomy in January 2017. Pt has expander in place. She reports having had C-diff 2x, requiring 9 day hospitalization with first incident.   Pt reports shooting pain from armpit to sternum across breast/chest at times 9/10, limb heaviness, pitting edema elbow to shoulder, surgical scar adhesions, decreased active range of motion left shoulder with upper trapezius pain at end ranges. Pt was referred to occupational therapy for education, management and reduction of swelling left upper extremity. Past Medical History/Comorbidities:   Ms. Marilia Martinez  has a past medical history of Arthritis; Asthma; Breast cancer ([facility] [address] (09/2016); CAD (coronary artery disease); Cancer ([facility] [address] Chronic pain; Complicated UTI (urinary tract infection) (12/8/2015); Diverticulosis; Heart failure ([facility] [address] History of echocardiogram (11/17/14 at [facility]); History of prediabetes; Hypertension; Shortness of breath; Sleep apnea; and Thyroid cyst.  Ms. Marilia Martinez  has a past surgical history that includes carpal tunnel release (Bilateral); cyst removal; cardiac surg procedure unlist (Cath 11/18/14 [facility]); lap cholecystectomy; sinus surgery proc unlisted; other surgical; neurological procedure unlisted; cholecystectomy; breast surgery procedure unlisted; mastectomy (Left, 01/2017); and insert tissue expander(s) (Left, 01/2017). Social History/Living Environment:    Pt lives alone  Prior Level of Function/Work/Activity:  Pt is retired. Current Medications:    Current Outpatient Prescriptions:     fluticasone (FLONASE) 50 mcg/actuation nasal spray, SHAKE LIQUID AND USE 2 SPRAYS IN EACH NOSTRIL DAILY, Disp: 1 Bottle, Rfl: 2    diazePAM (VALIUM) 5 mg tablet, Take 1 Tab by mouth every six (6) hours as needed for Anxiety. Max Daily Amount: 20 mg., Disp: 60 Tab, Rfl: 0    albuterol (PROAIR HFA) 90 mcg/actuation inhaler, Take 1 Puff by inhalation every six (6) hours as needed for Wheezing., Disp: 1 Inhaler, Rfl: 2    lisinopril-hydroCHLOROthiazide (PRINZIDE, ZESTORETIC) 20-12.5 mg per tablet, TAKE 1 TABLET BY MOUTH DAILY, Disp: 30 Tab, Rfl: 0    letrozole (FEMARA) 2.5 mg tablet, Take 1 Tab by mouth daily. , Disp: 30 Tab, Rfl: 12    metoprolol succinate (TOPROL-XL) 100 mg tablet, Take 1 Tab by mouth daily. , Disp: 30 Tab, Rfl: 11    ondansetron (ZOFRAN ODT) 4 mg disintegrating tablet, Take 1 Tab by mouth every six (6) hours as needed for Nausea., Disp: 60 Tab, Rfl: 0    oxyCODONE-acetaminophen (PERCOCET)  mg per tablet, Take 1 Tab by mouth every six (6) hours as needed for Pain. Max Daily Amount: 4 Tabs., Disp: 60 Tab, Rfl: 0    venlafaxine-SR (EFFEXOR-XR) 37.5 mg capsule, Take 1 capsule by mouth for 1 week and then increase to 75 mg (2 capsules) after 1 week., Disp: 60 Cap, Rfl: 0    fluticasone-salmeterol (ADVAIR DISKUS) 250-50 mcg/dose diskus inhaler, Take 1 Puff by inhalation two (2) times a day., Disp: 1 Inhaler, Rfl: 11    Cetirizine (ZYRTEC) 10 mg cap, Take  by mouth every morning., Disp: , Rfl:     acetaminophen (TYLENOL EXTRA STRENGTH) 500 mg tablet, Take  by mouth every six (6) hours as needed for Pain., Disp: , Rfl:     montelukast (SINGULAIR) 10 mg tablet, Take 1 Tab by mouth nightly., Disp: 30 Tab, Rfl: 5    cpap machine kit, by Does Not Apply route., Disp: , Rfl:     OXYGEN-AIR DELIVERY SYSTEMS, by Does Not Apply route. 3 lpm qhs, Disp: , Rfl:     ranitidine (ZANTAC) 150 mg tablet, Take 1 Tab by mouth two (2) times a day. Indications: GASTROESOPHAGEAL REFLUX, Disp: 60 Tab, Rfl: 3    nitroglycerin (NITROSTAT) 0.4 mg SL tablet, by SubLINGual route every five (5) minutes as needed for Chest Pain., Disp: , Rfl:             Date Last Reviewed:  9/15/2017     Complexity Level : Expanded review of therapy/medical records (1-2):  MODERATE COMPLEXITY   ASSESSMENT OF OCCUPATIONAL PERFORMANCE:   Palpation:          Pitting edema with skin folds elbow to shoulder. Elbow to hand minimal (1-/5)   Expander in place left breast.  Pt with scar adhesions lateral breast.  Pocket of edema below axilla on chest wall. Skin Integrity:          Skin integrity intact; no signs of cellulitis or fibrosis.   Edema/Girth:  2+ and pitting    Left Right Initial Most Recent Initial Most Recent   Upper  Extremity  217.0 cm  Palm to axilla  202.5cm  Palm to axilla   9/7/2017       Lower  Extremity               Physical Skills Involved:  1. Range of Motion  2. Pain (acute)  3. Edema  4. Skin Integrity Cognitive Skills Affected (resulting in the inability to perform in a timely and safe manner):  1. Perception Psychosocial Skills Affected:  1. Habits/Routines   Number of elements that affect the Plan of Care: 3-5:  MODERATE COMPLEXITY   CLINICAL DECISION MAKING:   Outcome Measure: Tool Used: Lymphedema Life Impact Scale   Score:  Initial: 41 Most Recent: X (Date: -- )   Interpretation of Score: The Lymphedema Life Impact Scale (LLIS) is a validated instrument that measures the physical, functional, and psychosocial concerns pertinent to patients with extremity lymphedema. The Scale's questionnaire is administered to patients to gauge impairments, activity limitations, and participation restrictions resulting from their lymphedema. Score 0 1-13 14-26 27-40 41-54 55-67 68   Modifier CH CI CJ CK CL CM CN     ? Other PT/OT Primary Functional Limitations:     - CURRENT STATUS: CL - 60%-79% impaired, limited or restricted    - GOAL STATUS: CK - 40%-59% impaired, limited or restricted    - D/C STATUS:  ---------------To be determined---------------  Medical Necessity:   · Patient is expected to demonstrate progress in lymphedema management to decrease pain, swelling, reduce scar adhesions, self-manage lymphedema. Reason for Services/Other Comments:  · Patient continues to require skilled intervention due to left arm/chest lymphedema. Clinical Decision-Making Assessment:  Patient referred to OT for post mastectomy lymphedema management. Pt has histrory of chronic back pain which reduces mobility. Pt will need home exercise program to regain full AROM and improve strength and endurance in addition to lymphedema management/complete decongestive therapy. Assessment process, impact of co-morbidities, assessment modification\need for assistance, and selection of interventions: Analytical Complexity:MODERATE COMPLEXITY   TREATMENT:   (In addition to Assessment/Re-Assessment sessions the following treatments were rendered)    Pre-treatment Symptoms/Complaints:  Patient had fecal implant due to chronic C-diff August 25th. No new complaints. Pain: Initial:   Pain Intensity 1: 3  Post Session:  3   Occupational Therapy Evaluation (x) OT eval completed. Pt received information on lymphedema and risk reduction/self management practices as outlined by the National Lymphedema Network. Therapeutic Exercise: (10 minutes): Supine with 2 lb dowel, pt completed 15 reps each:  Shoulder flex/ext, abd/add, internal/ext rotation, elbow flex/ext, circumduction    Manual Therapy: (50 minutes): Opened cervical, AAA, JAIDA, AAI, TONO anastamosies and facilitation of upper quadrant nodes prior to decongestion of L UE. Application of Eucerin lotion. Scar massage and application of kineseotape over right breast and right axilla scar tissue using cross taping technique. Mulltilayer compression bandaging with cotton stockinette and 2 short stretch bandages from wrist to axilla. New custom gauntlet does fit and patient will wear with bandages to allow her to remove for hand washing. Per patient, sitter is able to bandage adequately. Treatment/Session Assessment:    · Response to Treatment:    She is responding to MLD and multi layer bandaging as evidenced by 14.5cm loss in LUE limb size. She also reports she can wash \"under L arm (axilla) with greater ease and is now able to fasten her brra using BUE's. .\"  New custom sleeve does not fit and measurements made to have revisions completed. Pt has good support system in home health aid. See manual section above. · Compliance with Program/Exercises:  good to date  · Recommendations/Intent for next treatment session:  \"Next visit will focus on complete decongestive therapy\".   Total Treatment Duration:  OT Patient Time In/Time Out  Time In: 0100  Time Out: [address] [facility], OT

## 2017-09-19 ENCOUNTER — APPOINTMENT (OUTPATIENT)
Dept: PHYSICAL THERAPY | Age: 62
End: 2017-09-19
Payer: MEDICAID

## 2017-09-21 ENCOUNTER — HOSPITAL ENCOUNTER (OUTPATIENT)
Dept: PHYSICAL THERAPY | Age: 62
Discharge: HOME | End: 2017-09-21
Payer: MEDICAID

## 2017-09-21 PROCEDURE — 97110 THERAPEUTIC EXERCISES: CPT

## 2017-09-21 PROCEDURE — 97140 MANUAL THERAPY 1/> REGIONS: CPT

## 2017-09-21 NOTE — PROGRESS NOTES
Garfield Joyner  : 1955 [facility]  [address], 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 5 = High Risk)  MD Orders: Evaluate and treat left arm lymphedema, s/p mastectomy MEDICAL/REFERRING DIAGNOSIS:   Lymphedema, not elsewhere classified [I89.0]   DATE OF ONSET: 2017   REFERRING PHYSICIAN: Justin Mixon MD  RETURN PHYSICIAN APPOINTMENT: 2 weeks     INITIAL ASSESSMENT:  Ms. Brant Kaur presents with swelling in her left arm and breast, s/p left mastectomy in 2017. Pt has expander in place. She reports having had C-diff 2x, requiring 9 day hospitalization with first incident. Pt reports shooting pain from armpit to sternum across breast/chest at times 9/10, limb heaviness, pitting edema elbow to shoulder, surgical scar adhesions, decreased active range of motion left shoulder with upper trapezius pain at end ranges. Progress update - Patient has been seen for 16 visits since her initial evaluation. She has made good gains in her LUE limb size reduction (14.5cm palm to axilla reduction) and has been fitted for a custom class I sleeve/gauntlet. She should receive her garment in the next week and will begin wearing for day compression. Patient does continue with swelling at her L breast expander site and wears a foam insert in her bra for compression. Patient is independent in her HEP for ROM of the LUE and now is able to to fasten her bra using BUE's independently. Once patient receives her garments she will transition toward discharge. PLAN OF CARE:   PROBLEM LIST:  1. Decreased Strength  2. Increased Pain  3. Decreased Flexibility/Joint Mobility  4.  Edema/Girth INTERVENTIONS PLANNED:  1. Hygiene training; skin integrity management  2. Manual therapy training; manual lymphatic drainage  3. Therapeutic exercise  4. Multilayer bandaging  5. Compression management  6. Kineseotaping; compression pump; prn   TREATMENT PLAN:  Effective Dates: 9/7/17 TO 12/3//17. Frequency/Duration:  2 times a week for 12 weeks; upon reassessment will adjust frequency and duration as necessary. GOALS: (Goals have been discussed and agreed upon with patient.)  Short-Term Functional Goals: Time Frame: 6 weeks STG MET  1. Patient will verbalize understanding of .longlymphedema precautions  2. Patient will be independent in skin care to reduce risk of cellulitis  3. Patient will be independent with home exercise program  4. Patient will tolerate multilayer compression bandaging to aid in edema reduction  Discharge Goals: Time Frame: 12 weeks LTG PROGRESSING  1. Pt will have reduction and stabilization of circumferencial volumetric graph measurements left upper extremity  3. Patient is independent with donning and doffing compression garments for long term management  3. Patient is independent with home management of lymphedema  Rehabilitation Potential For Stated Goals: Good  Regarding Steven Lovett's therapy, I certify that the treatment plan above will be carried out by a therapist or under their direction. Thank you for this referral,  Amberly Contreras, REENA     Referring Physician Signature: Mary Grace Morales MD _________________________  Date _________            The information in this section was collected on 9/21/2017   (except where otherwise noted). OCCUPATIONAL PROFILE & HISTORY:   History of Present Injury/Illness (Reason for Referral):   Ms. Conor Trujillo presents with swelling in her left arm and breast, s/p left mastectomy in January 2017. Pt has expander in place. She reports having had C-diff 2x, requiring 9 day hospitalization with first incident.   Pt reports shooting pain from armpit to sternum across breast/chest at times 9/10, limb heaviness, pitting edema elbow to shoulder, surgical scar adhesions, decreased active range of motion left shoulder with upper trapezius pain at end ranges. Pt was referred to occupational therapy for education, management and reduction of swelling left upper extremity. Past Medical History/Comorbidities:   Ms. Carlito Garcia  has a past medical history of Arthritis; Asthma; Breast cancer ([facility] [address] (09/2016); CAD (coronary artery disease); Cancer ([facility] [address] Chronic pain; Complicated UTI (urinary tract infection) (12/8/2015); Diverticulosis; Heart failure ([facility] [address] History of echocardiogram (11/17/14 at [address]); History of prediabetes; Hypertension; Shortness of breath; Sleep apnea; and Thyroid cyst.  Ms. Carlito Garcia  has a past surgical history that includes carpal tunnel release (Bilateral); cyst removal; cardiac surg procedure unlist (Cath 11/18/14 [facility]); lap cholecystectomy; sinus surgery proc unlisted; other surgical; neurological procedure unlisted; cholecystectomy; breast surgery procedure unlisted; mastectomy (Left, 01/2017); and insert tissue expander(s) (Left, 01/2017). Social History/Living Environment:    Pt lives alone  Prior Level of Function/Work/Activity:  Pt is retired. Current Medications:    Current Outpatient Prescriptions:     fluticasone (FLONASE) 50 mcg/actuation nasal spray, SHAKE LIQUID AND USE 2 SPRAYS IN EACH NOSTRIL DAILY, Disp: 1 Bottle, Rfl: 2    diazePAM (VALIUM) 5 mg tablet, Take 1 Tab by mouth every six (6) hours as needed for Anxiety. Max Daily Amount: 20 mg., Disp: 60 Tab, Rfl: 0    albuterol (PROAIR HFA) 90 mcg/actuation inhaler, Take 1 Puff by inhalation every six (6) hours as needed for Wheezing., Disp: 1 Inhaler, Rfl: 2    lisinopril-hydroCHLOROthiazide (PRINZIDE, ZESTORETIC) 20-12.5 mg per tablet, TAKE 1 TABLET BY MOUTH DAILY, Disp: 30 Tab, Rfl: 0    letrozole (FEMARA) 2.5 mg tablet, Take 1 Tab by mouth daily. , Disp: 30 Tab, Rfl: 12    metoprolol succinate (TOPROL-XL) 100 mg tablet, Take 1 Tab by mouth daily. , Disp: 30 Tab, Rfl: 11    ondansetron (ZOFRAN ODT) 4 mg disintegrating tablet, Take 1 Tab by mouth every six (6) hours as needed for Nausea., Disp: 60 Tab, Rfl: 0    oxyCODONE-acetaminophen (PERCOCET)  mg per tablet, Take 1 Tab by mouth every six (6) hours as needed for Pain. Max Daily Amount: 4 Tabs., Disp: 60 Tab, Rfl: 0    venlafaxine-SR (EFFEXOR-XR) 37.5 mg capsule, Take 1 capsule by mouth for 1 week and then increase to 75 mg (2 capsules) after 1 week., Disp: 60 Cap, Rfl: 0    fluticasone-salmeterol (ADVAIR DISKUS) 250-50 mcg/dose diskus inhaler, Take 1 Puff by inhalation two (2) times a day., Disp: 1 Inhaler, Rfl: 11    Cetirizine (ZYRTEC) 10 mg cap, Take  by mouth every morning., Disp: , Rfl:     acetaminophen (TYLENOL EXTRA STRENGTH) 500 mg tablet, Take  by mouth every six (6) hours as needed for Pain., Disp: , Rfl:     montelukast (SINGULAIR) 10 mg tablet, Take 1 Tab by mouth nightly., Disp: 30 Tab, Rfl: 5    cpap machine kit, by Does Not Apply route., Disp: , Rfl:     OXYGEN-AIR DELIVERY SYSTEMS, by Does Not Apply route. 3 lpm qhs, Disp: , Rfl:     ranitidine (ZANTAC) 150 mg tablet, Take 1 Tab by mouth two (2) times a day. Indications: GASTROESOPHAGEAL REFLUX, Disp: 60 Tab, Rfl: 3    nitroglycerin (NITROSTAT) 0.4 mg SL tablet, by SubLINGual route every five (5) minutes as needed for Chest Pain., Disp: , Rfl:             Date Last Reviewed:  9/21/2017     Complexity Level : Expanded review of therapy/medical records (1-2):  MODERATE COMPLEXITY   ASSESSMENT OF OCCUPATIONAL PERFORMANCE:   Palpation:          Pitting edema with skin folds elbow to shoulder. Elbow to hand minimal (1-/5)   Expander in place left breast.  Pt with scar adhesions lateral breast.  Pocket of edema below axilla on chest wall. Skin Integrity:          Skin integrity intact; no signs of cellulitis or fibrosis.   Edema/Girth:  2+ and pitting    Left Right Initial Most Recent Initial Most Recent   Upper  Extremity  217.0 cm  Palm to axilla  202.5cm  Palm to axilla   9/7/2017       Lower  Extremity               Physical Skills Involved:  1. Range of Motion  2. Pain (acute)  3. Edema  4. Skin Integrity Cognitive Skills Affected (resulting in the inability to perform in a timely and safe manner):  1. Perception Psychosocial Skills Affected:  1. Habits/Routines   Number of elements that affect the Plan of Care: 3-5:  MODERATE COMPLEXITY   CLINICAL DECISION MAKING:   Outcome Measure: Tool Used: Lymphedema Life Impact Scale   Score:  Initial: 41 Most Recent: X (Date: -- )   Interpretation of Score: The Lymphedema Life Impact Scale (LLIS) is a validated instrument that measures the physical, functional, and psychosocial concerns pertinent to patients with extremity lymphedema. The Scale's questionnaire is administered to patients to gauge impairments, activity limitations, and participation restrictions resulting from their lymphedema. Score 0 1-13 14-26 27-40 41-54 55-67 68   Modifier CH CI CJ CK CL CM CN     ? Other PT/OT Primary Functional Limitations:     - CURRENT STATUS: CL - 60%-79% impaired, limited or restricted    - GOAL STATUS: CK - 40%-59% impaired, limited or restricted    - D/C STATUS:  ---------------To be determined---------------  Medical Necessity:   · Patient is expected to demonstrate progress in lymphedema management to decrease pain, swelling, reduce scar adhesions, self-manage lymphedema. Reason for Services/Other Comments:  · Patient continues to require skilled intervention due to left arm/chest lymphedema. Clinical Decision-Making Assessment:  Patient referred to OT for post mastectomy lymphedema management. Pt has histrory of chronic back pain which reduces mobility. Pt will need home exercise program to regain full AROM and improve strength and endurance in addition to lymphedema management/complete decongestive therapy. Assessment process, impact of co-morbidities, assessment modification\need for assistance, and selection of interventions: Analytical Complexity:MODERATE COMPLEXITY   TREATMENT:   (In addition to Assessment/Re-Assessment sessions the following treatments were rendered)    Pre-treatment Symptoms/Complaints:  Patient had fecal implant due to chronic C-diff August 25th. No new complaints. She is anxious to get her adjusted compression sleeve. Pain: Initial:   Pain Intensity 1: 3  Post Session:  3   Occupational Therapy Evaluation (x) OT eval completed. Pt received information on lymphedema and risk reduction/self management practices as outlined by the National Lymphedema Network. Therapeutic Exercise: (10 minutes): Supine with 2 lb dowel, pt completed 15 reps each:  Shoulder flex/ext, abd/add, internal/ext rotation, elbow flex/ext, circumduction. Patient completed pulleys overhead for 30 reps with bandages on to aid in promoting lymphatic flow. Manual Therapy: (60 minutes): Opened cervical, AAA, JAIDA, AAI, TONO anastamosies and facilitation of upper quadrant nodes prior to decongestion of L UE followed by use of flexitouch pump for upper arm /chest cycle only. Application of Eucerin lotion. Mulltilayer compression bandaging with cotton stockinette and 3 short stretch bandages from wrist to axilla. New custom gauntlet does fit and patient will wear with bandages to allow her to remove for hand washing. Per patient, sitter is able to bandage adequately. Treatment/Session Assessment:    · Response to Treatment:    She is responding to MLD and multi layer bandaging as evidenced by 14.5cm loss in LUE limb size. She also reports she can wash \"under L arm (axilla) with greater ease and is now able to fasten her brra using BUE's. .\"  New custom sleeve does not fit and measurements made to have revisions completed. Pt has good support system in home health aid. See manual section above.   · Compliance with Program/Exercises:  good to date  · Recommendations/Intent for next treatment session: \"Next visit will focus on complete decongestive therapy\".   Total Treatment Duration:  OT Patient Time In/Time Out  Time In: 0200  [facility] [address] [facility]

## 2017-09-26 ENCOUNTER — HOSPITAL ENCOUNTER (OUTPATIENT)
Dept: PHYSICAL THERAPY | Age: 62
Discharge: HOME | End: 2017-09-26
Payer: MEDICAID

## 2017-09-26 NOTE — PROGRESS NOTES
Talha Ralph  : 1955 [facility]  [address], [facility] [address]  [phone]   [phone]        OUTPATIENT DAILY NOTE    NAME/AGE/GENDER: Talha Ralph is a 58 y.o. female. DATE: 2017    Patient cancelled for appointment today due to scheduling conflict.       Wagner Hall, OT

## 2017-09-28 ENCOUNTER — HOSPITAL ENCOUNTER (OUTPATIENT)
Dept: PHYSICAL THERAPY | Age: 62
Discharge: HOME | End: 2017-09-28
Payer: MEDICAID

## 2017-09-28 PROCEDURE — 97140 MANUAL THERAPY 1/> REGIONS: CPT

## 2017-09-28 NOTE — PROGRESS NOTES
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 5 = High Risk)  MD Orders: Evaluate and treat left arm lymphedema, s/p mastectomy MEDICAL/REFERRING DIAGNOSIS:   Lymphedema, not elsewhere classified [I89.0]   DATE OF ONSET: 2017   REFERRING PHYSICIAN: Vivien Mixon MD  RETURN PHYSICIAN APPOINTMENT: 2 weeks     INITIAL ASSESSMENT:  Ms. Joel Montalvo presents with swelling in her left arm and breast, s/p left mastectomy in 2017. Pt has expander in place. She reports having had C-diff 2x, requiring 9 day hospitalization with first incident. Pt reports shooting pain from armpit to sternum across breast/chest at times 9/10, limb heaviness, pitting edema elbow to shoulder, surgical scar adhesions, decreased active range of motion left shoulder with upper trapezius pain at end ranges. Progress update - Patient has been seen for 16 visits since her initial evaluation. She has made good gains in her LUE limb size reduction (14.5cm palm to axilla reduction) and has been fitted for a custom class I sleeve/gauntlet. She should receive her garment in the next week and will begin wearing for day compression. Patient does continue with swelling at her L breast expander site and wears a foam insert in her bra for compression. Patient is independent in her HEP for ROM of the LUE and now is able to to fasten her bra using BUE's independently. Once patient receives her garments she will transition toward discharge. PLAN OF CARE:   PROBLEM LIST:  1. Decreased Strength  2. Increased Pain  3. Decreased Flexibility/Joint Mobility  4.  Edema/Girth INTERVENTIONS PLANNED:  1. Hygiene training; skin integrity management  2. Manual therapy training; manual lymphatic drainage  3. Therapeutic exercise  4. Multilayer bandaging  5. Compression management  6. Kineseotaping; compression pump; prn   TREATMENT PLAN:  Effective Dates: 9/7/17 TO 12/3//17. Frequency/Duration:  2 times a week for 12 weeks; upon reassessment will adjust frequency and duration as necessary. GOALS: (Goals have been discussed and agreed upon with patient.)  Short-Term Functional Goals: Time Frame: 6 weeks STG MET  1. Patient will verbalize understanding of .longlymphedema precautions  2. Patient will be independent in skin care to reduce risk of cellulitis  3. Patient will be independent with home exercise program  4. Patient will tolerate multilayer compression bandaging to aid in edema reduction  Discharge Goals: Time Frame: 12 weeks LTG PROGRESSING  1. Pt will have reduction and stabilization of circumferencial volumetric graph measurements left upper extremity  3. Patient is independent with donning and doffing compression garments for long term management  3. Patient is independent with home management of lymphedema  Rehabilitation Potential For Stated Goals: Good  Regarding Phyllis Eldadoreen Ojedaon's therapy, I certify that the treatment plan above will be carried out by a therapist or under their direction. Thank you for this referral,  Jose Angel Santiago OT     Referring Physician Signature: Fabienne Lofton MD _________________________  Date _________            The information in this section was collected on 9/28/2017   (except where otherwise noted). OCCUPATIONAL PROFILE & HISTORY:   History of Present Injury/Illness (Reason for Referral):   Ms. Bk Hampton presents with swelling in her left arm and breast, s/p left mastectomy in January 2017. Pt has expander in place. She reports having had C-diff 2x, requiring 9 day hospitalization with first incident.   Pt reports shooting pain from armpit to sternum across breast/chest at times 9/10, limb heaviness, pitting edema elbow to shoulder, surgical scar adhesions, decreased active range of motion left shoulder with upper trapezius pain at end ranges. Pt was referred to occupational therapy for education, management and reduction of swelling left upper extremity. Past Medical History/Comorbidities:   Ms. Trina Linn  has a past medical history of Arthritis; Asthma; Breast cancer ([facility] (09/2016); CAD (coronary artery disease); Cancer ([facility] Chronic pain; Complicated UTI (urinary tract infection) (12/8/2015); Diverticulosis; Heart failure ([facility] History of echocardiogram (11/17/14 at [address]); History of prediabetes; Hypertension; Shortness of breath; Sleep apnea; and Thyroid cyst.  Ms. Trina Linn  has a past surgical history that includes carpal tunnel release (Bilateral); cyst removal; cardiac surg procedure unlist (Cath 11/18/14 [facility]); lap cholecystectomy; sinus surgery proc unlisted; other surgical; neurological procedure unlisted; cholecystectomy; breast surgery procedure unlisted; mastectomy (Left, 01/2017); and insert tissue expander(s) (Left, 01/2017). Social History/Living Environment:    Pt lives alone  Prior Level of Function/Work/Activity:  Pt is retired. Current Medications:    Current Outpatient Prescriptions:     lisinopril-hydroCHLOROthiazide (PRINZIDE, ZESTORETIC) 20-12.5 mg per tablet, TAKE 1 TABLET BY MOUTH DAILY, Disp: 30 Tab, Rfl: 0    fluticasone (FLONASE) 50 mcg/actuation nasal spray, SHAKE LIQUID AND USE 2 SPRAYS IN EACH NOSTRIL DAILY, Disp: 1 Bottle, Rfl: 2    diazePAM (VALIUM) 5 mg tablet, Take 1 Tab by mouth every six (6) hours as needed for Anxiety. Max Daily Amount: 20 mg., Disp: 60 Tab, Rfl: 0    albuterol (PROAIR HFA) 90 mcg/actuation inhaler, Take 1 Puff by inhalation every six (6) hours as needed for Wheezing., Disp: 1 Inhaler, Rfl: 2    letrozole (FEMARA) 2.5 mg tablet, Take 1 Tab by mouth daily. , Disp: 30 Tab, Rfl: 12    metoprolol succinate (TOPROL-XL) 100 mg tablet, Take 1 Tab by mouth daily. , Disp: 30 Tab, Rfl: 11    ondansetron (ZOFRAN ODT) 4 mg disintegrating tablet, Take 1 Tab by mouth every six (6) hours as needed for Nausea., Disp: 60 Tab, Rfl: 0    oxyCODONE-acetaminophen (PERCOCET)  mg per tablet, Take 1 Tab by mouth every six (6) hours as needed for Pain. Max Daily Amount: 4 Tabs., Disp: 60 Tab, Rfl: 0    venlafaxine-SR (EFFEXOR-XR) 37.5 mg capsule, Take 1 capsule by mouth for 1 week and then increase to 75 mg (2 capsules) after 1 week., Disp: 60 Cap, Rfl: 0    fluticasone-salmeterol (ADVAIR DISKUS) 250-50 mcg/dose diskus inhaler, Take 1 Puff by inhalation two (2) times a day., Disp: 1 Inhaler, Rfl: 11    Cetirizine (ZYRTEC) 10 mg cap, Take  by mouth every morning., Disp: , Rfl:     acetaminophen (TYLENOL EXTRA STRENGTH) 500 mg tablet, Take  by mouth every six (6) hours as needed for Pain., Disp: , Rfl:     montelukast (SINGULAIR) 10 mg tablet, Take 1 Tab by mouth nightly., Disp: 30 Tab, Rfl: 5    cpap machine kit, by Does Not Apply route., Disp: , Rfl:     OXYGEN-AIR DELIVERY SYSTEMS, by Does Not Apply route. 3 lpm qhs, Disp: , Rfl:     ranitidine (ZANTAC) 150 mg tablet, Take 1 Tab by mouth two (2) times a day. Indications: GASTROESOPHAGEAL REFLUX, Disp: 60 Tab, Rfl: 3    nitroglycerin (NITROSTAT) 0.4 mg SL tablet, by SubLINGual route every five (5) minutes as needed for Chest Pain., Disp: , Rfl:             Date Last Reviewed:  9/28/2017     Complexity Level : Expanded review of therapy/medical records (1-2):  MODERATE COMPLEXITY   ASSESSMENT OF OCCUPATIONAL PERFORMANCE:   Palpation:          Pitting edema with skin folds elbow to shoulder. Elbow to hand minimal (1-/5)   Expander in place left breast.  Pt with scar adhesions lateral breast.  Pocket of edema below axilla on chest wall. Skin Integrity:          Skin integrity intact; no signs of cellulitis or fibrosis.   Edema/Girth:  2+ and pitting    Left Right Initial Most Recent Initial Most Recent   Upper  Extremity  217.0 cm  Palm to axilla  202.5cm  Palm to axilla   9/7/2017       Lower  Extremity               Physical Skills Involved:  1. Range of Motion  2. Pain (acute)  3. Edema  4. Skin Integrity Cognitive Skills Affected (resulting in the inability to perform in a timely and safe manner):  1. Perception Psychosocial Skills Affected:  1. Habits/Routines   Number of elements that affect the Plan of Care: 3-5:  MODERATE COMPLEXITY   CLINICAL DECISION MAKING:   Outcome Measure: Tool Used: Lymphedema Life Impact Scale   Score:  Initial: 41 Most Recent: X (Date: -- )   Interpretation of Score: The Lymphedema Life Impact Scale (LLIS) is a validated instrument that measures the physical, functional, and psychosocial concerns pertinent to patients with extremity lymphedema. The Scale's questionnaire is administered to patients to gauge impairments, activity limitations, and participation restrictions resulting from their lymphedema. Score 0 1-13 14-26 27-40 41-54 55-67 68   Modifier CH CI CJ CK CL CM CN     ? Other PT/OT Primary Functional Limitations:     - CURRENT STATUS: CL - 60%-79% impaired, limited or restricted    - GOAL STATUS: CK - 40%-59% impaired, limited or restricted    - D/C STATUS:  ---------------To be determined---------------  Medical Necessity:   · Patient is expected to demonstrate progress in lymphedema management to decrease pain, swelling, reduce scar adhesions, self-manage lymphedema. Reason for Services/Other Comments:  · Patient continues to require skilled intervention due to left arm/chest lymphedema. Clinical Decision-Making Assessment:  Patient referred to OT for post mastectomy lymphedema management. Pt has histrory of chronic back pain which reduces mobility. Pt will need home exercise program to regain full AROM and improve strength and endurance in addition to lymphedema management/complete decongestive therapy. Assessment process, impact of co-morbidities, assessment modification\need for assistance, and selection of interventions: Analytical Complexity:MODERATE COMPLEXITY   TREATMENT:   (In addition to Assessment/Re-Assessment sessions the following treatments were rendered)    Pre-treatment Symptoms/Complaints:  Patient had fecal implant due to chronic C-diff August 25th. No new complaints. She is anxious to get her adjusted compression sleeve. Pain: Initial:   Pain Intensity 1: 3  Post Session:  3   Occupational Therapy Evaluation (x) OT eval completed. Pt received information on lymphedema and risk reduction/self management practices as outlined by the National Lymp[employer] Network. Therapeutic Exercise: ( minutes): Supine with 2 lb dowel, pt completed 15 reps each:  Shoulder flex/ext, abd/add, internal/ext rotation, elbow flex/ext, circumduction. Patient completed pulleys overhead for 30 reps with bandages on to aid in promoting lymphatic flow. Manual Therapy: (55 minutes): Opened cervical, AAA, JAIDA, AAI, TONO anastamosies and facilitation of upper quadrant nodes prior to decongestion of L UE. Jose Enrique Sanchez New custom Medi sleeve with shoulder attachment arrived today and was donned with assist from therapist.  Fit of garment is good and she will begin wearing for day compression in conjunction with custom gauntlet . Treatment/Session Assessment:    · Response to Treatment:    She is responding to MLD and multi layer bandaging as evidenced by 14.5cm loss in LUE limb size. She also reports she can wash \"under L arm (axilla) with greater ease and is now able to fasten her brra using BUE's. .\"  New custom sleeve (adjusted) was issued today and she will begin wearing with gauntlet for day compression. Pt has good support system in home health aid. See manual section above. · Compliance with Program/Exercises:  good to date  · Recommendations/Intent for next treatment session:  \"Next visit will focus on complete decongestive therapy\".   Total Treatment Duration:  OT Patient Time In/Time Out  Time In: 0200  Time Out: Eloise 60, OT

## 2017-10-03 ENCOUNTER — HOSPITAL ENCOUNTER (OUTPATIENT)
Dept: PHYSICAL THERAPY | Age: 62
Discharge: HOME | End: 2017-10-03
Payer: MEDICAID

## 2017-10-03 NOTE — PROGRESS NOTES
Kevyn Gonsalez  : 1955  Payor: [id number] / Plan: [address] / Product Type: Medicaid /  [address] at [facility]  [address], [address]  [phone]   [phone]        OUTPATIENT DAILY NOTE    NAME/AGE/GENDER: Kevyn Gonsalez is a 58 y.o. female. DATE: 10/3/2017    Patient cancelled for appointment today due to conflict. Will plan to follow up on next scheduled visit.     Anamaria Goddard, OT

## 2017-10-05 ENCOUNTER — HOSPITAL ENCOUNTER (OUTPATIENT)
Dept: PHYSICAL THERAPY | Age: 62
Discharge: HOME | End: 2017-10-05
Payer: MEDICAID

## 2017-10-05 PROCEDURE — 97140 MANUAL THERAPY 1/> REGIONS: CPT

## 2017-10-05 PROCEDURE — 97110 THERAPEUTIC EXERCISES: CPT

## 2017-10-05 NOTE — PROGRESS NOTES
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 5 = High Risk)  MD Orders: Evaluate and treat left arm lymphedema, s/p mastectomy MEDICAL/REFERRING DIAGNOSIS:   Lymphedema, not elsewhere classified [I89.0]   DATE OF ONSET: 2017   REFERRING PHYSICIAN: An Mixon MD  RETURN PHYSICIAN APPOINTMENT: 2 weeks     INITIAL ASSESSMENT:  Ms. Som Hart presents with swelling in her left arm and breast, s/p left mastectomy in 2017. Pt has expander in place. She reports having had C-diff 2x, requiring 9 day hospitalization with first incident. Pt reports shooting pain from armpit to sternum across breast/chest at times 9/10, limb heaviness, pitting edema elbow to shoulder, surgical scar adhesions, decreased active range of motion left shoulder with upper trapezius pain at end ranges. Progress update - Patient has been seen for 16 visits since her initial evaluation. She has made good gains in her LUE limb size reduction (14.5cm palm to axilla reduction) and has been fitted for a custom class I sleeve/gauntlet. She should receive her garment in the next week and will begin wearing for day compression. Patient does continue with swelling at her L breast expander site and wears a foam insert in her bra for compression. Patient is independent in her HEP for ROM of the LUE and now is able to to fasten her bra using BUE's independently. Once patient receives her garments she will transition toward discharge. PLAN OF CARE:   PROBLEM LIST:  1. Decreased Strength  2. Increased Pain  3. Decreased Flexibility/Joint Mobility  4.  Edema/Girth INTERVENTIONS PLANNED:  1. Hygiene training; skin integrity management  2. Manual therapy training; manual lymphatic drainage  3. Therapeutic exercise  4. Multilayer bandaging  5. Compression management  6. Kineseotaping; compression pump; prn   TREATMENT PLAN:  Effective Dates: 9/7/17 TO 12/3//17. Frequency/Duration:  2 times a week for 12 weeks; upon reassessment will adjust frequency and duration as necessary. GOALS: (Goals have been discussed and agreed upon with patient.)  Short-Term Functional Goals: Time Frame: 6 weeks STG MET  1. Patient will verbalize understanding of .longlymphedema precautions  2. Patient will be independent in skin care to reduce risk of cellulitis  3. Patient will be independent with home exercise program  4. Patient will tolerate multilayer compression bandaging to aid in edema reduction  Discharge Goals: Time Frame: 12 weeks LTG PROGRESSING  1. Pt will have reduction and stabilization of circumferencial volumetric graph measurements left upper extremity  3. Patient is independent with donning and doffing compression garments for long term management  3. Patient is independent with home management of lymphedema  Rehabilitation Potential For Stated Goals: Good  Regarding Peggy Klinefelter Cannon's therapy, I certify that the treatment plan above will be carried out by a therapist or under their direction. Thank you for this referral,  Adams Johnson, OT     Referring Physician Signature: Inocencio Longoria MD _________________________  Date _________            The information in this section was collected on 10/5/2017   (except where otherwise noted). OCCUPATIONAL PROFILE & HISTORY:   History of Present Injury/Illness (Reason for Referral):   Ms. Gris Chaudhary presents with swelling in her left arm and breast, s/p left mastectomy in January 2017. Pt has expander in place. She reports having had C-diff 2x, requiring 9 day hospitalization with first incident.   Pt reports shooting pain from armpit to sternum across breast/chest at times 9/10, limb heaviness, pitting edema elbow to shoulder, surgical scar adhesions, decreased active range of motion left shoulder with upper trapezius pain at end ranges. Pt was referred to occupational therapy for education, management and reduction of swelling left upper extremity. Past Medical History/Comorbidities:   Ms. Jaime Burnett  has a past medical history of Arthritis; Asthma; Breast cancer ([facility] [address] (09/2016); CAD (coronary artery disease); Cancer ([facility] [address] Chronic pain; Complicated UTI (urinary tract infection) (12/8/2015); Diverticulosis; Heart failure ([facility] [address] History of echocardiogram (11/17/14 at [facility]); History of prediabetes; Hypertension; Shortness of breath; Sleep apnea; and Thyroid cyst.  Ms. Jaime Burnett  has a past surgical history that includes carpal tunnel release (Bilateral); cyst removal; cardiac surg procedure unlist (Cath 11/18/14 [facility]); lap cholecystectomy; sinus surgery proc unlisted; other surgical; neurological procedure unlisted; cholecystectomy; breast surgery procedure unlisted; mastectomy (Left, 01/2017); and insert tissue expander(s) (Left, 01/2017). Social History/Living Environment:    Pt lives alone  Prior Level of Function/Work/Activity:  Pt is retired. Current Medications:    Current Outpatient Prescriptions:     lisinopril-hydroCHLOROthiazide (PRINZIDE, ZESTORETIC) 20-12.5 mg per tablet, TAKE 1 TABLET BY MOUTH DAILY, Disp: 30 Tab, Rfl: 0    fluticasone (FLONASE) 50 mcg/actuation nasal spray, SHAKE LIQUID AND USE 2 SPRAYS IN EACH NOSTRIL DAILY, Disp: 1 Bottle, Rfl: 2    diazePAM (VALIUM) 5 mg tablet, Take 1 Tab by mouth every six (6) hours as needed for Anxiety. Max Daily Amount: 20 mg., Disp: 60 Tab, Rfl: 0    albuterol (PROAIR HFA) 90 mcg/actuation inhaler, Take 1 Puff by inhalation every six (6) hours as needed for Wheezing., Disp: 1 Inhaler, Rfl: 2    letrozole (FEMARA) 2.5 mg tablet, Take 1 Tab by mouth daily. , Disp: 30 Tab, Rfl: 12    metoprolol succinate (TOPROL-XL) 100 mg tablet, Take 1 Tab by mouth daily. , Disp: 30 Tab, Rfl: 11    ondansetron (ZOFRAN ODT) 4 mg disintegrating tablet, Take 1 Tab by mouth every six (6) hours as needed for Nausea., Disp: 60 Tab, Rfl: 0    oxyCODONE-acetaminophen (PERCOCET)  mg per tablet, Take 1 Tab by mouth every six (6) hours as needed for Pain. Max Daily Amount: 4 Tabs., Disp: 60 Tab, Rfl: 0    venlafaxine-SR (EFFEXOR-XR) 37.5 mg capsule, Take 1 capsule by mouth for 1 week and then increase to 75 mg (2 capsules) after 1 week., Disp: 60 Cap, Rfl: 0    fluticasone-salmeterol (ADVAIR DISKUS) 250-50 mcg/dose diskus inhaler, Take 1 Puff by inhalation two (2) times a day., Disp: 1 Inhaler, Rfl: 11    Cetirizine (ZYRTEC) 10 mg cap, Take  by mouth every morning., Disp: , Rfl:     acetaminophen (TYLENOL EXTRA STRENGTH) 500 mg tablet, Take  by mouth every six (6) hours as needed for Pain., Disp: , Rfl:     montelukast (SINGULAIR) 10 mg tablet, Take 1 Tab by mouth nightly., Disp: 30 Tab, Rfl: 5    cpap machine kit, by Does Not Apply route., Disp: , Rfl:     OXYGEN-AIR DELIVERY SYSTEMS, by Does Not Apply route. 3 lpm qhs, Disp: , Rfl:     ranitidine (ZANTAC) 150 mg tablet, Take 1 Tab by mouth two (2) times a day. Indications: GASTROESOPHAGEAL REFLUX, Disp: 60 Tab, Rfl: 3    nitroglycerin (NITROSTAT) 0.4 mg SL tablet, by SubLINGual route every five (5) minutes as needed for Chest Pain., Disp: , Rfl:             Date Last Reviewed:  10/5/2017     Complexity Level : Expanded review of therapy/medical records (1-2):  MODERATE COMPLEXITY   ASSESSMENT OF OCCUPATIONAL PERFORMANCE:   Palpation:          Pitting edema with skin folds elbow to shoulder. Elbow to hand minimal (1-/5)   Expander in place left breast.  Pt with scar adhesions lateral breast.  Pocket of edema below axilla on chest wall. Skin Integrity:          Skin integrity intact; no signs of cellulitis or fibrosis.   Edema/Girth:  2+ and pitting    Left Right Initial Most Recent Initial Most Recent   Upper  Extremity  217.0 cm  Palm to axilla  202.5cm  Palm to axilla   9/7/2017       Lower  Extremity               Physical Skills Involved:  1. Range of Motion  2. Pain (acute)  3. Edema  4. Skin Integrity Cognitive Skills Affected (resulting in the inability to perform in a timely and safe manner):  1. Perception Psychosocial Skills Affected:  1. Habits/Routines   Number of elements that affect the Plan of Care: 3-5:  MODERATE COMPLEXITY   CLINICAL DECISION MAKING:   Outcome Measure: Tool Used: Lymphedema Life Impact Scale   Score:  Initial: 41 Most Recent: X (Date: -- )   Interpretation of Score: The Lymphedema Life Impact Scale (LLIS) is a validated instrument that measures the physical, functional, and psychosocial concerns pertinent to patients with extremity lymphedema. The Scale's questionnaire is administered to patients to gauge impairments, activity limitations, and participation restrictions resulting from their lymphedema. Score 0 1-13 14-26 27-40 41-54 55-67 68   Modifier CH CI CJ CK CL CM CN     ? Other PT/OT Primary Functional Limitations:     - CURRENT STATUS: CL - 60%-79% impaired, limited or restricted    - GOAL STATUS: CK - 40%-59% impaired, limited or restricted    - D/C STATUS:  ---------------To be determined---------------  Medical Necessity:   · Patient is expected to demonstrate progress in lymphedema management to decrease pain, swelling, reduce scar adhesions, self-manage lymphedema. Reason for Services/Other Comments:  · Patient continues to require skilled intervention due to left arm/chest lymphedema. Clinical Decision-Making Assessment:  Patient referred to OT for post mastectomy lymphedema management. Pt has histrory of chronic back pain which reduces mobility. Pt will need home exercise program to regain full AROM and improve strength and endurance in addition to lymphedema management/complete decongestive therapy. Assessment process, impact of co-morbidities, assessment modification\need for assistance, and selection of interventions: Analytical Complexity:MODERATE COMPLEXITY   TREATMENT:   (In addition to Assessment/Re-Assessment sessions the following treatments were rendered)    Pre-treatment Symptoms/Complaints:  Patient had fecal implant due to chronic C-diff August 25th. No new complaints. She is anxious to get her adjusted compression sleeve. Pain: Initial:   Pain Intensity 1: 3  Post Session:  3   Occupational Therapy Evaluation (x) OT eval completed. Pt received information on lymphedema and risk reduction/self management practices as outlined by the National [employer]. Therapeutic Exercise: (40 minutes): Sitting  with 2 lb dowel, pt completed 15 reps each:  Shoulder flex/ext, abd/add, internal/ext rotation, elbow flex/ext, circumduction. NuStep x 5 minutes with no resistance with garment on to facilitate muscle pumping and lymphatic flow. Self MLD instructions; pt returned demonstration and was provided written/illustrated HEP. Manual Therapy: (20 minutes):   Pt with difficulty donning new custom Medi sleeve with shoulder attachment. Pt is able to don over forearm and then requires assistance to get upper arm on and shoulder strap hooked. Shoulder strap was too long; therefore, therapist shortened and sewed the strap for security. Instructed pt to place arm overhead on wall and, with assistance, pull sleeve over upper arm and shoulder. Pt returned demonstration x 3 with min assistance. Required assistance to keep shoulder strap in line and hook. Independent with hand gauntlet. Pt increased there ex today with compression on to facilitate lymphatic flow. Treatment/Session Assessment:    · Response to Treatment:    She is responding to MLD and multi layer bandaging as evidenced by 14.5cm loss in LUE limb size.   She also reports she can wash \"under L arm (axilla) with greater ease and is now able to fasten her brra using BUE's. .\"  New custom sleeve (adjusted) was issued today and she will begin wearing with gauntlet for day compression. Pt has good support system in home health aid. See manual section above. · Compliance with Program/Exercises:  good to date  · Recommendations/Intent for next treatment session: \"Next visit will focus on complete decongestive therapy\".   Total Treatment Duration:  OT Patient Time In/Time Out  Time In: 0300  Time Out: Popeye Jack OT

## 2017-10-09 ENCOUNTER — HOSPITAL ENCOUNTER (OUTPATIENT)
Dept: PHYSICAL THERAPY | Age: 62
Discharge: HOME | End: 2017-10-09
Payer: MEDICAID

## 2017-10-09 NOTE — PROGRESS NOTES
OUTPATIENT DAILY NOTE    NAME/AGE/GENDER: Alejo Feng is a 58 y.o. female. DATE: 10/9/2017    Patient cancelled for appointment today due to illness. Will plan to follow up on next scheduled visit.     Misa Marin OT

## 2017-10-11 ENCOUNTER — HOSPITAL ENCOUNTER (OUTPATIENT)
Dept: PHYSICAL THERAPY | Age: 62
Discharge: HOME | End: 2017-10-11
Payer: MEDICAID

## 2017-10-11 PROCEDURE — 97140 MANUAL THERAPY 1/> REGIONS: CPT

## 2017-10-11 PROCEDURE — 97110 THERAPEUTIC EXERCISES: CPT

## 2017-10-11 NOTE — PROGRESS NOTES
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 5 = High Risk)  MD Orders: Evaluate and treat left arm lymphedema, s/p mastectomy MEDICAL/REFERRING DIAGNOSIS:   Lymphedema, not elsewhere classified [I89.0]   DATE OF ONSET: 2017   REFERRING PHYSICIAN: Maggi Mixon MD  RETURN PHYSICIAN APPOINTMENT: 2 weeks     INITIAL ASSESSMENT:  Ms. Olya Alexander presents with swelling in her left arm and breast, s/p left mastectomy in 2017. Pt has expander in place. She reports having had C-diff 2x, requiring 9 day hospitalization with first incident. Pt reports shooting pain from armpit to sternum across breast/chest at times 9/10, limb heaviness, pitting edema elbow to shoulder, surgical scar adhesions, decreased active range of motion left shoulder with upper trapezius pain at end ranges. Progress update - Patient has been seen for 16 visits since her initial evaluation. She has made good gains in her LUE limb size reduction (14.5cm palm to axilla reduction) and has been fitted for a custom class I sleeve/gauntlet. She should receive her garment in the next week and will begin wearing for day compression. Patient does continue with swelling at her L breast expander site and wears a foam insert in her bra for compression. Patient is independent in her HEP for ROM of the LUE and now is able to to fasten her bra using BUE's independently. Once patient receives her garments she will transition toward discharge. PLAN OF CARE:   PROBLEM LIST:  1. Decreased Strength  2. Increased Pain  3. Decreased Flexibility/Joint Mobility  4.  Edema/Girth INTERVENTIONS PLANNED:  1. Hygiene training; skin integrity management  2. Manual therapy training; manual lymphatic drainage  3. Therapeutic exercise  4. Multilayer bandaging  5. Compression management  6. Kineseotaping; compression pump; prn   TREATMENT PLAN:  Effective Dates: 9/7/17 TO 12/3//17. Frequency/Duration:  2 times a week for 12 weeks; upon reassessment will adjust frequency and duration as necessary. GOALS: (Goals have been discussed and agreed upon with patient.)  Short-Term Functional Goals: Time Frame: 6 weeks STG MET  1. Patient will verbalize understanding of .longlymphedema precautions  2. Patient will be independent in skin care to reduce risk of cellulitis  3. Patient will be independent with home exercise program  4. Patient will tolerate multilayer compression bandaging to aid in edema reduction  Discharge Goals: Time Frame: 12 weeks LTG PROGRESSING  1. Pt will have reduction and stabilization of circumferencial volumetric graph measurements left upper extremity  3. Patient is independent with donning and doffing compression garments for long term management  3. Patient is independent with home management of lymphedema  Rehabilitation Potential For Stated Goals: Good  Regarding Forest Lovett's therapy, I certify that the treatment plan above will be carried out by a therapist or under their direction. Thank you for this referral,  Renetta Elizondo OT     Referring Physician Signature: Evy Boswell MD _________________________  Date _________            The information in this section was collected on 10/11/2017   (except where otherwise noted). OCCUPATIONAL PROFILE & HISTORY:   History of Present Injury/Illness (Reason for Referral):   Ms. Alan Cardoza presents with swelling in her left arm and breast, s/p left mastectomy in January 2017. Pt has expander in place. She reports having had C-diff 2x, requiring 9 day hospitalization with first incident.   Pt reports shooting pain from armpit to sternum across breast/chest at times 9/10, limb heaviness, pitting edema elbow to shoulder, surgical scar adhesions, decreased active range of motion left shoulder with upper trapezius pain at end ranges. Pt was referred to occupational therapy for education, management and reduction of swelling left upper extremity. Past Medical History/Comorbidities:   Ms. Shefali Spencer  has a past medical history of Arthritis; Asthma; Breast cancer ([facility] [address] (09/2016); CAD (coronary artery disease); Cancer ([facility] [address] Chronic pain; Complicated UTI (urinary tract infection) (12/8/2015); Diverticulosis; Heart failure ([facility] [address] History of echocardiogram (11/17/14 at [facility]); History of prediabetes; Hypertension; Shortness of breath; Sleep apnea; and Thyroid cyst.  Ms. Shefali Spencer  has a past surgical history that includes carpal tunnel release (Bilateral); cyst removal; cardiac surg procedure unlist (Cath 11/18/14 [facility]); lap cholecystectomy; sinus surgery proc unlisted; other surgical; neurological procedure unlisted; cholecystectomy; breast surgery procedure unlisted; mastectomy (Left, 01/2017); and insert tissue expander(s) (Left, 01/2017). Social History/Living Environment:    Pt lives alone  Prior Level of Function/Work/Activity:  Pt is retired. Current Medications:    Current Outpatient Prescriptions:     lisinopril-hydroCHLOROthiazide (PRINZIDE, ZESTORETIC) 20-12.5 mg per tablet, TAKE 1 TABLET BY MOUTH DAILY, Disp: 30 Tab, Rfl: 0    fluticasone (FLONASE) 50 mcg/actuation nasal spray, SHAKE LIQUID AND USE 2 SPRAYS IN EACH NOSTRIL DAILY, Disp: 1 Bottle, Rfl: 2    diazePAM (VALIUM) 5 mg tablet, Take 1 Tab by mouth every six (6) hours as needed for Anxiety. Max Daily Amount: 20 mg., Disp: 60 Tab, Rfl: 0    albuterol (PROAIR HFA) 90 mcg/actuation inhaler, Take 1 Puff by inhalation every six (6) hours as needed for Wheezing., Disp: 1 Inhaler, Rfl: 2    letrozole (FEMARA) 2.5 mg tablet, Take 1 Tab by mouth daily. , Disp: 30 Tab, Rfl: 12    metoprolol succinate (TOPROL-XL) 100 mg tablet, Take 1 Tab by mouth daily. , Disp: 30 Tab, Rfl: 11    ondansetron (ZOFRAN ODT) 4 mg disintegrating tablet, Take 1 Tab by mouth every six (6) hours as needed for Nausea., Disp: 60 Tab, Rfl: 0    oxyCODONE-acetaminophen (PERCOCET)  mg per tablet, Take 1 Tab by mouth every six (6) hours as needed for Pain. Max Daily Amount: 4 Tabs., Disp: 60 Tab, Rfl: 0    venlafaxine-SR (EFFEXOR-XR) 37.5 mg capsule, Take 1 capsule by mouth for 1 week and then increase to 75 mg (2 capsules) after 1 week., Disp: 60 Cap, Rfl: 0    fluticasone-salmeterol (ADVAIR DISKUS) 250-50 mcg/dose diskus inhaler, Take 1 Puff by inhalation two (2) times a day., Disp: 1 Inhaler, Rfl: 11    Cetirizine (ZYRTEC) 10 mg cap, Take  by mouth every morning., Disp: , Rfl:     acetaminophen (TYLENOL EXTRA STRENGTH) 500 mg tablet, Take  by mouth every six (6) hours as needed for Pain., Disp: , Rfl:     montelukast (SINGULAIR) 10 mg tablet, Take 1 Tab by mouth nightly., Disp: 30 Tab, Rfl: 5    cpap machine kit, by Does Not Apply route., Disp: , Rfl:     OXYGEN-AIR DELIVERY SYSTEMS, by Does Not Apply route. 3 lpm qhs, Disp: , Rfl:     ranitidine (ZANTAC) 150 mg tablet, Take 1 Tab by mouth two (2) times a day. Indications: GASTROESOPHAGEAL REFLUX, Disp: 60 Tab, Rfl: 3    nitroglycerin (NITROSTAT) 0.4 mg SL tablet, by SubLINGual route every five (5) minutes as needed for Chest Pain., Disp: , Rfl:             Date Last Reviewed:  10/11/2017     Complexity Level : Expanded review of therapy/medical records (1-2):  MODERATE COMPLEXITY   ASSESSMENT OF OCCUPATIONAL PERFORMANCE:   Palpation:          Pitting edema with skin folds elbow to shoulder. Elbow to hand minimal (1-/5)   Expander in place left breast.  Pt with scar adhesions lateral breast.  Pocket of edema below axilla on chest wall. Skin Integrity:          Skin integrity intact; no signs of cellulitis or fibrosis.   Edema/Girth:  2+ and pitting    Left Right Initial Most Recent Initial Most Recent   Upper  Extremity  217.0 cm  Palm to axilla  202.5cm  Palm to axilla   9/7/2017       Lower  Extremity               Physical Skills Involved:  1. Range of Motion  2. Pain (acute)  3. Edema  4. Skin Integrity Cognitive Skills Affected (resulting in the inability to perform in a timely and safe manner):  1. Perception Psychosocial Skills Affected:  1. Habits/Routines   Number of elements that affect the Plan of Care: 3-5:  MODERATE COMPLEXITY   CLINICAL DECISION MAKING:   Outcome Measure: Tool Used: Lymphedema Life Impact Scale   Score:  Initial: 41 Most Recent: X (Date: -- )   Interpretation of Score: The Lymphedema Life Impact Scale (LLIS) is a validated instrument that measures the physical, functional, and psychosocial concerns pertinent to patients with extremity lymphedema. The Scale's questionnaire is administered to patients to gauge impairments, activity limitations, and participation restrictions resulting from their lymphedema. Score 0 1-13 14-26 27-40 41-54 55-67 68   Modifier CH CI CJ CK CL CM CN     ? Other PT/OT Primary Functional Limitations:     - CURRENT STATUS: CL - 60%-79% impaired, limited or restricted    - GOAL STATUS: CK - 40%-59% impaired, limited or restricted    - D/C STATUS:  ---------------To be determined---------------  Medical Necessity:   · Patient is expected to demonstrate progress in lymphedema management to decrease pain, swelling, reduce scar adhesions, self-manage lymphedema. Reason for Services/Other Comments:  · Patient continues to require skilled intervention due to left arm/chest lymphedema. Clinical Decision-Making Assessment:  Patient referred to OT for post mastectomy lymphedema management. Pt has histrory of chronic back pain which reduces mobility. Pt will need home exercise program to regain full AROM and improve strength and endurance in addition to lymphedema management/complete decongestive therapy. Assessment process, impact of co-morbidities, assessment modification\need for assistance, and selection of interventions: Analytical Complexity:MODERATE COMPLEXITY   TREATMENT:   (In addition to Assessment/Re-Assessment sessions the following treatments were rendered)    Pre-treatment Symptoms/Complaints:  Patient had fecal implant due to chronic C-diff August 25th. No new complaints. She reports sleeve is comfortable but needs assist to dar. Pain: Initial:   Pain Intensity 1: 3  Post Session:  3   Occupational Therapy Evaluation (x) OT eval completed. Pt received information on lymphedema and risk reduction/self management practices as outlined by the National [employer]. Therapeutic Exercise: (350 minutes): Sitting  with 2 lb dowel, pt completed 15 reps each:  Shoulder flex/ext, abd/add, internal/ext rotation, elbow flex/ext, circumduction. NuStep x 10 minutes with level 2 resistance with garment on to facilitate muscle pumping and lymphatic flow. Self MLD instructions; pt returned demonstration and was provided written/illustrated HEP. Manual Therapy: (25 minutes):   Patient received MLD to LUE following stimulation of cervical, axillary and inguinal nodes. LUE measured from palm to axilla and since therapy began she has lost 18.2cm in LUE limb size. Garments applied at end of session with patient assisting. Pt increased there ex today with compression on to facilitate lymphatic flow. Treatment/Session Assessment:    · Response to Treatment:    She is responding to MLD and multi layer bandaging as evidenced by 18.2cm loss in LUE limb size. She also reports she can wash \"under L arm (axilla) with greater ease and is now able to fasten her brra using BUE's. .\"  New custom sleeve (adjusted) was issued today and she will begin wearing with gauntlet for day compression. Pt has good support system in home health aid. See manual section above.   · Compliance with Program/Exercises:  good to date  · Recommendations/Intent for next treatment session: \"Next visit will focus on complete decongestive therapy\".   Total Treatment Duration:  OT Patient Time In/Time Out  Time In: 0300  Time Out: [id number], OT

## 2017-10-17 ENCOUNTER — HOSPITAL ENCOUNTER (OUTPATIENT)
Dept: PHYSICAL THERAPY | Age: 62
Discharge: HOME | End: 2017-10-17
Payer: MEDICAID

## 2017-10-17 NOTE — PROGRESS NOTES
OUTPATIENT DAILY NOTE    NAME/AGE/GENDER: Ihsan Martins is a 58 y.o. female. DATE: 10/17/2017    Patient cancelled for appointment today for unknown reason. Will plan to follow up on next scheduled visit.     Kb Ortiz OT

## 2017-10-19 ENCOUNTER — HOSPITAL ENCOUNTER (OUTPATIENT)
Dept: PHYSICAL THERAPY | Age: 62
Discharge: HOME | End: 2017-10-19
Payer: MEDICAID

## 2017-10-19 PROCEDURE — 97140 MANUAL THERAPY 1/> REGIONS: CPT

## 2017-10-19 PROCEDURE — 97110 THERAPEUTIC EXERCISES: CPT

## 2017-10-19 NOTE — PROGRESS NOTES
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 5 = High Risk)  MD Orders: Evaluate and treat left arm lymphedema, s/p mastectomy MEDICAL/REFERRING DIAGNOSIS:   Lymphedema, not elsewhere classified [I89.0]   DATE OF ONSET: 2017   REFERRING PHYSICIAN: Laxmi Mixon MD  RETURN PHYSICIAN APPOINTMENT: 2 weeks     INITIAL ASSESSMENT:  Ms. Tatyana Mark presents with swelling in her left arm and breast, s/p left mastectomy in 2017. Pt has expander in place. She reports having had C-diff 2x, requiring 9 day hospitalization with first incident. Pt reports shooting pain from armpit to sternum across breast/chest at times 9/10, limb heaviness, pitting edema elbow to shoulder, surgical scar adhesions, decreased active range of motion left shoulder with upper trapezius pain at end ranges. Progress update - Patient has been seen for 16 visits since her initial evaluation. She has made good gains in her LUE limb size reduction (14.5cm palm to axilla reduction) and has been fitted for a custom class I sleeve/gauntlet. She should receive her garment in the next week and will begin wearing for day compression. Patient does continue with swelling at her L breast expander site and wears a foam insert in her bra for compression. Patient is independent in her HEP for ROM of the LUE and now is able to to fasten her bra using BUE's independently. Once patient receives her garments she will transition toward discharge. PLAN OF CARE:   PROBLEM LIST:  1. Decreased Strength  2. Increased Pain  3. Decreased Flexibility/Joint Mobility  4.  Edema/Girth INTERVENTIONS PLANNED:  1. Hygiene training; skin integrity management  2. Manual therapy training; manual lymphatic drainage  3. Therapeutic exercise  4. Multilayer bandaging  5. Compression management  6. Kineseotaping; compression pump; prn   TREATMENT PLAN:  Effective Dates: 9/7/17 TO 12/3//17. Frequency/Duration:  2 times a week for 12 weeks; upon reassessment will adjust frequency and duration as necessary. GOALS: (Goals have been discussed and agreed upon with patient.)  Short-Term Functional Goals: Time Frame: 6 weeks STG MET  1. Patient will verbalize understanding of .longlymphedema precautions  2. Patient will be independent in skin care to reduce risk of cellulitis  3. Patient will be independent with home exercise program  4. Patient will tolerate multilayer compression bandaging to aid in edema reduction  Discharge Goals: Time Frame: 12 weeks LTG PROGRESSING  1. Pt will have reduction and stabilization of circumferencial volumetric graph measurements left upper extremity  3. Patient is independent with donning and doffing compression garments for long term management  3. Patient is independent with home management of lymphedema  Rehabilitation Potential For Stated Goals: Good  Regarding Sonal Lovett's therapy, I certify that the treatment plan above will be carried out by a therapist or under their direction. Thank you for this referral,  Dennis Dejesus, OT     Referring Physician Signature: Julio Bertrand MD _________________________  Date _________            The information in this section was collected on 10/19/2017   (except where otherwise noted). OCCUPATIONAL PROFILE & HISTORY:   History of Present Injury/Illness (Reason for Referral):   Ms. Som Hart presents with swelling in her left arm and breast, s/p left mastectomy in January 2017. Pt has expander in place. She reports having had C-diff 2x, requiring 9 day hospitalization with first incident.   Pt reports shooting pain from armpit to sternum across breast/chest at times 9/10, limb heaviness, pitting edema elbow to shoulder, surgical scar adhesions, decreased active range of motion left shoulder with upper trapezius pain at end ranges. Pt was referred to occupational therapy for education, management and reduction of swelling left upper extremity. Past Medical History/Comorbidities:   Ms. Bk Hampton  has a past medical history of Arthritis; Asthma; Breast cancer ([facility] (09/2016); CAD (coronary artery disease); Cancer ([facility] [facility] Chronic pain; Complicated UTI (urinary tract infection) (12/8/2015); Diverticulosis; Heart failure ([facility] History of echocardiogram ([facility]/17/14 at [facility]); History of prediabetes; Hypertension; Shortness of breath; Sleep apnea; and Thyroid cyst.  Ms. Bk Hampton  has a past surgical history that includes carpal tunnel release (Bilateral); cyst removal; cardiac surg procedure unlist (Cath 11/18/14 [facility]); lap cholecystectomy; sinus surgery proc unlisted; other surgical; neurological procedure unlisted; cholecystectomy; breast surgery procedure unlisted; mastectomy (Left, 01/2017); and insert tissue expander(s) (Left, 01/2017). Social History/Living Environment:    Pt lives alone  Prior Level of Function/Work/Activity:  Pt is retired. Current Medications:    Current Outpatient Prescriptions:     lisinopril-hydroCHLOROthiazide (PRINZIDE, ZESTORETIC) 20-12.5 mg per tablet, TAKE 1 TABLET BY MOUTH DAILY, Disp: 30 Tab, Rfl: 0    fluticasone (FLONASE) 50 mcg/actuation nasal spray, SHAKE LIQUID AND USE 2 SPRAYS IN EACH NOSTRIL DAILY, Disp: 1 Bottle, Rfl: 2    diazePAM (VALIUM) 5 mg tablet, Take 1 Tab by mouth every six (6) hours as needed for Anxiety. Max Daily Amount: 20 mg., Disp: 60 Tab, Rfl: 0    albuterol (PROAIR HFA) 90 mcg/actuation inhaler, Take 1 Puff by inhalation every six (6) hours as needed for Wheezing., Disp: 1 Inhaler, Rfl: 2    letrozole (FEMARA) 2.5 mg tablet, Take 1 Tab by mouth daily. , Disp: 30 Tab, Rfl: 12    metoprolol succinate (TOPROL-XL) 100 mg tablet, Take 1 Tab by mouth daily. , Disp: 30 Tab, Rfl: 11    ondansetron (ZOFRAN ODT) 4 mg disintegrating tablet, Take 1 Tab by mouth every six (6) hours as needed for Nausea., Disp: 60 Tab, Rfl: 0    oxyCODONE-acetaminophen (PERCOCET)  mg per tablet, Take 1 Tab by mouth every six (6) hours as needed for Pain. Max Daily Amount: 4 Tabs., Disp: 60 Tab, Rfl: 0    venlafaxine-SR (EFFEXOR-XR) 37.5 mg capsule, Take 1 capsule by mouth for 1 week and then increase to 75 mg (2 capsules) after 1 week., Disp: 60 Cap, Rfl: 0    fluticasone-salmeterol (ADVAIR DISKUS) 250-50 mcg/dose diskus inhaler, Take 1 Puff by inhalation two (2) times a day., Disp: 1 Inhaler, Rfl: 11    Cetirizine (ZYRTEC) 10 mg cap, Take  by mouth every morning., Disp: , Rfl:     acetaminophen (TYLENOL EXTRA STRENGTH) 500 mg tablet, Take  by mouth every six (6) hours as needed for Pain., Disp: , Rfl:     montelukast (SINGULAIR) 10 mg tablet, Take 1 Tab by mouth nightly., Disp: 30 Tab, Rfl: 5    cpap machine kit, by Does Not Apply route., Disp: , Rfl:     OXYGEN-AIR DELIVERY SYSTEMS, by Does Not Apply route. 3 lpm qhs, Disp: , Rfl:     ranitidine (ZANTAC) 150 mg tablet, Take 1 Tab by mouth two (2) times a day. Indications: GASTROESOPHAGEAL REFLUX, Disp: 60 Tab, Rfl: 3    nitroglycerin (NITROSTAT) 0.4 mg SL tablet, by SubLINGual route every five (5) minutes as needed for Chest Pain., Disp: , Rfl:             Date Last Reviewed:  10/19/2017     Complexity Level : Expanded review of therapy/medical records (1-2):  MODERATE COMPLEXITY   ASSESSMENT OF OCCUPATIONAL PERFORMANCE:   Palpation:          Pitting edema with skin folds elbow to shoulder. Elbow to hand minimal (1-/5)   Expander in place left breast.  Pt with scar adhesions lateral breast.  Pocket of edema below axilla on chest wall. Skin Integrity:          Skin integrity intact; no signs of cellulitis or fibrosis.   Edema/Girth:  2+ and pitting    Left Right Initial Most Recent Initial Most Recent   Upper  Extremity  217.0 cm  Palm to axilla  202.5cm  Palm to axilla   9/7/2017       Lower  Extremity               Physical Skills Involved:  1. Range of Motion  2. Pain (acute)  3. Edema  4. Skin Integrity Cognitive Skills Affected (resulting in the inability to perform in a timely and safe manner):  1. Perception Psychosocial Skills Affected:  1. Habits/Routines   Number of elements that affect the Plan of Care: 3-5:  MODERATE COMPLEXITY   CLINICAL DECISION MAKING:   Outcome Measure: Tool Used: Lymphedema Life Impact Scale   Score:  Initial: 41 Most Recent: X (Date: -- )   Interpretation of Score: The Lymphedema Life Impact Scale (LLIS) is a validated instrument that measures the physical, functional, and psychosocial concerns pertinent to patients with extremity lymphedema. The Scale's questionnaire is administered to patients to gauge impairments, activity limitations, and participation restrictions resulting from their lymphedema. Score 0 1-13 14-26 27-40 41-54 55-67 68   Modifier CH CI CJ CK CL CM CN     ? Other PT/OT Primary Functional Limitations:     - CURRENT STATUS: CL - 60%-79% impaired, limited or restricted    - GOAL STATUS: CK - 40%-59% impaired, limited or restricted    - D/C STATUS:  ---------------To be determined---------------  Medical Necessity:   · Patient is expected to demonstrate progress in lymphedema management to decrease pain, swelling, reduce scar adhesions, self-manage lymphedema. Reason for Services/Other Comments:  · Patient continues to require skilled intervention due to left arm/chest lymphedema. Clinical Decision-Making Assessment:  Patient referred to OT for post mastectomy lymphedema management. Pt has histrory of chronic back pain which reduces mobility. Pt will need home exercise program to regain full AROM and improve strength and endurance in addition to lymphedema management/complete decongestive therapy. Assessment process, impact of co-morbidities, assessment modification\need for assistance, and selection of interventions: Analytical Complexity:MODERATE COMPLEXITY   TREATMENT:   (In addition to Assessment/Re-Assessment sessions the following treatments were rendered)    Pre-treatment Symptoms/Complaints:    No new complaints. She reports sleeve is comfortable but needs assist to dar - she is wearing daily. Pain: Initial:   Pain Intensity 1: 3  Post Session:  3   Occupational Therapy Evaluation (x) OT eval completed. Pt received information on lymphedema and risk reduction/self management practices as outlined by the National Lymphedema Network. Therapeutic Exercise: (30 minutes): Sitting  with 2 lb dowel, pt completed 15 reps each:  Shoulder flex/ext, abd/add, internal/ext rotation, elbow flex/ext, circumduction. NuStep x 16 minutes with level 2 resistance with garment on to facilitate muscle pumping and lymphatic flow. Self MLD instructions; pt returned demonstration and was provided written/illustrated HEP. Manual Therapy: (30 minutes):   Patient received MLD to LUE following stimulation of cervical, axillary and inguinal nodes. Since therapy began she has lost 18.2cm in LUE limb size. Garments applied at end of session with patient assisting. Treatment/Session Assessment:    · Response to Treatment:    She is responding to MLD and multi layer bandaging as evidenced by 18.2cm loss in LUE limb size. She also reports she can wash \"under L arm (axilla) with greater ease and is now able to fasten her brra using BUE's. .\"  Patient is wearing new adjusted sleeve with gauntlet for day compression. Pt has good support system in home health aid. See manual section above. · Compliance with Program/Exercises:  good to date  · Recommendations/Intent for next treatment session: \"Next visit will focus on complete decongestive therapy\".   Total Treatment Duration:  OT Patient Time In/Time Out  Time In: 1000  Time Out: [address], OT

## 2017-10-24 ENCOUNTER — HOSPITAL ENCOUNTER (OUTPATIENT)
Dept: PHYSICAL THERAPY | Age: 62
Discharge: HOME | End: 2017-10-24
Payer: MEDICAID

## 2017-10-24 PROCEDURE — 97110 THERAPEUTIC EXERCISES: CPT

## 2017-10-24 PROCEDURE — 97140 MANUAL THERAPY 1/> REGIONS: CPT

## 2017-10-24 NOTE — PROGRESS NOTES
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 5 = High Risk)  MD Orders: Evaluate and treat left arm lymphedema, s/p mastectomy MEDICAL/REFERRING DIAGNOSIS:   Lymphedema, not elsewhere classified [I89.0]   DATE OF ONSET: 2017   REFERRING PHYSICIAN: Diana Mixon MD  RETURN PHYSICIAN APPOINTMENT: 2 weeks     INITIAL ASSESSMENT:  Ms. Neo Casanova presents with swelling in her left arm and breast, s/p left mastectomy in 2017. Pt has expander in place. She reports having had C-diff 2x, requiring 9 day hospitalization with first incident. Pt reports shooting pain from armpit to sternum across breast/chest at times 9/10, limb heaviness, pitting edema elbow to shoulder, surgical scar adhesions, decreased active range of motion left shoulder with upper trapezius pain at end ranges. Progress update - Patient has been seen for 16 visits since her initial evaluation. She has made good gains in her LUE limb size reduction (14.5cm palm to axilla reduction) and has been fitted for a custom class I sleeve/gauntlet. She should receive her garment in the next week and will begin wearing for day compression. Patient does continue with swelling at her L breast expander site and wears a foam insert in her bra for compression. Patient is independent in her HEP for ROM of the LUE and now is able to to fasten her bra using BUE's independently. Once patient receives her garments she will transition toward discharge. PLAN OF CARE:   PROBLEM LIST:  1. Decreased Strength  2. Increased Pain  3. Decreased Flexibility/Joint Mobility  4.  Edema/Girth INTERVENTIONS PLANNED:  1. Hygiene training; skin integrity management  2. Manual therapy training; manual lymphatic drainage  3. Therapeutic exercise  4. Multilayer bandaging  5. Compression management  6. Kineseotaping; compression pump; prn   TREATMENT PLAN:  Effective Dates: 9/7/17 TO 12/3//17. Frequency/Duration:  2 times a week for 12 weeks; upon reassessment will adjust frequency and duration as necessary. GOALS: (Goals have been discussed and agreed upon with patient.)  Short-Term Functional Goals: Time Frame: 6 weeks STG MET  1. Patient will verbalize understanding of .longlymphedema precautions  2. Patient will be independent in skin care to reduce risk of cellulitis  3. Patient will be independent with home exercise program  4. Patient will tolerate multilayer compression bandaging to aid in edema reduction  Discharge Goals: Time Frame: 12 weeks LTG PROGRESSING  1. Pt will have reduction and stabilization of circumferencial volumetric graph measurements left upper extremity  3. Patient is independent with donning and doffing compression garments for long term management  3. Patient is independent with home management of lymphedema  Rehabilitation Potential For Stated Goals: Good  Regarding Adams Lovett's therapy, I certify that the treatment plan above will be carried out by a therapist or under their direction. Thank you for this referral,  Gilmar Sheikh, OT     Referring Physician Signature: Tamika Markham MD _________________________  Date _________            The information in this section was collected on 10/24/2017   (except where otherwise noted). OCCUPATIONAL PROFILE & HISTORY:   History of Present Injury/Illness (Reason for Referral):   Ms. Jaime Burnett presents with swelling in her left arm and breast, s/p left mastectomy in January 2017. Pt has expander in place. She reports having had C-diff 2x, requiring 9 day hospitalization with first incident.   Pt reports shooting pain from armpit to sternum across breast/chest at times 9/10, limb heaviness, pitting edema elbow to shoulder, surgical scar adhesions, decreased active range of motion left shoulder with upper trapezius pain at end ranges. Pt was referred to occupational therapy for education, management and reduction of swelling left upper extremity. Past Medical History/Comorbidities:   Ms. [facility]  has a past medical history of Arthritis; Asthma; Breast cancer ([facility] [address] (09/2016); CAD (coronary artery disease); [facility] Chronic pain; Complicated UTI (urinary tract infection) (12/8/2015); Diverticulosis; Heart failure ([facility] [address] History of echocardiogram (11/17/14 at [facility]); History of prediabetes; Hypertension; Shortness of breath; Sleep apnea; and Thyroid cyst.  Ms. [facility]  has a past surgical history that includes carpal tunnel release (Bilateral); cyst removal; cardiac surg procedure unlist (Cath 11/18/14 [facility]); lap cholecystectomy; sinus surgery proc unlisted; other surgical; neurological procedure unlisted; cholecystectomy; breast surgery procedure unlisted; mastectomy (Left, 01/2017); and insert tissue expander(s) (Left, 01/2017). Social History/Living Environment:    Pt lives alone  Prior Level of Function/Work/Activity:  Pt is retired. Current Medications:    Current Outpatient Prescriptions:     montelukast (SINGULAIR) 10 mg tablet, TAKE 1 TABLET BY MOUTH EVERY NIGHT, Disp: 30 Tab, Rfl: 11    lisinopril-hydroCHLOROthiazide (PRINZIDE, ZESTORETIC) 20-12.5 mg per tablet, TAKE 1 TABLET BY MOUTH DAILY, Disp: 30 Tab, Rfl: 0    fluticasone (FLONASE) 50 mcg/actuation nasal spray, SHAKE LIQUID AND USE 2 SPRAYS IN EACH NOSTRIL DAILY, Disp: 1 Bottle, Rfl: 2    diazePAM (VALIUM) 5 mg tablet, Take 1 Tab by mouth every six (6) hours as needed for Anxiety.  Max Daily Amount: 20 mg., Disp: 60 Tab, Rfl: 0    albuterol (PROAIR HFA) 90 mcg/actuation inhaler, Take 1 Puff by inhalation every six (6) hours as needed for Wheezing., Disp: 1 Inhaler, Rfl: 2   letrozole (FEMARA) 2.5 mg tablet, Take 1 Tab by mouth daily. , Disp: 30 Tab, Rfl: 12    metoprolol succinate (TOPROL-XL) 100 mg tablet, Take 1 Tab by mouth daily. , Disp: 30 Tab, Rfl: 11    ondansetron (ZOFRAN ODT) 4 mg disintegrating tablet, Take 1 Tab by mouth every six (6) hours as needed for Nausea., Disp: 60 Tab, Rfl: 0    oxyCODONE-acetaminophen (PERCOCET)  mg per tablet, Take 1 Tab by mouth every six (6) hours as needed for Pain. Max Daily Amount: 4 Tabs., Disp: 60 Tab, Rfl: 0    venlafaxine-SR (EFFEXOR-XR) 37.5 mg capsule, Take 1 capsule by mouth for 1 week and then increase to 75 mg (2 capsules) after 1 week., Disp: 60 Cap, Rfl: 0    fluticasone-salmeterol (ADVAIR DISKUS) 250-50 mcg/dose diskus inhaler, Take 1 Puff by inhalation two (2) times a day., Disp: 1 Inhaler, Rfl: 11    Cetirizine (ZYRTEC) 10 mg cap, Take  by mouth every morning., Disp: , Rfl:     acetaminophen (TYLENOL EXTRA STRENGTH) 500 mg tablet, Take  by mouth every six (6) hours as needed for Pain., Disp: , Rfl:     cpap machine kit, by Does Not Apply route., Disp: , Rfl:     OXYGEN-AIR DELIVERY SYSTEMS, by Does Not Apply route. 3 lpm qhs, Disp: , Rfl:     ranitidine (ZANTAC) 150 mg tablet, Take 1 Tab by mouth two (2) times a day. Indications: GASTROESOPHAGEAL REFLUX, Disp: 60 Tab, Rfl: 3    nitroglycerin (NITROSTAT) 0.4 mg SL tablet, by SubLINGual route every five (5) minutes as needed for Chest Pain., Disp: , Rfl:             Date Last Reviewed:  10/24/2017     Complexity Level : Expanded review of therapy/medical records (1-2):  MODERATE COMPLEXITY   ASSESSMENT OF OCCUPATIONAL PERFORMANCE:   Palpation:          Pitting edema with skin folds elbow to shoulder. Elbow to hand minimal (1-/5)   Expander in place left breast.  Pt with scar adhesions lateral breast.  Pocket of edema below axilla on chest wall. Skin Integrity:          Skin integrity intact; no signs of cellulitis or fibrosis.   Edema/Girth:  2+ and pitting    Left Right    Initial Most Recent Initial Most Recent   Upper  Extremity  217.0 cm  Palm to axilla  202.5cm  Palm to axilla   9/7/2017       Lower  Extremity               Physical Skills Involved:  1. Range of Motion  2. Pain (acute)  3. Edema  4. Skin Integrity Cognitive Skills Affected (resulting in the inability to perform in a timely and safe manner):  1. Perception Psychosocial Skills Affected:  1. Habits/Routines   Number of elements that affect the Plan of Care: 3-5:  MODERATE COMPLEXITY   CLINICAL DECISION MAKING:   Outcome Measure: Tool Used: Lymphedema Life Impact Scale   Score:  Initial: 41 Most Recent: X (Date: -- )   Interpretation of Score: The Lymphedema Life Impact Scale (LLIS) is a validated instrument that measures the physical, functional, and psychosocial concerns pertinent to patients with extremity lymphedema. The Scale's questionnaire is administered to patients to gauge impairments, activity limitations, and participation restrictions resulting from their lymphedema. Score 0 1-13 14-26 27-40 41-54 55-67 68   Modifier CH CI CJ CK CL CM CN     ? Other PT/OT Primary Functional Limitations:     - CURRENT STATUS: CL - 60%-79% impaired, limited or restricted    - GOAL STATUS: CK - 40%-59% impaired, limited or restricted    - D/C STATUS:  ---------------To be determined---------------  Medical Necessity:   · Patient is expected to demonstrate progress in lymphedema management to decrease pain, swelling, reduce scar adhesions, self-manage lymphedema. Reason for Services/Other Comments:  · Patient continues to require skilled intervention due to left arm/chest lymphedema. Clinical Decision-Making Assessment:  Patient referred to OT for post mastectomy lymphedema management. Pt has histrory of chronic back pain which reduces mobility.  Pt will need home exercise program to regain full AROM and improve strength and endurance in addition to lymphedema management/complete decongestive therapy. Assessment process, impact of co-morbidities, assessment modification\need for assistance, and selection of interventions: Analytical Complexity:MODERATE COMPLEXITY   TREATMENT:   (In addition to Assessment/Re-Assessment sessions the following treatments were rendered)    Pre-treatment Symptoms/Complaints:    No new complaints. She reports sleeve is comfortable but needs assist to dar - she is wearing daily. Pain: Initial:   Pain Intensity 1: 3  Post Session:  3   Occupational Therapy Evaluation (x) OT eval completed. Pt received information on lymphedema and risk reduction/self management practices as outlined by the National Lymphedema Network. Therapeutic Exercise: (10 minutes): Supine  with 2 lb dowel, pt completed 20 reps each:  Shoulder flex/ext, abd/add, internal/ext rotation, elbow flex/ext, circumduction. Manual Therapy: (50 minutes):   Patient received MLD to LUE following stimulation of cervical, axillary and inguinal nodes. Opened cervical, AAA, JAIDA, AAI, TONO anastamosies and facilitation of upper quadrant nodes prior to decongestion of L UE. Application of Eucerin lotion. Scar massage over right breast and right axilla scar tissue  Since therapy began she has lost 18.2cm in LUE limb size. Garments applied at end of session with patient assisting. Treatment/Session Assessment:    · Response to Treatment:    She is responding to MLD and multi layer bandaging as evidenced by 18.2cm loss in LUE limb size. She also reports she can wash \"under L arm (axilla) with greater ease and is now able to fasten her brra using BUE's. .\"  Patient is wearing new adjusted sleeve with gauntlet for day compression. Pt has good support system in home health aid. See manual section above. · Compliance with Program/Exercises:  good to date  · Recommendations/Intent for next treatment session: \"Next visit will focus on complete decongestive therapy\".   Total Treatment Duration:  OT Patient Time In/Time Out  Time In: 0200  Time Out: [address], OT

## 2017-10-26 ENCOUNTER — APPOINTMENT (OUTPATIENT)
Dept: PHYSICAL THERAPY | Age: 62
End: 2017-10-26
Payer: MEDICAID

## 2017-11-09 ENCOUNTER — APPOINTMENT (OUTPATIENT)
Dept: PHYSICAL THERAPY | Age: 62
End: 2017-11-09

## 2017-11-16 ENCOUNTER — APPOINTMENT (OUTPATIENT)
Dept: PHYSICAL THERAPY | Age: 62
End: 2017-11-16

## 2018-01-10 NOTE — PROGRESS NOTES
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 5 = High Risk)  MD Orders: Evaluate and treat left arm lymphedema, s/p mastectomy MEDICAL/REFERRING DIAGNOSIS:   Lymphedema, not elsewhere classified [I89.0]   DATE OF ONSET: 2017   REFERRING PHYSICIAN: Isiah Mixon MD  RETURN PHYSICIAN APPOINTMENT: 2 weeks     INITIAL ASSESSMENT:  Ms. Susie Cramer presents with swelling in her left arm and breast, s/p left mastectomy in 2017. Pt has expander in place. She reports having had C-diff 2x, requiring 9 day hospitalization with first incident. Pt reports shooting pain from armpit to sternum across breast/chest at times 9/10, limb heaviness, pitting edema elbow to shoulder, surgical scar adhesions, decreased active range of motion left shoulder with upper trapezius pain at end ranges. Progress update - Patient has been seen for 16 visits since her initial evaluation. She has made good gains in her LUE limb size reduction (14.5cm palm to axilla reduction) and has been fitted for a custom class I sleeve/gauntlet. She should receive her garment in the next week and will begin wearing for day compression. Patient does continue with swelling at her L breast expander site and wears a foam insert in her bra for compression. Patient is independent in her HEP for ROM of the LUE and now is able to to fasten her bra using BUE's independently. Once patient receives her garments she will transition toward discharge. Discontinuation.:  Patient did not return to therapy but she was fitted for a class I custom sleeve for day compression. .  Since therapy began she has lost 14.5cm in LUE limb size. STG were met and LTG were progressing. PLAN OF CARE:   PROBLEM LIST:  1. Decreased Strength  2. Increased Pain  3. Decreased Flexibility/Joint Mobility  4. Edema/Girth INTERVENTIONS PLANNED:  1. Hygiene training; skin integrity management  2. Manual therapy training; manual lymphatic drainage  3. Therapeutic exercise  4. Multilayer bandaging  5. Compression management  6. Kineseotaping; compression pump; prn   TREATMENT PLAN:  Effective Dates: 9/7/17 TO 12/3//17. Frequency/Duration:  2 times a week for 12 weeks; upon reassessment will adjust frequency and duration as necessary. GOALS: (Goals have been discussed and agreed upon with patient.)  Short-Term Functional Goals: Time Frame: 6 weeks STG MET  1. Patient will verbalize understanding of .longlymphedema precautions  2. Patient will be independent in skin care to reduce risk of cellulitis  3. Patient will be independent with home exercise program  4. Patient will tolerate multilayer compression bandaging to aid in edema reduction  Discharge Goals: Time Frame: 12 weeks LTG PROGRESSING  1. Pt will have reduction and stabilization of circumferencial volumetric graph measurements left upper extremity  3. Patient is independent with donning and doffing compression garments for long term management  3. Patient is independent with home management of lymphedema  Rehabilitation Potential For Stated Goals: Good  Regarding Myriam Lovett's therapy, I certify that the treatment plan above will be carried out by a therapist or under their direction. Thank you for this referral,  Kirstin Rubi OT     Referring Physician Signature: Zoila Encinas MD _________________________  Date _________            The information in this section was collected on 10/19/2017   (except where otherwise noted).   OCCUPATIONAL PROFILE & HISTORY:   History of Present Injury/Illness (Reason for Referral):   Ms. Shefali Spencer presents with swelling in her left arm and breast, s/p left mastectomy in January 2017. Pt has expander in place. She reports having had C-diff 2x, requiring 9 day hospitalization with first incident. Pt reports shooting pain from armpit to sternum across breast/chest at times 9/10, limb heaviness, pitting edema elbow to shoulder, surgical scar adhesions, decreased active range of motion left shoulder with upper trapezius pain at end ranges. Pt was referred to occupational therapy for education, management and reduction of swelling left upper extremity. Past Medical History/Comorbidities:   Ms. Keyur Child  has a past medical history of Arthritis; Asthma; Breast cancer ([facility] [address] (09/2016); CAD (coronary artery disease); Cancer ([facility] [address] Chronic pain; Complicated UTI (urinary tract infection) (12/8/2015); Diverticulosis; Heart failure ([facility] [address] History of echocardiogram ([facility]/17/14 at [facility]); History of prediabetes; Hypertension; Shortness of breath; Sleep apnea; and Thyroid cyst.  Ms. Keyur Child  has a past surgical history that includes hx carpal tunnel release (Bilateral); hx cyst removal; pr cardiac surg procedure unlist (Cath 11/18/14 [facility]); hx lap cholecystectomy; pr sinus surgery proc unlisted; hx other surgical; pr neurological procedure unlisted; hx cholecystectomy; pr breast surgery procedure unlisted; hx mastectomy (Left, 01/2017); and pr insert tissue expander(s) (Left, 01/2017). Social History/Living Environment:    Pt lives alone  Prior Level of Function/Work/Activity:  Pt is retired.   Current Medications:    Current Outpatient Prescriptions:     lisinopril-hydroCHLOROthiazide (PRINZIDE, ZESTORETIC) 20-12.5 mg per tablet, TAKE 1 TABLET BY MOUTH DAILY, Disp: 30 Tab, Rfl: 0    montelukast (SINGULAIR) 10 mg tablet, TAKE 1 TABLET BY MOUTH EVERY NIGHT, Disp: 30 Tab, Rfl: 11    fluticasone (FLONASE) 50 mcg/actuation nasal spray, SHAKE LIQUID AND USE 2 SPRAYS IN EACH NOSTRIL DAILY, Disp: 1 Bottle, Rfl: 2    diazePAM (VALIUM) 5 mg tablet, Take 1 Tab by mouth every six (6) hours as needed for Anxiety. Max Daily Amount: 20 mg., Disp: 60 Tab, Rfl: 0    albuterol (PROAIR HFA) 90 mcg/actuation inhaler, Take 1 Puff by inhalation every six (6) hours as needed for Wheezing., Disp: 1 Inhaler, Rfl: 2    letrozole (FEMARA) 2.5 mg tablet, Take 1 Tab by mouth daily. , Disp: 30 Tab, Rfl: 12    metoprolol succinate (TOPROL-XL) 100 mg tablet, Take 1 Tab by mouth daily. , Disp: 30 Tab, Rfl: 11    ondansetron (ZOFRAN ODT) 4 mg disintegrating tablet, Take 1 Tab by mouth every six (6) hours as needed for Nausea., Disp: 60 Tab, Rfl: 0    oxyCODONE-acetaminophen (PERCOCET)  mg per tablet, Take 1 Tab by mouth every six (6) hours as needed for Pain. Max Daily Amount: 4 Tabs., Disp: 60 Tab, Rfl: 0    venlafaxine-SR (EFFEXOR-XR) 37.5 mg capsule, Take 1 capsule by mouth for 1 week and then increase to 75 mg (2 capsules) after 1 week., Disp: 60 Cap, Rfl: 0    fluticasone-salmeterol (ADVAIR DISKUS) 250-50 mcg/dose diskus inhaler, Take 1 Puff by inhalation two (2) times a day., Disp: 1 Inhaler, Rfl: 11    Cetirizine (ZYRTEC) 10 mg cap, Take  by mouth every morning., Disp: , Rfl:     acetaminophen (TYLENOL EXTRA STRENGTH) 500 mg tablet, Take  by mouth every six (6) hours as needed for Pain., Disp: , Rfl:     cpap machine kit, by Does Not Apply route., Disp: , Rfl:     OXYGEN-AIR DELIVERY SYSTEMS, by Does Not Apply route. 3 lpm qhs, Disp: , Rfl:     ranitidine (ZANTAC) 150 mg tablet, Take 1 Tab by mouth two (2) times a day. Indications: GASTROESOPHAGEAL REFLUX, Disp: 60 Tab, Rfl: 3    nitroglycerin (NITROSTAT) 0.4 mg SL tablet, by SubLINGual route every five (5) minutes as needed for Chest Pain., Disp: , Rfl:             Date Last Reviewed:  10/19/2017     Complexity Level : Expanded review of therapy/medical records (1-2):  MODERATE COMPLEXITY   ASSESSMENT OF OCCUPATIONAL PERFORMANCE:   Palpation:          Edema with skin folds elbow to shoulder.  Elbow to hand minimal (1-/5)   Expander in place left breast.  Pt with scar adhesions lateral breast.  Pocket of edema below axilla on chest wall. Since therapy began she has lost 14.5cm in LUE limb size. She is now wearing a custom class I sleeve for day compression. Skin Integrity:          Skin integrity intact; no signs of cellulitis or fibrosis. Edema/Girth:  2+ and pitting    Left Right    Initial Most Recent Initial Most Recent   Upper  Extremity  217.0 cm  Palm to axilla  202.5cm  Palm to axilla   9/7/2017       Lower  Extremity               Physical Skills Involved:  1. Range of Motion  2. Pain (acute)  3. Edema  4. Skin Integrity Cognitive Skills Affected (resulting in the inability to perform in a timely and safe manner):  1. Perception Psychosocial Skills Affected:  1. Habits/Routines   Number of elements that affect the Plan of Care: 3-5:  MODERATE COMPLEXITY   CLINICAL DECISION MAKING:   Outcome Measure: Tool Used: Lymphedema Life Impact Scale   Score:  Initial: 41 Most Recent: N/A   Interpretation of Score: The Lymphedema Life Impact Scale (LLIS) is a validated instrument that measures the physical, functional, and psychosocial concerns pertinent to patients with extremity lymphedema. The Scale's questionnaire is administered to patients to gauge impairments, activity limitations, and participation restrictions resulting from their lymphedema. Score 0 1-13 14-26 27-40 41-54 55-67 68   Modifier CH CI CJ CK CL CM CN     ?  Other PT/OT Primary Functional Limitations:     - CURRENT STATUS: CL - 60%-79% impaired, limited or restricted    - GOAL STATUS: CK - 40%-59% impaired, limited or restricted    - D/C STATUS:  Unable to assess     Assessment process, impact of co-morbidities, assessment modification\need for assistance, and selection of interventions: Analytical Complexity:MODERATE COMPLEXITY   TREATMENT:   (In addition to Assessment/Re-Assessment sessions the following treatments were rendered)    Pre-treatment Symptoms/Complaints:    No new complaints. She reports sleeve is comfortable but needs assist to dar - she is wearing daily. Pain: Initial:   Pain Intensity 1: 3  Post Session:  3   Occupational Therapy Evaluation (x) OT abbie completed. Pt received information on lymphedema and risk reduction/self management practices as outlined by the National [employer]. Treatment/Session Assessment:    · Response to Treatment:    She is responded to MLD and multi layer bandaging as evidenced by 18.2cm loss in LUE limb size. She also reports she can wash \"under L arm (axilla) with greater ease and is now able to fasten her brra using BUE's. .\"  Patient is wearing new adjusted sleeve with gauntlet for day compression. Pt has good support system in home health aid. See manual section above.   · Compliance with Program/Exercises:  good to date  Recommendations/Intent for next treatment session: DISCONTINUE PATIENT FROM THERAPY, SHE DID  [facility], OT

## 2018-01-25 PROBLEM — M54.40 MIDLINE LOW BACK PAIN WITH SCIATICA: Status: ACTIVE | Noted: 2018-01-25

## 2018-02-27 ENCOUNTER — HOSPITAL ENCOUNTER (OUTPATIENT)
Dept: PHYSICAL THERAPY | Age: 63
Discharge: HOME | End: 2018-02-27
Payer: MEDICAID

## 2018-02-27 PROCEDURE — 97166 OT EVAL MOD COMPLEX 45 MIN: CPT

## 2018-02-27 PROCEDURE — 97140 MANUAL THERAPY 1/> REGIONS: CPT

## 2018-02-27 NOTE — PROGRESS NOTES
Ambulatory/Rehab Services H2 Model Falls Risk Assessment    Risk Factor Pts. ·   Confusion/Disorientation/Impulsivity  []    4 ·   Symptomatic Depression  []   2 ·   Altered Elimination  []   1 ·   Dizziness/Vertigo  []   1 ·   Gender (Male)  []   1 ·   Any administered antiepileptics (anticonvulsants):  []   2 ·   Any administered benzodiazepines:  []   1 ·   Visual Impairment (specify):  []   1 ·   Portable Oxygen Use  []   1 ·   Orthostatic ? BP  []   1 ·   History of Recent Falls (within 3 mos.)  []   5     Ability to Rise from Chair (choose one) Pts. ·   Ability to rise in a single movement  [x]   0 ·   Pushes up, successful in one attempt  []   1 ·   Multiple attempts, but successful  []   3 ·   Unable to rise without assistance  []   4   Total: (5 or greater = High Risk) 0     Falls Prevention Plan:   []                Physical Limitations to Exercise (specify):   []                Mobility Assistance Device (type):   []                Exercise/Equipment Adaptation (specify):    ©2010 [employer] of [id number] Patent #[id number].  Federal Law prohibits the replication, distribution or use without written permission from [employer]

## 2018-02-27 NOTE — THERAPY EVALUATION
Vanessa Stover  : 1955  Primary: [place] [employer] [place]  Secondary:  [facility] [address] Therapy  [address] [place] [address], [facility] [address]  [phone]   [phone]              OUTPATIENT OCCUPATIONAL THERAPY: Initial Assessment and Daily Note 2018    ICD-10: Treatment Diagnosis: 197.2 post mastectomy lymphedema syndromePrecautions/Allergies:   Ciprofloxacin and Bactrim [sulfamethoprim ds]   Fall Risk Score: 0 (? 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 Jacques Schilling presents with LUE/breast lymphedema. Patient was referred to occupational therapy for lymphedema treatment of the [facility].  Patient had a mastectomy in 2017 with expander placement. Lymphedema is noted to have exacerbated since last seen at this facility in 2017 due to patient having difficulty with self managing her lymphedema. In order for the expander to be replaced with an implant patient's lymphedema has to be stabilized and consistently self managed. Patient has a custom compression sleeve with a shoulder strap that she was issued when she was seen for lymphedema at this facility in 2017. She reports it is difficult for her to apply therefore she does not wear it consistently. She will benefit from lymphedema treatment to include: skin care, MLD, exercise to aid in promoting lymphatic flow and extensive patient/caregiver education for self management principles. Patient has a personal aid 5 days/week . PLAN OF CARE:   PROBLEM LIST:  1. Decreased ADL/Functional Activities  2. Edema/Girth  3.  Decreased [place] with Home Exercise Program INTERVENTIONS PLANNED  1. Skin care  2. Compression bandaging  3. Fitting for compression garment(s) prn  4. Manual therapy/Manual lymph drainage  5. Therapeutic exercise/Therapeutic activities  6. Patient Education  7. Compression pump trial prn  8.  kinesiotaping prn   TREATMENT PLAN:  Effective Dates: 2/27/18 TO /5/27/17. Frequency/Duration: 2 times a week for 90 days and upon reassessment will adjust frequency and duration as progress indicates. GOALS: (Goals have been discussed and agreed upon with patient.)  Short-Term Functional Goals: Time Frame: 45 days  1. The patient/caregiver will verbalize understanding of lymphedema precautions. 2. Patient will be independent with skin care regimen to decrease risk of cellulitis. 3. The patient/caregiver will be independent with self-manual lymph drainage techniques and show decrease in limb volume. 5. Patient will be independent in lymphatic exercises. Discharge Goals: Time Frame: 90 days  1. Patient's left upper extremity circumferential measurements will decrease on volumetric graph by 3-5cm to maximize functional use in ADL's.    2. The patient/caregiver will be independent with home management of lymphedema. 3. Patient/caregiver will be independent donning and doffing left upper extremity compression garment. Rehabilitation Potential For Stated Goals: Good  Regarding Delmar Lovett's therapy, I certify that the treatment plan above will be carried out by a therapist or under their direction. Thank you for this referral,  Jean Torres OT     Referring Physician Signature: Khurram Bhandari MD _________________________  Date _________            The information in this section was collected on 2/27/18 (except where otherwise noted).   OCCUPATIONAL PROFILE & HISTORY:   History of Present Injury/Illness (Reason for Referral):  Patient was referred to occupational therapy for lymphedema treatment of the [facility]. Patient had a mastectomy in January 2017 with expander placement. Lymphedema is noted to have exacerbated since last seen at this facility in 2017 due to patient having difficulty with self managing her lymphedema. In order for the expander to be replaced with an implant patient's lymphedema has to be stabilized and consistently self managed. Past Medical History/Comorbidities:   Ms. Josesito Aguiar  has a past medical history of Arthritis; Asthma; Breast cancer ([facility] [address] (09/2016); CAD (coronary artery disease); Cancer ([facility] [address] Chronic pain; Complicated UTI (urinary tract infection) (12/8/2015); Diverticulosis; Heart failure ([facility] [address] History of echocardiogram (11/17/14 at [facility]); History of prediabetes; Hypertension; Shortness of breath; Sleep apnea; and Thyroid cyst.  Ms. Josesito Aguiar  has a past surgical history that includes hx carpal tunnel release (Bilateral); hx cyst removal; pr cardiac surg procedure unlist (Cath 11/18/14 [facility]); hx lap cholecystectomy; pr sinus surgery proc unlisted; hx other surgical; pr neurological procedure unlisted; hx cholecystectomy; pr breast surgery procedure unlisted; hx mastectomy (Left, 01/2017); and pr insert tissue expander(s) (Left, 01/2017). Social History/Living Environment:    Patient lives by herself and has a caregiver 3-4 hours 5days/week  Prior Level of Function/Work/Activity:  disability  Dominant Side:         RIGHT  Previous Treatment Approaches:          Patient had lymphedema treatment at this facility with reasonable outcomes. She was fitted for a custom compression sleeve for day compression.   Current Medications:    Current Outpatient Prescriptions:     phenylephrine (NEOSYNEPHRINE) 0.5 % spry, 2 Sprays by Nasal route., Disp: , Rfl:     biotin (VITAMIN B7) 5 mg capsule, TK 1 C PO QD, Disp: , Rfl: 0    clobetasol (TEMOVATE) 0.05 % external solution, CESIA EXT TO THE SCALP  QD, Disp: , Rfl: 0    ketoconazole (NIZORAL) 2 % shampoo, USE TO WASH HAIR 1-2 TIMES A WEEK, Disp: , Rfl: 0    lisinopril-hydroCHLOROthiazide (PRINZIDE, ZESTORETIC) 20-12.5 mg per tablet, Take 1 Tab by mouth daily. , Disp: 90 Tab, Rfl: 1    albuterol (PROAIR HFA) 90 mcg/actuation inhaler, Take 1 Puff by inhalation every six (6) hours as needed for Wheezing., Disp: 1 Inhaler, Rfl: 2    montelukast (SINGULAIR) 10 mg tablet, TAKE 1 TABLET BY MOUTH EVERY NIGHT, Disp: 30 Tab, Rfl: 11    fluticasone (FLONASE) 50 mcg/actuation nasal spray, SHAKE LIQUID AND USE 2 SPRAYS IN EACH NOSTRIL DAILY, Disp: 1 Bottle, Rfl: 2    diazePAM (VALIUM) 5 mg tablet, Take 1 Tab by mouth every six (6) hours as needed for Anxiety. Max Daily Amount: 20 mg., Disp: 60 Tab, Rfl: 0    letrozole (FEMARA) 2.5 mg tablet, Take 1 Tab by mouth daily. , Disp: 30 Tab, Rfl: 12    metoprolol succinate (TOPROL-XL) 100 mg tablet, Take 1 Tab by mouth daily. , Disp: 30 Tab, Rfl: 11    venlafaxine-SR (EFFEXOR-XR) 37.5 mg capsule, Take 1 capsule by mouth for 1 week and then increase to 75 mg (2 capsules) after 1 week., Disp: 60 Cap, Rfl: 0    fluticasone-salmeterol (ADVAIR DISKUS) 250-50 mcg/dose diskus inhaler, Take 1 Puff by inhalation two (2) times a day., Disp: 1 Inhaler, Rfl: 11    Cetirizine (ZYRTEC) 10 mg cap, Take  by mouth every morning., Disp: , Rfl:     acetaminophen (TYLENOL EXTRA STRENGTH) 500 mg tablet, Take  by mouth every six (6) hours as needed for Pain., Disp: , Rfl:     cpap machine kit, by Does Not Apply route., Disp: , Rfl:     OXYGEN-AIR DELIVERY SYSTEMS, by Does Not Apply route. 3 lpm qhs, Disp: , Rfl:     ranitidine (ZANTAC) 150 mg tablet, Take 1 Tab by mouth two (2) times a day.  Indications: GASTROESOPHAGEAL REFLUX, Disp: 60 Tab, Rfl: 3    nitroglycerin (NITROSTAT) 0.4 mg SL tablet, by SubLINGual route every five (5) minutes as needed for Chest Pain., Disp: , Rfl:             Date Last Reviewed:  2/27/2018   Complexity Level : Expanded review of therapy/medical records (1-2):  MODERATE COMPLEXITY   ASSESSMENT OF OCCUPATIONAL PERFORMANCE:   Palpation:          Edema noted in upper arm of the LUE and in the breast where expander was placed. LUE is 3cm larger in size than the RUE and L breast is very fibrotic/hard to touch. ROM:          WFL LUE for stated age  Strength:          3+/5 LUE    Skin Integrity:          intact  Sensation:  intact  Functional Mobility:  Independent    PRETREATMENT AFFECTED LIMB(s): left upper extremity      Date:  2/27/18         Right / Left           Axilla   []      [x] 55cm          3 inches   []      [x] 40cm          Elbow   []      [x] 29cm          6 inches   []      [x] 27.5cm          3 inches   []      [x] 21.5cm          Wrist   []      [x] 17.5cm          Palm   []      [x] 21cm          Total   []      [x] 211.5cm        Measurements are taken in centimeters:  2.54 cm = 1 inch  BODY WEIGHT  Date Taken:         Lbs. Physical Skills Involved:  1. Edema  2. impaired lymphatic system Cognitive Skills Affected (resulting in the inability to perform in a timely and safe manner):  1. Psychosocial Skills Affected:  1. Habits/Routines  2. Environmental Adaptation   Number of elements that affect the Plan of Care: 3-5:  MODERATE COMPLEXITY   CLINICAL DECISION MAKING:   Outcome Measure: Tool Used: Tool Used: Lymphedema Life Impact Scale   Score:  Initial: 34 Most Recent: X (Date: -- )   Interpretation of Score: The Lymphedema Life Impact Scale (LLIS) is a validated instrument that measures the physical, functional, and psychosocial concerns pertinent to patients with extremity lymphedema. The Scale's questionnaire is administered to patients to gauge impairments, activity limitations, and participation restrictions resulting from their lymphedema.   Score 0 1-13 14-26 27-40 41-54 55-67 68   Modifier CH CI CJ CK CL CM CN     Other PT/OT Primary Functional Limitations:     - CURRENT STATUS: CK - 40%-59% impaired, limited or restricted    - GOAL STATUS:  - 20%-39% impaired, limited or restricted    - D/C STATUS:  ---------------To be determined---------------   ·         Medical Necessity  ·  Skilled intervention continues to be required due to LUE post mastectomy lymphedema. Reason for Services/Other Comments:  · Patient continues to require skilled intervention due to patient's inability too effectivley self manage LUE/breast lymphedema. Use of outcome tool(s) and clinical judgement create a POC that gives a: Questionable prediction of patient's progress: MODERATE COMPLEXITY   TREATMENT:   (In addition to Assessment/Re-Assessment sessions the following treatments were rendered)    Pre-treatment Symptoms/Complaints:  LUE/breast lymphedema with patient having difficulty with self management. Pain: Initial:   Pain Intensity 1: 9  Post Session:  1:9   Occupational Therapy Treatments:    OT eval( x ) OT eval was completed. Pt received information on lymphedema and risk reduction/self management practices as outlined by the National [employer]. Therapeutic Exercise ( minutes): To be addressed as therapy progresses   HEP:  As above; handouts given to patient for all exercises.   Manual Therapy: (30 minutes):Patient received MLD to LUE/breast and patient/caregiver education for proper application of existing custom compression sleeve          Manual Lymph Drainage:(20 minutes)           Lymph Nodes:    Cervical Supraclavicular Axillary Abdominal Inguinal Popliteal Antecubital   RIGHT []     [x]     [x]     [x]     []     []     []       LEFT []     [x]     [x]     [x]     [x]     []     []          Anastamoses:   Axillo-axillary Inguino-inguinal Axillo-inguinal Inguino-axillary   ANTERIOR [x]     []     [x]     []       POSTERIOR []     []     []     []       RIGHT [x]     []     []     []       LEFT [x]     []     [x]     []         Limbs:   []    RUE     [x]    LUE     []    RLE    []    LLE   Compression: (10 minutes): Patient has an existing Medi strong compression sleeve with strap (class I) for the LUE. Review for wear schedule and application of garment took place with patient/caregiver. Patient requires min/mod assist to apply garment. She will wear garment for day compression. Importance of wearing daily compression for self management of was strongly emphasized. Treatment/Session Assessment:    · Response to Treatment:  Patient tolerated assessment/treatment without complication. .  · Compliance with Program/Exercises: compliant most of the time in the past  · Recommendations/Intent for next treatment session: \"Next visit will focus on lymphedema treatment guidelines to decrease LUE/breast lymphedema and patient education for self management principles. \".   Total Treatment Duration:  OT Patient Time In/Time Out  Time In: 0200  Time Out: [address], OT

## 2018-03-01 ENCOUNTER — APPOINTMENT (OUTPATIENT)
Dept: PHYSICAL THERAPY | Age: 63
End: 2018-03-01
Payer: MEDICAID

## 2018-03-06 ENCOUNTER — HOSPITAL ENCOUNTER (OUTPATIENT)
Dept: PHYSICAL THERAPY | Age: 63
Discharge: HOME | End: 2018-03-06
Payer: MEDICAID

## 2018-03-06 PROCEDURE — 97140 MANUAL THERAPY 1/> REGIONS: CPT

## 2018-03-06 NOTE — PROGRESS NOTES
Galilea Conrad  : 1955  [place]  Secondary:  [address]  at [facility]  [address], [facility] [address]  [phone]   [phone]              OUTPATIENT OCCUPATIONAL THERAPY: Daily Note 3/6/2018    ICD-10: Treatment Diagnosis: 197.2 post mastectomy lymphedema syndromePrecautions/Allergies:   Ciprofloxacin and Bactrim [sulfamethoprim ds]   Fall Risk Score: 0 (? 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 Dao Sparrow presents with LUE/breast lymphedema. Patient was referred to occupational therapy for lymphedema treatment of the [facility].  Patient had a mastectomy in 2017 with expander placement. Lymphedema is noted to have exacerbated since last seen at this facility in 2017 due to patient having difficulty with self managing her lymphedema. In order for the expander to be replaced with an implant patient's lymphedema has to be stabilized and consistently self managed. Patient has a custom compression sleeve with a shoulder strap that she was issued when she was seen for lymphedema at this facility in 2017. She reports it is difficult for her to apply therefore she does not wear it consistently. She will benefit from lymphedema treatment to include: skin care, MLD, exercise to aid in promoting lymphatic flow and extensive patient/caregiver education for self management principles. Patient has a personal aid 5 days/week . PLAN OF CARE:   PROBLEM LIST:  1. Decreased ADL/Functional Activities  2. Edema/Girth  3.  Decreased Lutz with Home Exercise Program INTERVENTIONS PLANNED  1. Skin care  2. Compression bandaging  3. Fitting for compression garment(s) prn  4. Manual therapy/Manual lymph drainage  5. Therapeutic exercise/Therapeutic activities  6. Patient Education  7. Compression pump trial prn  8.  kinesiotaping prn   TREATMENT PLAN:  Effective Dates: 2/27/18 TO /5/27/17. Frequency/Duration: 2 times a week for 90 days and upon reassessment will adjust frequency and duration as progress indicates. GOALS: (Goals have been discussed and agreed upon with patient.)  Short-Term Functional Goals: Time Frame: 45 days  1. The patient/caregiver will verbalize understanding of lymphedema precautions. 2. Patient will be independent with skin care regimen to decrease risk of cellulitis. 3. The patient/caregiver will be independent with self-manual lymph drainage techniques and show decrease in limb volume. 5. Patient will be independent in lymphatic exercises. Discharge Goals: Time Frame: 90 days  1. Patient's left upper extremity circumferential measurements will decrease on volumetric graph by 3-5cm to maximize functional use in ADL's.    2. The patient/caregiver will be independent with home management of lymphedema. 3. Patient/caregiver will be independent donning and doffing left upper extremity compression garment. Rehabilitation Potential For Stated Goals: Good  Regarding Teodora Lovett's therapy, I certify that the treatment plan above will be carried out by a therapist or under their direction. Thank you for this referral,  Elena Morrison, OT     Referring Physician Signature: Rakel Morales MD _________________________  Date _________            The information in this section was collected on 2/27/18 (except where otherwise noted).   OCCUPATIONAL PROFILE & HISTORY:   History of Present Injury/Illness (Reason for Referral):  Patient was referred to occupational therapy for lymphedema treatment of the [facility].  Patient had a mastectomy in January 2017 with expander placement. Lymphedema is noted to have exacerbated since last seen at this facility in 2017 due to patient having difficulty with self managing her lymphedema. In order for the expander to be replaced with an implant patient's lymphedema has to be stabilized and consistently self managed. Past Medical History/Comorbidities:   Ms. Randy Meyer  has a past medical history of Arthritis; Asthma; Breast cancer ([address] (09/2016); CAD (coronary artery disease); Cancer ([address] Chronic pain; Complicated UTI (urinary tract infection) (12/8/2015); Diverticulosis; Heart failure ([address] History of echocardiogram (11/17/14 at [facility]); History of prediabetes; Hypertension; Shortness of breath; Sleep apnea; and Thyroid cyst.  Ms. Randy Meyer  has a past surgical history that includes hx carpal tunnel release (Bilateral); hx cyst removal; pr cardiac surg procedure unlist (Cath 11/18/14 [facility]); hx lap cholecystectomy; pr sinus surgery proc unlisted; hx other surgical; pr neurological procedure unlisted; hx cholecystectomy; pr breast surgery procedure unlisted; hx mastectomy (Left, 01/2017); and pr insert tissue expander(s) (Left, 01/2017). Social History/Living Environment:    Patient lives by herself and has a caregiver 3-4 hours 5days/week  Prior Level of Function/Work/Activity:  disability  Dominant Side:         RIGHT  Previous Treatment Approaches:          Patient had lymphedema treatment at this facility with reasonable outcomes. She was fitted for a custom compression sleeve for day compression.   Current Medications:    Current Outpatient Prescriptions:     phenylephrine (NEOSYNEPHRINE) 0.5 % spry, 2 Sprays by Nasal route., Disp: , Rfl:     biotin (VITAMIN B7) 5 mg capsule, TK 1 C PO QD, Disp: , Rfl: 0    clobetasol (TEMOVATE) 0.05 % external solution, CESIA EXT TO THE SCALP  QD, Disp: , Rfl: 0    ketoconazole (NIZORAL) 2 % shampoo, USE TO WASH HAIR 1-2 TIMES A WEEK, Disp: , Rfl: 0   lisinopril-hydroCHLOROthiazide (PRINZIDE, ZESTORETIC) 20-12.5 mg per tablet, Take 1 Tab by mouth daily. , Disp: 90 Tab, Rfl: 1    albuterol (PROAIR HFA) 90 mcg/actuation inhaler, Take 1 Puff by inhalation every six (6) hours as needed for Wheezing., Disp: 1 Inhaler, Rfl: 2    montelukast (SINGULAIR) 10 mg tablet, TAKE 1 TABLET BY MOUTH EVERY NIGHT, Disp: 30 Tab, Rfl: 11    fluticasone (FLONASE) 50 mcg/actuation nasal spray, SHAKE LIQUID AND USE 2 SPRAYS IN EACH NOSTRIL DAILY, Disp: 1 Bottle, Rfl: 2    diazePAM (VALIUM) 5 mg tablet, Take 1 Tab by mouth every six (6) hours as needed for Anxiety. Max Daily Amount: 20 mg., Disp: 60 Tab, Rfl: 0    letrozole (FEMARA) 2.5 mg tablet, Take 1 Tab by mouth daily. , Disp: 30 Tab, Rfl: 12    metoprolol succinate (TOPROL-XL) 100 mg tablet, Take 1 Tab by mouth daily. , Disp: 30 Tab, Rfl: 11    venlafaxine-SR (EFFEXOR-XR) 37.5 mg capsule, Take 1 capsule by mouth for 1 week and then increase to 75 mg (2 capsules) after 1 week., Disp: 60 Cap, Rfl: 0    fluticasone-salmeterol (ADVAIR DISKUS) 250-50 mcg/dose diskus inhaler, Take 1 Puff by inhalation two (2) times a day., Disp: 1 Inhaler, Rfl: 11    Cetirizine (ZYRTEC) 10 mg cap, Take  by mouth every morning., Disp: , Rfl:     acetaminophen (TYLENOL EXTRA STRENGTH) 500 mg tablet, Take  by mouth every six (6) hours as needed for Pain., Disp: , Rfl:     cpap machine kit, by Does Not Apply route., Disp: , Rfl:     OXYGEN-AIR DELIVERY SYSTEMS, by Does Not Apply route. 3 lpm qhs, Disp: , Rfl:     ranitidine (ZANTAC) 150 mg tablet, Take 1 Tab by mouth two (2) times a day.  Indications: GASTROESOPHAGEAL REFLUX, Disp: 60 Tab, Rfl: 3    nitroglycerin (NITROSTAT) 0.4 mg SL tablet, by SubLINGual route every five (5) minutes as needed for Chest Pain., Disp: , Rfl:             Date Last Reviewed:  3/6/2018   Complexity Level : Expanded review of therapy/medical records (1-2):  MODERATE COMPLEXITY   ASSESSMENT OF OCCUPATIONAL PERFORMANCE: Palpation:          Edema noted in upper arm of the LUE and in the breast where expander was placed. LUE is 3cm larger in size than the RUE and L breast is very fibrotic/hard to touch. ROM:          WFL LUE for stated age  Strength:          3+/5 LUE    Skin Integrity:          intact  Sensation:  intact  Functional Mobility:  Independent    PRETREATMENT AFFECTED LIMB(s): left upper extremity      Date:  2/27/18         Right / Left           Axilla   []      [x] 55cm          3 inches   []      [x] 40cm          Elbow   []      [x] 29cm          6 inches   []      [x] 27.5cm          3 inches   []      [x] 21.5cm          Wrist   []      [x] 17.5cm          Palm   []      [x] 21cm          Total   []      [x] 211.5cm        Measurements are taken in centimeters:  2.54 cm = 1 inch  BODY WEIGHT  Date Taken:         Lbs. Physical Skills Involved:  1. Edema  2. impaired lymphatic system Cognitive Skills Affected (resulting in the inability to perform in a timely and safe manner):  1. Psychosocial Skills Affected:  1. Habits/Routines  2. Environmental Adaptation   Number of elements that affect the Plan of Care: 3-5:  MODERATE COMPLEXITY   CLINICAL DECISION MAKING:   Outcome Measure: Tool Used: Tool Used: Lymphedema Life Impact Scale   Score:  Initial: 34 Most Recent: X (Date: -- )   Interpretation of Score: The Lymphedema Life Impact Scale (LLIS) is a validated instrument that measures the physical, functional, and psychosocial concerns pertinent to patients with extremity lymphedema. The Scale's questionnaire is administered to patients to gauge impairments, activity limitations, and participation restrictions resulting from their lymphedema.   Score 0 1-13 14-26 27-40 41-54 55-67 68   Modifier CH CI CJ CK CL CM CN     Other PT/OT Primary Functional Limitations:     - CURRENT STATUS: CK - 40%-59% impaired, limited or restricted    - GOAL STATUS: CJ - 20%-39% impaired, limited or restricted    - D/C STATUS:  ---------------To be determined---------------   ·         Medical Necessity  ·  Skilled intervention continues to be required due to LUE post mastectomy lymphedema. Reason for Services/Other Comments:  · Patient continues to require skilled intervention due to patient's inability too effectivley self manage LUE/breast lymphedema. Use of outcome tool(s) and clinical judgement create a POC that gives a: Questionable prediction of patient's progress: MODERATE COMPLEXITY   TREATMENT:   (In addition to Assessment/Re-Assessment sessions the following treatments were rendered)    Pre-treatment Symptoms/Complaints:  LUE/breast lymphedema with patient having difficulty with self management. Pain: Initial:   Pain Intensity 1: 9  Post Session:  1:9   Occupational Therapy Treatments:    OT eval( x ) OT eval was completed. Pt received information on lymphedema and risk reduction/self management practices as outlined by the [employer]. Therapeutic Exercise ( minutes): To be addressed as therapy progresses   HEP:  As above; handouts given to patient for all exercises. Manual Therapy: (60 minutes):Patient arrived with compression sleeve on.   Once removed she  received MLD to Jam with emphasis on trunk/breast.          Manual Lymph Drainage:(50 minutes)           Lymph Nodes:    Cervical Supraclavicular Axillary Abdominal Inguinal Popliteal Antecubital   RIGHT []     [x]     [x]     [x]     []     []     []       LEFT []     [x]     [x]     [x]     [x]     []     []          Anastamoses:   Axillo-axillary Inguino-inguinal Axillo-inguinal Inguino-axillary   ANTERIOR [x]     []     [x]     []       POSTERIOR []     []     []     []       RIGHT [x]     []     []     []       LEFT [x]     []     [x]     []         Limbs:   []    RUE     [x]    LUE     []    RLE    []    LLE   Compression: (10 minutes): Medi strong compression sleeve with strap (class I) donned at end of session. Patient requires min/mod assist to apply garment - family or aid is assisting with application of garment. .  She is wearing garment for day compression. Importance of wearing daily compression for self management of was strongly emphasized. She would benefit from a swell spot to place in her bra for added compression to breast - will fabricate at patient's next appointment. Treatment/Session Assessment:    · Response to Treatment:  Patient tolerated assessment/treatment without complication. .  · Compliance with Program/Exercises: compliant most of the time in the past  · Recommendations/Intent for next treatment session: \"Next visit will focus on lymphedema treatment guidelines to decrease LUE/breast lymphedema and patient education for self management principles. \".   Total Treatment Duration:  OT Patient Time In/Time Out  Time In: 0400  Time Out: [address], OT

## 2018-03-13 ENCOUNTER — HOSPITAL ENCOUNTER (OUTPATIENT)
Dept: PHYSICAL THERAPY | Age: 63
Discharge: HOME | End: 2018-03-13
Payer: MEDICAID

## 2018-03-13 PROCEDURE — 97140 MANUAL THERAPY 1/> REGIONS: CPT

## 2018-03-13 NOTE — PROGRESS NOTES
Gabriela Garza  : 1955  Primary: [place] [facility]  Secondary:  [address] at [facility]  [address], [facility] [address]  [phone]   [phone]              OUTPATIENT OCCUPATIONAL THERAPY: Daily Note 3/13/2018    ICD-10: Treatment Diagnosis: 197.2 post mastectomy lymphedema syndromePrecautions/Allergies:   Ciprofloxacin and Bactrim [sulfamethoprim ds]   Fall Risk Score: 0 (? 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 Aguila Melara presents with LUE/breast lymphedema. Patient was referred to occupational therapy for lymphedema treatment of the [facility].  Patient had a mastectomy in 2017 with expander placement. Lymphedema is noted to have exacerbated since last seen at this facility in 2017 due to patient having difficulty with self managing her lymphedema. In order for the expander to be replaced with an implant patient's lymphedema has to be stabilized and consistently self managed. Patient has a custom compression sleeve with a shoulder strap that she was issued when she was seen for lymphedema at this facility in 2017. She reports it is difficult for her to apply therefore she does not wear it consistently. She will benefit from lymphedema treatment to include: skin care, MLD, exercise to aid in promoting lymphatic flow and extensive patient/caregiver education for self management principles. Patient has a personal aid 5 days/week . PLAN OF CARE:   PROBLEM LIST:  1. Decreased ADL/Functional Activities  2. Edema/Girth  3.  Decreased [place] with Home Exercise Program INTERVENTIONS PLANNED  1. Skin care  2. Compression bandaging  3. Fitting for compression garment(s) prn  4. Manual therapy/Manual lymph drainage  5. Therapeutic exercise/Therapeutic activities  6. Patient Education  7. Compression pump trial prn  8.  kinesiotaping prn   TREATMENT PLAN:  Effective Dates: 2/27/18 TO /5/27/17. Frequency/Duration: 2 times a week for 90 days and upon reassessment will adjust frequency and duration as progress indicates. GOALS: (Goals have been discussed and agreed upon with patient.)  Short-Term Functional Goals: Time Frame: 45 days  1. The patient/caregiver will verbalize understanding of lymphedema precautions. 2. Patient will be independent with skin care regimen to decrease risk of cellulitis. 3. The patient/caregiver will be independent with self-manual lymph drainage techniques and show decrease in limb volume. 5. Patient will be independent in lymphatic exercises. Discharge Goals: Time Frame: 90 days  1. Patient's left upper extremity circumferential measurements will decrease on volumetric graph by 3-5cm to maximize functional use in ADL's.    2. The patient/caregiver will be independent with home management of lymphedema. 3. Patient/caregiver will be independent donning and doffing left upper extremity compression garment. Rehabilitation Potential For Stated Goals: Good  Regarding Lacey Lovett's therapy, I certify that the treatment plan above will be carried out by a therapist or under their direction. Thank you for this referral,  Ellie Valente OT     Referring Physician Signature: Enma Davies MD _________________________  Date _________            The information in this section was collected on 2/27/18 (except where otherwise noted).   OCCUPATIONAL PROFILE & HISTORY:   History of Present Injury/Illness (Reason for Referral):  Patient was referred to occupational therapy for lymphedema treatment of the [facility].  Patient had a mastectomy in January 2017 with expander placement. Lymphedema is noted to have exacerbated since last seen at this facility in 2017 due to patient having difficulty with self managing her lymphedema. In order for the expander to be replaced with an implant patient's lymphedema has to be stabilized and consistently self managed. Past Medical History/Comorbidities:   Ms. Celestine Castaneda  has a past medical history of Arthritis; Asthma; Breast cancer ([facility] [address] (09/2016); CAD (coronary artery disease); Cancer ([facility] [address] Chronic pain; Complicated UTI (urinary tract infection) (12/8/2015); Diverticulosis; Heart failure ([facility] [address] History of echocardiogram (11/17/14 at [facility]); History of prediabetes; Hypertension; Shortness of breath; Sleep apnea; and Thyroid cyst.  Ms. Celestine Castaneda  has a past surgical history that includes hx carpal tunnel release (Bilateral); hx cyst removal; pr cardiac surg procedure unlist (Cath 11/18/14 [facility]); hx lap cholecystectomy; pr sinus surgery proc unlisted; hx other surgical; pr neurological procedure unlisted; hx cholecystectomy; pr breast surgery procedure unlisted; hx mastectomy (Left, 01/2017); and pr insert tissue expander(s) (Left, 01/2017). Social History/Living Environment:    Patient lives by herself and has a caregiver 3-4 hours 5days/week  Prior Level of Function/Work/Activity:  disability  Dominant Side:         RIGHT  Previous Treatment Approaches:          Patient had lymphedema treatment at this facility with reasonable outcomes. She was fitted for a custom compression sleeve for day compression.   Current Medications:    Current Outpatient Prescriptions:     phenylephrine (NEOSYNEPHRINE) 0.5 % spry, 2 Sprays by Nasal route., Disp: , Rfl:     biotin (VITAMIN B7) 5 mg capsule, TK 1 C PO QD, Disp: , Rfl: 0    clobetasol (TEMOVATE) 0.05 % external solution, CESIA EXT TO THE SCALP  QD, Disp: , Rfl: 0    ketoconazole (NIZORAL) 2 % shampoo, USE TO WASH HAIR 1-2 TIMES A WEEK, Disp: , Rfl: 0   lisinopril-hydroCHLOROthiazide (PRINZIDE, ZESTORETIC) 20-12.5 mg per tablet, Take 1 Tab by mouth daily. , Disp: 90 Tab, Rfl: 1    albuterol (PROAIR HFA) 90 mcg/actuation inhaler, Take 1 Puff by inhalation every six (6) hours as needed for Wheezing., Disp: 1 Inhaler, Rfl: 2    montelukast (SINGULAIR) 10 mg tablet, TAKE 1 TABLET BY MOUTH EVERY NIGHT, Disp: 30 Tab, Rfl: 11    fluticasone (FLONASE) 50 mcg/actuation nasal spray, SHAKE LIQUID AND USE 2 SPRAYS IN EACH NOSTRIL DAILY, Disp: 1 Bottle, Rfl: 2    diazePAM (VALIUM) 5 mg tablet, Take 1 Tab by mouth every six (6) hours as needed for Anxiety. Max Daily Amount: 20 mg., Disp: 60 Tab, Rfl: 0    letrozole (FEMARA) 2.5 mg tablet, Take 1 Tab by mouth daily. , Disp: 30 Tab, Rfl: 12    metoprolol succinate (TOPROL-XL) 100 mg tablet, Take 1 Tab by mouth daily. , Disp: 30 Tab, Rfl: 11    venlafaxine-SR (EFFEXOR-XR) 37.5 mg capsule, Take 1 capsule by mouth for 1 week and then increase to 75 mg (2 capsules) after 1 week., Disp: 60 Cap, Rfl: 0    fluticasone-salmeterol (ADVAIR DISKUS) 250-50 mcg/dose diskus inhaler, Take 1 Puff by inhalation two (2) times a day., Disp: 1 Inhaler, Rfl: 11    Cetirizine (ZYRTEC) 10 mg cap, Take  by mouth every morning., Disp: , Rfl:     acetaminophen (TYLENOL EXTRA STRENGTH) 500 mg tablet, Take  by mouth every six (6) hours as needed for Pain., Disp: , Rfl:     cpap machine kit, by Does Not Apply route., Disp: , Rfl:     OXYGEN-AIR DELIVERY SYSTEMS, by Does Not Apply route. 3 lpm qhs, Disp: , Rfl:     ranitidine (ZANTAC) 150 mg tablet, Take 1 Tab by mouth two (2) times a day.  Indications: GASTROESOPHAGEAL REFLUX, Disp: 60 Tab, Rfl: 3    nitroglycerin (NITROSTAT) 0.4 mg SL tablet, by SubLINGual route every five (5) minutes as needed for Chest Pain., Disp: , Rfl:             Date Last Reviewed:  3/13/2018   Complexity Level : Expanded review of therapy/medical records (1-2):  MODERATE COMPLEXITY   ASSESSMENT OF OCCUPATIONAL PERFORMANCE: Palpation:          Edema noted in upper arm of the LUE and in the breast where expander was placed. LUE is 3cm larger in size than the RUE and L breast is very fibrotic/hard to touch. ROM:          WFL LUE for stated age  Strength:          3+/5 LUE    Skin Integrity:          intact  Sensation:  intact  Functional Mobility:  Independent    PRETREATMENT AFFECTED LIMB(s): left upper extremity      Date:  2/27/18         Right / Left           Axilla   []      [x] 55cm          3 inches   []      [x] 40cm          Elbow   []      [x] 29cm          6 inches   []      [x] 27.5cm          3 inches   []      [x] 21.5cm          Wrist   []      [x] 17.5cm          Palm   []      [x] 21cm          Total   []      [x] 211.5cm        Measurements are taken in centimeters:  2.54 cm = 1 inch  BODY WEIGHT  Date Taken:         Lbs. Physical Skills Involved:  1. Edema  2. impaired lymphatic system Cognitive Skills Affected (resulting in the inability to perform in a timely and safe manner):  1. Psychosocial Skills Affected:  1. Habits/Routines  2. Environmental Adaptation   Number of elements that affect the Plan of Care: 3-5:  MODERATE COMPLEXITY   CLINICAL DECISION MAKING:   Outcome Measure: Tool Used: Tool Used: Lymphedema Life Impact Scale   Score:  Initial: 34 Most Recent: X (Date: -- )   Interpretation of Score: The Lymphedema Life Impact Scale (LLIS) is a validated instrument that measures the physical, functional, and psychosocial concerns pertinent to patients with extremity lymphedema. The Scale's questionnaire is administered to patients to gauge impairments, activity limitations, and participation restrictions resulting from their lymphedema.   Score 0 1-13 14-26 27-40 41-54 55-67 68   Modifier CH CI CJ CK CL CM CN     Other PT/OT Primary Functional Limitations:     - CURRENT STATUS: CK - 40%-59% impaired, limited or restricted    - GOAL STATUS: CJ - 20%-39% impaired, limited or restricted    - D/C STATUS:  ---------------To be determined---------------   ·         Medical Necessity  ·  Skilled intervention continues to be required due to LUE post mastectomy lymphedema. Reason for Services/Other Comments:  · Patient continues to require skilled intervention due to patient's inability too effectivley self manage LUE/breast lymphedema. Use of outcome tool(s) and clinical judgement create a POC that gives a: Questionable prediction of patient's progress: MODERATE COMPLEXITY   TREATMENT:   (In addition to Assessment/Re-Assessment sessions the following treatments were rendered)    Pre-treatment Symptoms/Complaints:  LUE/breast lymphedema with patient having difficulty with self management. Pain: Initial:   Pain Intensity 1: 9  Post Session:  1:9   Occupational Therapy Treatments:    OT eval( x ) OT eval was completed. Pt received information on lymphedema and risk reduction/self management practices as outlined by the National Lymp[employer]. Therapeutic Exercise ( minutes): To be addressed as therapy progresses   HEP:  As above; handouts given to patient for all exercises. Manual Therapy: (55 minutes):Patient arrived with compression sleeve on.   Once removed she  received MLD to Jam with emphasis on trunk/breast.          Manual Lymph Drainage:(40 minutes)           Lymph Nodes:    Cervical Supraclavicular Axillary Abdominal Inguinal Popliteal Antecubital   RIGHT []     [x]     [x]     [x]     []     []     []       LEFT []     [x]     [x]     [x]     [x]     []     []          Anastamoses:   Axillo-axillary Inguino-inguinal Axillo-inguinal Inguino-axillary   ANTERIOR [x]     []     [x]     []       POSTERIOR []     []     []     []       RIGHT [x]     []     []     []       LEFT [x]     []     [x]     []         Limbs:   []    RUE     [x]    LUE     []    RLE    []    LLE   Compression: (15 minutes): Medi strong compression sleeve with strap (class I) donned at end of session. Patient requires min/mod assist to apply garment - family or aid is assisting with application of garment. .  She is wearing garment for day compression. Importance of wearing daily compression for self management of was strongly emphasized. Swell spot fabricated for patient to wear in her bra for added compression to breast. To contact [facility], [address] to funding on a compression bra as she needs one for compression to aid in reducing lymphedema at the breast.    Treatment/Session Assessment:    · Response to Treatment:  Patient tolerated assessment/treatment without complication. . See compression section above. · Compliance with Program/Exercises: compliant most of the time in the past  · Recommendations/Intent for next treatment session: \"Next visit will focus on lymphedema treatment guidelines to decrease LUE/breast lymphedema and patient education for self management principles. \".   Total Treatment Duration: 55 minutes  OT Patient Time In/Time Out  Time In: 0310  Time Out: [address], OT

## 2018-03-15 ENCOUNTER — HOSPITAL ENCOUNTER (OUTPATIENT)
Dept: PHYSICAL THERAPY | Age: 63
Discharge: HOME | End: 2018-03-15
Payer: MEDICAID

## 2018-03-15 PROCEDURE — 97140 MANUAL THERAPY 1/> REGIONS: CPT

## 2018-03-15 PROCEDURE — 97110 THERAPEUTIC EXERCISES: CPT

## 2018-03-15 NOTE — PROGRESS NOTES
Joe Ceron  : 1955  Primary: [place] [employer] [place]  Secondary:  [address]  at [address], [facility] [address]  [phone]   [phone]              OUTPATIENT OCCUPATIONAL THERAPY: Daily Note 3/15/2018    ICD-10: Treatment Diagnosis: 197.2 post mastectomy lymphedema syndromePrecautions/Allergies:   Ciprofloxacin and Bactrim [sulfamethoprim ds]   Fall Risk Score: 0 (? 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 Khanh Keating presents with LUE/breast lymphedema. Patient was referred to occupational therapy for lymphedema treatment of the [facility].  Patient had a mastectomy in 2017 with expander placement. Lymphedema is noted to have exacerbated since last seen at this facility in 2017 due to patient having difficulty with self managing her lymphedema. In order for the expander to be replaced with an implant patient's lymphedema has to be stabilized and consistently self managed. Patient has a custom compression sleeve with a shoulder strap that she was issued when she was seen for lymphedema at this facility in 2017. She reports it is difficult for her to apply therefore she does not wear it consistently. She will benefit from lymphedema treatment to include: skin care, MLD, exercise to aid in promoting lymphatic flow and extensive patient/caregiver education for self management principles. Patient has a personal aid 5 days/week . PLAN OF CARE:   PROBLEM LIST:  1. Decreased ADL/Functional Activities  2. Edema/Girth  3.  Decreased [place] with Home Exercise Program INTERVENTIONS PLANNED  1. Skin care  2. Compression bandaging  3. Fitting for compression garment(s) prn  4. Manual therapy/Manual lymph drainage  5. Therapeutic exercise/Therapeutic activities  6. Patient Education  7. Compression pump trial prn  8.  kinesiotaping prn   TREATMENT PLAN:  Effective Dates: 2/27/18 TO /5/27/17. Frequency/Duration: 2 times a week for 90 days and upon reassessment will adjust frequency and duration as progress indicates. GOALS: (Goals have been discussed and agreed upon with patient.)  Short-Term Functional Goals: Time Frame: 45 days  1. The patient/caregiver will verbalize understanding of lymphedema precautions. 2. Patient will be independent with skin care regimen to decrease risk of cellulitis. 3. The patient/caregiver will be independent with self-manual lymph drainage techniques and show decrease in limb volume. 5. Patient will be independent in lymphatic exercises. Discharge Goals: Time Frame: 90 days  1. Patient's left upper extremity circumferential measurements will decrease on volumetric graph by 3-5cm to maximize functional use in ADL's.    2. The patient/caregiver will be independent with home management of lymphedema. 3. Patient/caregiver will be independent donning and doffing left upper extremity compression garment. Rehabilitation Potential For Stated Goals: Good  Regarding Tenzin Lovett's therapy, I certify that the treatment plan above will be carried out by a therapist or under their direction. Thank you for this referral,  Oralia Gallegos OT     Referring Physician Signature: Daren Hooker MD _________________________  Date _________            The information in this section was collected on 2/27/18 (except where otherwise noted).   OCCUPATIONAL PROFILE & HISTORY:   History of Present Injury/Illness (Reason for Referral):  Patient was referred to occupational therapy for lymphedema treatment of the [facility].  Patient had a mastectomy in January 2017 with expander placement. Lymphedema is noted to have exacerbated since last seen at this facility in 2017 due to patient having difficulty with self managing her lymphedema. In order for the expander to be replaced with an implant patient's lymphedema has to be stabilized and consistently self managed. Past Medical History/Comorbidities:   Ms. Josesito Aguiar  has a past medical history of Arthritis; Asthma; Breast cancer ([facility] [address] (09/2016); CAD (coronary artery disease); Cancer ([facility] [address] Chronic pain; Complicated UTI (urinary tract infection) (12/8/2015); Diverticulosis; Heart failure ([facility] [address] History of echocardiogram (11/17/14 at [address]); History of prediabetes; Hypertension; Shortness of breath; Sleep apnea; and Thyroid cyst.  Ms. Josesito Aguiar  has a past surgical history that includes hx carpal tunnel release (Bilateral); hx cyst removal; pr cardiac surg procedure unlist (Cath 11/18/14 [facility]); hx lap cholecystectomy; pr sinus surgery proc unlisted; hx other surgical; pr neurological procedure unlisted; hx cholecystectomy; pr breast surgery procedure unlisted; hx mastectomy (Left, 01/2017); and pr insert tissue expander(s) (Left, 01/2017). Social History/Living Environment:    Patient lives by herself and has a caregiver 3-4 hours 5days/week  Prior Level of Function/Work/Activity:  disability  Dominant Side:         RIGHT  Previous Treatment Approaches:          Patient had lymphedema treatment at this facility with reasonable outcomes. She was fitted for a custom compression sleeve for day compression.   Current Medications:    Current Outpatient Prescriptions:     phenylephrine (NEOSYNEPHRINE) 0.5 % spry, 2 Sprays by Nasal route., Disp: , Rfl:     biotin (VITAMIN B7) 5 mg capsule, TK 1 C PO QD, Disp: , Rfl: 0    clobetasol (TEMOVATE) 0.05 % external solution, CESIA EXT TO THE SCALP  QD, Disp: , Rfl: 0    ketoconazole (NIZORAL) 2 % shampoo, USE TO WASH HAIR 1-2 TIMES A WEEK, Disp: , Rfl: 0   lisinopril-hydroCHLOROthiazide (PRINZIDE, ZESTORETIC) 20-12.5 mg per tablet, Take 1 Tab by mouth daily. , Disp: 90 Tab, Rfl: 1    albuterol (PROAIR HFA) 90 mcg/actuation inhaler, Take 1 Puff by inhalation every six (6) hours as needed for Wheezing., Disp: 1 Inhaler, Rfl: 2    montelukast (SINGULAIR) 10 mg tablet, TAKE 1 TABLET BY MOUTH EVERY NIGHT, Disp: 30 Tab, Rfl: 11    fluticasone (FLONASE) 50 mcg/actuation nasal spray, SHAKE LIQUID AND USE 2 SPRAYS IN EACH NOSTRIL DAILY, Disp: 1 Bottle, Rfl: 2    diazePAM (VALIUM) 5 mg tablet, Take 1 Tab by mouth every six (6) hours as needed for Anxiety. Max Daily Amount: 20 mg., Disp: 60 Tab, Rfl: 0    letrozole (FEMARA) 2.5 mg tablet, Take 1 Tab by mouth daily. , Disp: 30 Tab, Rfl: 12    metoprolol succinate (TOPROL-XL) 100 mg tablet, Take 1 Tab by mouth daily. , Disp: 30 Tab, Rfl: 11    venlafaxine-SR (EFFEXOR-XR) 37.5 mg capsule, Take 1 capsule by mouth for 1 week and then increase to 75 mg (2 capsules) after 1 week., Disp: 60 Cap, Rfl: 0    fluticasone-salmeterol (ADVAIR DISKUS) 250-50 mcg/dose diskus inhaler, Take 1 Puff by inhalation two (2) times a day., Disp: 1 Inhaler, Rfl: 11    Cetirizine (ZYRTEC) 10 mg cap, Take  by mouth every morning., Disp: , Rfl:     acetaminophen (TYLENOL EXTRA STRENGTH) 500 mg tablet, Take  by mouth every six (6) hours as needed for Pain., Disp: , Rfl:     cpap machine kit, by Does Not Apply route., Disp: , Rfl:     OXYGEN-AIR DELIVERY SYSTEMS, by Does Not Apply route. 3 lpm qhs, Disp: , Rfl:     ranitidine (ZANTAC) 150 mg tablet, Take 1 Tab by mouth two (2) times a day.  Indications: GASTROESOPHAGEAL REFLUX, Disp: 60 Tab, Rfl: 3    nitroglycerin (NITROSTAT) 0.4 mg SL tablet, by SubLINGual route every five (5) minutes as needed for Chest Pain., Disp: , Rfl:             Date Last Reviewed:  3/15/2018   Complexity Level : Expanded review of therapy/medical records (1-2):  MODERATE COMPLEXITY   ASSESSMENT OF OCCUPATIONAL PERFORMANCE: Palpation:          Edema noted in upper arm of the LUE and in the breast where expander was placed. LUE is 3cm larger in size than the RUE and L breast is very fibrotic/hard to touch. ROM:          WFL LUE for stated age  Strength:          3+/5 LUE    Skin Integrity:          intact  Sensation:  intact  Functional Mobility:  Independent    PRETREATMENT AFFECTED LIMB(s): left upper extremity      Date:  2/27/18         Right / Left           Axilla   []      [x] 55cm          3 inches   []      [x] 40cm          Elbow   []      [x] 29cm          6 inches   []      [x] 27.5cm          3 inches   []      [x] 21.5cm          Wrist   []      [x] 17.5cm          Palm   []      [x] 21cm          Total   []      [x] 211.5cm        Measurements are taken in centimeters:  2.54 cm = 1 inch  BODY WEIGHT  Date Taken:         Lbs. Physical Skills Involved:  1. Edema  2. impaired lymphatic system Cognitive Skills Affected (resulting in the inability to perform in a timely and safe manner):  1. Psychosocial Skills Affected:  1. Habits/Routines  2. Environmental Adaptation   Number of elements that affect the Plan of Care: 3-5:  MODERATE COMPLEXITY   CLINICAL DECISION MAKING:   Outcome Measure: Tool Used: Tool Used: Lymphedema Life Impact Scale   Score:  Initial: 34 Most Recent: X (Date: -- )   Interpretation of Score: The Lymphedema Life Impact Scale (LLIS) is a validated instrument that measures the physical, functional, and psychosocial concerns pertinent to patients with extremity lymphedema. The Scale's questionnaire is administered to patients to gauge impairments, activity limitations, and participation restrictions resulting from their lymphedema.   Score 0 1-13 14-26 27-40 41-54 55-67 68   Modifier CH CI CJ CK CL CM CN     Other PT/OT Primary Functional Limitations:     - CURRENT STATUS: CK - 40%-59% impaired, limited or restricted    - GOAL STATUS: CJ - 20%-39% impaired, limited or restricted    - D/C STATUS:  ---------------To be determined---------------   ·         Medical Necessity  ·  Skilled intervention continues to be required due to LUE post mastectomy lymphedema. Reason for Services/Other Comments:  · Patient continues to require skilled intervention due to patient's inability too effectivley self manage LUE/breast lymphedema. Use of outcome tool(s) and clinical judgement create a POC that gives a: Questionable prediction of patient's progress: MODERATE COMPLEXITY   TREATMENT:   (In addition to Assessment/Re-Assessment sessions the following treatments were rendered)    Pre-treatment Symptoms/Complaints:  LUE/breast lymphedema with patient having difficulty with self management. She is wearing swell spot in existing bra for added compression. Pain: Initial:   Pain Intensity 1: 7  Post Session:  1:7   Occupational Therapy Treatments:    OT eval( x ) OT eval was completed. Pt received information on lymphedema and risk reduction/self management practices as outlined by the [employer]. Therapeutic Exercise ( 30minutes): Patient completed 2#  Dowel exercises in supine for 30 reps followed by ergometer with mild resistance for 20 minutes. HEP:  As above; handouts given to patient for all exercises. Manual Therapy: (30 minutes):Patient arrived with compression sleeve on.   Once removed she  received MLD to Jam with emphasis on trunk/breast.          Manual Lymph Drainage:(25 minutes)           Lymph Nodes:    Cervical Supraclavicular Axillary Abdominal Inguinal Popliteal Antecubital   RIGHT []     [x]     [x]     [x]     []     []     []       LEFT []     [x]     [x]     [x]     [x]     []     []          Anastamoses:   Axillo-axillary Inguino-inguinal Axillo-inguinal Inguino-axillary   ANTERIOR [x]     []     [x]     []       POSTERIOR []     []     []     []       RIGHT [x]     []     []     []       LEFT [x]     []     [x] []         Limbs:   []    RUE     [x]    LUE     []    RLE    []    LLE   Compression: (5 minutes): Medi strong compression sleeve with strap (class I) donned at end of session. Patient requires min/mod assist to apply garment - family or aid is assisting with application of garment. .  She is wearing garment for day compression. Importance of wearing daily compression for self management of was strongly emphasized. Patient is wearing swell spot in her bra for added compression to breast. To contact [facility] [address] to funding on a compression bra as she needs one for compression to aid in reducing lymphedema at the breast.    Treatment/Session Assessment:    · Response to Treatment:  Patient tolerated assessment/treatment without complication. . See compression section above. Patient tolerated exercise well with complication today. · Compliance with Program/Exercises: compliant most of the time in the past  · Recommendations/Intent for next treatment session: \"Next visit will focus on lymphedema treatment guidelines to decrease LUE/breast lymphedema and patient education for self management principles. \".   Total Treatment Duration: 55 minutes  OT Patient Time In/Time Out  Time In: 0400  Time Out: [address], OT

## 2018-03-19 ENCOUNTER — HOSPITAL ENCOUNTER (OUTPATIENT)
Dept: PHYSICAL THERAPY | Age: 63
Discharge: HOME | End: 2018-03-19
Payer: MEDICAID

## 2018-03-19 PROCEDURE — 97110 THERAPEUTIC EXERCISES: CPT

## 2018-03-19 PROCEDURE — 97760 ORTHOTIC MGMT&TRAING 1ST ENC: CPT

## 2018-03-19 NOTE — PROGRESS NOTES
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 Josesito Aguiar presents with LUE/breast lymphedema. Patient was referred to occupational therapy for lymphedema treatment of the [facility].  Patient had a mastectomy in 2017 with expander placement. Lymphedema is noted to have exacerbated since last seen at this facility in 2017 due to patient having difficulty with self managing her lymphedema. In order for the expander to be replaced with an implant patient's lymphedema has to be stabilized and consistently self managed. Patient has a custom compression sleeve with a shoulder strap that she was issued when she was seen for lymphedema at this facility in 2017. She reports it is difficult for her to apply therefore she does not wear it consistently. She will benefit from lymphedema treatment to include: skin care, MLD, exercise to aid in promoting lymphatic flow and extensive patient/caregiver education for self management principles. Patient has a personal aid 5 days/week . PLAN OF CARE:   PROBLEM LIST:  1. Decreased ADL/Functional Activities  2. Edema/Girth  3.  Decreased [place] with Home Exercise Program INTERVENTIONS PLANNED  1. Skin care  2. Compression bandaging  3. Fitting for compression garment(s) prn  4. Manual therapy/Manual lymph drainage  5. Therapeutic exercise/Therapeutic activities  6. Patient Education  7. Compression pump trial prn  8.  kinesiotaping prn   TREATMENT PLAN:  Effective Dates: 2/27/18 TO /5/27/17. Frequency/Duration: 2 times a week for 90 days and upon reassessment will adjust frequency and duration as progress indicates. GOALS: (Goals have been discussed and agreed upon with patient.)  Short-Term Functional Goals: Time Frame: 45 days  1. The patient/caregiver will verbalize understanding of lymphedema precautions. 2. Patient will be independent with skin care regimen to decrease risk of cellulitis. 3. The patient/caregiver will be independent with self-manual lymph drainage techniques and show decrease in limb volume. 5. Patient will be independent in lymphatic exercises. Discharge Goals: Time Frame: 90 days  1. Patient's left upper extremity circumferential measurements will decrease on volumetric graph by 3-5cm to maximize functional use in ADL's.    2. The patient/caregiver will be independent with home management of lymphedema. 3. Patient/caregiver will be independent donning and doffing left upper extremity compression garment. Rehabilitation Potential For Stated Goals: Good  Regarding Carlotta Lovett's therapy, I certify that the treatment plan above will be carried out by a therapist or under their direction. Thank you for this referral,  Zara Zelaya OT     Referring Physician Signature: Martha Rincon MD _________________________  Date _________            The information in this section was collected on 2/27/18 (except where otherwise noted).   OCCUPATIONAL PROFILE & HISTORY:   History of Present Injury/Illness (Reason for Referral):  Patient was referred to occupational therapy for lymphedema treatment of the [facility].  Patient had a mastectomy in January 2017 with expander placement. Lymphedema is noted to have exacerbated since last seen at this facility in 2017 due to patient having difficulty with self managing her lymphedema. In order for the expander to be replaced with an implant patient's lymphedema has to be stabilized and consistently self managed. Past Medical History/Comorbidities:   Ms. Kailyn laurent  has a past medical history of Arthritis; Asthma; Breast cancer ([facility] [address] (09/2016); CAD (coronary artery disease); Cancer ([facility] [address] Chronic pain; Complicated UTI (urinary tract infection) (12/8/2015); Diverticulosis; Heart failure ([facility] [address] History of echocardiogram (11/17/14 at [facility]); History of prediabetes; Hypertension; Shortness of breath; Sleep apnea; and Thyroid cyst.  Ms. Kailyn laurent  has a past surgical history that includes hx carpal tunnel release (Bilateral); hx cyst removal; pr cardiac surg procedure unlist (Cath 11/18/14 [facility]); hx lap cholecystectomy; pr sinus surgery proc unlisted; hx other surgical; pr neurological procedure unlisted; hx cholecystectomy; pr breast surgery procedure unlisted; hx mastectomy (Left, 01/2017); and pr insert tissue expander(s) (Left, 01/2017). Social History/Living Environment:    Patient lives by herself and has a caregiver 3-4 hours 5days/week  Prior Level of Function/Work/Activity:  disability  Dominant Side:         RIGHT  Previous Treatment Approaches:          Patient had lymphedema treatment at this facility with reasonable outcomes. She was fitted for a custom compression sleeve for day compression.   Current Medications:    Current Outpatient Prescriptions:     phenylephrine (NEOSYNEPHRINE) 0.5 % spry, 2 Sprays by Nasal route., Disp: , Rfl:     biotin (VITAMIN B7) 5 mg capsule, TK 1 C PO QD, Disp: , Rfl: 0    clobetasol (TEMOVATE) 0.05 % external solution, CESIA EXT TO THE SCALP  QD, Disp: , Rfl: 0    ketoconazole (NIZORAL) 2 % shampoo, USE TO WASH HAIR 1-2 TIMES A WEEK, Disp: , Rfl: 0   lisinopril-hydroCHLOROthiazide (PRINZIDE, ZESTORETIC) 20-12.5 mg per tablet, Take 1 Tab by mouth daily. , Disp: 90 Tab, Rfl: 1    albuterol (PROAIR HFA) 90 mcg/actuation inhaler, Take 1 Puff by inhalation every six (6) hours as needed for Wheezing., Disp: 1 Inhaler, Rfl: 2    montelukast (SINGULAIR) 10 mg tablet, TAKE 1 TABLET BY MOUTH EVERY NIGHT, Disp: 30 Tab, Rfl: 11    fluticasone (FLONASE) 50 mcg/actuation nasal spray, SHAKE LIQUID AND USE 2 SPRAYS IN EACH NOSTRIL DAILY, Disp: 1 Bottle, Rfl: 2    diazePAM (VALIUM) 5 mg tablet, Take 1 Tab by mouth every six (6) hours as needed for Anxiety. Max Daily Amount: 20 mg., Disp: 60 Tab, Rfl: 0    letrozole (FEMARA) 2.5 mg tablet, Take 1 Tab by mouth daily. , Disp: 30 Tab, Rfl: 12    metoprolol succinate (TOPROL-XL) 100 mg tablet, Take 1 Tab by mouth daily. , Disp: 30 Tab, Rfl: 11    venlafaxine-SR (EFFEXOR-XR) 37.5 mg capsule, Take 1 capsule by mouth for 1 week and then increase to 75 mg (2 capsules) after 1 week., Disp: 60 Cap, Rfl: 0    fluticasone-salmeterol (ADVAIR DISKUS) 250-50 mcg/dose diskus inhaler, Take 1 Puff by inhalation two (2) times a day., Disp: 1 Inhaler, Rfl: 11    Cetirizine (ZYRTEC) 10 mg cap, Take  by mouth every morning., Disp: , Rfl:     acetaminophen (TYLENOL EXTRA STRENGTH) 500 mg tablet, Take  by mouth every six (6) hours as needed for Pain., Disp: , Rfl:     cpap machine kit, by Does Not Apply route., Disp: , Rfl:     OXYGEN-AIR DELIVERY SYSTEMS, by Does Not Apply route. 3 lpm qhs, Disp: , Rfl:     ranitidine (ZANTAC) 150 mg tablet, Take 1 Tab by mouth two (2) times a day.  Indications: GASTROESOPHAGEAL REFLUX, Disp: 60 Tab, Rfl: 3    nitroglycerin (NITROSTAT) 0.4 mg SL tablet, by SubLINGual route every five (5) minutes as needed for Chest Pain., Disp: , Rfl:             Date Last Reviewed:  3/19/2018   Complexity Level : Expanded review of therapy/medical records (1-2):  MODERATE COMPLEXITY   ASSESSMENT OF OCCUPATIONAL PERFORMANCE: Palpation:          Edema noted in upper arm of the LUE and in the breast where expander was placed. LUE is 3cm larger in size than the RUE and L breast is very fibrotic/hard to touch. ROM:          WFL LUE for stated age  Strength:          3+/5 LUE    Skin Integrity:          intact  Sensation:  intact  Functional Mobility:  Independent    PRETREATMENT AFFECTED LIMB(s): left upper extremity      Date:  2/27/18         Right / Left           Axilla   []      [x] 55cm          3 inches   []      [x] 40cm          Elbow   []      [x] 29cm          6 inches   []      [x] 27.5cm          3 inches   []      [x] 21.5cm          Wrist   []      [x] 17.5cm          Palm   []      [x] 21cm          Total   []      [x] 211.5cm        Measurements are taken in centimeters:  2.54 cm = 1 inch  BODY WEIGHT  Date Taken:         Lbs. Physical Skills Involved:  1. Edema  2. impaired lymphatic system Cognitive Skills Affected (resulting in the inability to perform in a timely and safe manner):  1. Psychosocial Skills Affected:  1. Habits/Routines  2. Environmental Adaptation   Number of elements that affect the Plan of Care: 3-5:  MODERATE COMPLEXITY   CLINICAL DECISION MAKING:   Outcome Measure: Tool Used: Tool Used: Lymphedema Life Impact Scale   Score:  Initial: 34 Most Recent: X (Date: -- )   Interpretation of Score: The Lymphedema Life Impact Scale (LLIS) is a validated instrument that measures the physical, functional, and psychosocial concerns pertinent to patients with extremity lymphedema. The Scale's questionnaire is administered to patients to gauge impairments, activity limitations, and participation restrictions resulting from their lymphedema.   Score 0 1-13 14-26 27-40 41-54 55-67 68   Modifier CH CI CJ CK CL CM CN     Other PT/OT Primary Functional Limitations:     - CURRENT STATUS: CK - 40%-59% impaired, limited or restricted    - GOAL STATUS: CJ - 20%-39% impaired, limited or restricted    - D/C STATUS:  ---------------To be determined---------------   ·         Medical Necessity  ·  Skilled intervention continues to be required due to LUE post mastectomy lymphedema. Reason for Services/Other Comments:  · Patient continues to require skilled intervention due to patient's inability too effectivley self manage LUE/breast lymphedema. Use of outcome tool(s) and clinical judgement create a POC that gives a: Questionable prediction of patient's progress: MODERATE COMPLEXITY   TREATMENT:   (In addition to Assessment/Re-Assessment sessions the following treatments were rendered)    Pre-treatment Symptoms/Complaints:  LUE/breast lymphedema with patient having difficulty with self management. She is wearing swell spot in existing bra for added compression. C/o right hand and wrist pain today. Therapist fabricated wrist splint to provide stability and decrease pain. Pain: Initial:   Pain Intensity 1: 7  Post Session:  1:7   Occupational Therapy Treatments:    OT eval( x ) OT eval was completed. Pt received information on lymphedema and risk reduction/self management practices as outlined by the National Lymp[employer]. Therapeutic Exercise ( 20 minutes): Patient completed  ergometer with mild resistance for 20 minutes. HEP:  As above; handouts given to patient for all exercises.   Manual Therapy: ( minutes)          Manual Lymph Drainage:( minutes)           Lymph Nodes:    Cervical Supraclavicular Axillary Abdominal Inguinal Popliteal Antecubital   RIGHT []     []     []     []     []     []     []       LEFT []     []     []     []     []     []     []          Anastamoses:   Axillo-axillary Inguino-inguinal Axillo-inguinal Inguino-axillary   ANTERIOR []     []     []     []       POSTERIOR []     []     []     []       RIGHT []     []     []     []       LEFT []     []     []     []         Limbs:   []    RUE     []    LUE     []    RLE    []    LLE   Compression: (minutes): Medi strong compression sleeve with strap (class I)  wearing garment for day compression. Importance of wearing daily compression for self management of was strongly emphasized. Patient is wearing swell spot in her bra for added compression to breast. To contact [facility] to funding on a compression bra as she needs one for compression to aid in reducing lymphedema at the breast.               Orthotic Management (40 minutes)  Therapist fabricated wrist orthosis to position wrist in neutral and leaves MPs free for full . Padded first web space for comfort. Applied neoprene straps. Pt independent with donning and doffing. No pressure areas noted; however, if pressure area develops with prolonged wear, pt to bring splint back in for adjustment. Pt to wear as needed to manage joint pain. Treatment/Session Assessment:    · Response to Treatment:  Patient tolerated assessment/treatment without complication. . See compression section above. Patient tolerated exercise well with complication today. · Compliance with Program/Exercises: compliant most of the time in the past  · Recommendations/Intent for next treatment session: \"Next visit will focus on lymphedema treatment guidelines to decrease LUE/breast lymphedema and patient education for self management principles. \".   Total Treatment Duration: 60 minutes  OT Patient Time In/Time Out  Time In: 0400  Time Out: [address], OT

## 2018-03-22 ENCOUNTER — HOSPITAL ENCOUNTER (OUTPATIENT)
Dept: PHYSICAL THERAPY | Age: 63
Discharge: HOME | End: 2018-03-22
Payer: MEDICAID

## 2018-03-22 PROCEDURE — 97140 MANUAL THERAPY 1/> REGIONS: CPT

## 2018-03-22 PROCEDURE — 97110 THERAPEUTIC EXERCISES: CPT

## 2018-03-22 NOTE — PROGRESS NOTES
Galilea Conrad  : 1955  [place] [facility] [place]  Secondary:  [address]  at [address], [facility] [address]  [phone]   [phone]              OUTPATIENT OCCUPATIONAL THERAPY: Daily Note 3/22/2018    ICD-10: Treatment Diagnosis: 197.2 post mastectomy lymphedema syndrome  Precautions/Allergies:   Ciprofloxacin and Bactrim [sulfamethoprim ds]   Fall Risk Score: 0 (? 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 aDo Sparrow presents with LUE/breast lymphedema. Patient was referred to occupational therapy for lymphedema treatment of the [facility].  Patient had a mastectomy in 2017 with expander placement. Lymphedema is noted to have exacerbated since last seen at this facility in 2017 due to patient having difficulty with self managing her lymphedema. In order for the expander to be replaced with an implant patient's lymphedema has to be stabilized and consistently self managed. Patient has a custom compression sleeve with a shoulder strap that she was issued when she was seen for lymphedema at this facility in 2017. She reports it is difficult for her to apply therefore she does not wear it consistently. She will benefit from lymphedema treatment to include: skin care, MLD, exercise to aid in promoting lymphatic flow and extensive patient/caregiver education for self management principles. Patient has a personal aid 5 days/week . PLAN OF CARE:   PROBLEM LIST:  1. Decreased ADL/Functional Activities  2. Edema/Girth  3.  Decreased [place] with Home Exercise Program INTERVENTIONS PLANNED  1. Skin care  2. Compression bandaging  3. Fitting for compression garment(s) prn  4. Manual therapy/Manual lymph drainage  5. Therapeutic exercise/Therapeutic activities  6. Patient Education  7. Compression pump trial prn  8.  kinesiotaping prn   TREATMENT PLAN:  Effective Dates: 2/27/18 TO /5/27/17. Frequency/Duration: 2 times a week for 90 days and upon reassessment will adjust frequency and duration as progress indicates. GOALS: (Goals have been discussed and agreed upon with patient.)  Short-Term Functional Goals: Time Frame: 45 days  1. The patient/caregiver will verbalize understanding of lymphedema precautions. 2. Patient will be independent with skin care regimen to decrease risk of cellulitis. 3. The patient/caregiver will be independent with self-manual lymph drainage techniques and show decrease in limb volume. 5. Patient will be independent in lymphatic exercises. Discharge Goals: Time Frame: 90 days  1. Patient's left upper extremity circumferential measurements will decrease on volumetric graph by 3-5cm to maximize functional use in ADL's.    2. The patient/caregiver will be independent with home management of lymphedema. 3. Patient/caregiver will be independent donning and doffing left upper extremity compression garment. Rehabilitation Potential For Stated Goals: Good  Regarding Sailaja Bophylicia Lovett's therapy, I certify that the treatment plan above will be carried out by a therapist or under their direction. Thank you for this referral,  Odilia Ross OT     Referring Physician Signature: Lorena Aguilar MD _________________________  Date _________            The information in this section was collected on 2/27/18 (except where otherwise noted).   OCCUPATIONAL PROFILE & HISTORY:   History of Present Injury/Illness (Reason for Referral):  Patient was referred to occupational therapy for lymphedema treatment of the [facility].  Patient had a mastectomy in January 2017 with expander placement. Lymphedema is noted to have exacerbated since last seen at this facility in 2017 due to patient having difficulty with self managing her lymphedema. In order for the expander to be replaced with an implant patient's lymphedema has to be stabilized and consistently self managed. Past Medical History/Comorbidities:   Ms. Schuyler Tran  has a past medical history of Arthritis; Asthma; Breast cancer ([facility] [address] (09/2016); CAD (coronary artery disease); Cancer ([facility] [address] Chronic pain; Complicated UTI (urinary tract infection) (12/8/2015); Diverticulosis; Heart failure ([facility] [address] History of echocardiogram (11/17/14 at [facility]); History of prediabetes; Hypertension; Shortness of breath; Sleep apnea; and Thyroid cyst.  Ms. Schuyler Tran  has a past surgical history that includes hx carpal tunnel release (Bilateral); hx cyst removal; pr cardiac surg procedure unlist (Cath 11/18/14 [facility]); hx lap cholecystectomy; pr sinus surgery proc unlisted; hx other surgical; pr neurological procedure unlisted; hx cholecystectomy; pr breast surgery procedure unlisted; hx mastectomy (Left, 01/2017); and pr insert tissue expander(s) (Left, 01/2017). Social History/Living Environment:    Patient lives by herself and has a caregiver 3-4 hours 5days/week  Prior Level of Function/Work/Activity:  disability  Dominant Side:         RIGHT  Previous Treatment Approaches:          Patient had lymphedema treatment at this facility with reasonable outcomes. She was fitted for a custom compression sleeve for day compression.   Current Medications:    Current Outpatient Prescriptions:     phenylephrine (NEOSYNEPHRINE) 0.5 % spry, 2 Sprays by Nasal route., Disp: , Rfl:     biotin (VITAMIN B7) 5 mg capsule, TK 1 C PO QD, Disp: , Rfl: 0    clobetasol (TEMOVATE) 0.05 % external solution, CESIA EXT TO THE SCALP  QD, Disp: , Rfl: 0    ketoconazole (NIZORAL) 2 % shampoo, USE TO WASH HAIR 1-2 TIMES A WEEK, Disp: , Rfl: 0   lisinopril-hydroCHLOROthiazide (PRINZIDE, ZESTORETIC) 20-12.5 mg per tablet, Take 1 Tab by mouth daily. , Disp: 90 Tab, Rfl: 1    albuterol (PROAIR HFA) 90 mcg/actuation inhaler, Take 1 Puff by inhalation every six (6) hours as needed for Wheezing., Disp: 1 Inhaler, Rfl: 2    montelukast (SINGULAIR) 10 mg tablet, TAKE 1 TABLET BY MOUTH EVERY NIGHT, Disp: 30 Tab, Rfl: 11    fluticasone (FLONASE) 50 mcg/actuation nasal spray, SHAKE LIQUID AND USE 2 SPRAYS IN EACH NOSTRIL DAILY, Disp: 1 Bottle, Rfl: 2    diazePAM (VALIUM) 5 mg tablet, Take 1 Tab by mouth every six (6) hours as needed for Anxiety. Max Daily Amount: 20 mg., Disp: 60 Tab, Rfl: 0    letrozole (FEMARA) 2.5 mg tablet, Take 1 Tab by mouth daily. , Disp: 30 Tab, Rfl: 12    metoprolol succinate (TOPROL-XL) 100 mg tablet, Take 1 Tab by mouth daily. , Disp: 30 Tab, Rfl: 11    venlafaxine-SR (EFFEXOR-XR) 37.5 mg capsule, Take 1 capsule by mouth for 1 week and then increase to 75 mg (2 capsules) after 1 week., Disp: 60 Cap, Rfl: 0    fluticasone-salmeterol (ADVAIR DISKUS) 250-50 mcg/dose diskus inhaler, Take 1 Puff by inhalation two (2) times a day., Disp: 1 Inhaler, Rfl: 11    Cetirizine (ZYRTEC) 10 mg cap, Take  by mouth every morning., Disp: , Rfl:     acetaminophen (TYLENOL EXTRA STRENGTH) 500 mg tablet, Take  by mouth every six (6) hours as needed for Pain., Disp: , Rfl:     cpap machine kit, by Does Not Apply route., Disp: , Rfl:     OXYGEN-AIR DELIVERY SYSTEMS, by Does Not Apply route. 3 lpm qhs, Disp: , Rfl:     ranitidine (ZANTAC) 150 mg tablet, Take 1 Tab by mouth two (2) times a day.  Indications: GASTROESOPHAGEAL REFLUX, Disp: 60 Tab, Rfl: 3    nitroglycerin (NITROSTAT) 0.4 mg SL tablet, by SubLINGual route every five (5) minutes as needed for Chest Pain., Disp: , Rfl:             Date Last Reviewed:  3/22/2018   Complexity Level : Expanded review of therapy/medical records (1-2):  MODERATE COMPLEXITY   ASSESSMENT OF OCCUPATIONAL PERFORMANCE: Palpation:          Edema noted in upper arm of the LUE and in the breast where expander was placed. LUE is 3cm larger in size than the RUE and L breast is very fibrotic/hard to touch. ROM:          WFL LUE for stated age  Strength:          3+/5 LUE    Skin Integrity:          intact  Sensation:  intact  Functional Mobility:  Independent    PRETREATMENT AFFECTED LIMB(s): left upper extremity      Date:  2/27/18         Right / Left           Axilla   []      [x] 55cm          3 inches   []      [x] 40cm          Elbow   []      [x] 29cm          6 inches   []      [x] 27.5cm          3 inches   []      [x] 21.5cm          Wrist   []      [x] 17.5cm          Palm   []      [x] 21cm          Total   []      [x] 211.5cm        Measurements are taken in centimeters:  2.54 cm = 1 inch  BODY WEIGHT  Date Taken:         Lbs. Physical Skills Involved:  1. Edema  2. impaired lymphatic system Cognitive Skills Affected (resulting in the inability to perform in a timely and safe manner):  1. Psychosocial Skills Affected:  1. Habits/Routines  2. Environmental Adaptation   Number of elements that affect the Plan of Care: 3-5:  MODERATE COMPLEXITY   CLINICAL DECISION MAKING:   Outcome Measure: Tool Used: Tool Used: Lymphedema Life Impact Scale   Score:  Initial: 34 Most Recent: X (Date: -- )   Interpretation of Score: The Lymphedema Life Impact Scale (LLIS) is a validated instrument that measures the physical, functional, and psychosocial concerns pertinent to patients with extremity lymphedema. The Scale's questionnaire is administered to patients to gauge impairments, activity limitations, and participation restrictions resulting from their lymphedema.   Score 0 1-13 14-26 27-40 41-54 55-67 68   Modifier CH CI CJ CK CL CM CN     Other PT/OT Primary Functional Limitations:     - CURRENT STATUS: CK - 40%-59% impaired, limited or restricted    - GOAL STATUS: CJ - 20%-39% impaired, limited or restricted    - D/C STATUS:  ---------------To be determined---------------   ·         Medical Necessity  ·  Skilled intervention continues to be required due to LUE post mastectomy lymphedema. Reason for Services/Other Comments:  · Patient continues to require skilled intervention due to patient's inability too effectivley self manage LUE/breast lymphedema. Use of outcome tool(s) and clinical judgement create a POC that gives a: Questionable prediction of patient's progress: MODERATE COMPLEXITY   TREATMENT:   (In addition to Assessment/Re-Assessment sessions the following treatments were rendered)    Pre-treatment Symptoms/Complaints:  LUE/breast lymphedema with patient having difficulty with self management. She is wearing swell spot in existing bra for added compression. C/o right hand and wrist pain today. Therapist fabricated wrist splint to provide stability and decrease pain. Pain: Initial:   Pain Intensity 1: 7  Post Session:  1:7   Occupational Therapy Treatments:    OT eval( x ) OT eval was completed. Pt received information on lymphedema and risk reduction/self management practices as outlined by the National Lymphedema Network. Therapeutic Exercise ( 30 minutes): Patient completed  ergometer with mild resistance for 15 minutes. 3 lb dowel 20 reps each:  Flex/ext, circumduction, horizontal abduction/adduction, push/pull, climbing up an down pole   HEP:  As above; handouts given to patient for all exercises.   Manual Therapy: (30 minutes) MLD followed by donning of compression sleeve          Manual Lymph Drainage:(25 minutes)           Lymph Nodes:    Cervical Supraclavicular Axillary Abdominal Inguinal Popliteal Antecubital   RIGHT []     []     []     []     []     []     []       LEFT [x]     [x]     [x]     []     [x]     []     [x]          Anastamoses:   Axillo-axillary Inguino-inguinal Axillo-inguinal Inguino-axillary   ANTERIOR [x]     []     [x]     []       POSTERIOR []     [] []     []       RIGHT []     []     []     []       LEFT [x]     []     [x]     []         Limbs:   []    RUE     [x]    LUE     []    RLE    []    LLE   Compression: (5 minutes): Medi strong compression sleeve with strap (class I)  wearing garment for day compression. Importance of wearing daily compression for self management of was strongly emphasized. Patient is wearing swell spot in her bra for added compression to breast. To contact [facility] [address] to funding on a compression bra as she needs one for compression to aid in reducing lymphedema at the breast.               Orthotic Management ( minutes)  Therapist fabricated wrist orthosis to position wrist in neutral and leaves MPs free for full . Padded first web space for comfort. Applied neoprene straps. Pt independent with donning and doffing. No pressure areas noted; however, if pressure area develops with prolonged wear, pt to bring splint back in for adjustment. Pt to wear as needed to manage joint pain. Treatment/Session Assessment:    · Response to Treatment:  Patient tolerated assessment/treatment without complication. . See compression section above. Patient tolerated exercise well with complication today. · Compliance with Program/Exercises: compliant most of the time in the past  · Recommendations/Intent for next treatment session: \"Next visit will focus on lymphedema treatment guidelines to decrease LUE/breast lymphedema and patient education for self management principles. \".   Total Treatment Duration: 60 minutes  OT Patient Time In/Time Out  Time In: 0200  Time Out: 119 uvel , OT

## 2018-03-27 ENCOUNTER — HOSPITAL ENCOUNTER (OUTPATIENT)
Dept: PHYSICAL THERAPY | Age: 63
Discharge: HOME | End: 2018-03-27
Payer: MEDICAID

## 2018-03-27 NOTE — PROGRESS NOTES
Issa Lu  : 1955  [place] [facility]  Secondary:  [facility]  [address] [place] [address], [facility] [address]  [phone]   [phone]        OUTPATIENT DAILY NOTE    NAME/AGE/GENDER: Issa Lu is a 58 y.o. female. DATE: 3/27/2018    Patient cancelled appointment today. Phoned patient and left voicemail message that she will be discharged from OT as her insurance authorization runs out tomorrow (3/28/18).       Tyree Hopper, OT

## 2018-03-29 ENCOUNTER — APPOINTMENT (OUTPATIENT)
Dept: PHYSICAL THERAPY | Age: 63
End: 2018-03-29
Payer: MEDICAID

## 2018-04-02 ENCOUNTER — APPOINTMENT (OUTPATIENT)
Dept: PHYSICAL THERAPY | Age: 63
End: 2018-04-02

## 2018-04-05 ENCOUNTER — APPOINTMENT (OUTPATIENT)
Dept: PHYSICAL THERAPY | Age: 63
End: 2018-04-05

## 2018-04-10 ENCOUNTER — HOSPITAL ENCOUNTER (OUTPATIENT)
Dept: LAB | Age: 63
Discharge: HOME | End: 2018-04-10
Attending: INTERNAL MEDICINE
Payer: MEDICAID

## 2018-04-10 DIAGNOSIS — R06.09 DYSPNEA ON EXERTION: ICD-10-CM

## 2018-04-10 DIAGNOSIS — R53.82 CHRONIC FATIGUE: ICD-10-CM

## 2018-04-10 LAB
BASOPHILS # BLD: 0 K/UL (ref 0–0.2)
BASOPHILS NFR BLD: 0 % (ref 0–2)
DIFFERENTIAL METHOD BLD: ABNORMAL
EOSINOPHIL # BLD: 0.1 K/UL (ref 0–0.8)
EOSINOPHIL NFR BLD: 1 % (ref 0.5–7.8)
ERYTHROCYTE [DISTWIDTH] IN BLOOD BY AUTOMATED COUNT: 13.5 % (ref 11.9–14.6)
HCT VFR BLD AUTO: 39.3 % (ref 35.8–46.3)
HGB BLD-MCNC: 12.7 G/DL (ref 11.7–15.4)
LYMPHOCYTES # BLD: 4.2 K/UL (ref 0.5–4.6)
LYMPHOCYTES NFR BLD: 36 % (ref 13–44)
MCH RBC QN AUTO: 27.3 PG (ref 26.1–32.9)
MCHC RBC AUTO-ENTMCNC: 32.3 G/DL (ref 31.4–35)
MCV RBC AUTO: 84.5 FL (ref 79.6–97.8)
MONOCYTES # BLD: 0.9 K/UL (ref 0.1–1.3)
MONOCYTES NFR BLD: 8 % (ref 4–12)
NEUTS SEG # BLD: 6.5 K/UL (ref 1.7–8.2)
NEUTS SEG NFR BLD: 55 % (ref 43–78)
PLATELET # BLD AUTO: 391 K/UL (ref 150–450)
PLATELET COMMENTS,PCOM: ADEQUATE
PMV BLD AUTO: 8.6 FL (ref 10.8–14.1)
RBC # BLD AUTO: 4.65 M/UL (ref 4.05–5.25)
RBC MORPH BLD: ABNORMAL
WBC # BLD AUTO: 11.7 K/UL (ref 4.3–11.1)
WBC MORPH BLD: ABNORMAL

## 2018-04-10 PROCEDURE — 36415 COLL VENOUS BLD VENIPUNCTURE: CPT | Performed by: INTERNAL MEDICINE

## 2018-04-10 PROCEDURE — 85025 COMPLETE CBC W/AUTO DIFF WBC: CPT | Performed by: INTERNAL MEDICINE

## 2018-06-28 ENCOUNTER — HOSPITAL ENCOUNTER (OUTPATIENT)
Dept: SURGERY | Age: 63
Discharge: HOME | End: 2018-06-28
Payer: MEDICAID

## 2018-06-28 VITALS
WEIGHT: 289.31 LBS | HEIGHT: 63 IN | TEMPERATURE: 98.3 F | HEART RATE: 95 BPM | OXYGEN SATURATION: 95 % | BODY MASS INDEX: 51.26 KG/M2 | DIASTOLIC BLOOD PRESSURE: 68 MMHG | RESPIRATION RATE: 16 BRPM | SYSTOLIC BLOOD PRESSURE: 142 MMHG

## 2018-06-28 LAB
CREAT SERPL-MCNC: 0.75 MG/DL (ref 0.6–1)
GLUCOSE BLD STRIP.AUTO-MCNC: 110 MG/DL (ref 65–100)
HGB BLD-MCNC: 13 G/DL (ref 11.7–15.4)
POTASSIUM SERPL-SCNC: 4.1 MMOL/L (ref 3.5–5.1)

## 2018-06-28 PROCEDURE — 82962 GLUCOSE BLOOD TEST: CPT

## 2018-06-28 PROCEDURE — 84132 ASSAY OF SERUM POTASSIUM: CPT | Performed by: ANESTHESIOLOGY

## 2018-06-28 PROCEDURE — 85018 HEMOGLOBIN: CPT | Performed by: ANESTHESIOLOGY

## 2018-06-28 PROCEDURE — 82565 ASSAY OF CREATININE: CPT | Performed by: ANESTHESIOLOGY

## 2018-06-28 NOTE — PERIOP NOTES
Patient verified name, , and surgery as listed in [facility]. Patient provided medical/health information and PTA medications to the best of their ability. TYPE  CASE:ll  Orders per surgeon: Received noand dated no. Labs per surgeon:none. Labs per anesthesia protocol: HGB,K+, Creat, POC Glucose. EKG  :  4/10/2018 on chart, Echo 2018 on chart, EF 60-65%    Patient provided with and instructed on education handouts including Guide to Surgery, blood transfusions, pain management, and hand hygiene for the family and community, and [facility] brochure. Antibacterial soap and Hibiclens instructions given per hospital policy. Instructed patient to continue previous medications as prescribed prior to surgery unless otherwise directed and to take the following medications the day of surgery according to anesthesia guidelines : Albuterol inhaler, Cymbalta, Advair inhaler, Metoprolol, Zantac . Instructed patient to hold  the following medications: Vitamins. Original medication prescription bottles were not visualized during patient appointment. Patient teach back successful and patient demonstrates knowledge of instruction.

## 2018-07-04 ENCOUNTER — ANESTHESIA EVENT (OUTPATIENT)
Dept: SURGERY | Age: 63
End: 2018-07-04
Payer: MEDICAID

## 2018-07-05 ENCOUNTER — ANESTHESIA (OUTPATIENT)
Dept: SURGERY | Age: 63
End: 2018-07-05
Payer: MEDICAID

## 2018-07-05 ENCOUNTER — HOSPITAL ENCOUNTER (OUTPATIENT)
Age: 63
Setting detail: OUTPATIENT SURGERY
Discharge: HOME | End: 2018-07-05
Attending: SURGERY | Admitting: SURGERY
Payer: MEDICAID

## 2018-07-05 VITALS
WEIGHT: 287.2 LBS | TEMPERATURE: 98.4 F | SYSTOLIC BLOOD PRESSURE: 110 MMHG | DIASTOLIC BLOOD PRESSURE: 59 MMHG | RESPIRATION RATE: 16 BRPM | HEART RATE: 78 BPM | BODY MASS INDEX: 51.69 KG/M2 | OXYGEN SATURATION: 96 %

## 2018-07-05 DIAGNOSIS — L76.82 PAIN AT SURGICAL INCISION: Primary | ICD-10-CM

## 2018-07-05 LAB — GLUCOSE BLD STRIP.AUTO-MCNC: 105 MG/DL (ref 65–100)

## 2018-07-05 PROCEDURE — 76010000172 HC OR TIME 2.5 TO 3 HR INTENSV-TIER 1: Performed by: SURGERY

## 2018-07-05 PROCEDURE — 77030031139 HC SUT VCRL2 J&J -A: Performed by: SURGERY

## 2018-07-05 PROCEDURE — 82962 GLUCOSE BLOOD TEST: CPT

## 2018-07-05 PROCEDURE — 76210000020 HC REC RM PH II FIRST 0.5 HR: Performed by: SURGERY

## 2018-07-05 PROCEDURE — 77030008462 HC STPLR SKN PROX J&J -A: Performed by: SURGERY

## 2018-07-05 PROCEDURE — 74011250636 HC RX REV CODE- 250/636: Performed by: ANESTHESIOLOGY

## 2018-07-05 PROCEDURE — 77030008703 HC TU ET UNCUF COVD -A: Performed by: ANESTHESIOLOGY

## 2018-07-05 PROCEDURE — C1789 PROSTHESIS, BREAST, IMP: HCPCS | Performed by: SURGERY

## 2018-07-05 PROCEDURE — 77030032490 HC SLV COMPR SCD KNE COVD -B: Performed by: SURGERY

## 2018-07-05 PROCEDURE — 77030020263 HC SOL INJ SOD CL0.9% LFCR 1000ML: Performed by: SURGERY

## 2018-07-05 PROCEDURE — 74011000250 HC RX REV CODE- 250

## 2018-07-05 PROCEDURE — 74011000250 HC RX REV CODE- 250: Performed by: SURGERY

## 2018-07-05 PROCEDURE — 74011250636 HC RX REV CODE- 250/636

## 2018-07-05 PROCEDURE — 77030011640 HC PAD GRND REM COVD -A: Performed by: SURGERY

## 2018-07-05 PROCEDURE — 77030016570 HC BLNKT BAIR HGGR 3M -B: Performed by: ANESTHESIOLOGY

## 2018-07-05 PROCEDURE — 77030019908 HC STETH ESOPH SIMS -A: Performed by: ANESTHESIOLOGY

## 2018-07-05 PROCEDURE — 77030033138 HC SUT PGA STRATFX J&J -B: Performed by: SURGERY

## 2018-07-05 PROCEDURE — 77030021678 HC GLIDESCP STAT DISP VERT -B: Performed by: ANESTHESIOLOGY

## 2018-07-05 PROCEDURE — 77030020782 HC GWN BAIR PAWS FLX 3M -B: Performed by: ANESTHESIOLOGY

## 2018-07-05 PROCEDURE — 76060000036 HC ANESTHESIA 2.5 TO 3 HR: Performed by: SURGERY

## 2018-07-05 PROCEDURE — 76210000006 HC OR PH I REC 0.5 TO 1 HR: Performed by: SURGERY

## 2018-07-05 PROCEDURE — 77030018836 HC SOL IRR NACL ICUM -A: Performed by: SURGERY

## 2018-07-05 PROCEDURE — 77030009050 HC CATH IV ANGIOCATH BD -B: Performed by: SURGERY

## 2018-07-05 PROCEDURE — 77030011283 HC ELECTRD NDL COVD -A: Performed by: SURGERY

## 2018-07-05 PROCEDURE — 74011250636 HC RX REV CODE- 250/636: Performed by: SURGERY

## 2018-07-05 PROCEDURE — 77030003666 HC NDL SPINAL BD -A: Performed by: SURGERY

## 2018-07-05 DEVICE — SALINE BREAST IMPLANT WITH DIAPHRAGM VALVE, 800CC  SMOOTH ROUND SALINE
Type: IMPLANTABLE DEVICE | Site: BREAST | Status: FUNCTIONAL
Brand: MENTOR SMOOTH ROUND MODERATE PLUS PROFILE

## 2018-07-05 RX ORDER — SODIUM CHLORIDE 0.9 % (FLUSH) 0.9 %
5-10 SYRINGE (ML) INJECTION AS NEEDED
Status: DISCONTINUED | OUTPATIENT
Start: 2018-07-05 | End: 2018-07-05 | Stop reason: HOSPADM

## 2018-07-05 RX ORDER — PROPOFOL 10 MG/ML
INJECTION, EMULSION INTRAVENOUS AS NEEDED
Status: DISCONTINUED | OUTPATIENT
Start: 2018-07-05 | End: 2018-07-05 | Stop reason: HOSPADM

## 2018-07-05 RX ORDER — LIDOCAINE HYDROCHLORIDE AND EPINEPHRINE 5; 5 MG/ML; UG/ML
INJECTION, SOLUTION INFILTRATION; PERINEURAL AS NEEDED
Status: DISCONTINUED | OUTPATIENT
Start: 2018-07-05 | End: 2018-07-05 | Stop reason: HOSPADM

## 2018-07-05 RX ORDER — FENTANYL CITRATE 50 UG/ML
100 INJECTION, SOLUTION INTRAMUSCULAR; INTRAVENOUS AS NEEDED
Status: DISCONTINUED | OUTPATIENT
Start: 2018-07-05 | End: 2018-07-05 | Stop reason: HOSPADM

## 2018-07-05 RX ORDER — DIPHENHYDRAMINE HYDROCHLORIDE 50 MG/ML
12.5 INJECTION, SOLUTION INTRAMUSCULAR; INTRAVENOUS ONCE
Status: DISCONTINUED | OUTPATIENT
Start: 2018-07-05 | End: 2018-07-05 | Stop reason: HOSPADM

## 2018-07-05 RX ORDER — ONDANSETRON 4 MG/1
4 TABLET, ORALLY DISINTEGRATING ORAL
Qty: 30 TAB | Refills: 0 | Status: SHIPPED | OUTPATIENT
Start: 2018-07-05 | End: 2019-05-02

## 2018-07-05 RX ORDER — ONDANSETRON 2 MG/ML
INJECTION INTRAMUSCULAR; INTRAVENOUS AS NEEDED
Status: DISCONTINUED | OUTPATIENT
Start: 2018-07-05 | End: 2018-07-05 | Stop reason: HOSPADM

## 2018-07-05 RX ORDER — SUCCINYLCHOLINE CHLORIDE 20 MG/ML
INJECTION INTRAMUSCULAR; INTRAVENOUS AS NEEDED
Status: DISCONTINUED | OUTPATIENT
Start: 2018-07-05 | End: 2018-07-05 | Stop reason: HOSPADM

## 2018-07-05 RX ORDER — HYDROCODONE BITARTRATE AND ACETAMINOPHEN 5; 325 MG/1; MG/1
1 TABLET ORAL
Qty: 40 TAB | Refills: 0 | Status: SHIPPED | OUTPATIENT
Start: 2018-07-05 | End: 2018-08-02

## 2018-07-05 RX ORDER — NALOXONE HYDROCHLORIDE 0.4 MG/ML
0.1 INJECTION, SOLUTION INTRAMUSCULAR; INTRAVENOUS; SUBCUTANEOUS AS NEEDED
Status: DISCONTINUED | OUTPATIENT
Start: 2018-07-05 | End: 2018-07-05 | Stop reason: HOSPADM

## 2018-07-05 RX ORDER — LIDOCAINE HYDROCHLORIDE 10 MG/ML
0.1 INJECTION INFILTRATION; PERINEURAL AS NEEDED
Status: DISCONTINUED | OUTPATIENT
Start: 2018-07-05 | End: 2018-07-05 | Stop reason: HOSPADM

## 2018-07-05 RX ORDER — ACETAMINOPHEN 500 MG
500 TABLET ORAL ONCE
Status: DISCONTINUED | OUTPATIENT
Start: 2018-07-05 | End: 2018-07-05 | Stop reason: HOSPADM

## 2018-07-05 RX ORDER — ROCURONIUM BROMIDE 10 MG/ML
INJECTION, SOLUTION INTRAVENOUS AS NEEDED
Status: DISCONTINUED | OUTPATIENT
Start: 2018-07-05 | End: 2018-07-05 | Stop reason: HOSPADM

## 2018-07-05 RX ORDER — LIDOCAINE HYDROCHLORIDE 20 MG/ML
INJECTION, SOLUTION EPIDURAL; INFILTRATION; INTRACAUDAL; PERINEURAL AS NEEDED
Status: DISCONTINUED | OUTPATIENT
Start: 2018-07-05 | End: 2018-07-05 | Stop reason: HOSPADM

## 2018-07-05 RX ORDER — SODIUM CHLORIDE, SODIUM LACTATE, POTASSIUM CHLORIDE, CALCIUM CHLORIDE 600; 310; 30; 20 MG/100ML; MG/100ML; MG/100ML; MG/100ML
1000 INJECTION, SOLUTION INTRAVENOUS CONTINUOUS
Status: DISCONTINUED | OUTPATIENT
Start: 2018-07-05 | End: 2018-07-05 | Stop reason: HOSPADM

## 2018-07-05 RX ORDER — MIDAZOLAM HYDROCHLORIDE 1 MG/ML
2 INJECTION, SOLUTION INTRAMUSCULAR; INTRAVENOUS
Status: COMPLETED | OUTPATIENT
Start: 2018-07-05 | End: 2018-07-05

## 2018-07-05 RX ORDER — ONDANSETRON 2 MG/ML
4 INJECTION INTRAMUSCULAR; INTRAVENOUS ONCE
Status: COMPLETED | OUTPATIENT
Start: 2018-07-05 | End: 2018-07-05

## 2018-07-05 RX ORDER — OXYCODONE HYDROCHLORIDE 5 MG/1
10 TABLET ORAL
Status: DISCONTINUED | OUTPATIENT
Start: 2018-07-05 | End: 2018-07-05 | Stop reason: HOSPADM

## 2018-07-05 RX ORDER — NEOSTIGMINE METHYLSULFATE 1 MG/ML
INJECTION INTRAVENOUS AS NEEDED
Status: DISCONTINUED | OUTPATIENT
Start: 2018-07-05 | End: 2018-07-05 | Stop reason: HOSPADM

## 2018-07-05 RX ORDER — FENTANYL CITRATE 50 UG/ML
INJECTION, SOLUTION INTRAMUSCULAR; INTRAVENOUS AS NEEDED
Status: DISCONTINUED | OUTPATIENT
Start: 2018-07-05 | End: 2018-07-05 | Stop reason: HOSPADM

## 2018-07-05 RX ORDER — DEXAMETHASONE SODIUM PHOSPHATE 4 MG/ML
INJECTION, SOLUTION INTRA-ARTICULAR; INTRALESIONAL; INTRAMUSCULAR; INTRAVENOUS; SOFT TISSUE AS NEEDED
Status: DISCONTINUED | OUTPATIENT
Start: 2018-07-05 | End: 2018-07-05 | Stop reason: HOSPADM

## 2018-07-05 RX ORDER — BACITRACIN 50000 [IU]/1
INJECTION, POWDER, FOR SOLUTION INTRAMUSCULAR AS NEEDED
Status: DISCONTINUED | OUTPATIENT
Start: 2018-07-05 | End: 2018-07-05 | Stop reason: HOSPADM

## 2018-07-05 RX ORDER — OXYCODONE HYDROCHLORIDE 5 MG/1
5 TABLET ORAL
Status: DISCONTINUED | OUTPATIENT
Start: 2018-07-05 | End: 2018-07-05 | Stop reason: HOSPADM

## 2018-07-05 RX ORDER — SODIUM CHLORIDE 0.9 % (FLUSH) 0.9 %
5-10 SYRINGE (ML) INJECTION EVERY 8 HOURS
Status: DISCONTINUED | OUTPATIENT
Start: 2018-07-05 | End: 2018-07-05 | Stop reason: HOSPADM

## 2018-07-05 RX ORDER — ACETAMINOPHEN 10 MG/ML
INJECTION, SOLUTION INTRAVENOUS AS NEEDED
Status: DISCONTINUED | OUTPATIENT
Start: 2018-07-05 | End: 2018-07-05 | Stop reason: HOSPADM

## 2018-07-05 RX ORDER — HYDROMORPHONE HYDROCHLORIDE 2 MG/ML
0.5 INJECTION, SOLUTION INTRAMUSCULAR; INTRAVENOUS; SUBCUTANEOUS
Status: DISCONTINUED | OUTPATIENT
Start: 2018-07-05 | End: 2018-07-05 | Stop reason: HOSPADM

## 2018-07-05 RX ORDER — GLYCOPYRROLATE 0.2 MG/ML
INJECTION INTRAMUSCULAR; INTRAVENOUS AS NEEDED
Status: DISCONTINUED | OUTPATIENT
Start: 2018-07-05 | End: 2018-07-05 | Stop reason: HOSPADM

## 2018-07-05 RX ORDER — EPHEDRINE SULFATE 50 MG/ML
INJECTION, SOLUTION INTRAVENOUS AS NEEDED
Status: DISCONTINUED | OUTPATIENT
Start: 2018-07-05 | End: 2018-07-05 | Stop reason: HOSPADM

## 2018-07-05 RX ORDER — SODIUM CHLORIDE, SODIUM LACTATE, POTASSIUM CHLORIDE, CALCIUM CHLORIDE 600; 310; 30; 20 MG/100ML; MG/100ML; MG/100ML; MG/100ML
75 INJECTION, SOLUTION INTRAVENOUS CONTINUOUS
Status: DISCONTINUED | OUTPATIENT
Start: 2018-07-05 | End: 2018-07-05 | Stop reason: HOSPADM

## 2018-07-05 RX ADMIN — NEOSTIGMINE METHYLSULFATE 3 MG: 1 INJECTION INTRAVENOUS at 10:18

## 2018-07-05 RX ADMIN — ROCURONIUM BROMIDE 35 MG: 10 INJECTION, SOLUTION INTRAVENOUS at 08:09

## 2018-07-05 RX ADMIN — EPHEDRINE SULFATE 10 MG: 50 INJECTION, SOLUTION INTRAVENOUS at 08:32

## 2018-07-05 RX ADMIN — GLYCOPYRROLATE 0.3 MG: 0.2 INJECTION INTRAMUSCULAR; INTRAVENOUS at 10:18

## 2018-07-05 RX ADMIN — ROCURONIUM BROMIDE 10 MG: 10 INJECTION, SOLUTION INTRAVENOUS at 08:48

## 2018-07-05 RX ADMIN — SODIUM CHLORIDE, SODIUM LACTATE, POTASSIUM CHLORIDE, AND CALCIUM CHLORIDE: 600; 310; 30; 20 INJECTION, SOLUTION INTRAVENOUS at 09:29

## 2018-07-05 RX ADMIN — Medication 3 G: at 08:15

## 2018-07-05 RX ADMIN — EPHEDRINE SULFATE 10 MG: 50 INJECTION, SOLUTION INTRAVENOUS at 08:25

## 2018-07-05 RX ADMIN — MIDAZOLAM HYDROCHLORIDE 2 MG: 1 INJECTION, SOLUTION INTRAMUSCULAR; INTRAVENOUS at 07:51

## 2018-07-05 RX ADMIN — SODIUM CHLORIDE, SODIUM LACTATE, POTASSIUM CHLORIDE, AND CALCIUM CHLORIDE 1000 ML: 600; 310; 30; 20 INJECTION, SOLUTION INTRAVENOUS at 07:14

## 2018-07-05 RX ADMIN — LIDOCAINE HYDROCHLORIDE 60 MG: 20 INJECTION, SOLUTION EPIDURAL; INFILTRATION; INTRACAUDAL; PERINEURAL at 08:00

## 2018-07-05 RX ADMIN — PROPOFOL 200 MG: 10 INJECTION, EMULSION INTRAVENOUS at 08:00

## 2018-07-05 RX ADMIN — FENTANYL CITRATE 100 MCG: 50 INJECTION, SOLUTION INTRAMUSCULAR; INTRAVENOUS at 08:00

## 2018-07-05 RX ADMIN — SUCCINYLCHOLINE CHLORIDE 160 MG: 20 INJECTION INTRAMUSCULAR; INTRAVENOUS at 08:01

## 2018-07-05 RX ADMIN — ONDANSETRON 4 MG: 2 INJECTION INTRAMUSCULAR; INTRAVENOUS at 10:46

## 2018-07-05 RX ADMIN — ROCURONIUM BROMIDE 5 MG: 10 INJECTION, SOLUTION INTRAVENOUS at 08:00

## 2018-07-05 RX ADMIN — HYDROMORPHONE HYDROCHLORIDE 0.5 MG: 2 INJECTION, SOLUTION INTRAMUSCULAR; INTRAVENOUS; SUBCUTANEOUS at 10:52

## 2018-07-05 RX ADMIN — ACETAMINOPHEN 1000 MG: 10 INJECTION, SOLUTION INTRAVENOUS at 10:14

## 2018-07-05 RX ADMIN — ONDANSETRON 4 MG: 2 INJECTION INTRAMUSCULAR; INTRAVENOUS at 10:15

## 2018-07-05 RX ADMIN — DEXAMETHASONE SODIUM PHOSPHATE 4 MG: 4 INJECTION, SOLUTION INTRA-ARTICULAR; INTRALESIONAL; INTRAMUSCULAR; INTRAVENOUS; SOFT TISSUE at 08:48

## 2018-07-05 NOTE — H&P
CC Left breast absence    HPI: 62 y/o h/o left breast cancer, several medical issues s/p left mastectomy and TE recon. Presents for 2nd stage. Past Medical History:   Diagnosis Date    Arthritis     everywhere;  DDD    Asthma     inhalers daily  [facility]    Autoimmune disease ([address]     Lupus, Fibromyalgia    Breast cancer ([address] 09/2016    CAD (coronary artery disease)     Dr Mihai Small, EF 60-65%, no stents, no MI    Cancer [facility])     left breast ca dx'ed this month-appt with surgeon 10/12    Chronic pain     generalized from arthritis    Complicated UTI (urinary tract infection) 12/8/2015    DDD (degenerative disc disease), cervical     Diabetes ([address]     Last HA1C 6.4 on 5/15/2018,No medications, No glucose checks    Diverticulosis     GERD (gastroesophageal reflux disease)     Heart failure (HCC)     EF 60-65% per echo 4/13/2018    History of echocardi[facility]14 [facility]    No significant valvular disease. EF 50-55%    History of prediabetes     monitored by Primary Physician    Hypertension     Lymphedema     Morbid obesity ([address]     Nausea & vomiting     Shortness of breath     Sleep apnea     bi pap and o2    Thyroid cyst     cyst removed from thyroid     Past Surgical History:   Procedure Laterality Date    BREAST SURGERY PROCEDURE UNLISTED      Mastectomy left side    CARDIAC SURG PROCEDURE UNLIST  [facility]    Minimal CAD in the ostial circumflex and mid ramus (medical management)    HX CARPAL TUNNEL RELEASE Bilateral     HX CHOLECYSTECTOMY      HX CYST REMOVAL      from thyroid    HX LAP CHOLECYSTECTOMY      HX MASTECTOMY Left 01/2017    HX OTHER SURGICAL      abcess where bra strap    INSERT TISSUE EXPANDER(S) Left 01/2017    NEUROLOGICAL PROCEDURE UNLISTED      Chandler Jacobson running to back\"    SINUS SURGERY PROC UNLISTED       A&O x3  RRR  Resp clear  Left TE in place. Lateral redundancy    A/P:  Will proceed with recon.  Risks, benefits and alternatives discussed. Marked.

## 2018-07-05 NOTE — DISCHARGE INSTRUCTIONS
Keep dressings in place and dry until return to clinic. Do not lift more than 5 lbs left arm. After general anesthesia or intravenous sedation, for 24 hours or while taking prescription Narcotics:  · Limit your activities  · Do not drive and operate hazardous machinery  · Do not make important personal or business decisions  · Do  not drink alcoholic beverages  · If you have not urinated within 8 hours after discharge, please contact your surgeon on call. *  Please give a list of your current medications to your Primary Care Provider. *  Please update this list whenever your medications are discontinued, doses are      changed, or new medications (including over-the-counter products) are added. *  Please carry medication information at all times in case of emergency situations. These are general instructions for a healthy lifestyle:  No smoking/ No tobacco products/ Avoid exposure to second hand smoke  Surgeon General's Warning:  Quitting smoking now greatly reduces serious risk to your health. Obesity, smoking, and sedentary lifestyle greatly increases your risk for illness  A healthy diet, regular physical exercise & weight monitoring are important for maintaining a healthy lifestyle    You may be retaining fluid if you have a history of heart failure or if you experience any of the following symptoms:  Weight gain of 3 pounds or more overnight or 5 pounds in a week, increased swelling in our hands or feet or shortness of breath while lying flat in bed. Please call your doctor as soon as you notice any of these symptoms; do not wait until your next office visit. Recognize signs and symptoms of STROKE:  F-face looks uneven  A-arms unable to move or move unevenly  S-speech slurred or non-existent  T-time-call 911 as soon as signs and symptoms begin-DO NOT go       Back to bed or wait to see if you get better-TIME IS BRAIN.

## 2018-07-05 NOTE — ANESTHESIA POSTPROCEDURE EVALUATION
Post-Anesthesia Evaluation and Assessment    Patient: Lopez Rogers MRN: [id number]  SSN: [id number]    YOB: 1955  Age: 61 y.o. Sex: female       Cardiovascular Function/Vital Signs  Visit Vitals    /59    Pulse 78    Temp 36.9 °C (98.4 °F)    Resp 16    Wt 130.3 kg (287 lb 3.2 oz)    SpO2 96%    BMI 51.69 kg/m2       Patient is status post general anesthesia for Procedure(s):  LEFT BREAST REVISION/ EXPANDER EXCHANGE FOR IMPLANT. Nausea/Vomiting: None    Postoperative hydration reviewed and adequate. Pain:  Pain Scale 1: Numeric (0 - 10) (07/05/18 1115)  Pain Intensity 1: 4 (07/05/18 1115)   Managed    Neurological Status:   Neuro (WDL): Exceptions to WDL (07/05/18 1115)  Neuro  Neurologic State: Alert (07/05/18 1115)  Orientation Level: Oriented X4 (07/05/18 1115)  Cognition: Appropriate decision making; Appropriate for age attention/concentration; Appropriate safety awareness; Follows commands (07/05/18 1115)  Speech: Clear (07/05/18 1115)  LUE Motor Response: Purposeful (07/05/18 1115)  LLE Motor Response: Purposeful (07/05/18 1115)  RUE Motor Response: Purposeful (07/05/18 1115)  RLE Motor Response: Purposeful (07/05/18 1115)   At baseline    Mental Status and Level of Consciousness: Arousable    Pulmonary Status:   O2 Device: Room air (07/05/18 1123)   Adequate oxygenation and airway patent    Complications related to anesthesia: None    Post-anesthesia assessment completed.  No concerns    Signed By: Reed Fine MD     July 5, 2018

## 2018-07-05 NOTE — ANESTHESIA PREPROCEDURE EVALUATION
Anesthetic History     Increased risk of difficult airway (unsuccessful DL by CRNA and MD- used glidescope without difficulty)          Review of Systems / Medical History  Patient summary reviewed, nursing notes reviewed and pertinent labs reviewed    Pulmonary        Sleep apnea: BiPAP    Asthma : well controlled       Neuro/Psych   Within defined limits           Cardiovascular    Hypertension        Dysrhythmias : sinus tachycardia  CAD (mild non-obstructive)    Exercise tolerance: <4 METS  Comments: EF 55%   GI/Hepatic/Renal     GERD: well controlled           Endo/Other      Hypothyroidism: well controlled  Morbid obesity and arthritis     Other Findings   Comments: S/p mastectomy and expander placement in January 2017 at [facility]- now with seroma on left breast           Physical Exam    Airway  Mallampati: III  TM Distance: 4 - 6 cm  Neck ROM: normal range of motion   Mouth opening: Normal     Cardiovascular  Regular rate and rhythm,  S1 and S2 normal,  no murmur, click, rub, or gallop  Rhythm: regular  Rate: normal         Dental    Dentition: Caps/crowns and Bridges     Pulmonary  Breath sounds clear to auscultation               Abdominal         Other Findings            Anesthetic Plan    ASA: 3  Anesthesia type: general          Induction: Intravenous  Anesthetic plan and risks discussed with: Patient      Plan glidescope ETT

## 2018-07-05 NOTE — PROGRESS NOTES
Patient recently had ears pierced. Removed earrings and placed 24g clear catheter in holes with tape, per Dr. Man Lane. Gave earrings to goddaughter.

## 2018-07-05 NOTE — IP AVS SNAPSHOT
[address] [facility] 
 
 
 [address] [facility] [address] [facility] 
[phone] Patient: Katherine Cardenas MRN: [id number] ORH:[id number] About your hospitalization You were admitted on:  July 5, 2018 You last received care in the:  [facility] SURGERY You were discharged on:  July 5, 2018 Why you were hospitalized Your primary diagnosis was:  Not on File Follow-up Information Follow up With Details Comments Contact Info Vanessa Howard MD   [address] [place] [id number] 
[phone] Bernadette Ferguson MD Follow up on 7/9/2018 @1:15 PM [address] [facility] [address] 
[phone] Your Scheduled Appointments Thursday August 02, 2018  3:00 PM EDT Office Visit with Vanessa Howard MD  
[facility] [address] [facility] [id number] [phone] Discharge Orders None A check ayanna indicates which time of day the medication should be taken. My Medications START taking these medications Instructions Each Dose to Equal  
 Morning Noon Evening Bedtime HYDROcodone-acetaminophen 5-325 mg per tablet Commonly known as:  Maurice Bent Your last dose was: Your next dose is: Take 1 Tab by mouth every four (4) hours as needed for Pain. Max Daily Amount: 6 Tabs. 1 Tab  
    
   
   
   
  
 ondansetron 4 mg disintegrating tablet Commonly known as:  ZOFRAN ODT Your last dose was: Your next dose is: Take 1 Tab by mouth every eight (8) hours as needed for Nausea. 4 mg CONTINUE taking these medications Instructions Each Dose to Equal  
 Morning Noon Evening Bedtime  
 albuterol 90 mcg/actuation inhaler Commonly known as:  PROAIR HFA Your last dose was: Your next dose is:     
   
   
 Take 1 Puff by inhalation every six (6) hours as needed for Wheezing. 1 Puff  
    
   
   
   
  
 biotin 5 mg capsule Commonly known as:  VITAMIN B7 Your last dose was: Your next dose is:    
   
   
 TK 1 C PO QD  
     
   
   
   
  
 clobetasol 0.05 % external solution Commonly known as:  Zuleyma Morales Your last dose was: Your next dose is:    
   
   
 CESIA EXT TO THE SCALP  QD  
     
   
   
   
  
 cpap machine kit Your last dose was: Your next dose is:    
   
   
 by Does Not Apply route. DULoxetine 60 mg capsule Commonly known as:  CYMBALTA Your last dose was: Your next dose is: Take 1 Cap by mouth daily. 60 mg  
    
   
   
   
  
 fluticasone 50 mcg/actuation nasal spray Commonly known as:  Sandra Finely Your last dose was: Your next dose is: SHAKE LIQUID AND USE 2 SPRAYS IN EACH NOSTRIL DAILY  
     
   
   
   
  
 fluticasone-salmeterol 250-50 mcg/dose diskus inhaler Commonly known as:  ADVAIR DISKUS Your last dose was: Your next dose is: Take 1 Puff by inhalation two (2) times a day. 1 Puff  
    
   
   
   
  
 ketoconazole 2 % shampoo Commonly known as:  NIZORAL Your last dose was: Your next dose is:    
   
   
 USE TO WASH HAIR 1-2 TIMES A WEEK  
     
   
   
   
  
 lisinopril-hydroCHLOROthiazide 20-12.5 mg per tablet Commonly known as:  Azael العلي Your last dose was: Your next dose is: Take 1 Tab by mouth daily. 1 Tab  
    
   
   
   
  
 metoprolol succinate 100 mg tablet Commonly known as:  TOPROL-XL Your last dose was: Your next dose is: Take 1 Tab by mouth daily. 100 mg  
    
   
   
   
  
 montelukast 10 mg tablet Commonly known as:  SINGULAIR Your last dose was: Your next dose is:  TAKE 1 TABLET BY MOUTH EVERY NIGHT  
     
   
   
   
  
 nitroglycerin 0.4 mg SL tablet Commonly known as:  NITROSTAT Your last dose was: Your next dose is:    
   
   
 by SubLINGual route every five (5) minutes as needed for Chest Pain. OXYGEN-AIR DELIVERY SYSTEMS Your last dose was: Your next dose is:    
   
   
 by Does Not Apply route. 3 lpm qhs  
     
   
   
   
  
 phenylephrine 0.5 % Spry Commonly known as:  NEOSYNEPHRINE Your last dose was: Your next dose is: 2 Sprays by Nasal route every six (6) hours as needed. 2 Spray  
    
   
   
   
  
 raNITIdine 150 mg tablet Commonly known as:  ZANTAC Your last dose was: Your next dose is: Take 1 Tab by mouth two (2) times a day. Indications: GASTROESOPHAGEAL REFLUX  
 150 mg  
    
   
   
   
  
 TYLENOL EXTRA STRENGTH 500 mg tablet Generic drug:  acetaminophen Your last dose was: Your next dose is: Take  by mouth every six (6) hours as needed for Pain. ZyrTEC 10 mg Cap Generic drug:  Cetirizine Your last dose was: Your next dose is: Take  by mouth every morning. Where to Get Your Medications Information on where to get these meds will be given to you by the nurse or doctor. ! Ask your nurse or doctor about these medications HYDROcodone-acetaminophen 5-325 mg per tablet  
 ondansetron 4 mg disintegrating tablet Opioid Education Prescription Opioids: What You Need to Know: 
 
Prescription opioids can be used to help relieve moderate-to-severe pain and are often prescribed following a surgery or injury, or for certain health conditions. These medications can be an important part of treatment but also come with serious risks. Opioids are strong pain medicines. Examples include hydrocodone, oxycodone, fentanyl, and morphine. Heroin is an example of an illegal opioid.   It is important to work with your health care provider to make sure you are getting the safest, most effective care. WHAT ARE THE RISKS AND SIDE EFFECTS OF OPIOID USE? Prescription opioids carry serious risks of addiction and overdose, especially with prolonged use. An opioid overdose, often marked by slow breathing, can cause sudden death. The use of prescription opioids can have a number of side effects as well, even when taken as directed. · Tolerance-meaning you might need to take more of a medication for the same pain relief · Physical dependence-meaning you have symptoms of withdrawal when the medication is stopped. Withdrawal symptoms can include nausea, sweating, chills, diarrhea, stomach cramps, and muscle aches. Withdrawal can last up to several weeks, depending on which drug you took and how long you took it. · Increased sensitivity to pain · Constipation · Nausea, vomiting, and dry mouth · Sleepiness and dizziness · Confusion · Depression · Low levels of testosterone that can result in lower sex drive, energy, and strength · Itching and sweating RISKS ARE GREATER WITH:      
· History of drug misuse, substance use disorder, or overdose · Mental health conditions (such as depression or anxiety) · Sleep apnea · Older age (72 years or older) · Pregnancy Avoid alcohol while taking prescription opioids. Also, unless specifically advised by your health care provider, medications to avoid include: · Benzodiazepines (such as Xanax or Valium) · Muscle relaxants (such as Soma or Flexeril) · Hypnotics (such as Ambien or Lunesta) · Other prescription opioids KNOW YOUR OPTIONS Talk to your health care provider about ways to manage your pain that don't involve prescription opioids. Some of these options may actually work better and have fewer risks and side effects. Options may include: 
· Pain relievers such as acetaminophen, ibuprofen, and naproxen · Some medications that are also used for depression or seizures · Physical therapy and exercise · Counseling to help patients learn how to cope better with triggers of pain and stress. · Application of heat or cold compress · Massage therapy · Relaxation techniques Be Informed Make sure you know the name of your medication, how much and how often to take it, and its potential risks & side effects. IF YOU ARE PRESCRIBED OPIOIDS FOR PAIN: 
· Never take opioids in greater amounts or more often than prescribed. Remember the goal is not to be pain-free but to manage your pain at a tolerable level. · Follow up with your primary care provider to: · Work together to create a plan on how to manage your pain. · Talk about ways to help manage your pain that don't involve prescription opioids. · Talk about any and all concerns and side effects. · Help prevent misuse and abuse. · Never sell or share prescription opioids · Help prevent misuse and abuse. · Store prescription opioids in a secure place and out of reach of others (this may include visitors, children, friends, and family). · Safely dispose of unused/unwanted prescription opioids: Find your community drug take-back program or your pharmacy mail-back program, or flush them down the toilet, following guidance from the Food and Drug Administration (www.fda.gov/Drugs/ResourcesForYou). · Visit www.cdc.gov/drugoverdose to learn about the risks of opioid abuse and overdose. · If you believe you may be struggling with addiction, tell your health care provider and ask for guidance or call [facility] at 5-477-089-ZQSS. Discharge Instructions Keep dressings in place and dry until return to clinic. Do not lift more than 5 lbs left arm. After general anesthesia or intravenous sedation, for 24 hours or while taking prescription Narcotics: · Limit your activities · Do not drive and operate hazardous machinery · Do not make important personal or business decisions · Do  not drink alcoholic beverages · If you have not urinated within 8 hours after discharge, please contact your surgeon on call. *  Please give a list of your current medications to your Primary Care Provider. *  Please update this list whenever your medications are discontinued, doses are 
    changed, or new medications (including over-the-counter products) are added. *  Please carry medication information at all times in case of emergency situations. These are general instructions for a healthy lifestyle: No smoking/ No tobacco products/ Avoid exposure to second hand smoke Surgeon General's Warning:  Quitting smoking now greatly reduces serious risk to your health. Obesity, smoking, and sedentary lifestyle greatly increases your risk for illness A healthy diet, regular physical exercise & weight monitoring are important for maintaining a healthy lifestyle You may be retaining fluid if you have a history of heart failure or if you experience any of the following symptoms:  Weight gain of 3 pounds or more overnight or 5 pounds in a week, increased swelling in our hands or feet or shortness of breath while lying flat in bed. Please call your doctor as soon as you notice any of these symptoms; do not wait until your next office visit. Recognize signs and symptoms of STROKE: 
F-face looks uneven A-arms unable to move or move unevenly S-speech slurred or non-existent T-time-call 911 as soon as signs and symptoms begin-DO NOT go Back to bed or wait to see if you get better-TIME IS BRAIN. Introducing [facility]! Dear Rachna Samayoa: 
Thank you for requesting a [employer] account. Our records indicate that you already have an active [employer] account. You can access your account anytime at [email or web address] Did you know that you can access your hospital and ER discharge instructions at any time in [employer]? You can also review all of your test results from your hospital stay or ER visit. Additional Information If you have questions, please visit the Frequently Asked Questions section of the MyChart website at [email or web address] Remember, MyChart is NOT to be used for urgent needs. For medical emergencies, dial 911. Now available from your iPhone and Android! Introducing Dorian Pink As a [employer] patient, I wanted to make you aware of our electronic visit tool called Dorian Pink. [employer] 24/7 allows you to connect within minutes with a medical provider 24 hours a day, seven days a week via a mobile device or tablet or logging into a secure website from your computer. You can access Dorian Pink from anywhere in the [place]. A virtual visit might be right for you when you have a simple condition and feel like you just dont want to get out of bed, or cant get away from work for an appointment, when your regular [employer] provider is not available (evenings, weekends or holidays), or when youre out of town and need minor care. Electronic visits cost only $49 and if the [employer] 24/7 provider determines a prescription is needed to treat your condition, one can be electronically transmitted to a nearby pharmacy*. Please take a moment to enroll today if you have not already done so. The enrollment process is free and takes just a few minutes. To enroll, please download the [employer] 24/7 yaron to your tablet or phone, or visit [email or web address] to enroll on your computer. And, as an [address] [place] [address] patient with a [employer] account, the results of your visits will be scanned into your electronic medical record and your primary care provider will be able to view the scanned results. We urge you to continue to see your regular [employer] provider for your ongoing medical care.   And while your primary care provider may not be the one available when you seek a Dorian Niñokels[employer]fin virtual visit, the peace of mind you get from getting a real diagnosis real time can be priceless. For more information on Dorian Niñokelsyfin, view our Frequently Asked Questions (FAQs) at [email or web address]. Sincerely, 
 
Litzy Ledesma MD 
Chief Medical Officer [address] *:  certain medications cannot be prescribed via Dorian Niñolee Providers Seen During Your Hospitalization Provider Specialty Primary office phone Cody Reyes MD Plastic Surgery [phone] Your Primary Care Physician (PCP) Primary Care Physician Office Phone Office Fax Deyanira Garnica [phone] You are allergic to the following Allergen Reactions Ciprofloxacin Diarrhea Other (comments) Per pt, started her c-diff\" Bactrim (Sulfamethoprim Ds) Rash Pt gets a yeast infection on body Sulfa (Sulfonamide Antibiotics) Other (comments) Yeast growth Recent Documentation Weight BMI OB Status Smoking Status  
  
  
 130.3 kg 51.69 kg/m2 Postmenopausal Never Smoker Emergency Contacts Name Discharge Info Relation Home Work Mobile Jey Martinez DISCHARGE CAREGIVER [3] Son [22] [phone] Macie Alexandra  Daughter [21] [phone] Patient Belongings The following personal items are in your possession at time of discharge: 
  Dental Appliances: Uppers, Other (comment) (upper bridge cemented)  Visual Aid: None          Jewelry: None (removed earrings)  Clothing: Undergarments, Footwear, Shirt, Pants Please provide this summary of care documentation to your next provider. Signatures-by signing, you are acknowledging that this After Visit Summary has been reviewed with you and you have received a copy.   
  
 
  
    
    
 Patient Signature: ____________________________________________________________ Date:  ____________________________________________________________  
  
Joelene Batman Provider Signature:  ____________________________________________________________ Date:  ____________________________________________________________

## 2018-07-06 NOTE — OP NOTES
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 We again discussed risks including but not limited to bleeding, infection, loss of tissue, need for further surgery, risks of anesthesia, dissatisfaction with outcome cosmesis. She agreed to proceed. She was marked in an upright, standing, relaxed position to delineate her IMF, midline, sternal notch, incisional lines, and breast footprint. Some medial excess soft tisuse marked for resection and overall the previous incisions lines marked for excision and closure. Wise pattern reduction marked on the right. Patient brought to the OR. Surgical timeout was performed and GETA induced. The patient was prepped and draped.     Attention was turned to the left side. Marked incisions infiltrated with local 1% lidocaine with epi. Incisions made sharply and carried down to the capsule. Capsule entered and expander removed. Capsule inspected and without gross abnormality. Expander weighed at 1020g. Circumferential and grid capsulotomy performed. Hemostasis obtained with bovie. Smooth round saline implant selected 800 ml and filled to 1000ml. Of note, while filling the expander, I noticed a large amount of dust settling within the surgical field potentially from a non sterile sheet that had been placed on the floor over wet spot.  Given the need to absolute sterility in an implant case, the surgical site was covered and impant discarded. New sterile field established with entirely new instruments. The patient was re-prepped and wound cavity irrigated copiously and washed with betadine. New implant brought in and filled to 1000ml. Implant placed using minimal touch technique following glove and gown change. Patient still noted to have less volume on the left side, and a laterally based adipofascial flap elevated sharply to provide further volume. This was folded over into the breast mound. Wound irrigated and hemostasis confirmed. Closure completed with interrupted 3-0 vicryls deeply and deep derm followed by running 3-0 monoderm quill. Patient noted to have taken her lisinopril prior to the case. Required vasopressin and neosynephrine gtt during the case. Decision made to stage the right sided reduction to avoid additional operative time. Flap noted to be perfused, but with delayed cap refill while on pressors.      Xeroform, bulky kerlex and ACE breast dressing was applied. Patient was awakened, extubated, and taken to recovery  without immediate complication. Surgical Staff:  Circ-1: Chen Mora RN  Circ-2: Alisha Holrbook  Scrub Tech-1: Milad Mcdaniels  Scrub Tech-2: Simran Landers  Scrub Tech-3: Cindy Atkinson  Event Time In   Incision Start 9477   Incision Close 1012     Anesthesia: General   Estimated Blood Loss: 10 ml    Implants:   Implant Name Type Inv.  Item Serial No.  Lot No. LRB No. Used Action   IMPL BRST MOD PLUS SALNE 800ML --  - [id number]   IMPL BRST MOD PLUS SALNE 800ML --  Rc Hanley [id number] Left 1 Implanted

## 2018-07-12 ENCOUNTER — HOSPITAL ENCOUNTER (INPATIENT)
Age: 63
LOS: 4 days | Discharge: HOME HEALTH | DRG: 721 | End: 2018-07-18
Attending: EMERGENCY MEDICINE | Admitting: FAMILY MEDICINE
Payer: MEDICAID

## 2018-07-12 DIAGNOSIS — L03.313 CELLULITIS OF CHEST WALL: ICD-10-CM

## 2018-07-12 PROBLEM — I25.10 CAD (CORONARY ARTERY DISEASE): Status: ACTIVE | Noted: 2018-07-12

## 2018-07-12 PROBLEM — N61.0 CELLULITIS OF LEFT BREAST: Status: ACTIVE | Noted: 2018-07-12

## 2018-07-12 PROBLEM — I10 HTN (HYPERTENSION): Status: ACTIVE | Noted: 2018-07-12

## 2018-07-12 LAB
ALBUMIN SERPL-MCNC: 2.8 G/DL (ref 3.2–4.6)
ALBUMIN/GLOB SERPL: 0.6 {RATIO} (ref 1.2–3.5)
ALP SERPL-CCNC: 127 U/L (ref 50–136)
ALT SERPL-CCNC: 25 U/L (ref 12–65)
ANION GAP SERPL CALC-SCNC: 8 MMOL/L (ref 7–16)
AST SERPL-CCNC: 16 U/L (ref 15–37)
BASOPHILS # BLD: 0 K/UL (ref 0–0.2)
BASOPHILS NFR BLD: 1 % (ref 0–2)
BILIRUB SERPL-MCNC: 0.3 MG/DL (ref 0.2–1.1)
BUN SERPL-MCNC: 8 MG/DL (ref 8–23)
CALCIUM SERPL-MCNC: 9.3 MG/DL (ref 8.3–10.4)
CHLORIDE SERPL-SCNC: 110 MMOL/L (ref 98–107)
CO2 SERPL-SCNC: 25 MMOL/L (ref 21–32)
CREAT SERPL-MCNC: 0.72 MG/DL (ref 0.6–1)
DIFFERENTIAL METHOD BLD: ABNORMAL
EOSINOPHIL # BLD: 0.2 K/UL (ref 0–0.8)
EOSINOPHIL NFR BLD: 3 % (ref 0.5–7.8)
ERYTHROCYTE [DISTWIDTH] IN BLOOD BY AUTOMATED COUNT: 13.7 % (ref 11.9–14.6)
GLOBULIN SER CALC-MCNC: 4.4 G/DL (ref 2.3–3.5)
GLUCOSE SERPL-MCNC: 100 MG/DL (ref 65–100)
HCT VFR BLD AUTO: 34.7 % (ref 35.8–46.3)
HGB BLD-MCNC: 11.2 G/DL (ref 11.7–15.4)
IMM GRANULOCYTES # BLD: 0.1 K/UL (ref 0–0.5)
IMM GRANULOCYTES NFR BLD AUTO: 1 % (ref 0–5)
LACTATE BLD-SCNC: 1.2 MMOL/L (ref 0.5–1.9)
LYMPHOCYTES # BLD: 1.9 K/UL (ref 0.5–4.6)
LYMPHOCYTES NFR BLD: 26 % (ref 13–44)
MCH RBC QN AUTO: 27 PG (ref 26.1–32.9)
MCHC RBC AUTO-ENTMCNC: 32.3 G/DL (ref 31.4–35)
MCV RBC AUTO: 83.6 FL (ref 79.6–97.8)
MONOCYTES # BLD: 0.6 K/UL (ref 0.1–1.3)
MONOCYTES NFR BLD: 9 % (ref 4–12)
NEUTS SEG # BLD: 4.4 K/UL (ref 1.7–8.2)
NEUTS SEG NFR BLD: 60 % (ref 43–78)
PLATELET # BLD AUTO: 470 K/UL (ref 150–450)
PMV BLD AUTO: 8.6 FL (ref 10.8–14.1)
POTASSIUM SERPL-SCNC: 4.1 MMOL/L (ref 3.5–5.1)
PROCALCITONIN SERPL-MCNC: 0.1 NG/ML
PROT SERPL-MCNC: 7.2 G/DL (ref 6.3–8.2)
RBC # BLD AUTO: 4.15 M/UL (ref 4.05–5.25)
SODIUM SERPL-SCNC: 143 MMOL/L (ref 136–145)
WBC # BLD AUTO: 7.2 K/UL (ref 4.3–11.1)

## 2018-07-12 PROCEDURE — 74011000258 HC RX REV CODE- 258: Performed by: FAMILY MEDICINE

## 2018-07-12 PROCEDURE — 96365 THER/PROPH/DIAG IV INF INIT: CPT | Performed by: EMERGENCY MEDICINE

## 2018-07-12 PROCEDURE — 87205 SMEAR GRAM STAIN: CPT | Performed by: EMERGENCY MEDICINE

## 2018-07-12 PROCEDURE — 87040 BLOOD CULTURE FOR BACTERIA: CPT | Performed by: EMERGENCY MEDICINE

## 2018-07-12 PROCEDURE — 74011250636 HC RX REV CODE- 250/636: Performed by: FAMILY MEDICINE

## 2018-07-12 PROCEDURE — 87077 CULTURE AEROBIC IDENTIFY: CPT | Performed by: EMERGENCY MEDICINE

## 2018-07-12 PROCEDURE — 74011000258 HC RX REV CODE- 258: Performed by: EMERGENCY MEDICINE

## 2018-07-12 PROCEDURE — 74011250636 HC RX REV CODE- 250/636: Performed by: EMERGENCY MEDICINE

## 2018-07-12 PROCEDURE — 94760 N-INVAS EAR/PLS OXIMETRY 1: CPT

## 2018-07-12 PROCEDURE — 87186 SC STD MICRODIL/AGAR DIL: CPT | Performed by: EMERGENCY MEDICINE

## 2018-07-12 PROCEDURE — 99218 HC RM OBSERVATION: CPT

## 2018-07-12 PROCEDURE — 84145 PROCALCITONIN (PCT): CPT | Performed by: EMERGENCY MEDICINE

## 2018-07-12 PROCEDURE — 77030020263 HC SOL INJ SOD CL0.9% LFCR 1000ML

## 2018-07-12 PROCEDURE — 99285 EMERGENCY DEPT VISIT HI MDM: CPT | Performed by: EMERGENCY MEDICINE

## 2018-07-12 PROCEDURE — 94640 AIRWAY INHALATION TREATMENT: CPT

## 2018-07-12 PROCEDURE — 74011250637 HC RX REV CODE- 250/637: Performed by: EMERGENCY MEDICINE

## 2018-07-12 PROCEDURE — 85025 COMPLETE CBC W/AUTO DIFF WBC: CPT | Performed by: EMERGENCY MEDICINE

## 2018-07-12 PROCEDURE — 74011250637 HC RX REV CODE- 250/637: Performed by: SURGERY

## 2018-07-12 PROCEDURE — 80053 COMPREHEN METABOLIC PANEL: CPT | Performed by: EMERGENCY MEDICINE

## 2018-07-12 PROCEDURE — 74011000250 HC RX REV CODE- 250: Performed by: FAMILY MEDICINE

## 2018-07-12 PROCEDURE — 83605 ASSAY OF LACTIC ACID: CPT

## 2018-07-12 PROCEDURE — 74011250637 HC RX REV CODE- 250/637: Performed by: FAMILY MEDICINE

## 2018-07-12 PROCEDURE — 81003 URINALYSIS AUTO W/O SCOPE: CPT | Performed by: EMERGENCY MEDICINE

## 2018-07-12 RX ORDER — SODIUM CHLORIDE 0.9 % (FLUSH) 0.9 %
5-10 SYRINGE (ML) INJECTION EVERY 8 HOURS
Status: DISCONTINUED | OUTPATIENT
Start: 2018-07-12 | End: 2018-07-18 | Stop reason: HOSPADM

## 2018-07-12 RX ORDER — DIAZEPAM 5 MG/1
5 TABLET ORAL
Status: DISCONTINUED | OUTPATIENT
Start: 2018-07-12 | End: 2018-07-18 | Stop reason: HOSPADM

## 2018-07-12 RX ORDER — HEPARIN SODIUM 5000 [USP'U]/ML
5000 INJECTION, SOLUTION INTRAVENOUS; SUBCUTANEOUS EVERY 8 HOURS
Status: DISCONTINUED | OUTPATIENT
Start: 2018-07-12 | End: 2018-07-18 | Stop reason: HOSPADM

## 2018-07-12 RX ORDER — FLUTICASONE PROPIONATE 50 MCG
2 SPRAY, SUSPENSION (ML) NASAL DAILY
Status: DISCONTINUED | OUTPATIENT
Start: 2018-07-13 | End: 2018-07-18 | Stop reason: HOSPADM

## 2018-07-12 RX ORDER — VANCOMYCIN 2 GRAM/500 ML IN 0.9 % SODIUM CHLORIDE INTRAVENOUS
2000 EVERY 12 HOURS
Status: DISCONTINUED | OUTPATIENT
Start: 2018-07-13 | End: 2018-07-15

## 2018-07-12 RX ORDER — MONTELUKAST SODIUM 10 MG/1
10 TABLET ORAL
Status: DISCONTINUED | OUTPATIENT
Start: 2018-07-12 | End: 2018-07-18 | Stop reason: HOSPADM

## 2018-07-12 RX ORDER — NITROGLYCERIN 0.4 MG/1
0.4 TABLET SUBLINGUAL
Status: DISCONTINUED | OUTPATIENT
Start: 2018-07-12 | End: 2018-07-18 | Stop reason: HOSPADM

## 2018-07-12 RX ORDER — HYDROCODONE BITARTRATE AND ACETAMINOPHEN 5; 325 MG/1; MG/1
1 TABLET ORAL
Status: DISCONTINUED | OUTPATIENT
Start: 2018-07-12 | End: 2018-07-18 | Stop reason: HOSPADM

## 2018-07-12 RX ORDER — LORATADINE 10 MG/1
10 TABLET ORAL DAILY
Status: DISCONTINUED | OUTPATIENT
Start: 2018-07-13 | End: 2018-07-18 | Stop reason: HOSPADM

## 2018-07-12 RX ORDER — LISINOPRIL AND HYDROCHLOROTHIAZIDE 12.5; 2 MG/1; MG/1
1 TABLET ORAL DAILY
Status: DISCONTINUED | OUTPATIENT
Start: 2018-07-13 | End: 2018-07-18 | Stop reason: HOSPADM

## 2018-07-12 RX ORDER — ALBUTEROL SULFATE 0.83 MG/ML
2.5 SOLUTION RESPIRATORY (INHALATION)
Status: DISCONTINUED | OUTPATIENT
Start: 2018-07-12 | End: 2018-07-18 | Stop reason: HOSPADM

## 2018-07-12 RX ORDER — ACETAMINOPHEN 325 MG/1
650 TABLET ORAL
Status: DISCONTINUED | OUTPATIENT
Start: 2018-07-12 | End: 2018-07-18 | Stop reason: HOSPADM

## 2018-07-12 RX ORDER — ACETAMINOPHEN 500 MG
1000 TABLET ORAL
Status: COMPLETED | OUTPATIENT
Start: 2018-07-12 | End: 2018-07-12

## 2018-07-12 RX ORDER — BUDESONIDE 0.5 MG/2ML
500 INHALANT ORAL
Status: DISCONTINUED | OUTPATIENT
Start: 2018-07-12 | End: 2018-07-18 | Stop reason: HOSPADM

## 2018-07-12 RX ORDER — DULOXETIN HYDROCHLORIDE 60 MG/1
60 CAPSULE, DELAYED RELEASE ORAL DAILY
Status: DISCONTINUED | OUTPATIENT
Start: 2018-07-13 | End: 2018-07-18 | Stop reason: HOSPADM

## 2018-07-12 RX ORDER — FAMOTIDINE 20 MG/1
20 TABLET, FILM COATED ORAL 2 TIMES DAILY
Status: DISCONTINUED | OUTPATIENT
Start: 2018-07-12 | End: 2018-07-18 | Stop reason: HOSPADM

## 2018-07-12 RX ORDER — SODIUM CHLORIDE 0.9 % (FLUSH) 0.9 %
5-10 SYRINGE (ML) INJECTION AS NEEDED
Status: DISCONTINUED | OUTPATIENT
Start: 2018-07-12 | End: 2018-07-18 | Stop reason: HOSPADM

## 2018-07-12 RX ORDER — SODIUM CHLORIDE 9 MG/ML
75 INJECTION, SOLUTION INTRAVENOUS CONTINUOUS
Status: DISCONTINUED | OUTPATIENT
Start: 2018-07-12 | End: 2018-07-15

## 2018-07-12 RX ORDER — MORPHINE SULFATE 2 MG/ML
1 INJECTION, SOLUTION INTRAMUSCULAR; INTRAVENOUS
Status: DISCONTINUED | OUTPATIENT
Start: 2018-07-12 | End: 2018-07-18 | Stop reason: HOSPADM

## 2018-07-12 RX ORDER — METOPROLOL SUCCINATE 100 MG/1
100 TABLET, EXTENDED RELEASE ORAL DAILY
Status: DISCONTINUED | OUTPATIENT
Start: 2018-07-13 | End: 2018-07-18 | Stop reason: HOSPADM

## 2018-07-12 RX ADMIN — SODIUM CHLORIDE 75 ML/HR: 900 INJECTION, SOLUTION INTRAVENOUS at 17:55

## 2018-07-12 RX ADMIN — PIPERACILLIN SODIUM AND TAZOBACTAM SODIUM 3.38 G: 3; .375 INJECTION, POWDER, LYOPHILIZED, FOR SOLUTION INTRAVENOUS at 21:18

## 2018-07-12 RX ADMIN — ALBUTEROL SULFATE 2.5 MG: 2.5 SOLUTION RESPIRATORY (INHALATION) at 20:55

## 2018-07-12 RX ADMIN — MORPHINE SULFATE 1 MG: 2 INJECTION, SOLUTION INTRAMUSCULAR; INTRAVENOUS at 20:10

## 2018-07-12 RX ADMIN — PIPERACILLIN SODIUM,TAZOBACTAM SODIUM 4.5 G: 4; .5 INJECTION, POWDER, FOR SOLUTION INTRAVENOUS at 15:43

## 2018-07-12 RX ADMIN — DIAZEPAM 5 MG: 5 TABLET ORAL at 21:26

## 2018-07-12 RX ADMIN — VANCOMYCIN HYDROCHLORIDE 2500 MG: 10 INJECTION, POWDER, LYOPHILIZED, FOR SOLUTION INTRAVENOUS at 17:55

## 2018-07-12 RX ADMIN — HEPARIN SODIUM 5000 UNITS: 5000 INJECTION, SOLUTION INTRAVENOUS; SUBCUTANEOUS at 20:11

## 2018-07-12 RX ADMIN — Medication 5 ML: at 22:11

## 2018-07-12 RX ADMIN — ACETAMINOPHEN 1000 MG: 500 TABLET, FILM COATED ORAL at 15:43

## 2018-07-12 RX ADMIN — FAMOTIDINE 20 MG: 20 TABLET ORAL at 20:10

## 2018-07-12 RX ADMIN — BUDESONIDE 500 MCG: 0.5 INHALANT RESPIRATORY (INHALATION) at 20:55

## 2018-07-12 RX ADMIN — MONTELUKAST SODIUM 10 MG: 10 TABLET, FILM COATED ORAL at 20:11

## 2018-07-12 RX ADMIN — ACETAMINOPHEN 650 MG: 325 TABLET ORAL at 20:10

## 2018-07-12 NOTE — ED TRIAGE NOTES
Pt arrived from [place] from home. Recent left sided breast reconstructions last Thursday. Drainage started two days and was placed on antibiotics last Thursday but none taken at home. Given zosyn 3.375mg and 300ml NS en route. Can only us right arm.  T - 98.2 BGL 87 /66

## 2018-07-12 NOTE — IP AVS SNAPSHOT
[facility] 
 
 
 [facility] [address] [facility] 
[phone] Patient: Camille Raymundo MRN: [id number] [facility]:0/2/0362 About your hospitalization You were admitted on:  July 12, 2018 You last received care in the:  [facility] 2 SURGICAL You were discharged on:  July 18, 2018 Why you were hospitalized Your primary diagnosis was:  Cellulitis Of Left Breast  
 Your diagnoses also included:  Htn (Hypertension), Cad (Coronary Artery Disease), Chronic Diastolic Heart Failure (Hcc), Manuel (Obstructive Sleep Apnea), Gastroesophageal Reflux Disease Without Esophagitis, Hyperlipidemia, Essential Hypertension, Benign, Morbid Obesity With Bmi Of 50.0-59.9, Adult (Hcc), Arthritis Follow-up Information Follow up With Details Comments Contact Info Charly Acevedo MD   [address] [place] [address] 
[phone] Yash Woodruff MD On 7/25/2018 10:00 [facility] [address] [facility] [address] 
[phone] Regi Ochoa MD  LEFT MESSAGE FOR OFFICE TO CALL PATIENT'S SON WITH A APPOINTMENT I WAS UNABLE TO REACH ANYONE  [address] [facility] [address] 
[phone] Your Scheduled Appointments Thursday August 02, 2018  3:00 PM EDT Office Visit with Charly Acevedo MD  
[facility] [address] [facility] [address]  
[phone] Discharge Orders None A check ayanna indicates which time of day the medication should be taken. My Medications START taking these medications Instructions Each Dose to Equal  
 Morning Noon Evening Bedtime  
 vancomycin 125 mg capsule Commonly known as:  BoatSetter Your last dose was:  7/18 at 12:00PM  
   
 Take 1 Cap by mouth four (4) times daily for 8 days. 125 mg CONTINUE taking these medications  Instructions Each Dose to Equal  
 Morning Noon Evening Bedtime  
 albuterol 90 mcg/actuation inhaler Commonly known as:  PROAIR HFA Take 1 Puff by inhalation every six (6) hours as needed for Wheezing. 1 Puff  
    
   
   
   
  
 biotin 5 mg capsule Commonly known as:  VITAMIN B7  
   
 TK 1 C PO QD  
     
   
   
   
  
 clobetasol 0.05 % external solution Commonly known as:  Luanne Mood  
   
 CESIA EXT TO THE SCALP  QD  
     
   
   
   
  
 cpap machine kit  
   
 by Does Not Apply route. DULoxetine 60 mg capsule Commonly known as:  CYMBALTA Your last dose was: This morning Your next dose is:  Tomorrow Morning Take 1 Cap by mouth daily. 60 mg  
    
  
   
   
   
  
 fluticasone 50 mcg/actuation nasal spray Commonly known as:  Jose Moy Your last dose was: This morning SHAKE LIQUID AND USE 2 SPRAYS IN EACH NOSTRIL DAILY  
     
  
   
   
   
  
 fluticasone-salmeterol 250-50 mcg/dose diskus inhaler Commonly known as:  ADVAIR DISKUS Notes to Patient:  Take as directed Take 1 Puff by inhalation two (2) times a day. 1 Puff HYDROcodone-acetaminophen 5-325 mg per tablet Commonly known as:  Marcy Kind Your last dose was:  7/14 Take 1 Tab by mouth every four (4) hours as needed for Pain. Max Daily Amount: 6 Tabs. 1 Tab  
    
   
   
   
  
 ketoconazole 2 % shampoo Commonly known as:  NIZORAL  
   
 USE TO WASH HAIR 1-2 TIMES A WEEK  
     
   
   
   
  
 lisinopril-hydroCHLOROthiazide 20-12.5 mg per tablet Commonly known as:  Climmie Giovana Your last dose was: This morning Your next dose is:  Tomorrow Morning Take 1 Tab by mouth daily. 1 Tab  
    
  
   
   
   
  
 metoprolol succinate 100 mg tablet Commonly known as:  TOPROL-XL Your last dose was: This morning Your next dose is:  Tomorrow Morning Take 1 Tab by mouth daily. 100 mg  
    
   
   
   
  
 montelukast 10 mg tablet Commonly known as: SINGULAIR Your last dose was:  7/17 at bedtime Your next dose is: Tonight at bedtime TAKE 1 TABLET BY MOUTH EVERY NIGHT  
     
   
   
   
  
  
 nitroglycerin 0.4 mg SL tablet Commonly known as:  NITROSTAT  
   
 by SubLINGual route every five (5) minutes as needed for Chest Pain. ondansetron 4 mg disintegrating tablet Commonly known as:  ZOFRAN ODT Take 1 Tab by mouth every eight (8) hours as needed for Nausea. 4 mg OXYGEN-AIR DELIVERY SYSTEMS  
   
 by Does Not Apply route. 3 lpm qhs  
     
   
   
   
  
 phenylephrine 0.5 % Spry Commonly known as:  NEOSYNEPHRINE  
   
 2 Sprays by Nasal route every six (6) hours as needed. 2 Spray  
    
   
   
   
  
 raNITIdine 150 mg tablet Commonly known as:  ZANTAC Notes to Patient:  Take as directed Take 1 Tab by mouth two (2) times a day. Indications: GASTROESOPHAGEAL REFLUX  
 150 mg  
    
   
   
   
  
 TYLENOL EXTRA STRENGTH 500 mg tablet Generic drug:  acetaminophen Your last dose was: Today at 1:19 PM  
   
 Take  by mouth every six (6) hours as needed for Pain. ZyrTEC 10 mg Cap Generic drug:  Cetirizine Notes to Patient:  Take as directed Take  by mouth every morning. Where to Get Your Medications Information on where to get these meds will be given to you by the nurse or doctor. ! Ask your nurse or doctor about these medications  
  vancomycin 125 mg capsule Opioid Education Prescription Opioids: What You Need to Know: 
 
 
After general anesthesia or intravenous sedation, for 24 hours or while taking prescription Narcotics: · Limit your activities · Do not drive and operate hazardous machinery · Do not make important personal or business decisions · Do  not drink alcoholic beverages · If you have not urinated within 8 hours after discharge, please contact your surgeon on call. Report the following to your surgeon: 
· Excessive pain, swelling, redness or odor of or around the surgical area · Temperature over 100.5 · Nausea and vomiting lasting longer than 4 hours or if unable to take medications · Any signs of decreased circulation or nerve impairment to extremity: change in color, persistent  numbness, tingling, coldness or increase pain · Any questions What to do at Home: 
Recommended activity: diet and activity as tolerated. Do not shower unless directed by your physician. No driving until cleared by physician. Keep your follow up appointments. If you experience any of the following symptoms temperature greater than 100.5, nausea/vomiting, increased or new redness around incision, new drainage or foul odor at the incision, pain is unrelieved by prescribed medication, please follow up with your physician. *  Please give a list of your current medications to your Primary Care Provider. *  Please update this list whenever your medications are discontinued, doses are 
    changed, or new medications (including over-the-counter products) are added. *  Please carry medication information at all times in case of emergency situations. These are general instructions for a healthy lifestyle: No smoking/ No tobacco products/ Avoid exposure to second hand smoke Surgeon General's Warning:  Quitting smoking now greatly reduces serious risk to your health. Obesity, smoking, and sedentary lifestyle greatly increases your risk for illness A healthy diet, regular physical exercise & weight monitoring are important for maintaining a healthy lifestyle You may be retaining fluid if you have a history of heart failure or if you experience any of the following symptoms:  Weight gain of 3 pounds or more overnight or 5 pounds in a week, increased swelling in our hands or feet or shortness of breath while lying flat in bed. Please call your doctor as soon as you notice any of these symptoms; do not wait until your next office visit. Recognize signs and symptoms of STROKE: 
 
F-face looks uneven A-arms unable to move or move unevenly S-speech slurred or non-existent T-time-call 911 as soon as signs and symptoms begin-DO NOT go Back to bed or wait to see if you get better-TIME IS BRAIN. Warning Signs of HEART ATTACK Call 911 if you have these symptoms:  Chest discomfort. Most heart attacks involve discomfort in the center of the chest that lasts more than a few minutes, or that goes away and comes back. It can feel like uncomfortable pressure, squeezing, fullness, or pain.  Discomfort in other areas of the upper body. Symptoms can include pain or discomfort in one or both arms, the back, neck, jaw, or stomach.  Shortness of breath with or without chest discomfort.  Other signs may include breaking out in a cold sweat, nausea, or lightheadedness. Don't wait more than five minutes to call [address]! Fast action can save your life. Calling 911 is almost always the fastest way to get lifesaving treatment. Emergency Medical Services staff can begin treatment when they arrive  up to an hour sooner than if someone gets to the hospital by car. The discharge information has been reviewed with the patient. The patient verbalized understanding. Discharge medications reviewed with the patient and appropriate educational materials and side effects teaching were provided. ___________________________________________________________________________________________________________________________________ Introducing [facility]! Dear Mirtha Nash: 
Thank you for requesting a [employer] account.   Our records indicate that you already have an active WiFi Rail account. You can access your account anytime at [email or web address] Did you know that you can access your hospital and ER discharge instructions at any time in WiFi Rail? You can also review all of your test results from your hospital stay or ER visit. Additional Information If you have questions, please visit the Frequently Asked Questions section of the WiFi Rail website at [email or web address] Remember, WiFi Rail is NOT to be used for urgent needs. For medical emergencies, dial 911. Now available from your iPhone and Android! Introducing Dorian Pink As a [facility] patient, I wanted to make you aware of our electronic visit tool called Dorian Salinaskelsyant. [facility] 24/7 allows you to connect within minutes with a medical provider 24 hours a day, seven days a week via a mobile device or tablet or logging into a secure website from your computer. You can access Dorian Salinaskelsyfin from anywhere in the [place]. A virtual visit might be right for you when you have a simple condition and feel like you just dont want to get out of bed, or cant get away from work for an appointment, when your regular [facility] provider is not available (evenings, weekends or holidays), or when youre out of town and need minor care. Electronic visits cost only $49 and if the [facility] 24/7 provider determines a prescription is needed to treat your condition, one can be electronically transmitted to a nearby pharmacy*. Please take a moment to enroll today if you have not already done so. The enrollment process is free and takes just a few minutes. To enroll, please download the [employer] 24/7 yaron to your tablet or phone, or visit [email or web address] to enroll on your computer.    
And, as an [address] [place] [address] patient with a [employer] Environment account, the results of your visits will be scanned into your electronic medical record and your primary care provider will be able to view the scanned results. We urge you to continue to see your regular [facility] provider for your ongoing medical care. And while your primary care provider may not be the one available when you seek a [employer] virtual visit, the peace of mind you get from getting a real diagnosis real time can be priceless. For more information on [employer], view our Frequently Asked Questions (FAQs) at [email or web address]. Sincerely, 
 
Perico Munoz MD 
Chief Medical Officer [employer] *:  certain medications cannot be prescribed via Vaultive Providers Seen During Your Hospitalization Provider Specialty Primary office phone Destini Todd MD Emergency Medicine [phone] Hao Burnette MD Internal Medicine [phone] Your Primary Care Physician (PCP) Primary Care Physician Office Phone Office Fax Nanci [place] [phone] You are allergic to the following Allergen Reactions Ciprofloxacin Diarrhea Other (comments) Per pt, started her c-diff\" Bactrim (Sulfamethoprim Ds) Rash Pt gets a yeast infection on body Sulfa (Sulfonamide Antibiotics) Other (comments) Yeast growth Recent Documentation Height Weight BMI OB Status Smoking Status 1.575 m 130.2 kg 52.49 kg/m2 Postmenopausal Never Smoker Emergency Contacts Name Discharge Info Relation Home Work Mobile MichelleJey jeronimo DISCHARGE CAREGIVER [3] Son [22] [phone] Macie Alexandra  Daughter [21] [phone] Patient Belongings The following personal items are in your possession at time of discharge: 
  Dental Appliances: None         Home Medications: None   Jewelry: None  Clothing: With patient, Undergarments, Socks, Shirt, Pants, Footwear    Other Valuables: At bedside  Please provide this summary of care documentation to your next provider. Signatures-by signing, you are acknowledging that this After Visit Summary has been reviewed with you and you have received a copy. Patient Signature:  ____________________________________________________________ Date:  ____________________________________________________________  
  
[facility] Signature:  ____________________________________________________________ Date:  ____________________________________________________________

## 2018-07-12 NOTE — CONSULTS
Plastic Surgery Consult Consult    Impression:   · Left chest wall cellulitis and subcutaneous abscess with underlying breast implant    Plan:   · Continue abx pending breast and blood cultures. · BID packing of abscess cavity with iodoform  · Appreciate hospitalist management of multiple medical issues  · Monitor closely for an C-Diff. 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 · No contracindications to chemical DVT prophylaxis with heparin at the moment  · No immediate plan for exploration in OR. No sign of hematoma or seroma. Central superficial portion of wound opened and explored to reveal ~1 cm x 4 cm x 1 cm subcutaneous abscess. Packed. Fluid has been sent for culture. · Would consider early placement of PICC line due to previous issues with IV access    Subjective:   Date of Consultation:  July 12, 2018    Patient is a 61 y.o. female with recent history of increasing left breast pain, swelling, new drainage starting yesterday PM one week s/p left breast expander implant exchange. 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 Has been started on Vanc and zosyn. Notes decreased pain with spontaneous drainage. Subjective chills no measurable fevers. WBC normal on admission. CBG's not elevated.      Patient Active Problem List   Diagnosis Code    Chronic diastolic heart failure (HCC) I50.32    DOLORES (obstructive sleep apnea) G47.33    Essential hypertension, benign I10    Gastroesophageal reflux disease without esophagitis K21.9    Morbid obesity with BMI of 50.0-59.9, adult ([facility] E66.01, Z68.43    Anxiety F41.9    Tachycardia R00.0    Acute lower UTI N39.0    Palpitations R00.2    Hyperlipidemia E78.5    Skin lesions L98.9    Arthritis M19.90    Preop cardiovascular exam Z01.810    Cellulitis and abscess of trunk L03.319, L02.219    Acute diverticulitis K57.92    C. difficile colitis A04.72    Midline low back pain with sciatica M54.40    Dyspnea on exertion R06.09    Chronic fatigue R53.82    HTN (hypertension) I10    CAD (coronary artery disease) I25.10    Cellulitis of left breast N61.0     Past Medical History:   Diagnosis Date    Arthritis     everywhere;  DDD    Asthma     inhalers daily  [facility]    Autoimmune disease ([address]     Lupus, Fibromyalgia    Breast cancer ([address] 09/2016    CAD (coronary artery disease)     Dr Tete Fleming, EF 60-65%, no stents, no MI    Cancer ([address]     left breast ca dx'ed this month-appt with surgeon 10/12    Chronic pain     generalized from arthritis    Complicated UTI (urinary tract infection) 12/8/2015    DDD (degenerative disc disease), cervical     Diabetes ([address]     Last HA1C 6.4 on 5/15/2018,No medications, No glucose checks    Diverticulosis     GERD (gastroesophageal reflux disease)     Heart failure ([address]     EF 60-65% per echo 4/13/2018    [facility] of echocardi[facility]17/[facility]    No significant valvular disease.   EF 50-55%    History of prediabetes     monitored by Primary Physician    Hypertension     Lymphedema     Morbid obesity ([address]     Nausea & vomiting     Shortness of breath     Sleep apnea     bi pap and o2    Thyroid cyst     cyst removed from thyroid      Family History   Problem Relation Age of Onset    Heart Disease Mother     Cancer Mother      lung    Hypertension Mother     Hypertension Father     Sleep Apnea Father     Cancer Father      prostate    Sleep Apnea Brother      states also has two children with sleep apnea    Cancer Brother      pituitary    Hypertension Brother     Breast Cancer Neg Hx       Social History   Substance Use Topics    Smoking status: Never Smoker    Smokeless tobacco: Never Used    Alcohol use No     Past Surgical History:   Procedure Laterality Date    BREAST SURGERY PROCEDURE UNLISTED      Mastectomy left side    CARDIAC SURG PROCEDURE UNLIST  Cath 11/18[facility]14 [facility]    Minimal CAD in the ostial circumflex and mid ramus (medical management)    HX BREAST RECONSTRUCTION Left 7/5/2018    LEFT BREAST REVISION/ EXPANDER EXCHANGE FOR IMPLANT performed by Dorinda Pope [facility] at [facility] MA[facility] OR    HX CARPAL TUNNEL RELEASE Bilateral     HX CHOLECYSTECTOMY      HX CYST REMOVAL      from thyroid    HX LAP CHOLECYSTECTOMY      HX MASTECTOMY Left 01/2017    HX OTHER SURGICAL      abcess where bra strap    INSERT TISSUE EXPANDER(S) Left 01/2017    NEUROLOGICAL PROCEDURE UNLISTED      [facility] running to back\"    SINUS SURGERY PROC UNLISTED        Prior to Admission medications    Medication Sig Start Date End Date Taking? Authorizing Provider   DULoxetine (CYMBALTA) 60 mg capsule Take 1 Cap by mouth daily. 6/26/18  Yes Corrina Kuo MD   metoprolol succinate (TOPROL-XL) 100 mg tablet Take 1 Tab by mouth daily. 5/2/18  Yes Ceilo Rincon MD   phenylephrine (NEOSYNEPHRINE) 0.5 % spry 2 Sprays by Nasal route every six (6) hours as needed. Yes Historical Provider   biotin (VITAMIN B7) 5 mg capsule TK 1 C PO QD 12/6/17  Yes Historical Provider   clobetasol (TEMOVATE) 0.05 % external solution CESIA EXT TO THE SCALP  QD 12/6/17  Yes Historical Provider   ketoconazole (NIZORAL) 2 % shampoo USE TO [address] HAIR 1-2 TIMES A WEEK 12/6/17  Yes Historical Provider   lisinopril-hydroCHLOROthiazide (PRINZIDE, ZESTORETIC) 20-12.5 mg per tablet Take 1 Tab by mouth daily.  1/25/18  Yes Ninfa Membreno MD   albuterol (PROAIR HFA) 90 mcg/actuation inhaler Take 1 Puff by inhalation every six (6) hours as needed for Wheezing. 1/25/18  Yes Ninfa Membreno MD   montelukast (SINGULAIR) 10 mg tablet TAKE 1 TABLET BY MOUTH EVERY NIGHT 10/23/17  Yes Isaac Prince NP   fluticasone (FLONASE) 50 mcg/actuation nasal spray SHAKE LIQUID AND USE 2 SPRAYS IN EACH NOSTRIL DAILY 8/30/17  Yes Ninfa Membreno MD   fluticasone-salmeterol (ADVAIR DISKUS) 250-50 mcg/dose diskus inhaler Take 1 Puff by inhalation two (2) times a day. 10/10/16  Yes Anthony Munoz MD   Cetirizine (ZYRTEC) 10 mg cap Take  by mouth every morning. Yes Historical Provider   cpap machine kit by Does Not Apply route. Yes Historical Provider   OXYGEN-AIR DELIVERY SYSTEMS by Does Not Apply route. 3 lpm qhs   Yes Historical Provider   ranitidine (ZANTAC) 150 mg tablet Take 1 Tab by mouth two (2) times a day. Indications: GASTROESOPHAGEAL REFLUX 16  Yes Anthony Munoz MD   HYDROcodone-acetaminophen (NORCO) 5-325 mg per tablet Take 1 Tab by mouth every four (4) hours as needed for Pain. Max Daily Amount: 6 Tabs. 18   Rick Kauffman MD   ondansetron (ZOFRAN ODT) 4 mg disintegrating tablet Take 1 Tab by mouth every eight (8) hours as needed for Nausea. 18   Rick Kauffman MD   acetaminophen (TYLENOL EXTRA STRENGTH) 500 mg tablet Take  by mouth every six (6) hours as needed for Pain. Historical Provider   nitroglycerin (NITROSTAT) 0.4 mg SL tablet by SubLINGual route every five (5) minutes as needed for Chest Pain. Historical Provider     Allergies   Allergen Reactions    Ciprofloxacin Diarrhea and Other (comments)     Per pt, started her c-diff\"    Bactrim [Sulfamethoprim Ds] Rash     Pt gets a yeast infection on body     Sulfa (Sulfonamide Antibiotics) Other (comments)     Yeast growth            Objective:   Blood pressure 121/76, pulse 89, temperature 98.5 °F (36.9 °C), resp. rate 16, height 5' 2\" (1.575 m), weight 130.2 kg (287 lb), SpO2 98 %.   Temp (24hrs), Av.5 °F (36.9 °C), Min:98.5 °F (36.9 °C), Max:98.5 °F (36.9 °C)    Current Facility-Administered Medications   Medication Dose Route Frequency    vancomycin (VANCOCIN) 2500 mg in  mL infusion  2,500 mg IntraVENous ONCE    0.9% sodium chloride infusion  75 mL/hr IntraVENous CONTINUOUS    albuterol (PROVENTIL VENTOLIN) nebulizer solution 2.5 mg  2.5 mg Nebulization Q4H PRN    [START ON 2018] loratadine (CLARITIN) tablet 10 mg  10 mg Oral DAILY    [START ON 7/13/2018] DULoxetine (CYMBALTA) capsule 60 mg  60 mg Oral DAILY    [START ON 7/13/2018] fluticasone (FLONASE) 50 mcg/actuation nasal spray 2 Spray  2 Spray Both Nostrils DAILY    HYDROcodone-acetaminophen (NORCO) 5-325 mg per tablet 1 Tab  1 Tab Oral Q4H PRN    [START ON 7/13/2018] lisinopril-hydroCHLOROthiazide (PRINZIDE, ZESTORETIC) 20-12.5 mg per tablet 1 Tab  1 Tab Oral DAILY    [START ON 7/13/2018] metoprolol succinate (TOPROL-XL) tablet 100 mg  100 mg Oral DAILY    montelukast (SINGULAIR) tablet 10 mg  10 mg Oral QHS    nitroglycerin (NITROSTAT) tablet 0.4 mg  0.4 mg SubLINGual Q5MIN PRN    famotidine (PEPCID) tablet 20 mg  20 mg Oral BID    sodium chloride (NS) flush 5-10 mL  5-10 mL IntraVENous Q8H    sodium chloride (NS) flush 5-10 mL  5-10 mL IntraVENous PRN    acetaminophen (TYLENOL) tablet 650 mg  650 mg Oral Q4H PRN    morphine injection 1 mg  1 mg IntraVENous Q4H PRN    heparin (porcine) injection 5,000 Units  5,000 Units SubCUTAneous Q8H    piperacillin-tazobactam (ZOSYN) 3.375 g in 0.9% sodium chloride (MBP/ADV) 100 mL  3.375 g IntraVENous Q8H    [START ON 7/13/2018] vancomycin (VANCOCIN) 2000 mg in  ml infusion  2,000 mg IntraVENous Q12H    budesonide (PULMICORT) 500 mcg/2 ml nebulizer suspension  500 mcg Nebulization BID RT    And    albuterol (PROVENTIL VENTOLIN) nebulizer solution 2.5 mg  2.5 mg Nebulization Q6HWA RT        Exam:    Visit Vitals    /76    Pulse 89    Temp 98.5 °F (36.9 °C)    Resp 16    Ht 5' 2\" (1.575 m)    Wt 130.2 kg (287 lb)    SpO2 98%    BMI 52.49 kg/m2     A&O X3 Non ill appearing  RRR  Resp clear  Abd soft  Left breast with centrally draining punctum, slightly cloudy yellow non bloody, non malodorous. Surround warmth, mild erythema but no crepitance. Punctum opened to reveal subcutaneous cavity without communication with deeper implant pocket. Packed.       Signed By: Megha Dunn MD Oral     July 12, 2018

## 2018-07-12 NOTE — PROGRESS NOTES
07/12/18 1730   Dual Skin Pressure Injury Assessment   Second Care Provider (Based on [address] [place] [address]) moncho perry rn     Left breast incision with erythema and drainage. Dry gauze in bra. No skin breakdown to sacrum.

## 2018-07-12 NOTE — ED PROVIDER NOTES
HPI Comments: Patient status post recent left breast reconstruction surgery who presents to the ER concerned about wound infection. States for the past couple days she has noticed increased drainage as well as redness last evening. Denies any fevers reports some nausea. Denies any cough or vomiting    Patient is a 61 y.o. female presenting with wound infection. The history is provided by the patient. Wound Infection    This is a new problem. The current episode started more than 2 days ago. The problem has been gradually worsening. Pain location: left breast. The pain is at a severity of 5/10. The pain is moderate. Associated symptoms include a fever and nausea. Pertinent negatives include no hematochezia, no vomiting, no frequency, no hematuria, no headaches and no back pain. Past Medical History:   Diagnosis Date    Arthritis     everywhere;  DDD    Asthma     inhalers daily  [facility]    Autoimmune disease ([address]     Lupus, Fibromyalgia    Breast cancer ([address] 09/2016    CAD (coronary artery disease)     Dr Tete Fleming, EF 60-65%, no stents, no MI    Cancer [facility])     left breast ca dx'ed this month-appt with surgeon 10/12    Chronic pain     generalized from arthritis    Complicated UTI (urinary tract infection) 12/8/2015    DDD (degenerative disc disease), cervical     Diabetes ([address]     Last HA1C 6.4 on 5/15/2018,No medications, No glucose checks    Diverticulosis     GERD (gastroesophageal reflux disease)     Heart failure (HCC)     EF 60-65% per echo 4/13/2018    [facility] of [facility]17/[facility]    No significant valvular disease.   EF 50-55%    History of prediabetes     monitored by Primary Physician    Hypertension     Lymphedema     Morbid obesity ([address]     Nausea & vomiting     Shortness of breath     Sleep apnea     bi pap and o2    Thyroid cyst     cyst removed from thyroid       Past Surgical History:   Procedure Laterality Date    BREAST SURGERY PROCEDURE UNLISTED      Mastectomy left side    CARDIAC SURG PROCEDURE UNLIST  [facility]    Minimal CAD in the ostial circumflex and mid ramus (medical management)    HX BREAST RECONSTRUCTION Left 7/5/2018    LEFT BREAST REVISION/ EXPANDER EXCHANGE FOR IMPLANT performed by Noelle Buerger, MD at [facility] MAIN OR    HX CARPAL TUNNEL RELEASE Bilateral     HX CHOLECYSTECTOMY      HX CYST REMOVAL      from thyroid    HX LAP CHOLECYSTECTOMY      HX MASTECTOMY Left 01/2017    HX OTHER SURGICAL      abcess where bra strap    INSERT TISSUE EXPANDER(S) Left 01/2017    NEUROLOGICAL PROCEDURE UNLISTED      [place] running to back\"    SINUS SURGERY PROC UNLISTED           Family History:   Problem Relation Age of Onset    Heart Disease Mother     Cancer Mother      lung    Hypertension Mother     Hypertension Father     Sleep Apnea Father     Cancer Father      prostate    Sleep Apnea Brother      states also has two children with sleep apnea    Cancer Brother      pituitary    Hypertension Brother     Breast Cancer Neg Hx        Social History     Social History    Marital status:      Spouse name: N/A    Number of children: N/A    Years of education: N/A     Occupational History    Not on file. 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 ALLERGIES: Ciprofloxacin; Bactrim [sulfamethoprim ds]; and Sulfa (sulfonamide antibiotics)    Review of Systems   Constitutional: Positive for fever. Negative for diaphoresis and fatigue. HENT: Negative for congestion. Eyes: Negative for photophobia and redness. Respiratory: Negative for cough and chest tightness. Gastrointestinal: Positive for nausea. Negative for abdominal pain, hematochezia and vomiting. Endocrine: Negative for polydipsia, polyphagia and polyuria. Genitourinary: Negative for flank pain, frequency and hematuria. Musculoskeletal: Negative for back pain. Skin: Positive for wound. Negative for color change and pallor. Allergic/Immunologic: Negative for food allergies and immunocompromised state. Neurological: Negative for seizures, speech difficulty and headaches. Psychiatric/Behavioral: Negative for behavioral problems and confusion. All other systems reviewed and are negative. Vitals:    07/12/18 1115   BP: (!) 161/94   Pulse: 98   Resp: 22   Temp: 98.5 °F (36.9 °C)   SpO2: 98%   Weight: 130.2 kg (287 lb)   Height: 5' 2\" (1.575 m)            Physical Exam   Constitutional: She is oriented to person, place, and time. She appears well-developed. HENT:   Head: Normocephalic and atraumatic. Eyes: Conjunctivae and EOM are normal. Pupils are equal, round, and reactive to light. Cardiovascular: Normal rate, regular rhythm and normal heart sounds. Pulmonary/Chest: Effort normal and breath sounds normal.       Abdominal: Soft. Bowel sounds are normal.   Neurological: She is alert and oriented to person, place, and time. She has normal reflexes. Nursing note and vitals reviewed. MDM  Number of Diagnoses or Management Options  Diagnosis management comments: Concern for possible wound infection  We'll obtain labs including blood cultures    3:51 PM  Was able to discuss case with patient's primary plastic surgeon, he has recommended  Admission for IV antibiotics. He will come evaluate the patient.   Hospitalist has been asked to admit patient       Amount and/or Complexity of Data Reviewed  Clinical lab tests: ordered and reviewed    Risk of Complications, Morbidity, and/or Mortality  Presenting problems: moderate  Diagnostic procedures: low  Management options: low    Patient Progress  Patient progress: stable        ED Course       Procedures           Results Include:    Recent Results (from the past 24 hour(s))   CBC WITH AUTOMATED DIFF    Collection Time: 07/12/18 12:58 PM   Result Value Ref Range    WBC 7.2 4.3 - 11.1 K/uL    RBC 4.15 4.05 - 5.25 M/uL    HGB 11.2 (L) 11.7 - 15.4 g/dL    HCT 34.7 (L) 35.8 - 46.3 %    MCV 83.6 79.6 - 97.8 FL    MCH 27.0 26.1 - 32.9 PG    MCHC 32.3 31.4 - 35.0 g/dL    RDW 13.7 11.9 - 14.6 %    PLATELET 836 (H) 898 - 450 K/uL    MPV 8.6 (L) 10.8 - 14.1 FL    DF AUTOMATED      NEUTROPHILS 60 43 - 78 %    LYMPHOCYTES 26 13 - 44 %    MONOCYTES 9 4.0 - 12.0 %    EOSINOPHILS 3 0.5 - 7.8 %    BASOPHILS 1 0.0 - 2.0 %    IMMATURE GRANULOCYTES 1 0.0 - 5.0 %    ABS. NEUTROPHILS 4.4 1.7 - 8.2 K/UL    ABS. LYMPHOCYTES 1.9 0.5 - 4.6 K/UL    ABS. MONOCYTES 0.6 0.1 - 1.3 K/UL    ABS. EOSINOPHILS 0.2 0.0 - 0.8 K/UL    ABS. BASOPHILS 0.0 0.0 - 0.2 K/UL    ABS. IMM. GRANS. 0.1 0.0 - 0.5 K/UL   METABOLIC PANEL, COMPREHENSIVE    Collection Time: 07/12/18 12:58 PM   Result Value Ref Range    Sodium 143 136 - 145 mmol/L    Potassium 4.1 3.5 - 5.1 mmol/L    Chloride 110 (H) 98 - 107 mmol/L    CO2 25 21 - 32 mmol/L    Anion gap 8 7 - 16 mmol/L    Glucose 100 65 - 100 mg/dL    BUN 8 8 - 23 MG/DL    Creatinine 0.72 0.6 - 1.0 MG/DL    GFR est AA >60 >60 ml/min/1.73m2    GFR est non-AA >60 >60 ml/min/1.73m2    Calcium 9.3 8.3 - 10.4 MG/DL    Bilirubin, total 0.3 0.2 - 1.1 MG/DL    ALT (SGPT) 25 12 - 65 U/L    AST (SGOT) 16 15 - 37 U/L    Alk.  phosphatase 127 50 - 136 U/L    Protein, total 7.2 6.3 - 8.2 g/dL    Albumin 2.8 (L) 3.2 - 4.6 g/dL    Globulin 4.4 (H) 2.3 - 3.5 g/dL    A-G Ratio 0.6 (L) 1.2 - 3.5     PROCALCITONIN    Collection Time: 07/12/18 12:58 PM   Result Value Ref Range    Procalcitonin 0.1 ng/mL   POC LACTIC ACID    Collection Time: 07/12/18  1:03 PM   Result Value Ref Range    Lactic Acid (POC) 1.2 0.5 - 1.9 mmol/L

## 2018-07-12 NOTE — H&P
Hospitalist H&P Note Admit Date:  2018 11:05 AM  
Name:  Reyes Flaming Age:  61 y.o. 
:  1955 MRN:  [id number] PCP:  Yehuda Gaspar MD 
Treatment Team: Attending Provider: Rick Daley MD; Consulting Provider: Hailee Ventura MD 
Left breast region pain , discharge, fever. HPI:  
61 yr old female pt with known h/o breast cancer,htn,hld,sleep apnea on bipap with 3 lit/min oxygen, diastolic chf, fibromyalgia,back pain, gerd,obesity. Pt had left breast surgery about a week ago. According to pt from the next of surgery she had been having fever,mild chills. Noticed mild left breast pain on the off. Since last night pt noticed that the dressing was soaked,discharge from the left breast region, noticed that the pain mildy improved after lot of drainage. ER physician has spoke to plastic surgeon - who requested hospitalist to admit and plastic would be consulting the pt. Labs normal. 
Mild reddish brown discharge from the anterior incision site left breast. 
ER has ordered vancomycin and zosyn. 10 systems reviewed and negative except as noted in HPI. Past Medical History:  
Diagnosis Date  Arthritis   
 everywhere;  DDD  Asthma   
 inhalers daily  [facility]  Autoimmune disease ([address] Lupus, Fibromyalgia  Breast cancer ([address] 2016  CAD (coronary artery disease) Dr Areli Ulloa, EF 60-65%, no stents, no MI  
 Cancer [facility])   
 left breast ca dx'ed this month-appt with surgeon 10/12  Chronic pain   
 generalized from arthritis  Complicated UTI (urinary tract infection) 2015  DDD (degenerative disc disease), cervical   
 Diabetes ([address] Last HA1C 6.4 on 5/15/2018,No medications, No glucose checks  Diverticulosis  GERD (gastroesophageal reflux disease)  Heart failure ([address] EF 60-65% per echo 2018  [facility] of echocardiogram 14 at [facility] No significant valvular disease. EF 50-55%  History of prediabetes   
 monitored by Primary Physician  Hypertension  Lymphedema  Morbid obesity ([address]  Nausea & vomiting  Shortness of breath  Sleep apnea   
 bi pap and o2  Thyroid cyst   
 cyst removed from thyroid Past Surgical History:  
Procedure Laterality Date  BREAST SURGERY PROCEDURE UNLISTED Mastectomy left side  CARDIAC SURG PROCEDURE UNLIST  [facility] Minimal CAD in the ostial circumflex and mid ramus (medical management)  HX BREAST RECONSTRUCTION Left 7/5/2018 LEFT BREAST REVISION/ EXPANDER EXCHANGE FOR IMPLANT performed by Cody Reyes MD [facility] MA[facility] OR  
 HX CARPAL TUNNEL RELEASE Bilateral   
 HX CHOLECYSTECTOMY  HX CYST REMOVAL    
 from thyroid  HX LAP CHOLECYSTECTOMY  HX MASTECTOMY Left 01/2017  HX OTHER SURGICAL    
 abcess where bra strap  INSERT TISSUE EXPANDER(S) Left 01/2017  
 NEUROLOGICAL PROCEDURE UNLISTED \"nerve running to back\"  SINUS SURGERY PROC UNLISTED Allergies Allergen Reactions  Ciprofloxacin Diarrhea and Other (comments) Per pt, started her c-diff\"  Bactrim [Sulfamethoprim Ds] Rash Pt gets a yeast infection on body  Sulfa (Sulfonamide Antibiotics) Other (comments) Yeast growth Social History Substance Use Topics  Smoking status: Never Smoker  Smokeless tobacco: Never Used  Alcohol use No  
  
Family History Problem Relation Age of Onset  Heart Disease Mother  Cancer Mother   
  lung  Hypertension Mother  Hypertension Father  Sleep Apnea Father  Cancer Father   
  prostate  Sleep Apnea Brother   
  states also has two children with sleep apnea  Cancer Brother   
  pituitary  Hypertension Brother  Breast Cancer Neg Hx Immunization History Administered Date(s) Administered  Influenza Vaccine 10/07/2015  Influenza Vaccine (Quad) Mdck Pf 01/25/2018  Influenza Vaccine (Quad) PF 10/23/2013, 11/17/2014, 03/13/2017  Pneumococcal Vaccine (Unspecified Type) 01/01/2014 PTA Medications: 
Prior to Admission Medications Prescriptions Last Dose Informant Patient Reported? Taking? Cetirizine (ZYRTEC) 10 mg cap   Yes No  
Sig: Take  by mouth every morning. DULoxetine (CYMBALTA) 60 mg capsule   No No  
Sig: Take 1 Cap by mouth daily. HYDROcodone-acetaminophen (NORCO) 5-325 mg per tablet   No No  
Sig: Take 1 Tab by mouth every four (4) hours as needed for Pain. Max Daily Amount: 6 Tabs. OXYGEN-AIR DELIVERY SYSTEMS   Yes No  
Sig: by Does Not Apply route. 3 lpm qhs  
acetaminophen (TYLENOL EXTRA STRENGTH) 500 mg tablet   Yes No  
Sig: Take  by mouth every six (6) hours as needed for Pain. albuterol (PROAIR HFA) 90 mcg/actuation inhaler   No No  
Sig: Take 1 Puff by inhalation every six (6) hours as needed for Wheezing. biotin (VITAMIN B7) 5 mg capsule   Yes No  
Sig: TK 1 C PO QD  
clobetasol (TEMOVATE) 0.05 % external solution   Yes No  
Sig: CESIA EXT TO THE SCALP  QD  
cpap machine kit   Yes No  
Sig: by Does Not Apply route. fluticasone (FLONASE) 50 mcg/actuation nasal spray   No No  
Sig: SHAKE LIQUID AND USE 2 SPRAYS IN EACH NOSTRIL DAILY  
fluticasone-salmeterol (ADVAIR DISKUS) 250-50 mcg/dose diskus inhaler   No No  
Sig: Take 1 Puff by inhalation two (2) times a day.  
ketoconazole (NIZORAL) 2 % shampoo   Yes No  
Sig: USE TO WASH HAIR 1-2 TIMES A WEEK  
lisinopril-hydroCHLOROthiazide (PRINZIDE, ZESTORETIC) 20-12.5 mg per tablet   No No  
Sig: Take 1 Tab by mouth daily. metoprolol succinate (TOPROL-XL) 100 mg tablet   No No  
Sig: Take 1 Tab by mouth daily. montelukast (SINGULAIR) 10 mg tablet   No No  
Sig: TAKE 1 TABLET BY MOUTH EVERY NIGHT  
nitroglycerin (NITROSTAT) 0.4 mg SL tablet   Yes No  
Sig: by SubLINGual route every five (5) minutes as needed for Chest Pain.   
ondansetron (ZOFRAN ODT) 4 mg disintegrating tablet   No No  
Sig: Take 1 Tab by mouth every eight (8) hours as needed for Nausea. phenylephrine (NEOSYNEPHRINE) 0.5 % spry   Yes No  
Si Sprays by Nasal route every six (6) hours as needed. ranitidine (ZANTAC) 150 mg tablet   No No  
Sig: Take 1 Tab by mouth two (2) times a day. Indications: GASTROESOPHAGEAL REFLUX Facility-Administered Medications: None Objective:  
Patient Vitals for the past 24 hrs: 
 Temp Pulse Resp BP SpO2  
18 1732 98.5 °F (36.9 °C) 89 16 121/76 98 % 18 1644 - - - 129/81 -  
18 1643 - - - - 98 % 18 1336 - - - - 100 % 18 1154 - - - 174/81 97 % 18 1115 98.5 °F (36.9 °C) 98 22 (!) 161/94 98 % Oxygen Therapy O2 Sat (%): 98 % (18 1732) Pulse via Oximetry: 102 beats per minute (18 1643) O2 Device: Room air (18 1115) No intake or output data in the 24 hours ending 18 1757 Physical Exam: 
General:    Well nourished. Alert. Eyes:   Normal sclera. Extraocular movements intact. ENT:  Normocephalic, atraumatic. Moist mucous membranes CV:   RRR. No murmur, rub, or gallop. Lungs:  CTAB. No wheezing, rhonchi, or rales. Left breast- dressing soaked,induration superior aspect of the incision site,mild tender- more on the anterior aspect,small opening anterior aspect of the incision, copious reddish brown Coloured drainage. No foul odour. Erythema. Abdomen: Soft, nontender, nondistended. Bowel sounds normal. obese Extremities: Warm and dry. No cyanosis or edema. Neurologic: CN II-XII grossly intact. Sensation intact. Skin:     No rashes or jaundice. Psych:  Normal mood and affect. I reviewed the labs. Data Review:  
Recent Results (from the past 24 hour(s)) CBC WITH AUTOMATED DIFF Collection Time: 18 12:58 PM  
Result Value Ref Range WBC 7.2 4.3 - 11.1 K/uL  
 RBC 4.15 4.05 - 5.25 M/uL  
 HGB 11.2 (L) 11.7 - 15.4 g/dL HCT 34.7 (L) 35.8 - 46.3 % MCV 83.6 79.6 - 97.8 FL  
 MCH 27.0 26.1 - 32.9 PG  
 MCHC 32.3 31.4 - 35.0 g/dL RDW 13.7 11.9 - 14.6 % PLATELET 418 (H) 886 - 450 K/uL MPV 8.6 (L) 10.8 - 14.1 FL  
 DF AUTOMATED NEUTROPHILS 60 43 - 78 % LYMPHOCYTES 26 13 - 44 % MONOCYTES 9 4.0 - 12.0 % EOSINOPHILS 3 0.5 - 7.8 % BASOPHILS 1 0.0 - 2.0 % IMMATURE GRANULOCYTES 1 0.0 - 5.0 %  
 ABS. NEUTROPHILS 4.4 1.7 - 8.2 K/UL  
 ABS. LYMPHOCYTES 1.9 0.5 - 4.6 K/UL  
 ABS. MONOCYTES 0.6 0.1 - 1.3 K/UL  
 ABS. EOSINOPHILS 0.2 0.0 - 0.8 K/UL  
 ABS. BASOPHILS 0.0 0.0 - 0.2 K/UL  
 ABS. IMM. GRANS. 0.1 0.0 - 0.5 K/UL METABOLIC PANEL, COMPREHENSIVE Collection Time: 07/12/18 12:58 PM  
Result Value Ref Range Sodium 143 136 - 145 mmol/L Potassium 4.1 3.5 - 5.1 mmol/L Chloride 110 (H) 98 - 107 mmol/L  
 CO2 25 21 - 32 mmol/L Anion gap 8 7 - 16 mmol/L Glucose 100 65 - 100 mg/dL BUN 8 8 - 23 MG/DL Creatinine 0.72 0.6 - 1.0 MG/DL  
 GFR est AA >60 >60 ml/min/1.73m2 GFR est non-AA >60 >60 ml/min/1.73m2 Calcium 9.3 8.3 - 10.4 MG/DL Bilirubin, total 0.3 0.2 - 1.1 MG/DL  
 ALT (SGPT) 25 12 - 65 U/L  
 AST (SGOT) 16 15 - 37 U/L Alk. phosphatase 127 50 - 136 U/L Protein, total 7.2 6.3 - 8.2 g/dL Albumin 2.8 (L) 3.2 - 4.6 g/dL Globulin 4.4 (H) 2.3 - 3.5 g/dL A-G Ratio 0.6 (L) 1.2 - 3.5 PROCALCITONIN Collection Time: 07/12/18 12:58 PM  
Result Value Ref Range Procalcitonin 0.1 ng/mL POC LACTIC ACID Collection Time: 07/12/18  1:03 PM  
Result Value Ref Range Lactic Acid (POC) 1.2 0.5 - 1.9 mmol/L All Micro Results Procedure Component Value Units Date/Time Leeann Red STAIN [[id number]] Collected:  07/12/18 1634 Order Status:  Completed Specimen:  Wound from Breast Updated:  07/12/18 1646 CULTURE, BLOOD [[id number]] Collected:  07/12/18 1540 Order Status:  Completed Specimen:  Blood from Blood Updated:  07/12/18 1601 CULTURE, BLOOD [[id number]] Collected:  07/12/18 1258  Order Status:  Completed Specimen:  Blood from Blood Updated: 07/12/18 1309 Other Studies: No results found. Assessment and Plan:  
 
Hospital Problems as of 7/12/2018  Date Reviewed: 5/15/2018 Codes Class Noted - Resolved POA  
 HTN (hypertension) ICD-10-CM: I10 
ICD-9-CM: 401.9  7/12/2018 - Present Yes CAD (coronary artery disease) ICD-10-CM: I25.10 ICD-9-CM: 414.00  7/12/2018 - Present Yes * (Principal)Cellulitis of left breast ICD-10-CM: N61.0 ICD-9-CM: 611.0  7/12/2018 - Present Yes Arthritis ICD-10-CM: M19.90 ICD-9-CM: 716.90  Unknown - Present Yes Overview Signed 10/18/2016  9:16 AM by Kristel Galvez  
  everywhere;  DDD Morbid obesity with BMI of 50.0-59.9, adult [facility]) ICD-10-CM: E66.01, Z68.43 
ICD-9-CM: 278.01, V85.43  11/5/2015 - Present Yes Gastroesophageal reflux disease without esophagitis (Chronic) ICD-10-CM: K21.9 ICD-9-CM: 530.81  10/7/2015 - Present Yes Essential hypertension, benign ICD-10-CM: I10 
ICD-9-CM: 401.1  9/28/2015 - Present Yes Chronic diastolic heart failure (HCC) (Chronic) ICD-10-CM: I50.32 
ICD-9-CM: 428.32  9/9/2015 - Present Yes Hyperlipidemia (Chronic) ICD-10-CM: D17.1 ICD-9-CM: 272.4  9/9/2015 - Present Yes  
   
 DOLORES (obstructive sleep apnea) (Chronic) ICD-10-CM: Q63.93 
ICD-9-CM: 327.23  7/12/2013 - Present Yes PLAN: 
Cellulitis left breast region-- recent surgical incision site drainage- cant vanc and zosyn. 
htn Sleep apnea Fibromyalgia Morbid obesity. DVT ppx:  heparin Anticipated DC needs:   
Code status:  Full Estimated LOS:  Greater than 2 midnights Risk:  high Signed: 
Magan Bolton MD

## 2018-07-12 NOTE — PROGRESS NOTES
Pharmacokinetic Consult to Pharmacist    Home Saw is a 61 y.o. female being treated for SSTI with vancomycin. Height: 5' 2\" (157.5 cm)  Weight: 130.2 kg (287 lb)  Lab Results   Component Value Date/Time    BUN 8 07/12/2018 12:58 PM    Creatinine 0.72 07/12/2018 12:58 PM    WBC 7.2 07/12/2018 12:58 PM    Procalcitonin 0.1 07/12/2018 12:58 PM    Lactic acid 0.6 03/06/2017 11:05 PM    Lactic Acid (POC) 1.2 07/12/2018 01:03 PM      Estimated Creatinine Clearance: 103.7 mL/min (based on Cr of 0.72). CULTURES:  All Micro Results     Procedure Component Value Units Date/Time    CULTURE, Jeanne Navarror [[id number]] Collected:  07/12/18 1634    Order Status:  Completed Specimen:  Wound from Breast Updated:  07/12/18 1646    CULTURE, BLOOD [[id number]] Collected:  07/12/18 1540    Order Status:  Completed Specimen:  Blood from Blood Updated:  07/12/18 1601    CULTURE, BLOOD [[id number]] Collected:  07/12/18 1258    Order Status:  Completed Specimen:  Blood from Blood Updated:  07/12/18 1309            Day 1 of vancomycin. Goal trough is 15-20. Vancomycin dose initiated at 2500mg x 1 dose, then 2000mg q 12 hours. Will continue to follow patient.       Thank you,  Jerod Hernández, StephanieD

## 2018-07-12 NOTE — ROUTINE PROCESS
TRANSFER - OUT REPORT:    Verbal report given to Luis Miguel Myles RN  on Ayaka Gasca  being transferred to [facility] for routine progression of care       Report consisted of patients Situation, Background, Assessment and   Recommendations(SBAR). Information from the following report(s) SBAR was reviewed with the receiving nurse. Lines:       Opportunity for questions and clarification was provided.       Patient transported with:   [email or web address]

## 2018-07-12 NOTE — ED NOTES
Pt had 3 family members in hallway. Pt and family educated on 1 visitor policy for hallway bed. Pt educated on move into room for exam when one available and will be placed back in hallway. Pt previously provided with blanket and pillow.

## 2018-07-13 LAB
ANION GAP SERPL CALC-SCNC: 10 MMOL/L (ref 7–16)
BASOPHILS # BLD: 0.1 K/UL (ref 0–0.2)
BASOPHILS NFR BLD: 1 % (ref 0–2)
BUN SERPL-MCNC: 7 MG/DL (ref 8–23)
CALCIUM SERPL-MCNC: 8.3 MG/DL (ref 8.3–10.4)
CHLORIDE SERPL-SCNC: 108 MMOL/L (ref 98–107)
CO2 SERPL-SCNC: 23 MMOL/L (ref 21–32)
CREAT SERPL-MCNC: 0.6 MG/DL (ref 0.6–1)
DIFFERENTIAL METHOD BLD: ABNORMAL
EOSINOPHIL # BLD: 0.3 K/UL (ref 0–0.8)
EOSINOPHIL NFR BLD: 4 % (ref 0.5–7.8)
ERYTHROCYTE [DISTWIDTH] IN BLOOD BY AUTOMATED COUNT: 13.7 % (ref 11.9–14.6)
GLUCOSE SERPL-MCNC: 104 MG/DL (ref 65–100)
HCT VFR BLD AUTO: 30.7 % (ref 35.8–46.3)
HGB BLD-MCNC: 9.8 G/DL (ref 11.7–15.4)
IMM GRANULOCYTES # BLD: 0.1 K/UL (ref 0–0.5)
IMM GRANULOCYTES NFR BLD AUTO: 1 % (ref 0–5)
LYMPHOCYTES # BLD: 1.8 K/UL (ref 0.5–4.6)
LYMPHOCYTES NFR BLD: 25 % (ref 13–44)
MCH RBC QN AUTO: 26.6 PG (ref 26.1–32.9)
MCHC RBC AUTO-ENTMCNC: 31.9 G/DL (ref 31.4–35)
MCV RBC AUTO: 83.4 FL (ref 79.6–97.8)
MONOCYTES # BLD: 0.7 K/UL (ref 0.1–1.3)
MONOCYTES NFR BLD: 10 % (ref 4–12)
NEUTS SEG # BLD: 4.2 K/UL (ref 1.7–8.2)
NEUTS SEG NFR BLD: 59 % (ref 43–78)
PLATELET # BLD AUTO: 436 K/UL (ref 150–450)
PMV BLD AUTO: 8.5 FL (ref 10.8–14.1)
POTASSIUM SERPL-SCNC: 3.7 MMOL/L (ref 3.5–5.1)
RBC # BLD AUTO: 3.68 M/UL (ref 4.05–5.25)
SODIUM SERPL-SCNC: 141 MMOL/L (ref 136–145)
WBC # BLD AUTO: 7.1 K/UL (ref 4.3–11.1)

## 2018-07-13 PROCEDURE — 74011250636 HC RX REV CODE- 250/636

## 2018-07-13 PROCEDURE — 94640 AIRWAY INHALATION TREATMENT: CPT

## 2018-07-13 PROCEDURE — 74011250637 HC RX REV CODE- 250/637: Performed by: SURGERY

## 2018-07-13 PROCEDURE — 80048 BASIC METABOLIC PNL TOTAL CA: CPT | Performed by: FAMILY MEDICINE

## 2018-07-13 PROCEDURE — 76937 US GUIDE VASCULAR ACCESS: CPT

## 2018-07-13 PROCEDURE — 74011250637 HC RX REV CODE- 250/637: Performed by: FAMILY MEDICINE

## 2018-07-13 PROCEDURE — 77030019895 HC PCKNG STRP IODO -A

## 2018-07-13 PROCEDURE — 74011250636 HC RX REV CODE- 250/636: Performed by: FAMILY MEDICINE

## 2018-07-13 PROCEDURE — 74011250636 HC RX REV CODE- 250/636: Performed by: HOSPITALIST

## 2018-07-13 PROCEDURE — 85025 COMPLETE CBC W/AUTO DIFF WBC: CPT | Performed by: FAMILY MEDICINE

## 2018-07-13 PROCEDURE — 36415 COLL VENOUS BLD VENIPUNCTURE: CPT | Performed by: FAMILY MEDICINE

## 2018-07-13 PROCEDURE — 02HV33Z INSERTION OF INFUSION DEVICE INTO SUPERIOR VENA CAVA, PERCUTANEOUS APPROACH: ICD-10-PCS | Performed by: FAMILY MEDICINE

## 2018-07-13 PROCEDURE — 94760 N-INVAS EAR/PLS OXIMETRY 1: CPT

## 2018-07-13 PROCEDURE — C1751 CATH, INF, PER/CENT/MIDLINE: HCPCS

## 2018-07-13 PROCEDURE — 74011000250 HC RX REV CODE- 250: Performed by: FAMILY MEDICINE

## 2018-07-13 PROCEDURE — 77010033678 HC OXYGEN DAILY

## 2018-07-13 PROCEDURE — B548ZZA ULTRASONOGRAPHY OF SUPERIOR VENA CAVA, GUIDANCE: ICD-10-PCS | Performed by: FAMILY MEDICINE

## 2018-07-13 PROCEDURE — 74011000258 HC RX REV CODE- 258: Performed by: FAMILY MEDICINE

## 2018-07-13 PROCEDURE — 77030018786 HC NDL GD F/USND BARD -B

## 2018-07-13 PROCEDURE — 99218 HC RM OBSERVATION: CPT

## 2018-07-13 PROCEDURE — 77030020263 HC SOL INJ SOD CL0.9% LFCR 1000ML

## 2018-07-13 PROCEDURE — 36569 INSJ PICC 5 YR+ W/O IMAGING: CPT | Performed by: FAMILY MEDICINE

## 2018-07-13 PROCEDURE — 77030018719 HC DRSG PTCH ANTIMIC J&J -A

## 2018-07-13 RX ORDER — ONDANSETRON 2 MG/ML
INJECTION INTRAMUSCULAR; INTRAVENOUS
Status: COMPLETED
Start: 2018-07-13 | End: 2018-07-13

## 2018-07-13 RX ORDER — SAME BUTANEDISULFONATE/BETAINE 400-600 MG
250 POWDER IN PACKET (EA) ORAL 2 TIMES DAILY
Status: DISCONTINUED | OUTPATIENT
Start: 2018-07-13 | End: 2018-07-18 | Stop reason: HOSPADM

## 2018-07-13 RX ORDER — HEPARIN 100 UNIT/ML
600 SYRINGE INTRAVENOUS AS NEEDED
Status: DISCONTINUED | OUTPATIENT
Start: 2018-07-13 | End: 2018-07-18 | Stop reason: HOSPADM

## 2018-07-13 RX ORDER — SODIUM CHLORIDE 0.9 % (FLUSH) 0.9 %
20 SYRINGE (ML) INJECTION AS NEEDED
Status: DISCONTINUED | OUTPATIENT
Start: 2018-07-13 | End: 2018-07-18 | Stop reason: HOSPADM

## 2018-07-13 RX ORDER — HEPARIN 100 UNIT/ML
600 SYRINGE INTRAVENOUS EVERY 8 HOURS
Status: DISCONTINUED | OUTPATIENT
Start: 2018-07-13 | End: 2018-07-18 | Stop reason: HOSPADM

## 2018-07-13 RX ORDER — SODIUM CHLORIDE 0.9 % (FLUSH) 0.9 %
20 SYRINGE (ML) INJECTION EVERY 8 HOURS
Status: DISCONTINUED | OUTPATIENT
Start: 2018-07-13 | End: 2018-07-18 | Stop reason: HOSPADM

## 2018-07-13 RX ORDER — ONDANSETRON 2 MG/ML
4 INJECTION INTRAMUSCULAR; INTRAVENOUS
Status: DISCONTINUED | OUTPATIENT
Start: 2018-07-13 | End: 2018-07-18 | Stop reason: HOSPADM

## 2018-07-13 RX ADMIN — LORATADINE 10 MG: 10 TABLET ORAL at 09:00

## 2018-07-13 RX ADMIN — FLUTICASONE PROPIONATE 2 SPRAY: 50 SPRAY, METERED NASAL at 08:25

## 2018-07-13 RX ADMIN — ALBUTEROL SULFATE 2.5 MG: 2.5 SOLUTION RESPIRATORY (INHALATION) at 07:25

## 2018-07-13 RX ADMIN — ONDANSETRON 4 MG: 2 INJECTION, SOLUTION INTRAMUSCULAR; INTRAVENOUS at 17:14

## 2018-07-13 RX ADMIN — Medication 5 ML: at 06:00

## 2018-07-13 RX ADMIN — METOPROLOL SUCCINATE 100 MG: 100 TABLET, EXTENDED RELEASE ORAL at 08:29

## 2018-07-13 RX ADMIN — Medication 20 ML: at 17:12

## 2018-07-13 RX ADMIN — Medication 250 MG: at 17:11

## 2018-07-13 RX ADMIN — DIAZEPAM 5 MG: 5 TABLET ORAL at 23:26

## 2018-07-13 RX ADMIN — MONTELUKAST SODIUM 10 MG: 10 TABLET, FILM COATED ORAL at 22:05

## 2018-07-13 RX ADMIN — VANCOMYCIN HYDROCHLORIDE 2000 MG: 10 INJECTION, POWDER, LYOPHILIZED, FOR SOLUTION INTRAVENOUS at 17:12

## 2018-07-13 RX ADMIN — ALBUTEROL SULFATE 2.5 MG: 2.5 SOLUTION RESPIRATORY (INHALATION) at 20:21

## 2018-07-13 RX ADMIN — PIPERACILLIN SODIUM AND TAZOBACTAM SODIUM 3.38 G: 3; .375 INJECTION, POWDER, LYOPHILIZED, FOR SOLUTION INTRAVENOUS at 22:05

## 2018-07-13 RX ADMIN — Medication 20 ML: at 22:06

## 2018-07-13 RX ADMIN — Medication 10 ML: at 14:00

## 2018-07-13 RX ADMIN — Medication 600 UNITS: at 17:11

## 2018-07-13 RX ADMIN — PIPERACILLIN SODIUM AND TAZOBACTAM SODIUM 3.38 G: 3; .375 INJECTION, POWDER, LYOPHILIZED, FOR SOLUTION INTRAVENOUS at 05:49

## 2018-07-13 RX ADMIN — HEPARIN SODIUM 5000 UNITS: 5000 INJECTION, SOLUTION INTRAVENOUS; SUBCUTANEOUS at 14:33

## 2018-07-13 RX ADMIN — SODIUM CHLORIDE 75 ML/HR: 900 INJECTION, SOLUTION INTRAVENOUS at 08:35

## 2018-07-13 RX ADMIN — ONDANSETRON 4 MG: 2 INJECTION, SOLUTION INTRAMUSCULAR; INTRAVENOUS at 06:52

## 2018-07-13 RX ADMIN — BUDESONIDE 500 MCG: 0.5 INHALANT RESPIRATORY (INHALATION) at 07:25

## 2018-07-13 RX ADMIN — ACETAMINOPHEN 650 MG: 325 TABLET ORAL at 02:11

## 2018-07-13 RX ADMIN — MORPHINE SULFATE 1 MG: 2 INJECTION, SOLUTION INTRAMUSCULAR; INTRAVENOUS at 02:11

## 2018-07-13 RX ADMIN — HEPARIN SODIUM 5000 UNITS: 5000 INJECTION, SOLUTION INTRAVENOUS; SUBCUTANEOUS at 05:50

## 2018-07-13 RX ADMIN — ALBUTEROL SULFATE 2.5 MG: 2.5 SOLUTION RESPIRATORY (INHALATION) at 15:01

## 2018-07-13 RX ADMIN — ACETAMINOPHEN 650 MG: 325 TABLET ORAL at 08:35

## 2018-07-13 RX ADMIN — Medication 250 MG: at 08:25

## 2018-07-13 RX ADMIN — DULOXETINE HYDROCHLORIDE 60 MG: 60 CAPSULE, DELAYED RELEASE ORAL at 08:26

## 2018-07-13 RX ADMIN — LISINOPRIL AND HYDROCHLOROTHIAZIDE 1 TABLET: 12.5; 2 TABLET ORAL at 08:26

## 2018-07-13 RX ADMIN — HEPARIN SODIUM 5000 UNITS: 5000 INJECTION, SOLUTION INTRAVENOUS; SUBCUTANEOUS at 22:05

## 2018-07-13 RX ADMIN — VANCOMYCIN HYDROCHLORIDE 2000 MG: 10 INJECTION, POWDER, LYOPHILIZED, FOR SOLUTION INTRAVENOUS at 05:49

## 2018-07-13 RX ADMIN — BUDESONIDE 500 MCG: 0.5 INHALANT RESPIRATORY (INHALATION) at 20:21

## 2018-07-13 RX ADMIN — DIAZEPAM 5 MG: 5 TABLET ORAL at 09:13

## 2018-07-13 RX ADMIN — MORPHINE SULFATE 1 MG: 2 INJECTION, SOLUTION INTRAMUSCULAR; INTRAVENOUS at 13:23

## 2018-07-13 RX ADMIN — FAMOTIDINE 20 MG: 20 TABLET ORAL at 17:11

## 2018-07-13 RX ADMIN — FAMOTIDINE 20 MG: 20 TABLET ORAL at 08:26

## 2018-07-13 RX ADMIN — ACETAMINOPHEN 650 MG: 325 TABLET ORAL at 23:27

## 2018-07-13 RX ADMIN — MORPHINE SULFATE 1 MG: 2 INJECTION, SOLUTION INTRAMUSCULAR; INTRAVENOUS at 08:35

## 2018-07-13 NOTE — PROGRESS NOTES
Uneventful shift. Hourly rounds completed throughout shift. Patient received pain medication and zofran this shift. MD came and made incision to drain left breast. Moderate amount of drainage noted this shift. Patient denies needs at this time. Will continue to monitor and give bedside report to oncoming day shift nurse.

## 2018-07-13 NOTE — PROGRESS NOTES
Problem: Falls - Risk of  Goal: *Absence of Falls  Document Meagan Fall Risk and appropriate interventions in the flowsheet.    Outcome: Progressing Towards Goal  Fall Risk Interventions:            Medication Interventions: Evaluate medications/consider consulting pharmacy, Patient to call before getting OOB, Teach patient to arise slowly    Elimination Interventions: Call light in reach, Elevated toilet seat, Patient to call for help with toileting needs, Toilet paper/wipes in reach, Toileting schedule/hourly rounds

## 2018-07-13 NOTE — PROGRESS NOTES
Plastic Surgery Consult Consult     Impression:   · Left chest wall cellulitis and subcutaneous abscess with underlying breast implant     Plan:   · Continue abx pending breast and blood cultures - GNR on gram stain. IV abx for implant salvage. · BID packing of abscess cavity with iodoform  · Appreciate hospitalist management of multiple medical issues  · Monitor closely for an C-Diff. 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 · No contracindications to chemical DVT prophylaxis with heparin at the moment  · Would consider early placement of PICC line due to previous issues with IV access     Subjective:   Date of Consultation:  July 12, 2018     Patient is a 61 y.o. female with recent history of increasing left breast pain, swelling, new drainage starting yesterday PM one week s/p left breast expander implant exchange. 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 Has been started on Vanc and zosyn. Notes decreased pain with spontaneous drainage. Subjective chills no measurable fevers. WBC normal on admission. CBG's not elevated. Pain improved overnight. Less drainage. Tolerating packing.  No bowel issues.               Patient Active Problem List   Diagnosis Code    Chronic diastolic heart failure (HCC) I50.32    DOLORES (obstructive sleep apnea) G47.33    Essential hypertension, benign I10    Gastroesophageal reflux disease without esophagitis K21.9    Morbid obesity with BMI of 50.0-59.9, adult ([facility] [address] E66.01, Z68.43    Anxiety F41.9    Tachycardia R00.0    Acute lower UTI N39.0    Palpitations R00.2    Hyperlipidemia E78.5    Skin lesions L98.9    Arthritis M19.90    Preop cardiovascular exam Z01.810    Cellulitis and abscess of trunk L03.319, L02.219    Acute diverticulitis K57.92    C. difficile colitis A04.72    Midline low back pain with sciatica M54.40    Dyspnea on exertion R06.09    Chronic fatigue R53.82    HTN (hypertension) I10    CAD (coronary artery disease) I25.10    Cellulitis of left breast N61.0           Past Medical History:   Diagnosis Date    Arthritis       everywhere;  DDD    Asthma       inhalers daily  [facility]    Autoimmune disease ([address]       Lupus, Fibromyalgia    Breast cancer ([facility] [address] 09/2016    CAD (coronary artery disease)       Dr Stephanie Ansari, EF 60-65%, no stents, no MI    Cancer [facility])       left breast ca dx'ed this month-appt with surgeon 10/12    Chronic pain       generalized from arthritis    Complicated UTI (urinary tract infection) 12/8/2015    DDD (degenerative disc disease), cervical      Diabetes ([facility] [address]       Last HA1C 6.4 on 5/15/2018,No medications, No glucose checks    Diverticulosis      GERD (gastroesophageal reflux disease)      Heart failure (HCC)       EF 60-65% per echo 4/13/2018    [facility] of echocardiogram [facility]/17/14 at [facility]     No significant valvular disease.   EF 50-55%    History of prediabetes       monitored by Primary Physician    Hypertension      Lymphedema      Morbid obesity ([address]      Nausea & vomiting      Shortness of breath      Sleep apnea       bi pap and o2    Thyroid cyst       cyst removed from thyroid             Family History   Problem Relation Age of Onset    Heart Disease Mother      Cancer Mother         lung    Hypertension Mother      Hypertension Father      Sleep Apnea Father      Cancer Father         prostate    Sleep Apnea Brother         states also has two children with sleep apnea    Cancer Brother         pituitary    Hypertension Brother      Breast Cancer Neg Hx             Social History   Substance Use Topics    Smoking status: Never Smoker    Smokeless tobacco: Never Used    Alcohol use No            Past Surgical History:   Procedure Laterality Date    BREAST SURGERY PROCEDURE UNLISTED         Mastectomy left side    CARDIAC SURG PROCEDURE UNLIST   Cath 11/18/14 [facility]     Minimal CAD in the ostial circumflex and mid ramus (medical management)    HX BREAST RECONSTRUCTION Left 7/5/2018     LEFT BREAST REVISION/ EXPANDER EXCHANGE FOR IMPLANT performed by [facility] at [facility] MAIN OR    HX CARPAL TUNNEL RELEASE Bilateral      HX CHOLECYSTECTOMY        HX CYST REMOVAL         from thyroid    HX LAP CHOLECYSTECTOMY        HX MASTECTOMY Left 01/2017    HX OTHER SURGICAL         abcess where bra strap    INSERT TISSUE EXPANDER(S) Left 01/2017    NEUROLOGICAL PROCEDURE UNLISTED         \"nerve running to back\"    SINUS SURGERY PROC UNLISTED                  Prior to Admission medications    Medication Sig Start Date End Date Taking? Authorizing Provider   DULoxetine (CYMBALTA) 60 mg capsule Take 1 Cap by mouth daily. 6/26/18   Yes Jhon Roland MD   metoprolol succinate (TOPROL-XL) 100 mg tablet Take 1 Tab by mouth daily. 5/2/18   Yes Vee Eddy MD   phenylephrine (NEOSYNEPHRINE) 0.5 % spry 2 Sprays by Nasal route every six (6) hours as needed.     Yes Historical Provider   biotin (VITAMIN B7) 5 mg capsule TK 1 C PO QD 12/6/17   Yes Historical Provider   clobetasol (TEMOVATE) 0.05 % external solution CESIA EXT TO THE SCALP  QD 12/6/17   Yes Historical Provider   ketoconazole (NIZORAL) 2 % shampoo USE TO [address] HAIR 1-2 TIMES A WEEK 12/6/17   Yes Historical Provider   lisinopril-hydroCHLOROthiazide (PRINZIDE, ZESTORETIC) 20-12.5 mg per tablet Take 1 Tab by mouth daily.  1/25/18   Yes Duane Campos MD   albuterol (PROAIR HFA) 90 mcg/actuation inhaler Take 1 Puff by inhalation every six (6) hours as needed for Wheezing. 1/25/18   Yes Duane Campos MD   montelukast (SINGULAIR) 10 mg tablet TAKE 1 TABLET BY MOUTH EVERY NIGHT 10/23/17   Yes Melissa Eden NP   fluticasone (FLONASE) 50 mcg/actuation nasal spray SHAKE LIQUID AND USE 2 SPRAYS IN EACH NOSTRIL DAILY 8/30/17   Yes Duane Campos MD fluticasone-salmeterol (ADVAIR DISKUS) 250-50 mcg/dose diskus inhaler Take 1 Puff by inhalation two (2) times a day. 10/10/16   Yes Zach Vasquez MD   Cetirizine (ZYRTEC) 10 mg cap Take  by mouth every morning.     Yes Historical Provider   cpap machine kit by Does Not Apply route.     Yes Historical Provider   OXYGEN-AIR DELIVERY SYSTEMS by Does Not Apply route. 3 lpm qhs     Yes Historical Provider   ranitidine (ZANTAC) 150 mg tablet Take 1 Tab by mouth two (2) times a day. Indications: GASTROESOPHAGEAL REFLUX 7/1/16   Yes Zach Vasquez MD   HYDROcodone-acetaminophen (NORCO) 5-325 mg per tablet Take 1 Tab by mouth every four (4) hours as needed for Pain. Max Daily Amount: 6 Tabs. 7/5/18     Sally Mixon MD   ondansetron (ZOFRAN ODT) 4 mg disintegrating tablet Take 1 Tab by mouth every eight (8) hours as needed for Nausea. 7/5/18     Mayur Moreno MD   acetaminophen (TYLENOL EXTRA STRENGTH) 500 mg tablet Take  by mouth every six (6) hours as needed for Pain.       Historical Provider   nitroglycerin (NITROSTAT) 0.4 mg SL tablet by SubLINGual route every five (5) minutes as needed for Chest Pain.       Historical Provider            Allergies   Allergen Reactions    Ciprofloxacin Diarrhea and Other (comments)       Per pt, started her c-diff\"    Bactrim [Sulfamethoprim Ds] Rash       Pt gets a yeast infection on body     Sulfa (Sulfonamide Antibiotics) Other (comments)       Yeast growth               Objective:     Visit Vitals    /58    Pulse (!) 113    Temp 99.3 °F (37.4 °C)    Resp 20    Ht 5' 2\" (1.575 m)    Wt 130.2 kg (287 lb)    SpO2 94%    BMI 52.49 kg/m2          A&O X3 Non ill appearing  RR, tachy  Resp clear  Abd soft  Left breast with centrally draining punctum, packed, some purulence, non malodorous. Less induration and warmth.

## 2018-07-13 NOTE — PHYSICIAN ADVISORY
Letter of Determination: Upgrade from Observation to Inpatient Status    This patient was originally hospitalized as Outpatient Status with Observation Services on 7/12/2018 for cellulitis . This patient now meets for Inpatient Admission based on medical necessity. The patient's stay was medically necessary based on recent breast surgery, with cellulitis requring intravenous antibiotic therapy with intravenous zosyn and vancomycin. It is our recommendation that this patient's hospitalization status should be upgraded from OBSERVATION to INPATIENT status.      The final decision regarding the patient's hospitalization status depends on the attending physician's judgement.     Andree Ryan MD, AMADO,   Physician Nathan Randall.

## 2018-07-13 NOTE — PROGRESS NOTES
PICC Placement Note PRE-PROCEDURE VERIFICATION Correct Procedure: yes. Time out completed with assistant Tracie Wong RN, VAT and all persons present in agreement with time out. Correct Site:  yes Temperature: Temp: 98.8 °F (37.1 °C), Temperature Source: Temp Source: Oral 
Recent Labs  
   07/13/18 
 0427 BUN  7*  
CREA  0.60 PLT  436 WBC  7.1 Allergies: Ciprofloxacin; Bactrim [sulfamethoprim ds]; and Sulfa (sulfonamide antibiotics) Education materials for [facility] given to patient or family. PROCEDURE DETAIL A double lumen PICC line was started for vascular access, antibiotic therapy and desire for reliable access. The following documentation is in addition to the PICC properties in the lines/airways flowsheet : 
Lot #: [id number] 
xylocaine used: yes Mid-Arm Circumference: 41 (cm) Internal Catheter Length: 52 (cm) Internal Catheter Total Length: 52 (cm) Vein Selection for PICC:right cephalic Central Line Bundle followed yes Complication Related to Insertion: none Both the insertion guidewire and ECG guidewire were removed intact all ports have positive blood return and were flush well with normal saline. The location of the tip of the PICC is verified using ECG technology. The tip is in the SVC per ECG reading. See image below. Line is okay to use: yes Fausto Nielsen RN

## 2018-07-13 NOTE — CONSULTS
Infectious Disease Consult    Today's Date: 7/13/2018   Admit Date: 7/12/2018    Impression:   · Left chest wall cellulitis and subcutaneous abscess with underlying breast implant  · Hx refractory C-diff ultimately requiring fecal transplant (8/25/17) [facility] Dr. Anne Engel  · Hx Left total mastectomy with lymph biopsy and tissue expander 1/19/18 @ [facility] with Dr. Jennetta Francois Dr. Alba Spurling  · I&D of left chest wall seroma 3/11/18 , Dr. Alba Spurling [facility])  · S/P left breast reconstruction expander implant exchange adipofascial flap augmentation of left breast volume (7/6/18) Dr. Torri Casas  · Superficial wd swab growing GNR pending ID     Plan:   · Continue Vancomycin & Zosyn  · Follow cx and adjust abx as needed    Anti-infectives:   · Vancomycin (7/12-  · Zosyn (7/12-    Subjective:   Date of Consultation:  July 13, 2018  Referring Physician: Dr. Tegan Mueller    Patient is a 61 y.o. female h/o breast cancer,htn,hld,sleep apnea on bipap with 3 lit/min oxygen, diastolic chf, fibromyalgia,back pain, gerd,obesity. Pt had left breast surgery about a week ago. According to pt from the next day of surgery she had been having fever,mild chills. Noticed mild left breast pain on the off. Wedsday pt noticed that the dressing was soaked,discharge from the left breast region, noticed that the pain mildy improved after lot of drainage. Superficial wd swab growing GNR. ID is asked to evaluate patient for abx recommendations.     Patient Active Problem List   Diagnosis Code    Chronic diastolic heart failure (HCC) I50.32    DOLORES (obstructive sleep apnea) G47.33    Essential hypertension, benign I10    Gastroesophageal reflux disease without esophagitis K21.9    Morbid obesity with BMI of 50.0-59.9, adult ([address] E66.01, Z68.43    Anxiety F41.9    Tachycardia R00.0    Acute lower UTI N39.0    Palpitations R00.2    Hyperlipidemia E78.5    Skin lesions L98.9    Arthritis M19.90    Preop cardiovascular exam Z01.810    Cellulitis and abscess of trunk L03.319, L02.219    Acute diverticulitis K57.92    C. difficile colitis A04.72    Midline low back pain with sciatica M54.40    Dyspnea on exertion R06.09    Chronic fatigue R53.82    HTN (hypertension) I10    CAD (coronary artery disease) I25.10    Cellulitis of left breast N61.0     Past Medical History:   Diagnosis Date    Arthritis     everywhere;  DDD    Asthma     inhalers daily  [facility]    Autoimmune disease ([address]     Lupus, Fibromyalgia    Breast cancer ([address] 09/2016    CAD (coronary artery disease)     Dr Angie Carcamo, EF 60-65%, no stents, no MI    Cancer ([address]     left breast ca dx'ed this month-appt with surgeon 10/12    Chronic pain     generalized from arthritis    Complicated UTI (urinary tract infection) 12/8/2015    DDD (degenerative disc disease), cervical     Diabetes ([address]     Last HA1C 6.4 on 5/15/2018,No medications, No glucose checks    Diverticulosis     GERD (gastroesophageal reflux disease)     Heart failure (HCC)     EF 60-65% per echo 4/13/2018    [facility] of echocardi[facility]    No significant valvular disease.   EF 50-55%    History of prediabetes     monitored by Primary Physician    Hypertension     Lymphedema     Morbid obesity ([address]     Nausea & vomiting     Shortness of breath     Sleep apnea     bi pap and o2    Thyroid cyst     cyst removed from thyroid      Family History   Problem Relation Age of Onset    Heart Disease Mother     Cancer Mother      lung    Hypertension Mother     Hypertension Father     Sleep Apnea Father     Cancer Father      prostate    Sleep Apnea Brother      states also has two children with sleep apnea    Cancer Brother      pituitary    Hypertension Brother     Breast Cancer Neg Hx       Social History   Substance Use Topics    Smoking status: Never Smoker    Smokeless tobacco: Never Used    Alcohol use No     Past Surgical History:   Procedure Laterality Date    BREAST SURGERY PROCEDURE UNLISTED      Mastectomy left side    CARDIAC SURG PROCEDURE UNLIST  [facility]    Minimal CAD in the ostial circumflex and mid ramus (medical management)    HX BREAST RECONSTRUCTION Left 7/5/2018    LEFT BREAST REVISION/ EXPANDER EXCHANGE FOR IMPLANT performed by Judy Coyle MD at [facility] MAIN OR    HX CARPAL TUNNEL RELEASE Bilateral     HX CHOLECYSTECTOMY      HX CYST REMOVAL      from thyroid    HX LAP CHOLECYSTECTOMY      HX MASTECTOMY Left 01/2017    HX OTHER SURGICAL      abcess where bra strap    INSERT TISSUE EXPANDER(S) Left 01/2017    NEUROLOGICAL PROCEDURE UNLISTED      Cardwell Bleak running to back\"    SINUS SURGERY PROC UNLISTED        Prior to Admission medications    Medication Sig Start Date End Date Taking? Authorizing Provider   DULoxetine (CYMBALTA) 60 mg capsule Take 1 Cap by mouth daily. 6/26/18  Yes Jhon Roland MD   metoprolol succinate (TOPROL-XL) 100 mg tablet Take 1 Tab by mouth daily. 5/2/18  Yes Vee Eddy MD   phenylephrine (NEOSYNEPHRINE) 0.5 % spry 2 Sprays by Nasal route every six (6) hours as needed. Yes Historical Provider   biotin (VITAMIN B7) 5 mg capsule TK 1 C PO QD 12/6/17  Yes Historical Provider   clobetasol (TEMOVATE) 0.05 % external solution CESIA EXT TO THE SCALP  QD 12/6/17  Yes Historical Provider   ketoconazole (NIZORAL) 2 % shampoo USE TO [address] HAIR 1-2 TIMES A WEEK 12/6/17  Yes Historical Provider   lisinopril-hydroCHLOROthiazide (PRINZIDE, ZESTORETIC) 20-12.5 mg per tablet Take 1 Tab by mouth daily.  1/25/18  Yes Duane Campos MD   albuterol (PROAIR HFA) 90 mcg/actuation inhaler Take 1 Puff by inhalation every six (6) hours as needed for Wheezing. 1/25/18  Yes Duane Campos MD   montelukast (SINGULAIR) 10 mg tablet TAKE 1 TABLET BY MOUTH EVERY NIGHT 10/23/17  Yes Melissa Eden NP   fluticasone (FLONASE) 50 mcg/actuation nasal spray SHAKE LIQUID AND USE 2 SPRAYS IN EACH NOSTRIL DAILY 8/30/17  Yes Duane Campos MD fluticasone-salmeterol (ADVAIR DISKUS) 250-50 mcg/dose diskus inhaler Take 1 Puff by inhalation two (2) times a day. 10/10/16  Yes Zach Vasquez MD   Cetirizine (ZYRTEC) 10 mg cap Take  by mouth every morning. Yes Historical Provider   cpap machine kit by Does Not Apply route. Yes Historical Provider   OXYGEN-AIR DELIVERY SYSTEMS by Does Not Apply route. 3 lpm qhs   Yes Historical Provider   ranitidine (ZANTAC) 150 mg tablet Take 1 Tab by mouth two (2) times a day. Indications: GASTROESOPHAGEAL REFLUX 16  Yes Zach Vasquez MD   HYDROcodone-acetaminophen (NORCO) 5-325 mg per tablet Take 1 Tab by mouth every four (4) hours as needed for Pain. Max Daily Amount: 6 Tabs. 18   Mayur Moreno MD   ondansetron (ZOFRAN ODT) 4 mg disintegrating tablet Take 1 Tab by mouth every eight (8) hours as needed for Nausea. 18   Mayur Moreno MD   acetaminophen (TYLENOL EXTRA STRENGTH) 500 mg tablet Take  by mouth every six (6) hours as needed for Pain. Historical Provider   nitroglycerin (NITROSTAT) 0.4 mg SL tablet by SubLINGual route every five (5) minutes as needed for Chest Pain. Historical Provider       Allergies   Allergen Reactions    Ciprofloxacin Diarrhea and Other (comments)     Per pt, started her c-diff\"    Bactrim [Sulfamethoprim Ds] Rash     Pt gets a yeast infection on body     Sulfa (Sulfonamide Antibiotics) Other (comments)     Yeast growth        Review of Systems:  A comprehensive review of systems was negative except for that written in the History of Present Illness.     Objective:     Visit Vitals    /65 (BP 1 Location: Right arm, BP Patient Position: Lying left side)    Pulse 96    Temp 98.6 °F (37 °C)    Resp 20    Ht 5' 2\" (1.575 m)    Wt 130.2 kg (287 lb)    SpO2 98%    BMI 52.49 kg/m2     Temp (24hrs), Av.2 °F (37.3 °C), Min:98.5 °F (36.9 °C), Max:99.8 °F (37.7 °C)       Lines:  Peripheral IV:            Physical Exam:    General:  Alert, cooperative, well noursished, well developed, appears stated age   Eyes:  Sclera anicteric. Pupils equally round and reactive to light. Mouth/Throat: Mucous membranes normal, oral pharynx clear   Neck: Supple   Lungs:   Clear to auscultation bilaterally, good effort   CV:  Regular rate and rhythm,no murmur, click, rub or gallop   Abdomen:   Soft, non-tender.  bowel sounds normal. non-distended   Extremities: No cyanosis or edema   Skin: Skin color, texture, turgor normal. no acute rash , left breast mid incisional packed wd with purulent drainage noted   Lymph nodes: Cervical and supraclavicular normal   Musculoskeletal: No swelling or deformity   Lines/Devices:  Intact, no erythema, drainage or tenderness   Psych: Alert and oriented, normal mood affect given the setting       Data Review:     CBC:  Recent Labs      07/13/18   0427  07/12/18   1258   WBC  7.1  7.2   GRANS  59  60   MONOS  10  9   EOS  4  3   ANEU  4.2  4.4   ABL  1.8  1.9   HGB  9.8*  11.2*   HCT  30.7*  34.7*   PLT  436  470*       BMP:  Recent Labs      07/13/18   0427  07/12/18   1258   CREA  0.60  0.72   BUN  7*  8   NA  141  143   K  3.7  4.1   CL  108*  110*   CO2  23  25   AGAP  10  8   GLU  104*  100       LFTS:  Recent Labs      07/12/18   1258   TBILI  0.3   ALT  25   SGOT  16   AP  127   TP  7.2   ALB  2.8*       Microbiology:     All Micro Results     Procedure Component Value Units Date/Time    CULTURE, Wiliam Moan STAIN [[id number]]  (Abnormal) Collected:  07/12/18 1634    Order Status:  Completed Specimen:  Wound from Breast Updated:  07/13/18 1207     Special Requests: NO SPECIAL REQUESTS        GRAM STAIN 0 TO 3 WBC'S/OIF      RARE GRAM NEGATIVE RODS     Culture result:         LIGHT GRAM NEGATIVE RODS (A)      SUBCULTURE IN PROGRESS       CULTURE, BLOOD [[id number]] Collected:  07/12/18 1258    Order Status:  Completed Specimen:  Blood from Blood Updated:  07/13/18 6338     Special Requests: --        NO SPECIAL REQUESTS  RIGHT  HAND Culture result: NO GROWTH AFTER 19 HOURS       CULTURE, BLOOD [[id number]] Collected:  07/12/18 1540    Order Status:  Completed Specimen:  Blood from Blood Updated:  07/13/18 0827     Special Requests: RIGHT ANTECUBITAL        Culture result: NO GROWTH AFTER 16 HOURS             Imaging:   None    Signed By: Lawson Perkins NP     July 13, 2018

## 2018-07-13 NOTE — PROGRESS NOTES
Hospitalist Progress Note Admit Date:  2018 11:05 AM  
Name:  Asa Valle Age:  61 y.o. 
:  1955 MRN:  [id number] PCP:  Carol Ann Ring MD 
Treatment Team: Attending Provider: Annie Weston MD; Consulting Provider: Angel Harrell MD; Utilization Review: Truong Wasserman RN Subjective:  
Pt admitted for left breast cellulitis- recent breast surgery 18 Says doing ok Not much pain left breast region Objective:  
Patient Vitals for the past 24 hrs: 
 Temp Pulse Resp BP SpO2  
18 0844 99.3 °F (37.4 °C) (!) 113 20 114/58 94 % 18 0736 - - - - 99 % 18 0725 - - - - 96 % 18 0335 99.6 °F (37.6 °C) 97 16 139/80 100 % 18 2315 99.8 °F (37.7 °C) (!) 111 16 123/66 95 % 18 2055 - - - - 98 % 18 1924 99.1 °F (37.3 °C) 96 16 139/83 96 % 18 1732 98.5 °F (36.9 °C) 89 16 121/76 98 % 18 1644 - - - 129/81 -  
18 1643 - - - - 98 % 18 1336 - - - - 100 % 18 1154 - - - 174/81 97 % Oxygen Therapy O2 Sat (%): 94 % (18) Pulse via Oximetry: 85 beats per minute (18) O2 Device: Room air (18) O2 Flow Rate (L/min): 3 l/min (18) Intake/Output Summary (Last 24 hours) at 18 1145 Last data filed at 18 5498 Gross per 24 hour Intake             1527 ml Output              750 ml Net              777 ml General:    Well nourished. Alert.   
heent- normal 
CV:   RRR. No murmur, rub, or gallop. Lungs:   Clear to auscultation bilaterally. No wheezing, rhonchi, or rales. Left breast- dressing in place, nontender Abdomen:   Soft, nontender, nondistended. Morbid obesity Cns- no focal neurological deficits Extremities: Warm and dry. No cyanosis or edema. Skin:     No rashes or jaundice. Data Review: 
I have reviewed all labs, meds, telemetry events, and studies from the last 24 hours.  
 
Recent Results (from the past 24 hour(s)) CULTURE, BLOOD Collection Time: 07/12/18 12:58 PM  
Result Value Ref Range Special Requests: NO SPECIAL REQUESTS 
RIGHT 
HAND Culture result: NO GROWTH AFTER 19 HOURS    
CBC WITH AUTOMATED DIFF Collection Time: 07/12/18 12:58 PM  
Result Value Ref Range WBC 7.2 4.3 - 11.1 K/uL  
 RBC 4.15 4.05 - 5.25 M/uL  
 HGB 11.2 (L) 11.7 - 15.4 g/dL HCT 34.7 (L) 35.8 - 46.3 % MCV 83.6 79.6 - 97.8 FL  
 MCH 27.0 26.1 - 32.9 PG  
 MCHC 32.3 31.4 - 35.0 g/dL  
 RDW 13.7 11.9 - 14.6 % PLATELET 404 (H) 977 - 450 K/uL MPV 8.6 (L) 10.8 - 14.1 FL  
 DF AUTOMATED NEUTROPHILS 60 43 - 78 % LYMPHOCYTES 26 13 - 44 % MONOCYTES 9 4.0 - 12.0 % EOSINOPHILS 3 0.5 - 7.8 % BASOPHILS 1 0.0 - 2.0 % IMMATURE GRANULOCYTES 1 0.0 - 5.0 %  
 ABS. NEUTROPHILS 4.4 1.7 - 8.2 K/UL  
 ABS. LYMPHOCYTES 1.9 0.5 - 4.6 K/UL  
 ABS. MONOCYTES 0.6 0.1 - 1.3 K/UL  
 ABS. EOSINOPHILS 0.2 0.0 - 0.8 K/UL  
 ABS. BASOPHILS 0.0 0.0 - 0.2 K/UL  
 ABS. IMM. GRANS. 0.1 0.0 - 0.5 K/UL METABOLIC PANEL, COMPREHENSIVE Collection Time: 07/12/18 12:58 PM  
Result Value Ref Range Sodium 143 136 - 145 mmol/L Potassium 4.1 3.5 - 5.1 mmol/L Chloride 110 (H) 98 - 107 mmol/L  
 CO2 25 21 - 32 mmol/L Anion gap 8 7 - 16 mmol/L Glucose 100 65 - 100 mg/dL BUN 8 8 - 23 MG/DL Creatinine 0.72 0.6 - 1.0 MG/DL  
 GFR est AA >60 >60 ml/min/1.73m2 GFR est non-AA >60 >60 ml/min/1.73m2 Calcium 9.3 8.3 - 10.4 MG/DL Bilirubin, total 0.3 0.2 - 1.1 MG/DL  
 ALT (SGPT) 25 12 - 65 U/L  
 AST (SGOT) 16 15 - 37 U/L Alk. phosphatase 127 50 - 136 U/L Protein, total 7.2 6.3 - 8.2 g/dL Albumin 2.8 (L) 3.2 - 4.6 g/dL Globulin 4.4 (H) 2.3 - 3.5 g/dL A-G Ratio 0.6 (L) 1.2 - 3.5 PROCALCITONIN Collection Time: 07/12/18 12:58 PM  
Result Value Ref Range Procalcitonin 0.1 ng/mL POC LACTIC ACID Collection Time: 07/12/18  1:03 PM  
Result Value Ref Range  Lactic Acid (POC) 1.2 0.5 - 1.9 mmol/L  
CULTURE, BLOOD Collection Time: 07/12/18  3:40 PM  
Result Value Ref Range Special Requests: RIGHT ANTECUBITAL Culture result: NO GROWTH AFTER 16 HOURS    
CULTURE, WOUND W GRAM STAIN Collection Time: 07/12/18  4:34 PM  
Result Value Ref Range Special Requests: NO SPECIAL REQUESTS    
 GRAM STAIN PENDING Culture result: LIGHT GRAM NEGATIVE RODS (A) Culture result: SUBCULTURE IN PROGRESS METABOLIC PANEL, BASIC Collection Time: 07/13/18  4:27 AM  
Result Value Ref Range Sodium 141 136 - 145 mmol/L Potassium 3.7 3.5 - 5.1 mmol/L Chloride 108 (H) 98 - 107 mmol/L  
 CO2 23 21 - 32 mmol/L Anion gap 10 7 - 16 mmol/L Glucose 104 (H) 65 - 100 mg/dL BUN 7 (L) 8 - 23 MG/DL Creatinine 0.60 0.6 - 1.0 MG/DL  
 GFR est AA >60 >60 ml/min/1.73m2 GFR est non-AA >60 >60 ml/min/1.73m2 Calcium 8.3 8.3 - 10.4 MG/DL  
CBC WITH AUTOMATED DIFF Collection Time: 07/13/18  4:27 AM  
Result Value Ref Range WBC 7.1 4.3 - 11.1 K/uL  
 RBC 3.68 (L) 4.05 - 5.25 M/uL HGB 9.8 (L) 11.7 - 15.4 g/dL HCT 30.7 (L) 35.8 - 46.3 % MCV 83.4 79.6 - 97.8 FL  
 MCH 26.6 26.1 - 32.9 PG  
 MCHC 31.9 31.4 - 35.0 g/dL  
 RDW 13.7 11.9 - 14.6 % PLATELET 154 261 - 494 K/uL MPV 8.5 (L) 10.8 - 14.1 FL  
 DF AUTOMATED NEUTROPHILS 59 43 - 78 % LYMPHOCYTES 25 13 - 44 % MONOCYTES 10 4.0 - 12.0 % EOSINOPHILS 4 0.5 - 7.8 % BASOPHILS 1 0.0 - 2.0 % IMMATURE GRANULOCYTES 1 0.0 - 5.0 %  
 ABS. NEUTROPHILS 4.2 1.7 - 8.2 K/UL  
 ABS. LYMPHOCYTES 1.8 0.5 - 4.6 K/UL  
 ABS. MONOCYTES 0.7 0.1 - 1.3 K/UL  
 ABS. EOSINOPHILS 0.3 0.0 - 0.8 K/UL  
 ABS. BASOPHILS 0.1 0.0 - 0.2 K/UL  
 ABS. IMM. GRANS. 0.1 0.0 - 0.5 K/UL All Micro Results Procedure Component Value Units Date/Time CULTURE, Greg Kangter STAIN [[id number]]  (Abnormal) Collected:  07/12/18 1634 Order Status:  Completed Specimen:  Wound from Breast Updated:  07/13/18 5000   Special Requests: NO SPECIAL REQUESTS     
  GRAM STAIN PENDING Culture result:      
  LIGHT GRAM NEGATIVE RODS (A) SUBCULTURE IN PROGRESS     
 CULTURE, BLOOD [[id number]] Collected:  07/12/18 1258 Order Status:  Completed Specimen:  Blood from Blood Updated:  07/13/18 0827 Special Requests: --     
  NO SPECIAL REQUESTS 
RIGHT 
HAND Culture result: NO GROWTH AFTER 19 HOURS     
 CULTURE, BLOOD [[id number]] Collected:  07/12/18 1540 Order Status:  Completed Specimen:  Blood from Blood Updated:  07/13/18 0827 Special Requests: RIGHT ANTECUBITAL Culture result: NO GROWTH AFTER 16 HOURS Current Meds: 
Current Facility-Administered Medications Medication Dose Route Frequency  ondansetron (ZOFRAN) injection 4 mg  4 mg IntraVENous Q4H PRN  
 Saccharomyces boulardii (FLORASTOR) capsule 250 mg  250 mg Oral BID  
 0.9% sodium chloride infusion  75 mL/hr IntraVENous CONTINUOUS  
 albuterol (PROVENTIL VENTOLIN) nebulizer solution 2.5 mg  2.5 mg Nebulization Q4H PRN  
 loratadine (CLARITIN) tablet 10 mg  10 mg Oral DAILY  DULoxetine (CYMBALTA) capsule 60 mg  60 mg Oral DAILY  fluticasone (FLONASE) 50 mcg/actuation nasal spray 2 Spray  2 Spray Both Nostrils DAILY  HYDROcodone-acetaminophen (NORCO) 5-325 mg per tablet 1 Tab  1 Tab Oral Q4H PRN  
 lisinopril-hydroCHLOROthiazide (PRINZIDE, ZESTORETIC) 20-12.5 mg per tablet 1 Tab  1 Tab Oral DAILY  metoprolol succinate (TOPROL-XL) tablet 100 mg  100 mg Oral DAILY  montelukast (SINGULAIR) tablet 10 mg  10 mg Oral QHS  nitroglycerin (NITROSTAT) tablet 0.4 mg  0.4 mg SubLINGual Q5MIN PRN  
 famotidine (PEPCID) tablet 20 mg  20 mg Oral BID  sodium chloride (NS) flush 5-10 mL  5-10 mL IntraVENous Q8H  
 sodium chloride (NS) flush 5-10 mL  5-10 mL IntraVENous PRN  
 acetaminophen (TYLENOL) tablet 650 mg  650 mg Oral Q4H PRN  
 morphine injection 1 mg  1 mg IntraVENous Q4H PRN  
 heparin (porcine) injection 5,000 Units  5,000 Units SubCUTAneous Q8H  piperacillin-tazobactam (ZOSYN) 3.375 g in 0.9% sodium chloride (MBP/ADV) 100 mL  3.375 g IntraVENous Q8H  
 vancomycin (VANCOCIN) 2000 mg in  ml infusion  2,000 mg IntraVENous Q12H  
 budesonide (PULMICORT) 500 mcg/2 ml nebulizer suspension  500 mcg Nebulization BID RT And  
 albuterol (PROVENTIL VENTOLIN) nebulizer solution 2.5 mg  2.5 mg Nebulization Q6HWA RT  
 diazePAM (VALIUM) tablet 5 mg  5 mg Oral Q6H PRN Other Studies (last 24 hours): No results found. Assessment and Plan:  
 
Hospital Problems as of 7/13/2018  Date Reviewed: 5/15/2018 Codes Class Noted - Resolved POA  
 HTN (hypertension) ICD-10-CM: I10 
ICD-9-CM: 401.9  7/12/2018 - Present Yes CAD (coronary artery disease) ICD-10-CM: I25.10 ICD-9-CM: 414.00  7/12/2018 - Present Yes * (Principal)Cellulitis of left breast ICD-10-CM: N61.0 ICD-9-CM: 611.0  7/12/2018 - Present Yes Arthritis ICD-10-CM: M19.90 ICD-9-CM: 716.90  Unknown - Present Yes Overview Signed 10/18/2016  9:16 AM by [employer]  
  everywhere;  DDD Morbid obesity with BMI of 50.0-59.9, adult [facility]) ICD-10-CM: E66.01, Z68.43 
ICD-9-CM: 278.01, V85.43  11/5/2015 - Present Yes Gastroesophageal reflux disease without esophagitis (Chronic) ICD-10-CM: K21.9 ICD-9-CM: 530.81  10/7/2015 - Present Yes Essential hypertension, benign ICD-10-CM: I10 
ICD-9-CM: 401.1  9/28/2015 - Present Yes Chronic diastolic heart failure (HCC) (Chronic) ICD-10-CM: I50.32 
ICD-9-CM: 428.32  9/9/2015 - Present Yes Hyperlipidemia (Chronic) ICD-10-CM: E24.4 ICD-9-CM: 272.4  9/9/2015 - Present Yes  
   
 DOLORES (obstructive sleep apnea) (Chronic) ICD-10-CM: D48.38 
ICD-9-CM: 327.23  7/12/2013 - Present Yes PLAN:   
Left breast cellulitis- recent surgery- gram negative rods- on vanc and zosyn. Sleep apnea on bipap 
htn Morbid obesity 
hld Fibromyalgia Diastolic chf 
 
DC planning/Dispo: DVT ppx:  heparin Signed: 
Rick Daley MD

## 2018-07-14 LAB
ANION GAP SERPL CALC-SCNC: 12 MMOL/L (ref 7–16)
BUN SERPL-MCNC: 5 MG/DL (ref 8–23)
CALCIUM SERPL-MCNC: 8.8 MG/DL (ref 8.3–10.4)
CHLORIDE SERPL-SCNC: 108 MMOL/L (ref 98–107)
CO2 SERPL-SCNC: 24 MMOL/L (ref 21–32)
CREAT SERPL-MCNC: 0.66 MG/DL (ref 0.6–1)
GLUCOSE SERPL-MCNC: 137 MG/DL (ref 65–100)
POTASSIUM SERPL-SCNC: 3.7 MMOL/L (ref 3.5–5.1)
SODIUM SERPL-SCNC: 144 MMOL/L (ref 136–145)
VANCOMYCIN TROUGH SERPL-MCNC: 11.4 UG/ML (ref 5–20)

## 2018-07-14 PROCEDURE — 80048 BASIC METABOLIC PNL TOTAL CA: CPT | Performed by: FAMILY MEDICINE

## 2018-07-14 PROCEDURE — 74011250636 HC RX REV CODE- 250/636: Performed by: FAMILY MEDICINE

## 2018-07-14 PROCEDURE — 77030018836 HC SOL IRR NACL ICUM -A

## 2018-07-14 PROCEDURE — 77030020263 HC SOL INJ SOD CL0.9% LFCR 1000ML

## 2018-07-14 PROCEDURE — 74011250637 HC RX REV CODE- 250/637: Performed by: SURGERY

## 2018-07-14 PROCEDURE — 65270000029 HC RM PRIVATE

## 2018-07-14 PROCEDURE — 99218 HC RM OBSERVATION: CPT

## 2018-07-14 PROCEDURE — 94640 AIRWAY INHALATION TREATMENT: CPT

## 2018-07-14 PROCEDURE — 94760 N-INVAS EAR/PLS OXIMETRY 1: CPT

## 2018-07-14 PROCEDURE — 74011000258 HC RX REV CODE- 258: Performed by: FAMILY MEDICINE

## 2018-07-14 PROCEDURE — 77010033678 HC OXYGEN DAILY

## 2018-07-14 PROCEDURE — 74011000250 HC RX REV CODE- 250: Performed by: FAMILY MEDICINE

## 2018-07-14 PROCEDURE — 80202 ASSAY OF VANCOMYCIN: CPT | Performed by: INTERNAL MEDICINE

## 2018-07-14 PROCEDURE — 74011250637 HC RX REV CODE- 250/637: Performed by: FAMILY MEDICINE

## 2018-07-14 RX ADMIN — ALBUTEROL SULFATE 2.5 MG: 2.5 SOLUTION RESPIRATORY (INHALATION) at 19:09

## 2018-07-14 RX ADMIN — PIPERACILLIN SODIUM AND TAZOBACTAM SODIUM 3.38 G: 3; .375 INJECTION, POWDER, LYOPHILIZED, FOR SOLUTION INTRAVENOUS at 14:43

## 2018-07-14 RX ADMIN — FAMOTIDINE 20 MG: 20 TABLET ORAL at 08:13

## 2018-07-14 RX ADMIN — Medication 600 UNITS: at 17:36

## 2018-07-14 RX ADMIN — ACETAMINOPHEN 650 MG: 325 TABLET ORAL at 20:50

## 2018-07-14 RX ADMIN — BUDESONIDE 500 MCG: 0.5 INHALANT RESPIRATORY (INHALATION) at 19:09

## 2018-07-14 RX ADMIN — HYDROCODONE BITARTRATE AND ACETAMINOPHEN 1 TABLET: 5; 325 TABLET ORAL at 20:44

## 2018-07-14 RX ADMIN — VANCOMYCIN HYDROCHLORIDE 2000 MG: 10 INJECTION, POWDER, LYOPHILIZED, FOR SOLUTION INTRAVENOUS at 17:58

## 2018-07-14 RX ADMIN — METOPROLOL SUCCINATE 100 MG: 100 TABLET, EXTENDED RELEASE ORAL at 08:13

## 2018-07-14 RX ADMIN — DIAZEPAM 5 MG: 5 TABLET ORAL at 08:22

## 2018-07-14 RX ADMIN — ACETAMINOPHEN 650 MG: 325 TABLET ORAL at 14:48

## 2018-07-14 RX ADMIN — HEPARIN SODIUM 5000 UNITS: 5000 INJECTION, SOLUTION INTRAVENOUS; SUBCUTANEOUS at 14:44

## 2018-07-14 RX ADMIN — ALBUTEROL SULFATE 2.5 MG: 2.5 SOLUTION RESPIRATORY (INHALATION) at 15:10

## 2018-07-14 RX ADMIN — LISINOPRIL AND HYDROCHLOROTHIAZIDE 1 TABLET: 12.5; 2 TABLET ORAL at 08:14

## 2018-07-14 RX ADMIN — Medication 250 MG: at 17:39

## 2018-07-14 RX ADMIN — MORPHINE SULFATE 1 MG: 2 INJECTION, SOLUTION INTRAMUSCULAR; INTRAVENOUS at 20:50

## 2018-07-14 RX ADMIN — FLUTICASONE PROPIONATE 2 SPRAY: 50 SPRAY, METERED NASAL at 08:19

## 2018-07-14 RX ADMIN — PIPERACILLIN SODIUM AND TAZOBACTAM SODIUM 3.38 G: 3; .375 INJECTION, POWDER, LYOPHILIZED, FOR SOLUTION INTRAVENOUS at 21:31

## 2018-07-14 RX ADMIN — HEPARIN SODIUM 5000 UNITS: 5000 INJECTION, SOLUTION INTRAVENOUS; SUBCUTANEOUS at 21:32

## 2018-07-14 RX ADMIN — ALBUTEROL SULFATE 2.5 MG: 2.5 SOLUTION RESPIRATORY (INHALATION) at 07:08

## 2018-07-14 RX ADMIN — VANCOMYCIN HYDROCHLORIDE 2000 MG: 10 INJECTION, POWDER, LYOPHILIZED, FOR SOLUTION INTRAVENOUS at 06:35

## 2018-07-14 RX ADMIN — FAMOTIDINE 20 MG: 20 TABLET ORAL at 17:39

## 2018-07-14 RX ADMIN — Medication 20 ML: at 06:37

## 2018-07-14 RX ADMIN — LORATADINE 10 MG: 10 TABLET ORAL at 08:14

## 2018-07-14 RX ADMIN — MONTELUKAST SODIUM 10 MG: 10 TABLET, FILM COATED ORAL at 21:32

## 2018-07-14 RX ADMIN — BUDESONIDE 500 MCG: 0.5 INHALANT RESPIRATORY (INHALATION) at 07:08

## 2018-07-14 RX ADMIN — HEPARIN SODIUM 5000 UNITS: 5000 INJECTION, SOLUTION INTRAVENOUS; SUBCUTANEOUS at 06:37

## 2018-07-14 RX ADMIN — Medication 250 MG: at 08:13

## 2018-07-14 RX ADMIN — DULOXETINE HYDROCHLORIDE 60 MG: 60 CAPSULE, DELAYED RELEASE ORAL at 08:13

## 2018-07-14 RX ADMIN — Medication 10 ML: at 06:38

## 2018-07-14 RX ADMIN — ACETAMINOPHEN 650 MG: 325 TABLET ORAL at 08:13

## 2018-07-14 RX ADMIN — PIPERACILLIN SODIUM AND TAZOBACTAM SODIUM 3.38 G: 3; .375 INJECTION, POWDER, LYOPHILIZED, FOR SOLUTION INTRAVENOUS at 06:37

## 2018-07-14 NOTE — PROGRESS NOTES
END OF SHIFT NOTE:    INTAKE/OUTPUT  07/13 0701 - 07/14 0700  In: 1858 [I.V.:1858]  Out: 300 [Urine:300]  Voiding: YES  Catheter: NO  Drain:              Flatus: Patient does have flatus present. Stool:  0 occurrences. Characteristics:       Emesis: 0 occurrences. Characteristics:        VITAL SIGNS  Patient Vitals for the past 12 hrs:   Temp Pulse Resp BP SpO2   07/14/18 0704 - - - - 100 %   07/14/18 0400 99 °F (37.2 °C) 79 18 160/89 100 %   07/14/18 0000 99.2 °F (37.3 °C) 91 18 160/90 97 %   07/13/18 2021 - - - - 98 %   07/13/18 2000 99 °F (37.2 °C) 99 18 136/70 96 %       Pain Assessment  Pain Intensity 1: 0 (07/14/18 0016)  Pain Location 1: Breast  Pain Intervention(s) 1: Medication (see MAR)  Patient Stated Pain Goal: 0    Ambulating  Yes    Shift report given to oncoming nurse at the bedside.     Eric Rich RN

## 2018-07-14 NOTE — PROGRESS NOTES
Hospitalist Progress Note Admit Date:  2018 11:05 AM  
Name:  Dai Ridley Age:  61 y.o. 
:  1955 MRN:  [id number] PCP:  Carlo Mares MD 
Treatment Team: Attending Provider: Mary Morales MD; Consulting Provider: Bria Alexandra MD; Utilization Review: Sayda Sharma RN; Consulting Provider: Chery Cervantes MD; Utilization Review: Caren Huerta Subjective:  
Pt admitted for left breast cellulitis- recent breast surgery 18 Says doing ok Not much pain left breast region. 18 ID saw pt yesterday. Pt says pain left breast better. Objective:  
 
Patient Vitals for the past 24 hrs: 
 Temp Pulse Resp BP SpO2  
18 1032 98.8 °F (37.1 °C) 89 18 149/86 97 % 18 0731 98.9 °F (37.2 °C) 99 17 154/81 98 % 18 0704 - - - - 100 % 18 0400 99 °F (37.2 °C) 79 18 160/89 100 % 18 0000 99.2 °F (37.3 °C) 91 18 160/90 97 % 18 - - - - 98 % 18 2000 99 °F (37.2 °C) 99 18 136/70 96 % 18 1540 98.8 °F (37.1 °C) (!) 106 18 138/80 95 % 18 1502 - - - - 97 % 18 1151 98.6 °F (37 °C) 96 20 104/65 98 % Oxygen Therapy O2 Sat (%): 97 % (18 1032) Pulse via Oximetry: 94 beats per minute (18) O2 Device: CPAP mask (18) O2 Flow Rate (L/min): 3 l/min (18) Intake/Output Summary (Last 24 hours) at 18 1149 Last data filed at 18 6949 Gross per 24 hour Intake             1858 ml Output                0 ml Net             1858 ml General:    Well nourished. Alert.   
heent- normal 
CV:   RRR. No murmur, rub, or gallop. Lungs:   Clear to auscultation bilaterally. No wheezing, rhonchi, or rales. Left breast- dressing in place, nontender Abdomen:   Soft, nontender, nondistended. Morbid obesity Cns- no focal neurological deficits Extremities: Warm and dry. No cyanosis or edema. Skin:     No rashes or jaundice.   
 
Data Review: 
I have reviewed all labs, meds, telemetry events, and studies from the last 24 hours. Recent Results (from the past 24 hour(s)) METABOLIC PANEL, BASIC Collection Time: 07/14/18  6:09 AM  
Result Value Ref Range Sodium 144 136 - 145 mmol/L Potassium 3.7 3.5 - 5.1 mmol/L Chloride 108 (H) 98 - 107 mmol/L  
 CO2 24 21 - 32 mmol/L Anion gap 12 7 - 16 mmol/L Glucose 137 (H) 65 - 100 mg/dL BUN 5 (L) 8 - 23 MG/DL Creatinine 0.66 0.6 - 1.0 MG/DL  
 GFR est AA >60 >60 ml/min/1.73m2 GFR est non-AA >60 >60 ml/min/1.73m2 Calcium 8.8 8.3 - 10.4 MG/DL All Micro Results Procedure Component Value Units Date/Time CULTURE, Isela Ling STAIN [[id number]]  (Abnormal) Collected:  07/12/18 1634 Order Status:  Completed Specimen:  Wound from Breast Updated:  07/14/18 9819 Special Requests: NO SPECIAL REQUESTS     
  GRAM STAIN 0 TO 3 WBC'S/OIF  
   RARE GRAM NEGATIVE RODS Culture result:      
  LIGHT GRAM NEGATIVE RODS (A) IDENTIFICATION AND SUSCEPTIBILITY TO FOLLOW CULTURE, BLOOD [[id number]] Collected:  07/12/18 1258 Order Status:  Completed Specimen:  Blood from Blood Updated:  07/14/18 7510 Special Requests: --     
  NO SPECIAL REQUESTS 
RIGHT 
HAND Culture result: NO GROWTH 2 DAYS     
 CULTURE, BLOOD [[id number]] Collected:  07/12/18 9530 Order Status:  Completed Specimen:  Blood from Blood Updated:  07/14/18 0729 Special Requests: RIGHT ANTECUBITAL Culture result: NO GROWTH 2 DAYS Current Meds: 
Current Facility-Administered Medications Medication Dose Route Frequency  Vancomycin trough reminder   Other ONCE  
 ondansetron (ZOFRAN) injection 4 mg  4 mg IntraVENous Q4H PRN  
 Saccharomyces boulardii (FLORASTOR) capsule 250 mg  250 mg Oral BID  sodium chloride (NS) flush 20 mL  20 mL InterCATHeter Q8H  
 heparin (porcine) pf 600 Units  600 Units InterCATHeter Q8H  
 sodium chloride (NS) flush 20 mL 20 mL InterCATHeter PRN  
 heparin (porcine) pf 600 Units  600 Units InterCATHeter PRN  
 0.9% sodium chloride infusion  75 mL/hr IntraVENous CONTINUOUS  
 albuterol (PROVENTIL VENTOLIN) nebulizer solution 2.5 mg  2.5 mg Nebulization Q4H PRN  
 loratadine (CLARITIN) tablet 10 mg  10 mg Oral DAILY  DULoxetine (CYMBALTA) capsule 60 mg  60 mg Oral DAILY  fluticasone (FLONASE) 50 mcg/actuation nasal spray 2 Spray  2 Spray Both Nostrils DAILY  HYDROcodone-acetaminophen (NORCO) 5-325 mg per tablet 1 Tab  1 Tab Oral Q4H PRN  
 lisinopril-hydroCHLOROthiazide (PRINZIDE, ZESTORETIC) 20-12.5 mg per tablet 1 Tab  1 Tab Oral DAILY  metoprolol succinate (TOPROL-XL) tablet 100 mg  100 mg Oral DAILY  montelukast (SINGULAIR) tablet 10 mg  10 mg Oral QHS  nitroglycerin (NITROSTAT) tablet 0.4 mg  0.4 mg SubLINGual Q5MIN PRN  
 famotidine (PEPCID) tablet 20 mg  20 mg Oral BID  sodium chloride (NS) flush 5-10 mL  5-10 mL IntraVENous Q8H  
 sodium chloride (NS) flush 5-10 mL  5-10 mL IntraVENous PRN  
 acetaminophen (TYLENOL) tablet 650 mg  650 mg Oral Q4H PRN  
 morphine injection 1 mg  1 mg IntraVENous Q4H PRN  
 heparin (porcine) injection 5,000 Units  5,000 Units SubCUTAneous Q8H  piperacillin-tazobactam (ZOSYN) 3.375 g in 0.9% sodium chloride (MBP/ADV) 100 mL  3.375 g IntraVENous Q8H  
 vancomycin (VANCOCIN) 2000 mg in  ml infusion  2,000 mg IntraVENous Q12H  
 budesonide (PULMICORT) 500 mcg/2 ml nebulizer suspension  500 mcg Nebulization BID RT And  
 albuterol (PROVENTIL VENTOLIN) nebulizer solution 2.5 mg  2.5 mg Nebulization Q6HWA RT  
 diazePAM (VALIUM) tablet 5 mg  5 mg Oral Q6H PRN Other Studies (last 24 hours): No results found. Assessment and Plan:  
 
Hospital Problems as of 7/14/2018  Date Reviewed: 5/15/2018 Codes Class Noted - Resolved POA  
 HTN (hypertension) ICD-10-CM: I10 
ICD-9-CM: 401.9  7/12/2018 - Present Yes  CAD (coronary artery disease) ICD-10-CM: I25.10 ICD-9-CM: 414.00  7/12/2018 - Present Yes * (Principal)Cellulitis of left breast ICD-10-CM: N61.0 ICD-9-CM: 611.0  7/12/2018 - Present Yes Arthritis ICD-10-CM: M19.90 ICD-9-CM: 716.90  Unknown - Present Yes Overview Signed 10/18/2016  9:16 AM by Rusty Reynolds  
  everywhere;  DDD Morbid obesity with BMI of 50.0-59.9, adult [facility]) ICD-10-CM: E66.01, Z68.43 
ICD-9-CM: 278.01, V85.43  11/5/2015 - Present Yes Gastroesophageal reflux disease without esophagitis (Chronic) ICD-10-CM: K21.9 ICD-9-CM: 530.81  10/7/2015 - Present Yes Essential hypertension, benign ICD-10-CM: I10 
ICD-9-CM: 401.1  9/28/2015 - Present Yes Chronic diastolic heart failure (HCC) (Chronic) ICD-10-CM: I50.32 
ICD-9-CM: 428.32  9/9/2015 - Present Yes Hyperlipidemia (Chronic) ICD-10-CM: P20.1 ICD-9-CM: 272.4  9/9/2015 - Present Yes  
   
 DOLORES (obstructive sleep apnea) (Chronic) ICD-10-CM: B84.77 
ICD-9-CM: 327.23  7/12/2013 - Present Yes PLAN:   
Left breast cellulitis- recent surgery- gram negative rods- on vanc and zosyn. Sleep apnea on bipap 
htn Morbid obesity 
hld Fibromyalgia Diastolic chf 
 
DC planning/Dispo: DVT ppx:  heparin Signed: 
Janae Nieves MD

## 2018-07-15 LAB
BACTERIA SPEC CULT: ABNORMAL
GRAM STN SPEC: ABNORMAL
GRAM STN SPEC: ABNORMAL
SERVICE CMNT-IMP: ABNORMAL

## 2018-07-15 PROCEDURE — 74011000250 HC RX REV CODE- 250: Performed by: FAMILY MEDICINE

## 2018-07-15 PROCEDURE — 74011000258 HC RX REV CODE- 258: Performed by: FAMILY MEDICINE

## 2018-07-15 PROCEDURE — 65270000029 HC RM PRIVATE

## 2018-07-15 PROCEDURE — 74011250636 HC RX REV CODE- 250/636: Performed by: FAMILY MEDICINE

## 2018-07-15 PROCEDURE — 74011250636 HC RX REV CODE- 250/636: Performed by: INTERNAL MEDICINE

## 2018-07-15 PROCEDURE — 74011000258 HC RX REV CODE- 258: Performed by: INTERNAL MEDICINE

## 2018-07-15 PROCEDURE — 77030020263 HC SOL INJ SOD CL0.9% LFCR 1000ML

## 2018-07-15 PROCEDURE — 94640 AIRWAY INHALATION TREATMENT: CPT

## 2018-07-15 PROCEDURE — 77010033678 HC OXYGEN DAILY

## 2018-07-15 PROCEDURE — 94760 N-INVAS EAR/PLS OXIMETRY 1: CPT

## 2018-07-15 PROCEDURE — 74011250637 HC RX REV CODE- 250/637: Performed by: FAMILY MEDICINE

## 2018-07-15 RX ORDER — VANCOMYCIN HYDROCHLORIDE
1250 EVERY 8 HOURS
Status: DISCONTINUED | OUTPATIENT
Start: 2018-07-15 | End: 2018-07-15

## 2018-07-15 RX ADMIN — VANCOMYCIN HYDROCHLORIDE 1250 MG: 10 INJECTION, POWDER, LYOPHILIZED, FOR SOLUTION INTRAVENOUS at 13:23

## 2018-07-15 RX ADMIN — DULOXETINE HYDROCHLORIDE 60 MG: 60 CAPSULE, DELAYED RELEASE ORAL at 08:40

## 2018-07-15 RX ADMIN — Medication 20 ML: at 21:54

## 2018-07-15 RX ADMIN — ALBUTEROL SULFATE 2.5 MG: 2.5 SOLUTION RESPIRATORY (INHALATION) at 08:32

## 2018-07-15 RX ADMIN — ACETAMINOPHEN 650 MG: 325 TABLET ORAL at 08:52

## 2018-07-15 RX ADMIN — BUDESONIDE 500 MCG: 0.5 INHALANT RESPIRATORY (INHALATION) at 19:19

## 2018-07-15 RX ADMIN — METOPROLOL SUCCINATE 100 MG: 100 TABLET, EXTENDED RELEASE ORAL at 08:40

## 2018-07-15 RX ADMIN — ACETAMINOPHEN 650 MG: 325 TABLET ORAL at 21:53

## 2018-07-15 RX ADMIN — FAMOTIDINE 20 MG: 20 TABLET ORAL at 17:29

## 2018-07-15 RX ADMIN — MORPHINE SULFATE 1 MG: 2 INJECTION, SOLUTION INTRAMUSCULAR; INTRAVENOUS at 08:52

## 2018-07-15 RX ADMIN — MORPHINE SULFATE 1 MG: 2 INJECTION, SOLUTION INTRAMUSCULAR; INTRAVENOUS at 21:53

## 2018-07-15 RX ADMIN — ALTEPLASE 1 MG: KIT at 10:00

## 2018-07-15 RX ADMIN — Medication 600 UNITS: at 13:12

## 2018-07-15 RX ADMIN — Medication 250 MG: at 17:29

## 2018-07-15 RX ADMIN — ALBUTEROL SULFATE 2.5 MG: 2.5 SOLUTION RESPIRATORY (INHALATION) at 19:19

## 2018-07-15 RX ADMIN — LISINOPRIL AND HYDROCHLOROTHIAZIDE 1 TABLET: 12.5; 2 TABLET ORAL at 08:40

## 2018-07-15 RX ADMIN — PIPERACILLIN SODIUM AND TAZOBACTAM SODIUM 3.38 G: 3; .375 INJECTION, POWDER, LYOPHILIZED, FOR SOLUTION INTRAVENOUS at 13:11

## 2018-07-15 RX ADMIN — Medication 20 ML: at 06:23

## 2018-07-15 RX ADMIN — HEPARIN SODIUM 5000 UNITS: 5000 INJECTION, SOLUTION INTRAVENOUS; SUBCUTANEOUS at 13:11

## 2018-07-15 RX ADMIN — BUDESONIDE 500 MCG: 0.5 INHALANT RESPIRATORY (INHALATION) at 08:32

## 2018-07-15 RX ADMIN — FAMOTIDINE 20 MG: 20 TABLET ORAL at 08:40

## 2018-07-15 RX ADMIN — HEPARIN SODIUM 5000 UNITS: 5000 INJECTION, SOLUTION INTRAVENOUS; SUBCUTANEOUS at 06:22

## 2018-07-15 RX ADMIN — FLUTICASONE PROPIONATE 2 SPRAY: 50 SPRAY, METERED NASAL at 08:40

## 2018-07-15 RX ADMIN — VANCOMYCIN HYDROCHLORIDE 2000 MG: 10 INJECTION, POWDER, LYOPHILIZED, FOR SOLUTION INTRAVENOUS at 07:06

## 2018-07-15 RX ADMIN — PIPERACILLIN SODIUM AND TAZOBACTAM SODIUM 3.38 G: 3; .375 INJECTION, POWDER, LYOPHILIZED, FOR SOLUTION INTRAVENOUS at 06:23

## 2018-07-15 RX ADMIN — MONTELUKAST SODIUM 10 MG: 10 TABLET, FILM COATED ORAL at 21:54

## 2018-07-15 RX ADMIN — ALBUTEROL SULFATE 2.5 MG: 2.5 SOLUTION RESPIRATORY (INHALATION) at 13:24

## 2018-07-15 RX ADMIN — Medication 20 ML: at 13:13

## 2018-07-15 RX ADMIN — Medication 10 ML: at 13:13

## 2018-07-15 RX ADMIN — LORATADINE 10 MG: 10 TABLET ORAL at 08:40

## 2018-07-15 RX ADMIN — CEFEPIME HYDROCHLORIDE 2 G: 2 INJECTION, POWDER, FOR SOLUTION INTRAVENOUS at 20:41

## 2018-07-15 RX ADMIN — HEPARIN SODIUM 5000 UNITS: 5000 INJECTION, SOLUTION INTRAVENOUS; SUBCUTANEOUS at 21:53

## 2018-07-15 RX ADMIN — Medication 250 MG: at 08:40

## 2018-07-15 NOTE — PROGRESS NOTES
Pharmacokinetic Consult to Pharmacist    Sasha Martinezp is a 61 y.o. female being treated with vancomycin and pip/tazo for left breast abscess in setting of breast implant following reconstructive surgery in pt with Hx of c diff. Height: 5' 2\" (157.5 cm)  Weight: 130.2 kg (287 lb)  Lab Results   Component Value Date/Time    BUN 5 (L) 07/14/2018 06:09 AM    Creatinine 0.66 07/14/2018 06:09 AM    WBC 7.1 07/13/2018 04:27 AM    Procalcitonin 0.1 07/12/2018 12:58 PM    Lactic acid 0.6 03/06/2017 11:05 PM    Lactic Acid (POC) 1.2 07/12/2018 01:03 PM      Estimated Creatinine Clearance: 113.1 mL/min (based on Cr of 0.66). CULTURES:  All Micro Results     Procedure Component Value Units Date/Time    CULTURE, BLOOD [[id number]] Collected:  07/12/18 1258    Order Status:  Completed Specimen:  Blood from Blood Updated:  07/15/18 0557     Special Requests: --        NO SPECIAL REQUESTS  RIGHT  HAND       Culture result: NO GROWTH 3 DAYS       CULTURE, BLOOD [[id number]] Collected:  07/12/18 1540    Order Status:  Completed Specimen:  Blood from Blood Updated:  07/15/18 0557     Special Requests: RIGHT ANTECUBITAL        Culture result: NO GROWTH 3 DAYS       CULTURE, Enoch Baumgarten STAIN [[id number]]  (Abnormal) Collected:  07/12/18 1634    Order Status:  Completed Specimen:  Wound from Breast Updated:  07/14/18 0919     Special Requests: NO SPECIAL REQUESTS        GRAM STAIN 0 TO 3 WBC'S/OIF      RARE GRAM NEGATIVE RODS     Culture result:         LIGHT GRAM NEGATIVE RODS (A)              IDENTIFICATION AND SUSCEPTIBILITY TO FOLLOW        Lab Results   Component Value Date/Time    Vancomycin,trough 11.4 07/14/2018 05:55 PM       Day 4 of vancomycin. Goal trough is 15-20. Trough slightly low. Will adjust dosing to 1250 mg IV q8h. Further levels will be ordered as clinically indicated. Pharmacy will continue to follow. Please call with any questions.     Thank you,  Yary Acosta, PharmD  Clinical Pharmacist  [phone]

## 2018-07-15 NOTE — PROGRESS NOTES
Brief ID note:    Chart review, patient not seen. Based on morganella from cx stop pip-tazo and vanco and start cefepime (avoid CTX as morganella can be an amp C ).

## 2018-07-15 NOTE — PROGRESS NOTES
Plastic Surgery  Impression:   · Left chest wall cellulitis and subcutaneous abscess with underlying breast implant, improving      Plan:   · ID - following. Morganella, zosyn sensitive. Continue abx for implant salvage. · Continue BID packing of abscess cavity with iodoform. Will need HH for daily dressing changes  · Appreciate hospitalist management of multiple medical issues  · No contracindications to chemical DVT prophylaxis with heparin at the moment      Subjective:         Patient is a 61 y. o. female with recent history of increasing left breast pain, swelling, new drainage starting yesterday PM one week s/p left breast expander implant exchange. 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 Tolerating abx. No bowel issues. Breast pain and drainage improving. No fevers.          Patient Active Problem List   Diagnosis Code    Chronic diastolic heart failure ([facility]) I50.32    DOLORES (obstructive sleep apnea) G47.33    Essential hypertension, benign I10    Gastroesophageal reflux disease without esophagitis K21.9    Morbid obesity with BMI of 50.0-59.9, adult ([facility]) E66.01, Z68.43    Anxiety F41.9    Tachycardia R00.0    Acute lower UTI N39.0    Palpitations R00.2    Hyperlipidemia E78.5    Skin lesions L98.9    Arthritis M19.90    Preop cardiovascular exam Z01.810    Cellulitis and abscess of trunk L03.319, L02.219    Acute diverticulitis K57.92    C. difficile colitis A04.72    Midline low back pain with sciatica M54.40    Dyspnea on exertion R06.09    Chronic fatigue R53.82    HTN (hypertension) I10    CAD (coronary artery disease) I25.10    Cellulitis of left breast N61.0     Past Medical History:   Diagnosis Date    Arthritis     everywhere;  DDD    Asthma     inhalers daily  [facility]    Autoimmune disease ([facility] [address]     Lupus, Fibromyalgia    Breast cancer ([facility] [address] 09/2016    CAD (coronary artery disease)     Dr Yumiko Looney, EF 60-65%, no stents, no MI    Cancer [facility])     left breast ca dx'ed this month-appt with surgeon 10/12    Chronic pain     generalized from arthritis    Complicated UTI (urinary tract infection) 12/8/2015    DDD (degenerative disc disease), cervical     Diabetes ([address]     Last HA1C 6.4 on 5/15/2018,No medications, No glucose checks    Diverticulosis     GERD (gastroesophageal reflux disease)     Heart failure (HCC)     EF 60-65% per echo 4/13/2018    [facility]    No significant valvular disease.   EF 50-55%    History of prediabetes     monitored by Primary Physician    Hypertension     Lymphedema     Morbid obesity ([address]     Nausea & vomiting     Shortness of breath     Sleep apnea     bi pap and o2    Thyroid cyst     cyst removed from thyroid      Family History   Problem Relation Age of Onset    Heart Disease Mother     Cancer Mother      lung    Hypertension Mother     Hypertension Father     Sleep Apnea Father     Cancer Father      prostate    Sleep Apnea Brother      states also has two children with sleep apnea    Cancer Brother      pituitary    Hypertension Brother     Breast Cancer Neg Hx       Social History   Substance Use Topics    Smoking status: Never Smoker    Smokeless tobacco: Never Used    Alcohol use No     Past Surgical History:   Procedure Laterality Date    BREAST SURGERY PROCEDURE UNLISTED      Mastectomy left side    CARDIAC SURG PROCEDURE UNLIST  Cath [facility]    Minimal CAD in the ostial circumflex and mid ramus (medical management)    HX BREAST RECONSTRUCTION Left 7/5/2018    LEFT BREAST REVISION/ EXPANDER EXCHANGE FOR IMPLANT performed by Lito Gutierrez MD at [facility] MAIN OR    HX [address] Bilateral     HX CHOLECYSTECTOMY      HX CYST REMOVAL      from thyroid    HX LAP CHOLECYSTECTOMY      HX MASTECTOMY Left 01/2017    HX OTHER SURGICAL      abcess where bra strap    INSERT TISSUE EXPANDER(S) Left 01/2017    NEUROLOGICAL PROCEDURE UNLISTED      Kaye Osei running to back\"    SINUS SURGERY PROC UNLISTED        Prior to Admission medications    Medication Sig Start Date End Date Taking? Authorizing Provider   DULoxetine (CYMBALTA) 60 mg capsule Take 1 Cap by mouth daily. 6/26/18  Yes Larissa Trinidad MD   metoprolol succinate (TOPROL-XL) 100 mg tablet Take 1 Tab by mouth daily. 5/2/18  Yes Luis Alfredo Martinez MD   phenylephrine (NEOSYNEPHRINE) 0.5 % spry 2 Sprays by Nasal route every six (6) hours as needed. Yes Historical Provider   biotin (VITAMIN B7) 5 mg capsule TK 1 C PO QD 12/6/17  Yes Historical Provider   clobetasol (TEMOVATE) 0.05 % external solution CESIA EXT TO THE SCALP  QD 12/6/17  Yes Historical Provider   ketoconazole (NIZORAL) 2 % shampoo USE TO [address] HAIR 1-2 TIMES A WEEK 12/6/17  Yes Historical Provider   lisinopril-hydroCHLOROthiazide (PRINZIDE, ZESTORETIC) 20-12.5 mg per tablet Take 1 Tab by mouth daily. 1/25/18  Yes Jamal Andrade MD   albuterol (PROAIR HFA) 90 mcg/actuation inhaler Take 1 Puff by inhalation every six (6) hours as needed for Wheezing. 1/25/18  Yes Jamal Andrade MD   montelukast (SINGULAIR) 10 mg tablet TAKE 1 TABLET BY MOUTH EVERY NIGHT 10/23/17  Yes Jonnie Mejia NP   fluticasone (FLONASE) 50 mcg/actuation nasal spray SHAKE LIQUID AND USE 2 SPRAYS IN EACH NOSTRIL DAILY 8/30/17  Yes Jamal Andrade MD   fluticasone-salmeterol (ADVAIR DISKUS) 250-50 mcg/dose diskus inhaler Take 1 Puff by inhalation two (2) times a day. 10/10/16  Yes Jamal Andrade MD   Cetirizine (ZYRTEC) 10 mg cap Take  by mouth every morning. Yes Historical Provider   cpap machine kit by Does Not Apply route. Yes Historical Provider   OXYGEN-AIR DELIVERY SYSTEMS by Does Not Apply route. 3 lpm qhs   Yes Historical Provider   ranitidine (ZANTAC) 150 mg tablet Take 1 Tab by mouth two (2) times a day.  Indications: GASTROESOPHAGEAL REFLUX 7/1/16  Yes Katie COE Yasemin Ramos MD   HYDROcodone-acetaminophen (NORCO) 5-325 mg per tablet Take 1 Tab by mouth every four (4) hours as needed for Pain. Max Daily Amount: 6 Tabs. 18   Corina Pritchard MD   ondansetron (ZOFRAN ODT) 4 mg disintegrating tablet Take 1 Tab by mouth every eight (8) hours as needed for Nausea. 18   Corina Pritchard MD   acetaminophen (TYLENOL EXTRA STRENGTH) 500 mg tablet Take  by mouth every six (6) hours as needed for Pain. Historical Provider   nitroglycerin (NITROSTAT) 0.4 mg SL tablet by SubLINGual route every five (5) minutes as needed for Chest Pain. Historical Provider     Allergies   Allergen Reactions    Ciprofloxacin Diarrhea and Other (comments)     Per pt, started her c-diff\"    Bactrim [Sulfamethoprim Ds] Rash     Pt gets a yeast infection on body     Sulfa (Sulfonamide Antibiotics) Other (comments)     Yeast growth          Objective:   Blood pressure 121/71, pulse 78, temperature 98.5 °F (36.9 °C), resp. rate 16, height 5' 2\" (1.575 m), weight 130.2 kg (287 lb), SpO2 98 %.   Temp (24hrs), Av.5 °F (36.9 °C), Min:97.6 °F (36.4 °C), Max:99.3 °F (37.4 °C)    Current Facility-Administered Medications   Medication Dose Route Frequency    vancomycin (VANCOCIN) 1250 mg in  ml infusion  1,250 mg IntraVENous Q8H    ondansetron (ZOFRAN) injection 4 mg  4 mg IntraVENous Q4H PRN    Saccharomyces boulardii (FLORASTOR) capsule 250 mg  250 mg Oral BID    sodium chloride (NS) flush 20 mL  20 mL InterCATHeter Q8H    heparin (porcine) pf 600 Units  600 Units InterCATHeter Q8H    sodium chloride (NS) flush 20 mL  20 mL InterCATHeter PRN    heparin (porcine) pf 600 Units  600 Units InterCATHeter PRN    albuterol (PROVENTIL VENTOLIN) nebulizer solution 2.5 mg  2.5 mg Nebulization Q4H PRN    loratadine (CLARITIN) tablet 10 mg  10 mg Oral DAILY    DULoxetine (CYMBALTA) capsule 60 mg  60 mg Oral DAILY    fluticasone (FLONASE) 50 mcg/actuation nasal spray 2 Spray  2 Spray Both Nostrils DAILY    HYDROcodone-acetaminophen (NORCO) 5-325 mg per tablet 1 Tab  1 Tab Oral Q4H PRN    lisinopril-hydroCHLOROthiazide (PRINZIDE, ZESTORETIC) 20-12.5 mg per tablet 1 Tab  1 Tab Oral DAILY    metoprolol succinate (TOPROL-XL) tablet 100 mg  100 mg Oral DAILY    montelukast (SINGULAIR) tablet 10 mg  10 mg Oral QHS    nitroglycerin (NITROSTAT) tablet 0.4 mg  0.4 mg SubLINGual Q5MIN PRN    famotidine (PEPCID) tablet 20 mg  20 mg Oral BID    sodium chloride (NS) flush 5-10 mL  5-10 mL IntraVENous Q8H    sodium chloride (NS) flush 5-10 mL  5-10 mL IntraVENous PRN    acetaminophen (TYLENOL) tablet 650 mg  650 mg Oral Q4H PRN    morphine injection 1 mg  1 mg IntraVENous Q4H PRN    heparin (porcine) injection 5,000 Units  5,000 Units SubCUTAneous Q8H    piperacillin-tazobactam (ZOSYN) 3.375 g in 0.9% sodium chloride (MBP/ADV) 100 mL  3.375 g IntraVENous Q8H    budesonide (PULMICORT) 500 mcg/2 ml nebulizer suspension  500 mcg Nebulization BID RT    And    albuterol (PROVENTIL VENTOLIN) nebulizer solution 2.5 mg  2.5 mg Nebulization Q6HWA RT    diazePAM (VALIUM) tablet 5 mg  5 mg Oral Q6H PRN        Exam:      A&O x3  RRR  Resp clear  abd soft, NT  Packing in place, some cloudy drainage.    RUE PICC in place    Signed By: Anish Kunz MD     July 15, 2018

## 2018-07-15 NOTE — PROGRESS NOTES
Hospitalist Progress Note Admit Date:  2018 11:05 AM  
Name:  Reyes Harper Age:  61 y.o. 
:  1955 MRN:  [id number] PCP:  Milly Dumont MD 
Treatment Team: Attending Provider: Charli Coker MD; Consulting Provider: Noelle Buerger, MD; Utilization Review: Ismael Sharma RN; Consulting Provider: Christiana Mortimer, MD; Utilization Review: Morris Tran Subjective:  
Pt admitted for left breast cellulitis- recent breast surgery 18 Says doing ok Not much pain left breast region. 18 ID saw pt yesterday. Pt says pain left breast better. 7/15/18 Says doing ok Getting routine breathing treatment. Objective:  
 
Patient Vitals for the past 24 hrs: 
 Temp Pulse Resp BP SpO2  
07/15/18 0834 - - - - 96 % 07/15/18 0709 99.3 °F (37.4 °C) 88 16 140/82 100 % 07/15/18 0344 98 °F (36.7 °C) 85 16 133/80 100 % 07/15/18 0000 98.8 °F (37.1 °C) 92 16 158/85 97 % 18 2000 98.9 °F (37.2 °C) 96 18 154/80 98 % 18 1909 - - - - 99 % 18 1550 97.6 °F (36.4 °C) 95 18 137/70 95 % 18 1455 - - - - 94 % 18 1032 98.8 °F (37.1 °C) 89 18 149/86 97 % Oxygen Therapy O2 Sat (%): 96 % (07/15/18 0834) Pulse via Oximetry: 95 beats per minute (07/15/18 0834) O2 Device: Room air (07/15/18 6216) O2 Flow Rate (L/min): 3 l/min (18 1455) FIO2 (%): 21 % (07/15/18 0834) Intake/Output Summary (Last 24 hours) at 07/15/18 9072 Last data filed at 07/15/18 4594 Gross per 24 hour Intake             1895 ml Output                0 ml Net             1895 ml General:    Well nourished. Alert.   
heent- normal 
CV:   RRR. No murmur, rub, or gallop. Lungs:   Clear to auscultation bilaterally. No wheezing, rhonchi, or rales. Left breast- dressing in place, nontender Abdomen:   Soft, nontender, nondistended. Morbid obesity Cns- no focal neurological deficits Extremities: Warm and dry. No cyanosis or edema.   
Skin: No rashes or jaundice. Data Review: 
I have reviewed all labs, meds, telemetry events, and studies from the last 24 hours. Recent Results (from the past 24 hour(s)) Junior Cruz Collection Time: 07/14/18  5:55 PM  
Result Value Ref Range Vancomycin,trough 11.4 5 - 20 ug/mL All Micro Results Procedure Component Value Units Date/Time CULTURE, BLOOD [[id number]] Collected:  07/12/18 1258 Order Status:  Completed Specimen:  Blood from Blood Updated:  07/15/18 0557 Special Requests: --     
  NO SPECIAL REQUESTS 
RIGHT 
HAND Culture result: NO GROWTH 3 DAYS     
 CULTURE, BLOOD [[id number]] Collected:  07/12/18 1540 Order Status:  Completed Specimen:  Blood from Blood Updated:  07/15/18 0557 Special Requests: RIGHT ANTECUBITAL Culture result: NO GROWTH 3 DAYS     
 CULTURE, Arzella Yair STAIN [[id number]]  (Abnormal) Collected:  07/12/18 1634 Order Status:  Completed Specimen:  Wound from Breast Updated:  07/14/18 6464 Special Requests: NO SPECIAL REQUESTS     
  GRAM STAIN 0 TO 3 WBC'S/OIF  
   RARE GRAM NEGATIVE RODS Culture result:      
  LIGHT GRAM NEGATIVE RODS (A) IDENTIFICATION AND SUSCEPTIBILITY TO FOLLOW Current Meds: 
Current Facility-Administered Medications Medication Dose Route Frequency  ondansetron (ZOFRAN) injection 4 mg  4 mg IntraVENous Q4H PRN  
 Saccharomyces boulardii (FLORASTOR) capsule 250 mg  250 mg Oral BID  sodium chloride (NS) flush 20 mL  20 mL InterCATHeter Q8H  
 heparin (porcine) pf 600 Units  600 Units InterCATHeter Q8H  
 sodium chloride (NS) flush 20 mL  20 mL InterCATHeter PRN  
 heparin (porcine) pf 600 Units  600 Units InterCATHeter PRN  
 albuterol (PROVENTIL VENTOLIN) nebulizer solution 2.5 mg  2.5 mg Nebulization Q4H PRN  
 loratadine (CLARITIN) tablet 10 mg  10 mg Oral DAILY  DULoxetine (CYMBALTA) capsule 60 mg  60 mg Oral DAILY  fluticasone (FLONASE) 50 mcg/actuation nasal spray 2 Spray  2 Spray Both Nostrils DAILY  HYDROcodone-acetaminophen (NORCO) 5-325 mg per tablet 1 Tab  1 Tab Oral Q4H PRN  
 lisinopril-hydroCHLOROthiazide (PRINZIDE, ZESTORETIC) 20-12.5 mg per tablet 1 Tab  1 Tab Oral DAILY  metoprolol succinate (TOPROL-XL) tablet 100 mg  100 mg Oral DAILY  montelukast (SINGULAIR) tablet 10 mg  10 mg Oral QHS  nitroglycerin (NITROSTAT) tablet 0.4 mg  0.4 mg SubLINGual Q5MIN PRN  
 famotidine (PEPCID) tablet 20 mg  20 mg Oral BID  sodium chloride (NS) flush 5-10 mL  5-10 mL IntraVENous Q8H  
 sodium chloride (NS) flush 5-10 mL  5-10 mL IntraVENous PRN  
 acetaminophen (TYLENOL) tablet 650 mg  650 mg Oral Q4H PRN  
 morphine injection 1 mg  1 mg IntraVENous Q4H PRN  
 heparin (porcine) injection 5,000 Units  5,000 Units SubCUTAneous Q8H  piperacillin-tazobactam (ZOSYN) 3.375 g in 0.9% sodium chloride (MBP/ADV) 100 mL  3.375 g IntraVENous Q8H  
 vancomycin (VANCOCIN) 2000 mg in  ml infusion  2,000 mg IntraVENous Q12H  
 budesonide (PULMICORT) 500 mcg/2 ml nebulizer suspension  500 mcg Nebulization BID RT And  
 albuterol (PROVENTIL VENTOLIN) nebulizer solution 2.5 mg  2.5 mg Nebulization Q6HWA RT  
 diazePAM (VALIUM) tablet 5 mg  5 mg Oral Q6H PRN Other Studies (last 24 hours): No results found. Assessment and Plan:  
 
Hospital Problems as of 7/15/2018  Date Reviewed: 5/15/2018 Codes Class Noted - Resolved POA  
 HTN (hypertension) ICD-10-CM: I10 
ICD-9-CM: 401.9  7/12/2018 - Present Yes CAD (coronary artery disease) ICD-10-CM: I25.10 ICD-9-CM: 414.00  7/12/2018 - Present Yes * (Principal)Cellulitis of left breast ICD-10-CM: N61.0 ICD-9-CM: 611.0  7/12/2018 - Present Yes Arthritis ICD-10-CM: M19.90 ICD-9-CM: 716.90  Unknown - Present Yes Overview Signed 10/18/2016  9:16 AM by Mary Grace Chavez  
  everywhere;  DDD  Morbid obesity with BMI of 50.0-59.9, adult (HCC) ICD-10-CM: E66.01, T77.96 
ICD-9-CM: 278.01, V85.43  11/5/2015 - Present Yes Gastroesophageal reflux disease without esophagitis (Chronic) ICD-10-CM: K21.9 ICD-9-CM: 530.81  10/7/2015 - Present Yes Essential hypertension, benign ICD-10-CM: I10 
ICD-9-CM: 401.1  9/28/2015 - Present Yes Chronic diastolic heart failure (HCC) (Chronic) ICD-10-CM: I50.32 
ICD-9-CM: 428.32  9/9/2015 - Present Yes Hyperlipidemia (Chronic) ICD-10-CM: I04.4 ICD-9-CM: 272.4  9/9/2015 - Present Yes  
   
 DOLORES (obstructive sleep apnea) (Chronic) ICD-10-CM: T80.95 
ICD-9-CM: 327.23  7/12/2013 - Present Yes PLAN:   
Left breast cellulitis- recent surgery- gram negative rods- on vanc and zosyn- ID following. Sleep apnea on bipap 
htn Morbid obesity 
hld Fibromyalgia Diastolic chf 
 
DC planning/Dispo: DVT ppx:  heparin Signed: 
Mandy Garcia MD

## 2018-07-16 LAB
ANION GAP SERPL CALC-SCNC: 8 MMOL/L (ref 7–16)
BASOPHILS # BLD: 0 K/UL (ref 0–0.2)
BASOPHILS NFR BLD: 1 % (ref 0–2)
BUN SERPL-MCNC: 6 MG/DL (ref 8–23)
CALCIUM SERPL-MCNC: 8.8 MG/DL (ref 8.3–10.4)
CHLORIDE SERPL-SCNC: 107 MMOL/L (ref 98–107)
CO2 SERPL-SCNC: 28 MMOL/L (ref 21–32)
CREAT SERPL-MCNC: 0.62 MG/DL (ref 0.6–1)
DIFFERENTIAL METHOD BLD: ABNORMAL
EOSINOPHIL # BLD: 0.3 K/UL (ref 0–0.8)
EOSINOPHIL NFR BLD: 4 % (ref 0.5–7.8)
ERYTHROCYTE [DISTWIDTH] IN BLOOD BY AUTOMATED COUNT: 14.1 % (ref 11.9–14.6)
GLUCOSE SERPL-MCNC: 105 MG/DL (ref 65–100)
HCT VFR BLD AUTO: 29.9 % (ref 35.8–46.3)
HGB BLD-MCNC: 9.3 G/DL (ref 11.7–15.4)
IMM GRANULOCYTES # BLD: 0.1 K/UL (ref 0–0.5)
IMM GRANULOCYTES NFR BLD AUTO: 2 % (ref 0–5)
LYMPHOCYTES # BLD: 1.9 K/UL (ref 0.5–4.6)
LYMPHOCYTES NFR BLD: 25 % (ref 13–44)
MCH RBC QN AUTO: 26.6 PG (ref 26.1–32.9)
MCHC RBC AUTO-ENTMCNC: 31.1 G/DL (ref 31.4–35)
MCV RBC AUTO: 85.7 FL (ref 79.6–97.8)
MONOCYTES # BLD: 0.5 K/UL (ref 0.1–1.3)
MONOCYTES NFR BLD: 7 % (ref 4–12)
NEUTS SEG # BLD: 4.6 K/UL (ref 1.7–8.2)
NEUTS SEG NFR BLD: 61 % (ref 43–78)
PLATELET # BLD AUTO: 439 K/UL (ref 150–450)
PMV BLD AUTO: 8.1 FL (ref 10.8–14.1)
POTASSIUM SERPL-SCNC: 3.8 MMOL/L (ref 3.5–5.1)
RBC # BLD AUTO: 3.49 M/UL (ref 4.05–5.25)
SODIUM SERPL-SCNC: 143 MMOL/L (ref 136–145)
WBC # BLD AUTO: 7.4 K/UL (ref 4.3–11.1)

## 2018-07-16 PROCEDURE — 74011250637 HC RX REV CODE- 250/637: Performed by: FAMILY MEDICINE

## 2018-07-16 PROCEDURE — 94640 AIRWAY INHALATION TREATMENT: CPT

## 2018-07-16 PROCEDURE — 77010033678 HC OXYGEN DAILY

## 2018-07-16 PROCEDURE — 80048 BASIC METABOLIC PNL TOTAL CA: CPT | Performed by: FAMILY MEDICINE

## 2018-07-16 PROCEDURE — 85025 COMPLETE CBC W/AUTO DIFF WBC: CPT | Performed by: FAMILY MEDICINE

## 2018-07-16 PROCEDURE — 74011250636 HC RX REV CODE- 250/636: Performed by: FAMILY MEDICINE

## 2018-07-16 PROCEDURE — 74011000250 HC RX REV CODE- 250: Performed by: FAMILY MEDICINE

## 2018-07-16 PROCEDURE — 74011000258 HC RX REV CODE- 258: Performed by: INTERNAL MEDICINE

## 2018-07-16 PROCEDURE — 65270000029 HC RM PRIVATE

## 2018-07-16 PROCEDURE — 94760 N-INVAS EAR/PLS OXIMETRY 1: CPT

## 2018-07-16 PROCEDURE — 74011250636 HC RX REV CODE- 250/636: Performed by: INTERNAL MEDICINE

## 2018-07-16 PROCEDURE — 74011250637 HC RX REV CODE- 250/637: Performed by: SURGERY

## 2018-07-16 RX ADMIN — Medication 20 ML: at 13:50

## 2018-07-16 RX ADMIN — FAMOTIDINE 20 MG: 20 TABLET ORAL at 08:53

## 2018-07-16 RX ADMIN — Medication 600 UNITS: at 13:50

## 2018-07-16 RX ADMIN — BUDESONIDE 500 MCG: 0.5 INHALANT RESPIRATORY (INHALATION) at 08:29

## 2018-07-16 RX ADMIN — ALBUTEROL SULFATE 2.5 MG: 2.5 SOLUTION RESPIRATORY (INHALATION) at 08:30

## 2018-07-16 RX ADMIN — ACETAMINOPHEN 650 MG: 325 TABLET ORAL at 08:53

## 2018-07-16 RX ADMIN — FLUTICASONE PROPIONATE 2 SPRAY: 50 SPRAY, METERED NASAL at 08:56

## 2018-07-16 RX ADMIN — FAMOTIDINE 20 MG: 20 TABLET ORAL at 17:08

## 2018-07-16 RX ADMIN — Medication 20 ML: at 06:40

## 2018-07-16 RX ADMIN — ACETAMINOPHEN 650 MG: 325 TABLET ORAL at 14:05

## 2018-07-16 RX ADMIN — ALBUTEROL SULFATE 2.5 MG: 2.5 SOLUTION RESPIRATORY (INHALATION) at 20:21

## 2018-07-16 RX ADMIN — Medication 600 UNITS: at 06:40

## 2018-07-16 RX ADMIN — LISINOPRIL AND HYDROCHLOROTHIAZIDE 1 TABLET: 12.5; 2 TABLET ORAL at 08:53

## 2018-07-16 RX ADMIN — Medication 250 MG: at 17:08

## 2018-07-16 RX ADMIN — ACETAMINOPHEN 650 MG: 325 TABLET ORAL at 20:11

## 2018-07-16 RX ADMIN — MORPHINE SULFATE 1 MG: 2 INJECTION, SOLUTION INTRAMUSCULAR; INTRAVENOUS at 08:52

## 2018-07-16 RX ADMIN — HEPARIN SODIUM 5000 UNITS: 5000 INJECTION, SOLUTION INTRAVENOUS; SUBCUTANEOUS at 13:48

## 2018-07-16 RX ADMIN — MONTELUKAST SODIUM 10 MG: 10 TABLET, FILM COATED ORAL at 20:05

## 2018-07-16 RX ADMIN — Medication 5 ML: at 13:50

## 2018-07-16 RX ADMIN — DIAZEPAM 5 MG: 5 TABLET ORAL at 20:12

## 2018-07-16 RX ADMIN — LORATADINE 10 MG: 10 TABLET ORAL at 08:53

## 2018-07-16 RX ADMIN — METOPROLOL SUCCINATE 100 MG: 100 TABLET, EXTENDED RELEASE ORAL at 08:52

## 2018-07-16 RX ADMIN — DULOXETINE HYDROCHLORIDE 60 MG: 60 CAPSULE, DELAYED RELEASE ORAL at 08:53

## 2018-07-16 RX ADMIN — CEFEPIME HYDROCHLORIDE 2 G: 2 INJECTION, POWDER, FOR SOLUTION INTRAVENOUS at 08:53

## 2018-07-16 RX ADMIN — BUDESONIDE 500 MCG: 0.5 INHALANT RESPIRATORY (INHALATION) at 20:22

## 2018-07-16 RX ADMIN — CEFEPIME HYDROCHLORIDE 2 G: 2 INJECTION, POWDER, FOR SOLUTION INTRAVENOUS at 20:06

## 2018-07-16 RX ADMIN — Medication 600 UNITS: at 20:07

## 2018-07-16 RX ADMIN — HEPARIN SODIUM 5000 UNITS: 5000 INJECTION, SOLUTION INTRAVENOUS; SUBCUTANEOUS at 06:39

## 2018-07-16 RX ADMIN — Medication 10 ML: at 06:41

## 2018-07-16 RX ADMIN — Medication 20 ML: at 20:08

## 2018-07-16 RX ADMIN — Medication 250 MG: at 08:53

## 2018-07-16 RX ADMIN — HEPARIN SODIUM 5000 UNITS: 5000 INJECTION, SOLUTION INTRAVENOUS; SUBCUTANEOUS at 20:06

## 2018-07-16 NOTE — CDMP QUERY
Please clarify if this patient is being treated/managed for:    POST-OPERATIVE WOUND INFECTION in a patient s/p breast reconstructive surgery admitted with cellulitis/abscess at surgical site requiring treatment with wound care and Maxipime     =>Other Explanation of clinical findings  =>Unable to Determine (no explanation of clinical findings)    The medical record reflects the following:    Risk Factors: s/p breast reconstructive surgery     Clinical Indicators: Cellulitis/abscess at surgical site, drainage, wound cultures + for Morganella     Treatment: Maxipime, wound care    Please clarify and document your clinical opinion in the progress notes and discharge summary including the definitive and/or presumptive diagnosis, (suspected or probable), related to the above clinical findings. Please include clinical findings supporting your diagnosis.     Thanks,  Nikos Mike RN, [address] [place] [address] Documentation Management Program  [phone]

## 2018-07-16 NOTE — PROGRESS NOTES
Plastic Surgery  Impression: ·   Left chest wall cellulitis and subcutaneous abscess with underlying breast implant, improving      Plan: ·   ID - following. Morganella, zosyn sensitive. Changed to Cefepime per ID  · Continue BID packing of abscess cavity with iodoform. Will need HH for daily dressing changes  · Appreciate hospitalist management of multiple medical issues  · Stable for d/c with packing and IV abx from wound perspective      Subjective:          Patient is a 61 y. o. female with recent history of increasing left breast pain, swelling, new drainage starting yesterday PM one week s/p left breast expander implant exchange. 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      Tolerating abx. No bowel issues. Had BM. Breast pain and drainage improving. No fevers.  Has been up and out of bed to chair during day today.                 Patient Active Problem List   Diagnosis Code    Chronic diastolic heart failure (HCC) I50.32    DOLORES (obstructive sleep apnea) G47.33    Essential hypertension, benign I10    Gastroesophageal reflux disease without esophagitis K21.9    Morbid obesity with BMI of 50.0-59.9, adult (HCC) E66.01, Z68.43    Anxiety F41.9    Tachycardia R00.0    Acute lower UTI N39.0    Palpitations R00.2    Hyperlipidemia E78.5    Skin lesions L98.9    Arthritis M19.90    Preop cardiovascular exam Z01.810    Cellulitis and abscess of trunk L03.319, L02.219    Acute diverticulitis K57.92    C. difficile colitis A04.72    Midline low back pain with sciatica M54.40    Dyspnea on exertion R06.09    Chronic fatigue R53.82    HTN (hypertension) I10    CAD (coronary artery disease) I25.10    Cellulitis of left breast N61.0           Past Medical History:   Diagnosis Date    Arthritis       everywhere;  DDD    Asthma       inhalers daily  [facility]    Autoimmune disease ([facility] [address]       Lupus, Fibromyalgia    Breast cancer ([facility] [address] 09/2016    CAD (coronary artery disease)       Dr Fili Francisco, EF 60-65%, no stents, no MI    Cancer [facility])       left breast ca dx'ed this month-appt with surgeon 10/12    Chronic pain       generalized from arthritis    Complicated UTI (urinary tract infection) 12/8/2015    DDD (degenerative disc disease), cervical      Diabetes ([facility] [address]       Last HA1C 6.4 on 5/15/2018,No medications, No glucose checks    Diverticulosis      GERD (gastroesophageal reflux disease)      Heart failure (HCC)       EF 60-65% per echo 4/13/2018    [facility]     No significant valvular disease.   EF 50-55%    History of prediabetes       monitored by Primary Physician    Hypertension      Lymphedema      Morbid obesity ([facility] [address]      Nausea & vomiting      Shortness of breath      Sleep apnea       bi pap and o2    Thyroid cyst       cyst removed from thyroid             Family History   Problem Relation Age of Onset    Heart Disease Mother      Cancer Mother         lung    Hypertension Mother      Hypertension Father      Sleep Apnea Father      Cancer Father         prostate    Sleep Apnea Brother         states also has two children with sleep apnea    Cancer Brother         pituitary    Hypertension Brother      Breast Cancer Neg Hx             Social History   Substance Use Topics    Smoking status: Never Smoker    Smokeless tobacco: Never Used    Alcohol use No            Past Surgical History:   Procedure Laterality Date    BREAST SURGERY PROCEDURE UNLISTED         Mastectomy left side    CARDIAC SURG PROCEDURE UNLIST   [facility]     Minimal CAD in the ostial circumflex and mid ramus (medical management)    HX BREAST RECONSTRUCTION Left 7/5/2018     LEFT BREAST REVISION/ EXPANDER EXCHANGE FOR IMPLANT performed by Eddie Gomez MD at [facility] MAIN OR    HX CARPAL TUNNEL RELEASE Bilateral      HX CHOLECYSTECTOMY        HX CYST REMOVAL         from thyroid    HX LAP CHOLECYSTECTOMY        HX MASTECTOMY Left 01/2017    HX OTHER SURGICAL         abcess where bra strap    INSERT TISSUE EXPANDER(S) Left 01/2017    NEUROLOGICAL PROCEDURE UNLISTED         \"nerve running to back\"    SINUS SURGERY PROC UNLISTED                  Prior to Admission medications    Medication Sig Start Date End Date Taking? Authorizing Provider   DULoxetine (CYMBALTA) 60 mg capsule Take 1 Cap by mouth daily. 6/26/18   Yes Reba Wiggins MD   metoprolol succinate (TOPROL-XL) 100 mg tablet Take 1 Tab by mouth daily. 5/2/18   Yes Miguel Oneal MD   phenylephrine (NEOSYNEPHRINE) 0.5 % spry 2 Sprays by Nasal route every six (6) hours as needed.     Yes Historical Provider   biotin (VITAMIN B7) 5 mg capsule TK 1 C PO QD 12/6/17   Yes Historical Provider   clobetasol (TEMOVATE) 0.05 % external solution CESIA EXT TO THE SCALP  QD 12/6/17   Yes Historical Provider   ketoconazole (NIZORAL) 2 % shampoo USE TO [address] HAIR 1-2 TIMES A WEEK 12/6/17   Yes Historical Provider   lisinopril-hydroCHLOROthiazide (PRINZIDE, ZESTORETIC) 20-12.5 mg per tablet Take 1 Tab by mouth daily. 1/25/18   Yes Jo Ann Weaver MD   albuterol (PROAIR HFA) 90 mcg/actuation inhaler Take 1 Puff by inhalation every six (6) hours as needed for Wheezing. 1/25/18   Yes Jo Ann Weaver MD   montelukast (SINGULAIR) 10 mg tablet TAKE 1 TABLET BY MOUTH EVERY NIGHT 10/23/17   Yes Indira Serrano NP   fluticasone (FLONASE) 50 mcg/actuation nasal spray SHAKE LIQUID AND USE 2 SPRAYS IN EACH NOSTRIL DAILY 8/30/17   Yes Jo Ann Weaver MD   fluticasone-salmeterol (ADVAIR DISKUS) 250-50 mcg/dose diskus inhaler Take 1 Puff by inhalation two (2) times a day.  10/10/16   Yes Jo Ann Weaver MD   Cetirizine (ZYRTEC) 10 mg cap Take  by mouth every morning.     Yes Historical Provider   cpap machine kit by Does Not Apply route.     Yes Historical Provider   OXYGEN-AIR DELIVERY SYSTEMS by Does Not Apply route. 3 lpm qhs     Yes Historical Provider   ranitidine (ZANTAC) 150 mg tablet Take 1 Tab by mouth two (2) times a day. Indications: GASTROESOPHAGEAL REFLUX 7/1/16   Yes Marito Bansal MD   HYDROcodone-acetaminophen (NORCO) 5-325 mg per tablet Take 1 Tab by mouth every four (4) hours as needed for Pain. Max Daily Amount: 6 Tabs. 7/5/18     Solo Mixon MD   ondansetron (ZOFRAN ODT) 4 mg disintegrating tablet Take 1 Tab by mouth every eight (8) hours as needed for Nausea. 7/5/18     Gabo Grande MD   acetaminophen (TYLENOL EXTRA STRENGTH) 500 mg tablet Take  by mouth every six (6) hours as needed for Pain.       Historical Provider   nitroglycerin (NITROSTAT) 0.4 mg SL tablet by SubLINGual route every five (5) minutes as needed for Chest Pain.       Historical Provider            Allergies   Allergen Reactions    Ciprofloxacin Diarrhea and Other (comments)       Per pt, started her c-diff\"    Bactrim [Sulfamethoprim Ds] Rash       Pt gets a yeast infection on body     Sulfa (Sulfonamide Antibiotics) Other (comments)       Yeast growth            Objective:     Visit Vitals    /79    Pulse 91    Temp 98.9 °F (37.2 °C)    Resp 16    Ht 5' 2\" (1.575 m)    Wt 130.2 kg (287 lb)    SpO2 97%    BMI 52.49 kg/m2         A&O x3  RRR  Resp clear  Abd soft  Packing removed.  Some purulent drainage but no crepitance, cellulitis stable.

## 2018-07-16 NOTE — PROGRESS NOTES
Infectious Disease Progress Note    Today's Date: 2018   Admit Date: 2018    Impression:   · Left chest wall cellulitis and subcutaneous abscess with underlying breast implant. superficial wd swab with morganella morganii - avoid ceftriaxone due to potential amp-C production and isolate is ceftriaxone intermediate  · S/P left breast reconstruction expander implant exchange adipofascial flap augmentation of left breast volume (18) Dr. Velasquez Saez  · Hx Left total mastectomy with lymph biopsy and tissue expander (18) at [facility] with Dr. Joycelyn Arreola  · I&D of left chest wall seroma (3/11/18), Dr. Sarah Arreola [facility])   · Hx refractory C-diff ultimately requiring fecal transplant (17) at [facility] Dr. Kasandra Alpers  · Multiple antibiotic allergies    Plan:   · Continue cefepime and will continue to follow. Anti-infectives:   · Cefepime (7/15 -  · Vancomycin (- 7/15)   · Zosyn (-7/15)    Interval History:   Afebrile. Antibiotics adjusted yesterday by Dr. Hayden Mccallum based on culture. Allergies   Allergen Reactions    Ciprofloxacin Diarrhea and Other (comments)     Per pt, started her c-diff\"    Bactrim [Sulfamethoprim Ds] Rash     Pt gets a yeast infection on body     Sulfa (Sulfonamide Antibiotics) Other (comments)     Yeast growth        Review of Systems: Reports that she is improving some, decreased drainage, and decreased tenderness. Has had a little nausea, denies vomiting, denies diarrhea, although she has had a few soft-formed stools, denies fevers, chills, sweats. Pertinent items are noted in the History of Present Illness.     Objective:     Visit Vitals    /80    Pulse 81    Temp 98.2 °F (36.8 °C)    Resp 16    Ht 5' 2\" (1.575 m)    Wt 130.2 kg (287 lb)    SpO2 97%    BMI 52.49 kg/m2     Temp (24hrs), Av.6 °F (37 °C), Min:98.2 °F (36.8 °C), Max:99.1 °F (37.3 °C)       Lines:  Peripheral IV:            Physical Exam:    General:  Alert, cooperative, obese, appears stated age Eyes:  Sclera anicteric. Pupils equally round and reactive to light. Mouth/Throat: Mucous membranes normal, oral pharynx clear   Neck: Supple, midline trachea   Lungs:   Anterior lungs clear without increased work of breathing   CV:  RRR without audible murmur   Abdomen:   Soft, non-tender. bowel sounds normal. non-distended   Extremities: No cyanosis or edema   Skin: L breast wound packed with small wick in place, small amount of yellow drainage on dressing, no surrounding erythema   Musculoskeletal: No swelling or deformity, moves all extremities well   Lines/Devices:  Intact, no erythema, drainage or tenderness   Psych: Alert and oriented, normal mood affect given the setting       Data Review:     CBC:  Recent Labs      07/16/18   0602   WBC  7.4   GRANS  61   MONOS  7   EOS  4   ANEU  4.6   ABL  1.9   HGB  9.3*   HCT  29.9*   PLT  439       BMP:  Recent Labs      07/16/18   0602 07/14/18   0609   CREA  0.62  0.66   BUN  6*  5*   NA  143  144   K  3.8  3.7   CL  107  108*   CO2  28  24   AGAP  8  12   GLU  105*  137*       LFTS:  No results for input(s): TBILI, ALT, SGOT, AP, TP, ALB in the last 72 hours.     Microbiology:     All Micro Results     Procedure Component Value Units Date/Time    C. DIFFICILE/EPI PCR [[id number]]     Order Status:  Sent Specimen:  Stool     CULTURE, BLOOD [[id number]] Collected:  07/12/18 1258    Order Status:  Completed Specimen:  Blood from Blood Updated:  07/16/18 0640     Special Requests: --        NO SPECIAL REQUESTS  RIGHT  HAND       Culture result: NO GROWTH 4 DAYS       CULTURE, BLOOD [[id number]] Collected:  07/12/18 1540    Order Status:  Completed Specimen:  Blood from Blood Updated:  07/16/18 0640     Special Requests: RIGHT ANTECUBITAL        Culture result: NO GROWTH 4 DAYS       CULTURE, WOUND Donah Gurley STAIN [[id number]]  (Abnormal)  (Susceptibility) Collected:  07/12/18 1634    Order Status:  Completed Specimen:  Wound from Breast Updated:  07/15/18 1010     Special Requests: NO SPECIAL REQUESTS        GRAM STAIN 0 TO 3 WBC'S/OIF      RARE GRAM NEGATIVE RODS     Culture result:         LIGHT MORGANELLA MORGANII (A)          Imaging:   None    Signed By: Albert Winter NP     July 16, 2018

## 2018-07-16 NOTE — PROGRESS NOTES
END OF SHIFT NOTE:    INTAKE/OUTPUT     Voiding: YES  Catheter: NO  Drain:              Flatus: Patient does have flatus present. Stool:  0 occurrences. Characteristics:       Emesis: 0 occurrences. Characteristics:        VITAL SIGNS  Patient Vitals for the past 12 hrs:   Temp Pulse Resp BP SpO2   07/16/18 0411 98.8 °F (37.1 °C) 77 16 140/75 100 %   07/15/18 2341 98.6 °F (37 °C) 84 16 141/83 100 %   07/15/18 1930 99.1 °F (37.3 °C) 90 16 (!) 165/95 95 %   07/15/18 1919 - - - - 95 %       Pain Assessment  Pain Intensity 1: 3 (07/15/18 2340)  Pain Location 1: Breast  Pain Intervention(s) 1: Medication (see MAR)  Patient Stated Pain Goal: 0    Ambulating  Yes    Shift report given to oncoming nurse at the bedside.     Nikkie Lopez RN

## 2018-07-16 NOTE — PROGRESS NOTES
Problem: Falls - Risk of  Goal: *Absence of Falls  Document Meagan Fall Risk and appropriate interventions in the flowsheet.    Outcome: Progressing Towards Goal  Fall Risk Interventions:            Medication Interventions: Bed/chair exit alarm, Evaluate medications/consider consulting pharmacy, Patient to call before getting OOB, Teach patient to arise slowly    Elimination Interventions: Call light in reach, Elevated toilet seat, Patient to call for help with toileting needs, Toilet paper/wipes in reach, Toileting schedule/hourly rounds

## 2018-07-16 NOTE — PROGRESS NOTES
Hospitalist Progress Note Admit Date:  2018 11:05 AM  
Name:  Lily Gallegos Age:  61 y.o. 
:  1955 MRN:  [id number] PCP:  Luh Camilo MD 
Treatment Team: Attending Provider: Bassam Judge MD; Consulting Provider: Corina Pritchard MD; Utilization Review: David Hawthorne RN; Consulting Provider: Greg Caicedo MD; Care Manager: Sanna Posadas RN Subjective:  
Pt admitted for left breast cellulitis- recent breast surgery 18 Says doing ok Not much pain left breast region. 18 ID saw pt yesterday. Pt says pain left breast better. 7/15/18 Says doing ok Getting routine breathing treatment. 18 Staff says pt having diarrhea Pt says she feels good Objective:  
 
Patient Vitals for the past 24 hrs: 
 Temp Pulse Resp BP SpO2  
18 1924 98.4 °F (36.9 °C) 89 18 154/84 97 % 18 1504 98.9 °F (37.2 °C) 91 18 150/79 97 % 18 1101 98.6 °F (37 °C) 81 16 141/63 98 % 18 0830 - - - - 97 % 18 0705 98.2 °F (36.8 °C) 81 16 126/80 100 % 18 0411 98.8 °F (37.1 °C) 77 16 140/75 100 % 07/15/18 2341 98.6 °F (37 °C) 84 16 141/83 100 % Oxygen Therapy O2 Sat (%): 97 % (18) Pulse via Oximetry: 94 beats per minute (18 0830) O2 Device: Room air (18) O2 Flow Rate (L/min): 0 l/min (18 0830) FIO2 (%): 21 % (07/15/18 1325) No intake or output data in the 24 hours ending 18 General:    Well nourished. Alert.   
heent- normal 
CV:   RRR. No murmur, rub, or gallop. Lungs:   Clear to auscultation bilaterally. No wheezing, rhonchi, or rales. Left breast- dressing in place, nontender Abdomen:   Soft, nontender, nondistended. Morbid obesity Cns- no focal neurological deficits Extremities: Warm and dry. No cyanosis or edema. Skin:     No rashes or jaundice.   
 
Data Review: 
I have reviewed all labs, meds, telemetry events, and studies from the last  hours. Recent Results (from the past 24 hour(s)) CBC WITH AUTOMATED DIFF Collection Time: 07/16/18  6:02 AM  
Result Value Ref Range WBC 7.4 4.3 - 11.1 K/uL  
 RBC 3.49 (L) 4.05 - 5.25 M/uL HGB 9.3 (L) 11.7 - 15.4 g/dL HCT 29.9 (L) 35.8 - 46.3 % MCV 85.7 79.6 - 97.8 FL  
 MCH 26.6 26.1 - 32.9 PG  
 MCHC 31.1 (L) 31.4 - 35.0 g/dL  
 RDW 14.1 11.9 - 14.6 % PLATELET 442 975 - 856 K/uL MPV 8.1 (L) 10.8 - 14.1 FL  
 DF AUTOMATED NEUTROPHILS 61 43 - 78 % LYMPHOCYTES 25 13 - 44 % MONOCYTES 7 4.0 - 12.0 % EOSINOPHILS 4 0.5 - 7.8 % BASOPHILS 1 0.0 - 2.0 % IMMATURE GRANULOCYTES 2 0.0 - 5.0 %  
 ABS. NEUTROPHILS 4.6 1.7 - 8.2 K/UL  
 ABS. LYMPHOCYTES 1.9 0.5 - 4.6 K/UL  
 ABS. MONOCYTES 0.5 0.1 - 1.3 K/UL  
 ABS. EOSINOPHILS 0.3 0.0 - 0.8 K/UL  
 ABS. BASOPHILS 0.0 0.0 - 0.2 K/UL  
 ABS. IMM. GRANS. 0.1 0.0 - 0.5 K/UL METABOLIC PANEL, BASIC Collection Time: 07/16/18  6:02 AM  
Result Value Ref Range Sodium 143 136 - 145 mmol/L Potassium 3.8 3.5 - 5.1 mmol/L Chloride 107 98 - 107 mmol/L  
 CO2 28 21 - 32 mmol/L Anion gap 8 7 - 16 mmol/L Glucose 105 (H) 65 - 100 mg/dL BUN 6 (L) 8 - 23 MG/DL Creatinine 0.62 0.6 - 1.0 MG/DL  
 GFR est AA >60 >60 ml/min/1.73m2 GFR est non-AA >60 >60 ml/min/1.73m2 Calcium 8.8 8.3 - 10.4 MG/DL All Micro Results Procedure Component Value Units Date/Time C. DIFFICILE/EPI PCR [[id number]] Order Status:  Canceled Specimen:  Stool CULTURE, BLOOD [[id number]] Collected:  07/12/18 1258 Order Status:  Completed Specimen:  Blood from Blood Updated:  07/16/18 5242 Special Requests: --     
  NO SPECIAL REQUESTS 
RIGHT 
HAND Culture result: NO GROWTH 4 DAYS     
 CULTURE, BLOOD [[id number]] Collected:  07/12/18 1540 Order Status:  Completed Specimen:  Blood from Blood Updated:  07/16/18 6271 Special Requests: RIGHT ANTECUBITAL   Culture result: NO GROWTH 4 DAYS     
 CULTURE, WOUND W Nicole Luis STAIN [[id number]]  (Abnormal)  (Susceptibility) Collected:  07/12/18 7154 Order Status:  Completed Specimen:  Wound from Breast Updated:  07/15/18 1010 Special Requests: NO SPECIAL REQUESTS     
  GRAM STAIN 0 TO 3 WBC'S/OIF  
   RARE GRAM NEGATIVE RODS Culture result:      
  LIGHT MORGANELLA MORGANII (A) Current Meds: 
Current Facility-Administered Medications Medication Dose Route Frequency  cefepime (MAXIPIME) 2 g in 0.9% sodium chloride (MBP/ADV) 100 mL  2 g IntraVENous Q12H  
 ondansetron (ZOFRAN) injection 4 mg  4 mg IntraVENous Q4H PRN  
 Saccharomyces boulardii (FLORASTOR) capsule 250 mg  250 mg Oral BID  sodium chloride (NS) flush 20 mL  20 mL InterCATHeter Q8H  
 heparin (porcine) pf 600 Units  600 Units InterCATHeter Q8H  
 sodium chloride (NS) flush 20 mL  20 mL InterCATHeter PRN  
 heparin (porcine) pf 600 Units  600 Units InterCATHeter PRN  
 albuterol (PROVENTIL VENTOLIN) nebulizer solution 2.5 mg  2.5 mg Nebulization Q4H PRN  
 loratadine (CLARITIN) tablet 10 mg  10 mg Oral DAILY  DULoxetine (CYMBALTA) capsule 60 mg  60 mg Oral DAILY  fluticasone (FLONASE) 50 mcg/actuation nasal spray 2 Spray  2 Spray Both Nostrils DAILY  HYDROcodone-acetaminophen (NORCO) 5-325 mg per tablet 1 Tab  1 Tab Oral Q4H PRN  
 lisinopril-hydroCHLOROthiazide (PRINZIDE, ZESTORETIC) 20-12.5 mg per tablet 1 Tab  1 Tab Oral DAILY  metoprolol succinate (TOPROL-XL) tablet 100 mg  100 mg Oral DAILY  montelukast (SINGULAIR) tablet 10 mg  10 mg Oral QHS  nitroglycerin (NITROSTAT) tablet 0.4 mg  0.4 mg SubLINGual Q5MIN PRN  
 famotidine (PEPCID) tablet 20 mg  20 mg Oral BID  sodium chloride (NS) flush 5-10 mL  5-10 mL IntraVENous Q8H  
 sodium chloride (NS) flush 5-10 mL  5-10 mL IntraVENous PRN  
 acetaminophen (TYLENOL) tablet 650 mg  650 mg Oral Q4H PRN  
 morphine injection 1 mg  1 mg IntraVENous Q4H PRN  
 heparin (porcine) injection 5,000 Units  5,000 Units SubCUTAneous Q8H  
 budesonide (PULMICORT) 500 mcg/2 ml nebulizer suspension  500 mcg Nebulization BID RT And  
 albuterol (PROVENTIL VENTOLIN) nebulizer solution 2.5 mg  2.5 mg Nebulization Q6HWA RT  
 diazePAM (VALIUM) tablet 5 mg  5 mg Oral Q6H PRN Other Studies (last 24 hours): No results found. Assessment and Plan:  
 
Hospital Problems as of 7/16/2018  Date Reviewed: 5/15/2018 Codes Class Noted - Resolved POA  
 HTN (hypertension) ICD-10-CM: I10 
ICD-9-CM: 401.9  7/12/2018 - Present Yes CAD (coronary artery disease) ICD-10-CM: I25.10 ICD-9-CM: 414.00  7/12/2018 - Present Yes * (Principal)Cellulitis of left breast ICD-10-CM: N61.0 ICD-9-CM: 611.0  7/12/2018 - Present Yes Arthritis ICD-10-CM: M19.90 ICD-9-CM: 716.90  Unknown - Present Yes Overview Signed 10/18/2016  9:16 AM by Rusty Reynolds  
  everywhere;  DDD Morbid obesity with BMI of 50.0-59.9, adult [facility]) ICD-10-CM: E66.01, Z68.43 
ICD-9-CM: 278.01, V85.43  11/5/2015 - Present Yes Gastroesophageal reflux disease without esophagitis (Chronic) ICD-10-CM: K21.9 ICD-9-CM: 530.81  10/7/2015 - Present Yes Essential hypertension, benign ICD-10-CM: I10 
ICD-9-CM: 401.1  9/28/2015 - Present Yes Chronic diastolic heart failure (HCC) (Chronic) ICD-10-CM: I50.32 
ICD-9-CM: 428.32  9/9/2015 - Present Yes Hyperlipidemia (Chronic) ICD-10-CM: T26.7 ICD-9-CM: 272.4  9/9/2015 - Present Yes  
   
 DOLORES (obstructive sleep apnea) (Chronic) ICD-10-CM: J15.51 
ICD-9-CM: 327.23  7/12/2013 - Present Yes PLAN:   
Left breast cellulitis- recent surgery- morganella  On cefepime- ID following. Sleep apnea on bipap 
htn Morbid obesity 
hld Fibromyalgia Diastolic chf 
 
DC planning/Dispo: DVT ppx:  heparin Signed: 
Janae Nieves MD

## 2018-07-17 LAB
ANION GAP SERPL CALC-SCNC: 7 MMOL/L (ref 7–16)
BACTERIA SPEC CULT: NORMAL
BACTERIA SPEC CULT: NORMAL
BUN SERPL-MCNC: 10 MG/DL (ref 8–23)
CALCIUM SERPL-MCNC: 9.5 MG/DL (ref 8.3–10.4)
CHLORIDE SERPL-SCNC: 106 MMOL/L (ref 98–107)
CO2 SERPL-SCNC: 29 MMOL/L (ref 21–32)
CREAT SERPL-MCNC: 0.61 MG/DL (ref 0.6–1)
GLUCOSE SERPL-MCNC: 101 MG/DL (ref 65–100)
POTASSIUM SERPL-SCNC: 4 MMOL/L (ref 3.5–5.1)
SERVICE CMNT-IMP: NORMAL
SERVICE CMNT-IMP: NORMAL
SODIUM SERPL-SCNC: 142 MMOL/L (ref 136–145)

## 2018-07-17 PROCEDURE — 65270000029 HC RM PRIVATE

## 2018-07-17 PROCEDURE — 77030021668 HC NEB PREFIL KT VYRM -A

## 2018-07-17 PROCEDURE — 94640 AIRWAY INHALATION TREATMENT: CPT

## 2018-07-17 PROCEDURE — 80048 BASIC METABOLIC PNL TOTAL CA: CPT | Performed by: FAMILY MEDICINE

## 2018-07-17 PROCEDURE — 74011250636 HC RX REV CODE- 250/636: Performed by: INTERNAL MEDICINE

## 2018-07-17 PROCEDURE — 74011250637 HC RX REV CODE- 250/637: Performed by: SURGERY

## 2018-07-17 PROCEDURE — 74011000258 HC RX REV CODE- 258: Performed by: INTERNAL MEDICINE

## 2018-07-17 PROCEDURE — 74011250636 HC RX REV CODE- 250/636: Performed by: FAMILY MEDICINE

## 2018-07-17 PROCEDURE — 94760 N-INVAS EAR/PLS OXIMETRY 1: CPT

## 2018-07-17 PROCEDURE — 74011250637 HC RX REV CODE- 250/637: Performed by: FAMILY MEDICINE

## 2018-07-17 PROCEDURE — 74011000250 HC RX REV CODE- 250: Performed by: FAMILY MEDICINE

## 2018-07-17 PROCEDURE — 74011250637 HC RX REV CODE- 250/637: Performed by: NURSE PRACTITIONER

## 2018-07-17 RX ADMIN — ACETAMINOPHEN 650 MG: 325 TABLET ORAL at 23:19

## 2018-07-17 RX ADMIN — Medication 250 MG: at 09:15

## 2018-07-17 RX ADMIN — ACETAMINOPHEN 650 MG: 325 TABLET ORAL at 09:15

## 2018-07-17 RX ADMIN — Medication 20 ML: at 14:00

## 2018-07-17 RX ADMIN — BUDESONIDE 500 MCG: 0.5 INHALANT RESPIRATORY (INHALATION) at 07:12

## 2018-07-17 RX ADMIN — Medication 20 ML: at 20:30

## 2018-07-17 RX ADMIN — CEFEPIME HYDROCHLORIDE 2 G: 2 INJECTION, POWDER, FOR SOLUTION INTRAVENOUS at 09:15

## 2018-07-17 RX ADMIN — FAMOTIDINE 20 MG: 20 TABLET ORAL at 09:15

## 2018-07-17 RX ADMIN — VANCOMYCIN HYDROCHLORIDE 125 MG: 1 INJECTION, POWDER, LYOPHILIZED, FOR SOLUTION INTRAVENOUS at 14:02

## 2018-07-17 RX ADMIN — LISINOPRIL AND HYDROCHLOROTHIAZIDE 1 TABLET: 12.5; 2 TABLET ORAL at 09:15

## 2018-07-17 RX ADMIN — HEPARIN SODIUM 5000 UNITS: 5000 INJECTION, SOLUTION INTRAVENOUS; SUBCUTANEOUS at 14:01

## 2018-07-17 RX ADMIN — ALBUTEROL SULFATE 2.5 MG: 2.5 SOLUTION RESPIRATORY (INHALATION) at 19:09

## 2018-07-17 RX ADMIN — CEFEPIME HYDROCHLORIDE 2 G: 2 INJECTION, POWDER, FOR SOLUTION INTRAVENOUS at 20:32

## 2018-07-17 RX ADMIN — MORPHINE SULFATE 1 MG: 2 INJECTION, SOLUTION INTRAMUSCULAR; INTRAVENOUS at 23:19

## 2018-07-17 RX ADMIN — HEPARIN SODIUM 5000 UNITS: 5000 INJECTION, SOLUTION INTRAVENOUS; SUBCUTANEOUS at 20:33

## 2018-07-17 RX ADMIN — ALBUTEROL SULFATE 2.5 MG: 2.5 SOLUTION RESPIRATORY (INHALATION) at 07:12

## 2018-07-17 RX ADMIN — Medication 10 ML: at 14:00

## 2018-07-17 RX ADMIN — METOPROLOL SUCCINATE 100 MG: 100 TABLET, EXTENDED RELEASE ORAL at 09:14

## 2018-07-17 RX ADMIN — ALBUTEROL SULFATE 2.5 MG: 2.5 SOLUTION RESPIRATORY (INHALATION) at 13:23

## 2018-07-17 RX ADMIN — BUDESONIDE 500 MCG: 0.5 INHALANT RESPIRATORY (INHALATION) at 19:08

## 2018-07-17 RX ADMIN — Medication 250 MG: at 17:00

## 2018-07-17 RX ADMIN — LORATADINE 10 MG: 10 TABLET ORAL at 09:15

## 2018-07-17 RX ADMIN — DIAZEPAM 5 MG: 5 TABLET ORAL at 14:08

## 2018-07-17 RX ADMIN — DIAZEPAM 5 MG: 5 TABLET ORAL at 04:43

## 2018-07-17 RX ADMIN — HEPARIN SODIUM 5000 UNITS: 5000 INJECTION, SOLUTION INTRAVENOUS; SUBCUTANEOUS at 04:43

## 2018-07-17 RX ADMIN — ACETAMINOPHEN 650 MG: 325 TABLET ORAL at 04:43

## 2018-07-17 RX ADMIN — FAMOTIDINE 20 MG: 20 TABLET ORAL at 17:00

## 2018-07-17 RX ADMIN — Medication 600 UNITS: at 04:46

## 2018-07-17 RX ADMIN — MONTELUKAST SODIUM 10 MG: 10 TABLET, FILM COATED ORAL at 20:30

## 2018-07-17 RX ADMIN — DULOXETINE HYDROCHLORIDE 60 MG: 60 CAPSULE, DELAYED RELEASE ORAL at 09:15

## 2018-07-17 RX ADMIN — Medication 600 UNITS: at 20:33

## 2018-07-17 RX ADMIN — VANCOMYCIN HYDROCHLORIDE 125 MG: 1 INJECTION, POWDER, LYOPHILIZED, FOR SOLUTION INTRAVENOUS at 23:19

## 2018-07-17 RX ADMIN — Medication 20 ML: at 04:46

## 2018-07-17 RX ADMIN — ACETAMINOPHEN 650 MG: 325 TABLET ORAL at 14:08

## 2018-07-17 RX ADMIN — Medication 600 UNITS: at 14:01

## 2018-07-17 RX ADMIN — VANCOMYCIN HYDROCHLORIDE 125 MG: 1 INJECTION, POWDER, LYOPHILIZED, FOR SOLUTION INTRAVENOUS at 20:00

## 2018-07-17 RX ADMIN — MORPHINE SULFATE 1 MG: 2 INJECTION, SOLUTION INTRAMUSCULAR; INTRAVENOUS at 09:15

## 2018-07-17 RX ADMIN — FLUTICASONE PROPIONATE 2 SPRAY: 50 SPRAY, METERED NASAL at 09:26

## 2018-07-17 RX ADMIN — MORPHINE SULFATE 1 MG: 2 INJECTION, SOLUTION INTRAMUSCULAR; INTRAVENOUS at 16:57

## 2018-07-17 NOTE — PROGRESS NOTES
Hospitalist Progress Note Admit Date:  2018 11:05 AM  
Name:  Alessandro Chanel Age:  61 y.o. 
:  1955 MRN:  [id number] PCP:  Tina Del Real MD 
Treatment Team: Attending Provider: Zenia Chavez MD; Consulting Provider: Lito Gutierrez MD; Utilization Review: Felicitas Torres RN; Consulting Provider: Hailee George MD; Care Manager: Mary Ayala RN Subjective:  
61 yr old female pt with known h/o breast cancer,htn,hld,sleep apnea on bipap with 3 lit/min oxygen, diastolic chf, fibromyalgia,back pain, gerd,obesity. Pt had left breast surgery about a week ago. According to pt from the next of surgery she had been having fever,mild chills. Noticed mild left breast pain on the off. Since last night pt noticed that the dressing was soaked,discharge from the left breast region, noticed that the pain mildy improved after lot of drainage. 18 Says doing ok Not much pain left breast region. 18 ID saw pt yesterday. Pt says pain left breast better. 7/15/18 Says doing ok Getting routine breathing treatment. 18 Staff says pt having diarrhea Pt says she feels good 18 Patient is feeling well. Still having pain in the left breast, 4/10, intermittent and nonradiating No fevers or chills. No N/V. Patient states there is still yellow fluid draining from the left breast wound Objective:  
 
Patient Vitals for the past 24 hrs: 
 Temp Pulse Resp BP SpO2  
18 0739 98.1 °F (36.7 °C) 92 18 121/62 96 % 18 0712 - - - - 96 % 18 0400 97.3 °F (36.3 °C) 74 18 115/75 100 % 18 2340 98 °F (36.7 °C) 76 18 137/73 100 % 18 2034 - - - - 100 % 18 1924 98.4 °F (36.9 °C) 89 18 154/84 97 % 18 1504 98.9 °F (37.2 °C) 91 18 150/79 97 % 18 1101 98.6 °F (37 °C) 81 16 141/63 98 % Oxygen Therapy O2 Sat (%): 96 % (18 0739) Pulse via Oximetry: 74 beats per minute (18 9649) O2 Device: Room air (07/17/18 5370) O2 Flow Rate (L/min): 3 l/min (07/16/18 2034) FIO2 (%): 21 % (07/17/18 0712) No intake or output data in the 24 hours ending 07/17/18 1027 General:    Well nourished. Obese. AA Ox3. No acute distres 
heent               PERRL. CV:   RRR. No murmur, rub, or gallop. Lungs:   Clear to auscultation bilaterally. No wheezing, rhonchi, or rales. Left breast       Packing noted. Serosanguinous drainage noted with some pus. Abdomen:   Soft, nontender, nondistended. Morbid obesity CNS                nonfocal 
Extremities: Warm and dry. No cyanosis or edema. Skin:     No rashes or jaundice. Psychiatric      Mood and affect normal 
 
Data Review: 
I have reviewed all labs, meds, telemetry events, and studies from the last 24 hours. Recent Results (from the past 24 hour(s)) METABOLIC PANEL, BASIC Collection Time: 07/17/18  4:58 AM  
Result Value Ref Range Sodium 142 136 - 145 mmol/L Potassium 4.0 3.5 - 5.1 mmol/L Chloride 106 98 - 107 mmol/L  
 CO2 29 21 - 32 mmol/L Anion gap 7 7 - 16 mmol/L Glucose 101 (H) 65 - 100 mg/dL BUN 10 8 - 23 MG/DL Creatinine 0.61 0.6 - 1.0 MG/DL  
 GFR est AA >60 >60 ml/min/1.73m2 GFR est non-AA >60 >60 ml/min/1.73m2 Calcium 9.5 8.3 - 10.4 MG/DL All Micro Results Procedure Component Value Units Date/Time CULTURE, BLOOD [[id number]] Collected:  07/12/18 1540 Order Status:  Completed Specimen:  Blood from Blood Updated:  07/17/18 0522 Special Requests: RIGHT ANTECUBITAL Culture result: NO GROWTH 5 DAYS     
 CULTURE, BLOOD [[id number]] Collected:  07/12/18 1258 Order Status:  Completed Specimen:  Blood from Blood Updated:  07/17/18 6563 Special Requests: --     
  NO SPECIAL REQUESTS 
RIGHT 
HAND Culture result: NO GROWTH 5 DAYS C. DIFFICILE/EPI PCR [[id number]] Order Status:  Canceled Specimen:  Stool  CULTURE, Geronimo Lay STAIN [[id number]]  (Abnormal)  (Susceptibility) Collected:  07/12/18 7104 Order Status:  Completed Specimen:  Wound from Breast Updated:  07/15/18 1010 Special Requests: NO SPECIAL REQUESTS     
  GRAM STAIN 0 TO 3 WBC'S/OIF  
   RARE GRAM NEGATIVE RODS Culture result:      
  LIGHT MORGANELLA MORGANII (A) Current Meds: 
Current Facility-Administered Medications Medication Dose Route Frequency  cefepime (MAXIPIME) 2 g in 0.9% sodium chloride (MBP/ADV) 100 mL  2 g IntraVENous Q12H  
 ondansetron (ZOFRAN) injection 4 mg  4 mg IntraVENous Q4H PRN  
 Saccharomyces boulardii (FLORASTOR) capsule 250 mg  250 mg Oral BID  sodium chloride (NS) flush 20 mL  20 mL InterCATHeter Q8H  
 heparin (porcine) pf 600 Units  600 Units InterCATHeter Q8H  
 sodium chloride (NS) flush 20 mL  20 mL InterCATHeter PRN  
 heparin (porcine) pf 600 Units  600 Units InterCATHeter PRN  
 albuterol (PROVENTIL VENTOLIN) nebulizer solution 2.5 mg  2.5 mg Nebulization Q4H PRN  
 loratadine (CLARITIN) tablet 10 mg  10 mg Oral DAILY  DULoxetine (CYMBALTA) capsule 60 mg  60 mg Oral DAILY  fluticasone (FLONASE) 50 mcg/actuation nasal spray 2 Spray  2 Spray Both Nostrils DAILY  HYDROcodone-acetaminophen (NORCO) 5-325 mg per tablet 1 Tab  1 Tab Oral Q4H PRN  
 lisinopril-hydroCHLOROthiazide (PRINZIDE, ZESTORETIC) 20-12.5 mg per tablet 1 Tab  1 Tab Oral DAILY  metoprolol succinate (TOPROL-XL) tablet 100 mg  100 mg Oral DAILY  montelukast (SINGULAIR) tablet 10 mg  10 mg Oral QHS  nitroglycerin (NITROSTAT) tablet 0.4 mg  0.4 mg SubLINGual Q5MIN PRN  
 famotidine (PEPCID) tablet 20 mg  20 mg Oral BID  sodium chloride (NS) flush 5-10 mL  5-10 mL IntraVENous Q8H  
 sodium chloride (NS) flush 5-10 mL  5-10 mL IntraVENous PRN  
 acetaminophen (TYLENOL) tablet 650 mg  650 mg Oral Q4H PRN  
 morphine injection 1 mg  1 mg IntraVENous Q4H PRN  
 heparin (porcine) injection 5,000 Units  5,000 Units SubCUTAneous Q8H  
 budesonide (PULMICORT) 500 mcg/2 ml nebulizer suspension  500 mcg Nebulization BID RT And  
 albuterol (PROVENTIL VENTOLIN) nebulizer solution 2.5 mg  2.5 mg Nebulization Q6HWA RT  
 diazePAM (VALIUM) tablet 5 mg  5 mg Oral Q6H PRN Other Studies (last 24 hours): No results found. Assessment and Plan:  
 
Hospital Problems as of 7/17/2018  Date Reviewed: 5/15/2018 Codes Class Noted - Resolved POA  
 HTN (hypertension) ICD-10-CM: I10 
ICD-9-CM: 401.9  7/12/2018 - Present Yes CAD (coronary artery disease) ICD-10-CM: I25.10 ICD-9-CM: 414.00  7/12/2018 - Present Yes * (Principal)Cellulitis of left breast ICD-10-CM: N61.0 ICD-9-CM: 611.0  7/12/2018 - Present Yes Arthritis ICD-10-CM: M19.90 ICD-9-CM: 716.90  Unknown - Present Yes Overview Signed 10/18/2016  9:16 AM by Lex Drum  
  everywhere;  DDD Morbid obesity with BMI of 50.0-59.9, adult [facility]) ICD-10-CM: E66.01, Z68.43 
ICD-9-CM: 278.01, V85.43  11/5/2015 - Present Yes Gastroesophageal reflux disease without esophagitis (Chronic) ICD-10-CM: K21.9 ICD-9-CM: 530.81  10/7/2015 - Present Yes Essential hypertension, benign ICD-10-CM: I10 
ICD-9-CM: 401.1  9/28/2015 - Present Yes Chronic diastolic heart failure (HCC) (Chronic) ICD-10-CM: I50.32 
ICD-9-CM: 428.32  9/9/2015 - Present Yes Hyperlipidemia (Chronic) ICD-10-CM: E78.8 ICD-9-CM: 272.4  9/9/2015 - Present Yes  
   
 DOLORES (obstructive sleep apnea) (Chronic) ICD-10-CM: H60.01 
ICD-9-CM: 327.23  7/12/2013 - Present Yes ASSESSMENT AND PLAN:   
 
1.  Left breast cellulitis, secondary to post-op wound infection, complicated by abscess formation 
-patient had a recent left breast reconstruction expander implant exchange with adipofascial flap augmentation on 7/6/18 
-presented with cellulitis and and drainage from the surgical incisional wound with underlying abscess formation 
-wound culture positive for Morganella with multiple resistance 
-ID recommended approx 2 weeks of IV Cefepime. 
-plastic surgery recommended to continue packing BID. No surgery indicated at this time 
-continue pain control 2. Essential HTN 
-controlled 
-continue current regimen 3. DOLORES 
-continue CPAP 4. Chronic diastolic CHF 
-stable and compensated 
-continue home meds 5. Disposition 
-probably home with home health. As per ID recommendations, she might need to stay for antibiotic therapy and close monitoring. 6. DVT prophylaxis:   
-SQ Heparin Signed: 
Ananya Shearer MD

## 2018-07-17 NOTE — PROGRESS NOTES
Infectious Disease Progress Note    Today's Date: 7/17/2018   Admit Date: 7/12/2018    Impression:   · Left chest wall cellulitis and subcutaneous abscess with underlying breast implant remaining in place. Superficial culture grew morganella morganii - avoid ceftriaxone due to potential amp-C production and isolate is ceftriaxone intermediate  · S/P left breast reconstruction expander implant exchange adipofascial flap augmentation of left breast volume (7/6/18) Dr. Efrem Arthur  · Hx Left total mastectomy with lymph biopsy and tissue expander (1/19/18) at [facility] with Dr. Gia Fall  · I&D of left chest wall seroma (3/11/18), Dr. 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 Claudell Dupre  · Multiple antibiotic allergies    Plan:   · Monitor temperature curve. Will restart oral vancomycin to prevent relapsed C Diff. · Continue cefepime Q12 hrs for now. Transition to ertapenem 1g IV Q24 hrs to facilitate discharge planning if necessary. · At this time, she is still requiring twice daily dressing changes, which will require home health. She does not have help at home to assist with IV antibiotics and management of the PICC. · Discussed with Case management, and will continue to evaluate best treatment options. Anti-infectives:   · Cefepime (7/15 -  · Vancomycin (7/12- 7/15)   · Zosyn (7/12-7/15)    Interval History:     Tmax 100.3. Allergies   Allergen Reactions    Ciprofloxacin Diarrhea and Other (comments)     Per pt, started her c-diff\"    Bactrim [Sulfamethoprim Ds] Rash     Pt gets a yeast infection on body     Sulfa (Sulfonamide Antibiotics) Other (comments)     Yeast growth        Review of Systems: Complains of five loose stools yesterday and two today. Denies nausea, vomiting, fevers, chills or sweats. Pertinent items are noted in the History of Present Illness.     Objective:     Visit Vitals    /62    Pulse 92    Temp 98.1 °F (36.7 °C)    Resp 18    Ht 5' 2\" (1.575 m)    Wt 130.2 kg (287 lb)    SpO2 96%    BMI 52.49 kg/m2     Temp (24hrs), Av.1 °F (36.7 °C), Min:97.3 °F (36.3 °C), Max:98.9 °F (37.2 °C)       Lines:  Peripheral IV:            Physical Exam:    General:  Alert, cooperative, obese, appears stated age   Eyes:  Sclera anicteric. Pupils equally round and reactive to light. Mouth/Throat: Mucous membranes normal, oral pharynx clear   Neck: Supple, midline trachea   Lungs:   Anterior lungs clear without increased work of breathing   CV:  RRR without audible murmur   Abdomen:   Soft, non-tender. bowel sounds active   Extremities: No cyanosis or edema   Skin: L breast wound packed with iodoform packing, moderate amount of yellow/sanguinous drainage on dressing, mild warmth   Musculoskeletal: No swelling or deformity, moves all extremities well   Lines/Devices:  Intact, no erythema, drainage or tenderness   Psych: Alert and oriented, dysphoric mood       Data Review:     CBC:  Recent Labs      18   0602   WBC  7.4   GRANS  61   MONOS  7   EOS  4   ANEU  4.6   ABL  1.9   HGB  9.3*   HCT  29.9*   PLT  439       BMP:  Recent Labs      18   0458  18   0602   CREA  0.61  0.62   BUN  10  6*   NA  142  143   K  4.0  3.8   CL  106  107   CO2  29  28   AGAP  7  8   GLU  101*  105*       LFTS:  No results for input(s): TBILI, ALT, SGOT, AP, TP, ALB in the last 72 hours.     Microbiology:     All Micro Results     Procedure Component Value Units Date/Time    CULTURE, BLOOD [[id number]] Collected:  18 1540    Order Status:  Completed Specimen:  Blood from Blood Updated:  18 0819     Special Requests: RIGHT ANTECUBITAL        Culture result: NO GROWTH 5 DAYS       CULTURE, BLOOD [[id number]] Collected:  18 1258    Order Status:  Completed Specimen:  Blood from Blood Updated:  18 2642     Special Requests: --        NO SPECIAL REQUESTS  RIGHT  HAND       Culture result: NO GROWTH 5 DAYS       C. DIFFICILE/EPI PCR [[id number]]     Order Status:  Canceled Specimen:  Stool     CULTURE, Fred Reagin STAIN [[id number]]  (Abnormal)  (Susceptibility) Collected:  07/12/18 1634    Order Status:  Completed Specimen:  Wound from Breast Updated:  07/15/18 1010     Special Requests: NO SPECIAL REQUESTS        GRAM STAIN 0 TO 3 WBC'S/OIF      RARE GRAM NEGATIVE RODS     Culture result:         LIGHT MORGANELLA MORGANII (A)          Imaging:   None    Signed By: Ivone Bowie NP     July 17, 2018

## 2018-07-18 ENCOUNTER — HOME HEALTH ADMISSION (OUTPATIENT)
Dept: HOME HEALTH SERVICES | Facility: HOME HEALTH | Age: 63
End: 2018-07-18
Payer: MEDICAID

## 2018-07-18 VITALS
TEMPERATURE: 97 F | DIASTOLIC BLOOD PRESSURE: 81 MMHG | OXYGEN SATURATION: 98 % | RESPIRATION RATE: 18 BRPM | SYSTOLIC BLOOD PRESSURE: 127 MMHG | BODY MASS INDEX: 52.81 KG/M2 | WEIGHT: 287 LBS | HEART RATE: 80 BPM | HEIGHT: 62 IN

## 2018-07-18 LAB
BASOPHILS # BLD: 0.2 K/UL (ref 0–0.2)
BASOPHILS NFR BLD MANUAL: 2 % (ref 0–2)
DIFFERENTIAL METHOD BLD: ABNORMAL
EOSINOPHIL # BLD: 0.7 K/UL (ref 0–0.8)
EOSINOPHIL NFR BLD MANUAL: 7 % (ref 1–8)
ERYTHROCYTE [DISTWIDTH] IN BLOOD BY AUTOMATED COUNT: 14.2 % (ref 11.9–14.6)
HCT VFR BLD AUTO: 35 % (ref 35.8–46.3)
HGB BLD-MCNC: 11.1 G/DL (ref 11.7–15.4)
LYMPHOCYTES # BLD: 1.4 K/UL (ref 0.5–4.6)
LYMPHOCYTES NFR BLD MANUAL: 15 % (ref 16–44)
MCH RBC QN AUTO: 26.9 PG (ref 26.1–32.9)
MCHC RBC AUTO-ENTMCNC: 31.7 G/DL (ref 31.4–35)
MCV RBC AUTO: 85 FL (ref 79.6–97.8)
MONOCYTES # BLD: 2 K/UL (ref 0.1–1.3)
MONOCYTES NFR BLD MANUAL: 21 % (ref 3–9)
NEUTS BAND NFR BLD MANUAL: 1 % (ref 0–10)
NEUTS SEG # BLD: 5.1 K/UL (ref 1.7–8.2)
NEUTS SEG NFR BLD MANUAL: 54 % (ref 47–75)
PLATELET # BLD AUTO: 514 K/UL (ref 150–450)
PLATELET COMMENTS,PCOM: ABNORMAL
PMV BLD AUTO: 8.3 FL (ref 10.8–14.1)
RBC # BLD AUTO: 4.12 M/UL (ref 4.05–5.25)
RBC MORPH BLD: ABNORMAL
WBC # BLD AUTO: 9.4 K/UL (ref 4.3–11.1)

## 2018-07-18 PROCEDURE — 74011250636 HC RX REV CODE- 250/636: Performed by: INTERNAL MEDICINE

## 2018-07-18 PROCEDURE — 94760 N-INVAS EAR/PLS OXIMETRY 1: CPT

## 2018-07-18 PROCEDURE — 85025 COMPLETE CBC W/AUTO DIFF WBC: CPT | Performed by: INTERNAL MEDICINE

## 2018-07-18 PROCEDURE — 74011000258 HC RX REV CODE- 258: Performed by: INTERNAL MEDICINE

## 2018-07-18 PROCEDURE — 74011000258 HC RX REV CODE- 258: Performed by: NURSE PRACTITIONER

## 2018-07-18 PROCEDURE — 74011000250 HC RX REV CODE- 250: Performed by: FAMILY MEDICINE

## 2018-07-18 PROCEDURE — 74011250637 HC RX REV CODE- 250/637: Performed by: FAMILY MEDICINE

## 2018-07-18 PROCEDURE — 74011250637 HC RX REV CODE- 250/637: Performed by: SURGERY

## 2018-07-18 PROCEDURE — 94640 AIRWAY INHALATION TREATMENT: CPT

## 2018-07-18 PROCEDURE — 74011250636 HC RX REV CODE- 250/636: Performed by: NURSE PRACTITIONER

## 2018-07-18 PROCEDURE — 77030019895 HC PCKNG STRP IODO -A

## 2018-07-18 PROCEDURE — 74011250636 HC RX REV CODE- 250/636: Performed by: FAMILY MEDICINE

## 2018-07-18 PROCEDURE — 74011250637 HC RX REV CODE- 250/637: Performed by: NURSE PRACTITIONER

## 2018-07-18 RX ORDER — VANCOMYCIN HYDROCHLORIDE 125 MG/1
125 CAPSULE ORAL 4 TIMES DAILY
Qty: 32 CAP | Refills: 0 | Status: SHIPPED | OUTPATIENT
Start: 2018-07-18 | End: 2018-07-26

## 2018-07-18 RX ADMIN — Medication 600 UNITS: at 13:21

## 2018-07-18 RX ADMIN — ALBUTEROL SULFATE 2.5 MG: 2.5 SOLUTION RESPIRATORY (INHALATION) at 14:09

## 2018-07-18 RX ADMIN — Medication 600 UNITS: at 06:02

## 2018-07-18 RX ADMIN — FLUTICASONE PROPIONATE 2 SPRAY: 50 SPRAY, METERED NASAL at 09:10

## 2018-07-18 RX ADMIN — ALBUTEROL SULFATE 2.5 MG: 2.5 SOLUTION RESPIRATORY (INHALATION) at 07:58

## 2018-07-18 RX ADMIN — LORATADINE 10 MG: 10 TABLET ORAL at 09:11

## 2018-07-18 RX ADMIN — BUDESONIDE 500 MCG: 0.5 INHALANT RESPIRATORY (INHALATION) at 07:58

## 2018-07-18 RX ADMIN — METOPROLOL SUCCINATE 100 MG: 100 TABLET, EXTENDED RELEASE ORAL at 09:11

## 2018-07-18 RX ADMIN — HEPARIN SODIUM 5000 UNITS: 5000 INJECTION, SOLUTION INTRAVENOUS; SUBCUTANEOUS at 06:02

## 2018-07-18 RX ADMIN — SODIUM CHLORIDE 1 G: 900 INJECTION, SOLUTION INTRAVENOUS at 12:00

## 2018-07-18 RX ADMIN — LISINOPRIL AND HYDROCHLOROTHIAZIDE 1 TABLET: 12.5; 2 TABLET ORAL at 09:10

## 2018-07-18 RX ADMIN — VANCOMYCIN HYDROCHLORIDE 125 MG: 1 INJECTION, POWDER, LYOPHILIZED, FOR SOLUTION INTRAVENOUS at 12:00

## 2018-07-18 RX ADMIN — MORPHINE SULFATE 1 MG: 2 INJECTION, SOLUTION INTRAMUSCULAR; INTRAVENOUS at 13:20

## 2018-07-18 RX ADMIN — DIAZEPAM 5 MG: 5 TABLET ORAL at 00:49

## 2018-07-18 RX ADMIN — DULOXETINE HYDROCHLORIDE 60 MG: 60 CAPSULE, DELAYED RELEASE ORAL at 09:10

## 2018-07-18 RX ADMIN — HEPARIN SODIUM 5000 UNITS: 5000 INJECTION, SOLUTION INTRAVENOUS; SUBCUTANEOUS at 13:21

## 2018-07-18 RX ADMIN — Medication 20 ML: at 13:21

## 2018-07-18 RX ADMIN — ACETAMINOPHEN 650 MG: 325 TABLET ORAL at 13:19

## 2018-07-18 RX ADMIN — CEFEPIME HYDROCHLORIDE 2 G: 2 INJECTION, POWDER, FOR SOLUTION INTRAVENOUS at 07:33

## 2018-07-18 RX ADMIN — FAMOTIDINE 20 MG: 20 TABLET ORAL at 09:10

## 2018-07-18 RX ADMIN — Medication 250 MG: at 09:11

## 2018-07-18 RX ADMIN — VANCOMYCIN HYDROCHLORIDE 125 MG: 1 INJECTION, POWDER, LYOPHILIZED, FOR SOLUTION INTRAVENOUS at 06:03

## 2018-07-18 RX ADMIN — Medication 20 ML: at 06:02

## 2018-07-18 NOTE — PROGRESS NOTES
Discharged to home. To dc area via wheelchair accompanied by staff. Condition stable at discharge. PICC line intact. To home via car with family.

## 2018-07-18 NOTE — PROGRESS NOTES
Discharge paperwork given to patient at this time. She verbalizes understanding of medications, instructions and follow up appointments. PICC packed and caps replaced by [employer] rep in the last 30 minutes. [facility]. She has called her ride and will page the nurses station when they arrive. Staff aware that DC is complete.

## 2018-07-18 NOTE — PROGRESS NOTES
Infectious Disease Progress Note    Today's Date: 7/18/2018   Admit Date: 7/12/2018    Impression:   · Left chest wall cellulitis and subcutaneous abscess with underlying breast implant remaining in place. Superficial culture grew morganella morganii - avoid ceftriaxone due to potential amp-C production and isolate is ceftriaxone intermediate  · S/P left breast reconstruction expander implant exchange adipofascial flap augmentation of left breast volume (7/6/18) Dr. Jun Cano  · Hx Left total mastectomy with lymph biopsy and tissue expander (1/19/18) at [facility] with Dr. Jesus Scott  · I&D of left chest wall seroma (3/11/18), Dr. 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 Princess Sandoval  · Multiple antibiotic allergies    Plan:   ID Plan of Care:   Routine PICC care; she will need extension for the PICC  Ertapenem 1g IV Q 24 hrs, through at least 7/26 (two weeks treatment)  Continue oral vancomycin 125 ml QID through at least one week past end of other antibiotics  Q Monday labs:   CBC with diff, serum creatinine, LFTs  Please fax labs to [phone][facility]  Follow up with ID 7/25 at 10 am    Anti-infectives:   · Oral vancomycin (7/17 -  · Cefepime (7/15 -  · Vancomycin (7/12- 7/15)   · Zosyn (7/12-7/15)    Interval History:     Afebrile. Plan is for home with home health and IV antibiotics per discussion with patient and case management. She reports she has some help with dressing changes and will be able to give herself the IV antibiotics. Allergies   Allergen Reactions    Ciprofloxacin Diarrhea and Other (comments)     Per pt, started her c-diff\"    Bactrim [Sulfamethoprim Ds] Rash     Pt gets a yeast infection on body     Sulfa (Sulfonamide Antibiotics) Other (comments)     Yeast growth        Review of Systems: Reports that stools \"are trying to form,\" denies nausea, vomiting, fevers, chills or sweats. Denies other complaints.   Pertinent items are noted in the History of Present Illness. Objective:     Visit Vitals    /79    Pulse 82    Temp 97.4 °F (36.3 °C)    Resp 18    Ht 5' 2\" (1.575 m)    Wt 130.2 kg (287 lb)    SpO2 94%    BMI 52.49 kg/m2     Temp (24hrs), Av.3 °F (36.8 °C), Min:97.1 °F (36.2 °C), Max:100.3 °F (37.9 °C)      Lines:  Peripheral IV:          Physical Exam:    General:  Awake- CPAP in place, cooperative, obese, appears stated age, no acute distress   Eyes:  Sclera anicteric. Pupils equally round and reactive to light. Mouth/Throat: Mucous membranes normal, oral pharynx clear   Neck: Supple, midline trachea   Lungs:   Clear lungs without increased work of breathing, using CPAP   CV:  RRR without audible murmur   Abdomen:   Soft, non-tender. bowel sounds active   Extremities: No cyanosis or edema   Skin: L breast wound packed with iodoform packing, not examined today   Musculoskeletal: No swelling or deformity, moves all extremities well   Lines/Devices:  Intact, no erythema, drainage or tenderness   Psych: Alert and oriented, dysphoric mood       Data Review:     CBC:  Recent Labs      18   0620  18   0602   WBC  9.4  7.4   GRANS   --   61   MONOS   --   7   EOS   --   4   ANEU   --   4.6   ABL   --   1.9   HGB  11.1*  9.3*   HCT  35.0*  29.9*   PLT  514*  439       BMP:  Recent Labs      18   0458  18   0602   CREA  0.61  0.62   BUN  10  6*   NA  142  143   K  4.0  3.8   CL  106  107   CO2  29  28   AGAP  7  8   GLU  101*  105*       LFTS:  No results for input(s): TBILI, ALT, SGOT, AP, TP, ALB in the last 72 hours.     Microbiology:     All Micro Results     Procedure Component Value Units Date/Time    CULTURE, BLOOD [[id number]] Collected:  18 1540    Order Status:  Completed Specimen:  Blood from Blood Updated:  18     Special Requests: RIGHT ANTECUBITAL        Culture result: NO GROWTH 5 DAYS       CULTURE, BLOOD [[id number]] Collected:  18 1258    Order Status: Completed Specimen:  Blood from Blood Updated:  07/17/18 0822     Special Requests: --        NO SPECIAL REQUESTS  RIGHT  HAND       Culture result: NO GROWTH 5 DAYS       C. DIFFICILE/EPI PCR [[id number]]     Order Status:  Canceled Specimen:  Stool     CULTURE, Exie Connolly STAIN [[id number]]  (Abnormal)  (Susceptibility) Collected:  07/12/18 1634    Order Status:  Completed Specimen:  Wound from Breast Updated:  07/15/18 1010     Special Requests: NO SPECIAL REQUESTS        GRAM STAIN 0 TO 3 WBC'S/OIF      RARE GRAM NEGATIVE RODS     Culture result:         LIGHT MORGANELLA MORGANII (A)          Imaging:   None    Signed By: Tosin Gao NP     July 18, 2018

## 2018-07-18 NOTE — DISCHARGE INSTRUCTIONS
DISCHARGE SUMMARY from Nurse    PATIENT INSTRUCTIONS:    After general anesthesia or intravenous sedation, for 24 hours or while taking prescription Narcotics:  · Limit your activities  · Do not drive and operate hazardous machinery  · Do not make important personal or business decisions  · Do  not drink alcoholic beverages  · If you have not urinated within 8 hours after discharge, please contact your surgeon on call. Report the following to your surgeon:  · Excessive pain, swelling, redness or odor of or around the surgical area  · Temperature over 100.5  · Nausea and vomiting lasting longer than 4 hours or if unable to take medications  · Any signs of decreased circulation or nerve impairment to extremity: change in color, persistent  numbness, tingling, coldness or increase pain  · Any questions    What to do at Home:  Recommended activity: diet and activity as tolerated. Do not shower unless directed by your physician. No driving until cleared by physician. Keep your follow up appointments. If you experience any of the following symptoms temperature greater than 100.5, nausea/vomiting, increased or new redness around incision, new drainage or foul odor at the incision, pain is unrelieved by prescribed medication, please follow up with your physician. *  Please give a list of your current medications to your Primary Care Provider. *  Please update this list whenever your medications are discontinued, doses are      changed, or new medications (including over-the-counter products) are added. *  Please carry medication information at all times in case of emergency situations. These are general instructions for a healthy lifestyle:    No smoking/ No tobacco products/ Avoid exposure to second hand smoke  Surgeon General's Warning:  Quitting smoking now greatly reduces serious risk to your health.     Obesity, smoking, and sedentary lifestyle greatly increases your risk for illness    A healthy diet, regular physical exercise & weight monitoring are important for maintaining a healthy lifestyle    You may be retaining fluid if you have a history of heart failure or if you experience any of the following symptoms:  Weight gain of 3 pounds or more overnight or 5 pounds in a week, increased swelling in our hands or feet or shortness of breath while lying flat in bed. Please call your doctor as soon as you notice any of these symptoms; do not wait until your next office visit. Recognize signs and symptoms of STROKE:    F-face looks uneven    A-arms unable to move or move unevenly    S-speech slurred or non-existent    T-time-call 911 as soon as signs and symptoms begin-DO NOT go       Back to bed or wait to see if you get better-TIME IS BRAIN. Warning Signs of HEART ATTACK     Call 911 if you have these symptoms:   Chest discomfort. Most heart attacks involve discomfort in the center of the chest that lasts more than a few minutes, or that goes away and comes back. It can feel like uncomfortable pressure, squeezing, fullness, or pain.  Discomfort in other areas of the upper body. Symptoms can include pain or discomfort in one or both arms, the back, neck, jaw, or stomach.  Shortness of breath with or without chest discomfort.  Other signs may include breaking out in a cold sweat, nausea, or lightheadedness. Don't wait more than five minutes to call 911 - MINUTES MATTER! Fast action can save your life. Calling 911 is almost always the fastest way to get lifesaving treatment. Emergency Medical Services staff can begin treatment when they arrive -- up to an hour sooner than if someone gets to the hospital by car. The discharge information has been reviewed with the patient. The patient verbalized understanding.   Discharge medications reviewed with the patient and appropriate educational materials and side effects teaching were provided.   ___________________________________________________________________________________________________________________________________

## 2018-07-18 NOTE — PROGRESS NOTES
Dispo update:  Ms. Nadja Mondragno now wants to go home. Orders faxed to [facility] fax [phone] and referral, order, and face to face done for [facility] home health RN.   Updated Dr. Karly Shin MD.

## 2018-07-18 NOTE — PROGRESS NOTES
[address]  Face to Face Encounter    Patients Name: Gwendolyn Yusuf    YOB: 1955    Ordering Physician: Dr. Dylan Freeman MD    Primary Diagnosis: Cellulitis of left breast  CAD (coronary artery disease)  HTN (hypertension)  Cellulitis of left breast  CAD (coronary artery disease)  HTN (hypertension)    Date of Face to Face:   7/18/2018                                  Face to Face Encounter findings are related to primary reason for home care:   yes. 1. I certify that the patient needs intermittent care as follows: skilled nursing care:  teaching/training of IV abx and wound care    2. I certify that this patient is homebound, that is: 1) patient requires the use of a walker device, special transportation, or assistance of another to leave the home; or 2) patient's condition makes leaving the home medically contraindicated; and 3) patient has a normal inability to leave the home and leaving the home requires considerable and taxing effort. Patient may leave the home for infrequent and short duration for medical reasons, and occasional absences for non-medical reasons. Homebound status is due to the following functional limitations: Patient currently under activity restrictions secondary to recent surgical procedure, this hinders their ability to safely leave the home. 3. I certify that this patient is under my care and that I, or a nurse practitioner or  [id number], or clinical nurse specialist, or certified nurse midwife, working with me, had a Face-to-Face Encounter that meets the physician Face-to-Face Encounter requirements.   The following are the clinical findings from the [address] [place] [address] encounter that support the need for skilled services and is a summary of the encounter: see hospital chart    See hospital chart      Stacie Damon RN  7/18/2018      THE FOLLOWING TO BE COMPLETED BY THE COMMUNITY PHYSICIAN:    I concur with the findings described above from the [facility] encounter that this patient is homebound and in need of a skilled service.     Certifying Physician: _____________________________________      Printed Certifying Physician Name: _____________________________________    Date: _________________

## 2018-07-18 NOTE — PROGRESS NOTES
Spoke to Ms. Nadja Mondragon about discharge planning. She will need bid wound care to her breast wound, and IV cefepime q 12 hours or IV ertapenem q 24 hours. She says she is ok giving herself the IV abx (has done before), but states she cannot do the wound care, not can any of her children. Explained that home health cannot come out twice a day. The other option, which is going to a skilled nursing facility (SNF), would require finding a SNF that accepts Medicaid (e.g., [facility]), do a [place] Medicaid CLTC Level of Care for a SNF (for room and board), and [place] Complex Care (for the IV abx and wound care). This would take approximately a week to find out if Medicaid would approve. Furthermore, Ms. Nadja Mondragon wants a private SNF room, and does not find [facility], or [facility] to her satisfaction. Will re-assess. Care Management Interventions  Plan discussed with Pt/Family/Caregiver:  Yes

## 2018-07-18 NOTE — DISCHARGE SUMMARY
Physician Discharge Summary     Patient: Ángel Price MRN: [id number]  SSN: [id number]    YOB: 1955  Age: 61 y.o. Sex: female       Admit Date: 7/12/2018    Discharge Date: 7/18/2018    Admission Diagnoses: Cellulitis of left breast  CAD (coronary artery disease)  HTN (hypertension)  Cellulitis of left breast  CAD (coronary artery disease)  HTN (hypertension)    Discharge Diagnoses:   Hospital Problems  Date Reviewed: 5/15/2018          Codes Class Noted POA    HTN (hypertension) ICD-10-CM: I10  ICD-9-CM: 401.9  7/12/2018 Yes        CAD (coronary artery disease) ICD-10-CM: I25.10  ICD-9-CM: 414.00  7/12/2018 Yes        * (Principal)Cellulitis of left breast ICD-10-CM: N61.0  ICD-9-CM: 611.0  7/12/2018 Yes        Arthritis ICD-10-CM: M19.90  ICD-9-CM: 716.90  Unknown Yes    Overview Signed 10/18/2016  9:16 AM by Jp Artist     everywhere;  DDD             Morbid obesity with BMI of 50.0-59.9, adult ([facility] ICD-10-CM: E66.01, Z68.43  ICD-9-CM: 278.01, V85.43  11/5/2015 Yes        Gastroesophageal reflux disease without esophagitis (Chronic) ICD-10-CM: K21.9  ICD-9-CM: 530.81  10/7/2015 Yes        Essential hypertension, benign ICD-10-CM: I10  ICD-9-CM: 401.1  9/28/2015 Yes        Chronic diastolic heart failure (HCC) (Chronic) ICD-10-CM: I50.32  ICD-9-CM: 428.32  9/9/2015 Yes        Hyperlipidemia (Chronic) ICD-10-CM: E78.5  ICD-9-CM: 272.4  9/9/2015 Yes        DOLORES (obstructive sleep apnea) (Chronic) ICD-10-CM: G47.33  ICD-9-CM: 327.23  7/12/2013 Yes              Discharge Condition:   Stable    Hospital Course:   61 yr old female pt with known h/o breast cancer,htn,hld,sleep apnea on bipap with 3 lit/min oxygen, diastolic chf, fibromyalgia,back pain, gerd,obesity. Pt had left breast surgery about a week ago. According to pt from the next of surgery she had been having fever,mild chills. Noticed mild left breast pain on the off.  Since last night pt noticed that the dressing was soaked,discharge from the left breast region, noticed that the pain mildy improved after lot of drainage. The patient was admitted for post-wound infection of her left breast reconstruction expander implant. She had exchange of the implant on 7/6/18. Her wound culture grew Morganella with multiple resistances. ID consulted and she was placed on Cefepime. Recommended IV Abx Rx for at least 2 weeks. Upon discharge the Abx was changed to [address]. She is to continue this antibiotic and follow up with ID on 8/25/18  PICC line was placed. Plastic surgery consulted and recommended packing BID. She is to f/u with Dr. Maranda Grace in 1 week  Patient is hemodynamically stable and afebrile. She will be discharged home with Home health.     Primary Care Physician:  Jagjit Glover MD     Consults:   ID Dr. Danya Ren surgery Dr. Maranda Grace          Discharge Exam:  GA: comfortable. No acute distress  CV: S1S2 normal. RRR  Lungs: Clear to auscultation bilaterally. No rales. No wheezing. Abdomen: Soft. Nondistended  Neuro: Nonfocal    Disposition:   Home with Home health    Discharge Medications:   Cannot display discharge medications since this patient is not currently admitted. Activity: as tolerated    Diet:     Follow-up Appointments   Procedures    FOLLOW UP VISIT Appointment in: One Week Infectious disease Dr. Bertha Clemons on 8/25/18 Plastic surgery Dr. Maranda Grace in 1 week     Infectious disease Dr. Bertha Clemons on 8/25/18  Plastic surgery Dr. Maranda Grace in 1 week     Standing Status:   Standing     Number of Occurrences:   1     Order Specific Question:   Appointment in     Answer:    One Week        Time spent on discharge >30 minutes    Signed By: Ana Paula Dougherty MD     July 18, 2018

## 2018-07-19 ENCOUNTER — HOME CARE VISIT (OUTPATIENT)
Dept: SCHEDULING | Facility: HOME HEALTH | Age: 63
End: 2018-07-19
Payer: MEDICAID

## 2018-07-19 PROCEDURE — 400013 HH SOC

## 2018-07-19 PROCEDURE — A9270 NON-COVERED ITEM OR SERVICE: HCPCS

## 2018-07-19 PROCEDURE — A6252 ABSORPT DRG >16 <=48 W/O BDR: HCPCS

## 2018-07-19 PROCEDURE — A4649 SURGICAL SUPPLIES: HCPCS

## 2018-07-19 PROCEDURE — A6266 IMPREG GAUZE NO H20/SAL/YARD: HCPCS

## 2018-07-19 PROCEDURE — G0299 HHS/HOSPICE OF RN EA 15 MIN: HCPCS

## 2018-07-19 PROCEDURE — A6402 STERILE GAUZE <= 16 SQ IN: HCPCS

## 2018-07-19 PROCEDURE — A6260 WOUND CLEANSER ANY TYPE/SIZE: HCPCS

## 2018-07-20 VITALS
OXYGEN SATURATION: 98 % | DIASTOLIC BLOOD PRESSURE: 88 MMHG | HEART RATE: 98 BPM | TEMPERATURE: 97.6 F | RESPIRATION RATE: 16 BRPM | SYSTOLIC BLOOD PRESSURE: 140 MMHG

## 2018-07-23 ENCOUNTER — HOSPITAL ENCOUNTER (OUTPATIENT)
Dept: LAB | Age: 63
Discharge: HOME | End: 2018-07-23
Payer: MEDICAID

## 2018-07-23 ENCOUNTER — HOME CARE VISIT (OUTPATIENT)
Dept: SCHEDULING | Facility: HOME HEALTH | Age: 63
End: 2018-07-23
Payer: MEDICAID

## 2018-07-23 VITALS
HEART RATE: 88 BPM | TEMPERATURE: 97.3 F | SYSTOLIC BLOOD PRESSURE: 138 MMHG | OXYGEN SATURATION: 95 % | DIASTOLIC BLOOD PRESSURE: 80 MMHG | RESPIRATION RATE: 16 BRPM

## 2018-07-23 LAB
ALBUMIN SERPL-MCNC: 3.4 G/DL (ref 3.2–4.6)
ALBUMIN/GLOB SERPL: 0.8 {RATIO}
ALP SERPL-CCNC: 110 U/L (ref 50–136)
ALT SERPL-CCNC: 36 U/L (ref 12–65)
AST SERPL-CCNC: 18 U/L (ref 15–37)
BASOPHILS # BLD: 0.1 K/UL (ref 0–0.2)
BASOPHILS NFR BLD MANUAL: 2 % (ref 0–2)
BILIRUB DIRECT SERPL-MCNC: <0.1 MG/DL
BILIRUB SERPL-MCNC: 0.4 MG/DL (ref 0.2–1.1)
CREAT SERPL-MCNC: 0.7 MG/DL (ref 0.6–1)
DIFFERENTIAL METHOD BLD: ABNORMAL
EOSINOPHIL # BLD: 0.2 K/UL (ref 0–0.8)
EOSINOPHIL NFR BLD MANUAL: 3 % (ref 1–8)
ERYTHROCYTE [DISTWIDTH] IN BLOOD BY AUTOMATED COUNT: 14.1 % (ref 11.9–14.6)
GLOBULIN SER CALC-MCNC: 4.4 G/DL
HCT VFR BLD AUTO: 38.3 % (ref 35.8–46.3)
HGB BLD-MCNC: 12 G/DL (ref 11.7–15.4)
LYMPHOCYTES # BLD: 2.3 K/UL (ref 0.5–4.6)
LYMPHOCYTES NFR BLD MANUAL: 37 % (ref 16–44)
MCH RBC QN AUTO: 27.2 PG (ref 26.1–32.9)
MCHC RBC AUTO-ENTMCNC: 31.3 G/DL (ref 31.4–35)
MCV RBC AUTO: 86.8 FL (ref 79.6–97.8)
MONOCYTES # BLD: 0.4 K/UL (ref 0.1–1.3)
MONOCYTES NFR BLD MANUAL: 6 % (ref 3–9)
MYELOCYTES NFR BLD MANUAL: 1 %
NEUTS BAND NFR BLD MANUAL: 3 % (ref 0–6)
NEUTS SEG # BLD: 3.2 K/UL (ref 1.7–8.2)
NEUTS SEG NFR BLD MANUAL: 48 % (ref 47–75)
PLATELET # BLD AUTO: 536 K/UL (ref 150–450)
PLATELET COMMENTS,PCOM: SLIGHT
PMV BLD AUTO: 8.5 FL (ref 10.8–14.1)
PROT SERPL-MCNC: 7.8 G/DL (ref 6.3–8.2)
RBC # BLD AUTO: 4.41 M/UL (ref 4.05–5.25)
RBC MORPH BLD: ABNORMAL
WBC # BLD AUTO: 6.3 K/UL (ref 4.3–11.1)
WBC MORPH BLD: ABNORMAL

## 2018-07-23 PROCEDURE — G0299 HHS/HOSPICE OF RN EA 15 MIN: HCPCS

## 2018-07-23 PROCEDURE — 85025 COMPLETE CBC W/AUTO DIFF WBC: CPT | Performed by: INTERNAL MEDICINE

## 2018-07-23 PROCEDURE — 82565 ASSAY OF CREATININE: CPT | Performed by: INTERNAL MEDICINE

## 2018-07-23 PROCEDURE — 80076 HEPATIC FUNCTION PANEL: CPT | Performed by: INTERNAL MEDICINE

## 2018-07-26 ENCOUNTER — HOME CARE VISIT (OUTPATIENT)
Dept: SCHEDULING | Facility: HOME HEALTH | Age: 63
End: 2018-07-26
Payer: MEDICAID

## 2018-07-26 ENCOUNTER — HOME CARE VISIT (OUTPATIENT)
Dept: HOME HEALTH SERVICES | Facility: HOME HEALTH | Age: 63
End: 2018-07-26
Payer: MEDICAID

## 2018-07-26 PROCEDURE — G0299 HHS/HOSPICE OF RN EA 15 MIN: HCPCS

## 2018-07-27 VITALS
SYSTOLIC BLOOD PRESSURE: 130 MMHG | HEART RATE: 99 BPM | RESPIRATION RATE: 18 BRPM | TEMPERATURE: 96.9 F | DIASTOLIC BLOOD PRESSURE: 70 MMHG

## 2018-07-30 ENCOUNTER — HOSPITAL ENCOUNTER (OUTPATIENT)
Dept: LAB | Age: 63
Discharge: HOME | End: 2018-07-30
Payer: MEDICAID

## 2018-07-30 ENCOUNTER — HOME CARE VISIT (OUTPATIENT)
Dept: SCHEDULING | Facility: HOME HEALTH | Age: 63
End: 2018-07-30
Payer: MEDICAID

## 2018-07-30 LAB
ALBUMIN SERPL-MCNC: 3.2 G/DL (ref 3.2–4.6)
ALBUMIN/GLOB SERPL: 0.8 {RATIO} (ref 1.2–3.5)
ALP SERPL-CCNC: 101 U/L (ref 50–136)
ALT SERPL-CCNC: 31 U/L (ref 12–65)
AST SERPL-CCNC: 22 U/L (ref 15–37)
BASOPHILS # BLD: 0.1 K/UL (ref 0–0.2)
BASOPHILS NFR BLD: 1 % (ref 0–2)
BILIRUB DIRECT SERPL-MCNC: 0.1 MG/DL
BILIRUB SERPL-MCNC: 0.5 MG/DL (ref 0.2–1.1)
CREAT SERPL-MCNC: 0.59 MG/DL (ref 0.6–1)
DIFFERENTIAL METHOD BLD: ABNORMAL
EOSINOPHIL # BLD: 0.3 K/UL (ref 0–0.8)
EOSINOPHIL NFR BLD: 7 % (ref 0.5–7.8)
ERYTHROCYTE [DISTWIDTH] IN BLOOD BY AUTOMATED COUNT: 14 % (ref 11.9–14.6)
GLOBULIN SER CALC-MCNC: 3.8 G/DL (ref 2.3–3.5)
HCT VFR BLD AUTO: 37.7 % (ref 35.8–46.3)
HGB BLD-MCNC: 11.9 G/DL (ref 11.7–15.4)
IMM GRANULOCYTES # BLD: 0 K/UL (ref 0–0.5)
IMM GRANULOCYTES NFR BLD AUTO: 0 % (ref 0–5)
LYMPHOCYTES # BLD: 1.8 K/UL (ref 0.5–4.6)
LYMPHOCYTES NFR BLD: 43 % (ref 13–44)
MCH RBC QN AUTO: 27.1 PG (ref 26.1–32.9)
MCHC RBC AUTO-ENTMCNC: 31.6 G/DL (ref 31.4–35)
MCV RBC AUTO: 85.9 FL (ref 79.6–97.8)
MONOCYTES # BLD: 0.4 K/UL (ref 0.1–1.3)
MONOCYTES NFR BLD: 10 % (ref 4–12)
NEUTS SEG # BLD: 1.7 K/UL (ref 1.7–8.2)
NEUTS SEG NFR BLD: 39 % (ref 43–78)
PLATELET # BLD AUTO: 389 K/UL (ref 150–450)
PMV BLD AUTO: 9.3 FL (ref 10.8–14.1)
PROT SERPL-MCNC: 7 G/DL (ref 6.3–8.2)
RBC # BLD AUTO: 4.39 M/UL (ref 4.05–5.25)
WBC # BLD AUTO: 4.4 K/UL (ref 4.3–11.1)

## 2018-07-30 PROCEDURE — A4927 NON-STERILE GLOVES: HCPCS

## 2018-07-30 PROCEDURE — 82565 ASSAY OF CREATININE: CPT | Performed by: INTERNAL MEDICINE

## 2018-07-30 PROCEDURE — 80076 HEPATIC FUNCTION PANEL: CPT | Performed by: INTERNAL MEDICINE

## 2018-07-30 PROCEDURE — G0299 HHS/HOSPICE OF RN EA 15 MIN: HCPCS

## 2018-07-30 PROCEDURE — 85025 COMPLETE CBC W/AUTO DIFF WBC: CPT | Performed by: INTERNAL MEDICINE

## 2018-07-30 PROCEDURE — A6222 GAUZE <=16 IN NO W/SAL W/O B: HCPCS

## 2018-07-31 VITALS
DIASTOLIC BLOOD PRESSURE: 70 MMHG | RESPIRATION RATE: 18 BRPM | HEART RATE: 89 BPM | SYSTOLIC BLOOD PRESSURE: 115 MMHG | TEMPERATURE: 97 F

## 2018-08-02 ENCOUNTER — HOME CARE VISIT (OUTPATIENT)
Dept: SCHEDULING | Facility: HOME HEALTH | Age: 63
End: 2018-08-02
Payer: MEDICAID

## 2018-08-02 VITALS
DIASTOLIC BLOOD PRESSURE: 98 MMHG | TEMPERATURE: 97.5 F | HEART RATE: 91 BPM | OXYGEN SATURATION: 98 % | RESPIRATION RATE: 18 BRPM | SYSTOLIC BLOOD PRESSURE: 130 MMHG

## 2018-08-02 PROCEDURE — G0299 HHS/HOSPICE OF RN EA 15 MIN: HCPCS

## 2018-08-02 PROCEDURE — A6266 IMPREG GAUZE NO H20/SAL/YARD: HCPCS

## 2018-08-02 PROCEDURE — A9270 NON-COVERED ITEM OR SERVICE: HCPCS

## 2018-08-03 PROCEDURE — A6266 IMPREG GAUZE NO H20/SAL/YARD: HCPCS

## 2018-08-07 ENCOUNTER — HOME CARE VISIT (OUTPATIENT)
Dept: SCHEDULING | Facility: HOME HEALTH | Age: 63
End: 2018-08-07
Payer: MEDICAID

## 2018-08-07 VITALS
TEMPERATURE: 96.6 F | SYSTOLIC BLOOD PRESSURE: 142 MMHG | HEART RATE: 100 BPM | DIASTOLIC BLOOD PRESSURE: 90 MMHG | RESPIRATION RATE: 18 BRPM

## 2018-08-07 PROCEDURE — G0299 HHS/HOSPICE OF RN EA 15 MIN: HCPCS

## 2018-08-09 ENCOUNTER — HOME CARE VISIT (OUTPATIENT)
Dept: SCHEDULING | Facility: HOME HEALTH | Age: 63
End: 2018-08-09
Payer: MEDICAID

## 2018-08-09 VITALS
RESPIRATION RATE: 18 BRPM | HEART RATE: 96 BPM | SYSTOLIC BLOOD PRESSURE: 128 MMHG | TEMPERATURE: 98.4 F | DIASTOLIC BLOOD PRESSURE: 65 MMHG | BODY MASS INDEX: 52.44 KG/M2 | HEIGHT: 62 IN | OXYGEN SATURATION: 97 % | WEIGHT: 285 LBS

## 2018-08-09 PROCEDURE — G0151 HHCP-SERV OF PT,EA 15 MIN: HCPCS

## 2018-08-10 ENCOUNTER — HOME CARE VISIT (OUTPATIENT)
Dept: SCHEDULING | Facility: HOME HEALTH | Age: 63
End: 2018-08-10
Payer: MEDICAID

## 2018-08-10 PROCEDURE — G0299 HHS/HOSPICE OF RN EA 15 MIN: HCPCS

## 2018-08-13 VITALS
TEMPERATURE: 96.7 F | DIASTOLIC BLOOD PRESSURE: 70 MMHG | RESPIRATION RATE: 18 BRPM | HEART RATE: 97 BPM | OXYGEN SATURATION: 97 % | SYSTOLIC BLOOD PRESSURE: 120 MMHG

## 2018-08-14 ENCOUNTER — HOME CARE VISIT (OUTPATIENT)
Dept: SCHEDULING | Facility: HOME HEALTH | Age: 63
End: 2018-08-14
Payer: MEDICAID

## 2018-08-14 VITALS
SYSTOLIC BLOOD PRESSURE: 132 MMHG | TEMPERATURE: 97.9 F | DIASTOLIC BLOOD PRESSURE: 82 MMHG | RESPIRATION RATE: 20 BRPM | HEART RATE: 86 BPM

## 2018-08-14 PROCEDURE — A6222 GAUZE <=16 IN NO W/SAL W/O B: HCPCS

## 2018-08-14 PROCEDURE — A4927 NON-STERILE GLOVES: HCPCS

## 2018-08-14 PROCEDURE — G0299 HHS/HOSPICE OF RN EA 15 MIN: HCPCS

## 2018-08-14 PROCEDURE — A6252 ABSORPT DRG >16 <=48 W/O BDR: HCPCS

## 2018-08-16 ENCOUNTER — HOME CARE VISIT (OUTPATIENT)
Dept: SCHEDULING | Facility: HOME HEALTH | Age: 63
End: 2018-08-16
Payer: MEDICAID

## 2018-08-16 VITALS
DIASTOLIC BLOOD PRESSURE: 76 MMHG | RESPIRATION RATE: 18 BRPM | SYSTOLIC BLOOD PRESSURE: 142 MMHG | TEMPERATURE: 97.8 F | HEART RATE: 84 BPM | OXYGEN SATURATION: 99 %

## 2018-08-16 PROCEDURE — G0299 HHS/HOSPICE OF RN EA 15 MIN: HCPCS

## 2018-08-20 ENCOUNTER — HOME CARE VISIT (OUTPATIENT)
Dept: SCHEDULING | Facility: HOME HEALTH | Age: 63
End: 2018-08-20
Payer: MEDICAID

## 2018-08-20 PROCEDURE — G0299 HHS/HOSPICE OF RN EA 15 MIN: HCPCS

## 2018-08-21 VITALS
DIASTOLIC BLOOD PRESSURE: 70 MMHG | RESPIRATION RATE: 20 BRPM | SYSTOLIC BLOOD PRESSURE: 130 MMHG | TEMPERATURE: 98.9 F | HEART RATE: 90 BPM | OXYGEN SATURATION: 98 %

## 2018-08-24 ENCOUNTER — HOME CARE VISIT (OUTPATIENT)
Dept: SCHEDULING | Facility: HOME HEALTH | Age: 63
End: 2018-08-24
Payer: MEDICAID

## 2018-08-24 VITALS
TEMPERATURE: 97.9 F | DIASTOLIC BLOOD PRESSURE: 60 MMHG | SYSTOLIC BLOOD PRESSURE: 121 MMHG | OXYGEN SATURATION: 98 % | HEART RATE: 106 BPM | RESPIRATION RATE: 29 BRPM

## 2018-08-24 PROCEDURE — G0299 HHS/HOSPICE OF RN EA 15 MIN: HCPCS

## 2018-08-28 ENCOUNTER — HOME CARE VISIT (OUTPATIENT)
Dept: SCHEDULING | Facility: HOME HEALTH | Age: 63
End: 2018-08-28
Payer: MEDICAID

## 2018-08-28 PROCEDURE — G0299 HHS/HOSPICE OF RN EA 15 MIN: HCPCS

## 2018-08-29 VITALS
RESPIRATION RATE: 20 BRPM | DIASTOLIC BLOOD PRESSURE: 78 MMHG | TEMPERATURE: 97.9 F | SYSTOLIC BLOOD PRESSURE: 115 MMHG | HEART RATE: 97 BPM

## 2018-08-31 ENCOUNTER — HOME CARE VISIT (OUTPATIENT)
Dept: SCHEDULING | Facility: HOME HEALTH | Age: 63
End: 2018-08-31
Payer: MEDICAID

## 2018-08-31 VITALS
SYSTOLIC BLOOD PRESSURE: 115 MMHG | HEART RATE: 92 BPM | DIASTOLIC BLOOD PRESSURE: 78 MMHG | TEMPERATURE: 96.5 F | RESPIRATION RATE: 20 BRPM | OXYGEN SATURATION: 98 %

## 2018-08-31 PROCEDURE — G0299 HHS/HOSPICE OF RN EA 15 MIN: HCPCS

## 2019-02-01 ENCOUNTER — HOSPITAL ENCOUNTER (OUTPATIENT)
Dept: MAMMOGRAPHY | Age: 64
Discharge: HOME | End: 2019-02-01
Attending: INTERNAL MEDICINE

## 2019-02-01 DIAGNOSIS — Z12.31 VISIT FOR SCREENING MAMMOGRAM: ICD-10-CM

## 2019-02-12 ENCOUNTER — HOSPITAL ENCOUNTER (OUTPATIENT)
Dept: SURGERY | Age: 64
Discharge: HOME | End: 2019-02-12

## 2019-02-12 VITALS
BODY MASS INDEX: 53.92 KG/M2 | TEMPERATURE: 98.5 F | HEIGHT: 62 IN | HEART RATE: 100 BPM | OXYGEN SATURATION: 96 % | RESPIRATION RATE: 20 BRPM | WEIGHT: 293 LBS | DIASTOLIC BLOOD PRESSURE: 84 MMHG | SYSTOLIC BLOOD PRESSURE: 160 MMHG

## 2019-02-12 NOTE — PERIOP NOTES
Patient verified name and . Patient provided medical/health information and PTA medications to the best of their ability. TYPE  CASE:lb  Order for consent NOT found in EHR and matches case posting. Labs per surgeon:None, no orders in [facility] at the time of PAT appointment. Labs per anesthesia protocol: Potassium on DOS. EKG  :  4/10/2018 ST (108), Last echo done 2018 EF 60-45%. Patient is a poor historian. Patient provided with and instructed on education handouts including Guide to Surgery, blood transfusions, pain management, and hand hygiene for the family and community, and [facility] brochure. Antibacterial soap and HIbiclens instructions given per hospital policy. Instructed patient to continue previous medications as prescribed prior to surgery unless otherwise directed and to take the following medications the day of surgery according to anesthesia guidelines : Albuterol inhaler, Cymbalta, Advair inhaler, Metoprolol, Zantac . Instructed patient to hold  the following medications: Vitamins. Original medication prescription bottles were not visualized during patient appointment. Patient teach back successful and patient demonstrates knowledge of instruction.

## 2019-02-19 ENCOUNTER — HOSPITAL ENCOUNTER (OUTPATIENT)
Age: 64
Setting detail: OUTPATIENT SURGERY
Discharge: HOME | End: 2019-02-19
Attending: SURGERY | Admitting: SURGERY
Payer: MEDICAID

## 2019-02-19 ENCOUNTER — ANESTHESIA EVENT (OUTPATIENT)
Dept: SURGERY | Age: 64
End: 2019-02-19
Payer: MEDICAID

## 2019-02-19 ENCOUNTER — ANESTHESIA (OUTPATIENT)
Dept: SURGERY | Age: 64
End: 2019-02-19
Payer: MEDICAID

## 2019-02-19 VITALS
BODY MASS INDEX: 53.92 KG/M2 | OXYGEN SATURATION: 94 % | HEART RATE: 91 BPM | WEIGHT: 293 LBS | HEIGHT: 62 IN | RESPIRATION RATE: 23 BRPM | DIASTOLIC BLOOD PRESSURE: 89 MMHG | SYSTOLIC BLOOD PRESSURE: 164 MMHG | TEMPERATURE: 98.2 F

## 2019-02-19 LAB — GLUCOSE BLD STRIP.AUTO-MCNC: 106 MG/DL (ref 65–100)

## 2019-02-19 PROCEDURE — 88305 TISSUE EXAM BY PATHOLOGIST: CPT

## 2019-02-19 PROCEDURE — 74011250636 HC RX REV CODE- 250/636: Performed by: SURGERY

## 2019-02-19 PROCEDURE — 77030011283 HC ELECTRD NDL COVD -A: Performed by: SURGERY

## 2019-02-19 PROCEDURE — 77030031139 HC SUT VCRL2 J&J -A: Performed by: SURGERY

## 2019-02-19 PROCEDURE — 76060000032 HC ANESTHESIA 0.5 TO 1 HR: Performed by: SURGERY

## 2019-02-19 PROCEDURE — 76010000138 HC OR TIME 0.5 TO 1 HR: Performed by: SURGERY

## 2019-02-19 PROCEDURE — 82962 GLUCOSE BLOOD TEST: CPT

## 2019-02-19 PROCEDURE — 74011250636 HC RX REV CODE- 250/636

## 2019-02-19 PROCEDURE — 77030018836 HC SOL IRR NACL ICUM -A: Performed by: SURGERY

## 2019-02-19 PROCEDURE — 74011000250 HC RX REV CODE- 250: Performed by: SURGERY

## 2019-02-19 PROCEDURE — 84132 ASSAY OF SERUM POTASSIUM: CPT

## 2019-02-19 PROCEDURE — 76210000021 HC REC RM PH II 0.5 TO 1 HR: Performed by: SURGERY

## 2019-02-19 PROCEDURE — 74011250636 HC RX REV CODE- 250/636: Performed by: ANESTHESIOLOGY

## 2019-02-19 PROCEDURE — 76210000063 HC OR PH I REC FIRST 0.5 HR: Performed by: SURGERY

## 2019-02-19 PROCEDURE — 77030013629 HC ELECTRD NDL STRY -B: Performed by: SURGERY

## 2019-02-19 RX ORDER — LIDOCAINE HYDROCHLORIDE 20 MG/ML
INJECTION, SOLUTION EPIDURAL; INFILTRATION; INTRACAUDAL; PERINEURAL AS NEEDED
Status: DISCONTINUED | OUTPATIENT
Start: 2019-02-19 | End: 2019-02-19 | Stop reason: HOSPADM

## 2019-02-19 RX ORDER — FENTANYL CITRATE 50 UG/ML
100 INJECTION, SOLUTION INTRAMUSCULAR; INTRAVENOUS ONCE
Status: DISCONTINUED | OUTPATIENT
Start: 2019-02-19 | End: 2019-02-19 | Stop reason: HOSPADM

## 2019-02-19 RX ORDER — HYDROCODONE BITARTRATE AND ACETAMINOPHEN 5; 325 MG/1; MG/1
2 TABLET ORAL AS NEEDED
Status: DISCONTINUED | OUTPATIENT
Start: 2019-02-19 | End: 2019-02-19 | Stop reason: HOSPADM

## 2019-02-19 RX ORDER — SODIUM CHLORIDE, SODIUM LACTATE, POTASSIUM CHLORIDE, CALCIUM CHLORIDE 600; 310; 30; 20 MG/100ML; MG/100ML; MG/100ML; MG/100ML
100 INJECTION, SOLUTION INTRAVENOUS CONTINUOUS
Status: DISCONTINUED | OUTPATIENT
Start: 2019-02-19 | End: 2019-02-19 | Stop reason: HOSPADM

## 2019-02-19 RX ORDER — HYDROMORPHONE HYDROCHLORIDE 2 MG/ML
0.5 INJECTION, SOLUTION INTRAMUSCULAR; INTRAVENOUS; SUBCUTANEOUS
Status: DISCONTINUED | OUTPATIENT
Start: 2019-02-19 | End: 2019-02-19 | Stop reason: HOSPADM

## 2019-02-19 RX ORDER — LIDOCAINE HYDROCHLORIDE 10 MG/ML
0.1 INJECTION INFILTRATION; PERINEURAL AS NEEDED
Status: DISCONTINUED | OUTPATIENT
Start: 2019-02-19 | End: 2019-02-19 | Stop reason: HOSPADM

## 2019-02-19 RX ORDER — MIDAZOLAM HYDROCHLORIDE 1 MG/ML
5 INJECTION, SOLUTION INTRAMUSCULAR; INTRAVENOUS ONCE
Status: DISCONTINUED | OUTPATIENT
Start: 2019-02-19 | End: 2019-02-19 | Stop reason: HOSPADM

## 2019-02-19 RX ORDER — LIDOCAINE HYDROCHLORIDE AND EPINEPHRINE 5; 5 MG/ML; UG/ML
INJECTION, SOLUTION INFILTRATION; PERINEURAL AS NEEDED
Status: DISCONTINUED | OUTPATIENT
Start: 2019-02-19 | End: 2019-02-19 | Stop reason: HOSPADM

## 2019-02-19 RX ORDER — CEFAZOLIN SODIUM/WATER 2 G/20 ML
2 SYRINGE (ML) INTRAVENOUS ONCE
Status: DISCONTINUED | OUTPATIENT
Start: 2019-02-19 | End: 2019-02-19 | Stop reason: CLARIF

## 2019-02-19 RX ORDER — SODIUM CHLORIDE 0.9 % (FLUSH) 0.9 %
5-40 SYRINGE (ML) INJECTION EVERY 8 HOURS
Status: DISCONTINUED | OUTPATIENT
Start: 2019-02-19 | End: 2019-02-19 | Stop reason: HOSPADM

## 2019-02-19 RX ORDER — BACITRACIN 50000 [IU]/1
INJECTION, POWDER, FOR SOLUTION INTRAMUSCULAR AS NEEDED
Status: DISCONTINUED | OUTPATIENT
Start: 2019-02-19 | End: 2019-02-19 | Stop reason: HOSPADM

## 2019-02-19 RX ORDER — BUPIVACAINE HYDROCHLORIDE AND EPINEPHRINE 5; 5 MG/ML; UG/ML
INJECTION, SOLUTION EPIDURAL; INTRACAUDAL; PERINEURAL AS NEEDED
Status: DISCONTINUED | OUTPATIENT
Start: 2019-02-19 | End: 2019-02-19 | Stop reason: HOSPADM

## 2019-02-19 RX ORDER — SODIUM CHLORIDE, SODIUM LACTATE, POTASSIUM CHLORIDE, CALCIUM CHLORIDE 600; 310; 30; 20 MG/100ML; MG/100ML; MG/100ML; MG/100ML
75 INJECTION, SOLUTION INTRAVENOUS CONTINUOUS
Status: DISCONTINUED | OUTPATIENT
Start: 2019-02-19 | End: 2019-02-19 | Stop reason: HOSPADM

## 2019-02-19 RX ORDER — OXYCODONE HYDROCHLORIDE 5 MG/1
5 TABLET ORAL
Status: DISCONTINUED | OUTPATIENT
Start: 2019-02-19 | End: 2019-02-19 | Stop reason: HOSPADM

## 2019-02-19 RX ORDER — MIDAZOLAM HYDROCHLORIDE 1 MG/ML
2 INJECTION, SOLUTION INTRAMUSCULAR; INTRAVENOUS
Status: COMPLETED | OUTPATIENT
Start: 2019-02-19 | End: 2019-02-19

## 2019-02-19 RX ORDER — SODIUM CHLORIDE 0.9 % (FLUSH) 0.9 %
5-40 SYRINGE (ML) INJECTION AS NEEDED
Status: DISCONTINUED | OUTPATIENT
Start: 2019-02-19 | End: 2019-02-19 | Stop reason: HOSPADM

## 2019-02-19 RX ORDER — MIDAZOLAM HYDROCHLORIDE 1 MG/ML
INJECTION, SOLUTION INTRAMUSCULAR; INTRAVENOUS AS NEEDED
Status: DISCONTINUED | OUTPATIENT
Start: 2019-02-19 | End: 2019-02-19 | Stop reason: HOSPADM

## 2019-02-19 RX ORDER — KETAMINE HYDROCHLORIDE 100 MG/ML
INJECTION, SOLUTION INTRAMUSCULAR; INTRAVENOUS AS NEEDED
Status: DISCONTINUED | OUTPATIENT
Start: 2019-02-19 | End: 2019-02-19 | Stop reason: HOSPADM

## 2019-02-19 RX ORDER — PROPOFOL 10 MG/ML
INJECTION, EMULSION INTRAVENOUS AS NEEDED
Status: DISCONTINUED | OUTPATIENT
Start: 2019-02-19 | End: 2019-02-19 | Stop reason: HOSPADM

## 2019-02-19 RX ADMIN — PROPOFOL 30 MG: 10 INJECTION, EMULSION INTRAVENOUS at 10:08

## 2019-02-19 RX ADMIN — LIDOCAINE HYDROCHLORIDE 20 MG: 20 INJECTION, SOLUTION EPIDURAL; INFILTRATION; INTRACAUDAL; PERINEURAL at 09:58

## 2019-02-19 RX ADMIN — SODIUM CHLORIDE, SODIUM LACTATE, POTASSIUM CHLORIDE, AND CALCIUM CHLORIDE 100 ML/HR: 600; 310; 30; 20 INJECTION, SOLUTION INTRAVENOUS at 09:00

## 2019-02-19 RX ADMIN — Medication 3 G: at 09:40

## 2019-02-19 RX ADMIN — KETAMINE HYDROCHLORIDE 50 MG: 100 INJECTION, SOLUTION INTRAMUSCULAR; INTRAVENOUS at 10:16

## 2019-02-19 RX ADMIN — MIDAZOLAM HYDROCHLORIDE 2 MG: 2 INJECTION, SOLUTION INTRAMUSCULAR; INTRAVENOUS at 09:10

## 2019-02-19 RX ADMIN — PROPOFOL 20 MG: 10 INJECTION, EMULSION INTRAVENOUS at 10:00

## 2019-02-19 RX ADMIN — PROPOFOL 30 MG: 10 INJECTION, EMULSION INTRAVENOUS at 10:04

## 2019-02-19 RX ADMIN — PROPOFOL 60 MG: 10 INJECTION, EMULSION INTRAVENOUS at 09:58

## 2019-02-19 RX ADMIN — MIDAZOLAM HYDROCHLORIDE 1 MG: 1 INJECTION, SOLUTION INTRAMUSCULAR; INTRAVENOUS at 10:16

## 2019-02-19 NOTE — ANESTHESIA PREPROCEDURE EVALUATION
Anesthetic History Increased risk of difficult airway (unsuccessful DL by CRNA and MD- used glidescope without difficulty) Review of Systems / Medical History Patient summary reviewed, nursing notes reviewed and pertinent labs reviewed Pulmonary Sleep apnea (oxygen at night): BiPAP Asthma : well controlled Neuro/Psych Within defined limits Cardiovascular Hypertension: well controlled Dysrhythmias : sinus tachycardia CAD (mild non-obstructive) Exercise tolerance: <4 METS Comments: EF 55% GI/Hepatic/Renal 
  
GERD: well controlled Endo/Other Hypothyroidism: well controlled Morbid obesity and arthritis Other Findings Comments: S/p mastectomy and expander placement in January 2017 at [facility] Physical Exam 
 
Airway Mallampati: III 
TM Distance: 4 - 6 cm Neck ROM: normal range of motion Mouth opening: Normal 
 
 Cardiovascular Regular rate and rhythm,  S1 and S2 normal,  no murmur, click, rub, or gallop Rhythm: regular Rate: normal 
 
 
 
 Dental 
 
Dentition: Caps/crowns and [employer] Pulmonary Breath sounds clear to auscultation Abdominal 
 
 
 
 Other Findings Anesthetic Plan ASA: 3 Anesthesia type: total IV anesthesia Induction: Intravenous Anesthetic plan and risks discussed with: Patient and Family

## 2019-02-19 NOTE — H&P
CC: Midline back lesion  HPI: 62 y/o with history of recurrently infected and draining cysts of the midline back. Presents for excision. Has previously undergone excision 3 years ago. Past Medical History:   Diagnosis Date    Anxiety     Arthritis     everywhere;  DDD    Asthma     inhalers daily  [facility]    Autoimmune disease ([address]     Lupus, Fibromyalgia    Breast cancer ([address] 09/2016    CAD (coronary artery disease)     Dr Abelino Sainz, EF 60-65%, no stents, no MI    Cancer [facility])     left breast ca dx'ed this month-appt with surgeon 10/12    Chronic pain     generalized from arthritis    Complicated UTI (urinary tract infection) 12/8/2015    DDD (degenerative disc disease), cervical     Depression     Diverticulosis     GERD (gastroesophageal reflux disease)     Hearing reduced     Heart failure ([address]     EF 60-65% per echo 4/13/2018    History of echocardiogram 11/17/14 at [address]    No significant valvular disease.   EF 50-55%    History of prediabetes     monitored by Primary Physician    Hypertension     Lymphedema     Morbid obesity ([address]     Nausea & vomiting     Shortness of breath     Sinus problem     Sleep apnea     bi pap and 3 liters oxygen     Thyroid cyst     cyst removed from thyroid     Past Surgical History:   Procedure Laterality Date    BREAST SURGERY PROCEDURE UNLISTED      Mastectomy left side    CARDIAC SURG PROCEDURE UNLIST  [facility]    Minimal CAD in the ostial circumflex and mid ramus (medical management)    HX BREAST RECONSTRUCTION Left 7/5/2018    LEFT BREAST REVISION/ EXPANDER EXCHANGE FOR IMPLANT performed by Isaac [facility] at [facility] MA[facility] OR    HX [address]ilateral     HX CHOLECYSTECTOMY      HX CYST REMOVAL      from thyroid    HX LAP CHOLECYSTECTOMY      HX MASTECTOMY Left 01/2017    HX OTHER SURGICAL      abcess where bra strap    INSERT TISSUE EXPANDER(S) Left 01/2017    NEUROLOGICAL PROCEDURE UNLISTED Burning nerve to back    SINUS SURGERY PROC UNLISTED      sinus surgery     Visit Vitals  /68 (BP 1 Location: Right arm, BP Patient Position: Sitting)   Pulse 86   Temp 98.1 °F (36.7 °C)   Resp 18   Wt 132.9 kg (293 lb)   SpO2 97%   BMI 53.59 kg/m²       A&O x3  RRR  Resp clear  abd soft  Midline back incision with punctum in inferior portion    A/P:    Will plan for excision and closure of likely sebaceous cyst. Plan for this to be done in OR due to patient habitus and anxiety with MAC/local.

## 2019-02-19 NOTE — DISCHARGE INSTRUCTIONS
ACTIVITY  · As tolerated and as directed by your doctor. · Bathe or shower as directed by your doctor. DIET  · Clear liquids until no nausea or vomiting; then light diet for the first day. · Advance to regular diet on second day, unless your doctor orders otherwise. · If nausea and vomiting continues, call your doctor. PAIN  · Take pain medication as directed by your doctor. · Call your doctor if pain is NOT relieved by medication. · DO NOT take aspirin of blood thinners unless directed by your doctor. DRESSING CARE  Remove dressing in two days and cover with bandaid, use waterproof bandaid with showers    CALL YOUR DOCTOR IF   · Excessive bleeding that does not stop after holding pressure over the area  · Temperature of 101 degrees F or above  · Excessive redness, swelling or bruising, and/ or green or yellow, smelly discharge from incision    AFTER ANESTHESIA   · For the first 24 hours: DO NOT Drive, Drink alcoholic beverages, or Make important decisions. · Be aware of dizziness following anesthesia and while taking pain medication. DISCHARGE SUMMARY from Nurse    PATIENT INSTRUCTIONS:    After general anesthesia or intravenous sedation, for 24 hours or while taking prescription Narcotics:  · Limit your activities  · Do not drive and operate hazardous machinery  · Do not make important personal or business decisions  · Do  not drink alcoholic beverages  · If you have not urinated within 8 hours after discharge, please contact your surgeon on call. *  Please give a list of your current medications to your Primary Care Provider. *  Please update this list whenever your medications are discontinued, doses are      changed, or new medications (including over-the-counter products) are added. *  Please carry medication information at all times in case of emergency situations.       These are general instructions for a healthy lifestyle:    No smoking/ No tobacco products/ Avoid exposure to second hand smoke    Surgeon General's Warning:  Quitting smoking now greatly reduces serious risk to your health. Obesity, smoking, and sedentary lifestyle greatly increases your risk for illness    A healthy diet, regular physical exercise & weight monitoring are important for maintaining a healthy lifestyle    You may be retaining fluid if you have a history of heart failure or if you experience any of the following symptoms:  Weight gain of 3 pounds or more overnight or 5 pounds in a week, increased swelling in our hands or feet or shortness of breath while lying flat in bed. Please call your doctor as soon as you notice any of these symptoms; do not wait until your next office visit. Recognize signs and symptoms of STROKE:    F-face looks uneven    A-arms unable to move or move unevenly    S-speech slurred or non-existent    T-time-call 911 as soon as signs and symptoms begin-DO NOT go       Back to bed or wait to see if you get better-TIME IS BRAIN.

## 2019-02-19 NOTE — ANESTHESIA POSTPROCEDURE EVALUATION
Procedure(s): EXCISION AND CLOSURE OF SEBACEOUS CYST UPPER BACK. Anesthesia Post Evaluation Multimodal analgesia: multimodal analgesia used between 6 hours prior to anesthesia start to PACU discharge Patient location during evaluation: bedside Patient participation: complete - patient participated Level of consciousness: awake and alert Pain score: 3 Pain management: adequate Airway patency: patent Anesthetic complications: no 
Cardiovascular status: acceptable and hemodynamically stable Respiratory status: acceptable Hydration status: acceptable Post anesthesia nausea and vomiting:  none Visit Vitals /77 Pulse 89 Temp 36.8 °C (98.2 °F) Resp 20 Ht 5' 2\" (1.575 m) Wt 132.9 kg (293 lb) SpO2 100% BMI 53.59 kg/m²

## 2019-02-19 NOTE — OP NOTES
OPERATIVE NOTE    Date of Procedure: 2/19/2019     Preoperative Diagnosis:   Chronic back wound    Postoperative Diagnosis:   Same     Procedure(s):  Excision of midline back lesion 1 cm diameter  Complex closure of excisional site 3 cm length    Surgeon(s) and Role:     * Tomer Padilla MD - Primary         Prior to the procedure the patient was met in holding. Once again a discussion of the risks, benefits and alternatives was conducted including but not limited to bleeding, infection, failure of the surgery, need for further procedures, disappointing or unacceptable cosmesis, damage to adjacent structures was conducted. The patient again affirmed understanding and consent. The patient was marked in the upright and relaxed position. Patient brought to the OR, surgical timeout performed and anesthesia induced. Patient positioned right lateral and prepped and draped. Lesion infiltrated with 30 ml of 0.5% lidocaine with epi and 30 ml of 0.5% marcaine with epi. Elliptical excision designed and made sharply after infiltrating methylene blue with peroxide. Lesion excised completely and passed off field for permanent pathology. Due to wound tension skin flaps elevated laterally on both side 2 cm sharply. Hemostasis obtained with cautery. Irrigated and closed with deep dermal 3-0 vertical mattress buried sutures. Deep dermal 3-0 vicryls and staples. Dressed with xeroform, gauze, tegaderm.       Surgical Staff:  Circ-1: Nicola Conway RN  Circ-Relief: Andi Lesch, RN  Scrub Tech-1: Ivy Uribe  Scrub Tech-2: Sapphire Stratton  Event Time In Time Out   Incision Start 1001    Incision Close 1019      Anesthesia: MAC   Estimated Blood Loss: minimal  Specimens:   ID Type Source Tests Collected by Time Destination   1 : CHRONIC WOUND UPPER BACK Preservative Back  Moister, Jena Cortez MD 2/19/2019 1018 Pathology      Implants: * No implants in log *

## 2019-02-21 LAB — POTASSIUM BLD-SCNC: 4.3 MMOL/L (ref 3.5–5.1)

## 2019-10-04 NOTE — PROGRESS NOTES
PM assessment complete. Alert, oriented x 4. Respirations even and unlabored, no distress noted. Dressing to left breast clean, dry and intact. Hemovac drain patent and charged. Abdomen soft, bowel sounds active. Denies needs or pain. no

## 2020-02-10 ENCOUNTER — ANESTHESIA EVENT (OUTPATIENT)
Dept: SURGERY | Age: 65
End: 2020-02-10
Payer: MEDICAID

## 2020-02-11 ENCOUNTER — HOSPITAL ENCOUNTER (OUTPATIENT)
Age: 65
Setting detail: OUTPATIENT SURGERY
Discharge: HOME | End: 2020-02-11
Attending: SURGERY | Admitting: SURGERY
Payer: MEDICAID

## 2020-02-11 ENCOUNTER — ANESTHESIA (OUTPATIENT)
Dept: SURGERY | Age: 65
End: 2020-02-11
Payer: MEDICAID

## 2020-02-11 VITALS
OXYGEN SATURATION: 96 % | BODY MASS INDEX: 52.31 KG/M2 | SYSTOLIC BLOOD PRESSURE: 107 MMHG | DIASTOLIC BLOOD PRESSURE: 55 MMHG | RESPIRATION RATE: 15 BRPM | HEART RATE: 90 BPM | WEIGHT: 286 LBS | TEMPERATURE: 98.1 F

## 2020-02-11 DIAGNOSIS — L76.82 PAIN AT SURGICAL INCISION: Primary | ICD-10-CM

## 2020-02-11 LAB
GLUCOSE BLD STRIP.AUTO-MCNC: 119 MG/DL (ref 65–100)
POTASSIUM BLD-SCNC: 4.4 MMOL/L (ref 3.5–5.1)

## 2020-02-11 PROCEDURE — 77030019908 HC STETH ESOPH SIMS -A: Performed by: REGISTERED NURSE

## 2020-02-11 PROCEDURE — 77030033269 HC SLV COMPR SCD KNE2 CARD -B: Performed by: SURGERY

## 2020-02-11 PROCEDURE — 88305 TISSUE EXAM BY PATHOLOGIST: CPT

## 2020-02-11 PROCEDURE — 84132 ASSAY OF SERUM POTASSIUM: CPT

## 2020-02-11 PROCEDURE — 74011000250 HC RX REV CODE- 250: Performed by: SURGERY

## 2020-02-11 PROCEDURE — 87176 TISSUE HOMOGENIZATION CULTR: CPT

## 2020-02-11 PROCEDURE — 74011250636 HC RX REV CODE- 250/636: Performed by: ANESTHESIOLOGY

## 2020-02-11 PROCEDURE — 77030008703 HC TU ET UNCUF COVD -A: Performed by: REGISTERED NURSE

## 2020-02-11 PROCEDURE — 77030021678 HC GLIDESCP STAT DISP VERT -B: Performed by: REGISTERED NURSE

## 2020-02-11 PROCEDURE — 74011250636 HC RX REV CODE- 250/636: Performed by: SURGERY

## 2020-02-11 PROCEDURE — 87153 DNA/RNA SEQUENCING: CPT

## 2020-02-11 PROCEDURE — 74011250637 HC RX REV CODE- 250/637: Performed by: ANESTHESIOLOGY

## 2020-02-11 PROCEDURE — 77030040922 HC BLNKT HYPOTHRM STRY -A: Performed by: REGISTERED NURSE

## 2020-02-11 PROCEDURE — 82962 GLUCOSE BLOOD TEST: CPT

## 2020-02-11 PROCEDURE — 76210000020 HC REC RM PH II FIRST 0.5 HR: Performed by: SURGERY

## 2020-02-11 PROCEDURE — 87076 CULTURE ANAEROBE IDENT EACH: CPT

## 2020-02-11 PROCEDURE — 77030011283 HC ELECTRD NDL COVD -A: Performed by: SURGERY

## 2020-02-11 PROCEDURE — 77030013629 HC ELECTRD NDL STRY -B: Performed by: SURGERY

## 2020-02-11 PROCEDURE — 74011000250 HC RX REV CODE- 250: Performed by: REGISTERED NURSE

## 2020-02-11 PROCEDURE — 76060000034 HC ANESTHESIA 1.5 TO 2 HR: Performed by: SURGERY

## 2020-02-11 PROCEDURE — 77030031139 HC SUT VCRL2 J&J -A: Performed by: SURGERY

## 2020-02-11 PROCEDURE — 77030018836 HC SOL IRR NACL ICUM -A: Performed by: SURGERY

## 2020-02-11 PROCEDURE — 87075 CULTR BACTERIA EXCEPT BLOOD: CPT

## 2020-02-11 PROCEDURE — 77030002933 HC SUT MCRYL J&J -A: Performed by: SURGERY

## 2020-02-11 PROCEDURE — 74011250636 HC RX REV CODE- 250/636: Performed by: REGISTERED NURSE

## 2020-02-11 PROCEDURE — 76210000006 HC OR PH I REC 0.5 TO 1 HR: Performed by: SURGERY

## 2020-02-11 PROCEDURE — 87205 SMEAR GRAM STAIN: CPT

## 2020-02-11 PROCEDURE — 76010000162 HC OR TIME 1.5 TO 2 HR INTENSV-TIER 1: Performed by: SURGERY

## 2020-02-11 RX ORDER — SUCCINYLCHOLINE CHLORIDE 20 MG/ML
INJECTION INTRAMUSCULAR; INTRAVENOUS AS NEEDED
Status: DISCONTINUED | OUTPATIENT
Start: 2020-02-11 | End: 2020-02-11 | Stop reason: HOSPADM

## 2020-02-11 RX ORDER — HYDROMORPHONE HYDROCHLORIDE 2 MG/ML
0.5 INJECTION, SOLUTION INTRAMUSCULAR; INTRAVENOUS; SUBCUTANEOUS
Status: DISCONTINUED | OUTPATIENT
Start: 2020-02-11 | End: 2020-02-11 | Stop reason: HOSPADM

## 2020-02-11 RX ORDER — METHYLENE BLUE 10 MG/ML
INJECTION INTRAVENOUS AS NEEDED
Status: DISCONTINUED | OUTPATIENT
Start: 2020-02-11 | End: 2020-02-11 | Stop reason: HOSPADM

## 2020-02-11 RX ORDER — FENTANYL CITRATE 50 UG/ML
INJECTION, SOLUTION INTRAMUSCULAR; INTRAVENOUS AS NEEDED
Status: DISCONTINUED | OUTPATIENT
Start: 2020-02-11 | End: 2020-02-11 | Stop reason: HOSPADM

## 2020-02-11 RX ORDER — ALBUTEROL SULFATE 0.83 MG/ML
2.5 SOLUTION RESPIRATORY (INHALATION) AS NEEDED
Status: DISCONTINUED | OUTPATIENT
Start: 2020-02-11 | End: 2020-02-11 | Stop reason: HOSPADM

## 2020-02-11 RX ORDER — SODIUM CHLORIDE, SODIUM LACTATE, POTASSIUM CHLORIDE, CALCIUM CHLORIDE 600; 310; 30; 20 MG/100ML; MG/100ML; MG/100ML; MG/100ML
50 INJECTION, SOLUTION INTRAVENOUS CONTINUOUS
Status: DISCONTINUED | OUTPATIENT
Start: 2020-02-11 | End: 2020-02-11 | Stop reason: HOSPADM

## 2020-02-11 RX ORDER — LIDOCAINE HYDROCHLORIDE AND EPINEPHRINE 10; 10 MG/ML; UG/ML
INJECTION, SOLUTION INFILTRATION; PERINEURAL AS NEEDED
Status: DISCONTINUED | OUTPATIENT
Start: 2020-02-11 | End: 2020-02-11 | Stop reason: HOSPADM

## 2020-02-11 RX ORDER — ONDANSETRON 2 MG/ML
INJECTION INTRAMUSCULAR; INTRAVENOUS AS NEEDED
Status: DISCONTINUED | OUTPATIENT
Start: 2020-02-11 | End: 2020-02-11 | Stop reason: HOSPADM

## 2020-02-11 RX ORDER — SCOLOPAMINE TRANSDERMAL SYSTEM 1 MG/1
1 PATCH, EXTENDED RELEASE TRANSDERMAL ONCE
Status: DISCONTINUED | OUTPATIENT
Start: 2020-02-11 | End: 2020-02-11 | Stop reason: HOSPADM

## 2020-02-11 RX ORDER — FENTANYL CITRATE 50 UG/ML
100 INJECTION, SOLUTION INTRAMUSCULAR; INTRAVENOUS ONCE
Status: DISCONTINUED | OUTPATIENT
Start: 2020-02-11 | End: 2020-02-11 | Stop reason: HOSPADM

## 2020-02-11 RX ORDER — SODIUM CHLORIDE, SODIUM LACTATE, POTASSIUM CHLORIDE, CALCIUM CHLORIDE 600; 310; 30; 20 MG/100ML; MG/100ML; MG/100ML; MG/100ML
75 INJECTION, SOLUTION INTRAVENOUS CONTINUOUS
Status: DISCONTINUED | OUTPATIENT
Start: 2020-02-11 | End: 2020-02-11 | Stop reason: HOSPADM

## 2020-02-11 RX ORDER — EPHEDRINE SULFATE/0.9% NACL/PF 50 MG/5 ML
SYRINGE (ML) INTRAVENOUS AS NEEDED
Status: DISCONTINUED | OUTPATIENT
Start: 2020-02-11 | End: 2020-02-11 | Stop reason: HOSPADM

## 2020-02-11 RX ORDER — MIDAZOLAM HYDROCHLORIDE 1 MG/ML
2 INJECTION, SOLUTION INTRAMUSCULAR; INTRAVENOUS
Status: DISCONTINUED | OUTPATIENT
Start: 2020-02-11 | End: 2020-02-11 | Stop reason: HOSPADM

## 2020-02-11 RX ORDER — OXYCODONE HYDROCHLORIDE 5 MG/1
5 TABLET ORAL
Status: DISCONTINUED | OUTPATIENT
Start: 2020-02-11 | End: 2020-02-11 | Stop reason: HOSPADM

## 2020-02-11 RX ORDER — GLYCOPYRROLATE 0.2 MG/ML
INJECTION INTRAMUSCULAR; INTRAVENOUS AS NEEDED
Status: DISCONTINUED | OUTPATIENT
Start: 2020-02-11 | End: 2020-02-11 | Stop reason: HOSPADM

## 2020-02-11 RX ORDER — LIDOCAINE HYDROCHLORIDE 10 MG/ML
0.1 INJECTION INFILTRATION; PERINEURAL AS NEEDED
Status: DISCONTINUED | OUTPATIENT
Start: 2020-02-11 | End: 2020-02-11 | Stop reason: HOSPADM

## 2020-02-11 RX ORDER — ROCURONIUM BROMIDE 10 MG/ML
INJECTION, SOLUTION INTRAVENOUS AS NEEDED
Status: DISCONTINUED | OUTPATIENT
Start: 2020-02-11 | End: 2020-02-11 | Stop reason: HOSPADM

## 2020-02-11 RX ORDER — HYDROCODONE BITARTRATE AND ACETAMINOPHEN 5; 325 MG/1; MG/1
1 TABLET ORAL
Qty: 30 TAB | Refills: 0 | Status: SHIPPED | OUTPATIENT
Start: 2020-02-11 | End: 2020-02-14

## 2020-02-11 RX ORDER — LIDOCAINE HYDROCHLORIDE 20 MG/ML
INJECTION, SOLUTION EPIDURAL; INFILTRATION; INTRACAUDAL; PERINEURAL AS NEEDED
Status: DISCONTINUED | OUTPATIENT
Start: 2020-02-11 | End: 2020-02-11 | Stop reason: HOSPADM

## 2020-02-11 RX ORDER — NEOSTIGMINE METHYLSULFATE 1 MG/ML
INJECTION, SOLUTION INTRAVENOUS AS NEEDED
Status: DISCONTINUED | OUTPATIENT
Start: 2020-02-11 | End: 2020-02-11 | Stop reason: HOSPADM

## 2020-02-11 RX ORDER — DEXAMETHASONE SODIUM PHOSPHATE 4 MG/ML
INJECTION, SOLUTION INTRA-ARTICULAR; INTRALESIONAL; INTRAMUSCULAR; INTRAVENOUS; SOFT TISSUE AS NEEDED
Status: DISCONTINUED | OUTPATIENT
Start: 2020-02-11 | End: 2020-02-11 | Stop reason: HOSPADM

## 2020-02-11 RX ORDER — PROPOFOL 10 MG/ML
INJECTION, EMULSION INTRAVENOUS AS NEEDED
Status: DISCONTINUED | OUTPATIENT
Start: 2020-02-11 | End: 2020-02-11 | Stop reason: HOSPADM

## 2020-02-11 RX ORDER — MIDAZOLAM HYDROCHLORIDE 1 MG/ML
2 INJECTION, SOLUTION INTRAMUSCULAR; INTRAVENOUS ONCE
Status: COMPLETED | OUTPATIENT
Start: 2020-02-11 | End: 2020-02-11

## 2020-02-11 RX ADMIN — GLYCOPYRROLATE 0.4 MG: 0.2 INJECTION, SOLUTION INTRAMUSCULAR; INTRAVENOUS at 10:39

## 2020-02-11 RX ADMIN — GLYCOPYRROLATE 0.2 MG: 0.2 INJECTION, SOLUTION INTRAMUSCULAR; INTRAVENOUS at 10:48

## 2020-02-11 RX ADMIN — PHENYLEPHRINE HYDROCHLORIDE 100 MCG: 10 INJECTION INTRAVENOUS at 10:01

## 2020-02-11 RX ADMIN — Medication 1 MG: at 10:47

## 2020-02-11 RX ADMIN — PHENYLEPHRINE HYDROCHLORIDE 100 MCG: 10 INJECTION INTRAVENOUS at 10:37

## 2020-02-11 RX ADMIN — PROPOFOL 40 MG: 10 INJECTION, EMULSION INTRAVENOUS at 10:26

## 2020-02-11 RX ADMIN — CEFAZOLIN 3 G: 1 INJECTION, POWDER, FOR SOLUTION INTRAVENOUS at 09:25

## 2020-02-11 RX ADMIN — SODIUM CHLORIDE, SODIUM LACTATE, POTASSIUM CHLORIDE, AND CALCIUM CHLORIDE: 600; 310; 30; 20 INJECTION, SOLUTION INTRAVENOUS at 10:42

## 2020-02-11 RX ADMIN — Medication 3 MG: at 10:39

## 2020-02-11 RX ADMIN — DEXAMETHASONE SODIUM PHOSPHATE 4 MG: 4 INJECTION, SOLUTION INTRAMUSCULAR; INTRAVENOUS at 09:31

## 2020-02-11 RX ADMIN — ONDANSETRON 4 MG: 2 INJECTION INTRAMUSCULAR; INTRAVENOUS at 10:38

## 2020-02-11 RX ADMIN — PROPOFOL 180 MG: 10 INJECTION, EMULSION INTRAVENOUS at 09:17

## 2020-02-11 RX ADMIN — SODIUM CHLORIDE, SODIUM LACTATE, POTASSIUM CHLORIDE, AND CALCIUM CHLORIDE 75 ML/HR: 600; 310; 30; 20 INJECTION, SOLUTION INTRAVENOUS at 07:00

## 2020-02-11 RX ADMIN — GLYCOPYRROLATE 0.2 MG: 0.2 INJECTION, SOLUTION INTRAMUSCULAR; INTRAVENOUS at 10:47

## 2020-02-11 RX ADMIN — SUCCINYLCHOLINE CHLORIDE 200 MG: 20 INJECTION, SOLUTION INTRAMUSCULAR; INTRAVENOUS at 09:18

## 2020-02-11 RX ADMIN — Medication 1 MG: at 10:48

## 2020-02-11 RX ADMIN — MIDAZOLAM 2 MG: 1 INJECTION INTRAMUSCULAR; INTRAVENOUS at 07:47

## 2020-02-11 RX ADMIN — FENTANYL CITRATE 100 MCG: 50 INJECTION INTRAMUSCULAR; INTRAVENOUS at 09:17

## 2020-02-11 RX ADMIN — LIDOCAINE HYDROCHLORIDE 60 MG: 20 INJECTION, SOLUTION EPIDURAL; INFILTRATION; INTRACAUDAL; PERINEURAL at 09:17

## 2020-02-11 RX ADMIN — PROPOFOL 20 MG: 10 INJECTION, EMULSION INTRAVENOUS at 09:29

## 2020-02-11 RX ADMIN — PHENYLEPHRINE HYDROCHLORIDE 100 MCG: 10 INJECTION INTRAVENOUS at 09:37

## 2020-02-11 RX ADMIN — Medication 10 MG: at 10:37

## 2020-02-11 RX ADMIN — ROCURONIUM BROMIDE 15 MG: 10 INJECTION, SOLUTION INTRAVENOUS at 09:30

## 2020-02-11 RX ADMIN — PHENYLEPHRINE HYDROCHLORIDE 100 MCG: 10 INJECTION INTRAVENOUS at 10:35

## 2020-02-11 RX ADMIN — ROCURONIUM BROMIDE 5 MG: 10 INJECTION, SOLUTION INTRAVENOUS at 09:17

## 2020-02-11 NOTE — DISCHARGE INSTRUCTIONS
Keep dressings in place and dry until return to office    TYPICAL SIDE EFFECTS OF PAIN MEDICATION:  *    Constipation: Drink lots of fluids. Over the counter stool softener if needed. *    Nausea: Take pain medication with food. Call your doctor with persistent nausea. ACTIVITY  · As tolerated and as directed by your doctor. · Bathe or shower as directed by your doctor. DIET  · Day of surgery: Clear liquids until no nausea or vomiting; small portion, light diet [place] foods (ex: baked chicken, plain rice, grits, scrambled eggs, toast). Nothing greasy, fried or spicy today. · Advance to regular diet on second day, unless your doctor orders otherwise. · If nausea and vomiting continues, call your doctor. PAIN  · Take pain medication as directed by your doctor. · DO NOT take aspirin or blood thinners unless directed by your doctor. CALL YOUR DOCTOR IF    s Call your doctor if pain is NOT relieved by medication.   s Excessive bleeding that does not stop after holding pressure over the area  · Temperature of 101 degrees F or above  · Excessive redness, swelling or bruising, and/ or green or yellow, smelly discharge from incision    AFTER ANESTHESIA   · For the first 24 hours: DO NOT Drive, Drink alcoholic beverages, or Make important decisions. · Be aware of dizziness following anesthesia and while taking pain medication. DISCHARGE SUMMARY from Nurse    PATIENT INSTRUCTIONS:    After general anesthesia or intravenous sedation, for 24 hours or while taking prescription Narcotics:  · Limit your activities  · Do not drive and operate hazardous machinery  · Do not make important personal or business decisions  · Do  not drink alcoholic beverages  · If you have not urinated within 8 hours after discharge, please contact your surgeon on call. *  Please give a list of your current medications to your Primary Care Provider.     *  Please update this list whenever your medications are discontinued, doses are      changed, or new medications (including over-the-counter products) are added. *  Please carry medication information at all times in case of emergency situations. Preventing Infection at Home  We care about preventing infection and avoiding the spread of germs - not only when you are in the hospital but also when you return home. When you return home from the hospital, its important to take the following steps to help prevent infection and avoid spreading germs that could infect you and others. Ask everyone in your home to follow these guidelines, too. Clean Your Hands  · Clean your hands whenever your hands are visibly dirty, before you eat, before or after touching your mouth, nose or eyes, and before preparing food. Clean them after contact with body fluids, using the restroom, touching animals or changing diapers. · When washing hands, wet them with warm water and work up a lather. Rub hands for at least 15 seconds, then rinse them and pat them dry with a clean towel or paper towel. · When using hand sanitizers, it should take about 15 seconds to rub your hands dry. If not, you probably didnt apply enough . Cover Your Sneeze or Cough  Germs are released into the air whenever you sneeze or cough. To prevent the spread of infection:  · Turn away from other people before coughing or sneezing. · Cover your mouth or nose with a tissue when you cough or sneeze. Put the tissue in the trash. · If you dont have a tissue, cough or sneeze into your upper sleeve, not your hands. · Always clean your hands after coughing or sneezing. Care for Wounds  Your skin is your bodys first line of defense against germs, but an open wound leaves an easy way for germs to enter your body. To prevent infection:  · Clean your hands before and after changing wound dressings, and wear gloves to change dressings if recommended by your doctor.   · Take special care with IV lines or other devices inserted into the body. If you must touch them, clean your hands first.  · Follow any specific instructions from your doctor to care for your wounds. Contact your doctor if you experience any signs of infection, such as fever or increased redness at the surgical or wound site. Keep a Clean Home  · Clean or wipe commonly touched hard surfaces like door handles, sinks, tabletops, phones and TV remotes. · Use products labeled disinfectant to kill harmful bacteria and viruses. · Use a clean cloth or paper towel to clean and dry surfaces. Wiping surfaces with a dirty dishcloth, sponge or towel will only spread germs. · Never share toothbrushes, kumar, drinking glasses, utensils, razor blades, face cloths or bath towels to avoid spreading germs. · Be sure that the linens that you sleep on are clean. · Keep pets away from wounds and wash your hands after touching pets, their toys or bedding. We care about you and your health. Remember, preventing infections is a team effort between you, your family, friends and health care providers. These are general instructions for a healthy lifestyle:    No smoking/ No tobacco products/ Avoid exposure to second hand smoke    Surgeon General's Warning:  Quitting smoking now greatly reduces serious risk to your health. Obesity, smoking, and sedentary lifestyle greatly increases your risk for illness    A healthy diet, regular physical exercise & weight monitoring are important for maintaining a healthy lifestyle    You may be retaining fluid if you have a history of heart failure or if you experience any of the following symptoms:  Weight gain of 3 pounds or more overnight or 5 pounds in a week, increased swelling in our hands or feet or shortness of breath while lying flat in bed. Please call your doctor as soon as you notice any of these symptoms; do not wait until your next office visit.     Recognize signs and symptoms of STROKE:    F-face looks uneven    A-arms unable to move or move unevenly    S-speech slurred or non-existent    T-time-call 911 as soon as signs and symptoms begin-DO NOT go       Back to bed or wait to see if you get better-TIME IS BRAIN.

## 2020-02-11 NOTE — ANESTHESIA POSTPROCEDURE EVALUATION
Procedure(s):  LEFT CHEST WALL LESION EXCISION  EXCISION of BACK LESION. general    Anesthesia Post Evaluation      Multimodal analgesia: multimodal analgesia used between 6 hours prior to anesthesia start to PACU discharge  Patient location during evaluation: PACU  Patient participation: complete - patient participated  Level of consciousness: awake  Pain management: adequate  Airway patency: patent  Anesthetic complications: no  Cardiovascular status: acceptable  Respiratory status: acceptable, spontaneous ventilation and nonlabored ventilation  Hydration status: acceptable  Post anesthesia nausea and vomiting:  none      Vitals Value Taken Time   BP 95/52 2/11/2020 11:31 AM   Temp 36.7 °C (98 °F) 2/11/2020 11:04 AM   Pulse 95 2/11/2020 11:32 AM   Resp 14 2/11/2020 11:04 AM   SpO2 90 % 2/11/2020 11:32 AM   Vitals shown include unvalidated device data.

## 2020-02-11 NOTE — ANESTHESIA PREPROCEDURE EVALUATION
Anesthetic History     Increased risk of difficult airway (unsuccessful DL by CRNA and MD- used glidescope without difficulty) and PONV          Review of Systems / Medical History  Patient summary reviewed, nursing notes reviewed and pertinent labs reviewed    Pulmonary        Sleep apnea (oxygen at night): BiPAP    Asthma : well controlled       Neuro/Psych         Psychiatric history     Cardiovascular    Hypertension: well controlled        Dysrhythmias : sinus tachycardia  CAD (mild non-obstructive)    Exercise tolerance: <4 METS  Comments: TTE 2018- EF 60-65%, no valve abnl   GI/Hepatic/Renal     GERD: well controlled           Endo/Other      Hypothyroidism: well controlled  Morbid obesity and arthritis     Other Findings   Comments: Lupus  fibromyalgia           Physical Exam    Airway  Mallampati: III  TM Distance: 4 - 6 cm  Neck ROM: normal range of motion   Mouth opening: Normal     Cardiovascular  Regular rate and rhythm,  S1 and S2 normal,  no murmur, click, rub, or gallop  Rhythm: regular  Rate: normal         Dental    Dentition: Caps/crowns and Bridges     Pulmonary  Breath sounds clear to auscultation               Abdominal         Other Findings            Anesthetic Plan    ASA: 3  Anesthesia type: general          Induction: Intravenous  Anesthetic plan and risks discussed with: Patient and Family      Plan glidescope intubation  Scop. Patch for PONV proph.

## 2020-02-11 NOTE — H&P
CC: Left chest wall and midline back lesions  HPI: 58 y/o with previous hx of sebaceous cyst of the back, several recurrences as well as a left chest wall mass. Known previous hx breast cancer. Past Medical History:   Diagnosis Date    Anxiety     Arthritis     everywhere;  DDD    Asthma     inhalers daily  [facility]    Autoimmune disease ([address]     Lupus, Fibromyalgia    Breast cancer ([address] 09/2016    CAD (coronary artery disease)     Dr Teddy Abad, EF 60-65%, no stents, no MI    Cancer ([address]     Chronic pain     generalized from arthritis    Complicated UTI (urinary tract infection) 12/8/2015    DDD (degenerative disc disease), cervical     Depression     Diverticulosis     GERD (gastroesophageal reflux disease)     Hearing reduced     Heart failure ([address]     EF 60-65% per echo 4/13/2018    [facility]    No significant valvular disease.   EF 50-55%    History of prediabetes     monitored by Primary Physician    Hypertension     Lymphedema     Morbid obesity ([address]     Nausea & vomiting     Shortness of breath     Sinus problem     Sleep apnea     bi pap and 3 liters oxygen     Thyroid cyst     cyst removed from thyroid     Past Surgical History:   Procedure Laterality Date    BREAST SURGERY PROCEDURE UNLISTED      Mastectomy left side    CARDIAC SURG PROCEDURE UNLIST  [facility]    Minimal CAD in the ostial circumflex and mid ramus (medical management)    HX BREAST RECONSTRUCTION Left 7/5/2018    LEFT BREAST REVISION/ EXPANDER EXCHANGE FOR IMPLANT performed by Alejandra Maynard MD at [facility] MA[facility] OR    HX CARPAL TUNNEL RELEASE Bilateral     HX CHOLECYSTECTOMY      HX CYST REMOVAL      from thyroid    HX LAP CHOLECYSTECTOMY      HX MASTECTOMY Left 01/2017    HX OTHER SURGICAL      abcess where bra strap    INSERT TISSUE EXPANDER(S) Left 01/2017    NEUROLOGICAL PROCEDURE UNLISTED      Burning nerve to back    SINUS SURGERY PROC UNLISTED      sinus surgery     Visit Vitals  /72 (BP 1 Location: Right arm, BP Patient Position: Sitting)   Pulse 84   Temp 98.3 °F (36.8 °C)   Resp 18   Wt 129.7 kg (286 lb)   SpO2 95%   BMI 52.31 kg/m²     A&O x3  RRR  Resp clear  Abd soft  Left chest wall lesion subcutaneous and draining back lesion. A/P:  Will proceed with excision of chest wall and back lesions. Marked. Understands risks of recurrence.

## 2020-02-11 NOTE — PERIOP NOTES
PACU DISCHARGE NOTE  Vital signs stable, pain well controlled, alert and oriented times three or at baseline, no anesthetic complications. IV removed with catheter tip intact. Written and verbal discharge instructions given, including pain control, dressing care and follow up appointment. Daughter, Kieran Jim, verbalized understanding and signed discharge instructions electronically. All questions answered prior to discharge. Dr Eunice Dickerson okay to discharge at this time. Pt and all belongings taken via wheelchair and safely put in vehicle.

## 2020-02-13 LAB
BACTERIA SPEC CULT: NORMAL
GRAM STN SPEC: NORMAL
GRAM STN SPEC: NORMAL
SERVICE CMNT-IMP: NORMAL

## 2020-02-17 NOTE — OP NOTES
BRIEF OPERATIVE NOTE    Date of Procedure: 2/11/2020   Preoperative Diagnosis:   Sebaceous cyst [L72.3] - back  Chest wall lesion    Postoperative Diagnosis:   Sebaceous cyst [L72.3] - back  Sebaceous cyst [L72.3] - chest      Procedure(s):  LEFT CHEST WALL LESION EXCISION 3 x 2 cm with layered closure 3 cm  EXCISION of BACK LESION 3 x 2 cm with complex closure 4 cm    Surgeon(s) and Role:     * Clement Mixon MD - Primary    Prior to the procedure the patient was met in holding. Once again a discussion of the risks, benefits and alternatives was conducted including but not limited to bleeding, infection, failure of the surgery, need for further procedures, disappointing or unacceptable cosmesis, damage to adjacent structures was conducted. The patient again affirmed understanding and consent. The patient was marked in the upright and relaxed position. Patient brought to the OR, surgical timeout performed and anesthesia induced. Patient positioned and prepped and draped right lateral decubitus. Previously marked lines infiltrated and wound tract delineated with methylene blue and peroxide. Lesion excised sharply to level of fat fascia and hemostasis obtained. Subcutaneous tissue undermined 4 cm laterally right and left. Closure completed with fat fascial 2-0 vicryl, deep dermal 3-0 vicryl and staples. Dressed with xeroform and gauze. Attention then turned to the chest. Patient repositioned supine and prepped and draped again. Lenticular excision performed and closure completed after hemostasis with deep dermal 3-0 vicryl and running intracuticular 3-0 vicryl. Similarly dressed.                Surgical Staff:  Circ-1: Annabella Espinal RN  Scrub Tech-1: [facility]  Surg Asst-1: [facility]  Event Time In Time Out   Incision Start 4024    Incision Close 6032      Anesthesia: General   Estimated Blood Loss: Minimal  Specimens:   ID Type Source Tests Collected by Time Destination   1 : LEFT CHEST [address], MD 2/11/2020 1038 Pathology   1 : mid back lesion Tissue Back CULTURE, ANAEROBIC, CULTURE, TISSUE W Michelle Robins MD 2/11/2020 6039 Microbiology      Findings:   Left chest wall lesion  Sebaceous cyst- back     Complications: None immediate  Implants: * No implants in log *

## 2020-03-06 LAB
BACTERIA SPEC CULT: ABNORMAL
Lab: NORMAL
REFERENCE LAB,REFLB: NORMAL
SERVICE CMNT-IMP: ABNORMAL
TEST DESCRIPTION:,ATST: NORMAL

## 2020-04-14 PROBLEM — G47.00 PERSISTENT DISORDER OF INITIATING OR MAINTAINING SLEEP: Status: ACTIVE | Noted: 2020-04-14

## 2020-10-30 ENCOUNTER — HOSPITAL ENCOUNTER (OUTPATIENT)
Dept: MRI IMAGING | Age: 65
Discharge: HOME | End: 2020-10-30
Attending: FAMILY MEDICINE
Payer: MEDICARE

## 2020-10-30 DIAGNOSIS — R41.3 MEMORY LOSS: ICD-10-CM

## 2020-10-30 PROCEDURE — 70551 MRI BRAIN STEM W/O DYE: CPT

## 2021-02-11 ENCOUNTER — ANESTHESIA EVENT (OUTPATIENT)
Dept: ENDOSCOPY | Age: 66
End: 2021-02-11
Payer: MEDICARE

## 2021-02-11 RX ORDER — SODIUM CHLORIDE, SODIUM LACTATE, POTASSIUM CHLORIDE, CALCIUM CHLORIDE 600; 310; 30; 20 MG/100ML; MG/100ML; MG/100ML; MG/100ML
100 INJECTION, SOLUTION INTRAVENOUS CONTINUOUS
Status: CANCELLED | OUTPATIENT
Start: 2021-02-11

## 2021-02-12 ENCOUNTER — ANESTHESIA (OUTPATIENT)
Dept: ENDOSCOPY | Age: 66
End: 2021-02-12
Payer: MEDICARE

## 2021-02-12 ENCOUNTER — HOSPITAL ENCOUNTER (OUTPATIENT)
Age: 66
Setting detail: OUTPATIENT SURGERY
Discharge: HOME | End: 2021-02-12
Attending: INTERNAL MEDICINE | Admitting: INTERNAL MEDICINE
Payer: MEDICARE

## 2021-02-12 VITALS
HEART RATE: 79 BPM | TEMPERATURE: 98 F | OXYGEN SATURATION: 100 % | SYSTOLIC BLOOD PRESSURE: 124 MMHG | RESPIRATION RATE: 16 BRPM | DIASTOLIC BLOOD PRESSURE: 58 MMHG

## 2021-02-12 LAB — GLUCOSE BLD STRIP.AUTO-MCNC: 104 MG/DL (ref 65–100)

## 2021-02-12 PROCEDURE — 77030009426 HC FCPS BIOP ENDOSC BSC -B: Performed by: INTERNAL MEDICINE

## 2021-02-12 PROCEDURE — 74011250636 HC RX REV CODE- 250/636: Performed by: ANESTHESIOLOGY

## 2021-02-12 PROCEDURE — 76060000032 HC ANESTHESIA 0.5 TO 1 HR: Performed by: INTERNAL MEDICINE

## 2021-02-12 PROCEDURE — 82962 GLUCOSE BLOOD TEST: CPT

## 2021-02-12 PROCEDURE — 74011000250 HC RX REV CODE- 250: Performed by: NURSE ANESTHETIST, CERTIFIED REGISTERED

## 2021-02-12 PROCEDURE — 88305 TISSUE EXAM BY PATHOLOGIST: CPT

## 2021-02-12 PROCEDURE — 74011250636 HC RX REV CODE- 250/636: Performed by: NURSE ANESTHETIST, CERTIFIED REGISTERED

## 2021-02-12 PROCEDURE — 77030013991 HC SNR POLYP ENDOSC BSC -A: Performed by: INTERNAL MEDICINE

## 2021-02-12 PROCEDURE — 74011250637 HC RX REV CODE- 250/637: Performed by: ANESTHESIOLOGY

## 2021-02-12 PROCEDURE — 2709999900 HC NON-CHARGEABLE SUPPLY: Performed by: INTERNAL MEDICINE

## 2021-02-12 PROCEDURE — 76040000026: Performed by: INTERNAL MEDICINE

## 2021-02-12 RX ORDER — SODIUM CHLORIDE, SODIUM LACTATE, POTASSIUM CHLORIDE, CALCIUM CHLORIDE 600; 310; 30; 20 MG/100ML; MG/100ML; MG/100ML; MG/100ML
100 INJECTION, SOLUTION INTRAVENOUS CONTINUOUS
Status: DISCONTINUED | OUTPATIENT
Start: 2021-02-12 | End: 2021-02-12 | Stop reason: HOSPADM

## 2021-02-12 RX ORDER — PROPOFOL 10 MG/ML
INJECTION, EMULSION INTRAVENOUS AS NEEDED
Status: DISCONTINUED | OUTPATIENT
Start: 2021-02-12 | End: 2021-02-12 | Stop reason: HOSPADM

## 2021-02-12 RX ORDER — METOPROLOL SUCCINATE 100 MG/1
100 TABLET, EXTENDED RELEASE ORAL
Status: COMPLETED | OUTPATIENT
Start: 2021-02-12 | End: 2021-02-12

## 2021-02-12 RX ORDER — FAMOTIDINE 20 MG/1
20 TABLET, FILM COATED ORAL AS NEEDED
Status: DISCONTINUED | OUTPATIENT
Start: 2021-02-12 | End: 2021-02-12 | Stop reason: HOSPADM

## 2021-02-12 RX ORDER — PROPOFOL 10 MG/ML
INJECTION, EMULSION INTRAVENOUS
Status: DISCONTINUED | OUTPATIENT
Start: 2021-02-12 | End: 2021-02-12 | Stop reason: HOSPADM

## 2021-02-12 RX ORDER — LIDOCAINE HYDROCHLORIDE 20 MG/ML
INJECTION, SOLUTION EPIDURAL; INFILTRATION; INTRACAUDAL; PERINEURAL AS NEEDED
Status: DISCONTINUED | OUTPATIENT
Start: 2021-02-12 | End: 2021-02-12 | Stop reason: HOSPADM

## 2021-02-12 RX ADMIN — METOPROLOL SUCCINATE 100 MG: 100 TABLET, EXTENDED RELEASE ORAL at 07:35

## 2021-02-12 RX ADMIN — FAMOTIDINE 20 MG: 10 INJECTION INTRAVENOUS at 07:35

## 2021-02-12 RX ADMIN — IVABRADINE 5 MG: 5 TABLET, FILM COATED ORAL at 07:35

## 2021-02-12 RX ADMIN — SODIUM CHLORIDE, SODIUM LACTATE, POTASSIUM CHLORIDE, AND CALCIUM CHLORIDE 100 ML/HR: 600; 310; 30; 20 INJECTION, SOLUTION INTRAVENOUS at 07:32

## 2021-02-12 RX ADMIN — LIDOCAINE HYDROCHLORIDE 40 MG: 20 INJECTION, SOLUTION EPIDURAL; INFILTRATION; INTRACAUDAL; PERINEURAL at 08:04

## 2021-02-12 RX ADMIN — PROPOFOL 40 MG: 10 INJECTION, EMULSION INTRAVENOUS at 08:12

## 2021-02-12 RX ADMIN — PROPOFOL 40 MG: 10 INJECTION, EMULSION INTRAVENOUS at 08:05

## 2021-02-12 RX ADMIN — PROPOFOL 120 MCG/KG/MIN: 10 INJECTION, EMULSION INTRAVENOUS at 08:15

## 2021-02-12 RX ADMIN — PROPOFOL 40 MG: 10 INJECTION, EMULSION INTRAVENOUS at 08:09

## 2021-02-12 RX ADMIN — PROPOFOL 30 MG: 10 INJECTION, EMULSION INTRAVENOUS at 08:07

## 2021-02-12 RX ADMIN — PHENYLEPHRINE HYDROCHLORIDE 100 MCG: 10 INJECTION INTRAVENOUS at 08:21

## 2021-02-12 RX ADMIN — PROPOFOL 40 MG: 10 INJECTION, EMULSION INTRAVENOUS at 08:15

## 2021-02-12 RX ADMIN — SODIUM CHLORIDE, SODIUM LACTATE, POTASSIUM CHLORIDE, AND CALCIUM CHLORIDE: 600; 310; 30; 20 INJECTION, SOLUTION INTRAVENOUS at 08:00

## 2021-02-12 NOTE — ANESTHESIA PREPROCEDURE EVALUATION
Anesthetic History     Increased risk of difficult airway (unsuccessful DL by CRNA and MD- used glidescope without difficulty) and PONV          Review of Systems / Medical History  Patient summary reviewed, nursing notes reviewed and pertinent labs reviewed    Pulmonary        Sleep apnea (oxygen at night):  BiPAP    Asthma : well controlled       Neuro/Psych         Psychiatric history     Cardiovascular    Hypertension: well controlled      CHF: NYHA Classification II, dyspnea on exertion  Dysrhythmias : sinus tachycardia  CAD (mild non-obstructive)    Exercise tolerance: <4 METS  Comments: TTE 2018- EF 60-65%, no valve abnl   GI/Hepatic/Renal     GERD: well controlled           Endo/Other    Diabetes: type 2  Hypothyroidism: well controlled  Morbid obesity and arthritis     Other Findings   Comments: Lupus  fibromyalgia           Physical Exam    Airway  Mallampati: III  TM Distance: 4 - 6 cm  Neck ROM: normal range of motion   Mouth opening: Normal     Cardiovascular  Regular rate and rhythm,  S1 and S2 normal,  no murmur, click, rub, or gallop  Rhythm: regular  Rate: normal         Dental    Dentition: Caps/crowns and Bridges     Pulmonary  Breath sounds clear to auscultation               Abdominal         Other Findings            Anesthetic Plan    ASA: 4  Anesthesia type: total IV anesthesia          Induction: Intravenous  Anesthetic plan and risks discussed with: Patient

## 2021-02-12 NOTE — PROGRESS NOTES
Prayer provided during Pre-op as requested by the the patient.       Nikolas Mike, [address] [facility], [facility] None

## 2021-02-12 NOTE — PROCEDURES
PROCEDURE: Esophagogastroduodenoscopy with Candie Simple dilation    ENDOSCOPIST: Taylor Roberson M.D. PREOPERATIVE DIAGNOSIS: Dysphagia    POSTOPERATIVE DIAGNOSIS: Normal    INSTRUMENTS: [id number]    SEDATION: MAC    DESCRIPTION: After informed consent was obtained, the patient was taken to the endoscopy suite and placed in the left lateral decubitus position. Intravenous sedation was administered by the Anesthesia provider. After adequate sedation had been achieved the endoscope was inserted over the tongue and through the posterior pharynx under direct visualization down the esophagus to the stomach and into the second portion of the duodenum. The endoscopic was withdrawn from that point, performing a careful survey of the mucosa. Retroflexion was performed in the gastric fundus. FINDINGS:  Esophagus: Normal.  Empiric dilation with 54 FG Diaz with no resistance and no mucosal trauma    Stomach: Normal    Duodenum: Normal    Estimated blood loss:  0 cc-minimal    IMPRESSION: Normal exam    PLAN: F/U in office to reassess      ANNA Alarcon MD

## 2021-02-12 NOTE — ANESTHESIA POSTPROCEDURE EVALUATION
Procedure(s):  ESOPHAGOGASTRODUODENOSCOPY (EGD)  COLONOSCOPY/ 53  ESOPHAGEAL DILATION  ENDOSCOPIC POLYPECTOMY. total IV anesthesia    Anesthesia Post Evaluation      Multimodal analgesia: multimodal analgesia not used between 6 hours prior to anesthesia start to PACU discharge        INITIAL Post-op Vital signs:   Vitals Value Taken Time   /55 02/12/21 0854   Temp     Pulse 83 02/12/21 0854   Resp     SpO2 98 % 02/12/21 0854   Vitals shown include unvalidated device data.

## 2021-02-12 NOTE — DISCHARGE INSTRUCTIONS
Gastrointestinal Esophagogastroduodenoscopy (EGD) - Upper Exam Discharge Instructions    1. Call Dr. Palmer Bowman for any problems or questions. 2. Contact the doctor's office for follow up appointment as directed. 3. Medication may cause drowsiness for several hours, therefore:  · Do not drive or operate machinery for remainder of the day. · No alcohol today. · Do not make any important or legal decisions for 24 hours. · Do not sign any legal documents for 24 hours. 5. Ordinarily, you may resume regular diet and activity after exam unless otherwise specified by your physician. 6. For mild soreness in your throat you may use Cepacol throat lozenges or warm salt-water gargles as needed. Gastrointestinal Colonoscopy/Flexible Sigmoidoscopy - Lower Exam Discharge Instructions  1. Contact the doctors office for follow up appointment as directed  2. Medication may cause drowsiness for several hours, therefore:  · Do not drive or operate machinery for reminder of the day. · No alcohol today. · Do not make any important or legal decisions for 24 hours. · Do not sign any legal documents for 24 hours. 3. Ordinarily, you may resume regular diet and activity after exam unless otherwise specified by your physician. 4. Because of air put into your colon during exam, you may experience some abdominal distension, relieved by the passage of gas, for several hours. 5. Contact your physician if you have any of the following:  · Excessive amount of bleeding - large amount when having a bowel movement. Occasional specks of blood with bowel movement would not be unusual.  · Severe abdominal pain  · Fever or Chills  6. Polyp Removal - follow these additional instructions  · Soft diet for next meal then resume regular diet   · Take Metamucil - 1 tablespoon in juice every morning for 3 days  · No Aspirin, Advil, Aleve, Nuprin, Ibuprofen, or medications that contain these drugs for 2 weeks.   Any additional instructions:    1. Await pathology  2. Follow up office visit. Instructions given to Oc Macedo and other family members.

## 2021-02-12 NOTE — H&P
Gastroenterology Outpatient History and Physical    Patient: Godfrey Pickett    Physician: Esme Flores MD    Vital Signs: Blood pressure (!) 217/94, pulse 96, temperature 97.8 °F (36.6 °C), resp. rate 16, SpO2 98 %, not currently breastfeeding. Allergies: Allergies   Allergen Reactions    Ciprofloxacin Diarrhea and Other (comments)     Per pt, started her c-diff\"    Bactrim [Sulfamethoprim Ds] Rash     Pt gets a yeast infection on body     Sulfa (Sulfonamide Antibiotics) Other (comments)     Yeast growth       Chief Complaint: Esophageal dysphagia, CRC screening    History of Present Illness: Ongoing dysphagia. TA x1 2013    Justification for Procedure: Evaluation and treatment    History:  Past Medical History:   Diagnosis Date    Anxiety     Arrhythmia     A. Fib., tachycardia    Arthritis     everywhere;  DDD    Asthma     inhalers daily  [facility]    Autoimmune disease ([address]     Lupus, Fibromyalgia    Breast cancer ([address] 09/2016    CAD (coronary artery disease)     Dr Pruitt Overall, EF 60-65%, no stents, no MI    Cancer ([address]     Chronic pain     generalized from arthritis    Coagulation disorder ([address]     anemia with pregnancy    Complicated UTI (urinary tract infection) 12/8/2015    DDD (degenerative disc disease), cervical     Depression     Diabetes ([address]     pre diabetic    Diverticulosis     Diverticulosis     GERD (gastroesophageal reflux disease)     Hearing reduced     Heart failure (HCC)     EF 60-65% per echo 4/13/2018    [facility]    No significant valvular disease.   EF 50-55%    History of prediabetes     monitored by Primary Physician    Hypertension     Lymphedema     Morbid obesity ([address]     Nausea & vomiting     Shortness of breath     Sinus problem     Sleep apnea     bi pap and 3 liters oxygen     Thyroid cyst     cyst removed from thyroid      Past Surgical History:   Procedure Laterality Date    HX BREAST RECONSTRUCTION Left 7/5/2018    LEFT BREAST REVISION/ EXPANDER EXCHANGE FOR IMPLANT performed by [facility]in [facility]    HX CARPAL TUNNEL RELEASE Bilateral     HX CHOLECYSTECTOMY      HX COLONOSCOPY      HX CYST REMOVAL      from thyroid    HX LAP CHOLECYSTECTOMY      HX MASTECTOMY Left 01/2017    HX OTHER SURGICAL      abcess where bra strap    NEUROLOGICAL PROCEDURE UNLISTED      Burning nerve to back    TX BREAST SURGERY PROCEDURE UNLISTED      Mastectomy left side    TX CARDIAC SURG PROCEDURE UNLIST  [facility]    Minimal CAD in the ostial circumflex and mid ramus (medical management)    TX INSERT TISSUE EXPANDER(S) Left 01/2017    TX SINUS SURGERY PROC UNLISTED      sinus surgery      Social History     Socioeconomic History    Marital status:      Spouse name: Not on file    Number of children: Not on file    Years of education: Not on file    Highest education level: Not on file   Tobacco Use    Smoking status: Never Smoker    Smokeless tobacco: Never Used   Substance and Sexual Activity    Alcohol use: No    Drug use: No    Sexual activity: Not Currently   Social History Narrative     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 Family History   Problem Relation Age of Onset    Heart Disease Mother     Cancer Mother         lung    Hypertension Mother     Hypertension Father     Sleep Apnea Father     Cancer Father         prostate    Sleep Apnea Brother         states also has two children with sleep apnea    Cancer Brother         pituitary    Hypertension Brother     Breast Cancer Neg Hx        Medications:   Prior to Admission medications    Medication Sig Start Date End Date Taking? Authorizing Provider   ivabradine (CORLANOR) 5 mg tablet Take 1 Tab by mouth two (2) times daily (with meals). 12/16/20  Yes Janine Andrade MD   losartan (COZAAR) 100 mg tablet Take 1 Tab by mouth daily.  12/7/20  Yes Janine Andrade MD   hydroCHLOROthiazide (HYDRODIURIL) 12.5 mg tablet Take 1 Tab by mouth daily. 12/7/20  Yes Errol Freeman MD   metoprolol succinate (TOPROL-XL) 100 mg tablet Take 1 Tab by mouth daily. 11/2/20  Yes Errol Freeman MD   baclofen (LIORESAL) 10 mg tablet Take 10 mg by mouth two (2) times a day. 9/24/20  Yes Provider, Historical   meloxicam (MOBIC) 7.5 mg tablet Take 7.5 mg by mouth two (2) times a day. 9/24/20  Yes Provider, Historical   DULoxetine (CYMBALTA) 60 mg capsule Take 1 Cap by mouth two (2) times a day. 3/27/20  Yes Tiffanie Gill MD   montelukast (SINGULAIR) 10 mg tablet TAKE 1 TABLET BY MOUTH EVERY NIGHT 3/27/20  Yes Tiffanie Gill MD   fexofenadine (ALLEGRA) 180 mg tablet Take 1 Tab by mouth daily. 3/27/20  Yes Tiffanie Gill MD   albuterol (PROVENTIL VENTOLIN) 2.5 mg /3 mL (0.083 %) nebu 3 mL by Nebulization route every four (4) hours as needed for Cough (wheezing). 3/27/20  Yes Tiffanie Gill MD   ADVAIR DISKUS 250-50 mcg/dose diskus inhaler Take 1 Puff by inhalation two (2) times a day. 12/18/19  Yes Tiffanie Gill MD   PROAIR HFA 90 mcg/actuation inhaler Take 1 Puff by inhalation every six (6) hours as needed for Wheezing. 12/18/19  Yes Tiffanie Gill MD   fluticasone propionate (FLONASE) 50 mcg/actuation nasal spray 2 Sprays by Both Nostrils route daily. 10/3/19  Yes Tiffanie Gill MD   phenylephrine (NEOSYNEPHRINE) 0.5 % spry 2 Sprays by Nasal route every six (6) hours as needed.   Yes Provider, Historical   biotin (VITAMIN B7) 5 mg capsule TK 1 C PO QD 12/6/17  Yes Provider, Historical   acetaminophen (TYLENOL EXTRA STRENGTH) 500 mg tablet Take  by mouth every six (6) hours as needed for Pain.   Yes Provider, Historical   cpap machine kit by Does Not Apply route.   Yes Provider, Historical   OXYGEN-AIR DELIVERY SYSTEMS by Does Not Apply route. 3 lpm qhs   Yes Provider, Historical   nitroglycerin (NITROSTAT) 0.4 mg SL tablet Place 1 tab under tongue for chest  pain. May repeat in 5 minutes if needed. If no relief, call EMS. 11/2/20   Lou Sandhu MD       Physical Exam:   General: no distress   HEENT: Head: Normocephalic, no lesions, without obvious abnormality.    Heart: regular rate and rhythm, S1, S2 normal, no murmur, click, rub or gallop   Lungs: chest clear, no wheezing, rales, normal symmetric air entry   Abdominal: Bowel sounds are normal, liver is not enlarged, spleen is not enlarged   Neurological: Grossly normal   Extremities: extremities normal, atraumatic, no cyanosis or edema     Findings/Diagnosis: Esophageal dysphagia, CRC screening      Plan of Care/Planned Procedure: EGD and colonoscopy

## 2021-02-12 NOTE — PROCEDURES
PROCEDURE: Colonoscopy with biopsy and snare polypectomy    ENDOSCOPIST: Servando Michelle M.D. PREOPERATIVE DIAGNOSIS: Screening, h/o polyp    POSTOPERATIVE DIAGNOSIS: Polyps, diverticulosis    SEDATION: MAC    INSTRUMENT: [id number]    DESCRIPTION OF PROCEDURE:  After informed consent was obtained the patient was placed in the left lateral decubitus position. Intravenous sedation was administered as above. After adequate sedation had been achieved, digital rectal examination was performed. The colonoscope was then inserted through the anus and followed the course of the rectum and colon to the cecum, which was confirmed by visualization of the ileocecal valve and appendiceal orifice. The colonoscope was withdrawn from that point, performing a careful survey of the mucosa. Retroflexion was performed in the rectum. FINDINGS:  Good prep    Colon:  Tiny polyp of AC, removed with cold forceps. 4 mm sessile polyp of TC, removed with cold snare. Extensive DC and sigmoid diverticulosis    Rectum: Normal    Estimated Blood Loss:  0 cc-min    IMPRESSION: Two small polyps. Very poor tolerance of colonoscopy from pain and respiratory standpoint. PLAN: Review pathology. Without significant weight loss, she is not a candidate for screening colonoscopy in the future. She would require intubation and is known to be difficult to intubate.     Raegan Dsouza MD

## 2021-03-10 ENCOUNTER — HOSPITAL ENCOUNTER (OUTPATIENT)
Dept: LAB | Age: 66
Discharge: HOME | End: 2021-03-10
Payer: MEDICARE

## 2021-03-10 DIAGNOSIS — R06.09 DOE (DYSPNEA ON EXERTION): ICD-10-CM

## 2021-03-10 LAB — D DIMER PPP FEU-MCNC: 0.61 UG/ML(FEU)

## 2021-03-10 PROCEDURE — 36415 COLL VENOUS BLD VENIPUNCTURE: CPT

## 2021-03-10 PROCEDURE — 85379 FIBRIN DEGRADATION QUANT: CPT

## 2021-03-19 ENCOUNTER — HOSPITAL ENCOUNTER (OUTPATIENT)
Dept: NUCLEAR MEDICINE | Age: 66
Discharge: HOME | End: 2021-03-19
Attending: INTERNAL MEDICINE
Payer: MEDICARE

## 2021-03-19 ENCOUNTER — HOSPITAL ENCOUNTER (OUTPATIENT)
Dept: GENERAL RADIOLOGY | Age: 66
Discharge: HOME | End: 2021-03-19
Attending: INTERNAL MEDICINE
Payer: MEDICARE

## 2021-03-19 DIAGNOSIS — R79.89 ELEVATED D-DIMER: ICD-10-CM

## 2021-03-19 DIAGNOSIS — R79.89 POSITIVE D DIMER: ICD-10-CM

## 2021-03-19 PROCEDURE — 78582 LUNG VENTILAT&PERFUS IMAGING: CPT

## 2021-03-19 PROCEDURE — 71046 X-RAY EXAM CHEST 2 VIEWS: CPT

## 2021-03-22 NOTE — PROGRESS NOTES
Please let patient know VQ scan showed no abnormalities no evidence of blood clots in her lungs. Spoke with patient. Relayed message above. Patient voiced understanding.

## 2021-09-14 ENCOUNTER — HOSPITAL ENCOUNTER (EMERGENCY)
Age: 66
Discharge: HOME | End: 2021-09-15
Payer: MEDICARE

## 2021-09-14 DIAGNOSIS — R10.11 ABDOMINAL PAIN, RIGHT UPPER QUADRANT: Primary | ICD-10-CM

## 2021-09-14 LAB
ALBUMIN SERPL-MCNC: 3.9 G/DL (ref 3.2–4.6)
ALBUMIN/GLOB SERPL: 1 {RATIO} (ref 1.2–3.5)
ALP SERPL-CCNC: 95 U/L (ref 50–130)
ALT SERPL-CCNC: 37 U/L (ref 12–65)
ANION GAP SERPL CALC-SCNC: 8 MMOL/L (ref 7–16)
AST SERPL-CCNC: 17 U/L (ref 15–37)
BASOPHILS # BLD: 0.1 K/UL (ref 0–0.2)
BASOPHILS NFR BLD: 1 % (ref 0–2)
BILIRUB SERPL-MCNC: 0.4 MG/DL (ref 0.2–1.1)
BUN SERPL-MCNC: 20 MG/DL (ref 8–23)
CALCIUM SERPL-MCNC: 9.7 MG/DL (ref 8.3–10.4)
CHLORIDE SERPL-SCNC: 102 MMOL/L (ref 98–107)
CO2 SERPL-SCNC: 30 MMOL/L (ref 21–32)
CREAT SERPL-MCNC: 0.86 MG/DL (ref 0.6–1)
DIFFERENTIAL METHOD BLD: ABNORMAL
EOSINOPHIL # BLD: 0.2 K/UL (ref 0–0.8)
EOSINOPHIL NFR BLD: 3 % (ref 0.5–7.8)
ERYTHROCYTE [DISTWIDTH] IN BLOOD BY AUTOMATED COUNT: 12.9 % (ref 11.9–14.6)
GLOBULIN SER CALC-MCNC: 4.1 G/DL (ref 2.3–3.5)
GLUCOSE SERPL-MCNC: 226 MG/DL (ref 65–100)
HCT VFR BLD AUTO: 42.9 % (ref 35.8–46.3)
HGB BLD-MCNC: 13.4 G/DL (ref 11.7–15.4)
IMM GRANULOCYTES # BLD AUTO: 0.1 K/UL (ref 0–0.5)
IMM GRANULOCYTES NFR BLD AUTO: 1 % (ref 0–5)
LIPASE SERPL-CCNC: 123 U/L (ref 73–393)
LYMPHOCYTES # BLD: 3.1 K/UL (ref 0.5–4.6)
LYMPHOCYTES NFR BLD: 39 % (ref 13–44)
MCH RBC QN AUTO: 27.8 PG (ref 26.1–32.9)
MCHC RBC AUTO-ENTMCNC: 31.2 G/DL (ref 31.4–35)
MCV RBC AUTO: 89 FL (ref 79.6–97.8)
MONOCYTES # BLD: 0.6 K/UL (ref 0.1–1.3)
MONOCYTES NFR BLD: 8 % (ref 4–12)
NEUTS SEG # BLD: 3.9 K/UL (ref 1.7–8.2)
NEUTS SEG NFR BLD: 50 % (ref 43–78)
NRBC # BLD: 0 K/UL (ref 0–0.2)
PLATELET # BLD AUTO: 365 K/UL (ref 150–450)
PMV BLD AUTO: 8.9 FL (ref 9.4–12.3)
POTASSIUM SERPL-SCNC: 4 MMOL/L (ref 3.5–5.1)
PROT SERPL-MCNC: 8 G/DL (ref 6.3–8.2)
RBC # BLD AUTO: 4.82 M/UL (ref 4.05–5.2)
SODIUM SERPL-SCNC: 140 MMOL/L (ref 136–145)
WBC # BLD AUTO: 7.9 K/UL (ref 4.3–11.1)

## 2021-09-14 PROCEDURE — 80053 COMPREHEN METABOLIC PANEL: CPT

## 2021-09-14 PROCEDURE — 99284 EMERGENCY DEPT VISIT MOD MDM: CPT

## 2021-09-14 PROCEDURE — 81003 URINALYSIS AUTO W/O SCOPE: CPT

## 2021-09-14 PROCEDURE — 83690 ASSAY OF LIPASE: CPT

## 2021-09-14 PROCEDURE — 85025 COMPLETE CBC W/AUTO DIFF WBC: CPT

## 2021-09-14 PROCEDURE — 96372 THER/PROPH/DIAG INJ SC/IM: CPT

## 2021-09-14 RX ORDER — SODIUM CHLORIDE 0.9 % (FLUSH) 0.9 %
5-10 SYRINGE (ML) INJECTION EVERY 8 HOURS
Status: DISCONTINUED | OUTPATIENT
Start: 2021-09-14 | End: 2021-09-15 | Stop reason: HOSPADM

## 2021-09-14 RX ORDER — SODIUM CHLORIDE 0.9 % (FLUSH) 0.9 %
5-10 SYRINGE (ML) INJECTION AS NEEDED
Status: DISCONTINUED | OUTPATIENT
Start: 2021-09-14 | End: 2021-09-15 | Stop reason: HOSPADM

## 2021-09-15 ENCOUNTER — APPOINTMENT (OUTPATIENT)
Dept: CT IMAGING | Age: 66
End: 2021-09-15
Payer: MEDICARE

## 2021-09-15 ENCOUNTER — APPOINTMENT (OUTPATIENT)
Dept: GENERAL RADIOLOGY | Age: 66
End: 2021-09-15
Payer: MEDICARE

## 2021-09-15 VITALS
WEIGHT: 293 LBS | DIASTOLIC BLOOD PRESSURE: 62 MMHG | BODY MASS INDEX: 53.92 KG/M2 | OXYGEN SATURATION: 97 % | TEMPERATURE: 98.1 F | HEIGHT: 62 IN | RESPIRATION RATE: 18 BRPM | HEART RATE: 78 BPM | SYSTOLIC BLOOD PRESSURE: 148 MMHG

## 2021-09-15 PROCEDURE — 74011250636 HC RX REV CODE- 250/636

## 2021-09-15 PROCEDURE — 74022 RADEX COMPL AQT ABD SERIES: CPT

## 2021-09-15 PROCEDURE — 74011250637 HC RX REV CODE- 250/637

## 2021-09-15 RX ORDER — HYOSCYAMINE SULFATE 0.12 MG/1
0.12 TABLET SUBLINGUAL
Status: COMPLETED | OUTPATIENT
Start: 2021-09-15 | End: 2021-09-15

## 2021-09-15 RX ORDER — DICYCLOMINE HYDROCHLORIDE 10 MG/1
10 CAPSULE ORAL
Qty: 12 CAPSULE | Refills: 0 | Status: SHIPPED | OUTPATIENT
Start: 2021-09-15

## 2021-09-15 RX ORDER — KETOROLAC TROMETHAMINE 30 MG/ML
30 INJECTION, SOLUTION INTRAMUSCULAR; INTRAVENOUS
Status: COMPLETED | OUTPATIENT
Start: 2021-09-15 | End: 2021-09-15

## 2021-09-15 RX ORDER — SODIUM CHLORIDE 9 MG/ML
1000 INJECTION, SOLUTION INTRAVENOUS ONCE
Status: DISCONTINUED | OUTPATIENT
Start: 2021-09-15 | End: 2021-09-15

## 2021-09-15 RX ORDER — SODIUM CHLORIDE 0.9 % (FLUSH) 0.9 %
10 SYRINGE (ML) INJECTION
Status: DISCONTINUED | OUTPATIENT
Start: 2021-09-15 | End: 2021-09-15 | Stop reason: HOSPADM

## 2021-09-15 RX ORDER — IBUPROFEN 600 MG/1
600 TABLET ORAL
Qty: 20 TABLET | Refills: 0 | Status: SHIPPED | OUTPATIENT
Start: 2021-09-15

## 2021-09-15 RX ADMIN — KETOROLAC TROMETHAMINE 30 MG: 30 INJECTION, SOLUTION INTRAMUSCULAR at 02:23

## 2021-09-15 RX ADMIN — HYOSCYAMINE SULFATE 0.12 MG: 0.12 TABLET ORAL; SUBLINGUAL at 02:25

## 2021-09-15 NOTE — ED NOTES
Dr Jordyn Mast aware that IV will not work.   Per Dr Jordyn Mast, ok to not start new IV and d/c fluids

## 2021-09-15 NOTE — ED NOTES
I have reviewed discharge instructions with the patient. The patient verbalized understanding. Patient left ED via Discharge Method: ambulatory to Home with daughter. Opportunity for questions and clarification provided. Patient given 2 scripts. To continue your aftercare when you leave the hospital, you may receive an automated call from our care team to check in on how you are doing. This is a free service and part of our promise to provide the best care and service to meet your aftercare needs.  If you have questions, or wish to unsubscribe from this service please call [phone]. Thank you for Choosing our [facility] Emergency Department.

## 2021-09-15 NOTE — ED PROVIDER NOTES
78-year-old female complaint of right upper quadrant abdominal pain. Onset of pain over 2 months. Abdominal Pain   This is a chronic problem. The current episode started more than 1 week ago. The problem occurs constantly. The problem has not changed since onset. The pain is located in the RUQ. The pain is at a severity of 10/10. The pain is severe. Associated symptoms include nausea. Pertinent negatives include no anorexia, no fever, no belching, no diarrhea, no flatus, no hematochezia, no melena, no vomiting, no constipation, no dysuria, no frequency, no hematuria, no headaches and no trauma. Nothing worsens the pain. Past Medical History:   Diagnosis Date    Anxiety     Arrhythmia     A. Fib., tachycardia    Arthritis     everywhere;  DDD    Asthma     inhalers daily  [facility]    Autoimmune disease ([address]     Lupus, Fibromyalgia    Breast cancer ([address] 09/2016    CAD (coronary artery disease)     Dr Rex Penn, EF 60-65%, no stents, no MI    Cancer ([address]     Chronic pain     generalized from arthritis    Coagulation disorder ([address]     anemia with pregnancy    Complicated UTI (urinary tract infection) 12/8/2015    DDD (degenerative disc disease), cervical     Depression     Diabetes ([address]     pre diabetic    Diverticulosis     Diverticulosis     GERD (gastroesophageal reflux disease)     Hearing reduced     Heart failure ([address]     EF 60-65% per echo 4/13/2018    [facility]    No significant valvular disease.   EF 50-55%    History of prediabetes     monitored by Primary Physician    Hypertension     Lymphedema     Morbid obesity ([address]     Nausea & vomiting     Shortness of breath     Sinus problem     Sleep apnea     bi pap and 3 liters oxygen     Thyroid cyst     cyst removed from thyroid       Past Surgical History:   Procedure Laterality Date    COLONOSCOPY  2/12/2021         COLONOSCOPY N/A 2/12/2021    COLONOSCOPY/ 53 performed by Malvin Juan MD at [facility] ENDOSCOPY    EGD  2/12/2021         HX BREAST RECONSTRUCTION Left 7/5/2018    LEFT BREAST REVISION/ EXPANDER EXCHANGE FOR IMPLANT performed by Alexa Rosario MD at [facility] MAIN OR    HX CARPAL TUNNEL RELEASE Bilateral     HX CHOLECYSTECTOMY      HX COLONOSCOPY      HX CYST REMOVAL      from thyroid    HX LAP CHOLECYSTECTOMY      HX MASTECTOMY Left 01/2017    HX OTHER SURGICAL      abcess where bra strap    NEUROLOGICAL PROCEDURE UNLISTED      Burning nerve to back    SC BREAST SURGERY PROCEDURE UNLISTED      Mastectomy left side    SC CARDIAC SURG PROCEDURE UNLIST  [facility]    Minimal CAD in the ostial circumflex and mid ramus (medical management)    SC INSERT TISSUE EXPANDER(S) Left 01/2017    SC SINUS SURGERY PROC UNLISTED      sinus surgery         Family History:   Problem Relation Age of Onset    Heart Disease Mother     Cancer Mother         lung    Hypertension Mother     Hypertension Father     Sleep Apnea Father     Cancer Father         prostate    Sleep Apnea Brother         states also has two children with sleep apnea    Cancer Brother         pituitary    Hypertension Brother     Breast Cancer Neg Hx        Social History     Socioeconomic History    Marital status:      Spouse name: Not on file    Number of children: Not on file    Years of education: Not on file    Highest education level: Not on file   Occupational History    Not on file   Tobacco Use    Smoking status: Never Smoker    Smokeless tobacco: Never Used   Vaping Use    Vaping Use: Never used   Substance and Sexual Activity    Alcohol use: No    Drug use: No    Sexual activity: Not Currently   Other Topics Concern    Not on file   Social History Narrative     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      Social Determinants of Health     Financial Resource Strain:     Difficulty of Paying Living Expenses:    Food Insecurity:     Worried About Running Out of Food in the Last Year:    [address] in the Last Year:    Transportation Needs:     Lack of Transportation (Medical):  Lack of Transportation (Non-Medical):    Physical Activity:     Days of Exercise per Week:     Minutes of Exercise per Session:    Stress:     Feeling of Stress :    Social Connections:     Frequency of Communication with Friends and Family:     Frequency of Social Gatherings with Friends and Family:     Attends Orthodoxy Services:     Active Member of Clubs or Organizations:     Attends Club or Organization Meetings:     Marital Status:    Intimate Partner Violence:     Fear of Current or Ex-Partner:     Emotionally Abused:     Physically Abused:     Sexually Abused: ALLERGIES: Ciprofloxacin, Bactrim [sulfamethoprim ds], and Sulfa (sulfonamide antibiotics)    Review of Systems   Constitutional: Negative. Negative for activity change and fever. HENT: Negative. Eyes: Negative. Respiratory: Negative. Cardiovascular: Negative. Gastrointestinal: Positive for abdominal pain and nausea. Negative for anorexia, constipation, diarrhea, flatus, hematochezia, melena and vomiting. Genitourinary: Negative. Negative for dysuria, frequency and hematuria. Musculoskeletal: Negative. Skin: Negative. Neurological: Negative. Negative for headaches. Psychiatric/Behavioral: Negative. All other systems reviewed and are negative. Vitals:    09/14/21 2320   BP: (!) 185/77   Pulse: 80   Resp: 18   Temp: 98.1 °F (36.7 °C)   SpO2: 100%   Weight: 138.3 kg (305 lb)   Height: 5' 2\" (1.575 m)            Physical Exam  Vitals and nursing note reviewed. Constitutional:       General: She is not in acute distress. Appearance: She is well-developed. HENT:      Head: Normocephalic and atraumatic. Right Ear: External ear normal.      Left Ear: External ear normal.      Nose: Nose normal.   Eyes:      General: No scleral icterus. Right eye: No discharge. Left eye: No discharge. Conjunctiva/sclera: Conjunctivae normal.      Pupils: Pupils are equal, round, and reactive to light. Cardiovascular:      Rate and Rhythm: Regular rhythm. Pulmonary:      Effort: Pulmonary effort is normal. No respiratory distress. Breath sounds: Normal breath sounds. No stridor. No wheezing or rales. Abdominal:      General: Bowel sounds are normal. There is no distension. Palpations: Abdomen is soft. Tenderness: There is no abdominal tenderness. There is no guarding or rebound. Negative signs include Jimenez's sign, Rovsing's sign and McBurney's sign. Musculoskeletal:         General: Normal range of motion. Cervical back: Normal range of motion. Skin:     General: Skin is warm and dry. Findings: No rash. Neurological:      Mental Status: She is alert and oriented to person, place, and time. Motor: No abnormal muscle tone. Coordination: Coordination normal.   Psychiatric:         Behavior: Behavior normal.          MDM  Number of Diagnoses or Management Options  Diagnosis management comments: Differential diagnosis: Biliary disease, appendicitis, pancreatitis, ovarian cyst, ovarian torsion, tubo-ovarian abscess, obstruction, ileus, aortic aneurysm, urolithiasis and renal colic. Serial exams of the abdomen were benign and no rebound or other acute abdominal findings were noted. No acute surgical emergency was found.        Amount and/or Complexity of Data Reviewed  Clinical lab tests: ordered and reviewed  Tests in the radiology section of CPT®: ordered and reviewed  Tests in the medicine section of CPT®: ordered and reviewed  Decide to obtain previous medical records or to obtain history from someone other than the patient: yes  Review and summarize past medical records: yes  Independent visualization of images, tracings, or specimens: yes    Risk of Complications, Morbidity, and/or Mortality  Presenting problems: high  Diagnostic procedures: high  Management options: high           Procedures

## 2021-12-20 ENCOUNTER — HOSPITAL ENCOUNTER (EMERGENCY)
Age: 66
Discharge: HOME | End: 2021-12-21
Attending: EMERGENCY MEDICINE
Payer: MEDICARE

## 2021-12-20 VITALS
HEIGHT: 62 IN | RESPIRATION RATE: 18 BRPM | TEMPERATURE: 98.4 F | HEART RATE: 71 BPM | DIASTOLIC BLOOD PRESSURE: 62 MMHG | BODY MASS INDEX: 53.18 KG/M2 | OXYGEN SATURATION: 98 % | WEIGHT: 289 LBS | SYSTOLIC BLOOD PRESSURE: 106 MMHG

## 2021-12-20 DIAGNOSIS — R73.9 HYPERGLYCEMIA: Primary | ICD-10-CM

## 2021-12-20 LAB
ALBUMIN SERPL-MCNC: 3.7 G/DL (ref 3.2–4.6)
ALBUMIN/GLOB SERPL: 0.9 {RATIO} (ref 1.2–3.5)
ALP SERPL-CCNC: 130 U/L (ref 50–136)
ALT SERPL-CCNC: 32 U/L (ref 12–65)
ANION GAP SERPL CALC-SCNC: 5 MMOL/L (ref 7–16)
AST SERPL-CCNC: 9 U/L (ref 15–37)
BASOPHILS # BLD: 0.1 K/UL (ref 0–0.2)
BASOPHILS NFR BLD: 1 % (ref 0–2)
BILIRUB SERPL-MCNC: 0.5 MG/DL (ref 0.2–1.1)
BUN SERPL-MCNC: 16 MG/DL (ref 8–23)
CALCIUM SERPL-MCNC: 9 MG/DL (ref 8.3–10.4)
CHLORIDE SERPL-SCNC: 98 MMOL/L (ref 98–107)
CO2 SERPL-SCNC: 29 MMOL/L (ref 21–32)
CREAT SERPL-MCNC: 0.95 MG/DL (ref 0.6–1)
DIFFERENTIAL METHOD BLD: NORMAL
EOSINOPHIL # BLD: 0.2 K/UL (ref 0–0.8)
EOSINOPHIL NFR BLD: 2 % (ref 0.5–7.8)
ERYTHROCYTE [DISTWIDTH] IN BLOOD BY AUTOMATED COUNT: 12.5 % (ref 11.9–14.6)
GLOBULIN SER CALC-MCNC: 4 G/DL (ref 2.3–3.5)
GLUCOSE SERPL-MCNC: 579 MG/DL (ref 65–100)
HCT VFR BLD AUTO: 42.7 % (ref 35.8–46.3)
HGB BLD-MCNC: 13.5 G/DL (ref 11.7–15.4)
IMM GRANULOCYTES # BLD AUTO: 0.1 K/UL (ref 0–0.5)
IMM GRANULOCYTES NFR BLD AUTO: 1 % (ref 0–5)
LIPASE SERPL-CCNC: 351 U/L (ref 73–393)
LYMPHOCYTES # BLD: 3 K/UL (ref 0.5–4.6)
LYMPHOCYTES NFR BLD: 38 % (ref 13–44)
MCH RBC QN AUTO: 27.6 PG (ref 26.1–32.9)
MCHC RBC AUTO-ENTMCNC: 31.6 G/DL (ref 31.4–35)
MCV RBC AUTO: 87.3 FL (ref 79.6–97.8)
MONOCYTES # BLD: 0.5 K/UL (ref 0.1–1.3)
MONOCYTES NFR BLD: 6 % (ref 4–12)
NEUTS SEG # BLD: 4.1 K/UL (ref 1.7–8.2)
NEUTS SEG NFR BLD: 52 % (ref 43–78)
NRBC # BLD: 0 K/UL (ref 0–0.2)
PLATELET # BLD AUTO: 332 K/UL (ref 150–450)
PMV BLD AUTO: 9.8 FL (ref 9.4–12.3)
POTASSIUM SERPL-SCNC: 4.4 MMOL/L (ref 3.5–5.1)
PROT SERPL-MCNC: 7.7 G/DL (ref 6.3–8.2)
RBC # BLD AUTO: 4.89 M/UL (ref 4.05–5.2)
SODIUM SERPL-SCNC: 132 MMOL/L (ref 136–145)
WBC # BLD AUTO: 7.9 K/UL (ref 4.3–11.1)

## 2021-12-20 PROCEDURE — 99283 EMERGENCY DEPT VISIT LOW MDM: CPT

## 2021-12-20 PROCEDURE — 85025 COMPLETE CBC W/AUTO DIFF WBC: CPT

## 2021-12-20 PROCEDURE — 96361 HYDRATE IV INFUSION ADD-ON: CPT

## 2021-12-20 PROCEDURE — 83690 ASSAY OF LIPASE: CPT

## 2021-12-20 PROCEDURE — 96374 THER/PROPH/DIAG INJ IV PUSH: CPT

## 2021-12-20 PROCEDURE — 80053 COMPREHEN METABOLIC PANEL: CPT

## 2021-12-20 PROCEDURE — 74011250636 HC RX REV CODE- 250/636: Performed by: EMERGENCY MEDICINE

## 2021-12-20 PROCEDURE — 81003 URINALYSIS AUTO W/O SCOPE: CPT

## 2021-12-20 PROCEDURE — 74011636637 HC RX REV CODE- 636/637: Performed by: EMERGENCY MEDICINE

## 2021-12-20 RX ORDER — SODIUM CHLORIDE 0.9 % (FLUSH) 0.9 %
5-10 SYRINGE (ML) INJECTION EVERY 8 HOURS
Status: DISCONTINUED | OUTPATIENT
Start: 2021-12-20 | End: 2021-12-21 | Stop reason: HOSPADM

## 2021-12-20 RX ORDER — SODIUM CHLORIDE 0.9 % (FLUSH) 0.9 %
5-10 SYRINGE (ML) INJECTION AS NEEDED
Status: DISCONTINUED | OUTPATIENT
Start: 2021-12-20 | End: 2021-12-21 | Stop reason: HOSPADM

## 2021-12-20 RX ADMIN — SODIUM CHLORIDE, SODIUM LACTATE, POTASSIUM CHLORIDE, AND CALCIUM CHLORIDE 1000 ML: 600; 310; 30; 20 INJECTION, SOLUTION INTRAVENOUS at 23:54

## 2021-12-20 RX ADMIN — INSULIN HUMAN 10 UNITS: 100 INJECTION, SOLUTION PARENTERAL at 23:53

## 2021-12-21 LAB
GLUCOSE BLD STRIP.AUTO-MCNC: 360 MG/DL (ref 65–100)
GLUCOSE BLD STRIP.AUTO-MCNC: 415 MG/DL (ref 65–100)
SERVICE CMNT-IMP: ABNORMAL
SERVICE CMNT-IMP: ABNORMAL

## 2021-12-21 PROCEDURE — 96361 HYDRATE IV INFUSION ADD-ON: CPT

## 2021-12-21 PROCEDURE — 74011250637 HC RX REV CODE- 250/637: Performed by: EMERGENCY MEDICINE

## 2021-12-21 PROCEDURE — 82962 GLUCOSE BLOOD TEST: CPT

## 2021-12-21 PROCEDURE — 74011250636 HC RX REV CODE- 250/636: Performed by: EMERGENCY MEDICINE

## 2021-12-21 PROCEDURE — 74011636637 HC RX REV CODE- 636/637: Performed by: EMERGENCY MEDICINE

## 2021-12-21 RX ORDER — METFORMIN HYDROCHLORIDE 500 MG/1
500 TABLET ORAL
Status: DISCONTINUED | OUTPATIENT
Start: 2021-12-21 | End: 2021-12-21

## 2021-12-21 RX ORDER — SODIUM CHLORIDE 9 MG/ML
1000 INJECTION, SOLUTION INTRAVENOUS ONCE
Status: COMPLETED | OUTPATIENT
Start: 2021-12-21 | End: 2021-12-21

## 2021-12-21 RX ORDER — GLIPIZIDE 5 MG/1
5 TABLET ORAL
Status: COMPLETED | OUTPATIENT
Start: 2021-12-21 | End: 2021-12-21

## 2021-12-21 RX ADMIN — GLIPIZIDE 5 MG: 5 TABLET ORAL at 01:46

## 2021-12-21 RX ADMIN — INSULIN HUMAN 5 UNITS: 100 INJECTION, SOLUTION PARENTERAL at 01:00

## 2021-12-21 RX ADMIN — SODIUM CHLORIDE 1000 ML: 900 INJECTION, SOLUTION INTRAVENOUS at 00:49

## 2021-12-21 NOTE — ED PROVIDER NOTES
Presents to the emerge department with complaint of high blood sugar. She reports polyuria and polydipsia. Her sugar has been running higher after having just completed a course of steroids. She was seen by her primary care provider today with a blood sugar over 400 and was started on Lantus insulin which she took 12units at that time. She denies any vomiting but has had some mild diarrhea. Review of systems otherwise negative. The history is provided by the patient and medical records. High Blood Sugar   This is a chronic problem. The current episode started more than 1 week ago. The problem occurs constantly. The problem has been rapidly worsening. Associated with: steroid use. The patient is experiencing no pain. Associated symptoms include diarrhea and frequency. Pertinent negatives include no fever, no nausea, no vomiting, no dysuria and no hematuria. The pain is worsened by eating. The pain is relieved by nothing. The patient's surgical history non-contributory. Past Medical History:   Diagnosis Date    Anxiety     Arrhythmia     A. Fib., tachycardia    Arthritis     everywhere;  DDD    Asthma     inhalers daily  [facility]    Autoimmune disease ([address]     Lupus, Fibromyalgia    Breast cancer ([address] 09/2016    CAD (coronary artery disease)     Dr Jovan Veras, EF 60-65%, no stents, no MI    Cancer ([address]     Chronic pain     generalized from arthritis    Coagulation disorder ([address]     anemia with pregnancy    Complicated UTI (urinary tract infection) 12/8/2015    DDD (degenerative disc disease), cervical     Depression     Diabetes ([address]     pre diabetic    Diverticulosis     Diverticulosis     GERD (gastroesophageal reflux disease)     Hearing reduced     Heart failure ([address]     EF 60-65% per echo 4/13/2018    [facility] of echocardi[facility]17/[facility]    No significant valvular disease.   EF 50-55%    History of prediabetes     monitored by Primary Physician    Hypertension     Lymphedema     [facility]bid [facility]     Nausea & vomiting     Shortness of breath     Sinus problem     Sleep apnea     bi pap and 3 liters oxygen     Thyroid cyst     cyst removed from thyroid       Past Surgical History:   Procedure Laterality Date    COLONOSCOPY  2/12/2021         COLONOSCOPY N/A 2/12/2021    COLONOSCOPY/ 53 performed by Felicia Gonzalez MD at [facility] EGD  2/12/2021         HX BREAST RECONSTRUCTION Left 7/5/2018    LEFT BREAST REVISION/ EXPANDER EXCHANGE FOR IMPLANT performed by Chrystal Angelo [facility] at [facility] MAIN OR    HX CARPAL TUNNEL RELEASE Bilateral     HX CHOLECYSTECTOMY      HX COLONOSCOPY      HX CYST REMOVAL      from thyroid    HX LAP CHOLECYSTECTOMY      HX MASTECTOMY Left 01/2017    HX OTHER SURGICAL      abcess where bra strap    NEUROLOGICAL PROCEDURE UNLISTED      Burning nerve to back    SC BREAST SURGERY PROCEDURE UNLISTED      Mastectomy left side    SC CARDIAC SURG PROCEDURE UNLIST  [facility]    Minimal CAD in the ostial circumflex and mid ramus (medical management)    SC INSERT TISSUE EXPANDER(S) Left 01/2017    SC SINUS SURGERY PROC UNLISTED      sinus surgery         Family History:   Problem Relation Age of Onset    Heart Disease Mother     Cancer Mother         lung    Hypertension Mother     Hypertension Father     Sleep Apnea Father     Cancer Father         prostate    Sleep Apnea Brother         states also has two children with sleep apnea    Cancer Brother         pituitary    Hypertension Brother     Breast Cancer Neg Hx        Social History     Socioeconomic History    Marital status:      Spouse name: Not on file    Number of children: Not on file    Years of education: Not on file    Highest education level: Not on file   Occupational History    Not on file   Tobacco Use    Smoking status: Never Smoker    Smokeless tobacco: Never Used   Vaping Use    Vaping Use: Never used Substance and Sexual Activity    Alcohol use: No    Drug use: No    Sexual activity: Not Currently   Other Topics Concern    Not on file   Social History Narrative     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 Social Determinants of Health     Financial Resource Strain:     Difficulty of Paying Living Expenses: Not on file   Food Insecurity:     Worried About Running Out of Food in the Last Year: Not on file    Luigi of Food in the Last Year: Not on file   Transportation Needs:     Lack of Transportation (Medical): Not on file    Lack of Transportation (Non-Medical): Not on file   Physical Activity:     Days of Exercise per Week: Not on file    Minutes of Exercise per Session: Not on file   Stress:     Feeling of Stress : Not on file   Social Connections:     Frequency of Communication with Friends and Family: Not on file    Frequency of Social Gatherings with Friends and Family: Not on file    Attends Restorationist Services: Not on file    Active Member of [address] [place] [address] or Organizations: Not on file    Attends Club or Organization Meetings: Not on file    Marital Status: Not on file   Intimate Partner Violence:     Fear of Current or Ex-Partner: Not on file    Emotionally Abused: Not on file    Physically Abused: Not on file    Sexually Abused: Not on file   Housing Stability:     Unable to Pay for Housing in the Last Year: Not on file    Number of [place] in the Last Year: Not on file    Unstable Housing in the Last Year: Not on file         ALLERGIES: Ciprofloxacin, Bactrim [sulfamethoprim ds], and Sulfa (sulfonamide antibiotics)    Review of Systems   Constitutional: Negative for fever. Gastrointestinal: Positive for diarrhea. Negative for nausea and vomiting. Genitourinary: Positive for frequency. Negative for dysuria and hematuria. All other systems reviewed and are negative.       Vitals:    12/20/21 2309   BP: 106/62   Pulse: 71   Resp: 18   Temp: 98.4 °F (36.9 °C)   SpO2: 98% Weight: 131.1 kg (289 lb)   Height: 5' 2\" (1.575 m)            Physical Exam  Vitals and nursing note reviewed. Constitutional:       General: She is not in acute distress. Appearance: Normal appearance. She is obese. She is not ill-appearing, toxic-appearing or diaphoretic. HENT:      Head: Normocephalic and atraumatic. Nose: Nose normal.      Mouth/Throat:      Mouth: Mucous membranes are dry. Pharynx: No oropharyngeal exudate or posterior oropharyngeal erythema. Eyes:      Extraocular Movements: Extraocular movements intact. Conjunctiva/sclera: Conjunctivae normal.      Pupils: Pupils are equal, round, and reactive to light. Cardiovascular:      Rate and Rhythm: Normal rate and regular rhythm. Pulses: Normal pulses. Pulmonary:      Effort: Pulmonary effort is normal.   Abdominal:      General: There is no distension. Palpations: Abdomen is soft. Tenderness: There is no abdominal tenderness. There is no right CVA tenderness, left CVA tenderness, guarding or rebound. Musculoskeletal:      Cervical back: Normal range of motion and neck supple. Skin:     General: Skin is warm and dry. Capillary Refill: Capillary refill takes less than 2 seconds. Neurological:      General: No focal deficit present. Mental Status: She is alert and oriented to person, place, and time. Mental status is at baseline. Psychiatric:         Mood and Affect: Mood normal.         Behavior: Behavior normal.         Thought Content:  Thought content normal.          MDM  Number of Diagnoses or Management Options     Amount and/or Complexity of Data Reviewed  Clinical lab tests: ordered and reviewed  Independent visualization of images, tracings, or specimens: yes    Risk of Complications, Morbidity, and/or Mortality  Presenting problems: moderate  Diagnostic procedures: moderate  Management options: moderate    Patient Progress  Patient progress: stable         Procedures

## 2021-12-21 NOTE — ED NOTES
I have reviewed discharge instructions with the patient. The patient verbalized understanding. Patient left ED via Discharge Method: ambulatory to Home with family. Opportunity for questions and clarification provided. Patient given 0 scripts. To continue your aftercare when you leave the hospital, you may receive an automated call from our care team to check in on how you are doing. This is a free service and part of our promise to provide the best care and service to meet your aftercare needs.  If you have questions, or wish to unsubscribe from this service please call [phone]. Thank you for Choosing our [facility] Emergency Department.

## 2021-12-21 NOTE — DISCHARGE INSTRUCTIONS
CHECK BLOOD GLUCOSE EVERY MORNING BEFORE BREAKFAST.   REPEAT LANTUS 12 UNITS IN AM IF BLOOD SUGAR IS GREATER THAN 200

## 2021-12-26 ENCOUNTER — HOSPITAL ENCOUNTER (EMERGENCY)
Age: 66
Discharge: HOME | End: 2021-12-26
Attending: EMERGENCY MEDICINE
Payer: MEDICARE

## 2021-12-26 VITALS
BODY MASS INDEX: 53.18 KG/M2 | DIASTOLIC BLOOD PRESSURE: 66 MMHG | HEIGHT: 62 IN | OXYGEN SATURATION: 97 % | SYSTOLIC BLOOD PRESSURE: 137 MMHG | WEIGHT: 289 LBS | HEART RATE: 72 BPM | RESPIRATION RATE: 20 BRPM | TEMPERATURE: 98.3 F

## 2021-12-26 DIAGNOSIS — L03.90 CELLULITIS, UNSPECIFIED CELLULITIS SITE: ICD-10-CM

## 2021-12-26 DIAGNOSIS — R73.9 HYPERGLYCEMIA: Primary | ICD-10-CM

## 2021-12-26 LAB
ALBUMIN SERPL-MCNC: 3.8 G/DL (ref 3.2–4.6)
ALBUMIN/GLOB SERPL: 1 {RATIO} (ref 1.2–3.5)
ALP SERPL-CCNC: 100 U/L (ref 50–136)
ALT SERPL-CCNC: 41 U/L (ref 12–65)
ANION GAP SERPL CALC-SCNC: 5 MMOL/L (ref 7–16)
AST SERPL-CCNC: 15 U/L (ref 15–37)
BASOPHILS # BLD: 0.1 K/UL (ref 0–0.2)
BASOPHILS NFR BLD: 1 % (ref 0–2)
BILIRUB SERPL-MCNC: 0.5 MG/DL (ref 0.2–1.1)
BUN SERPL-MCNC: 21 MG/DL (ref 8–23)
CALCIUM SERPL-MCNC: 10.3 MG/DL (ref 8.3–10.4)
CHLORIDE SERPL-SCNC: 101 MMOL/L (ref 98–107)
CO2 SERPL-SCNC: 30 MMOL/L (ref 21–32)
CREAT SERPL-MCNC: 0.99 MG/DL (ref 0.6–1)
DIFFERENTIAL METHOD BLD: NORMAL
EOSINOPHIL # BLD: 0.2 K/UL (ref 0–0.8)
EOSINOPHIL NFR BLD: 3 % (ref 0.5–7.8)
ERYTHROCYTE [DISTWIDTH] IN BLOOD BY AUTOMATED COUNT: 12.8 % (ref 11.9–14.6)
GLOBULIN SER CALC-MCNC: 3.7 G/DL (ref 2.3–3.5)
GLUCOSE BLD STRIP.AUTO-MCNC: 218 MG/DL (ref 65–100)
GLUCOSE BLD STRIP.AUTO-MCNC: 305 MG/DL (ref 65–100)
GLUCOSE BLD STRIP.AUTO-MCNC: 324 MG/DL (ref 65–100)
GLUCOSE SERPL-MCNC: 371 MG/DL (ref 65–100)
HCT VFR BLD AUTO: 41.2 % (ref 35.8–46.3)
HGB BLD-MCNC: 13.1 G/DL (ref 11.7–15.4)
IMM GRANULOCYTES # BLD AUTO: 0 K/UL (ref 0–0.5)
IMM GRANULOCYTES NFR BLD AUTO: 0 % (ref 0–5)
LYMPHOCYTES # BLD: 2.1 K/UL (ref 0.5–4.6)
LYMPHOCYTES NFR BLD: 31 % (ref 13–44)
MCH RBC QN AUTO: 27.9 PG (ref 26.1–32.9)
MCHC RBC AUTO-ENTMCNC: 31.8 G/DL (ref 31.4–35)
MCV RBC AUTO: 87.8 FL (ref 79.6–97.8)
MONOCYTES # BLD: 0.6 K/UL (ref 0.1–1.3)
MONOCYTES NFR BLD: 8 % (ref 4–12)
NEUTS SEG # BLD: 3.9 K/UL (ref 1.7–8.2)
NEUTS SEG NFR BLD: 57 % (ref 43–78)
NRBC # BLD: 0 K/UL (ref 0–0.2)
PLATELET # BLD AUTO: 315 K/UL (ref 150–450)
PMV BLD AUTO: 9.6 FL (ref 9.4–12.3)
POTASSIUM SERPL-SCNC: 4.3 MMOL/L (ref 3.5–5.1)
PROT SERPL-MCNC: 7.5 G/DL (ref 6.3–8.2)
RBC # BLD AUTO: 4.69 M/UL (ref 4.05–5.2)
SERVICE CMNT-IMP: ABNORMAL
SODIUM SERPL-SCNC: 136 MMOL/L (ref 136–145)
WBC # BLD AUTO: 6.8 K/UL (ref 4.3–11.1)

## 2021-12-26 PROCEDURE — 74011636637 HC RX REV CODE- 636/637: Performed by: PHYSICIAN ASSISTANT

## 2021-12-26 PROCEDURE — 96374 THER/PROPH/DIAG INJ IV PUSH: CPT

## 2021-12-26 PROCEDURE — 80053 COMPREHEN METABOLIC PANEL: CPT

## 2021-12-26 PROCEDURE — 82962 GLUCOSE BLOOD TEST: CPT

## 2021-12-26 PROCEDURE — 96361 HYDRATE IV INFUSION ADD-ON: CPT

## 2021-12-26 PROCEDURE — 81003 URINALYSIS AUTO W/O SCOPE: CPT

## 2021-12-26 PROCEDURE — 99284 EMERGENCY DEPT VISIT MOD MDM: CPT

## 2021-12-26 PROCEDURE — 85025 COMPLETE CBC W/AUTO DIFF WBC: CPT

## 2021-12-26 PROCEDURE — 74011250636 HC RX REV CODE- 250/636: Performed by: EMERGENCY MEDICINE

## 2021-12-26 RX ORDER — CEPHALEXIN 500 MG/1
500 CAPSULE ORAL 4 TIMES DAILY
Qty: 28 CAPSULE | Refills: 0 | Status: SHIPPED | OUTPATIENT
Start: 2021-12-26 | End: 2022-01-02

## 2021-12-26 RX ORDER — SODIUM CHLORIDE 0.9 % (FLUSH) 0.9 %
5-10 SYRINGE (ML) INJECTION AS NEEDED
Status: DISCONTINUED | OUTPATIENT
Start: 2021-12-26 | End: 2021-12-27 | Stop reason: HOSPADM

## 2021-12-26 RX ORDER — SODIUM CHLORIDE 0.9 % (FLUSH) 0.9 %
5-10 SYRINGE (ML) INJECTION EVERY 8 HOURS
Status: DISCONTINUED | OUTPATIENT
Start: 2021-12-26 | End: 2021-12-27 | Stop reason: HOSPADM

## 2021-12-26 RX ADMIN — INSULIN HUMAN 6 UNITS: 100 INJECTION, SOLUTION PARENTERAL at 18:49

## 2021-12-26 RX ADMIN — SODIUM CHLORIDE 1000 ML: 900 INJECTION, SOLUTION INTRAVENOUS at 19:19

## 2021-12-26 NOTE — ED NOTES
Blood glucose was 447, was on steroids recently, got off them 2 weeks ago. On insulin (prescribed last week). Went to PCP last week after visit here on 12/20/21. Does not have a diabetes educator.  today in triage. NAD. VSS. Patient evaluated initially in triage. Rapid Medical Evaluation was conducted and necessary orders have been placed. I have performed a medical screening exam.  Care will now be transferred to the provider in the back of the emergency department.   Usha Fraire, [address] 6:79 PM

## 2021-12-26 NOTE — ED TRIAGE NOTES
Pt to ED with c/o high blood sugar. Pt states he BS was 447 at home. POC BG in triage 324. Pt states she was here last week for high blood sugar and was put on insulin. Pt advises she is taking her insulin like she is supposed to. Masked.

## 2021-12-26 NOTE — ED PROVIDER NOTES
Patient is a 78-year-old female who presents with complaint of hyperglycemia. She states that in the past she was considered \"prediabetic\" and was on Metformin 7 years ago but then was able to improve her sugars with diet adjustments and has not been on anything for the past 7 years. A couple weeks ago she got sick and had a course of steroids and since then she has been having issues with her sugar running high. She has been checking her sugars regularly and today it was in the 400s which alarmed her and caused her to come to the hospital for evaluation. She was also seen here on the 21st for the same issue and has been taking 14 to 16 units of Lantus twice daily. Patient notes that she has a chronic cyst on her back that flares up and gets infected at times and it does feel more painful than it has in the past which is consistent with prior infections. She follows with the surgeon who has cut it open to drained it but they have not been able to \"get it all the way out\" because of its location. She denies any fever or chills but is concerned that it is flaring up again. No Nausea vomiting or abdominal pain. The history is provided by the patient and a relative. High Blood Sugar  Pertinent negatives include no chest pain, no abdominal pain and no shortness of breath.         Past Medical History:   Diagnosis Date    Anxiety     Arrhythmia     A. Fib., tachycardia    Arthritis     everywhere;  DDD    Asthma     inhalers daily  [facility]    Autoimmune disease ([address]     Lupus, Fibromyalgia    Breast cancer ([address] 09/2016    CAD (coronary artery disease)     Dr Christiano Alba, EF 60-65%, no stents, no MI    Cancer ([address]     Chronic pain     generalized from arthritis    Coagulation disorder ([address]     anemia with pregnancy    Complicated UTI (urinary tract infection) 12/8/2015    DDD (degenerative disc disease), cervical     Depression     Diabetes ([address]     pre diabetic    Diverticulosis     Diverticulosis     GERD (gastroesophageal reflux disease)     Hearing reduced     Heart failure (HCC)     EF 60-65% per echo 4/13/2018    History of echocardi[facility] 11/17/14 [facility]    No significant valvular disease.   EF 50-55%    History of prediabetes     monitored by Primary Physician    Hypertension     Lymphedema     Morbid obesity (HCC)     Nausea & vomiting     Shortness of breath     Sinus problem     Sleep apnea     bi pap and 3 liters oxygen     Thyroid cyst     cyst removed from thyroid       Past Surgical History:   Procedure Laterality Date    COLONOSCOPY  2/12/2021         COLONOSCOPY N/A 2/12/2021    COLONOSCOPY/ 48 performed by Madie White MD at [address] [place] [address] EGD  2/12/2021         HX BREAST RECONSTRUCTION Left 7/5/2018    LEFT BREAST REVISION/ EXPANDER EXCHANGE FOR IMPLANT performed by [facility]el [facility] MA[facility] OR    HX [place] Bilateral     HX CHOLECYSTECTOMY      HX COLONOSCOPY      HX CYST REMOVAL      from thyroid    HX LAP CHOLECYSTECTOMY      HX MASTECTOMY Left 01/2017    HX OTHER SURGICAL      abcess where bra strap    NEUROLOGICAL PROCEDURE UNLISTED      Burning nerve to back    GA BREAST SURGERY PROCEDURE UNLISTED      Mastectomy left side    GA CARDIAC SURG PROCEDURE UNLIST  [facility]    Minimal CAD in the ostial circumflex and mid ramus (medical management)    GA INSERT TISSUE EXPANDER(S) Left 01/2017    GA SINUS SURGERY PROC UNLISTED      sinus surgery         Family History:   Problem Relation Age of Onset    Heart Disease Mother     Cancer Mother         lung    Hypertension Mother     Hypertension Father     Sleep Apnea Father     Cancer Father         prostate    Sleep Apnea Brother         states also has two children with sleep apnea    Cancer Brother         pituitary    Hypertension Brother     Breast Cancer Neg Hx        Social History     Socioeconomic History    Marital status:      Spouse name: Not on file    Number of children: Not on file    Years of education: Not on file    Highest education level: Not on file   Occupational History    Not on file   Tobacco Use    Smoking status: Never Smoker    Smokeless tobacco: Never Used   Vaping Use    Vaping Use: Never used   Substance and Sexual Activity    Alcohol use: No    Drug use: No    Sexual activity: Not Currently   Other Topics Concern    Not on file   Social History Narrative     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 Social Determinants of Health     Financial Resource Strain:     Difficulty of Paying Living Expenses: Not on file   Food Insecurity:     Worried About Running Out of Food in the Last Year: Not on file    Luigi of Food in the Last Year: Not on file   Transportation Needs:     Lack of Transportation (Medical): Not on file    Lack of Transportation (Non-Medical):  Not on file   Physical Activity:     Days of Exercise per Week: Not on file    Minutes of Exercise per Session: Not on file   Stress:     Feeling of Stress : Not on file   Social Connections:     Frequency of Communication with Friends and Family: Not on file    Frequency of Social Gatherings with Friends and Family: Not on file    Attends Buddhist Services: Not on file    Active Member of [address] [place] [address] [employer] or Organizations: Not on file    Attends Club or Organization Meetings: Not on file    Marital Status: Not on file   Intimate Partner Violence:     Fear of Current or Ex-Partner: Not on file    Emotionally Abused: Not on file    Physically Abused: Not on file    Sexually Abused: Not on file   Housing Stability:     Unable to Pay for Housing in the Last Year: Not on file    Number of [place] in the Last Year: Not on file    Unstable Housing in the Last Year: Not on file         ALLERGIES: Ciprofloxacin, Bactrim [sulfamethoprim ds], and Sulfa (sulfonamide antibiotics)    Review of Systems   Constitutional: Negative for chills, fatigue and fever. HENT: Negative for congestion and sore throat. Respiratory: Negative for cough and shortness of breath. Cardiovascular: Negative for chest pain. Gastrointestinal: Negative for abdominal pain, nausea and vomiting. Genitourinary: Negative for dysuria and flank pain. Musculoskeletal: Negative for back pain. Skin:        Cyst on back   Neurological: Negative for light-headedness. Psychiatric/Behavioral: Negative for behavioral problems. Vitals:    12/26/21 1642   BP: 137/66   Pulse: 72   Resp: 20   Temp: 98.3 °F (36.8 °C)   SpO2: 97%   Weight: 131.1 kg (289 lb)   Height: 5' 2\" (1.575 m)            Physical Exam  Vitals and nursing note reviewed. Constitutional:       General: She is not in acute distress. Appearance: She is obese. She is not ill-appearing or toxic-appearing. HENT:      Head: Normocephalic and atraumatic. Cardiovascular:      Rate and Rhythm: Normal rate. Pulses: Normal pulses. Pulmonary:      Effort: Pulmonary effort is normal.      Breath sounds: Normal breath sounds. Abdominal:      General: There is no distension. Palpations: Abdomen is soft. Tenderness: There is no abdominal tenderness. Skin:     General: Skin is warm. Neurological:      Mental Status: She is alert and oriented to person, place, and time. Psychiatric:         Mood and Affect: Mood normal.         Behavior: Behavior normal.         Thought Content: Thought content normal.         Judgment: Judgment normal.          MDM  Number of Diagnoses or Management Options  Cellulitis, unspecified cellulitis site  Hyperglycemia  Diagnosis management comments: Patient is a 66-year-old female who presents with complaint of hyperglycemia. She is afebrile, nontoxic in appearance, vital signs within appropriate limits. Initial glucose 324. Will obtain labs including CBC, CMP, and urinalysis.     Labs Reviewed  METABOLIC PANEL, COMPREHENSIVE - Abnormal; Notable for the following components:     Anion gap                     5 (*)                  Glucose                       371 (*)                GFR est non-AA                60 (*)                 Globulin                      3.7 (*)                A-G Ratio                     1.0 (*)             All other components within normal limits  GLUCOSE, POC - Abnormal; Notable for the following components:     Glucose (POC)                 324 (*)             All other components within normal limits  GLUCOSE, POC - Abnormal; Notable for the following components:     Glucose (POC)                 305 (*)             All other components within normal limits  CBC WITH AUTOMATED DIFF    On discussing all results with patient and daughter at bedside daughter notes that patient has recurrent cyst that gets infected in her back and believes it is bothering her again concerned that this infection may be the reason for her elevated blood sugar readings. She does have area of redness to the mid upper back with tenderness with palpation, will DC with Keflex. After fluid bolus and 6 units of IV insulin, glucose 218. Instructed to follow-up closely with her doctor this week for evaluation of cyst on her back and reevaluation of glucose. Discussed reasons to return to the ER. All agreeable to plan.          Procedures

## 2021-12-27 NOTE — DISCHARGE INSTRUCTIONS
Call your doctor first thing in the morning to schedule close follow-up appointment in the next 2 to 3 days for repeat blood sugar. Take full course of Plavix and follow-up with your doctor for reevaluation of cyst on back. Return to the ER with any fevers, worsening pain or elevated sugars.

## 2021-12-27 NOTE — ED NOTES
I have reviewed discharge instructions with the patient. The patient verbalized understanding. Patient left ED via Discharge Method: ambulatory to Home with daughter  . Opportunity for questions and clarification provided. Patient given 1 scripts. To continue your aftercare when you leave the hospital, you may receive an automated call from our care team to check in on how you are doing. This is a free service and part of our promise to provide the best care and service to meet your aftercare needs.  If you have questions, or wish to unsubscribe from this service please call [phone]. Thank you for Choosing our [facility].

## 2022-03-18 PROBLEM — M54.40 MIDLINE LOW BACK PAIN WITH SCIATICA: Status: ACTIVE | Noted: 2018-01-25

## 2022-03-18 PROBLEM — R53.82 CHRONIC FATIGUE: Status: ACTIVE | Noted: 2018-04-10

## 2022-03-19 PROBLEM — K57.92 ACUTE DIVERTICULITIS: Status: ACTIVE | Noted: 2017-04-29

## 2022-03-19 PROBLEM — A04.72 C. DIFFICILE COLITIS: Status: ACTIVE | Noted: 2017-04-30

## 2022-03-19 PROBLEM — N61.0 CELLULITIS OF LEFT BREAST: Status: ACTIVE | Noted: 2018-07-12

## 2022-03-19 PROBLEM — I10 HTN (HYPERTENSION): Status: ACTIVE | Noted: 2018-07-12

## 2022-03-19 PROBLEM — G47.00 PERSISTENT DISORDER OF INITIATING OR MAINTAINING SLEEP: Status: ACTIVE | Noted: 2020-04-14

## 2022-03-20 PROBLEM — R06.09 DYSPNEA ON EXERTION: Status: ACTIVE | Noted: 2018-04-10

## 2022-03-20 PROBLEM — L03.319 CELLULITIS AND ABSCESS OF TRUNK: Status: ACTIVE | Noted: 2017-03-07

## 2022-03-20 PROBLEM — L02.219 CELLULITIS AND ABSCESS OF TRUNK: Status: ACTIVE | Noted: 2017-03-07

## 2022-03-20 PROBLEM — I25.10 CAD (CORONARY ARTERY DISEASE): Status: ACTIVE | Noted: 2018-07-12

## 2022-04-05 PROBLEM — I50.1: Status: ACTIVE | Noted: 2022-04-05

## 2022-04-05 PROBLEM — I25.110 CORONARY ARTERY DISEASE INVOLVING NATIVE HEART WITH UNSTABLE ANGINA PECTORIS (HCC): Status: ACTIVE | Noted: 2018-07-12

## 2022-04-05 PROBLEM — I10 ESSENTIAL HYPERTENSION: Status: ACTIVE | Noted: 2018-07-12

## 2022-05-20 ENCOUNTER — TELEPHONE (OUTPATIENT)
Dept: DIABETES SERVICES | Age: 67
End: 2022-05-20

## 2022-05-20 DIAGNOSIS — E11.65 TYPE 2 DIABETES MELLITUS WITH HYPERGLYCEMIA, WITH LONG-TERM CURRENT USE OF INSULIN (HCC): Primary | ICD-10-CM

## 2022-05-20 DIAGNOSIS — Z79.4 TYPE 2 DIABETES MELLITUS WITH HYPERGLYCEMIA, WITH LONG-TERM CURRENT USE OF INSULIN (HCC): Primary | ICD-10-CM

## 2022-05-23 ENCOUNTER — FOLLOWUP TELEPHONE ENCOUNTER (OUTPATIENT)
Dept: DIABETES SERVICES | Age: 67
End: 2022-05-23

## 2022-06-01 ENCOUNTER — TELEPHONE (OUTPATIENT)
Dept: DIABETES SERVICES | Age: 67
End: 2022-06-01

## 2022-06-01 NOTE — TELEPHONE ENCOUNTER
Third call about Diabetes Education. She has not had Diabetes education on medicare. Scheduled.  7-26-22

## 2022-06-08 DIAGNOSIS — Z79.4 TYPE 2 DIABETES MELLITUS WITH HYPERGLYCEMIA, WITH LONG-TERM CURRENT USE OF INSULIN (HCC): ICD-10-CM

## 2022-06-08 DIAGNOSIS — E78.00 HYPERCHOLESTEROLEMIA: Primary | ICD-10-CM

## 2022-06-08 DIAGNOSIS — E11.65 TYPE 2 DIABETES MELLITUS WITH HYPERGLYCEMIA, WITH LONG-TERM CURRENT USE OF INSULIN (HCC): ICD-10-CM

## 2022-06-16 ENCOUNTER — TELEPHONE (OUTPATIENT)
Dept: PULMONOLOGY | Age: 67
End: 2022-06-16

## 2022-06-21 RX ORDER — FLUTICASONE PROPIONATE 50 MCG
SPRAY, SUSPENSION (ML) NASAL
Qty: 16 G | Refills: 3 | Status: SHIPPED | OUTPATIENT
Start: 2022-06-21

## 2022-07-14 ENCOUNTER — OFFICE VISIT (OUTPATIENT)
Dept: ENDOCRINOLOGY | Age: 67
End: 2022-07-14
Payer: MEDICARE

## 2022-07-14 VITALS
HEART RATE: 70 BPM | SYSTOLIC BLOOD PRESSURE: 132 MMHG | DIASTOLIC BLOOD PRESSURE: 88 MMHG | OXYGEN SATURATION: 96 % | WEIGHT: 286.2 LBS | BODY MASS INDEX: 50.71 KG/M2 | HEIGHT: 63 IN | TEMPERATURE: 98.4 F

## 2022-07-14 DIAGNOSIS — E78.00 HYPERCHOLESTEROLEMIA: ICD-10-CM

## 2022-07-14 DIAGNOSIS — R10.11 RIGHT UPPER QUADRANT ABDOMINAL PAIN: ICD-10-CM

## 2022-07-14 DIAGNOSIS — Z79.4 TYPE 2 DIABETES MELLITUS WITH HYPERGLYCEMIA, WITH LONG-TERM CURRENT USE OF INSULIN (HCC): Primary | ICD-10-CM

## 2022-07-14 DIAGNOSIS — E11.65 TYPE 2 DIABETES MELLITUS WITH HYPERGLYCEMIA, WITH LONG-TERM CURRENT USE OF INSULIN (HCC): Primary | ICD-10-CM

## 2022-07-14 LAB
ALBUMIN SERPL-MCNC: 4.3 G/DL (ref 3.2–4.6)
ALBUMIN/GLOB SERPL: 1.2 {RATIO} (ref 1.2–3.5)
ALP SERPL-CCNC: 99 U/L (ref 50–136)
ALT SERPL-CCNC: 39 U/L (ref 12–65)
ANION GAP SERPL CALC-SCNC: 4 MMOL/L (ref 7–16)
AST SERPL-CCNC: 21 U/L (ref 15–37)
BASOPHILS # BLD: 0.1 K/UL (ref 0–0.2)
BASOPHILS NFR BLD: 1 % (ref 0–2)
BILIRUB DIRECT SERPL-MCNC: 0.2 MG/DL
BILIRUB SERPL-MCNC: 0.6 MG/DL (ref 0.2–1.1)
BUN SERPL-MCNC: 18 MG/DL (ref 8–23)
CALCIUM SERPL-MCNC: 10.2 MG/DL (ref 8.3–10.4)
CHLORIDE SERPL-SCNC: 105 MMOL/L (ref 98–107)
CO2 SERPL-SCNC: 30 MMOL/L (ref 21–32)
CREAT SERPL-MCNC: 1.1 MG/DL (ref 0.6–1)
DIFFERENTIAL METHOD BLD: ABNORMAL
EOSINOPHIL # BLD: 0.2 K/UL (ref 0–0.8)
EOSINOPHIL NFR BLD: 3 % (ref 0.5–7.8)
ERYTHROCYTE [DISTWIDTH] IN BLOOD BY AUTOMATED COUNT: 13.2 % (ref 11.9–14.6)
GLOBULIN SER CALC-MCNC: 3.6 G/DL (ref 2.3–3.5)
GLUCOSE SERPL-MCNC: 90 MG/DL (ref 65–100)
HBA1C MFR BLD: 5.7 %
HCT VFR BLD AUTO: 47.1 % (ref 35.8–46.3)
HGB BLD-MCNC: 14.6 G/DL (ref 11.7–15.4)
IMM GRANULOCYTES # BLD AUTO: 0 K/UL (ref 0–0.5)
IMM GRANULOCYTES NFR BLD AUTO: 0 % (ref 0–5)
LIPASE SERPL-CCNC: 117 U/L (ref 73–393)
LYMPHOCYTES # BLD: 2 K/UL (ref 0.5–4.6)
LYMPHOCYTES NFR BLD: 31 % (ref 13–44)
MCH RBC QN AUTO: 27.3 PG (ref 26.1–32.9)
MCHC RBC AUTO-ENTMCNC: 31 G/DL (ref 31.4–35)
MCV RBC AUTO: 88.2 FL (ref 79.6–97.8)
MONOCYTES # BLD: 0.5 K/UL (ref 0.1–1.3)
MONOCYTES NFR BLD: 8 % (ref 4–12)
NEUTS SEG # BLD: 3.6 K/UL (ref 1.7–8.2)
NEUTS SEG NFR BLD: 57 % (ref 43–78)
NRBC # BLD: 0 K/UL (ref 0–0.2)
PLATELET # BLD AUTO: 358 K/UL (ref 150–450)
PMV BLD AUTO: 10 FL (ref 9.4–12.3)
POTASSIUM SERPL-SCNC: 4.8 MMOL/L (ref 3.5–5.1)
PROT SERPL-MCNC: 7.9 G/DL (ref 6.3–8.2)
RBC # BLD AUTO: 5.34 M/UL (ref 4.05–5.2)
SODIUM SERPL-SCNC: 139 MMOL/L (ref 136–145)
WBC # BLD AUTO: 6.4 K/UL (ref 4.3–11.1)

## 2022-07-14 PROCEDURE — G8417 CALC BMI ABV UP PARAM F/U: HCPCS | Performed by: DIETITIAN, REGISTERED

## 2022-07-14 PROCEDURE — 1036F TOBACCO NON-USER: CPT | Performed by: DIETITIAN, REGISTERED

## 2022-07-14 PROCEDURE — 3017F COLORECTAL CA SCREEN DOC REV: CPT | Performed by: DIETITIAN, REGISTERED

## 2022-07-14 PROCEDURE — 3046F HEMOGLOBIN A1C LEVEL >9.0%: CPT | Performed by: DIETITIAN, REGISTERED

## 2022-07-14 PROCEDURE — 2022F DILAT RTA XM EVC RTNOPTHY: CPT | Performed by: DIETITIAN, REGISTERED

## 2022-07-14 PROCEDURE — G8400 PT W/DXA NO RESULTS DOC: HCPCS | Performed by: DIETITIAN, REGISTERED

## 2022-07-14 PROCEDURE — 99215 OFFICE O/P EST HI 40 MIN: CPT | Performed by: DIETITIAN, REGISTERED

## 2022-07-14 PROCEDURE — G8427 DOCREV CUR MEDS BY ELIG CLIN: HCPCS | Performed by: DIETITIAN, REGISTERED

## 2022-07-14 PROCEDURE — 83036 HEMOGLOBIN GLYCOSYLATED A1C: CPT | Performed by: DIETITIAN, REGISTERED

## 2022-07-14 PROCEDURE — 1090F PRES/ABSN URINE INCON ASSESS: CPT | Performed by: DIETITIAN, REGISTERED

## 2022-07-14 PROCEDURE — 1123F ACP DISCUSS/DSCN MKR DOCD: CPT | Performed by: DIETITIAN, REGISTERED

## 2022-07-14 RX ORDER — EMPAGLIFLOZIN 10 MG/1
10 TABLET, FILM COATED ORAL DAILY
Qty: 30 TABLET | Refills: 3 | Status: SHIPPED | OUTPATIENT
Start: 2022-07-14

## 2022-07-14 RX ORDER — DULAGLUTIDE 0.75 MG/.5ML
0.75 INJECTION, SOLUTION SUBCUTANEOUS WEEKLY
Qty: 2 ML | Refills: 5 | Status: SHIPPED | OUTPATIENT
Start: 2022-07-14

## 2022-07-14 RX ORDER — DULAGLUTIDE 0.75 MG/.5ML
0.75 INJECTION, SOLUTION SUBCUTANEOUS WEEKLY
COMMUNITY
End: 2022-07-14 | Stop reason: SDUPTHER

## 2022-07-14 ASSESSMENT — ENCOUNTER SYMPTOMS
BACK PAIN: 0
SHORTNESS OF BREATH: 0
NAUSEA: 0
COUGH: 0
VOICE CHANGE: 0
TROUBLE SWALLOWING: 0
DIARRHEA: 0
CONSTIPATION: 0
WHEEZING: 0
VOMITING: 0
ABDOMINAL PAIN: 0

## 2022-07-14 NOTE — PROGRESS NOTES
[facility] ENDOCRINOLOGY   AND   THYROID NODULE CLINIC    RAKEL Melendez  [address], [address] [facility], [address] [place] [address]  Phone [phone]  Facsimile [phone]      Reason for visit: Zenon VILLARREAL (1955) is here for follow up of Type 2 Diabetes Mellitus. ASSESSMENT AND PLAN:    1. Type 2 diabetes mellitus with hyperglycemia, with long-term current use of insulin ([facility])  A1c is 5.7%. Glycemic control is optimal.  Patient is having gas and burping daily. She also reports upper right quadrant abdominal pain. These are symptoms since starting Trulicity lowest dose. Patient denies hypoglycemic events less than 70 mg/dL. Instructed patient on SE profile of Trulicity which includes nausea, vomiting and diarrhea and the less common associations of pancreatitis and thyroid C-cell tumors. --- Continue current therapy as listed below. - AMB POC HEMOGLOBIN A1C  - Comprehensive Metabolic Panel; Future  - Hemoglobin H0L; Future  - TRULICITY 9.65 KO/1.9NR SOPN; Inject 0.75 mg into the skin once a week  Dispense: 2 mL; Refill: 5  - JARDIANCE 10 MG tablet; Take 1 tablet by mouth daily  Dispense: 30 tablet; Refill: 3    2. Right upper quadrant abdominal pain  - CBC with Auto Differential; Future  - Lipase; Future  - Hepatic Function Panel; Future  - Basic Metabolic Panel; Future  - Basic Metabolic Panel  - Hepatic Function Panel  - Lipase  - CBC with Auto Differential    3. Hypercholesterolemia  Last PVB 12 BNOOU goal of less than 55.   - atorvastatin (LIPITOR) 40 mg tablet; Take 1 Tablet by mouth daily.  Dispense: 30 Tablet; Refill: 1  - LIPID PANEL; Future      3. Essential hypertension   BP controlled. Patient is taking Losartan 1 mg daily   BP Readings from Last 3 Encounters:   22 132/88   22 130/80   22 138/80      4.  Coronary artery disease involving native heart with unstable angina pectoris, unspecified vessel or lesion type [facility])   Patient will benefit from GLP-1 receptor agonist and /or SGLT2 inhibitor due to cardioprotective and renal protective benefits      5. Chronic diastolic heart failure secondary to coronary artery disease ([address]   Patient will benefit from GLP-1 receptor agonist and /or SGLT2 inhibitor due to cardioprotective and renal protective benefits. 6. Vitamin D deficiency   Current Vitamin D levels are WNL. Reinforce Vitamin D Supplementation at next visit. Follow-up and Dispositions    · Return in about 3 months (around 10/14/2022).         Tests or Results Reviewed: (see lab dates below in note) HgbA1C, Cr, GFR, Microalbumin/Cr ratio, Lipid Profile,  TSH.       History of Present Illness:       History given by patient. Previously used steroids for bronchitis- caused her BG to rise > 500 mg in 12/2021.       Date of DM diagnosis: pre-diabetes - age 49        Current Diabetes Medications: Lantus 2 units 2 times daily, Trulciity 0.75 mg weekly,        Medication Failures: metformin- stopped taking 9 years ago - A1c was good for a long period of time and MD stopped.       No history of DKA, DFW, pancreatitis, and gastroparesis.        Current symptoms: See Review of Systems       Nephropathy: Stage 2 Kidney Disease   12/26/2021      Cr 0.99, GFR >70, microalbumin/Cr ratio none   12/21/2021      Cr 0.86, BKC 85   05/12/2022      Cr 0.94, GFR 67, microalbumin/Cr ratio <14  05/12/2022 Cr 0.94, GFR 67      Hemoglobin A1c:   04/05/2022      6.3%   07/14/2022      5.7%      Lipids:    10/19/2020  TC- 147, LDL- 91, VLDL- 14,  HDL- 42, TG- 71   12/21/2021  TC- 79, LDL- 36, VLDL- 24,  HDL- 24, TG- 103       Thyroid:    10/19/2020      TSH     4.020   04/05/2022         TSH  4.190      Free T4:   04/05/2022         1.06       Other Labs:       Vitamin D:   10/19/2020      29.1 ()   04/05/2022         44.9 ()     Vitamin B12:    10/19/2020      >2000 (453-1919)       Home blood glucose monitoring frequency: 3 times per day       Blood glucose: by meter review               fasting                AC lunch 109-117               AC supper none               bedtime none       Hypoglycemia: none. Nothing Below 80       Neuropathy: tingling in hands and feet       Retinopathy: Last eye exam was 10/2021 and demonstrated no diabetic retinopathy.  Eye care specialist is [facility]  for glaucoma.       Diet:    3 meals   Breakfast: meat and greens, and half cup rice   Lunch: sometimes skips - ham and cheese sandwich   Dinner: meat vegetable starch 1/2 cup mashed potatoes or rice   Bedtime Snack: banana or boiled egg   Drinks: coffee or tea with equal, diet soda, water, koolaid packs with diet water.       Exercise: none -. Uses a walker.       Diabetes education: The patient has not received formal diabetes education.       Pregnancy: s/p menopause       BP:               BP Readings from Last 3 Encounters:        04/05/22  138/80     03/17/22  134/82     12/26/21  137/66             Weight Trends: Wt Readings from Last 3 Encounters:        04/05/22  291 lb 9.6 oz (132.3 kg)     03/17/22  286 lb (129.7 kg)     12/26/21  289 lb (131.1 kg)             Medical/Surgical/Social/Family History: Reviewed in Chart       Medications: Reviewed in chart       Allergies   Ciprofloxacin, Bactrim [sulfamethoprim ds], and Sulfa (sulfonamide antibiotics)     /88   Pulse 70   Temp 98.4 °F (36.9 °C) (Tympanic)   Ht 5' 2.5\" (1.588 m)   Wt 286 lb 3.2 oz (129.8 kg)   SpO2 96%   BMI 51.51 kg/m²     Weight Trends: Wt Readings from Last 3 Encounters:   07/14/22 286 lb 3.2 oz (129.8 kg)   05/19/22 287 lb (130.2 kg)   04/05/22 291 lb 9.6 oz (132.3 kg)       Allergies and Medications: Reviewed in Chart    Review of Systems   Constitutional: Positive for fatigue. Negative for appetite change, diaphoresis and unexpected weight change. HENT: Negative for trouble swallowing and voice change. Eyes: Negative for visual disturbance. Respiratory: Negative for cough, shortness of breath and wheezing. Cardiovascular: Negative for chest pain, palpitations and leg swelling. Gastrointestinal: Negative for abdominal pain, constipation, diarrhea, nausea and vomiting. Endocrine: Negative for cold intolerance, heat intolerance, polydipsia, polyphagia and polyuria. Genitourinary: Negative for difficulty urinating and frequency. Musculoskeletal: Negative for arthralgias, back pain and myalgias. Skin: Negative for pallor. Neurological: Positive for numbness (on the bottom of the feet) and headaches (in the afternoon). Negative for dizziness, tremors and weakness. Hematological: Negative for adenopathy. Psychiatric/Behavioral: Negative for dysphoric mood and sleep disturbance. The patient is not nervous/anxious. Physical Exam  Constitutional:       General: She is not in acute distress. Appearance: Normal appearance. She is normal weight. She is not ill-appearing. HENT:      Head: Normocephalic. Cardiovascular:      Rate and Rhythm: Normal rate and regular rhythm. Pulses: Normal pulses. Pulmonary:      Effort: No respiratory distress. Breath sounds: Normal breath sounds. No wheezing or rhonchi. Chest:      Chest wall: No tenderness. Abdominal:      General: There is no distension. Palpations: Abdomen is soft. Tenderness: There is generalized abdominal tenderness and tenderness in the right upper quadrant. There is no guarding. Musculoskeletal:         General: No swelling, tenderness or signs of injury. Cervical back: Neck supple. No tenderness. Right lower leg: No edema. Left lower leg: No edema. Feet:      Right foot:      Skin integrity: Skin integrity normal. No ulcer. Left foot:      Skin integrity: Skin integrity normal. No ulcer. Lymphadenopathy:      Cervical: No cervical adenopathy. Skin:     General: Skin is warm and dry. Findings: No erythema or rash. Neurological:      Mental Status: She is alert. Motor: No weakness.    Psychiatric:         Mood and Affect: Mood normal.         Behavior: Behavior normal.         Orders Placed This Encounter   Procedures    CBC with Auto Differential     Standing Status:   Future     Number of Occurrences:   1     Standing Expiration Date:   7/14/2023    Lipase     Standing Status:   Future     Number of Occurrences:   1     Standing Expiration Date:   7/14/2023    Hepatic Function Panel     Standing Status:   Future     Number of Occurrences:   1     Standing Expiration Date:   7/14/2023    Basic Metabolic Panel     Standing Status:   Future     Number of Occurrences:   1     Standing Expiration Date:   7/14/2023    Lipid Panel     Standing Status:   Future     Standing Expiration Date:   7/14/2023    Comprehensive Metabolic Panel     Standing Status:   Future     Standing Expiration Date:   7/14/2023    Hemoglobin A1C     Standing Status:   Future     Standing Expiration Date:   7/14/2023    AMB POC HEMOGLOBIN A1C       Current Outpatient Medications   Medication Sig Dispense Refill    TRULICITY 4.86 YR/6.7KX SOPN Inject 0.75 mg into the skin once a week 2 mL 5    JARDIANCE 10 MG tablet Take 1 tablet by mouth daily 30 tablet 3    fluticasone (FLONASE) 50 MCG/ACT nasal spray SHAKE LIQUID AND USE 2 SPRAYS IN EACH NOSTRIL DAILY 16 g 3    acetaminophen (TYLENOL) 500 MG tablet Take by mouth every 6 hours as needed      albuterol (PROVENTIL) (2.5 MG/3ML) 0.083% nebulizer solution Inhale 2.5 mg into the lungs every 4 hours as needed      albuterol sulfate HFA (PROAIR HFA) 108 (90 Base) MCG/ACT inhaler Inhale 1 puff into the lungs every 6 hours as needed      atorvastatin (LIPITOR) 40 MG tablet Take 40 mg by mouth daily      baclofen (LIORESAL) 10 MG tablet Take 10 mg by mouth 2 times daily      Biotin 5 MG CAPS TK 1 C PO QD      dicyclomine (BENTYL) 10 MG capsule Take 10 mg by mouth 3 times daily as needed  DULoxetine (CYMBALTA) 60 MG extended release capsule TAKE 1 CAPSULE BY MOUTH TWICE DAILY      fexofenadine (ALLEGRA) 180 MG tablet TAKE 1 TABLET BY MOUTH EVERY DAY      Fluticasone-Umeclidin-Vilant (TRELEGY ELLIPTA) 200-62.5-25 MCG/INH AEPB Inhale 1 puff into the lungs daily      Glucagon 0.5 MG/0.1ML SOAJ Inject 0.5 mg into the skin as needed      ibuprofen (ADVIL;MOTRIN) 600 MG tablet Take 600 mg by mouth every 6 hours as needed      ivabradine (CORLANOR) 5 MG TABS tablet Take 5 mg by mouth 2 times daily (with meals)      losartan (COZAAR) 100 MG tablet TAKE 1 TABLET BY MOUTH DAILY      metoprolol succinate (TOPROL XL) 100 MG extended release tablet TAKE 1 AND 1/2 TABLETS BY MOUTH DAILY      montelukast (SINGULAIR) 10 MG tablet TAKE 1 TABLET BY MOUTH EVERY NIGHT      nitroGLYCERIN (NITROSTAT) 0.4 MG SL tablet Place 1 tab under tongue for chest pain. May repeat in 5 minutes if needed. If no relief, call EMS.  spironolactone-hydroCHLOROthiazide (ALDACTAZIDE) 25-25 MG per tablet Take 1 tablet by mouth daily       No current facility-administered medications for this visit. CROW Shaffer NP    On this date 7/14/2022 I have spent 45 minutes reviewing previous notes, test results and face to face with the patient discussing the diagnosis and importance of compliance with the treatment plan as well as documenting on the day of the visit. Portions of this note were generated with the assistance of voice recognition software. As such, some errors in transcription may be present.

## 2022-07-26 ENCOUNTER — TELEPHONE (OUTPATIENT)
Dept: DIABETES SERVICES | Age: 67
End: 2022-07-26

## 2022-08-08 ENCOUNTER — TELEPHONE (OUTPATIENT)
Dept: SLEEP MEDICINE | Age: 67
End: 2022-08-08

## 2022-08-08 NOTE — TELEPHONE ENCOUNTER
Patient needs  a return appt in sleep to discuss her options of oral appliance and Bipap.  Recent note scanned in [address] [place] [address] from her dentist.

## 2022-08-16 ENCOUNTER — TELEPHONE (OUTPATIENT)
Dept: DIABETES SERVICES | Age: 67
End: 2022-08-16

## 2022-08-16 ENCOUNTER — HOSPITAL ENCOUNTER (OUTPATIENT)
Dept: DIABETES SERVICES | Age: 67
Setting detail: RECURRING SERIES
Discharge: HOME | End: 2022-08-19

## 2022-09-21 NOTE — PROGRESS NOTES
[address] [place] [address] Court, [address] [facility]  [phone]    Patient Name:  Yuly Arias  YOB: 1955      Office Visit 9/22/2022    CHIEF COMPLAINT:    Chief Complaint   Patient presents with    Sleep Apnea    Follow-up         HISTORY OF PRESENT ILLNESS:  Patient is a 80 yo female seen today for follow up of DIONE and hypersomnia. Pt had a PSG on 10/29/15 with an AHI of 93.3/hr with desaturations to 47%. Pt is on AutoBiPAP 19.6/14cm H2O with 3L O2 bled in. Pt is accompanied by her God daughter. Pt reports the was seen by Dr. Alyx Ward, dentist, recently and mentioned that she wanted to get off of PAP therapy and have an oral appliance made. Dr. Deidra Selby did review pt's sleep study and thinks she may be a candidate for dual therapy. Pt reports that ever since being on BiPAP therapy, she has never felt like it helped with her energy during the day. She uses her BiPAP nightly as she cannot breath at night without it. She just feels sluggish and tired all day,every day. Of note, pt does have fibromyalgia. She uses a nasal mask. She is not on any sleep aids and she has no problems falling asleep or staying asleep. She is just frustrated that she remains so fatigued all of the time. I offered to increase her pressure and check an KRISTA on current settings with 3L bled in. Pt would like a new study but refuses to be off of her BIPAP 3 nights prior to the study. She is agreeable to a split night PSG. Pt has used BiPAP 365/365 days with an average use of 10 hrs and 50 mins per night. Pt has a mask leak of 0.1L/min with an AHI of 1.9/hr. Pt is benefiting and tolerating PAP therapy.          Past Medical History:   Diagnosis Date    Anxiety     Arrhythmia     A. Fib., tachycardia    Arthritis     everywhere;  DDD    Asthma     inhalers daily  [facility]    Autoimmune disease ([facility]     Lupus, Fibromyalgia    Breast cancer ([facility] 09/2016    CAD (coronary artery disease)     Dr Joselito Escobedo, EF 60-65%, no stents, no MI    Cancer [facility])     Chronic pain     generalized from arthritis    Coagulation disorder ([address]     anemia with pregnancy    Complicated UTI (urinary tract infection) 12/8/2015    Congestive heart failure (CHF) (HCC)     DDD (degenerative disc disease), cervical     Depression     Diabetes ([address]     pre diabetic    Diverticulosis     Diverticulosis     GERD (gastroesophageal reflux disease)     Glaucoma     Hearing reduced     Heart failure ([address]     EF 60-65% per echo 4/13/2018    History of echocardiogram 11/17/14 at [facility]    No significant valvular disease.   EF 50-55%    History of prediabetes     monitored by Primary Physician    Hypertension     Lymphedema     Morbid obesity ([address]     Nausea & vomiting     Shortness of breath     Sinus problem     Sleep apnea     bi pap and 3 liters oxygen     Stress incontinence in female     Thyroid cyst     cyst removed from thyroid         Patient Active Problem List   Diagnosis    Acute lower UTI    Tachycardia    Chronic fatigue    Midline low back pain with sciatica    Arthritis    DIONE (obstructive sleep apnea)    Skin lesions    Anxiety    Palpitations    C. difficile colitis    Cellulitis of left breast    Acute diverticulitis    Persistent disorder of initiating or maintaining sleep    Morbid obesity with BMI of 50.0-59.9, adult (HCC)    Gastroesophageal reflux disease without esophagitis    Cellulitis and abscess of trunk    Dyspnea on exertion    Type 2 diabetes mellitus with hyperglycemia, with long-term current use of insulin (HCC)    Essential hypertension    Coronary artery disease involving native heart with unstable angina pectoris (HCC)    Hypercholesterolemia    Left-sided congestive heart failure (HCC)    Chronic diastolic heart failure secondary to coronary artery disease (HCC)    Right upper quadrant abdominal pain          Past Surgical History:   Procedure Laterality Date    BREAST RECONSTRUCTION Left 7/5/2018    LEFT BREAST REVISION/ EXPANDER EXCHANGE FOR IMPLANT performed by Elsa Dooley MD at [facility] [address]      Mastectomy left side    CARPAL TUNNEL RELEASE Bilateral     CHOLECYSTECTOMY      CHOLECYSTECTOMY, LAPAROSCOPIC      COLONOSCOPY N/A 2/12/2021    COLONOSCOPY/ 53 performed by Sonny Maldonado MD at [facility] ENDOSCOPY    COLONOSCOPY  2/12/2021         COLONOSCOPY      CYST REMOVAL      from thyroid    INSERT TISSUE EXPANDER(S) Left 01/2017    MASTECTOMY Left 01/2017    NEUROLOGICAL SURGERY      Burning nerve to back    OTHER SURGICAL HISTORY      abcess where bra strap    [place] CARDIAC SURG PROCEDURE UNLIST  Cath [facility]14 [facility]    Minimal CAD in the ostial circumflex and mid ramus (medical management)    SINUS SURGERY PROC UNLISTED      sinus surgery    UPPER GASTROINTESTINAL ENDOSCOPY  2/12/2021                Social History     Socioeconomic History    Marital status:      Spouse name: Not on file    Number of children: Not on file    Years of education: Not on file    Highest education level: Not on file   Occupational History    Not on file   Tobacco Use    Smoking status: Never    Smokeless tobacco: Never   Substance and Sexual Activity    Alcohol use: No    Drug use: No    Sexual activity: Not on file   Other Topics Concern    Not on file   Social History Narrative     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      Social Determinants of Health     Financial Resource Strain: Not on file   Food Insecurity: Not on file   Transportation Needs: Not on file   Physical Activity: Not on file   Stress: Not on file   Social Connections: Not on file   Intimate Partner Violence: Not on file   Housing Stability: Not on file         Family History   Problem Relation Age of Onset    Breast Cancer Neg Hx     Hypertension Brother     Cancer Brother         pituitary    Sleep Apnea Brother         states also has two children with sleep apnea    Heart Disease Mother     Sleep Apnea Father     Hypertension Father     Hypertension Mother     Cancer Mother         lung    Cancer Father         prostate         Allergies   Allergen Reactions    Ciprofloxacin Diarrhea and Other (See Comments)     Per pt, started her c-diff\"    Sulfa Antibiotics Other (See Comments) and Rash     Yeast growth  Pt gets a yeast infection on body          Current Outpatient Medications   Medication Sig    spironolactone (ALDACTONE) 25 MG tablet Take 25 mg by mouth daily    hydroCHLOROthiazide (HYDRODIURIL) 25 MG tablet Take 25 mg by mouth daily    TRULICITY 5.87 XW/7.6EA SOPN Inject 0.75 mg into the skin once a week    JARDIANCE 10 MG tablet Take 1 tablet by mouth daily    fluticasone (FLONASE) 50 MCG/ACT nasal spray SHAKE LIQUID AND USE 2 SPRAYS IN EACH NOSTRIL DAILY    acetaminophen (TYLENOL) 500 MG tablet Take by mouth every 6 hours as needed    albuterol (PROVENTIL) (2.5 MG/3ML) 0.083% nebulizer solution Inhale 2.5 mg into the lungs every 4 hours as needed    albuterol sulfate HFA (PROVENTIL;VENTOLIN;PROAIR) 108 (90 Base) MCG/ACT inhaler Inhale 1 puff into the lungs every 6 hours as needed    atorvastatin (LIPITOR) 40 MG tablet Take 40 mg by mouth daily    Biotin 5 MG CAPS TK 1 C PO QD    DULoxetine (CYMBALTA) 60 MG extended release capsule TAKE 1 CAPSULE BY MOUTH TWICE DAILY    Fluticasone-Umeclidin-Vilant (TRELEGY ELLIPTA) 200-62.5-25 MCG/INH AEPB Inhale 1 puff into the lungs daily    Glucagon 0.5 MG/0.1ML SOAJ Inject 0.5 mg into the skin as needed    ibuprofen (ADVIL;MOTRIN) 600 MG tablet Take 600 mg by mouth every 6 hours as needed    ivabradine (CORLANOR) 5 MG TABS tablet Take 5 mg by mouth 2 times daily (with meals)    losartan (COZAAR) 100 MG tablet TAKE 1 TABLET BY MOUTH DAILY    metoprolol succinate (TOPROL XL) 100 MG extended release tablet TAKE 1 AND 1/2 TABLETS BY MOUTH DAILY    montelukast (SINGULAIR) 10 MG tablet TAKE 1 TABLET BY MOUTH EVERY NIGHT    nitroGLYCERIN (NITROSTAT) 0.4 MG SL tablet Place 1 tab under tongue for chest pain. May repeat in 5 minutes if needed. If no relief, call EMS. baclofen (LIORESAL) 10 MG tablet Take 10 mg by mouth 2 times daily (Patient not taking: Reported on 9/22/2022)    dicyclomine (BENTYL) 10 MG capsule Take 10 mg by mouth 3 times daily as needed (Patient not taking: Reported on 9/22/2022)    fexofenadine (ALLEGRA) 180 MG tablet TAKE 1 TABLET BY MOUTH EVERY DAY (Patient not taking: Reported on 9/22/2022)    spironolactone-hydroCHLOROthiazide (ALDACTAZIDE) 25-25 MG per tablet Take 1 tablet by mouth daily (Patient not taking: Reported on 9/22/2022)     No current facility-administered medications for this visit. REVIEW OF SYSTEMS:   CONSTITUTIONAL:+persistent fatigue, or lethargy/hypersomnolence. There is no history of fever, chills, night sweats, weight loss, weight gain,    CARDIAC:   No chest pain, pressure, discomfort, palpitations, orthopnea, murmurs, or edema. GI:   No dysphagia, heartburn reflux, nausea/vomiting, diarrhea, abdominal pain, or bleeding. NEURO:   There is no history of AMS, persistent headache, decreased level of consciousness, seizures, or motor or sensory deficits. PHYSICAL EXAM:    Vitals:    09/22/22 1235   BP: 128/76   Pulse: 76   Resp: 15   Temp: 97.3 °F (36.3 °C)   SpO2: 96%   Weight: 292 lb 9.6 oz (132.7 kg)   Height: 5' 2\" (1.575 m)         GENERAL APPEARANCE:   The patient is morbidly obese and in no respiratory distress. , on RA   HEENT:   PERRL. Conjunctivae unremarkable. Nasal mucosa is without epistaxis, exudate, or polyps. Gums and dentition are unremarkable. NECK/LYMPHATIC:   Symmetrical with no elevation of jugular venous pulsation. Trachea midline. No thyroid enlargement. No cervical adenopathy. LUNGS:   Normal respiratory effort with symmetrical lung expansion. Breath sounds distant breath sounds. HEART:   There is a regular rate and rhythm. No murmur, rub, or gallop.   There is 2+ edema in the lower extremities. ABDOMEN:   Soft and non-tender. No hepatosplenomegaly. Bowel sounds are normal.     NEURO:   The patient is alert and oriented to person, place, and time. Memory appears intact and mood is normal.  No gross sensorimotor deficits are present. ASSESSMENT:  (Medical Decision Making)      Diagnosis Orders   1. Sleep apnea treated with nocturnal BiPAP -pt has very severe sleep apnea treated with BiPAP 19.6/14cm H2O with a PS of 5cm H2O and 3L O2 bled in. Pt may benefit from an oral appliance with BiPAP therapy. I will order a split night PSG at her request to determine if pressures are correct and determine if more O2 is required Ambulatory Referral to Sleep Studies      2. Nocturnal hypoxemia  Ambulatory Referral to Sleep Studies      3. Hypersomnolence-likely multifactorial, consider adding provigil or nuvigil if BiPAP and oral appliance does not help with her sleepiness. Ambulatory Referral to Sleep Studies           PLAN:      Orders Placed This Encounter   Procedures    Ambulatory Referral to Sleep Studies     Referral Priority:   Urgent     Referral Type:   Consult for Advice and Opinion     Referral Reason:   Specialty Services Required     Number of Visits Requested:   1           Collaborating Physician: Dr. Martita Sawyer    Over 50% of today's office visit was spent in face to face time reviewing test results, prognosis, importance of compliance, education about disease process, benefits of medications, instructions for management of acute flare-ups, and follow up plans. Total face to face time spent with patient was 31 minutes.         KESHIA Rosas  Electronically signed

## 2022-09-22 ENCOUNTER — OFFICE VISIT (OUTPATIENT)
Dept: SLEEP MEDICINE | Age: 67
End: 2022-09-22
Payer: MEDICARE

## 2022-09-22 VITALS
HEIGHT: 62 IN | BODY MASS INDEX: 53.84 KG/M2 | HEART RATE: 76 BPM | WEIGHT: 292.6 LBS | RESPIRATION RATE: 15 BRPM | DIASTOLIC BLOOD PRESSURE: 76 MMHG | OXYGEN SATURATION: 96 % | SYSTOLIC BLOOD PRESSURE: 128 MMHG | TEMPERATURE: 97.3 F

## 2022-09-22 DIAGNOSIS — G47.34 NOCTURNAL HYPOXEMIA: ICD-10-CM

## 2022-09-22 DIAGNOSIS — G47.10 HYPERSOMNOLENCE: ICD-10-CM

## 2022-09-22 DIAGNOSIS — G47.30 SLEEP APNEA TREATED WITH NOCTURNAL BIPAP: Primary | ICD-10-CM

## 2022-09-22 PROCEDURE — 1090F PRES/ABSN URINE INCON ASSESS: CPT | Performed by: PHYSICIAN ASSISTANT

## 2022-09-22 PROCEDURE — 1036F TOBACCO NON-USER: CPT | Performed by: PHYSICIAN ASSISTANT

## 2022-09-22 PROCEDURE — 1123F ACP DISCUSS/DSCN MKR DOCD: CPT | Performed by: PHYSICIAN ASSISTANT

## 2022-09-22 PROCEDURE — 99214 OFFICE O/P EST MOD 30 MIN: CPT | Performed by: PHYSICIAN ASSISTANT

## 2022-09-22 PROCEDURE — G8400 PT W/DXA NO RESULTS DOC: HCPCS | Performed by: PHYSICIAN ASSISTANT

## 2022-09-22 PROCEDURE — G8427 DOCREV CUR MEDS BY ELIG CLIN: HCPCS | Performed by: PHYSICIAN ASSISTANT

## 2022-09-22 PROCEDURE — 3017F COLORECTAL CA SCREEN DOC REV: CPT | Performed by: PHYSICIAN ASSISTANT

## 2022-09-22 PROCEDURE — G8417 CALC BMI ABV UP PARAM F/U: HCPCS | Performed by: PHYSICIAN ASSISTANT

## 2022-09-22 RX ORDER — SPIRONOLACTONE 25 MG/1
25 TABLET ORAL DAILY
COMMUNITY

## 2022-09-22 RX ORDER — HYDROCHLOROTHIAZIDE 25 MG/1
25 TABLET ORAL DAILY
COMMUNITY

## 2022-09-22 ASSESSMENT — SLEEP AND FATIGUE QUESTIONNAIRES
HOW LIKELY ARE YOU TO NOD OFF OR FALL ASLEEP WHILE SITTING QUIETLY AFTER LUNCH WITHOUT ALCOHOL: 1
HOW LIKELY ARE YOU TO NOD OFF OR FALL ASLEEP WHILE SITTING AND READING: 2
HOW LIKELY ARE YOU TO NOD OFF OR FALL ASLEEP WHILE SITTING AND TALKING TO SOMEONE: 0
HOW LIKELY ARE YOU TO NOD OFF OR FALL ASLEEP IN A CAR, WHILE STOPPED FOR A FEW MINUTES IN TRAFFIC: 0
HOW LIKELY ARE YOU TO NOD OFF OR FALL ASLEEP WHILE WATCHING TV: 2
HOW LIKELY ARE YOU TO NOD OFF OR FALL ASLEEP WHEN YOU ARE A PASSENGER IN A CAR FOR AN HOUR WITHOUT A BREAK: 2
HOW LIKELY ARE YOU TO NOD OFF OR FALL ASLEEP WHILE LYING DOWN TO REST IN THE AFTERNOON WHEN CIRCUMSTANCES PERMIT: 3
HOW LIKELY ARE YOU TO NOD OFF OR FALL ASLEEP WHILE SITTING INACTIVE IN A PUBLIC PLACE: 1
ESS TOTAL SCORE: 11

## 2022-10-12 ENCOUNTER — OFFICE VISIT (OUTPATIENT)
Dept: PULMONOLOGY | Age: 67
End: 2022-10-12
Payer: MEDICARE

## 2022-10-12 VITALS
SYSTOLIC BLOOD PRESSURE: 144 MMHG | HEIGHT: 63 IN | BODY MASS INDEX: 51.21 KG/M2 | DIASTOLIC BLOOD PRESSURE: 73 MMHG | OXYGEN SATURATION: 96 % | HEART RATE: 82 BPM | TEMPERATURE: 97 F | WEIGHT: 289 LBS

## 2022-10-12 DIAGNOSIS — Z79.4 TYPE 2 DIABETES MELLITUS WITH HYPERGLYCEMIA, WITH LONG-TERM CURRENT USE OF INSULIN (HCC): ICD-10-CM

## 2022-10-12 DIAGNOSIS — R09.81 SINUS CONGESTION: ICD-10-CM

## 2022-10-12 DIAGNOSIS — E11.65 TYPE 2 DIABETES MELLITUS WITH HYPERGLYCEMIA, WITH LONG-TERM CURRENT USE OF INSULIN (HCC): ICD-10-CM

## 2022-10-12 DIAGNOSIS — J45.20 MILD INTERMITTENT ASTHMA WITHOUT COMPLICATION: ICD-10-CM

## 2022-10-12 DIAGNOSIS — R09.81 SINUS CONGESTION: Primary | ICD-10-CM

## 2022-10-12 DIAGNOSIS — E78.00 HYPERCHOLESTEROLEMIA: ICD-10-CM

## 2022-10-12 DIAGNOSIS — J30.89 NON-SEASONAL ALLERGIC RHINITIS DUE TO OTHER ALLERGIC TRIGGER: ICD-10-CM

## 2022-10-12 LAB
ALBUMIN SERPL-MCNC: 3.6 G/DL (ref 3.2–4.6)
ALBUMIN/GLOB SERPL: 1.1 {RATIO} (ref 0.4–1.6)
ALP SERPL-CCNC: 94 U/L (ref 50–136)
ALT SERPL-CCNC: 54 U/L (ref 12–65)
ANION GAP SERPL CALC-SCNC: 5 MMOL/L (ref 2–11)
AST SERPL-CCNC: 28 U/L (ref 15–37)
BILIRUB SERPL-MCNC: 0.4 MG/DL (ref 0.2–1.1)
BUN SERPL-MCNC: 14 MG/DL (ref 8–23)
CALCIUM SERPL-MCNC: 9.3 MG/DL (ref 8.3–10.4)
CHLORIDE SERPL-SCNC: 111 MMOL/L (ref 101–110)
CHOLEST SERPL-MCNC: 61 MG/DL
CO2 SERPL-SCNC: 26 MMOL/L (ref 21–32)
CREAT SERPL-MCNC: 0.7 MG/DL (ref 0.6–1)
EST. AVERAGE GLUCOSE BLD GHB EST-MCNC: 137 MG/DL
GLOBULIN SER CALC-MCNC: 3.3 G/DL (ref 2.8–4.5)
GLUCOSE SERPL-MCNC: 112 MG/DL (ref 65–100)
HBA1C MFR BLD: 6.4 % (ref 4.8–5.6)
HDLC SERPL-MCNC: 30 MG/DL (ref 40–60)
HDLC SERPL: 2 {RATIO}
LDLC SERPL CALC-MCNC: 21.2 MG/DL
POTASSIUM SERPL-SCNC: 3.6 MMOL/L (ref 3.5–5.1)
PROT SERPL-MCNC: 6.9 G/DL (ref 6.3–8.2)
SARS-COV-2: NORMAL
SODIUM SERPL-SCNC: 142 MMOL/L (ref 133–143)
TRIGL SERPL-MCNC: 49 MG/DL (ref 35–150)
VLDLC SERPL CALC-MCNC: 9.8 MG/DL (ref 6–23)

## 2022-10-12 PROCEDURE — G8400 PT W/DXA NO RESULTS DOC: HCPCS | Performed by: INTERNAL MEDICINE

## 2022-10-12 PROCEDURE — 1090F PRES/ABSN URINE INCON ASSESS: CPT | Performed by: INTERNAL MEDICINE

## 2022-10-12 PROCEDURE — G8417 CALC BMI ABV UP PARAM F/U: HCPCS | Performed by: INTERNAL MEDICINE

## 2022-10-12 PROCEDURE — 3017F COLORECTAL CA SCREEN DOC REV: CPT | Performed by: INTERNAL MEDICINE

## 2022-10-12 PROCEDURE — 1036F TOBACCO NON-USER: CPT | Performed by: INTERNAL MEDICINE

## 2022-10-12 PROCEDURE — 99213 OFFICE O/P EST LOW 20 MIN: CPT | Performed by: INTERNAL MEDICINE

## 2022-10-12 PROCEDURE — G8427 DOCREV CUR MEDS BY ELIG CLIN: HCPCS | Performed by: INTERNAL MEDICINE

## 2022-10-12 PROCEDURE — G8484 FLU IMMUNIZE NO ADMIN: HCPCS | Performed by: INTERNAL MEDICINE

## 2022-10-12 PROCEDURE — 1123F ACP DISCUSS/DSCN MKR DOCD: CPT | Performed by: INTERNAL MEDICINE

## 2022-10-12 RX ORDER — LORATADINE 10 MG/1
TABLET ORAL
COMMUNITY
Start: 2022-10-03

## 2022-10-12 RX ORDER — ALBUTEROL SULFATE 2.5 MG/3ML
2.5 SOLUTION RESPIRATORY (INHALATION) EVERY 4 HOURS PRN
Qty: 120 EACH | Refills: 11 | Status: SHIPPED | OUTPATIENT
Start: 2022-10-12

## 2022-10-12 RX ORDER — GABAPENTIN 100 MG/1
CAPSULE ORAL
COMMUNITY
Start: 2022-10-03

## 2022-10-12 RX ORDER — ALBUTEROL SULFATE 90 UG/1
1 AEROSOL, METERED RESPIRATORY (INHALATION) EVERY 6 HOURS PRN
Qty: 1 EACH | Refills: 11 | Status: SHIPPED | OUTPATIENT
Start: 2022-10-12

## 2022-10-12 RX ORDER — FLUTICASONE FUROATE, UMECLIDINIUM BROMIDE AND VILANTEROL TRIFENATATE 200; 62.5; 25 UG/1; UG/1; UG/1
1 POWDER RESPIRATORY (INHALATION) DAILY
Qty: 1 EACH | Refills: 11 | Status: SHIPPED | OUTPATIENT
Start: 2022-10-12

## 2022-10-12 NOTE — PROGRESS NOTES
Patient Name:  Last Narayan                               YOB: 1955  MRN: [id number]                                                Office Visit 10/12/2022    ASSESSMENT AND PLAN:  (Medical Decision Making)  Will manage sinus congestion today and obtain formal testing for asthma in the future. 1. Sinus congestion  Acute, associated with body aches/chills. Will test for flu and COVID-19. Otherwise will treat for allergic causes. -     COVID-19; Future  -     AMB POC RAPID INFLUENZA TEST  2. Mild intermittent asthma without complication  Recommend complete pulmonary function testing.  -     albuterol (PROVENTIL) (2.5 MG/3ML) 0.083% nebulizer solution; Take 3 mLs by nebulization every 4 hours as needed for Shortness of Breath or Wheezing DX:CODE : J45.0  PLEASE BILL TO MEDICARE PART B, Disp-120 each, R-11Normal  -     Fluticasone-Umeclidin-Vilant (TRELEGY ELLIPTA) 200-62.5-25 MCG/INH AEPB; Inhale 1 puff into the lungs daily, Disp-1 each, R-11Normal  3. Non-seasonal allergic rhinitis due to other allergic trigger  -     albuterol (PROVENTIL) (2.5 MG/3ML) 0.083% nebulizer solution; Take 3 mLs by nebulization every 4 hours as needed for Shortness of Breath or Wheezing DX:CODE : J45.0  PLEASE BILL TO MEDICARE PART B, Disp-120 each, R-11Normal  -     albuterol sulfate HFA (PROVENTIL;VENTOLIN;PROAIR) 108 (90 Base) MCG/ACT inhaler; Inhale 1 puff into the lungs every 6 hours as needed for Wheezing or Shortness of Breath, Disp-1 each, R-11Normal     Fadi Xiao MD        Total time for encounter on day of encounter was 26 minutes. This time includes chart prep, review of tests/procedures, review of other provider's notes, documentation and counseling patient regarding disease process and medications.    ___________________________________________________________________         ______      REASON FOR VISIT:   Chief Complaint   Patient presents with    Shortness of Breath    Follow-up HISTORY OF PRESENT ILLNESS:    Ms. Brooke Kiran is a 79 y.o. female with a PMH of atrial fibrillation, ? asthma, breast cancer, coronary artery disease, hypertension, DIONE on BiPAP with 3 L who is seen at [facility] today for shortness of breath. Of note, for about 24 hours she has had significant left maxillary sinus congestion, body aches and chills. She has not measured a fever. She has not had worsening cough or wheezing. She reports overall her asthma has been relatively stable. That said, I do not see any pulmonary function testing that actually confirms a diagnosis of asthma. She currently uses Trelegy and albuterol and feels it is helping her. She was seen in 11/2021 in urgent care with /67 and was wheezing and tretaed for asthma. Tobacco Use      Smoking status: Never      Smokeless tobacco: Never      REVIEW OF SYSTEMS:   10 point review of systems is negative except as reported in HPI. PHYSICAL EXAM:   Vitals:    10/12/22 1515   BP: (!) 144/73   Pulse: 82   Temp: 97 °F (36.1 °C)   TempSrc: Skin   SpO2: 96%   Weight: 289 lb (131.1 kg)   Height: 5' 2.5\" (1.588 m)     Body mass index is 52.02 kg/m². Weight Metrics 10/12/2022 9/22/2022 7/14/2022 5/19/2022 4/5/2022 3/17/2022 12/26/2021   Weight 289 lb 292 lb 9.6 oz 286 lb 3.2 oz 287 lb 291 lb 9.6 oz 286 lb 289 lb   BMI (Calculated) 52.1 kg/m2 53.6 kg/m2 51.6 kg/m2 51.8 kg/m2 52.6 kg/m2 52.4 kg/m2 53 kg/m2           General:   Alert, cooperative, no distress, appears stated age. Eyes/Ears/Nose:   Conjunctivae/corneas clear. PERRL. Nasal mucosa is normal.  Normal TMs and external auditory canals. Mouth/Throat:  Lips, mucosa, and tongue normal. Teeth and gums normal.        Lungs:   Without wheezes or rales     Heart:   Regular rate and rhythm, S1, S2 normal, no murmur, click, rub or gallop. Abdomen:    Soft, non-tender. Extremities:  Extremities normal, atraumatic, no cyanosis or edema.      Skin:  Skin color normal. No rashes or lesions     Neurologic:  A&Ox3     DIAGNOSTIC TESTS:                                                                                                                        Pulmonary Function Testing:   Date         FVC         FEV1         FEV1/FVC         FEF 25-75%         TLC         FRC         RV         DLCO         No flowsheet data found. No results found for this or any previous visit. No results found for this or any previous visit. LABS:   Lab Results   Component Value Date/Time    WBC 6.4 07/14/2022 04:44 PM    HGB 14.6 07/14/2022 04:44 PM    HCT 47.1 07/14/2022 04:44 PM     07/14/2022 04:44 PM    TSH 4.190 04/05/2022 03:28 PM     Imaging: I performed an independent interpretation of the patient's images. CXR:   XR ACUTE ABD SERIES CHEST 1 VW 09/15/2021    Narrative  Acute abdominal series    Comparison: March 19, 2021    Indication: Right upper quadrant pain    Findings:      Chest:    There is no focal pulmonary consolidation, pleural effusion or pneumothorax. No  pulmonary edema. Cardiac mediastinal silhouette is within normal limits. Abdomen: There is normal bowel gas pattern. No evidence of free air. Surrounding bones are intact. Impression  1. Normal/nonobstructive bowel gas pattern. 2. No evidence of acute pulmonary disease. CT Chest: No results found for this or any previous visit from the past 3650 days. No results found for this or any previous visit. No results found for this or any previous visit.     REFERENCE INFO:                                                                                                                            Past Medical History:   Diagnosis Date    Anxiety     Arrhythmia     A. Fib., tachycardia    Arthritis     everywhere;  DDD    Asthma     inhalers daily  [facility]    Autoimmune disease ([facility] [address]     Lupus, Fibromyalgia    Breast cancer ([facility] [address] 09/2016    CAD (coronary artery disease) Dr Gerard Orozco, EF 60-65%, no stents, no MI    Cancer [facility])     Chronic pain     generalized from arthritis    Coagulation disorder ([facility] [address]     anemia with pregnancy    Complicated UTI (urinary tract infection) 12/8/2015    Congestive heart failure (CHF) (HCC)     DDD (degenerative disc disease), cervical     Depression     Diabetes ([address]     pre diabetic    Diverticulosis     Diverticulosis     GERD (gastroesophageal reflux disease)     Glaucoma     Hearing reduced     Heart failure ([address]     EF 60-65% per echo 4/13/2018    History of echocardiogram 11/17/14 at [facility]    No significant valvular disease.   EF 50-55%    History of prediabetes     monitored by Primary Physician    Hypertension     Lymphedema     Morbid obesity (HCC)     Nausea & vomiting     Shortness of breath     Sinus problem     Sleep apnea     bi pap and 3 liters oxygen     Stress incontinence in female     Thyroid cyst     cyst removed from thyroid     Allergies   Allergen Reactions    Ciprofloxacin Diarrhea and Other (See Comments)     Per pt, started her c-diff\"    Sulfa Antibiotics Other (See Comments) and Rash     Yeast growth  Pt gets a yeast infection on body      Current Outpatient Medications   Medication Instructions    acetaminophen (TYLENOL) 500 MG tablet Oral, EVERY 6 HOURS PRN    albuterol (PROVENTIL) 2.5 mg, Nebulization, EVERY 4 HOURS PRN, DX:CODE : J45.0  PLEASE BILL TO MEDICARE PART B    albuterol sulfate HFA (PROVENTIL;VENTOLIN;PROAIR) 108 (90 Base) MCG/ACT inhaler 1 puff, Inhalation, EVERY 6 HOURS PRN    atorvastatin (LIPITOR) 40 mg, Oral, DAILY    baclofen (LIORESAL) 10 mg, 2 TIMES DAILY    Biotin 5 MG CAPS TK 1 C PO QD    dicyclomine (BENTYL) 10 mg, 3 TIMES DAILY PRN    DULoxetine (CYMBALTA) 60 MG extended release capsule TAKE 1 CAPSULE BY MOUTH TWICE DAILY    fexofenadine (ALLEGRA) 180 MG tablet TAKE 1 TABLET BY MOUTH EVERY DAY    fluticasone (FLONASE) 50 MCG/ACT nasal spray SHAKE LIQUID AND USE 2 SPRAYS IN EACH NOSTRIL DAILY Fluticasone-Umeclidin-Vilant (TRELEGY ELLIPTA) 200-62.5-25 MCG/INH AEPB 1 puff, Inhalation, DAILY    gabapentin (NEURONTIN) 100 MG capsule No dose, route, or frequency recorded. Glucagon 0.5 mg, SubCUTAneous, PRN    hydroCHLOROthiazide (HYDRODIURIL) 25 mg, Oral, DAILY    ibuprofen (ADVIL;MOTRIN) 600 mg, Oral, EVERY 6 HOURS PRN    ivabradine (CORLANOR) 5 mg, Oral, 2 TIMES DAILY WITH MEALS    Jardiance 10 mg, Oral, DAILY    loratadine (CLARITIN) 10 MG tablet No dose, route, or frequency recorded. losartan (COZAAR) 100 MG tablet TAKE 1 TABLET BY MOUTH DAILY    metoprolol succinate (TOPROL XL) 100 MG extended release tablet TAKE 1 AND 1/2 TABLETS BY MOUTH DAILY    montelukast (SINGULAIR) 10 MG tablet TAKE 1 TABLET BY MOUTH EVERY NIGHT    nitroGLYCERIN (NITROSTAT) 0.4 MG SL tablet Place 1 tab under tongue for chest pain. May repeat in 5 minutes if needed. If no relief, call EMS.     spironolactone (ALDACTONE) 25 mg, Oral, DAILY    spironolactone-hydroCHLOROthiazide (ALDACTAZIDE) 25-25 MG per tablet 1 tablet, DAILY    Trulicity 8.10 mg, SubCUTAneous, WEEKLY

## 2022-10-13 LAB
SARS-COV-2 RNA RESP QL NAA+PROBE: DETECTED
SOURCE: ABNORMAL

## 2022-10-14 ENCOUNTER — TELEPHONE (OUTPATIENT)
Dept: PULMONOLOGY | Age: 67
End: 2022-10-14

## 2022-10-14 DIAGNOSIS — U07.1 COVID-19: ICD-10-CM

## 2022-10-14 NOTE — TELEPHONE ENCOUNTER
Called patient and discussed with her. She is struggling with cough and feels malaise and weakness. She is unfortunately not a candidate for Paxlovid with her Corlanor medication, Interaction X. She could seek monoclonal antibodies through the ED, but I don't have anything to offer her as an outpatient other than supportive care. I would not recommend steroids this early in the course unless she were clearly wheezing for asthma. Steroids could be considered after 10 days, but earlier in the course we believe that this extends the viral phase.     Danette Aguilar MD

## 2022-10-21 RX ORDER — METOPROLOL SUCCINATE 100 MG/1
TABLET, EXTENDED RELEASE ORAL
Qty: 180 TABLET | Refills: 3 | Status: SHIPPED | OUTPATIENT
Start: 2022-10-21

## 2022-10-28 ENCOUNTER — OFFICE VISIT (OUTPATIENT)
Dept: PULMONOLOGY | Age: 67
End: 2022-10-28
Payer: MEDICARE

## 2022-10-28 VITALS
HEIGHT: 63 IN | TEMPERATURE: 97.1 F | OXYGEN SATURATION: 97 % | BODY MASS INDEX: 51.91 KG/M2 | DIASTOLIC BLOOD PRESSURE: 72 MMHG | HEART RATE: 105 BPM | SYSTOLIC BLOOD PRESSURE: 147 MMHG | WEIGHT: 293 LBS

## 2022-10-28 DIAGNOSIS — J18.9 PNEUMONIA OF BOTH LOWER LOBES DUE TO INFECTIOUS ORGANISM: Primary | ICD-10-CM

## 2022-10-28 DIAGNOSIS — J18.9 PNEUMONIA OF BOTH LOWER LOBES DUE TO INFECTIOUS ORGANISM: ICD-10-CM

## 2022-10-28 DIAGNOSIS — R05.1 ACUTE COUGH: ICD-10-CM

## 2022-10-28 PROCEDURE — 3017F COLORECTAL CA SCREEN DOC REV: CPT | Performed by: INTERNAL MEDICINE

## 2022-10-28 PROCEDURE — 1123F ACP DISCUSS/DSCN MKR DOCD: CPT | Performed by: INTERNAL MEDICINE

## 2022-10-28 PROCEDURE — 94640 AIRWAY INHALATION TREATMENT: CPT | Performed by: INTERNAL MEDICINE

## 2022-10-28 PROCEDURE — 1090F PRES/ABSN URINE INCON ASSESS: CPT | Performed by: INTERNAL MEDICINE

## 2022-10-28 PROCEDURE — 3074F SYST BP LT 130 MM HG: CPT | Performed by: INTERNAL MEDICINE

## 2022-10-28 PROCEDURE — 3078F DIAST BP <80 MM HG: CPT | Performed by: INTERNAL MEDICINE

## 2022-10-28 PROCEDURE — G8400 PT W/DXA NO RESULTS DOC: HCPCS | Performed by: INTERNAL MEDICINE

## 2022-10-28 PROCEDURE — 1036F TOBACCO NON-USER: CPT | Performed by: INTERNAL MEDICINE

## 2022-10-28 PROCEDURE — 99214 OFFICE O/P EST MOD 30 MIN: CPT | Performed by: INTERNAL MEDICINE

## 2022-10-28 PROCEDURE — G8484 FLU IMMUNIZE NO ADMIN: HCPCS | Performed by: INTERNAL MEDICINE

## 2022-10-28 PROCEDURE — G8427 DOCREV CUR MEDS BY ELIG CLIN: HCPCS | Performed by: INTERNAL MEDICINE

## 2022-10-28 PROCEDURE — G8417 CALC BMI ABV UP PARAM F/U: HCPCS | Performed by: INTERNAL MEDICINE

## 2022-10-28 RX ORDER — DEXAMETHASONE 6 MG/1
6 TABLET ORAL
Qty: 10 TABLET | Refills: 0 | Status: SHIPPED | OUTPATIENT
Start: 2022-10-28 | End: 2022-11-07

## 2022-10-28 RX ORDER — BENZONATATE 200 MG/1
200 CAPSULE ORAL 3 TIMES DAILY PRN
Qty: 90 CAPSULE | Refills: 0 | Status: SHIPPED | OUTPATIENT
Start: 2022-10-28 | End: 2022-11-27

## 2022-10-28 RX ORDER — DOXYCYCLINE HYCLATE 100 MG
100 TABLET ORAL 2 TIMES DAILY
Qty: 20 TABLET | Refills: 0 | Status: SHIPPED | OUTPATIENT
Start: 2022-10-28 | End: 2022-11-07

## 2022-10-28 NOTE — PATIENT INSTRUCTIONS
1.  Take doxycycline 100 mg twice daily x10 days  2. Submit sputum culture to lab today if possible. 3.  If not improving within 48h of starting doxycycline, I recommend starting doxycycline. 4.  Monitor your oxygen at home with an oximeter if possible. If it is consistently below 90% you need to go to a hospital.  5.  A cough suppressant will be prescribed.

## 2022-10-28 NOTE — PROGRESS NOTES
Patient Name:  Deric Morales                               YOB: 1955  MRN: [id number]                                                Office Visit 10/28/2022    ASSESSMENT AND PLAN:  (Medical Decision Making)    1. Pneumonia of both lower lobes due to infectious organism  Will treat with doxycycline 100 mg twice daily. Additionally cough suppressant provided. Because the patient recently had evidence of COVID-19 pneumonia, advised her to monitor her oxygen saturation closely. Sputum culture will be sent. -     Culture, Respiratory; Future  -     benzonatate (TESSALON) 200 MG capsule; Take 1 capsule by mouth 3 times daily as needed for Cough, Disp-90 capsule, R-0Normal      2. Acute cough  Tessalon provided. Continue bronchodilators in setting of asthma. Not wheezing today. 3.  18 mm left adrenal nodule  Needs follow-up imaging with adrenal protocol CT when the patient has recovered. Donna Cohen MD        Total time for encounter on day of encounter was 31minutes. This time includes chart prep, review of tests/procedures, review of other provider's notes, documentation and counseling patient regarding disease process and medications. ___________________________________________________________________         ______      REASON FOR VISIT:   Chief Complaint   Patient presents with    Asthma    Follow-up    Cough    Shortness of Breath         HISTORY OF PRESENT ILLNESS:    Ms. Jon Moran is a 79 y.o. female with a PMH of atrial fibrillation, ? asthma, diabetes, breast cancer, coronary artery disease, hypertension, DIONE on BiPAP with 3 L who is seen at [facility] today for shortness of breath. Of note, for about 24 hours she has had significant left maxillary sinus congestion, body aches and chills. She has not measured a fever. She has not had worsening cough or wheezing.     At her last visit here she was complaining of sinus congestion and tested positive for COVID-19. She subsequently required an emergency room visit on 10/17/2022. She was not a candidate for Paxil event due to her heart medication. A CT PE was performed which suggested COVID-19 pneumonia. She was discharged on remdesivir. Since then she continues to cough up green sputum which seems to be coming from her chest.  Her cough is bothersome and keeps her from sleeping at night. She denies any fevers. She denies any chills. There is no significant lower extremity edema. On ambulation today she does not desaturate. Tobacco Use      Smoking status: Never      Smokeless tobacco: Never      REVIEW OF SYSTEMS:   10 point review of systems is negative except as reported in HPI. PHYSICAL EXAM:   Vitals:    10/28/22 1002   BP: (!) 147/72   Pulse: (!) 105   Temp: 97.1 °F (36.2 °C)   TempSrc: Skin   SpO2: 97%   Weight: 293 lb (132.9 kg)   Height: 5' 2.5\" (1.588 m)       Body mass index is 52.74 kg/m². Weight Metrics 10/28/2022 10/12/2022 9/22/2022 7/14/2022 5/19/2022 4/5/2022 3/17/2022   Weight 293 lb 289 lb 292 lb 9.6 oz 286 lb 3.2 oz 287 lb 291 lb 9.6 oz 286 lb   BMI (Calculated) 52.8 kg/m2 52.1 kg/m2 53.6 kg/m2 51.6 kg/m2 51.8 kg/m2 52.6 kg/m2 52.4 kg/m2           General:   Alert, cooperative, no distress, appears stated age. Eyes/Ears/Nose:   Conjunctivae/corneas clear. PERRL. Nasal mucosa is normal.  Normal TMs and external auditory canals. Mouth/Throat:  Lips, mucosa, and tongue normal. Teeth and gums normal.        Lungs:   Coughing, scattered rales     Heart:   Regular rate and rhythm, S1, S2 normal, no murmur, click, rub or gallop. Abdomen:    Soft, non-tender. Extremities:  Extremities normal, atraumatic, no cyanosis or edema.      Skin:  Skin color normal. No rashes or lesions     Neurologic:  A&Ox3     DIAGNOSTIC TESTS:                                                                                                                        Pulmonary Function Testing: Not yet done  Date         FVC         FEV1         FEV1/FVC         FEF 25-75%         TLC         FRC         RV         DLCO         No flowsheet data found. No results found for this or any previous visit. No results found for this or any previous visit. LABS:   Lab Results   Component Value Date/Time    WBC 6.4 07/14/2022 04:44 PM    HGB 14.6 07/14/2022 04:44 PM    HCT 47.1 07/14/2022 04:44 PM     07/14/2022 04:44 PM    TSH 4.190 04/05/2022 03:28 PM     Imaging: I performed an independent interpretation of the patient's images. CT PE reviewed    1. No pulmonary embolism identified. 2. Patchy areas of groundglass throughout both lungs and diffuse bronchial wall thickening, could represent Covid pneumonia. Several areas of groundglass in the subpleural areas could represent pulmonary infarct from distal pulmonary embolus below the resolution of CT. 3. Homogenous soft tissue lesion in the anterior mediastinum, possibly a thymoma but is indeterminate. 4. Indeterminate 18 mm left adrenal nodule. Recommend further evaluation with nonemergent adrenal protocol CT. Signed by: 10/17/2022 4:13 AM: Rivka Vargas MD, Nicole Wu  CXR:   XR ACUTE ABD SERIES CHEST 1 VW 09/15/2021    Narrative  Acute abdominal series    Comparison: March 19, 2021    Indication: Right upper quadrant pain    Findings:      Chest:    There is no focal pulmonary consolidation, pleural effusion or pneumothorax. No  pulmonary edema. Cardiac mediastinal silhouette is within normal limits. Abdomen: There is normal bowel gas pattern. No evidence of free air. Surrounding bones are intact. Impression  1. Normal/nonobstructive bowel gas pattern. 2. No evidence of acute pulmonary disease. CT Chest: No results found for this or any previous visit from the past 3650 days. No results found for this or any previous visit. No results found for this or any previous visit.     REFERENCE INFO: Past Medical History:   Diagnosis Date    Anxiety     Arrhythmia     A. Fib., tachycardia    Arthritis     everywhere;  DDD    Asthma     inhalers daily  [facility]    Autoimmune disease ([facility] [address]     Lupus, Fibromyalgia    Breast cancer ([facility] [address] 09/2016    CAD (coronary artery disease)     Dr Jame Nunez, EF 60-65%, no stents, no MI    Cancer [facility])     Chronic pain     generalized from arthritis    Coagulation disorder ([facility] [address]     anemia with pregnancy    Complicated UTI (urinary tract infection) 12/8/2015    Congestive heart failure (CHF) (HCC)     DDD (degenerative disc disease), cervical     Depression     Diabetes ([address]     pre diabetic    Diverticulosis     Diverticulosis     GERD (gastroesophageal reflux disease)     Glaucoma     Hearing reduced     Heart failure ([facility] [address]     EF 60-65% per echo 4/13/2018    History of echocardiogram 11/17/14 at [facility]    No significant valvular disease.   EF 50-55%    History of prediabetes     monitored by Primary Physician    Hypertension     Lymphedema     Morbid obesity (HCC)     Nausea & vomiting     Shortness of breath     Sinus problem     Sleep apnea     bi pap and 3 liters oxygen     Stress incontinence in female     Thyroid cyst     cyst removed from thyroid     Allergies   Allergen Reactions    Ciprofloxacin Diarrhea and Other (See Comments)     Per pt, started her c-diff\"    Sulfa Antibiotics Other (See Comments) and Rash     Yeast growth  Pt gets a yeast infection on body      Current Outpatient Medications   Medication Instructions    acetaminophen (TYLENOL) 500 MG tablet Oral, EVERY 6 HOURS PRN    albuterol (PROVENTIL) 2.5 mg, Nebulization, EVERY 4 HOURS PRN, DX:CODE : J45.0  PLEASE BILL TO MEDICARE PART B    albuterol sulfate HFA (PROVENTIL;VENTOLIN;PROAIR) 108 (90 Base) MCG/ACT inhaler 1 puff, Inhalation, EVERY 6 HOURS PRN    atorvastatin (LIPITOR) 40 mg, Oral, DAILY baclofen (LIORESAL) 10 mg, 2 TIMES DAILY    benzonatate (TESSALON) 200 mg, Oral, 3 TIMES DAILY PRN    Biotin 5 MG CAPS TK 1 C PO QD    dexamethasone (DECADRON) 6 mg, Oral, DAILY WITH BREAKFAST    dicyclomine (BENTYL) 10 mg, Oral, 3 TIMES DAILY PRN    doxycycline hyclate (VIBRA-TABS) 100 mg, Oral, 2 TIMES DAILY    DULoxetine (CYMBALTA) 60 MG extended release capsule TAKE 1 CAPSULE BY MOUTH TWICE DAILY    fexofenadine (ALLEGRA) 180 MG tablet TAKE 1 TABLET BY MOUTH EVERY DAY    fluticasone (FLONASE) 50 MCG/ACT nasal spray SHAKE LIQUID AND USE 2 SPRAYS IN EACH NOSTRIL DAILY    Fluticasone-Umeclidin-Vilant (TRELEGY ELLIPTA) 200-62.5-25 MCG/INH AEPB 1 puff, Inhalation, DAILY    gabapentin (NEURONTIN) 100 MG capsule No dose, route, or frequency recorded. Glucagon 0.5 mg, SubCUTAneous, PRN    hydroCHLOROthiazide (HYDRODIURIL) 25 mg, Oral, DAILY    ibuprofen (ADVIL;MOTRIN) 600 mg, Oral, EVERY 6 HOURS PRN    ivabradine (CORLANOR) 5 mg, Oral, 2 TIMES DAILY WITH MEALS    Jardiance 10 mg, Oral, DAILY    loratadine (CLARITIN) 10 MG tablet No dose, route, or frequency recorded. losartan (COZAAR) 100 MG tablet TAKE 1 TABLET BY MOUTH DAILY    metoprolol succinate (TOPROL XL) 100 MG extended release tablet TAKE 1.5 TABLETS BY MOUTH EVERY DAY. montelukast (SINGULAIR) 10 MG tablet TAKE 1 TABLET BY MOUTH EVERY NIGHT    nitroGLYCERIN (NITROSTAT) 0.4 MG SL tablet Place 1 tab under tongue for chest pain. May repeat in 5 minutes if needed. If no relief, call EMS.     spironolactone (ALDACTONE) 25 mg, Oral, DAILY    spironolactone-hydroCHLOROthiazide (ALDACTAZIDE) 25-25 MG per tablet 1 tablet, DAILY    Trulicity 8.27 mg, SubCUTAneous, WEEKLY

## 2022-10-31 LAB
BACTERIA SPEC CULT: NORMAL
GRAM STN SPEC: NORMAL
SERVICE CMNT-IMP: NORMAL

## 2022-11-03 ENCOUNTER — TELEPHONE (OUTPATIENT)
Dept: PULMONOLOGY | Age: 67
End: 2022-11-03

## 2022-11-03 NOTE — TELEPHONE ENCOUNTER
----- Message from Anna Rodríguez MD sent at 11/1/2022  2:52 PM EDT -----  Please let patient know that sputum culture was normal.  No obvious bacterial infection detected within that sample  Anna Rodríguez MD    Called pt made her aware of her results.  Pt voiced understanding

## 2023-01-23 ENCOUNTER — TELEPHONE (OUTPATIENT)
Dept: SLEEP MEDICINE | Age: 68
End: 2023-01-23

## 2023-01-23 NOTE — TELEPHONE ENCOUNTER
Pt called in reference to her appointment today. She has not had her sleep study done due to illness. She will contact [employer] and schedule it.  Today's sleep study will be canceled and re-scheduled afterwards

## 2023-02-15 ENCOUNTER — HOSPITAL ENCOUNTER (OUTPATIENT)
Dept: SLEEP MEDICINE | Age: 68
Discharge: HOME | End: 2023-02-18
Payer: MEDICARE

## 2023-02-15 PROCEDURE — 95810 POLYSOM 6/> YRS 4/> PARAM: CPT

## 2023-03-04 ENCOUNTER — CLINICAL DOCUMENTATION (OUTPATIENT)
Dept: SLEEP MEDICINE | Age: 68
End: 2023-03-04

## 2023-03-04 NOTE — PROGRESS NOTES
Patient had recent sleep study that showed sleep apnea and hypoxemia. AHI- 82     Lowest SaO2- 81%. Per Interp patient will need a cpap titration. Sleep lab will call the patient to schedule titration or the patient can call the sleep lab at [phone] to schedule.

## 2023-03-20 RX ORDER — MONTELUKAST SODIUM 10 MG/1
TABLET ORAL
Qty: 90 TABLET | OUTPATIENT
Start: 2023-03-20

## 2023-03-27 ENCOUNTER — HOSPITAL ENCOUNTER (OUTPATIENT)
Dept: SLEEP MEDICINE | Age: 68
Discharge: HOME | End: 2023-03-30
Payer: MEDICARE

## 2023-03-27 PROCEDURE — 95811 POLYSOM 6/>YRS CPAP 4/> PARM: CPT

## 2023-06-26 RX ORDER — SPIRONOLACTONE AND HYDROCHLOROTHIAZIDE 25; 25 MG/1; MG/1
1 TABLET ORAL DAILY
Qty: 30 TABLET | Refills: 1 | Status: SHIPPED | OUTPATIENT
Start: 2023-06-26

## 2023-06-26 RX ORDER — SPIRONOLACTONE AND HYDROCHLOROTHIAZIDE 25; 25 MG/1; MG/1
1 TABLET ORAL DAILY
Qty: 90 TABLET | OUTPATIENT
Start: 2023-06-26

## 2023-08-25 RX ORDER — SPIRONOLACTONE AND HYDROCHLOROTHIAZIDE 25; 25 MG/1; MG/1
1 TABLET ORAL DAILY
Qty: 30 TABLET | Refills: 0 | Status: SHIPPED | OUTPATIENT
Start: 2023-08-25

## 2023-08-25 RX ORDER — SPIRONOLACTONE AND HYDROCHLOROTHIAZIDE 25; 25 MG/1; MG/1
1 TABLET ORAL DAILY
Qty: 90 TABLET | OUTPATIENT
Start: 2023-08-25

## 2023-08-25 NOTE — TELEPHONE ENCOUNTER
Requested Prescriptions     Pending Prescriptions Disp Refills    spironolactone-hydroCHLOROthiazide (ALDACTAZIDE) 25-25 MG per tablet [Pharmacy Med Name: SPIRONOLACTONE 25MG W/HCTZ 25MG TAB] 30 tablet 0     Sig: TAKE 1 TABLET BY MOUTH DAILY       Verified rx. Last OV 03/17/22. Called pt and scheduled annual appointment on 09/18/23. Erx to pharm on file.

## 2023-09-25 RX ORDER — LANCETS 33 GAUGE
EACH MISCELLANEOUS
Qty: 100 EACH | Refills: 3 | Status: SHIPPED | OUTPATIENT
Start: 2023-09-25

## 2023-09-25 RX ORDER — BLOOD-GLUCOSE METER
EACH MISCELLANEOUS
Qty: 1 KIT | Refills: 0 | Status: SHIPPED | OUTPATIENT
Start: 2023-09-25

## 2023-09-25 RX ORDER — BLOOD SUGAR DIAGNOSTIC
STRIP MISCELLANEOUS
Qty: 100 EACH | Refills: 3 | Status: SHIPPED | OUTPATIENT
Start: 2023-09-25

## 2023-09-25 NOTE — TELEPHONE ENCOUNTER
Received message from patient's pharmacy stating her insurance no longer covers her True Metrix supplies. They do cover the Accu Chek Guide or OneTouch Verio meter.   Last OV with Patti Juárez was 7/14/22  Next OV (scheduled today) is 9/28/23

## 2023-09-25 NOTE — TELEPHONE ENCOUNTER
Requested Prescriptions     Pending Prescriptions Disp Refills    ivabradine (CORLANOR) 5 MG TABS tablet [Pharmacy Med Name: CORLANOR 5MG TABLETS] 44 tablet 0     Sig: Take 1 tablet by mouth with breakfast and with evening meal for 22 days     Verified rx. Last OV 03/17/2023. Next OV 10/17/23 Erx to pharm on file.

## 2023-09-27 RX ORDER — SPIRONOLACTONE AND HYDROCHLOROTHIAZIDE 25; 25 MG/1; MG/1
1 TABLET ORAL DAILY
Qty: 90 TABLET | OUTPATIENT
Start: 2023-09-27

## 2023-09-27 RX ORDER — IVABRADINE 5 MG/1
5 TABLET, FILM COATED ORAL 2 TIMES DAILY WITH MEALS
Qty: 180 TABLET | OUTPATIENT
Start: 2023-09-27

## 2023-09-27 NOTE — TELEPHONE ENCOUNTER
Pending Prescriptions Disp Refills    spironolactone-hydroCHLOROthiazide (ALDACTAZIDE) 25-25 MG per tablet 20 tablet 0     Sig: Take 1 tablet by mouth daily for 20 days     Verified rx. Last OV 03/17/2022. Pt has follow up on 10/17/2023. Erx to pharm on file.

## 2023-09-28 ENCOUNTER — OFFICE VISIT (OUTPATIENT)
Dept: ENDOCRINOLOGY | Age: 68
End: 2023-09-28
Payer: COMMERCIAL

## 2023-09-28 VITALS
WEIGHT: 281 LBS | BODY MASS INDEX: 50.58 KG/M2 | SYSTOLIC BLOOD PRESSURE: 91 MMHG | HEART RATE: 64 BPM | DIASTOLIC BLOOD PRESSURE: 70 MMHG | OXYGEN SATURATION: 97 %

## 2023-09-28 DIAGNOSIS — Z79.4 TYPE 2 DIABETES MELLITUS WITH HYPERGLYCEMIA, WITH LONG-TERM CURRENT USE OF INSULIN (HCC): ICD-10-CM

## 2023-09-28 DIAGNOSIS — E11.59 CORONARY ARTERY DISEASE DUE TO TYPE 2 DIABETES MELLITUS (HCC): Chronic | ICD-10-CM

## 2023-09-28 DIAGNOSIS — E11.65 TYPE 2 DIABETES MELLITUS WITH HYPERGLYCEMIA, WITH LONG-TERM CURRENT USE OF INSULIN (HCC): Primary | ICD-10-CM

## 2023-09-28 DIAGNOSIS — I15.2 HYPERTENSION ASSOCIATED WITH TYPE 2 DIABETES MELLITUS (HCC): ICD-10-CM

## 2023-09-28 DIAGNOSIS — I25.10 CORONARY ARTERY DISEASE DUE TO TYPE 2 DIABETES MELLITUS (HCC): Chronic | ICD-10-CM

## 2023-09-28 DIAGNOSIS — E11.59 HYPERTENSION ASSOCIATED WITH TYPE 2 DIABETES MELLITUS (HCC): ICD-10-CM

## 2023-09-28 DIAGNOSIS — E11.65 TYPE 2 DIABETES MELLITUS WITH HYPERGLYCEMIA, WITH LONG-TERM CURRENT USE OF INSULIN (HCC): ICD-10-CM

## 2023-09-28 DIAGNOSIS — Z79.4 TYPE 2 DIABETES MELLITUS WITH HYPERGLYCEMIA, WITH LONG-TERM CURRENT USE OF INSULIN (HCC): Primary | ICD-10-CM

## 2023-09-28 LAB
ALBUMIN SERPL-MCNC: 3.6 G/DL (ref 3.2–4.6)
ALBUMIN/GLOB SERPL: 1 (ref 0.4–1.6)
ALP SERPL-CCNC: 91 U/L (ref 50–136)
ALT SERPL-CCNC: 30 U/L (ref 12–65)
ANION GAP SERPL CALC-SCNC: 4 MMOL/L (ref 2–11)
AST SERPL-CCNC: 15 U/L (ref 15–37)
BASOPHILS # BLD: 0 K/UL (ref 0–0.2)
BASOPHILS NFR BLD: 1 % (ref 0–2)
BILIRUB SERPL-MCNC: 0.5 MG/DL (ref 0.2–1.1)
BUN SERPL-MCNC: 16 MG/DL (ref 8–23)
CALCIUM SERPL-MCNC: 9.4 MG/DL (ref 8.3–10.4)
CHLORIDE SERPL-SCNC: 111 MMOL/L (ref 101–110)
CHOLEST SERPL-MCNC: 80 MG/DL
CO2 SERPL-SCNC: 27 MMOL/L (ref 21–32)
CREAT SERPL-MCNC: 0.9 MG/DL (ref 0.6–1)
DIFFERENTIAL METHOD BLD: ABNORMAL
EOSINOPHIL # BLD: 0.2 K/UL (ref 0–0.8)
EOSINOPHIL NFR BLD: 4 % (ref 0.5–7.8)
ERYTHROCYTE [DISTWIDTH] IN BLOOD BY AUTOMATED COUNT: 13.6 % (ref 11.9–14.6)
FERRITIN SERPL-MCNC: 58 NG/ML (ref 8–388)
GLOBULIN SER CALC-MCNC: 3.7 G/DL (ref 2.8–4.5)
GLUCOSE SERPL-MCNC: 80 MG/DL (ref 65–100)
HBA1C MFR BLD: 7 %
HCT VFR BLD AUTO: 43.7 % (ref 35.8–46.3)
HDLC SERPL-MCNC: 35 MG/DL (ref 40–60)
HDLC SERPL: 2.3
HGB BLD-MCNC: 13.5 G/DL (ref 11.7–15.4)
IMM GRANULOCYTES # BLD AUTO: 0 K/UL (ref 0–0.5)
IMM GRANULOCYTES NFR BLD AUTO: 0 % (ref 0–5)
IRON SERPL-MCNC: 60 UG/DL (ref 35–150)
LDLC SERPL CALC-MCNC: 32.2 MG/DL
LYMPHOCYTES # BLD: 1.8 K/UL (ref 0.5–4.6)
LYMPHOCYTES NFR BLD: 33 % (ref 13–44)
MCH RBC QN AUTO: 27.8 PG (ref 26.1–32.9)
MCHC RBC AUTO-ENTMCNC: 30.9 G/DL (ref 31.4–35)
MCV RBC AUTO: 89.9 FL (ref 82–102)
MONOCYTES # BLD: 0.5 K/UL (ref 0.1–1.3)
MONOCYTES NFR BLD: 9 % (ref 4–12)
NEUTS SEG # BLD: 3 K/UL (ref 1.7–8.2)
NEUTS SEG NFR BLD: 53 % (ref 43–78)
NRBC # BLD: 0 K/UL (ref 0–0.2)
PLATELET # BLD AUTO: 361 K/UL (ref 150–450)
PMV BLD AUTO: 9.3 FL (ref 9.4–12.3)
POTASSIUM SERPL-SCNC: 4.5 MMOL/L (ref 3.5–5.1)
PROT SERPL-MCNC: 7.3 G/DL (ref 6.3–8.2)
RBC # BLD AUTO: 4.86 M/UL (ref 4.05–5.2)
SODIUM SERPL-SCNC: 142 MMOL/L (ref 133–143)
TIBC SERPL-MCNC: 359 UG/DL (ref 250–450)
TRIGL SERPL-MCNC: 64 MG/DL (ref 35–150)
TSH W FREE THYROID IF ABNORMAL: 1.62 UIU/ML (ref 0.36–3.74)
VIT B12 SERPL-MCNC: 1612 PG/ML (ref 193–986)
VLDLC SERPL CALC-MCNC: 12.8 MG/DL (ref 6–23)
WBC # BLD AUTO: 5.6 K/UL (ref 4.3–11.1)

## 2023-09-28 PROCEDURE — 3017F COLORECTAL CA SCREEN DOC REV: CPT | Performed by: NURSE PRACTITIONER

## 2023-09-28 PROCEDURE — 1036F TOBACCO NON-USER: CPT | Performed by: NURSE PRACTITIONER

## 2023-09-28 PROCEDURE — 1090F PRES/ABSN URINE INCON ASSESS: CPT | Performed by: NURSE PRACTITIONER

## 2023-09-28 PROCEDURE — 2022F DILAT RTA XM EVC RTNOPTHY: CPT | Performed by: NURSE PRACTITIONER

## 2023-09-28 PROCEDURE — 3078F DIAST BP <80 MM HG: CPT | Performed by: NURSE PRACTITIONER

## 2023-09-28 PROCEDURE — G8427 DOCREV CUR MEDS BY ELIG CLIN: HCPCS | Performed by: NURSE PRACTITIONER

## 2023-09-28 PROCEDURE — G8417 CALC BMI ABV UP PARAM F/U: HCPCS | Performed by: NURSE PRACTITIONER

## 2023-09-28 PROCEDURE — 83036 HEMOGLOBIN GLYCOSYLATED A1C: CPT | Performed by: NURSE PRACTITIONER

## 2023-09-28 PROCEDURE — 3046F HEMOGLOBIN A1C LEVEL >9.0%: CPT | Performed by: NURSE PRACTITIONER

## 2023-09-28 PROCEDURE — 1123F ACP DISCUSS/DSCN MKR DOCD: CPT | Performed by: NURSE PRACTITIONER

## 2023-09-28 PROCEDURE — 99215 OFFICE O/P EST HI 40 MIN: CPT | Performed by: NURSE PRACTITIONER

## 2023-09-28 PROCEDURE — G8399 PT W/DXA RESULTS DOCUMENT: HCPCS | Performed by: NURSE PRACTITIONER

## 2023-09-28 PROCEDURE — 3074F SYST BP LT 130 MM HG: CPT | Performed by: NURSE PRACTITIONER

## 2023-09-28 RX ORDER — DULAGLUTIDE 1.5 MG/.5ML
INJECTION, SOLUTION SUBCUTANEOUS
COMMUNITY
Start: 2023-09-24

## 2023-09-28 ASSESSMENT — ENCOUNTER SYMPTOMS
DIARRHEA: 0
CONSTIPATION: 0

## 2023-09-28 NOTE — PROGRESS NOTES
literacy    Date of diagnosis: 2005 - prediabetic    Patient has no history of previous DKA episodes. Denies HX pancreatitis, gastroparesis, foot ulcer    Current Regimen: Jardiance 10 mg daily (on and off, about 3 days a week); Trulicity 1.5 mg weekly on Friday    Failed therapies: metformin - clinically ineffective; Jardiance - polyuria    Home blood glucose monitoring frequency:     By review of patient meter log  1 times per day, hasn't checked for over a month. 110-160    Diet: 3 meals  Breakfast: meat and greens, and half cup rice  Lunch: sometimes skips - ham and cheese sandwich  Dinner: meat vegetable starch 1/2 cup mashed potatoes or rice  Bedtime Snack: banana or boiled egg  Drinks: coffee or tea with equal, diet soda, water, koolaid packs with water. Exercise:  limited by mobility     Pregnancy: post-menopausal    Diabetes education: The patient has not received formal diabetes education. Diabetic complications:     Retinopathy: Last eye exam was 10/2022 and demonstrated no diabetic retinopathy. [facility] care specialist is [facility]. Needs to make an appointment. Albuminuria/nephropathy: Stage 2 CKD with A1 albuminuria     Neuropathy: tingling in hands and feet    Hypoglycemia: + awareness, jittery and hungry, not terribly frequent     Hyperglycemia: - awareness    Pertinent Co-morbid Diagnoses:   Cardiac: CAD, CHF - sees Dr. Herminia Bonilla  Current therapy: atorvastatin - 40 MG with good compliance     The ASCVD Risk score (Danita MASON, et al., 2019) failed to calculate for the following reasons: The valid total cholesterol range is 130 to 320 mg/dL    Renal:   Under care of nephro? no   on Ace-I/ARB losartan - 100 MG   Computed KFRE 2-Year unavailable. Necessary lab results were not found in the last year.     Liver:   History of hepatitis: denies; excessive alcohol consumption: denies      Pertinent Lab History:    Hemoglobin A1c:   04/10/2014: 5.7%   11/17/2014: 6.5%   06/09/2020:

## 2023-09-29 RX ORDER — SPIRONOLACTONE AND HYDROCHLOROTHIAZIDE 25; 25 MG/1; MG/1
1 TABLET ORAL DAILY
Qty: 20 TABLET | Refills: 0 | Status: SHIPPED | OUTPATIENT
Start: 2023-09-29 | End: 2023-10-19

## 2023-09-30 LAB — 1,25(OH)2D3 SERPL-MCNC: 61.6 PG/ML (ref 24.8–81.5)

## 2023-10-05 ENCOUNTER — TELEPHONE (OUTPATIENT)
Dept: ENDOCRINOLOGY | Age: 68
End: 2023-10-05

## 2023-10-05 DIAGNOSIS — Z85.3 HX OF BREAST CANCER: ICD-10-CM

## 2023-10-05 DIAGNOSIS — R74.8 ELEVATED VITAMIN B12 LEVEL: Primary | ICD-10-CM

## 2023-10-05 NOTE — TELEPHONE ENCOUNTER
She just started the is probably not the reason that her B12 level is so high. I like to refer her over to see a blood doctor. Please ask her to expect a call from their office.

## 2023-10-05 NOTE — TELEPHONE ENCOUNTER
Spoke with patient regarding labs,     Tanja Stubbs: she said she just startd taking a multi vitamin with vit B in it

## 2023-10-17 ENCOUNTER — OFFICE VISIT (OUTPATIENT)
Age: 68
End: 2023-10-17
Payer: COMMERCIAL

## 2023-10-17 VITALS
SYSTOLIC BLOOD PRESSURE: 138 MMHG | HEIGHT: 63 IN | DIASTOLIC BLOOD PRESSURE: 72 MMHG | HEART RATE: 74 BPM | BODY MASS INDEX: 51.91 KG/M2 | WEIGHT: 293 LBS

## 2023-10-17 DIAGNOSIS — E27.8 ADRENAL NODULE (HCC): ICD-10-CM

## 2023-10-17 DIAGNOSIS — R00.0 TACHYCARDIA: ICD-10-CM

## 2023-10-17 DIAGNOSIS — E66.01 MORBID (SEVERE) OBESITY DUE TO EXCESS CALORIES (HCC): Primary | ICD-10-CM

## 2023-10-17 DIAGNOSIS — I10 ESSENTIAL (PRIMARY) HYPERTENSION: ICD-10-CM

## 2023-10-17 PROCEDURE — G8484 FLU IMMUNIZE NO ADMIN: HCPCS | Performed by: INTERNAL MEDICINE

## 2023-10-17 PROCEDURE — 93000 ELECTROCARDIOGRAM COMPLETE: CPT | Performed by: INTERNAL MEDICINE

## 2023-10-17 PROCEDURE — 3075F SYST BP GE 130 - 139MM HG: CPT | Performed by: INTERNAL MEDICINE

## 2023-10-17 PROCEDURE — G8428 CUR MEDS NOT DOCUMENT: HCPCS | Performed by: INTERNAL MEDICINE

## 2023-10-17 PROCEDURE — 3017F COLORECTAL CA SCREEN DOC REV: CPT | Performed by: INTERNAL MEDICINE

## 2023-10-17 PROCEDURE — 1036F TOBACCO NON-USER: CPT | Performed by: INTERNAL MEDICINE

## 2023-10-17 PROCEDURE — 99214 OFFICE O/P EST MOD 30 MIN: CPT | Performed by: INTERNAL MEDICINE

## 2023-10-17 PROCEDURE — 3078F DIAST BP <80 MM HG: CPT | Performed by: INTERNAL MEDICINE

## 2023-10-17 PROCEDURE — 1123F ACP DISCUSS/DSCN MKR DOCD: CPT | Performed by: INTERNAL MEDICINE

## 2023-10-17 PROCEDURE — G8399 PT W/DXA RESULTS DOCUMENT: HCPCS | Performed by: INTERNAL MEDICINE

## 2023-10-17 PROCEDURE — 1090F PRES/ABSN URINE INCON ASSESS: CPT | Performed by: INTERNAL MEDICINE

## 2023-10-17 PROCEDURE — G8417 CALC BMI ABV UP PARAM F/U: HCPCS | Performed by: INTERNAL MEDICINE

## 2023-10-17 RX ORDER — METOPROLOL SUCCINATE 100 MG/1
TABLET, EXTENDED RELEASE ORAL
Qty: 180 TABLET | Refills: 3 | Status: SHIPPED | OUTPATIENT
Start: 2023-10-17

## 2023-10-17 RX ORDER — HYDROCHLOROTHIAZIDE 25 MG/1
25 TABLET ORAL DAILY
Qty: 90 TABLET | Refills: 3 | Status: SHIPPED | OUTPATIENT
Start: 2023-10-17

## 2023-10-17 RX ORDER — SPIRONOLACTONE 25 MG/1
25 TABLET ORAL DAILY
Qty: 90 TABLET | Refills: 3 | Status: SHIPPED | OUTPATIENT
Start: 2023-10-17

## 2023-10-17 NOTE — PROGRESS NOTES
[address] [facility], [address] [facility], [facility], [address]  PHONE: [phone]    SUBJECTIVE:   Bacilio Vora is a 76 y.o. female 1955   seen for a follow up visit regarding the following:     Chief Complaint   Patient presents with    Hypertension    Annual Exam    Coronary Artery Disease         History of present illness: 76 y.o. female presented for follow-up 10/17/23 The patient presented for followup 03/17/22  cardiac MRI was discussed at last appointment. Patient has had issues with sedation in the past.       Interval history:   Patient was previously seen by Dr. Froilan Eugene with history of echocardiogram performed in 04/2018. Normal left ventricular systolic function, diastolic parameters described as normal.  The patient is complaining of worsening shortness of breath,  as well as poor quality of life. Cardiac History:     Echocardiogram 2020 with left ventricular hypertrophy 1.4 cm. EKG - sinus rhythm, low voltage in precordial leads, abnormal.      12/2020 echocardiogram with normal left ventricular systolic function mitral annular calcification aortic sclerosis    2021 pyrophosphate scan negative for infiltrative disease Antoinette    3/23/2021 Normal spirometry. 3/17/2022 sinus rhythm, normal rate, normal [place] intervals, ST wave normal, normal axis  10/17/2023 sinus rhythm low voltage      Assessment and Plan:       Shortness of breath, controlled. Patient with profound LVH on echocardiogram.      Additional evaluation for infiltrative cardiomyopathy negative    Likely multifactorial PFTs normal spirometry 2021     stress perfusion study with no ischemia   sinus tachycardia    Controlled . Rapid heart rates. Cardiac monitor revealing sinus tachycardia with no arrhythmia   Hypertension, controlled. Cough on Lisinopril changed to ARB. Key CAD CHF Meds            ivabradine (CORLANOR) 5 MG TABS tablet (Taking)    Sig - Route:  Take 1

## 2023-10-27 DIAGNOSIS — R74.8 ELEVATED VITAMIN B12 LEVEL: Primary | ICD-10-CM

## 2023-10-31 ENCOUNTER — TELEPHONE (OUTPATIENT)
Age: 68
End: 2023-10-31

## 2023-10-31 ENCOUNTER — HOSPITAL ENCOUNTER (OUTPATIENT)
Dept: LAB | Age: 68
Discharge: HOME | End: 2023-11-03
Payer: COMMERCIAL

## 2023-10-31 ENCOUNTER — OFFICE VISIT (OUTPATIENT)
Dept: ONCOLOGY | Age: 68
End: 2023-10-31
Payer: COMMERCIAL

## 2023-10-31 VITALS
RESPIRATION RATE: 18 BRPM | WEIGHT: 293 LBS | HEART RATE: 72 BPM | SYSTOLIC BLOOD PRESSURE: 148 MMHG | OXYGEN SATURATION: 95 % | TEMPERATURE: 98.3 F | DIASTOLIC BLOOD PRESSURE: 70 MMHG | BODY MASS INDEX: 53.22 KG/M2

## 2023-10-31 DIAGNOSIS — R74.8 ELEVATED VITAMIN B12 LEVEL: Primary | ICD-10-CM

## 2023-10-31 DIAGNOSIS — R74.8 ELEVATED VITAMIN B12 LEVEL: ICD-10-CM

## 2023-10-31 DIAGNOSIS — Z85.3 HISTORY OF BREAST CANCER: ICD-10-CM

## 2023-10-31 LAB
ALBUMIN SERPL-MCNC: 3.8 G/DL (ref 3.2–4.6)
ALBUMIN/GLOB SERPL: 1 (ref 0.4–1.6)
ALP SERPL-CCNC: 94 U/L (ref 50–136)
ALT SERPL-CCNC: 29 U/L (ref 12–65)
ANION GAP SERPL CALC-SCNC: 3 MMOL/L (ref 2–11)
AST SERPL-CCNC: 13 U/L (ref 15–37)
BASOPHILS # BLD: 0 K/UL (ref 0–0.2)
BASOPHILS NFR BLD: 1 % (ref 0–2)
BILIRUB SERPL-MCNC: 0.5 MG/DL (ref 0.2–1.1)
BUN SERPL-MCNC: 18 MG/DL (ref 8–23)
CALCIUM SERPL-MCNC: 9.3 MG/DL (ref 8.3–10.4)
CHLORIDE SERPL-SCNC: 108 MMOL/L (ref 101–110)
CO2 SERPL-SCNC: 28 MMOL/L (ref 21–32)
CREAT SERPL-MCNC: 0.9 MG/DL (ref 0.6–1)
DIFFERENTIAL METHOD BLD: ABNORMAL
EOSINOPHIL # BLD: 0.2 K/UL (ref 0–0.8)
EOSINOPHIL NFR BLD: 3 % (ref 0.5–7.8)
ERYTHROCYTE [DISTWIDTH] IN BLOOD BY AUTOMATED COUNT: 13.2 % (ref 11.9–14.6)
GLOBULIN SER CALC-MCNC: 3.7 G/DL (ref 2.8–4.5)
GLUCOSE SERPL-MCNC: 101 MG/DL (ref 65–100)
HCT VFR BLD AUTO: 42.7 % (ref 35.8–46.3)
HGB BLD-MCNC: 13.3 G/DL (ref 11.7–15.4)
HGB RETIC QN AUTO: 33 PG (ref 29–35)
IMM GRANULOCYTES # BLD AUTO: 0 K/UL (ref 0–0.5)
IMM GRANULOCYTES NFR BLD AUTO: 0 % (ref 0–5)
IMM RETICS NFR: 8.5 % (ref 3–15.9)
LYMPHOCYTES # BLD: 2.3 K/UL (ref 0.5–4.6)
LYMPHOCYTES NFR BLD: 40 % (ref 13–44)
MCH RBC QN AUTO: 27.5 PG (ref 26.1–32.9)
MCHC RBC AUTO-ENTMCNC: 31.1 G/DL (ref 31.4–35)
MCV RBC AUTO: 88.2 FL (ref 82–102)
MONOCYTES # BLD: 0.5 K/UL (ref 0.1–1.3)
MONOCYTES NFR BLD: 8 % (ref 4–12)
NEUTS SEG # BLD: 2.8 K/UL (ref 1.7–8.2)
NEUTS SEG NFR BLD: 48 % (ref 43–78)
NRBC # BLD: 0 K/UL (ref 0–0.2)
PLATELET # BLD AUTO: 327 K/UL (ref 150–450)
PMV BLD AUTO: 9.1 FL (ref 9.4–12.3)
POTASSIUM SERPL-SCNC: 4.5 MMOL/L (ref 3.5–5.1)
PROT SERPL-MCNC: 7.5 G/DL (ref 6.3–8.2)
RBC # BLD AUTO: 4.84 M/UL (ref 4.05–5.2)
RETICS # AUTO: 0.08 M/UL (ref 0.03–0.1)
RETICS/RBC NFR AUTO: 1.6 % (ref 0.3–2)
SODIUM SERPL-SCNC: 139 MMOL/L (ref 133–143)
VIT B12 SERPL-MCNC: 998 PG/ML (ref 193–986)
WBC # BLD AUTO: 5.8 K/UL (ref 4.3–11.1)

## 2023-10-31 PROCEDURE — G8427 DOCREV CUR MEDS BY ELIG CLIN: HCPCS | Performed by: INTERNAL MEDICINE

## 2023-10-31 PROCEDURE — 1090F PRES/ABSN URINE INCON ASSESS: CPT | Performed by: INTERNAL MEDICINE

## 2023-10-31 PROCEDURE — 85046 RETICYTE/HGB CONCENTRATE: CPT

## 2023-10-31 PROCEDURE — 82607 VITAMIN B-12: CPT

## 2023-10-31 PROCEDURE — 3078F DIAST BP <80 MM HG: CPT | Performed by: INTERNAL MEDICINE

## 2023-10-31 PROCEDURE — G8484 FLU IMMUNIZE NO ADMIN: HCPCS | Performed by: INTERNAL MEDICINE

## 2023-10-31 PROCEDURE — 80053 COMPREHEN METABOLIC PANEL: CPT

## 2023-10-31 PROCEDURE — 1123F ACP DISCUSS/DSCN MKR DOCD: CPT | Performed by: INTERNAL MEDICINE

## 2023-10-31 PROCEDURE — G8399 PT W/DXA RESULTS DOCUMENT: HCPCS | Performed by: INTERNAL MEDICINE

## 2023-10-31 PROCEDURE — 85025 COMPLETE CBC W/AUTO DIFF WBC: CPT

## 2023-10-31 PROCEDURE — 1036F TOBACCO NON-USER: CPT | Performed by: INTERNAL MEDICINE

## 2023-10-31 PROCEDURE — 99204 OFFICE O/P NEW MOD 45 MIN: CPT | Performed by: INTERNAL MEDICINE

## 2023-10-31 PROCEDURE — 3077F SYST BP >= 140 MM HG: CPT | Performed by: INTERNAL MEDICINE

## 2023-10-31 PROCEDURE — 3017F COLORECTAL CA SCREEN DOC REV: CPT | Performed by: INTERNAL MEDICINE

## 2023-10-31 PROCEDURE — G8417 CALC BMI ABV UP PARAM F/U: HCPCS | Performed by: INTERNAL MEDICINE

## 2023-10-31 PROCEDURE — 36415 COLL VENOUS BLD VENIPUNCTURE: CPT

## 2023-10-31 NOTE — PROGRESS NOTES
a breast reconstruction by General Surgeon, Francisco Javier Herring MD (CE)     7/12/18 - Patient admitted for cellulitis of left breast (CE)     Current Illness:     7/11/23 - UNILATERAL RIGHT DIGITAL SCREENING MAMMOGRAM revealing: (CE)  IMPRESSION: INCOMPLETE NEEDS ADDITIONAL IMAGING EVALUATION   The asymmetry in the right breast is indeterminate. 3D imaging, spot CC Tomosynthesis, and mediolateral views as well as a possible ultrasound are recommended. 8/9/23 - UNILATERAL RIGHT DIGITAL DIAGNOSTIC MAMMOGRAM 3D/2D AND TARGETED RIGHT ULTRASOUND revealing: (CE)  IMPRESSION: BENIGN, ULTRASOUND BENIGN   The asymmetry in the right breast posterior depth laterally on the CC screening mammogram July 11, 2023 correlates with a benign 4 mm cyst.  There is no mammographic or sonographic evidence of malignancy. Return to annual mammogram screening schedule is recommended. 9/28/23 - Met with Endocrinologist, CORW Parsons-CNP (EP[facility])  Patient's PCP increased her Trulicity from 9.35 mg to 1.5 mg last week. She reports significant side effects with Jardiance and is taking it inconsistently, about 3 times a week. Will discontinue Jardiance due to side effects. Noted: Would like to see how she does on the 1.5 mg dose of Trulicity before adding any other medications to replace Jardiance. Patient is on a significant number of medications and I worry about polypharmacy. c/o low energy   Due for routine lab work. Orders placed today. Referred to Podiatry. Patient is overdue for annual diabetic eye exam.   Advised to schedule an appointment with opthalmology. Continue antihypertensives at current dose. However, it was pertinent to consider whether or not her low blood pressures may be contributing to her low energy level. She has an upcoming appointment with cardiology and I will ultimately defer to them given her cardiac history.   RTC in 3 months  9/28/23 - Abnormal labs (9/28/23): (EPIC)  Vitamin
No

## 2023-10-31 NOTE — PATIENT INSTRUCTIONS
Glucose 10/31/2023 101 (H)  65 - 100 mg/dL Final    BUN 10/31/2023 18  8 - 23 MG/DL Final    Creatinine 10/31/2023 0.90  0.6 - 1.0 MG/DL Final    EstBuddy Filt Rate 10/31/2023 >60  >60 ml/min/1.73m2 Final    Comment:    Pediatric calculator link: [place].at. org/professionals/kdoqi/gfr_calculatorped     These results are not intended for use in patients <25years of age. eGFR results are calculated without a race factor using  the 2021 CKD-EPI equation. Careful clinical correlation is recommended, particularly when comparing to results calculated using previous equations. The CKD-EPI equation is less accurate in patients with extremes of muscle mass, extra-renal metabolism of creatinine, excessive creatine ingestion, or following therapy that affects renal tubular secretion. Calcium 10/31/2023 9.3  8.3 - 10.4 MG/DL Final    Total Bilirubin 10/31/2023 0.5  0.2 - 1.1 MG/DL Final    ALT 10/31/2023 29  12 - 65 U/L Final    AST 10/31/2023 13 (L)  15 - 37 U/L Final    Alk Phosphatase 10/31/2023 94  50 - 136 U/L Final    Total Protein 10/31/2023 7.5  6.3 - 8.2 g/dL Final    Albumin 10/31/2023 3.8  3.2 - 4.6 g/dL Final    Globulin 10/31/2023 3.7  2.8 - 4.5 g/dL Final    Albumin/Globulin Ratio 10/31/2023 1.0  0.4 - 1.6   Final    Reticulocyte Count,Automated 10/31/2023 1.6  0.3 - 2.0 % Final    Absolute Retic # 10/31/2023 [id number]  0.026 - 0.095 M/ul Final    Immature Retic Fraction 10/31/2023 8.5  3.0 - 15.9 % Final    Retic Hemoglobin conc.  10/31/2023 33  29 - 35 pg Final    Vitamin B-12 10/31/2023 998 (H)  193 - 986 pg/mL Final         Treatment Summary has been discussed and given to patient: n/a        -------------------------------------------------------------------------------------------------------------------  Please call our office at [phone] if you have any  of the following symptoms:   Fever of 100.5 or greater  Chills  Shortness of breath  Swelling or pain in one leg    After office

## 2023-10-31 NOTE — PROGRESS NOTES
NEW PATIENT INTAKE    Referral Diagnosis: Elevated vitamin B12 level; Hx of breast cancer    Referring Provider: CROW Mishra CNP    Primary Care Provider: CROW Sam NP    Family History of Cancer/ Hematology Disorders: Family history significant for mother with lung cancer, father with prostate cancer and brother with pituitary cancer. Presenting Symptoms: Elevated vitamin B12 level; Hx of breast cancer    Chronological History of Pertinent Events:     61-year-old black female    Followed by Cardiology for CAD, arrhythmia, HF, HTN    PMH:  9/2016 - Met with PCP, Nedra Pritchett MD (CE)  Patient found to have a lump in the upper portion of her breast which prompted her work up. Denies previous breast cancer. Denies family history of breast cancer. 9/7/16 - (CE)      9/13/16 - Patient underwent a biopsy with pathology revealing: (CE)  ER+Positive (99%), NC+Positive (91%), HER2/ERAN-Negative (1+);  DIAGNOSIS:  A: \"LEFT BREAST, 11:30 POSITION, 15 CM FROM NIPPLE, CORE BIOPSY\":   INFILTRATING DUCT CARCINOMA WITH LOBULAR FEATURES, LOW GRADE (WELL-DIFFERENTIATED). ASSOCIATED DUCTAL CARCINOMA IN SITU, INTERMEDIATE GRADE (CRIBRIFORM TYPE). DEFINITE LYMPHOVASCULAR INVASION IS NOT IDENTIFIED. B: \"LEFT BREAST, 12 O'CLOCK POSITION, 11 CM FROM NIPPLE, CORE BIOPSY\":   DUCTAL CARCINOMA IN SITU, INTERMEDIATE GRADE (CRIBRIFORM TYPE). C: \"LEFT BREAST, 12 O'CLOCK POSITION, 7 CM FROM NIPPLE, CORE BIOPSY\":    DUCTAL CARCINOMA IN SITU, INTERMEDIATE GRADE (CRIBRIFORM TYPE). 10/12/16 - Met with General Surgeon, Kala Lovelace DO (CE)  Discussed pathology results and recommended SLN biopsy with frozen section and possible axillary dissection followed by Left simple mastectomy. Patient is interested in reconstruction and will consult with Dr. Nelda Berger for planning.      10/19/16 - Patient met with Oncologist, Roselyn Saez MD (CE)  Patient presents today in referral from Dr. Yumiko Wagner for

## 2023-11-01 NOTE — TELEPHONE ENCOUNTER
I spoke with Luis A Astorga, notified her to disregard the \"22 days\" that was written on the directions.

## 2023-11-14 ENCOUNTER — HOSPITAL ENCOUNTER (OUTPATIENT)
Dept: CT IMAGING | Age: 68
Discharge: HOME | End: 2023-11-17
Attending: INTERNAL MEDICINE
Payer: MEDICARE

## 2023-11-14 DIAGNOSIS — E27.8 ADRENAL NODULE (HCC): ICD-10-CM

## 2023-11-14 DIAGNOSIS — R93.0 ABNORMAL CT OF THE HEAD: ICD-10-CM

## 2023-11-14 DIAGNOSIS — R93.0 ABNORMAL FINDINGS ON DIAGNOSTIC IMAGING OF SKULL OR FACIAL BONES: ICD-10-CM

## 2023-11-14 PROCEDURE — 74170 CT ABD WO CNTRST FLWD CNTRST: CPT

## 2023-11-14 PROCEDURE — 6360000004 HC RX CONTRAST MEDICATION: Performed by: INTERNAL MEDICINE

## 2023-11-14 RX ADMIN — IOPAMIDOL 100 ML: 755 INJECTION, SOLUTION INTRAVENOUS at 14:58

## 2024-01-15 ENCOUNTER — CLINICAL DOCUMENTATION (OUTPATIENT)
Age: 69
End: 2024-01-15

## 2024-01-25 ENCOUNTER — TELEPHONE (OUTPATIENT)
Age: 69
End: 2024-01-25

## 2024-01-25 RX ORDER — IVABRADINE 7.5 MG/1
TABLET, FILM COATED ORAL
Qty: 180 TABLET | Refills: 3 | Status: SHIPPED | OUTPATIENT
Start: 2024-01-25

## 2024-01-25 NOTE — TELEPHONE ENCOUNTER
MEDICATION REFILL REQUEST      Name of Medication:  Corlanor  Dose:  7.5 mg  Frequency:  BID  Quantity:  ?  Days' supply:  ?      Pharmacy Name/Location:  [facility]-[phone]

## 2024-03-25 ENCOUNTER — TELEPHONE (OUTPATIENT)
Age: 69
End: 2024-03-25

## 2024-03-25 NOTE — TELEPHONE ENCOUNTER
MEDICATION REFILL REQUEST      Name of Medication:  Corlanor  Dose:  7.5 mg  Frequency:    Quantity:    Days' supply:        Pharmacy Name/Location:  Needs a PA / Patient requesting samples ,Please call patient

## 2024-03-26 NOTE — TELEPHONE ENCOUNTER
Pt has had a change from [employer] to [facility]. PA/Appeal was filed and approved through the appeals process with [facility] at the beginning of the year, but when a PA was submitted through [employer], the determination was N/A.     Called patient and relayed information above. Let pt know that she can try to receive a short term supply from the pharmacy until her medication is covered. Pt voiced understanding.

## 2024-03-27 NOTE — TELEPHONE ENCOUNTER
I contacted [employer] on 3/26/24. I was told there was a PA denial for Corlanor on 3/22/24 and the next step is an Appeal. I asked them to fax the denial to me.    The denial was received. It states they denied Corlanor because the EF was not 35% or lower.     I filed an Appeal with [employer] on 3/26/24. Outcome pending.

## 2024-04-09 ENCOUNTER — OFFICE VISIT (OUTPATIENT)
Dept: ORTHOPEDIC SURGERY | Age: 69
End: 2024-04-09
Payer: MEDICARE

## 2024-04-09 DIAGNOSIS — M43.16 SPONDYLOLISTHESIS OF LUMBAR REGION: ICD-10-CM

## 2024-04-09 DIAGNOSIS — E66.01 CLASS 3 SEVERE OBESITY WITH BODY MASS INDEX (BMI) OF 50.0 TO 59.9 IN ADULT, UNSPECIFIED OBESITY TYPE, UNSPECIFIED WHETHER SERIOUS COMORBIDITY PRESENT (HCC): ICD-10-CM

## 2024-04-09 DIAGNOSIS — M54.50 LOW BACK PAIN, UNSPECIFIED BACK PAIN LATERALITY, UNSPECIFIED CHRONICITY, UNSPECIFIED WHETHER SCIATICA PRESENT: Primary | ICD-10-CM

## 2024-04-09 DIAGNOSIS — M51.36 DDD (DEGENERATIVE DISC DISEASE), LUMBAR: ICD-10-CM

## 2024-04-09 PROCEDURE — G8417 CALC BMI ABV UP PARAM F/U: HCPCS | Performed by: PHYSICIAN ASSISTANT

## 2024-04-09 PROCEDURE — 1090F PRES/ABSN URINE INCON ASSESS: CPT | Performed by: PHYSICIAN ASSISTANT

## 2024-04-09 PROCEDURE — 1036F TOBACCO NON-USER: CPT | Performed by: PHYSICIAN ASSISTANT

## 2024-04-09 PROCEDURE — 1123F ACP DISCUSS/DSCN MKR DOCD: CPT | Performed by: PHYSICIAN ASSISTANT

## 2024-04-09 PROCEDURE — 99203 OFFICE O/P NEW LOW 30 MIN: CPT | Performed by: PHYSICIAN ASSISTANT

## 2024-04-09 PROCEDURE — G8427 DOCREV CUR MEDS BY ELIG CLIN: HCPCS | Performed by: PHYSICIAN ASSISTANT

## 2024-04-09 PROCEDURE — G8399 PT W/DXA RESULTS DOCUMENT: HCPCS | Performed by: PHYSICIAN ASSISTANT

## 2024-04-09 PROCEDURE — 3017F COLORECTAL CA SCREEN DOC REV: CPT | Performed by: PHYSICIAN ASSISTANT

## 2024-04-09 RX ORDER — CICLOPIROX 80 MG/ML
SOLUTION TOPICAL
COMMUNITY
Start: 2024-04-03

## 2024-04-09 RX ORDER — FAMOTIDINE 40 MG/1
40 TABLET, FILM COATED ORAL
COMMUNITY
Start: 2024-02-06

## 2024-04-09 RX ORDER — GABAPENTIN 300 MG/1
300 CAPSULE ORAL 2 TIMES DAILY
COMMUNITY
Start: 2024-02-06

## 2024-04-09 RX ORDER — FLUTICASONE PROPIONATE AND SALMETEROL 250; 50 UG/1; UG/1
2 POWDER RESPIRATORY (INHALATION) 2 TIMES DAILY
COMMUNITY
Start: 2024-04-05

## 2024-04-09 RX ORDER — DOXYCYCLINE HYCLATE 100 MG/1
CAPSULE ORAL
COMMUNITY
Start: 2024-01-13

## 2024-04-09 NOTE — PROGRESS NOTES
Name: Cassandra Arguello  YOB: 1955  Gender: female  MRN: [id number]    CC: Back Pain (Low back pain with radiation down the back of legs)       HPI: This is a 68 y.o. year old female who reports many year history of lower back pain.  She recalls she injured her back at age 28 lifting chairs.  She has had treatment with pain management I believe with [facility] several years ago and had \"nerves burned\" for years ago which did not provide relief.  5 years prior to that she had the same procedure done that did provide some relief.  Her pain is in the lower back and radiates in bilateral posterior legs pain can get up to 8 out of 10 there is numbness and tingling in the back of her legs back of her thighs with standing and walking but it does relieved with sitting.  She has had 3 weeks of physical therapy since March and has had ibuprofen.  She is currently not seeing pain management.    History was obtained by patient    This patient  has not had lumbar surgery in the past.     Prior treatment: Pain management with what sounds like radiofrequency ablations and sacroiliac injections but I do not have records    Physical therapy.      Pertinent medical history: Morbid obesity, diabetes, CHF, hypertension             4/9/2024     1:41 PM   AMB PAIN ASSESSMENT   Location of Pain Back   Location Modifiers Right;Left;Medial;Posterior   Severity of Pain 8   Quality of Pain Aching;Other (Comment)    Duration of Pain Persistent   Frequency of Pain Constant   Aggravating Factors Walking;Standing;Other (Comment)    Limiting Behavior Yes   Relieving Factors Other (Comment);Nsaids    Result of Injury No   Work-Related Injury No   Are there other pain locations you wish to document? No       Significant value            ROS/Meds/PSH/PMH/FH/SH: I personally reviewed the patient's collected intake data.  Below are the pertinents:    Allergies   Allergen Reactions    Ciprofloxacin Diarrhea

## 2024-04-23 ENCOUNTER — OFFICE VISIT (OUTPATIENT)
Age: 69
End: 2024-04-23
Payer: MEDICARE

## 2024-04-23 VITALS
WEIGHT: 293 LBS | DIASTOLIC BLOOD PRESSURE: 70 MMHG | HEART RATE: 80 BPM | HEIGHT: 63 IN | SYSTOLIC BLOOD PRESSURE: 126 MMHG | BODY MASS INDEX: 51.91 KG/M2

## 2024-04-23 DIAGNOSIS — R00.0 TACHYCARDIA: ICD-10-CM

## 2024-04-23 DIAGNOSIS — R06.02 SHORTNESS OF BREATH: Primary | ICD-10-CM

## 2024-04-23 DIAGNOSIS — I10 HYPERTENSION, ESSENTIAL: ICD-10-CM

## 2024-04-23 DIAGNOSIS — E27.8 ADRENAL NODULE (HCC): ICD-10-CM

## 2024-04-23 DIAGNOSIS — R06.02 SHORTNESS OF BREATH: ICD-10-CM

## 2024-04-23 LAB
ANION GAP SERPL CALC-SCNC: 10 MMOL/L (ref 9–18)
BASOPHILS # BLD: 0 K/UL (ref 0–0.2)
BASOPHILS NFR BLD: 1 % (ref 0–2)
BUN SERPL-MCNC: 22 MG/DL (ref 8–23)
CALCIUM SERPL-MCNC: 9.6 MG/DL (ref 8.8–10.2)
CHLORIDE SERPL-SCNC: 100 MMOL/L (ref 98–107)
CO2 SERPL-SCNC: 28 MMOL/L (ref 20–28)
CREAT SERPL-MCNC: 0.79 MG/DL (ref 0.6–1.1)
DIFFERENTIAL METHOD BLD: ABNORMAL
EOSINOPHIL # BLD: 0.1 K/UL (ref 0–0.8)
EOSINOPHIL NFR BLD: 3 % (ref 0.5–7.8)
ERYTHROCYTE [DISTWIDTH] IN BLOOD BY AUTOMATED COUNT: 13 % (ref 11.9–14.6)
GLUCOSE SERPL-MCNC: 92 MG/DL (ref 70–99)
HCT VFR BLD AUTO: 42.5 % (ref 35.8–46.3)
HGB BLD-MCNC: 13.4 G/DL (ref 11.7–15.4)
IMM GRANULOCYTES # BLD AUTO: 0 K/UL (ref 0–0.5)
IMM GRANULOCYTES NFR BLD AUTO: 0 % (ref 0–5)
LYMPHOCYTES # BLD: 1.9 K/UL (ref 0.5–4.6)
LYMPHOCYTES NFR BLD: 39 % (ref 13–44)
MCH RBC QN AUTO: 27.6 PG (ref 26.1–32.9)
MCV RBC AUTO: 87.6 FL (ref 82–102)
MONOCYTES # BLD: 0.5 K/UL (ref 0.1–1.3)
MONOCYTES NFR BLD: 9 % (ref 4–12)
NEUTS SEG # BLD: 2.3 K/UL (ref 1.7–8.2)
NEUTS SEG NFR BLD: 48 % (ref 43–78)
NRBC # BLD: 0 K/UL (ref 0–0.2)
PLATELET # BLD AUTO: 345 K/UL (ref 150–450)
PMV BLD AUTO: 9.2 FL (ref 9.4–12.3)
POTASSIUM SERPL-SCNC: 4.3 MMOL/L (ref 3.5–5.1)
RBC # BLD AUTO: 4.85 M/UL (ref 4.05–5.2)
SODIUM SERPL-SCNC: 138 MMOL/L (ref 136–145)
TSH W FREE THYROID IF ABNORMAL: 2.71 UIU/ML (ref 0.27–4.2)
WBC # BLD AUTO: 4.9 K/UL (ref 4.3–11.1)

## 2024-04-23 PROCEDURE — G8399 PT W/DXA RESULTS DOCUMENT: HCPCS | Performed by: INTERNAL MEDICINE

## 2024-04-23 PROCEDURE — 1123F ACP DISCUSS/DSCN MKR DOCD: CPT | Performed by: INTERNAL MEDICINE

## 2024-04-23 PROCEDURE — 1036F TOBACCO NON-USER: CPT | Performed by: INTERNAL MEDICINE

## 2024-04-23 PROCEDURE — G8417 CALC BMI ABV UP PARAM F/U: HCPCS | Performed by: INTERNAL MEDICINE

## 2024-04-23 PROCEDURE — 1090F PRES/ABSN URINE INCON ASSESS: CPT | Performed by: INTERNAL MEDICINE

## 2024-04-23 PROCEDURE — 3078F DIAST BP <80 MM HG: CPT | Performed by: INTERNAL MEDICINE

## 2024-04-23 PROCEDURE — 99214 OFFICE O/P EST MOD 30 MIN: CPT | Performed by: INTERNAL MEDICINE

## 2024-04-23 PROCEDURE — 3074F SYST BP LT 130 MM HG: CPT | Performed by: INTERNAL MEDICINE

## 2024-04-23 PROCEDURE — G8427 DOCREV CUR MEDS BY ELIG CLIN: HCPCS | Performed by: INTERNAL MEDICINE

## 2024-04-23 PROCEDURE — 3017F COLORECTAL CA SCREEN DOC REV: CPT | Performed by: INTERNAL MEDICINE

## 2024-04-23 RX ORDER — SEMAGLUTIDE 0.68 MG/ML
INJECTION, SOLUTION SUBCUTANEOUS
COMMUNITY
Start: 2024-04-18

## 2024-04-23 RX ORDER — NITROGLYCERIN 0.4 MG/1
TABLET SUBLINGUAL
Qty: 25 TABLET | Refills: 3 | Status: SHIPPED | OUTPATIENT
Start: 2024-04-23

## 2024-04-23 ASSESSMENT — ENCOUNTER SYMPTOMS
STRIDOR: 0
NAIL CHANGES: 0
APHONIA: 0
ABDOMINAL PAIN: 0
COUGH: 0
EYE PAIN: 0

## 2024-04-23 NOTE — PROGRESS NOTES
11/17/14 at [facility]    No significant valvular disease.  EF 50-55%    History of prediabetes     monitored by Primary Physician    Hypertension     Lymphedema     Morbid obesity (HCC)     Nausea & vomiting     Shortness of breath     Sinus problem     Sleep apnea     bi pap and 3 liters oxygen     Stress incontinence in female     Thyroid cyst     cyst removed from thyroid     Past Surgical History:   Procedure Laterality Date    BREAST RECONSTRUCTION Left 7/5/2018    LEFT BREAST REVISION/ EXPANDER EXCHANGE FOR IMPLANT performed by Kalpesh Lee MD at [facility] MAIN OR    BREAST SURGERY      Mastectomy left side    CARPAL TUNNEL RELEASE Bilateral     CHOLECYSTECTOMY      CHOLECYSTECTOMY, LAPAROSCOPIC      COLONOSCOPY N/A 2/12/2021    COLONOSCOPY/ 53 performed by Michael Calderón MD at [facility] ENDOSCOPY    COLONOSCOPY  2/12/2021         COLONOSCOPY      CYST REMOVAL      from thyroid    INSERT TISSUE EXPANDER(S) Left 01/2017    MASTECTOMY Left 01/2017    NEUROLOGICAL SURGERY      Burning nerve to back    OTHER SURGICAL HISTORY      abcess where bra strap    UT UNLISTED PROCEDURE CARDIAC SURGERY  Cath 11/18/14 [facility]    Minimal CAD in the ostial circumflex and mid ramus (medical management)    SINUS SURGERY PROC UNLISTED      sinus surgery    UPPER GASTROINTESTINAL ENDOSCOPY  2/12/2021          Family History   Problem Relation Age of Onset    Breast Cancer Neg Hx     Hypertension Brother     Cancer Brother         pituitary    Sleep Apnea Brother         states also has two children with sleep apnea    Heart Disease Mother     Sleep Apnea Father     Hypertension Father     Hypertension Mother     Cancer Mother         lung    Cancer Father         prostate      Social History     Tobacco Use    Smoking status: Never    Smokeless tobacco: Never   Substance Use Topics    Alcohol use: No       ROS:    Review of Systems   Constitutional: Negative for fever.   HENT:  Negative for stridor.    Eyes:  Negative for pain.

## 2024-04-24 ENCOUNTER — TELEPHONE (OUTPATIENT)
Dept: ORTHOPEDIC SURGERY | Age: 69
End: 2024-04-24

## 2024-04-24 NOTE — RESULT ENCOUNTER NOTE
Your most recent labs show no major abnormalities    Please let me know if you have additional questions or concerns.    Geo Weller MD

## 2024-04-24 NOTE — TELEPHONE ENCOUNTER
The patient was seen and is supposed to have an MRI. Please call Mindy and let her know if this has been done or the status of it.

## 2024-04-25 ENCOUNTER — TELEPHONE (OUTPATIENT)
Age: 69
End: 2024-04-25

## 2024-04-25 RX ORDER — IVABRADINE 7.5 MG/1
TABLET, FILM COATED ORAL
Qty: 180 TABLET | Refills: 3 | Status: SHIPPED | OUTPATIENT
Start: 2024-04-25

## 2024-04-25 NOTE — TELEPHONE ENCOUNTER
Pt called stating she registered with [facility]. She would like a RX for Corlanor (Ivabradine) faxed to them.    Message sent to Rivka VALLE MA

## 2024-04-30 ASSESSMENT — ENCOUNTER SYMPTOMS
DIARRHEA: 0
CONSTIPATION: 0

## 2024-04-30 NOTE — PROGRESS NOTES
[facility], APRN-[facility]  [address], [address]  [place] [address]            Cassandra Arguello is seen today for follow up of type 2 diabetes mellitus.      ASSESSMENT AND PLAN:    1. Type 2 diabetes mellitus with hyperglycemia, with long-term current use of insulin ([facility])  A1c 6.3%. Glycemic control improved since last visit despite her PCP stopping her Jardiance. Unfortunately, she is having significant side effects from Ozempic.   Will restart Jardiance at 10 mg. I also gave her a prescription for 3 mg of Trulicity and left the 1.5 mg dose on her chart. Rx for ondansetron to the pharmacy to mitigate the side effects of Ozempic.     - AMB POC HEMOGLOBIN A1C  - TRULICITY 3 MG/0.5ML SOPN; Inject 3 mg into the skin once a week E11.65  Dispense: 12 Adjustable Dose Pre-filled Pen Syringe; Refill: 3  - ondansetron (ZOFRAN-ODT) 4 MG disintegrating tablet; Place 1 tablet under the tongue every 8 hours as needed for Nausea or Vomiting  Dispense: 60 tablet; Refill: 3  - JARDIANCE 10 MG tablet; Take 1 tablet by mouth daily  Dispense: 90 tablet; Refill: 3        Return in about 3 months (around 8/3/2024).     History of Present Illness:  Interim medical updates: had to switch to Ozempic due to issues with Trulicity availability, but she's having terrible side effects. PCP stopped Jardiance because her A1c was good.     Barriers to care: mobility, health literacy    Date of diagnosis: 2005 - prediabetic    Patient has no history of previous DKA episodes.  Denies HX pancreatitis, gastroparesis, foot ulcer, anemia    Current Regimen: Ozempic 0.5 mg weekly    Failed therapies: metformin - clinically ineffective; Jardiance - polyuria    Home blood glucose monitoring frequency:     By review of meter download over past 30 days:  0.2 times per day  Average blood glucose 130 with a variability of 18.9%  Time in range 100%  High 0%, Very High 0%  Low 0%, Very Low 0%   Range   Fasting

## 2024-05-01 DIAGNOSIS — E27.8 ADRENAL NODULE (HCC): ICD-10-CM

## 2024-05-03 ENCOUNTER — OFFICE VISIT (OUTPATIENT)
Dept: ENDOCRINOLOGY | Age: 69
End: 2024-05-03

## 2024-05-03 VITALS
WEIGHT: 292 LBS | BODY MASS INDEX: 52.56 KG/M2 | HEART RATE: 80 BPM | SYSTOLIC BLOOD PRESSURE: 128 MMHG | DIASTOLIC BLOOD PRESSURE: 72 MMHG

## 2024-05-03 DIAGNOSIS — E11.65 TYPE 2 DIABETES MELLITUS WITH HYPERGLYCEMIA, WITH LONG-TERM CURRENT USE OF INSULIN (HCC): Primary | ICD-10-CM

## 2024-05-03 DIAGNOSIS — Z79.4 TYPE 2 DIABETES MELLITUS WITH HYPERGLYCEMIA, WITH LONG-TERM CURRENT USE OF INSULIN (HCC): Primary | ICD-10-CM

## 2024-05-03 LAB — HBA1C MFR BLD: 6.3 %

## 2024-05-03 RX ORDER — ONDANSETRON 4 MG/1
TABLET, ORALLY DISINTEGRATING ORAL
COMMUNITY
Start: 2024-05-01 | End: 2024-05-03 | Stop reason: SDUPTHER

## 2024-05-03 RX ORDER — EMPAGLIFLOZIN 10 MG/1
10 TABLET, FILM COATED ORAL DAILY
Qty: 90 TABLET | Refills: 3 | Status: SHIPPED | OUTPATIENT
Start: 2024-05-03

## 2024-05-03 RX ORDER — ONDANSETRON 4 MG/1
4 TABLET, ORALLY DISINTEGRATING ORAL EVERY 8 HOURS PRN
Qty: 60 TABLET | Refills: 3 | Status: SHIPPED | OUTPATIENT
Start: 2024-05-03

## 2024-05-03 RX ORDER — DULAGLUTIDE 3 MG/.5ML
3 INJECTION, SOLUTION SUBCUTANEOUS WEEKLY
Qty: 12 ADJUSTABLE DOSE PRE-FILLED PEN SYRINGE | Refills: 3 | Status: SHIPPED | OUTPATIENT
Start: 2024-05-03

## 2024-05-07 LAB
COLLECT DURATION TIME UR: 24 HR
METANEPH 24H UR-MRATE: 68 UG/24 HR (ref 36–209)
METANEPHS 24H UR-MCNC: 29 UG/L
NORMETANEPHRINE 24H UR-MCNC: 232 UG/L
NORMETANEPHRINE 24H UR-MRATE: 545 UG/24 HR (ref 131–612)
SPECIMEN VOL ?TM UR: 2350 ML

## 2024-05-08 LAB
COLLECT DURATION TIME UR: 24 HR
CORTIS F 24H UR-MRATE: 12 UG/24 HR (ref 6–42)
CORTIS F UR-MCNC: 5 UG/L
SPECIMEN VOL ?TM UR: 2350 ML

## 2024-05-09 ENCOUNTER — HOSPITAL ENCOUNTER (OUTPATIENT)
Dept: MRI IMAGING | Age: 69
Discharge: HOME | End: 2024-05-09
Payer: MEDICARE

## 2024-05-09 PROCEDURE — 72148 MRI LUMBAR SPINE W/O DYE: CPT

## 2024-05-14 ENCOUNTER — OFFICE VISIT (OUTPATIENT)
Age: 69
End: 2024-05-14
Payer: MEDICARE

## 2024-05-14 VITALS
SYSTOLIC BLOOD PRESSURE: 122 MMHG | HEART RATE: 84 BPM | WEIGHT: 292.3 LBS | DIASTOLIC BLOOD PRESSURE: 78 MMHG | HEIGHT: 63 IN | BODY MASS INDEX: 51.79 KG/M2

## 2024-05-14 DIAGNOSIS — I10 HYPERTENSION, ESSENTIAL: ICD-10-CM

## 2024-05-14 DIAGNOSIS — I10 ESSENTIAL (PRIMARY) HYPERTENSION: ICD-10-CM

## 2024-05-14 DIAGNOSIS — I51.7 LVH (LEFT VENTRICULAR HYPERTROPHY): ICD-10-CM

## 2024-05-14 DIAGNOSIS — R06.02 SHORTNESS OF BREATH: ICD-10-CM

## 2024-05-14 DIAGNOSIS — R00.0 TACHYCARDIA: Primary | ICD-10-CM

## 2024-05-14 PROCEDURE — G8417 CALC BMI ABV UP PARAM F/U: HCPCS | Performed by: INTERNAL MEDICINE

## 2024-05-14 PROCEDURE — 99214 OFFICE O/P EST MOD 30 MIN: CPT | Performed by: INTERNAL MEDICINE

## 2024-05-14 PROCEDURE — G8427 DOCREV CUR MEDS BY ELIG CLIN: HCPCS | Performed by: INTERNAL MEDICINE

## 2024-05-14 PROCEDURE — 1123F ACP DISCUSS/DSCN MKR DOCD: CPT | Performed by: INTERNAL MEDICINE

## 2024-05-14 PROCEDURE — 1036F TOBACCO NON-USER: CPT | Performed by: INTERNAL MEDICINE

## 2024-05-14 PROCEDURE — G8399 PT W/DXA RESULTS DOCUMENT: HCPCS | Performed by: INTERNAL MEDICINE

## 2024-05-14 PROCEDURE — 1090F PRES/ABSN URINE INCON ASSESS: CPT | Performed by: INTERNAL MEDICINE

## 2024-05-14 PROCEDURE — 3078F DIAST BP <80 MM HG: CPT | Performed by: INTERNAL MEDICINE

## 2024-05-14 PROCEDURE — 3074F SYST BP LT 130 MM HG: CPT | Performed by: INTERNAL MEDICINE

## 2024-05-14 PROCEDURE — 3017F COLORECTAL CA SCREEN DOC REV: CPT | Performed by: INTERNAL MEDICINE

## 2024-05-14 ASSESSMENT — ENCOUNTER SYMPTOMS
APHONIA: 0
COUGH: 0
ABDOMINAL PAIN: 0
NAIL CHANGES: 0
STRIDOR: 0

## 2024-05-14 NOTE — PROGRESS NOTES
[facility] CARDIOLOGY, [address], [facility] [address]  [place] [address]  PHONE: [phone]    SUBJECTIVE:   Cassandra Arguello is a 69 y.o. female 1955   seen for a follow up visit regarding the following:     Chief Complaint   Patient presents with    Hypertension    Follow-up       History of present illness: 69 y.o. female presented for follow-up 5/14/24 history of LVH negative pyrophosphate scan.  Recent urine metanephrines and 24-hour cortisol within normal limits patient has an adrenal adenoma.  She was reinitiated on Corlanor at 7.5 mg.  She had initial fatigue which has improved.  She has had gastrointestinal issues when switching from Trulicity to Ozempic.      Interval history:   Patient was previously seen by Dr. Rashard Gilbert with history of echocardiogram performed in 04/2018.  Normal left ventricular systolic function, diastolic parameters described as normal.  The patient is complaining of worsening shortness of breath,  as well as poor quality of life.        Cardiac History:     Echocardiogram 2020 with left ventricular hypertrophy 1.4 cm.      EKG - sinus rhythm, low voltage in precordial leads, abnormal.      12/2020 echocardiogram with normal left ventricular systolic function mitral annular calcification aortic sclerosis    2021 pyrophosphate scan negative for infiltrative disease Antoinette    3/23/2021 Normal spirometry.    3/17/2022 sinus rhythm, normal rate, normal WA intervals, ST wave normal, normal axis  10/17/2023 sinus rhythm low voltage  4/2020 four 24-hour urine metanephrines within normal limits 5/1/2020 four 24-hour urine cortisol within normal limits      Assessment and Plan:     Shortness of breath, controlled.      Patient with profound LVH on echocardiogram.      Additional evaluation for infiltrative cardiomyopathy negative    Likely multifactorial PFTs normal spirometry 2021     stress perfusion study with no ischemia pyrophosphate scan negative sinus

## 2024-05-15 ENCOUNTER — OFFICE VISIT (OUTPATIENT)
Age: 69
End: 2024-05-15
Payer: MEDICARE

## 2024-05-15 DIAGNOSIS — M48.062 LUMBAR STENOSIS WITH NEUROGENIC CLAUDICATION: ICD-10-CM

## 2024-05-15 DIAGNOSIS — M47.816 FACET ARTHROPATHY, LUMBAR: ICD-10-CM

## 2024-05-15 DIAGNOSIS — M43.16 SPONDYLOLISTHESIS OF LUMBAR REGION: Primary | ICD-10-CM

## 2024-05-15 PROCEDURE — 1123F ACP DISCUSS/DSCN MKR DOCD: CPT | Performed by: PHYSICIAN ASSISTANT

## 2024-05-15 PROCEDURE — 99214 OFFICE O/P EST MOD 30 MIN: CPT | Performed by: PHYSICIAN ASSISTANT

## 2024-05-15 PROCEDURE — 1090F PRES/ABSN URINE INCON ASSESS: CPT | Performed by: PHYSICIAN ASSISTANT

## 2024-05-15 PROCEDURE — G8427 DOCREV CUR MEDS BY ELIG CLIN: HCPCS | Performed by: PHYSICIAN ASSISTANT

## 2024-05-15 PROCEDURE — 3017F COLORECTAL CA SCREEN DOC REV: CPT | Performed by: PHYSICIAN ASSISTANT

## 2024-05-15 PROCEDURE — G8417 CALC BMI ABV UP PARAM F/U: HCPCS | Performed by: PHYSICIAN ASSISTANT

## 2024-05-15 PROCEDURE — 1036F TOBACCO NON-USER: CPT | Performed by: PHYSICIAN ASSISTANT

## 2024-05-15 PROCEDURE — G8399 PT W/DXA RESULTS DOCUMENT: HCPCS | Performed by: PHYSICIAN ASSISTANT

## 2024-05-15 RX ORDER — POLYETHYLENE GLYCOL 3350 17 G/17G
POWDER, FOR SOLUTION ORAL
COMMUNITY
Start: 2024-05-10

## 2024-05-15 NOTE — PROGRESS NOTES
Name: Cassandra Arguello  YOB: 1955  Gender: female  MRN: [id number]    CC: Back Pain (MRI results)       HPI: This is a 69 y.o. year old female who reports many year history of lower back pain.  She recalls she injured her back at age 28 lifting chairs.  She has had treatment with pain management I believe with [facility] several years ago and had \"nerves burned\" for years ago which did not provide relief.  5 years prior to that she had the same procedure done that did provide some relief.  Her pain is in the lower back and radiates in bilateral posterior legs pain can get up to 8 out of 10 there is numbness and tingling in the back of her legs back of her thighs with standing and walking but it does relieved with sitting.  She has had 3 weeks of physical therapy since March and has had ibuprofen.  She is currently not seeing pain management.    History was obtained by patient    This patient  has not had lumbar surgery in the past.     Prior treatment: Pain management with what sounds like radiofrequency ablations and sacroiliac injections but I do not have records    Physical therapy.      Pertinent medical history: Morbid obesity, diabetes, CHF, hypertension    I did order MRI scan of the lumbar spine to evaluate for neurogenic compression.  The spondylolisthesis at L3-4 and L4-5 is quite impressive.         4/9/2024     1:41 PM   AMB PAIN ASSESSMENT   Location of Pain Back   Location Modifiers Right;Left;Medial;Posterior   Severity of Pain 8   Quality of Pain Aching;Other (Comment)    Duration of Pain Persistent   Frequency of Pain Constant   Aggravating Factors Walking;Standing;Other (Comment)    Limiting Behavior Yes   Relieving Factors Other (Comment);Nsaids    Result of Injury No   Work-Related Injury No   Are there other pain locations you wish to document? No       Significant value            ROS/Meds/PSH/PMH/FH/SH: I personally reviewed the patient's

## 2024-05-23 ENCOUNTER — OFFICE VISIT (OUTPATIENT)
Dept: ORTHOPEDIC SURGERY | Age: 69
End: 2024-05-23
Payer: MEDICARE

## 2024-05-23 DIAGNOSIS — M43.16 SPONDYLOLISTHESIS OF LUMBAR REGION: Primary | ICD-10-CM

## 2024-05-23 DIAGNOSIS — M47.816 FACET ARTHROPATHY, LUMBAR: ICD-10-CM

## 2024-05-23 DIAGNOSIS — M48.062 LUMBAR STENOSIS WITH NEUROGENIC CLAUDICATION: ICD-10-CM

## 2024-05-23 PROCEDURE — 62323 NJX INTERLAMINAR LMBR/SAC: CPT | Performed by: PHYSICAL MEDICINE & REHABILITATION

## 2024-05-23 RX ORDER — BETAMETHASONE SODIUM PHOSPHATE AND BETAMETHASONE ACETATE 3; 3 MG/ML; MG/ML
12 INJECTION, SUSPENSION INTRA-ARTICULAR; INTRALESIONAL; INTRAMUSCULAR; SOFT TISSUE ONCE
Status: COMPLETED | OUTPATIENT
Start: 2024-05-23 | End: 2024-05-23

## 2024-05-23 RX ADMIN — BETAMETHASONE SODIUM PHOSPHATE AND BETAMETHASONE ACETATE 12 MG: 3; 3 INJECTION, SUSPENSION INTRA-ARTICULAR; INTRALESIONAL; INTRAMUSCULAR; SOFT TISSUE at 11:32

## 2024-05-23 NOTE — PROGRESS NOTES
acetate-betamethasone sodium phosphate (CELESTONE) injection 12 mg      2. Lumbar stenosis with neurogenic claudication  M48.062 XR INJ SPINE THER SUBST LUM/SAC W IMG     betamethasone acetate-betamethasone sodium phosphate (CELESTONE) injection 12 mg      3. Facet arthropathy, lumbar  M47.816 XR INJ SPINE THER SUBST LUM/SAC W IMG     betamethasone acetate-betamethasone sodium phosphate (CELESTONE) injection 12 mg         MOOK TREJO MD  05/23/24

## 2024-06-19 ENCOUNTER — TELEPHONE (OUTPATIENT)
Dept: SLEEP MEDICINE | Age: 69
End: 2024-06-19

## 2024-06-20 ENCOUNTER — TELEPHONE (OUTPATIENT)
Dept: SLEEP MEDICINE | Age: 69
End: 2024-06-20

## 2024-06-20 ENCOUNTER — OFFICE VISIT (OUTPATIENT)
Age: 69
End: 2024-06-20
Payer: MEDICARE

## 2024-06-20 DIAGNOSIS — M51.36 DDD (DEGENERATIVE DISC DISEASE), LUMBAR: ICD-10-CM

## 2024-06-20 DIAGNOSIS — M48.062 LUMBAR STENOSIS WITH NEUROGENIC CLAUDICATION: ICD-10-CM

## 2024-06-20 DIAGNOSIS — E66.01 CLASS 3 SEVERE OBESITY WITH BODY MASS INDEX (BMI) OF 50.0 TO 59.9 IN ADULT, UNSPECIFIED OBESITY TYPE, UNSPECIFIED WHETHER SERIOUS COMORBIDITY PRESENT (HCC): ICD-10-CM

## 2024-06-20 DIAGNOSIS — M47.816 FACET ARTHROPATHY, LUMBAR: ICD-10-CM

## 2024-06-20 DIAGNOSIS — M43.16 SPONDYLOLISTHESIS OF LUMBAR REGION: Primary | ICD-10-CM

## 2024-06-20 PROCEDURE — 99214 OFFICE O/P EST MOD 30 MIN: CPT | Performed by: PHYSICIAN ASSISTANT

## 2024-06-20 PROCEDURE — 1123F ACP DISCUSS/DSCN MKR DOCD: CPT | Performed by: PHYSICIAN ASSISTANT

## 2024-06-20 PROCEDURE — G8399 PT W/DXA RESULTS DOCUMENT: HCPCS | Performed by: PHYSICIAN ASSISTANT

## 2024-06-20 PROCEDURE — 1090F PRES/ABSN URINE INCON ASSESS: CPT | Performed by: PHYSICIAN ASSISTANT

## 2024-06-20 PROCEDURE — 3017F COLORECTAL CA SCREEN DOC REV: CPT | Performed by: PHYSICIAN ASSISTANT

## 2024-06-20 PROCEDURE — 1036F TOBACCO NON-USER: CPT | Performed by: PHYSICIAN ASSISTANT

## 2024-06-20 PROCEDURE — G8417 CALC BMI ABV UP PARAM F/U: HCPCS | Performed by: PHYSICIAN ASSISTANT

## 2024-06-20 PROCEDURE — G8428 CUR MEDS NOT DOCUMENT: HCPCS | Performed by: PHYSICIAN ASSISTANT

## 2024-06-20 NOTE — PROGRESS NOTES
Name: Cassandra Arguello  YOB: 1955  Gender: female  MRN: [id number]    CC: Back Pain (Follow up after injection [facility] 5/23/24)       HPI: This is a 69 y.o. year old female who reports many year history of lower back pain.  She recalls she injured her back at age 28 lifting chairs.  She has had treatment with pain management I believe with [facility] several years ago and had \"nerves burned\"  4 years ago which did not provide relief.  5 years prior to that she had the same procedure done that did provide some relief.  However her experience with the pain having the procedure was so severe she did not want to pursue it again.  Her pain is in the lower back and radiates in bilateral posterior legs pain can get up to 8 out of 10 there is numbness and tingling in the back of her legs back of her thighs with standing and walking but it does relieved with sitting.  She has had 3 weeks of physical therapy since March and has had ibuprofen.  She is currently not seeing pain management.    History was obtained by patient    This patient  has not had lumbar surgery in the past.     Prior treatment: Pain management with what sounds like radiofrequency ablations and sacroiliac injections but I do not have records    Physical therapy.    Pertinent medical history: Morbid obesity, diabetes, CHF, hypertension    I did order MRI scan of the lumbar spine to evaluate for neurogenic compression.  The spondylolisthesis at L3-4 and L4-5 is quite impressive.  The spondylolisthesis do reduce on MRI scan however there is quite severe stenosis L3-4 and L4-5.  We referred her for L3-4 epidural steroid injection.  She reports she had 50% relief of her symptoms.  She is able to walk a little further without so much pain.  Her pain is worse with standing.          ROS/Meds/PSH/PMH/FH/SH: I personally reviewed the patient's collected intake data.  Below are the pertinents:    Allergies   Allergen

## 2024-06-25 ENCOUNTER — HOSPITAL ENCOUNTER (OUTPATIENT)
Dept: MRI IMAGING | Age: 69
Discharge: HOME | End: 2024-06-28
Attending: SURGERY
Payer: MEDICARE

## 2024-06-25 DIAGNOSIS — L03.90 CELLULITIS, UNSPECIFIED CELLULITIS SITE: ICD-10-CM

## 2024-06-25 PROCEDURE — A9579 GAD-BASE MR CONTRAST NOS,1ML: HCPCS | Performed by: SURGERY

## 2024-06-25 PROCEDURE — 71552 MRI CHEST W/O & W/DYE: CPT

## 2024-06-25 PROCEDURE — 2580000003 HC RX 258: Performed by: SURGERY

## 2024-06-25 PROCEDURE — 6360000004 HC RX CONTRAST MEDICATION: Performed by: SURGERY

## 2024-06-25 RX ORDER — SODIUM CHLORIDE 0.9 % (FLUSH) 0.9 %
10 SYRINGE (ML) INJECTION AS NEEDED
Status: DISCONTINUED | OUTPATIENT
Start: 2024-06-25 | End: 2024-06-29 | Stop reason: HOSPADM

## 2024-06-25 RX ADMIN — GADOTERIDOL 27 ML: 279.3 INJECTION, SOLUTION INTRAVENOUS at 13:57

## 2024-06-25 RX ADMIN — SODIUM CHLORIDE, PRESERVATIVE FREE 10 ML: 5 INJECTION INTRAVENOUS at 13:57

## 2024-07-01 ENCOUNTER — OFFICE VISIT (OUTPATIENT)
Dept: SLEEP MEDICINE | Age: 69
End: 2024-07-01
Payer: MEDICARE

## 2024-07-01 VITALS
HEART RATE: 65 BPM | SYSTOLIC BLOOD PRESSURE: 131 MMHG | OXYGEN SATURATION: 94 % | HEIGHT: 63 IN | DIASTOLIC BLOOD PRESSURE: 52 MMHG | BODY MASS INDEX: 49.96 KG/M2 | WEIGHT: 282 LBS

## 2024-07-01 DIAGNOSIS — E66.01 MORBID OBESITY WITH BMI OF 50.0-59.9, ADULT (HCC): ICD-10-CM

## 2024-07-01 DIAGNOSIS — G47.33 OSA (OBSTRUCTIVE SLEEP APNEA): Primary | ICD-10-CM

## 2024-07-01 DIAGNOSIS — G47.34 NOCTURNAL HYPOXEMIA: ICD-10-CM

## 2024-07-01 PROCEDURE — 1036F TOBACCO NON-USER: CPT | Performed by: NURSE PRACTITIONER

## 2024-07-01 PROCEDURE — G8399 PT W/DXA RESULTS DOCUMENT: HCPCS | Performed by: NURSE PRACTITIONER

## 2024-07-01 PROCEDURE — 3017F COLORECTAL CA SCREEN DOC REV: CPT | Performed by: NURSE PRACTITIONER

## 2024-07-01 PROCEDURE — 1090F PRES/ABSN URINE INCON ASSESS: CPT | Performed by: NURSE PRACTITIONER

## 2024-07-01 PROCEDURE — G8417 CALC BMI ABV UP PARAM F/U: HCPCS | Performed by: NURSE PRACTITIONER

## 2024-07-01 PROCEDURE — 99213 OFFICE O/P EST LOW 20 MIN: CPT | Performed by: NURSE PRACTITIONER

## 2024-07-01 PROCEDURE — 3078F DIAST BP <80 MM HG: CPT | Performed by: NURSE PRACTITIONER

## 2024-07-01 PROCEDURE — 1123F ACP DISCUSS/DSCN MKR DOCD: CPT | Performed by: NURSE PRACTITIONER

## 2024-07-01 PROCEDURE — G8427 DOCREV CUR MEDS BY ELIG CLIN: HCPCS | Performed by: NURSE PRACTITIONER

## 2024-07-01 PROCEDURE — 3075F SYST BP GE 130 - 139MM HG: CPT | Performed by: NURSE PRACTITIONER

## 2024-07-01 ASSESSMENT — SLEEP AND FATIGUE QUESTIONNAIRES
ESS TOTAL SCORE: 5
HOW LIKELY ARE YOU TO NOD OFF OR FALL ASLEEP WHILE LYING DOWN TO REST IN THE AFTERNOON WHEN CIRCUMSTANCES PERMIT: HIGH CHANCE OF DOZING
HOW LIKELY ARE YOU TO NOD OFF OR FALL ASLEEP WHILE SITTING AND READING: WOULD NEVER DOZE
HOW LIKELY ARE YOU TO NOD OFF OR FALL ASLEEP WHILE WATCHING TV: MODERATE CHANCE OF DOZING
HOW LIKELY ARE YOU TO NOD OFF OR FALL ASLEEP WHEN YOU ARE A PASSENGER IN A CAR FOR AN HOUR WITHOUT A BREAK: WOULD NEVER DOZE
HOW LIKELY ARE YOU TO NOD OFF OR FALL ASLEEP WHILE SITTING INACTIVE IN A PUBLIC PLACE: WOULD NEVER DOZE
HOW LIKELY ARE YOU TO NOD OFF OR FALL ASLEEP WHILE SITTING QUIETLY AFTER LUNCH WITHOUT ALCOHOL: WOULD NEVER DOZE
HOW LIKELY ARE YOU TO NOD OFF OR FALL ASLEEP IN A CAR, WHILE STOPPED FOR A FEW MINUTES IN TRAFFIC: WOULD NEVER DOZE
HOW LIKELY ARE YOU TO NOD OFF OR FALL ASLEEP WHILE SITTING AND TALKING TO SOMEONE: WOULD NEVER DOZE

## 2024-07-01 NOTE — PATIENT INSTRUCTIONS
Continue BiPAP 19/13 cm H2O with 3 L oxygen bleed in with nightly compliance  New BiPAP supplies ordered  Overnight oximetry on PAP therapy ordered  Recommendations as above  Follow-up in 1 year or sooner if needed

## 2024-07-01 NOTE — PROGRESS NOTES
Making)       ICD-10-CM    1. DIONE (obstructive sleep apnea)  G47.33 DME - DURABLE MEDICAL EQUIPMENT - Patient is using, compliant and benefiting from Pap therapy. Continue current settings as AHI is down to 3.2 events per hour.        2. Nocturnal hypoxemia  G47.34 Pulse oximetry, overnight -will check overnight oximetry as she reports she is still fatigued/sleepy during the day and is using 3 L oxygen bleed in with BiPAP      3. Morbid obesity with BMI of 50.0-59.9, adult (HCC)  E66.01 Patient can lose weight including ambulating 8-10,000 steps.  Can Build Up Slowly as tolerated to this goal.    Also modifying dietary intake with decrease carbohydrates and sweets. Told to use avoid the P's --> Pizza, Pasta, Pastry, etc.  Increase fruits, vegetables, and lean meats e.g. Turkey, chicken or game meat. Patient is aware the goal is to consume less than 2000 shady/day, since patient is a nondiabetic.    We discussed using the [employer] LOSE IT to count calories. Patient can police themselves.     If the future, if still having issues can use a more regimented diet e.g. [place], Atkins, Optavia, etc. Clinical correlation suggested.     Z68.43             PLAN:    Continue BiPAP 19/13 cm H2O with 3 L oxygen bleed in with nightly compliance  New BiPAP supplies ordered  Overnight oximetry on PAP therapy ordered  Recommendations as above  Follow-up in 1 year or sooner if needed    Orders Placed This Encounter   Procedures    Pulse oximetry, overnight     Standing Status:   Future     Standing Expiration Date:   2025     Scheduling Instructions:      Please perform Overnight Oximetry on PAP Therapy.       Please Fax results to: [phone]    DME - DURABLE MEDICAL EQUIPMENT     [facility]  Phone: [address]   [facility] [address]  Dept: [phone]      Patient Name: Cassandra Arguello  : 1955  Gender: female  Address: [address] [place] [address]   Patient

## 2024-07-02 ENCOUNTER — TELEPHONE (OUTPATIENT)
Dept: SLEEP MEDICINE | Age: 69
End: 2024-07-02

## 2024-07-02 NOTE — TELEPHONE ENCOUNTER
Order placed in Go[employer]s for KRISTA. SENT TO [facility]  Order placed in GoScripts for CPAP SUPPLIES. SENT TO

## 2024-07-30 RX ORDER — PRASTERONE (DHEA) 50 MG
1 CAPSULE ORAL DAILY
COMMUNITY

## 2024-07-30 NOTE — PROGRESS NOTES
PLEASE CONTINUE TAKING ALL PRESCRIPTION MEDICATIONS UP TO THE DAY OF SURGERY UNLESS OTHERWISE DIRECTED BELOW. You may take Tylenol, allergy,  and/or indigestion medications.     TAKE ONLY THESE MEDICATIONS ON THE DAY OF SURGERY    Cymbalta, allegra , gabapentin, corlandor, metoprolol , trelegy, wixela and bring albuterol inhaler           DISCONTINUE all vitamins and supplements 7 days prior to surgery. DISCONTINUE Non-Steroidal Anti-Inflammatory (NSAIDS) such as Advil and Aleve 5 days prior to surgery.     Home Medications to Hold- please continue all other medications except these.    Pt takes  trulicity weekly on Tuesday. Last dose 7/22/24   All vitamins and supplements     Comments      On the day before surgery please take 2 Tylenol in the morning and then again before bed. You may use either regular or extra strength.           Please do not bring home medications with you on the day of surgery unless otherwise directed by your nurse.  If you are instructed to bring home medications, please give them to your nurse as they will be administered by the nursing staff.    If you have any questions, please call [facility] [phone].    A copy of this note was provided to the patient for reference.

## 2024-07-30 NOTE — PROGRESS NOTES
Patient verified name and     Order for consent WAS NOT found in EHR and matches case posting; patient verified.     Type 1B surgery, PHONE assessment complete.    Labs per surgeon: NONE  Labs per anesthesia protocol: POTASSIUM AND SQBS DOS--ORDERS PLACED IN EPIC  EKG: 10/2023 IN [facility] ALONG WITH LAST ECHO 3/2024--AS NEEDED  PT TAKES TRULICITY WEEKLY ON -- LAST DOSE 24-- PT AWARE TO HOLD THIS MED TIL AFTER SURG. ALSO PT DENIES HAVING ANY PORTABLE O2      Patient provided with and instructed on educational handouts including Guide to Surgery, Preventing Surgical Site Infections, Pain Management, and [facility] Brochure.    Patient answered medical/surgical history questions at their best of ability. All prior to admission medications documented in EPIC. Original medication prescription bottle WAS NOT visualized during patient appointment.     Patient instructed to hold all vitamins 7 days prior to surgery and NSAIDS 5 days prior to surgery, patient verbalized understanding.     Patient teach back successful and patient demonstrates knowledge of instructions.

## 2024-08-01 NOTE — PERIOP NOTE
Preop department called to notify patient of arrival time for scheduled procedure. Instructions given to Cassandra Arguello  - Arrive at [facility] En[facility] [address].  - Remain NPO after midnight, unless otherwise indicated, including gum, mints, and ice chips.   - Have a responsible adult to drive patient to the hospital, stay during surgery, and patient will need supervision 24 hours after anesthesia.   - Use antibacterial soap in shower the night before surgery and on the morning of surgery.       Was patient contacted: yes  Voicemail left: no  Numbers contacted: [phone]   Arrival time: 0530

## 2024-08-15 ENCOUNTER — ANESTHESIA EVENT (OUTPATIENT)
Dept: SURGERY | Age: 69
End: 2024-08-15
Payer: MEDICARE

## 2024-08-16 ENCOUNTER — ANESTHESIA (OUTPATIENT)
Dept: SURGERY | Age: 69
End: 2024-08-16
Payer: MEDICARE

## 2024-08-16 ENCOUNTER — HOSPITAL ENCOUNTER (OUTPATIENT)
Age: 69
Setting detail: OUTPATIENT SURGERY
Discharge: HOME | End: 2024-08-16
Attending: SURGERY | Admitting: SURGERY
Payer: MEDICARE

## 2024-08-16 VITALS
DIASTOLIC BLOOD PRESSURE: 52 MMHG | HEART RATE: 71 BPM | SYSTOLIC BLOOD PRESSURE: 108 MMHG | RESPIRATION RATE: 18 BRPM | WEIGHT: 285 LBS | OXYGEN SATURATION: 93 % | BODY MASS INDEX: 50.5 KG/M2 | TEMPERATURE: 97.5 F | HEIGHT: 63 IN

## 2024-08-16 DIAGNOSIS — G89.18 POST-OP PAIN: Primary | ICD-10-CM

## 2024-08-16 LAB
GLUCOSE BLD STRIP.AUTO-MCNC: 113 MG/DL (ref 65–100)
GLUCOSE BLD STRIP.AUTO-MCNC: 98 MG/DL (ref 65–100)
POTASSIUM BLD-SCNC: 3.8 MMOL/L (ref 3.5–5.1)
POTASSIUM BLD-SCNC: 5.5 MMOL/L (ref 3.5–5.1)
SERVICE CMNT-IMP: ABNORMAL
SERVICE CMNT-IMP: NORMAL

## 2024-08-16 PROCEDURE — 3700000000 HC ANESTHESIA ATTENDED CARE: Performed by: SURGERY

## 2024-08-16 PROCEDURE — 87102 FUNGUS ISOLATION CULTURE: CPT

## 2024-08-16 PROCEDURE — 87186 SC STD MICRODIL/AGAR DIL: CPT

## 2024-08-16 PROCEDURE — 87070 CULTURE OTHR SPECIMN AEROBIC: CPT

## 2024-08-16 PROCEDURE — 2500000003 HC RX 250 WO HCPCS: Performed by: SURGERY

## 2024-08-16 PROCEDURE — 6360000002 HC RX W HCPCS: Performed by: ANESTHESIOLOGY

## 2024-08-16 PROCEDURE — 2580000003 HC RX 258: Performed by: SURGERY

## 2024-08-16 PROCEDURE — 7100000000 HC PACU RECOVERY - FIRST 15 MIN: Performed by: SURGERY

## 2024-08-16 PROCEDURE — 7100000010 HC PHASE II RECOVERY - FIRST 15 MIN: Performed by: SURGERY

## 2024-08-16 PROCEDURE — 2500000003 HC RX 250 WO HCPCS: Performed by: NURSE ANESTHETIST, CERTIFIED REGISTERED

## 2024-08-16 PROCEDURE — 7100000001 HC PACU RECOVERY - ADDTL 15 MIN: Performed by: SURGERY

## 2024-08-16 PROCEDURE — 87076 CULTURE ANAEROBE IDENT EACH: CPT

## 2024-08-16 PROCEDURE — 6360000002 HC RX W HCPCS: Performed by: SURGERY

## 2024-08-16 PROCEDURE — 84132 ASSAY OF SERUM POTASSIUM: CPT

## 2024-08-16 PROCEDURE — A4217 STERILE WATER/SALINE, 500 ML: HCPCS | Performed by: SURGERY

## 2024-08-16 PROCEDURE — 87153 DNA/RNA SEQUENCING: CPT

## 2024-08-16 PROCEDURE — 6360000002 HC RX W HCPCS: Performed by: NURSE ANESTHETIST, CERTIFIED REGISTERED

## 2024-08-16 PROCEDURE — 3600000002 HC SURGERY LEVEL 2 BASE: Performed by: SURGERY

## 2024-08-16 PROCEDURE — 6370000000 HC RX 637 (ALT 250 FOR IP): Performed by: ANESTHESIOLOGY

## 2024-08-16 PROCEDURE — 82962 GLUCOSE BLOOD TEST: CPT

## 2024-08-16 PROCEDURE — 87206 SMEAR FLUORESCENT/ACID STAI: CPT

## 2024-08-16 PROCEDURE — 7100000011 HC PHASE II RECOVERY - ADDTL 15 MIN: Performed by: SURGERY

## 2024-08-16 PROCEDURE — 87077 CULTURE AEROBIC IDENTIFY: CPT

## 2024-08-16 PROCEDURE — 87075 CULTR BACTERIA EXCEPT BLOOD: CPT

## 2024-08-16 PROCEDURE — 87205 SMEAR GRAM STAIN: CPT

## 2024-08-16 PROCEDURE — 87185 SC STD ENZYME DETCJ PER NZM: CPT

## 2024-08-16 PROCEDURE — 2580000003 HC RX 258: Performed by: ANESTHESIOLOGY

## 2024-08-16 PROCEDURE — 3600000012 HC SURGERY LEVEL 2 ADDTL 15MIN: Performed by: SURGERY

## 2024-08-16 PROCEDURE — 2709999900 HC NON-CHARGEABLE SUPPLY: Performed by: SURGERY

## 2024-08-16 PROCEDURE — 88304 TISSUE EXAM BY PATHOLOGIST: CPT

## 2024-08-16 PROCEDURE — 3700000001 HC ADD 15 MINUTES (ANESTHESIA): Performed by: SURGERY

## 2024-08-16 RX ORDER — DIPHENHYDRAMINE HYDROCHLORIDE 50 MG/ML
12.5 INJECTION INTRAMUSCULAR; INTRAVENOUS
Status: DISCONTINUED | OUTPATIENT
Start: 2024-08-16 | End: 2024-08-16 | Stop reason: HOSPADM

## 2024-08-16 RX ORDER — SODIUM CHLORIDE 0.9 % (FLUSH) 0.9 %
5-40 SYRINGE (ML) INJECTION EVERY 12 HOURS SCHEDULED
Status: DISCONTINUED | OUTPATIENT
Start: 2024-08-16 | End: 2024-08-16 | Stop reason: HOSPADM

## 2024-08-16 RX ORDER — OXYCODONE HYDROCHLORIDE AND ACETAMINOPHEN 5; 325 MG/1; MG/1
1 TABLET ORAL EVERY 6 HOURS PRN
Qty: 28 TABLET | Refills: 0 | Status: SHIPPED | OUTPATIENT
Start: 2024-08-16 | End: 2024-08-23

## 2024-08-16 RX ORDER — FENTANYL CITRATE 50 UG/ML
100 INJECTION, SOLUTION INTRAMUSCULAR; INTRAVENOUS
Status: DISCONTINUED | OUTPATIENT
Start: 2024-08-16 | End: 2024-08-16 | Stop reason: HOSPADM

## 2024-08-16 RX ORDER — ONDANSETRON 2 MG/ML
4 INJECTION INTRAMUSCULAR; INTRAVENOUS
Status: COMPLETED | OUTPATIENT
Start: 2024-08-16 | End: 2024-08-16

## 2024-08-16 RX ORDER — LIDOCAINE HYDROCHLORIDE AND EPINEPHRINE 10; 10 MG/ML; UG/ML
INJECTION, SOLUTION INFILTRATION; PERINEURAL PRN
Status: DISCONTINUED | OUTPATIENT
Start: 2024-08-16 | End: 2024-08-16 | Stop reason: ALTCHOICE

## 2024-08-16 RX ORDER — ONDANSETRON 2 MG/ML
INJECTION INTRAMUSCULAR; INTRAVENOUS PRN
Status: DISCONTINUED | OUTPATIENT
Start: 2024-08-16 | End: 2024-08-16 | Stop reason: SDUPTHER

## 2024-08-16 RX ORDER — EPINEPHRINE 1 MG/ML(1)
AMPUL (ML) INJECTION PRN
Status: DISCONTINUED | OUTPATIENT
Start: 2024-08-16 | End: 2024-08-16 | Stop reason: ALTCHOICE

## 2024-08-16 RX ORDER — OXYCODONE HYDROCHLORIDE 5 MG/1
5 TABLET ORAL PRN
Status: DISCONTINUED | OUTPATIENT
Start: 2024-08-16 | End: 2024-08-16 | Stop reason: HOSPADM

## 2024-08-16 RX ORDER — SODIUM CHLORIDE 9 MG/ML
INJECTION, SOLUTION INTRAVENOUS CONTINUOUS
Status: DISCONTINUED | OUTPATIENT
Start: 2024-08-16 | End: 2024-08-16 | Stop reason: HOSPADM

## 2024-08-16 RX ORDER — SODIUM CHLORIDE 0.9 % (FLUSH) 0.9 %
5-40 SYRINGE (ML) INJECTION PRN
Status: DISCONTINUED | OUTPATIENT
Start: 2024-08-16 | End: 2024-08-16 | Stop reason: HOSPADM

## 2024-08-16 RX ORDER — FENTANYL CITRATE 50 UG/ML
INJECTION, SOLUTION INTRAMUSCULAR; INTRAVENOUS PRN
Status: DISCONTINUED | OUTPATIENT
Start: 2024-08-16 | End: 2024-08-16 | Stop reason: SDUPTHER

## 2024-08-16 RX ORDER — CEPHALEXIN 500 MG/1
500 CAPSULE ORAL 4 TIMES DAILY
Qty: 28 CAPSULE | Refills: 0 | Status: SHIPPED | OUTPATIENT
Start: 2024-08-16 | End: 2024-08-23

## 2024-08-16 RX ORDER — GLYCOPYRROLATE 0.2 MG/ML
INJECTION INTRAMUSCULAR; INTRAVENOUS PRN
Status: DISCONTINUED | OUTPATIENT
Start: 2024-08-16 | End: 2024-08-16 | Stop reason: SDUPTHER

## 2024-08-16 RX ORDER — HYDROMORPHONE HYDROCHLORIDE 2 MG/ML
0.5 INJECTION, SOLUTION INTRAMUSCULAR; INTRAVENOUS; SUBCUTANEOUS EVERY 10 MIN PRN
Status: DISCONTINUED | OUTPATIENT
Start: 2024-08-16 | End: 2024-08-16 | Stop reason: HOSPADM

## 2024-08-16 RX ORDER — EPHEDRINE SULFATE 5 MG/ML
INJECTION INTRAVENOUS PRN
Status: DISCONTINUED | OUTPATIENT
Start: 2024-08-16 | End: 2024-08-16 | Stop reason: SDUPTHER

## 2024-08-16 RX ORDER — MIDAZOLAM HYDROCHLORIDE 2 MG/2ML
2 INJECTION, SOLUTION INTRAMUSCULAR; INTRAVENOUS
Status: DISCONTINUED | OUTPATIENT
Start: 2024-08-16 | End: 2024-08-16 | Stop reason: HOSPADM

## 2024-08-16 RX ORDER — OXYCODONE HYDROCHLORIDE 5 MG/1
10 TABLET ORAL PRN
Status: DISCONTINUED | OUTPATIENT
Start: 2024-08-16 | End: 2024-08-16 | Stop reason: HOSPADM

## 2024-08-16 RX ORDER — MAGNESIUM HYDROXIDE 1200 MG/15ML
LIQUID ORAL CONTINUOUS PRN
Status: COMPLETED | OUTPATIENT
Start: 2024-08-16 | End: 2024-08-16

## 2024-08-16 RX ORDER — HYDROMORPHONE HYDROCHLORIDE 2 MG/ML
0.25 INJECTION, SOLUTION INTRAMUSCULAR; INTRAVENOUS; SUBCUTANEOUS EVERY 5 MIN PRN
Status: DISCONTINUED | OUTPATIENT
Start: 2024-08-16 | End: 2024-08-16 | Stop reason: HOSPADM

## 2024-08-16 RX ORDER — ACETAMINOPHEN 500 MG
1000 TABLET ORAL ONCE
Status: COMPLETED | OUTPATIENT
Start: 2024-08-16 | End: 2024-08-16

## 2024-08-16 RX ORDER — LIDOCAINE HYDROCHLORIDE 10 MG/ML
1 INJECTION, SOLUTION INFILTRATION; PERINEURAL
Status: DISCONTINUED | OUTPATIENT
Start: 2024-08-16 | End: 2024-08-16 | Stop reason: HOSPADM

## 2024-08-16 RX ORDER — SODIUM CHLORIDE, SODIUM LACTATE, POTASSIUM CHLORIDE, CALCIUM CHLORIDE 600; 310; 30; 20 MG/100ML; MG/100ML; MG/100ML; MG/100ML
INJECTION, SOLUTION INTRAVENOUS CONTINUOUS
Status: DISCONTINUED | OUTPATIENT
Start: 2024-08-16 | End: 2024-08-16 | Stop reason: HOSPADM

## 2024-08-16 RX ORDER — SODIUM CHLORIDE 9 MG/ML
INJECTION, SOLUTION INTRAVENOUS PRN
Status: DISCONTINUED | OUTPATIENT
Start: 2024-08-16 | End: 2024-08-16 | Stop reason: HOSPADM

## 2024-08-16 RX ORDER — LIDOCAINE HYDROCHLORIDE 20 MG/ML
INJECTION, SOLUTION EPIDURAL; INFILTRATION; INTRACAUDAL; PERINEURAL PRN
Status: DISCONTINUED | OUTPATIENT
Start: 2024-08-16 | End: 2024-08-16 | Stop reason: SDUPTHER

## 2024-08-16 RX ORDER — ROCURONIUM BROMIDE 10 MG/ML
INJECTION, SOLUTION INTRAVENOUS PRN
Status: DISCONTINUED | OUTPATIENT
Start: 2024-08-16 | End: 2024-08-16 | Stop reason: SDUPTHER

## 2024-08-16 RX ORDER — SUCCINYLCHOLINE/SOD CL,ISO/PF 200MG/10ML
SYRINGE (ML) INTRAVENOUS PRN
Status: DISCONTINUED | OUTPATIENT
Start: 2024-08-16 | End: 2024-08-16 | Stop reason: SDUPTHER

## 2024-08-16 RX ORDER — PROPOFOL 10 MG/ML
INJECTION, EMULSION INTRAVENOUS PRN
Status: DISCONTINUED | OUTPATIENT
Start: 2024-08-16 | End: 2024-08-16 | Stop reason: SDUPTHER

## 2024-08-16 RX ORDER — NALOXONE HYDROCHLORIDE 0.4 MG/ML
INJECTION, SOLUTION INTRAMUSCULAR; INTRAVENOUS; SUBCUTANEOUS PRN
Status: DISCONTINUED | OUTPATIENT
Start: 2024-08-16 | End: 2024-08-16 | Stop reason: HOSPADM

## 2024-08-16 RX ORDER — FAMOTIDINE 20 MG/1
20 TABLET, FILM COATED ORAL ONCE
Status: COMPLETED | OUTPATIENT
Start: 2024-08-16 | End: 2024-08-16

## 2024-08-16 RX ADMIN — PHENYLEPHRINE HYDROCHLORIDE 100 MCG: 0.1 INJECTION, SOLUTION INTRAVENOUS at 10:37

## 2024-08-16 RX ADMIN — ROCURONIUM BROMIDE 5 MG: 10 INJECTION, SOLUTION INTRAVENOUS at 10:12

## 2024-08-16 RX ADMIN — HYDROMORPHONE HYDROCHLORIDE 0.5 MG: 2 INJECTION INTRAMUSCULAR; INTRAVENOUS; SUBCUTANEOUS at 11:44

## 2024-08-16 RX ADMIN — LIDOCAINE HYDROCHLORIDE 100 MG: 20 INJECTION, SOLUTION EPIDURAL; INFILTRATION; INTRACAUDAL; PERINEURAL at 10:12

## 2024-08-16 RX ADMIN — ACETAMINOPHEN 1000 MG: 500 TABLET, FILM COATED ORAL at 08:45

## 2024-08-16 RX ADMIN — ROCURONIUM BROMIDE 30 MG: 10 INJECTION, SOLUTION INTRAVENOUS at 10:21

## 2024-08-16 RX ADMIN — PHENYLEPHRINE HYDROCHLORIDE 150 MCG: 0.1 INJECTION, SOLUTION INTRAVENOUS at 10:17

## 2024-08-16 RX ADMIN — EPHEDRINE SULFATE 10 MG: 5 INJECTION INTRAVENOUS at 10:17

## 2024-08-16 RX ADMIN — Medication 160 MG: at 10:12

## 2024-08-16 RX ADMIN — PROPOFOL 200 MG: 10 INJECTION, EMULSION INTRAVENOUS at 10:12

## 2024-08-16 RX ADMIN — PHENYLEPHRINE HYDROCHLORIDE 100 MCG: 0.1 INJECTION, SOLUTION INTRAVENOUS at 10:21

## 2024-08-16 RX ADMIN — FAMOTIDINE 20 MG: 20 TABLET, FILM COATED ORAL at 08:45

## 2024-08-16 RX ADMIN — FENTANYL CITRATE 100 MCG: 50 INJECTION, SOLUTION INTRAMUSCULAR; INTRAVENOUS at 10:12

## 2024-08-16 RX ADMIN — Medication 3000 MG: at 10:20

## 2024-08-16 RX ADMIN — PHENYLEPHRINE HYDROCHLORIDE 100 MCG: 0.1 INJECTION, SOLUTION INTRAVENOUS at 10:46

## 2024-08-16 RX ADMIN — SODIUM CHLORIDE, POTASSIUM CHLORIDE, SODIUM LACTATE AND CALCIUM CHLORIDE: 600; 310; 30; 20 INJECTION, SOLUTION INTRAVENOUS at 08:46

## 2024-08-16 RX ADMIN — SUGAMMADEX 200 MG: 100 INJECTION, SOLUTION INTRAVENOUS at 11:20

## 2024-08-16 RX ADMIN — PHENYLEPHRINE HYDROCHLORIDE 100 MCG: 0.1 INJECTION, SOLUTION INTRAVENOUS at 11:09

## 2024-08-16 RX ADMIN — PHENYLEPHRINE HYDROCHLORIDE 100 MCG: 0.1 INJECTION, SOLUTION INTRAVENOUS at 10:44

## 2024-08-16 RX ADMIN — ONDANSETRON 4 MG: 2 INJECTION INTRAMUSCULAR; INTRAVENOUS at 11:14

## 2024-08-16 RX ADMIN — GLYCOPYRROLATE 0.2 MG: 0.2 INJECTION INTRAMUSCULAR; INTRAVENOUS at 10:48

## 2024-08-16 RX ADMIN — EPHEDRINE SULFATE 5 MG: 5 INJECTION INTRAVENOUS at 10:37

## 2024-08-16 RX ADMIN — ONDANSETRON 4 MG: 2 INJECTION INTRAMUSCULAR; INTRAVENOUS at 12:34

## 2024-08-16 ASSESSMENT — PAIN DESCRIPTION - LOCATION: LOCATION: SHOULDER

## 2024-08-16 ASSESSMENT — PAIN SCALES - GENERAL
PAINLEVEL_OUTOF10: 10
PAINLEVEL_OUTOF10: 7

## 2024-08-16 ASSESSMENT — PAIN DESCRIPTION - PAIN TYPE: TYPE: CHRONIC PAIN

## 2024-08-16 ASSESSMENT — PAIN DESCRIPTION - DESCRIPTORS
DESCRIPTORS: ACHING;DISCOMFORT;SHARP;SORE
DESCRIPTORS: ACHING

## 2024-08-16 ASSESSMENT — PAIN - FUNCTIONAL ASSESSMENT
PAIN_FUNCTIONAL_ASSESSMENT: PREVENTS OR INTERFERES SOME ACTIVE ACTIVITIES AND ADLS
PAIN_FUNCTIONAL_ASSESSMENT: 0-10

## 2024-08-16 NOTE — ANESTHESIA PRE PROCEDURE
Smokeless tobacco: Never   Substance Use Topics    Alcohol use: No                                Counseling given: Not Answered      Vital Signs (Current):   Vitals:    07/30/24 1320 08/16/24 0820   BP:  (!) 142/64   Pulse:  73   Resp:  16   Temp:  98.1 °F (36.7 °C)   TempSrc:  Oral   SpO2:  97%   Weight: 130.6 kg (288 lb) 129.3 kg (285 lb)   Height: 1.588 m (5' 2.5\") 1.588 m (5' 2.5\")                                              BP Readings from Last 3 Encounters:   08/16/24 (!) 142/64   07/01/24 (!) 131/52   05/14/24 122/78       NPO Status: Time of last liquid consumption: 2200                        Time of last solid consumption: 2200                        Date of last liquid consumption: 08/15/24                        Date of last solid food consumption: 08/14/24    BMI:   Wt Readings from Last 3 Encounters:   08/16/24 129.3 kg (285 lb)   07/01/24 127.9 kg (282 lb)   06/25/24 132.5 kg (292 lb)     Body mass index is 51.3 kg/m².    CBC:   Lab Results   Component Value Date/Time    WBC 4.9 04/23/2024 03:55 PM    RBC 4.85 04/23/2024 03:55 PM    HGB 13.4 04/23/2024 03:55 PM    HCT 42.5 04/23/2024 03:55 PM    MCV 87.6 04/23/2024 03:55 PM    RDW 13.0 04/23/2024 03:55 PM     04/23/2024 03:55 PM       CMP:   Lab Results   Component Value Date/Time     04/23/2024 03:55 PM    K 4.3 04/23/2024 03:55 PM     04/23/2024 03:55 PM    CO2 28 04/23/2024 03:55 PM    BUN 22 04/23/2024 03:55 PM    CREATININE 0.79 04/23/2024 03:55 PM    GFRAA >60 07/14/2022 04:44 PM    AGRATIO 2.1 05/12/2022 02:10 PM    LABGLOM 82 04/23/2024 03:55 PM    LABGLOM 67 05/12/2022 02:10 PM    GLUCOSE 92 04/23/2024 03:55 PM    CALCIUM 9.6 04/23/2024 03:55 PM    BILITOT 0.5 10/31/2023 02:56 PM    ALKPHOS 94 10/31/2023 02:56 PM    ALKPHOS 98 05/12/2022 02:10 PM    AST 13 10/31/2023 02:56 PM    ALT 29 10/31/2023 02:56 PM       POC Tests: No results for input(s): \"POCGLU\", \"POCNA\", \"POCK\", \"POCCL\", \"POCBUN\", \"POCHEMO\", \"POCHCT\" in the

## 2024-08-16 NOTE — PROGRESS NOTES
Spiritual Consult for Pre-Surgery Prayer. PT was in bed preparing for surgery. PT shared about surgery. PT expressed trust in Toño and comfort in prayer. PT is Apostolic. CH offered prayer. CH offered additional support if needed.    Rev. Guerline Arreaga M.Div.

## 2024-08-16 NOTE — DISCHARGE INSTRUCTIONS
Keep dressings in place and dry until return to office.    Routine luba drain care.    ACTIVITY  As tolerated and as directed by your doctor.   Bathe or shower as directed by your doctor.     DIET  Clear liquids until no nausea or vomiting; then light diet for the first day.  Advance to regular diet on second day, unless your doctor orders otherwise.   If nausea and vomiting continues, call your doctor.     PAIN  Take pain medication as directed by your doctor.   Call your doctor if pain is NOT relieved by medication.   DO NOT take aspirin of blood thinners unless directed by your doctor.     CALL YOUR DOCTOR IF   Excessive bleeding that does not stop after holding pressure over the area  Temperature of 101 degrees F or above  Excessive redness, swelling or bruising, and/ or green or yellow, smelly discharge from incision      You may be retaining fluid if you have a history of heart failure or if you experience any of the following symptoms:  Weight gain of 3 pounds or more overnight or 5 pounds in a week, increased swelling in our hands or feet or shortness of breath while lying flat in bed.  Please call your doctor as soon as you notice any of these symptoms; do not wait until your next office visit.   After general anesthesia or intravenous sedation, for 24 hours or while taking prescription Narcotics:  Limit your activities  Some people will feel drowsy or dizzy for up to a few hours after waking up.  A responsible adult needs to be with you for the next 24 hours  Do not drive and operate hazardous machinery  Do not make important personal or business decisions  Do not drink alcoholic beverages  If you have not urinated within 8 hours after discharge, and you are experiencing discomfort from urinary retention, please go to the nearest ED.  If you have sleep apnea and have a CPAP machine, please use it for all naps and sleeping.  Please use caution when taking narcotics and any of your home medications that may

## 2024-08-16 NOTE — H&P
CC: midline back wound    HPI: 70 yo with history of recurrent midline sebaceous cyst. Presents for excision and flap closure.    Past Medical History:   Diagnosis Date    Anxiety     Arrhythmia 2005    A. Fib., tachycardia-- controlled with meds-- followed by dr trent    Arthritis     everywhere;  DDD    Asthma     prn inhaler-- HOME O2 AT HS AT 3 LITERS    Breast cancer ([facility]) 09/2016    left breast-- surg  and ORAL CHEMO---    CAD (coronary artery disease)     no stents, no MI-- followed by dr trent    Chronic pain     generalized from arthritis    Congestive heart failure (CHF) ([facility])     DDD (degenerative disc disease), cervical     Depression     Diabetes ([facility])     type 2-- sqbs am avg 120-- no hypo    Diverticulosis     Fibromyalgia     GERD (gastroesophageal reflux disease)     controlled with med    Hearing reduced     Heart failure ([facility])     last echo--3/2024-- EF 60-65%-- followed by dr trent    History of adverse reaction to anesthesia 2019    delayed of awakening with colonoscopy    History of echocardiogram 11/17/14 at [facility]    No significant valvular disease.  EF 50-55%    Hypertension     controlled with med    Lupus ([facility]) 2007    Lymphedema     Morbid obesity ([facility])     BMI=51    Nausea & vomiting     Shortness of breath     PER PT WITH EXERTION    Skin lesion of back     mid back    Sleep apnea     bi pap and 3 liters oxygen  at hs and naps    Stress incontinence in female     Thyroid cyst     cyst removed from thyroid       Past Surgical History:   Procedure Laterality Date    BREAST RECONSTRUCTION Left 7/5/2018    LEFT BREAST REVISION/ EXPANDER EXCHANGE FOR IMPLANT performed by Kalpesh Lee MD at [facility] MAIN OR    CARPAL TUNNEL RELEASE Bilateral     CHOLECYSTECTOMY, LAPAROSCOPIC      COLONOSCOPY N/A 2/12/2021    COLONOSCOPY/ 53 performed by Michael Calderón MD at [facility] ENDOSCOPY    COLONOSCOPY  2/12/2021         COLONOSCOPY      CYST REMOVAL      from thyroid    INSERT TISSUE EXPANDER(S) Left

## 2024-08-16 NOTE — ANESTHESIA POSTPROCEDURE EVALUATION
Department of Anesthesiology  Postprocedure Note    Patient: Cassandra Arguello  MRN: [id number]  YOB: 1955  Date of evaluation: 8/16/2024    Procedure Summary       Date: 08/16/24 Room / Location: [facility] MAIN OR  / [facility] MAIN OR    Anesthesia Start: 0955 Anesthesia Stop: 1132    Procedure: EXCISION OF BACK LESION WITH FLAP CLOSURE (Back) Diagnosis:       Sebaceous cyst      (Sebaceous cyst [L72.3])    Providers: Kalpesh Lee MD Responsible Provider: Maverick Canela MD    Anesthesia Type: general ASA Status: 3            Anesthesia Type: No value filed.    Gladis Phase I: Gladis Score: 7    Gladis Phase II: Gladis Score: 10    Anesthesia Post Evaluation    Patient location during evaluation: PACU  Patient participation: complete - patient participated  Level of consciousness: awake and awake and alert  Airway patency: patent  Nausea & Vomiting: no nausea  Cardiovascular status: hemodynamically stable  Respiratory status: acceptable  Hydration status: euvolemic  Multimodal analgesia pain management approach  Pain management: adequate    No notable events documented.

## 2024-08-19 NOTE — PROGRESS NOTES
WITH FLAP CLOSURE performed by Kalpesh Lee MD at [facility] MAIN OR    BREAST RECONSTRUCTION Left 7/5/2018    LEFT BREAST REVISION/ EXPANDER EXCHANGE FOR IMPLANT performed by Kalpesh Lee MD at [facility] MAIN OR    CARPAL TUNNEL RELEASE Bilateral     CHOLECYSTECTOMY, LAPAROSCOPIC      COLONOSCOPY N/A 2/12/2021    COLONOSCOPY/ 53 performed by Michael Calderón MD at [facility] ENDOSCOPY    COLONOSCOPY  2/12/2021         COLONOSCOPY      CYST REMOVAL      from thyroid    INSERT TISSUE EXPANDER(S) Left 01/2017    MASTECTOMY Left 01/2017    NEUROLOGICAL SURGERY      Burning nerve to back    OTHER SURGICAL HISTORY      abcess where bra strap    HI UNLISTED PROCEDURE CARDIAC SURGERY  Cath [facility]/14 [facility]    Minimal CAD in the ostial circumflex and mid ramus (medical management)    SINUS SURGERY PROC UNLISTED      sinus surgery    UPPER GASTROINTESTINAL ENDOSCOPY  2/12/2021          Family History:   Family History   Problem Relation Age of Onset    Breast Cancer Neg Hx     Hypertension Brother     Cancer Brother         pituitary    Sleep Apnea Brother         states also has two children with sleep apnea    Heart Disease Mother     Sleep Apnea Father     Hypertension Father     Hypertension Mother     Cancer Mother         lung    Cancer Father         prostate      Social History:   Social History     Tobacco Use    Smoking status: Never    Smokeless tobacco: Never   Substance Use Topics    Alcohol use: No       ALLERGIES:   Allergies   Allergen Reactions    Ciprofloxacin Diarrhea and Other (See Comments)     Per pt, started her c-diff\"    Ciprofloxacin Hcl     Misc. Sulfonamide Containing Compounds     Sulfa Antibiotics Other (See Comments) and Rash     Yeast growth  Pt gets a yeast infection on body         Patient Medications    Current Outpatient Medications   Medication Sig    oxyCODONE-acetaminophen (PERCOCET) 5-325 MG per tablet Take 1 tablet by mouth every 6 hours as needed for Pain for up to 7 days. Intended supply: 7

## 2024-08-20 LAB
FUNGAL CULT/SMEAR: NORMAL
SPECIMEN PROCESSING: NORMAL
SPECIMEN SOURCE: NORMAL

## 2024-08-21 ENCOUNTER — OFFICE VISIT (OUTPATIENT)
Dept: ORTHOPEDIC SURGERY | Age: 69
End: 2024-08-21
Payer: MEDICARE

## 2024-08-21 VITALS — BODY MASS INDEX: 52.44 KG/M2 | HEIGHT: 62 IN | WEIGHT: 285 LBS

## 2024-08-21 DIAGNOSIS — M47.816 LUMBAR SPONDYLOSIS: ICD-10-CM

## 2024-08-21 DIAGNOSIS — M25.551 RIGHT HIP PAIN: Primary | ICD-10-CM

## 2024-08-21 DIAGNOSIS — M25.552 LEFT HIP PAIN: ICD-10-CM

## 2024-08-21 DIAGNOSIS — M48.062 SPINAL STENOSIS OF LUMBAR REGION WITH NEUROGENIC CLAUDICATION: ICD-10-CM

## 2024-08-21 DIAGNOSIS — M54.16 LUMBAR RADICULOPATHY: ICD-10-CM

## 2024-08-21 DIAGNOSIS — M43.10 ACQUIRED SPONDYLOLISTHESIS: ICD-10-CM

## 2024-08-21 DIAGNOSIS — M51.36 DISC DEGENERATION, LUMBAR: ICD-10-CM

## 2024-08-21 PROCEDURE — 3017F COLORECTAL CA SCREEN DOC REV: CPT | Performed by: PHYSICAL MEDICINE & REHABILITATION

## 2024-08-21 PROCEDURE — G8417 CALC BMI ABV UP PARAM F/U: HCPCS | Performed by: PHYSICAL MEDICINE & REHABILITATION

## 2024-08-21 PROCEDURE — 1090F PRES/ABSN URINE INCON ASSESS: CPT | Performed by: PHYSICAL MEDICINE & REHABILITATION

## 2024-08-21 PROCEDURE — 1036F TOBACCO NON-USER: CPT | Performed by: PHYSICAL MEDICINE & REHABILITATION

## 2024-08-21 PROCEDURE — 1123F ACP DISCUSS/DSCN MKR DOCD: CPT | Performed by: PHYSICAL MEDICINE & REHABILITATION

## 2024-08-21 PROCEDURE — G2211 COMPLEX E/M VISIT ADD ON: HCPCS | Performed by: PHYSICAL MEDICINE & REHABILITATION

## 2024-08-21 PROCEDURE — G8399 PT W/DXA RESULTS DOCUMENT: HCPCS | Performed by: PHYSICAL MEDICINE & REHABILITATION

## 2024-08-21 PROCEDURE — G8427 DOCREV CUR MEDS BY ELIG CLIN: HCPCS | Performed by: PHYSICAL MEDICINE & REHABILITATION

## 2024-08-21 PROCEDURE — 99214 OFFICE O/P EST MOD 30 MIN: CPT | Performed by: PHYSICAL MEDICINE & REHABILITATION

## 2024-08-23 LAB
BACTERIA SPEC CULT: ABNORMAL
FUNGUS SMEAR: NORMAL
SERVICE CMNT-IMP: ABNORMAL
SPECIMEN SOURCE: NORMAL

## 2024-09-03 NOTE — OP NOTE
Operative Note      Patient: Cassandra Arguello  YOB: 1955  MRN: [id number]    Date of Procedure: 8/16/2024    Pre-Op Diagnosis Codes:      * Sebaceous cyst [L72.3]    Post-Op Diagnosis: Same       Procedure:  Excision of mid back paraspinous abscess 3 x 5 cm  Paraspinous muscle flap closure of midline back defect 7 cm length    Surgeon(s):  Kalpesh Lee MD    Assistant:   Surgical Assistant: Astrid Jacinto    Anesthesia: General    Estimated Blood Loss (mL): 15ml    Complications: None    Specimens:   ID Type Source Tests Collected by Time Destination   1 : back wound Swab Back CULTURE, FUNGUS, CULTURE, WOUND Kalpesh Lee MD 8/16/2024 1040    A : midline back wound Tissue Back SURGICAL PATHOLOGY Kalpesh Lee MD 8/16/2024 1047        Implants:  * No implants in log *      Drains:   [REMOVED] Closed/Suction Drain Midline Back Bulb (Removed)   Site Description Unable to view 08/16/24 1222   Dressing Status Clean, dry & intact 08/16/24 1222   Drainage Appearance Bright red 08/16/24 1222   Drain Status Compressed;To bulb suction 08/16/24 1222       Detailed Description of Procedure:     Prior to the procedure the patient was met in holding. Once again a discussion of the risks, benefits and alternatives was conducted including but not limited to bleeding, infection, failure of the surgery, need for further procedures, disappointing or unacceptable cosmesis, damage to adjacent structures was conducted. The patient again affirmed understanding and consent. The patient was marked in the upright and relaxed position. Patient brought to the OR, surgical timeout performed and anesthesia induced. Patient positioned and prepped and draped.    The sinus was probed and extended inferiorly 8 cm. This was painted with methylene blue. The sinus tract was then excised en bloc and passed off the field for permanent pathology. Wound irrigated copiously.     Dissection carried down to the muscle and

## 2024-09-04 LAB
BACTERIA SPEC CULT: ABNORMAL
BACTERIA SPEC CULT: ABNORMAL
ORGANISM ID: NORMAL
SERVICE CMNT-IMP: ABNORMAL

## 2024-09-16 LAB
FUNGAL CULT/SMEAR: NORMAL
FUNGUS (MYCOLOGY) CULTURE: NEGATIVE
FUNGUS SMEAR: NORMAL
REFLEX TO ID: NORMAL
SPECIMEN PROCESSING: NORMAL
SPECIMEN SOURCE: NORMAL
SPECIMEN SOURCE: NORMAL

## 2024-09-24 ENCOUNTER — OFFICE VISIT (OUTPATIENT)
Age: 69
End: 2024-09-24
Payer: MEDICARE

## 2024-09-24 DIAGNOSIS — M54.2 NECK PAIN: Primary | ICD-10-CM

## 2024-09-24 DIAGNOSIS — M47.812 CERVICAL FACET JOINT SYNDROME: ICD-10-CM

## 2024-09-24 DIAGNOSIS — M50.30 DDD (DEGENERATIVE DISC DISEASE), CERVICAL: ICD-10-CM

## 2024-09-24 DIAGNOSIS — M19.012 PRIMARY OSTEOARTHRITIS OF LEFT SHOULDER: ICD-10-CM

## 2024-09-24 PROCEDURE — 99214 OFFICE O/P EST MOD 30 MIN: CPT | Performed by: PHYSICIAN ASSISTANT

## 2024-09-24 PROCEDURE — 1036F TOBACCO NON-USER: CPT | Performed by: PHYSICIAN ASSISTANT

## 2024-09-24 PROCEDURE — 3017F COLORECTAL CA SCREEN DOC REV: CPT | Performed by: PHYSICIAN ASSISTANT

## 2024-09-24 PROCEDURE — G8417 CALC BMI ABV UP PARAM F/U: HCPCS | Performed by: PHYSICIAN ASSISTANT

## 2024-09-24 PROCEDURE — G8399 PT W/DXA RESULTS DOCUMENT: HCPCS | Performed by: PHYSICIAN ASSISTANT

## 2024-09-24 PROCEDURE — 1123F ACP DISCUSS/DSCN MKR DOCD: CPT | Performed by: PHYSICIAN ASSISTANT

## 2024-09-24 PROCEDURE — G8427 DOCREV CUR MEDS BY ELIG CLIN: HCPCS | Performed by: PHYSICIAN ASSISTANT

## 2024-09-24 PROCEDURE — 1090F PRES/ABSN URINE INCON ASSESS: CPT | Performed by: PHYSICIAN ASSISTANT

## 2024-09-24 RX ORDER — DEXTROMETHORPHAN HBR, GUAIFENESIN 10; 100 MG/5ML; MG/5ML
LIQUID ORAL
COMMUNITY
Start: 2024-07-09

## 2024-09-24 RX ORDER — POLYMYXIN B SULFATE AND TRIMETHOPRIM 1; 10000 MG/ML; [USP'U]/ML
SOLUTION OPHTHALMIC
COMMUNITY
Start: 2024-07-09

## 2024-09-24 RX ORDER — AZITHROMYCIN 500 MG/1
TABLET, FILM COATED ORAL
COMMUNITY
Start: 2024-07-09

## 2024-09-25 ENCOUNTER — OFFICE VISIT (OUTPATIENT)
Dept: SURGERY | Age: 69
End: 2024-09-25
Payer: MEDICARE

## 2024-09-25 VITALS
HEIGHT: 62 IN | SYSTOLIC BLOOD PRESSURE: 149 MMHG | DIASTOLIC BLOOD PRESSURE: 76 MMHG | WEIGHT: 276 LBS | BODY MASS INDEX: 50.79 KG/M2 | HEART RATE: 81 BPM

## 2024-09-25 DIAGNOSIS — E78.00 HYPERCHOLESTEROLEMIA: ICD-10-CM

## 2024-09-25 DIAGNOSIS — I25.10 CHRONIC DIASTOLIC HEART FAILURE SECONDARY TO CORONARY ARTERY DISEASE (HCC): ICD-10-CM

## 2024-09-25 DIAGNOSIS — K21.9 GASTROESOPHAGEAL REFLUX DISEASE WITHOUT ESOPHAGITIS: ICD-10-CM

## 2024-09-25 DIAGNOSIS — E11.65 TYPE 2 DIABETES MELLITUS WITH HYPERGLYCEMIA, WITH LONG-TERM CURRENT USE OF INSULIN (HCC): Chronic | ICD-10-CM

## 2024-09-25 DIAGNOSIS — I50.32 CHRONIC DIASTOLIC HEART FAILURE SECONDARY TO CORONARY ARTERY DISEASE (HCC): ICD-10-CM

## 2024-09-25 DIAGNOSIS — G47.33 OSA (OBSTRUCTIVE SLEEP APNEA): ICD-10-CM

## 2024-09-25 DIAGNOSIS — E66.01 MORBID OBESITY WITH BMI OF 50.0-59.9, ADULT: ICD-10-CM

## 2024-09-25 DIAGNOSIS — Z79.4 TYPE 2 DIABETES MELLITUS WITH HYPERGLYCEMIA, WITH LONG-TERM CURRENT USE OF INSULIN (HCC): Chronic | ICD-10-CM

## 2024-09-25 DIAGNOSIS — E78.2 MIXED HYPERLIPIDEMIA: ICD-10-CM

## 2024-09-25 DIAGNOSIS — I10 ESSENTIAL HYPERTENSION: Primary | ICD-10-CM

## 2024-09-25 PROCEDURE — 3078F DIAST BP <80 MM HG: CPT | Performed by: SURGERY

## 2024-09-25 PROCEDURE — 3077F SYST BP >= 140 MM HG: CPT | Performed by: SURGERY

## 2024-09-25 PROCEDURE — 2022F DILAT RTA XM EVC RTNOPTHY: CPT | Performed by: SURGERY

## 2024-09-25 PROCEDURE — G8399 PT W/DXA RESULTS DOCUMENT: HCPCS | Performed by: SURGERY

## 2024-09-25 PROCEDURE — G8417 CALC BMI ABV UP PARAM F/U: HCPCS | Performed by: SURGERY

## 2024-09-25 PROCEDURE — 99205 OFFICE O/P NEW HI 60 MIN: CPT | Performed by: SURGERY

## 2024-09-25 PROCEDURE — 1123F ACP DISCUSS/DSCN MKR DOCD: CPT | Performed by: SURGERY

## 2024-09-25 PROCEDURE — 1090F PRES/ABSN URINE INCON ASSESS: CPT | Performed by: SURGERY

## 2024-09-25 PROCEDURE — 1036F TOBACCO NON-USER: CPT | Performed by: SURGERY

## 2024-09-25 PROCEDURE — 3017F COLORECTAL CA SCREEN DOC REV: CPT | Performed by: SURGERY

## 2024-09-25 PROCEDURE — G8427 DOCREV CUR MEDS BY ELIG CLIN: HCPCS | Performed by: SURGERY

## 2024-09-25 PROCEDURE — 3046F HEMOGLOBIN A1C LEVEL >9.0%: CPT | Performed by: SURGERY

## 2024-10-01 ENCOUNTER — CLINICAL DOCUMENTATION (OUTPATIENT)
Dept: OTHER | Age: 69
End: 2024-10-01

## 2024-10-01 NOTE — PROGRESS NOTES
10-1-24  Left message to call to set up appointment for exercise assessment.  10-3-24  Spoke to MsMaria E Pattersonon and got her scheduled for her exercise assessment for 10-16-24.

## 2024-10-16 ENCOUNTER — CLINICAL DOCUMENTATION (OUTPATIENT)
Dept: CARDIAC REHAB | Age: 69
End: 2024-10-16

## 2024-10-16 NOTE — PROGRESS NOTES
Patient was seen today at [facility] for exercise assessment with Mena Shannon, exercise specialist.  She completed 3min of 6min exercise test, and had to stop d/t knee,back,and hip pain.  Vitals responded normally before, during, and after test. She voiced interest in using 30day membership, and is aware that it is encouraged by her cardiologist. Savannah Christie, exercise specialist

## 2024-11-04 ENCOUNTER — OFFICE VISIT (OUTPATIENT)
Dept: ORTHOPEDIC SURGERY | Age: 69
End: 2024-11-04
Payer: MEDICARE

## 2024-11-04 DIAGNOSIS — M79.18 MYOFASCIAL PAIN: ICD-10-CM

## 2024-11-04 DIAGNOSIS — M19.012 PRIMARY OSTEOARTHRITIS OF LEFT SHOULDER: Primary | ICD-10-CM

## 2024-11-04 PROCEDURE — 20611 DRAIN/INJ JOINT/BURSA W/US: CPT | Performed by: STUDENT IN AN ORGANIZED HEALTH CARE EDUCATION/TRAINING PROGRAM

## 2024-11-04 PROCEDURE — 1036F TOBACCO NON-USER: CPT | Performed by: STUDENT IN AN ORGANIZED HEALTH CARE EDUCATION/TRAINING PROGRAM

## 2024-11-04 PROCEDURE — 3017F COLORECTAL CA SCREEN DOC REV: CPT | Performed by: STUDENT IN AN ORGANIZED HEALTH CARE EDUCATION/TRAINING PROGRAM

## 2024-11-04 PROCEDURE — G8484 FLU IMMUNIZE NO ADMIN: HCPCS | Performed by: STUDENT IN AN ORGANIZED HEALTH CARE EDUCATION/TRAINING PROGRAM

## 2024-11-04 PROCEDURE — 1123F ACP DISCUSS/DSCN MKR DOCD: CPT | Performed by: STUDENT IN AN ORGANIZED HEALTH CARE EDUCATION/TRAINING PROGRAM

## 2024-11-04 PROCEDURE — G8428 CUR MEDS NOT DOCUMENT: HCPCS | Performed by: STUDENT IN AN ORGANIZED HEALTH CARE EDUCATION/TRAINING PROGRAM

## 2024-11-04 PROCEDURE — 1090F PRES/ABSN URINE INCON ASSESS: CPT | Performed by: STUDENT IN AN ORGANIZED HEALTH CARE EDUCATION/TRAINING PROGRAM

## 2024-11-04 PROCEDURE — 99204 OFFICE O/P NEW MOD 45 MIN: CPT | Performed by: STUDENT IN AN ORGANIZED HEALTH CARE EDUCATION/TRAINING PROGRAM

## 2024-11-04 PROCEDURE — G8417 CALC BMI ABV UP PARAM F/U: HCPCS | Performed by: STUDENT IN AN ORGANIZED HEALTH CARE EDUCATION/TRAINING PROGRAM

## 2024-11-04 PROCEDURE — G8399 PT W/DXA RESULTS DOCUMENT: HCPCS | Performed by: STUDENT IN AN ORGANIZED HEALTH CARE EDUCATION/TRAINING PROGRAM

## 2024-11-04 RX ORDER — METHYLPREDNISOLONE ACETATE 40 MG/ML
40 INJECTION, SUSPENSION INTRA-ARTICULAR; INTRALESIONAL; INTRAMUSCULAR; SOFT TISSUE ONCE
Status: COMPLETED | OUTPATIENT
Start: 2024-11-04 | End: 2024-11-04

## 2024-11-04 RX ADMIN — METHYLPREDNISOLONE ACETATE 40 MG: 40 INJECTION, SUSPENSION INTRA-ARTICULAR; INTRALESIONAL; INTRAMUSCULAR; SOFT TISSUE at 14:33

## 2024-11-04 NOTE — PROGRESS NOTES
Abduction 70.  Ex rotation 10. Int rotation: Side of hip  Tenderness: Multiple areas of tenderness including paraspinal musculature, trapezius, clavicle midshaft, subclavius, pectoralis, rotator cuff footprint  Strength: Abduction 5/5, External rotation 5/5, Internal rotation 5/5  Provocative tests: Pain and weakness with belly press, bearhug.  Positive empty can.  Positive Lagunas and Neer.  Normal capillary refill / 2+ radial pulse   Sensation intact to light touch          DIAGNOSTIC IMAGING:     X-ray 3 vw LEFT shoulder Grashey  / axillary / outlet taken previously for shoulder pain    Findings: Advanced degenerative changes of the glenohumeral joint with osteophytosis of the anterior inferior humeral head, cystic changes of the glenoid and humeral head with bony sclerosis.  There is moderate AC joint arthritis.  Subacromial space is narrowed.  Impression: Advanced glenohumeral joint arthritis, findings suggestive of rotator cuff arthropathy    I independently interpreted XR images.     ASSESSMENT/PLAN:   1. Primary osteoarthritis of left shoulder    2. Myofascial pain         Today we discussed imaging and examination findings, diagnosis, treatment, and expected prognosis.    She has findings consistent with osteoarthritis as well as likely chronic rotator cuff weakness but she also exhibits pain to multiple areas in both the shoulder and chest wall.  A lot of her pain is myofascial in nature.  I would not recommend surgical intervention as a first-line of treatment for this patient particularly considering her comorbidities and her obesity.  Shoulder treatment would more likely be a total shoulder but her BMI is quite elevated for this.  I have recommended a glenohumeral joint injection which was performed today as well as a course of physical therapy for her myofascial pain.  She can return in 6 weeks for reevaluation.  She was agreeable with care plan    Orders / medications today:   Orders Placed This

## 2024-11-06 ENCOUNTER — OFFICE VISIT (OUTPATIENT)
Age: 69
End: 2024-11-06

## 2024-11-06 VITALS
HEART RATE: 77 BPM | DIASTOLIC BLOOD PRESSURE: 70 MMHG | SYSTOLIC BLOOD PRESSURE: 120 MMHG | WEIGHT: 274 LBS | HEIGHT: 62 IN | BODY MASS INDEX: 50.42 KG/M2

## 2024-11-06 DIAGNOSIS — I51.7 LVH (LEFT VENTRICULAR HYPERTROPHY): ICD-10-CM

## 2024-11-06 DIAGNOSIS — I10 HYPERTENSION, ESSENTIAL: ICD-10-CM

## 2024-11-06 DIAGNOSIS — I50.30 HEART FAILURE WITH PRESERVED EJECTION FRACTION, UNSPECIFIED HF CHRONICITY (HCC): ICD-10-CM

## 2024-11-06 DIAGNOSIS — R00.0 TACHYCARDIA: Primary | ICD-10-CM

## 2024-11-06 DIAGNOSIS — E27.9 ADRENAL NODULE (HCC): ICD-10-CM

## 2024-11-06 DIAGNOSIS — E66.01 MORBID (SEVERE) OBESITY DUE TO EXCESS CALORIES: ICD-10-CM

## 2024-11-06 DIAGNOSIS — R00.2 PALPITATIONS: ICD-10-CM

## 2024-11-06 RX ORDER — HYDROCHLOROTHIAZIDE 25 MG/1
25 TABLET ORAL DAILY
Qty: 90 TABLET | Refills: 3 | Status: SHIPPED | OUTPATIENT
Start: 2024-11-06 | End: 2024-11-06 | Stop reason: ALTCHOICE

## 2024-11-06 RX ORDER — METOPROLOL SUCCINATE 100 MG/1
TABLET, EXTENDED RELEASE ORAL
Qty: 180 TABLET | Refills: 3 | Status: SHIPPED | OUTPATIENT
Start: 2024-11-06

## 2024-11-06 RX ORDER — IVABRADINE 7.5 MG/1
TABLET, FILM COATED ORAL
Qty: 180 TABLET | Refills: 3 | Status: SHIPPED | OUTPATIENT
Start: 2024-11-06

## 2024-11-06 RX ORDER — SPIRONOLACTONE 25 MG/1
25 TABLET ORAL DAILY
Qty: 90 TABLET | Refills: 3 | Status: SHIPPED | OUTPATIENT
Start: 2024-11-06

## 2024-11-06 NOTE — PROGRESS NOTES
reviewed with the patient today.           No results found for this or any previous visit (from the past 672 hour(s)).  Lab Results   Component Value Date/Time    CHOL 80 09/28/2023 03:20 PM    HDL 35 09/28/2023 03:20 PM    LDL 32.2 09/28/2023 03:20 PM    VLDL 12.8 09/28/2023 03:20 PM    VLDL 14 10/19/2020 12:23 PM       No results found for any visits on 11/06/24.        LENIN Trujillo was seen today for hypertension, tachycardia and shortness of breath.    Diagnoses and all orders for this visit:    Tachycardia    Hypertension, essential    LVH (left ventricular hypertrophy)    Adrenal nodule (HCC)    Morbid (severe) obesity due to excess calories    Heart failure with preserved ejection fraction, unspecified HF chronicity (HCC)      No follow-ups on file.       Geo Weller MD  11/6/2024  2:21 PM      The patient (or guardian, if applicable) and other individuals in attendance with the patient were advised that Artificial Intelligence will be utilized during this visit to record, process the conversation to generate a clinical note, and support improvement of the AI technology. The patient (or guardian, if applicable) and other individuals in attendance at the appointment consented to the use of AI, including the recording.

## 2024-11-07 ENCOUNTER — CLINICAL DOCUMENTATION (OUTPATIENT)
Age: 69
End: 2024-11-07

## 2024-11-07 ENCOUNTER — TELEPHONE (OUTPATIENT)
Age: 69
End: 2024-11-07

## 2024-11-07 DIAGNOSIS — I50.1: ICD-10-CM

## 2024-11-07 DIAGNOSIS — E11.59 CORONARY ARTERY DISEASE DUE TO TYPE 2 DIABETES MELLITUS (HCC): Primary | Chronic | ICD-10-CM

## 2024-11-07 DIAGNOSIS — R00.0 TACHYCARDIA: ICD-10-CM

## 2024-11-07 DIAGNOSIS — I25.10 CORONARY ARTERY DISEASE DUE TO TYPE 2 DIABETES MELLITUS (HCC): Primary | Chronic | ICD-10-CM

## 2024-11-07 DIAGNOSIS — R53.82 CHRONIC FATIGUE: ICD-10-CM

## 2024-11-07 NOTE — TELEPHONE ENCOUNTER
I called [facility] [phone] and asked for the pts nurse to call me back regarding labs.

## 2024-11-07 NOTE — PROGRESS NOTES
Ivabradine HCl 7.5MG tablets    PA submitted.     [facility]  PA Case: [id number], Status: Approved, Coverage Starts on: 1/1/2024 12:00:00 AM, Coverage Ends on: 12/31/2025 12:00:00 AM.

## 2024-11-07 NOTE — TELEPHONE ENCOUNTER
----- Message from Dr. Geo Weller MD sent at 11/6/2024  2:29 PM EST -----  Can we get interim home health to check a BMP next week?

## 2024-11-11 ENCOUNTER — TRANSCRIBE ORDERS (OUTPATIENT)
Dept: SCHEDULING | Age: 69
End: 2024-11-11

## 2024-11-11 DIAGNOSIS — Z12.31 ENCOUNTER FOR SCREENING MAMMOGRAM FOR MALIGNANT NEOPLASM OF BREAST: Primary | ICD-10-CM

## 2024-11-15 NOTE — TELEPHONE ENCOUNTER
Called  at [phone] I let them know I needed a fax number for a BMP lab, I was given fax [phone]. Her nurse will give me a call. Lab faxed.

## 2024-11-19 ENCOUNTER — TELEPHONE (OUTPATIENT)
Age: 69
End: 2024-11-19

## 2024-12-13 NOTE — PROGRESS NOTES
rashes, lesions or ulcers, normal temperature, turgor, and texture on uninvolved extremity.      ORTHO EXAM:    Left Shoulder:      Inspection: no biceps deformity; no notable atrophy or erythema  ROM active  passive: FF 90, intolerant forward flexion past 110. Abduction 70.  Ex rotation 10. Int rotation: Side of hip  Tenderness: Multiple areas of tenderness including paraspinal musculature, trapezius, clavicle midshaft, subclavius, pectoralis, rotator cuff footprint  Strength: Abduction 5/5, External rotation 5/5, Internal rotation 5/5  Provocative tests: Pain and weakness with belly press, bearhug.  Positive empty can.  Positive Lagunas and Neer.  Normal capillary refill / 2+ radial pulse   Sensation intact to light touch          DIAGNOSTIC IMAGING:     I have reviewed prior imaging studies. No new imaging studies required.     ASSESSMENT/PLAN:   1. Primary osteoarthritis of left shoulder    2. Myofascial pain         We had a discussion again regarding her shoulder.  She has advanced arthritis of the shoulder, suspect rotator cuff arthropathy, and significant generalized shoulder pain.  Patient has a BMI of 50 and other significant comorbidities making her high risk of surgical candidate to consider total shoulder arthroplasty.  We discussed surgery briefly,.  I also recommended consideration of shoulder ablations which she declined interested in.  She was agreeable to considering physical therapy for her shoulder and wanted to reschedule another injection in February.    Orders / medications today:   Orders Placed This Encounter    Ambulatory referral to Physical Therapy     Referral Priority:   Routine     Referral Type:   Physical Therapy     Referral Reason:   Patient Preference     Number of Visits Requested:   1      Follow up: Return in about 7 weeks (around 2/4/2025) for repeat injection.       The patient expressed understanding and agreed with the plan.     Marima Deluna MD   Orthopaedics and Sports

## 2024-12-17 ENCOUNTER — OFFICE VISIT (OUTPATIENT)
Dept: ORTHOPEDIC SURGERY | Age: 69
End: 2024-12-17
Payer: MEDICARE

## 2024-12-17 DIAGNOSIS — M79.18 MYOFASCIAL PAIN: ICD-10-CM

## 2024-12-17 DIAGNOSIS — M19.012 PRIMARY OSTEOARTHRITIS OF LEFT SHOULDER: Primary | ICD-10-CM

## 2024-12-17 PROCEDURE — G8428 CUR MEDS NOT DOCUMENT: HCPCS | Performed by: STUDENT IN AN ORGANIZED HEALTH CARE EDUCATION/TRAINING PROGRAM

## 2024-12-17 PROCEDURE — 1036F TOBACCO NON-USER: CPT | Performed by: STUDENT IN AN ORGANIZED HEALTH CARE EDUCATION/TRAINING PROGRAM

## 2024-12-17 PROCEDURE — G8399 PT W/DXA RESULTS DOCUMENT: HCPCS | Performed by: STUDENT IN AN ORGANIZED HEALTH CARE EDUCATION/TRAINING PROGRAM

## 2024-12-17 PROCEDURE — 3017F COLORECTAL CA SCREEN DOC REV: CPT | Performed by: STUDENT IN AN ORGANIZED HEALTH CARE EDUCATION/TRAINING PROGRAM

## 2024-12-17 PROCEDURE — G8484 FLU IMMUNIZE NO ADMIN: HCPCS | Performed by: STUDENT IN AN ORGANIZED HEALTH CARE EDUCATION/TRAINING PROGRAM

## 2024-12-17 PROCEDURE — G8417 CALC BMI ABV UP PARAM F/U: HCPCS | Performed by: STUDENT IN AN ORGANIZED HEALTH CARE EDUCATION/TRAINING PROGRAM

## 2024-12-17 PROCEDURE — 1123F ACP DISCUSS/DSCN MKR DOCD: CPT | Performed by: STUDENT IN AN ORGANIZED HEALTH CARE EDUCATION/TRAINING PROGRAM

## 2024-12-17 PROCEDURE — 1090F PRES/ABSN URINE INCON ASSESS: CPT | Performed by: STUDENT IN AN ORGANIZED HEALTH CARE EDUCATION/TRAINING PROGRAM

## 2024-12-17 PROCEDURE — 99213 OFFICE O/P EST LOW 20 MIN: CPT | Performed by: STUDENT IN AN ORGANIZED HEALTH CARE EDUCATION/TRAINING PROGRAM

## 2025-01-23 ENCOUNTER — TELEPHONE (OUTPATIENT)
Age: 70
End: 2025-01-23

## 2025-01-23 NOTE — TELEPHONE ENCOUNTER
Called patient to try and move her from Monday to today.  Unable to leave a voice message as her mailbox was full.

## 2025-01-27 ENCOUNTER — EVALUATION (OUTPATIENT)
Age: 70
End: 2025-01-27
Payer: MEDICARE

## 2025-01-27 DIAGNOSIS — G89.29 CHRONIC LEFT SHOULDER PAIN: ICD-10-CM

## 2025-01-27 DIAGNOSIS — Z78.9 ALTERATION IN PERFORMANCE OF ACTIVITIES OF DAILY LIVING: ICD-10-CM

## 2025-01-27 DIAGNOSIS — M25.612 SHOULDER STIFFNESS, LEFT: ICD-10-CM

## 2025-01-27 DIAGNOSIS — M25.512 CHRONIC LEFT SHOULDER PAIN: ICD-10-CM

## 2025-01-27 DIAGNOSIS — M79.18 MYOFASCIAL PAIN: ICD-10-CM

## 2025-01-27 DIAGNOSIS — M19.012 PRIMARY OSTEOARTHRITIS OF LEFT SHOULDER: ICD-10-CM

## 2025-01-27 DIAGNOSIS — R53.1 WEAKNESS GENERALIZED: Primary | ICD-10-CM

## 2025-01-27 PROCEDURE — 97163 PT EVAL HIGH COMPLEX 45 MIN: CPT | Performed by: PHYSICAL THERAPIST

## 2025-01-27 PROCEDURE — 97110 THERAPEUTIC EXERCISES: CPT | Performed by: PHYSICAL THERAPIST

## 2025-01-27 NOTE — PROGRESS NOTES
[facility]  [address] [place] [id number]  Dept: [phone]      Physical Therapy Initial Assessment     Referring MD: Mariam Deluna MD  Diagnosis:     ICD-10-CM    1. Weakness generalized  R53.1       2. Alteration in performance of activities of daily living  Z78.9       3. Shoulder stiffness, left  M25.612       4. Chronic left shoulder pain  M25.512     G89.29          Therapy precautions:None  Co-morbidities affecting plan of care: Cervical DDD, CAD, DM II, H.o.H, Morbid obesity, HTN, SOB, Fibromyalgia,    Total Timed Procedure Codes: 20 min, Total Time: 51 min  Time In: 01:04 PM  Time Out: 01:55 PM  Visit: 1   Modifier needed: No    Payor: Payor: [employer] /  /  /  Billing pattern: Government- total time     PERTINENT MEDICAL HISTORY     Past medical and surgical history:   Past Medical History:   Diagnosis Date    Anxiety     Arrhythmia 2005    A. Fib., tachycardia-- controlled with meds-- followed by dr trent    Arthritis     everywhere;  DDD    Asthma     prn inhaler-- HOME O2 AT  AT 3 LITERS    Breast cancer ([facility]) 09/2016    left breast-- surg  and ORAL CHEMO---    CAD (coronary artery disease)     no stents, no MI-- followed by dr trent    Chronic pain     generalized from arthritis    Congestive heart failure (CHF) ([facility])     DDD (degenerative disc disease), cervical     Depression     Diabetes ([facility])     type 2-- sqbs am avg 120-- no hypo    Diverticulosis     Fibromyalgia     GERD (gastroesophageal reflux disease)     controlled with med    Hearing reduced     Heart failure ([facility])     last echo--3/2024-- EF 60-65%-- followed by dr trent    History of adverse reaction to anesthesia 2019    delayed of awakening with colonoscopy    History of echocardiogram 11/17/14 at [facility]    No significant valvular disease.  EF 50-55%    Hypertension     controlled with med    Lupus ([facility]) 2007    Lymphedema     Morbid obesity     BMI=51    Nausea & vomiting

## 2025-02-03 NOTE — PROGRESS NOTES
Name: Cassandra Arguello  YOB: 1955  Gender: female  MRN: [id number]  Date of Encounter:  2/4/2025       CHIEF COMPLAINT:     Chief Complaint   Patient presents with    Injections     Left Shoulder (GHJ)        SUBJECTIVE/OBJECTIVE:      HPI:    Patient is a 69 y.o. pleasant female who presents today for a return evaluation of her left shoulder.    Working diagnosis:   1. Primary osteoarthritis of left shoulder    2. Myofascial pain       LOV: 12/17/2024     Recall hx: She presents for 6 months of increased shoulder pain associated with mobility and decreased range of motion.  She localizes pain from her upper neck down her trap all the way down to her elbow.  She also describes a deep pain.  Pain is frequently sharp and severe.  She has tried Advil in the past which was not helping as well as topical Bengay and patches.  She recently got a referral for home health physical therapy that has yet to begin.  She was seen by Jennifer Patetrson recently who referred her for evaluation with Dr. Reyes or Dr. Rebolledo.  She was recently seen by bariatric medicine for weight loss.   She has advanced GH and AC arthritis, findings consistent with RTC arthropathy. Also exhibits myofascial pain     11/4/24: Initial eval. GHJ injection performed. Recommended formal therapy.      12/17/24: She has some mild improvement with the injection.  She continues to have generalized shoulder pain.  She is doing lymphedema therapy but not physical therapy for her shoulder. PT order was placed.     2/4/25: She returns for repeat GHJ injection today. She had about 30% pain relief with the last injection. We had discussed possible ablation before but she declined that.        PAST HISTORY:   Past medical, surgical, family, social history and allergies reviewed by me. Unchanged from prior visit.     REVIEW OF SYSTEMS:   As noted in HPI.     PHYSICAL EXAMINATION:     Gen: Well-developed, no acute distress   HEENT: NC/AT, EOMI   Neck:

## 2025-02-04 ENCOUNTER — OFFICE VISIT (OUTPATIENT)
Dept: ORTHOPEDIC SURGERY | Age: 70
End: 2025-02-04
Payer: MEDICARE

## 2025-02-04 DIAGNOSIS — M19.012 PRIMARY OSTEOARTHRITIS OF LEFT SHOULDER: Primary | ICD-10-CM

## 2025-02-04 DIAGNOSIS — M79.18 MYOFASCIAL PAIN: ICD-10-CM

## 2025-02-04 PROCEDURE — G8428 CUR MEDS NOT DOCUMENT: HCPCS | Performed by: STUDENT IN AN ORGANIZED HEALTH CARE EDUCATION/TRAINING PROGRAM

## 2025-02-04 PROCEDURE — 1123F ACP DISCUSS/DSCN MKR DOCD: CPT | Performed by: STUDENT IN AN ORGANIZED HEALTH CARE EDUCATION/TRAINING PROGRAM

## 2025-02-04 PROCEDURE — 1036F TOBACCO NON-USER: CPT | Performed by: STUDENT IN AN ORGANIZED HEALTH CARE EDUCATION/TRAINING PROGRAM

## 2025-02-04 PROCEDURE — 1090F PRES/ABSN URINE INCON ASSESS: CPT | Performed by: STUDENT IN AN ORGANIZED HEALTH CARE EDUCATION/TRAINING PROGRAM

## 2025-02-04 PROCEDURE — G8399 PT W/DXA RESULTS DOCUMENT: HCPCS | Performed by: STUDENT IN AN ORGANIZED HEALTH CARE EDUCATION/TRAINING PROGRAM

## 2025-02-04 PROCEDURE — G8417 CALC BMI ABV UP PARAM F/U: HCPCS | Performed by: STUDENT IN AN ORGANIZED HEALTH CARE EDUCATION/TRAINING PROGRAM

## 2025-02-04 PROCEDURE — 99213 OFFICE O/P EST LOW 20 MIN: CPT | Performed by: STUDENT IN AN ORGANIZED HEALTH CARE EDUCATION/TRAINING PROGRAM

## 2025-02-04 PROCEDURE — 20611 DRAIN/INJ JOINT/BURSA W/US: CPT | Performed by: STUDENT IN AN ORGANIZED HEALTH CARE EDUCATION/TRAINING PROGRAM

## 2025-02-04 PROCEDURE — 3017F COLORECTAL CA SCREEN DOC REV: CPT | Performed by: STUDENT IN AN ORGANIZED HEALTH CARE EDUCATION/TRAINING PROGRAM

## 2025-02-04 RX ORDER — METHYLPREDNISOLONE ACETATE 40 MG/ML
40 INJECTION, SUSPENSION INTRA-ARTICULAR; INTRALESIONAL; INTRAMUSCULAR; SOFT TISSUE ONCE
Status: COMPLETED | OUTPATIENT
Start: 2025-02-04 | End: 2025-02-04

## 2025-02-04 RX ADMIN — METHYLPREDNISOLONE ACETATE 40 MG: 40 INJECTION, SUSPENSION INTRA-ARTICULAR; INTRALESIONAL; INTRAMUSCULAR; SOFT TISSUE at 13:12

## 2025-02-06 ENCOUNTER — TELEPHONE (OUTPATIENT)
Age: 70
End: 2025-02-06

## 2025-02-06 NOTE — TELEPHONE ENCOUNTER
Pt did not show for their scheduled therapy appointment today.    Reason: unknown  Communication: left a voicemail reminding pt of next appt

## 2025-02-07 ENCOUNTER — TREATMENT (OUTPATIENT)
Age: 70
End: 2025-02-07

## 2025-02-07 DIAGNOSIS — G89.29 CHRONIC LEFT SHOULDER PAIN: ICD-10-CM

## 2025-02-07 DIAGNOSIS — M25.512 CHRONIC LEFT SHOULDER PAIN: ICD-10-CM

## 2025-02-07 DIAGNOSIS — R53.1 WEAKNESS GENERALIZED: Primary | ICD-10-CM

## 2025-02-07 DIAGNOSIS — M25.612 SHOULDER STIFFNESS, LEFT: ICD-10-CM

## 2025-02-07 DIAGNOSIS — M79.18 MYOFASCIAL PAIN: ICD-10-CM

## 2025-02-07 DIAGNOSIS — Z78.9 ALTERATION IN PERFORMANCE OF ACTIVITIES OF DAILY LIVING: ICD-10-CM

## 2025-02-07 DIAGNOSIS — M19.012 PRIMARY OSTEOARTHRITIS OF LEFT SHOULDER: ICD-10-CM

## 2025-02-07 NOTE — PROGRESS NOTES
[facility]  [address] [place] [id number]  Dept: [phone]      Physical Therapy Daily Note     Referring MD: Mariam Deluna MD  Diagnosis:     ICD-10-CM    1. Weakness generalized  R53.1       2. Alteration in performance of activities of daily living  Z78.9       3. Shoulder stiffness, left  M25.612       4. Chronic left shoulder pain  M25.512     G89.29       5. Myofascial pain [M79.18]  M79.18       6. Primary osteoarthritis of left shoulder [M19.012]  M19.012          Therapy precautions:None  Co-morbidities affecting plan of care: Cervical DDD, CAD, DM II, H.o.H, Morbid obesity, HTN, SOB, Fibromyalgia,    Total Timed Procedure Codes: 34 min, Total Time: 50 min    Visit: 2   Modifier needed: No  Payor: Payor: [employer]IC[employer] /  /  /  Billing pattern: Government- total time     Chief complaints/history of injury: Patient is a 69 y.o. female with a PMH complicated as noted above.     Date symptoms began: 8-9 months  Nature of condition: Chronic (continuous duration > 3 months)  Primary cause of current episode: Unspecified  How did symptoms start: Cassandra Arguello  presented to Dr. Deluna on 12/17/2024 with c/o 6 month h/o left shoulder pain and associated impaired mobility and decreased ROM.  Patient reports insidious onset of symptoms.  States pain originated as pain in her left cervical side with radiation to her left elbow.  Describe current symptoms:  Cassandra Arguello  presents to PT with c/o left shoulder pain.  Patient reports self-treatment with pain patches and Ibuprofen, however no relief achieved.      Patient Stated Goals:   Be able to wash back  Improve ability to fall asleep  Decrease pain when using rollator    SUBJECTIVE     Pt comes today for her first f/u visit. She has a primary complaint of left shoulder pain.    OBJECTIVE     Functional Outcome Questionnaire: QuickDASH:  43/11 = 72.73% Functional Deficit     A/PROM: RIGHT LEFT End Feel

## 2025-02-12 ENCOUNTER — TREATMENT (OUTPATIENT)
Age: 70
End: 2025-02-12
Payer: MEDICARE

## 2025-02-12 DIAGNOSIS — M25.512 CHRONIC LEFT SHOULDER PAIN: ICD-10-CM

## 2025-02-12 DIAGNOSIS — G89.29 CHRONIC LEFT SHOULDER PAIN: ICD-10-CM

## 2025-02-12 DIAGNOSIS — M25.612 SHOULDER STIFFNESS, LEFT: ICD-10-CM

## 2025-02-12 DIAGNOSIS — R53.1 WEAKNESS GENERALIZED: Primary | ICD-10-CM

## 2025-02-12 DIAGNOSIS — Z78.9 ALTERATION IN PERFORMANCE OF ACTIVITIES OF DAILY LIVING: ICD-10-CM

## 2025-02-12 PROCEDURE — 97110 THERAPEUTIC EXERCISES: CPT | Performed by: PHYSICAL THERAPIST

## 2025-02-12 PROCEDURE — 97140 MANUAL THERAPY 1/> REGIONS: CPT | Performed by: PHYSICAL THERAPIST

## 2025-02-12 NOTE — PROGRESS NOTES
[facility]  [address] [place] [id number]  Dept: [phone]      Physical Therapy Daily Note     Referring MD: Mariam Deluna MD  Diagnosis:     ICD-10-CM    1. Weakness generalized  R53.1       2. Alteration in performance of activities of daily living  Z78.9       3. Shoulder stiffness, left  M25.612       4. Chronic left shoulder pain  M25.512     G89.29            Therapy precautions:None  Co-morbidities affecting plan of care: Cervical DDD, CAD, DM II, H.o.H, Morbid obesity, HTN, SOB, Fibromyalgia,    Total Timed Procedure Codes: 35 min, Total Time: 35 min  Time In: 11:35 AM  Time Out: 12:10 PM     Visit: 3   Modifier needed: No  Payor: Payor: [employer]IC[employer] /  /  /  Billing pattern: Government- total time     Chief complaints/history of injury: Patient is a 69 y.o. female with a PMH complicated as noted above.     Date symptoms began: 8-9 months  Nature of condition: Chronic (continuous duration > 3 months)  Primary cause of current episode: Unspecified  How did symptoms start: Cassandra Arguello  presented to Dr. Deluna on 12/17/2024 with c/o 6 month h/o left shoulder pain and associated impaired mobility and decreased ROM.  Patient reports insidious onset of symptoms.  States pain originated as pain in her left cervical side with radiation to her left elbow.  Describe current symptoms:  Cassandra Arguello  presents to PT with c/o left shoulder pain.  Patient reports self-treatment with pain patches and Ibuprofen, however no relief achieved.      Patient Stated Goals:   Be able to wash back  Improve ability to fall asleep  Decrease pain when using rollator    SUBJECTIVE     Pt reports 7/10 left shoulder pain at present.    OBJECTIVE     Findings:  A/PROM: RIGHT LEFT End Feel Muscle Guarding Comment   FLEX: 147° 85° Empty Mod Premature scapular elevation   Scaption: 106° 92° Empty Mod Premature scapular elevation   ER in ABD: 37° 20° Firm Mod    IR: 60° 26°

## 2025-02-13 ENCOUNTER — TELEPHONE (OUTPATIENT)
Dept: ORTHOPEDIC SURGERY | Age: 70
End: 2025-02-13

## 2025-02-21 ENCOUNTER — OFFICE VISIT (OUTPATIENT)
Age: 70
End: 2025-02-21

## 2025-02-21 DIAGNOSIS — M25.612 SHOULDER STIFFNESS, LEFT: ICD-10-CM

## 2025-02-21 DIAGNOSIS — Z78.9 ALTERATION IN PERFORMANCE OF ACTIVITIES OF DAILY LIVING: ICD-10-CM

## 2025-02-21 DIAGNOSIS — M25.512 CHRONIC LEFT SHOULDER PAIN: ICD-10-CM

## 2025-02-21 DIAGNOSIS — R53.1 WEAKNESS GENERALIZED: Primary | ICD-10-CM

## 2025-02-21 DIAGNOSIS — G89.29 CHRONIC LEFT SHOULDER PAIN: ICD-10-CM

## 2025-02-21 NOTE — PROGRESS NOTES
GVL PT INT [facility] ORTHOPAEDICS  [address] [place] [id number]  Dept: [phone]      Physical Therapy Daily Note     Referring MD: Mariam Deluna MD  Diagnosis:     ICD-10-CM    1. Weakness generalized  R53.1       2. Alteration in performance of activities of daily living  Z78.9       3. Shoulder stiffness, left  M25.612       4. Chronic left shoulder pain  M25.512     G89.29              Therapy precautions:None  Co-morbidities affecting plan of care: Cervical DDD, CAD, DM II, H.o.H, Morbid obesity, HTN, SOB, Fibromyalgia,    Total Timed Procedure Codes: 40 min, Total Time: 45 min  Time In: 11:25 AM  Time Out: 12:10 PM     Visit: 4   Modifier needed: No  Payor: Payor: [employer] /  /  /  Billing pattern: Government- total time     Chief complaints/history of injury: Patient is a 69 y.o. female with a PMH complicated as noted above.     Date symptoms began: 8-9 months  Nature of condition: Chronic (continuous duration > 3 months)  Primary cause of current episode: Unspecified  How did symptoms start: Cassandra Arguello  presented to Dr. Deluna on 12/17/2024 with c/o 6 month h/o left shoulder pain and associated impaired mobility and decreased ROM.  Patient reports insidious onset of symptoms.  States pain originated as pain in her left cervical side with radiation to her left elbow.  Describe current symptoms:  Cassandra Arguello  presents to PT with c/o left shoulder pain.  Patient reports self-treatment with pain patches and Ibuprofen, however no relief achieved.      Patient Stated Goals:   Be able to wash back  Improve ability to fall asleep  Decrease pain when using rollator    SUBJECTIVE     Pt reports her shoulder pain \"is about the same\".    OBJECTIVE     Findings:  A/PROM: RIGHT LEFT End Feel Muscle Guarding Comment   FLEX: 147° 85° Empty Mod Premature scapular elevation   Scaption: 106° 92° Empty Mod Premature scapular elevation   ER in ABD: 37° 20° Firm Mod    IR: 60°

## 2025-02-26 ENCOUNTER — OFFICE VISIT (OUTPATIENT)
Age: 70
End: 2025-02-26
Payer: MEDICARE

## 2025-02-26 DIAGNOSIS — R53.1 WEAKNESS GENERALIZED: Primary | ICD-10-CM

## 2025-02-26 DIAGNOSIS — Z78.9 ALTERATION IN PERFORMANCE OF ACTIVITIES OF DAILY LIVING: ICD-10-CM

## 2025-02-26 DIAGNOSIS — G89.29 CHRONIC LEFT SHOULDER PAIN: ICD-10-CM

## 2025-02-26 DIAGNOSIS — M25.512 CHRONIC LEFT SHOULDER PAIN: ICD-10-CM

## 2025-02-26 DIAGNOSIS — M25.612 SHOULDER STIFFNESS, LEFT: ICD-10-CM

## 2025-02-26 PROCEDURE — 97110 THERAPEUTIC EXERCISES: CPT | Performed by: PHYSICAL THERAPIST

## 2025-02-26 PROCEDURE — 97140 MANUAL THERAPY 1/> REGIONS: CPT | Performed by: PHYSICAL THERAPIST

## 2025-02-26 NOTE — PROGRESS NOTES
GVL PT [facility] ORTHOPAEDICS  [address] [place] [phone]  Dept: [phone]      Physical Therapy Progress Report     Referring MD: Mariam Deluna MD  Diagnosis:     ICD-10-CM    1. Weakness generalized  R53.1       2. Alteration in performance of activities of daily living  Z78.9       3. Shoulder stiffness, left  M25.612       4. Chronic left shoulder pain  M25.512     G89.29         Therapy precautions:None  Co-morbidities affecting plan of care: Cervical DDD, CAD, DM II, H.o.H, Morbid obesity, HTN, SOB, Fibromyalgia,    Total Timed Procedure Codes: 38 min, Total Time: 38 min  Time In: 01:46 PM  Time Out: 02:24 PM     Visit: 5   Modifier needed: No  Payor: Payor: [employer] /  /  /  Billing pattern: Government- total time     Chief complaints/history of injury: Patient is a 69 y.o. female with a PMH complicated as noted above.     Date symptoms began: 8-9 months  Nature of condition: Chronic (continuous duration > 3 months)  Primary cause of current episode: Unspecified  How did symptoms start: Cassandra Arguello  presented to Dr. Deluna on 12/17/2024 with c/o 6 month h/o left shoulder pain and associated impaired mobility and decreased ROM.  Patient reports insidious onset of symptoms.  States pain originated as pain in her left cervical side with radiation to her left elbow.  Describe current symptoms:  Cassandra Arguello  presents to PT with c/o left shoulder pain.  Patient reports self-treatment with pain patches and Ibuprofen, however no relief achieved.      Pain Assessment:      Pain Characteristics   Intensity: 6/10   Location: Left cervical, superior left shoulder with radiation to elbow   Description: Sharp, dull ache   How often do you feel symptoms:  Constant (% of time)   Aggravating factors: Upward reaching, lateral reaching, left sidelying, backwards reaching   Alleviating factors: Lymp     Patient Stated Goals:   Be able to wash back  Improve ability to

## 2025-03-07 ENCOUNTER — OFFICE VISIT (OUTPATIENT)
Age: 70
End: 2025-03-07

## 2025-03-07 DIAGNOSIS — G89.29 CHRONIC LEFT SHOULDER PAIN: ICD-10-CM

## 2025-03-07 DIAGNOSIS — Z78.9 ALTERATION IN PERFORMANCE OF ACTIVITIES OF DAILY LIVING: ICD-10-CM

## 2025-03-07 DIAGNOSIS — R53.1 WEAKNESS GENERALIZED: Primary | ICD-10-CM

## 2025-03-07 DIAGNOSIS — M25.512 CHRONIC LEFT SHOULDER PAIN: ICD-10-CM

## 2025-03-07 DIAGNOSIS — M25.612 SHOULDER STIFFNESS, LEFT: ICD-10-CM

## 2025-03-07 NOTE — PROGRESS NOTES
coordination, kinesthetic sense, posture and proprioception  Modalities prn to address pain, spasms, and swelling: (90475/) Electrical stimulation- unattended  (55821) Vasopneumatic compression    [place]  Access Code: [email or web address]  URL: [email or web address]  Date: 02/21/2025  Prepared by: Jessica Hamilton DPT    Exercises  - Seated Upper Trapezius Stretch  - 3 x daily - 7 x weekly - 1 sets - 5 reps - 12 seconds hold  - Seated Levator Scapulae Stretch  - 3 x daily - 7 x weekly - 1 sets - 5 reps - 12 seconds hold  - Seated Shoulder Flexion AAROM with Pulley Behind  - 2 x daily - 7 x weekly - 3 sets - 30 seconds hold  - Seated Shoulder Scaption AAROM with Pulley at Side  - 2 x daily - 7 x weekly - 3 sets - 30 seconds hold  - Seated Shoulder External Rotation AAROM with Cane and Hand in Neutral  - 2 x daily - 7 x weekly - 3 sets - 30 seconds hold  - Seated Shoulder Flexion Extension AAROM with Dowel into Wall  - 2 x daily - 7 x weekly - 3 sets - 10 reps  - Seated Shoulder Circles AAROM with Dowel into Wall  - 2 x daily - 7 x weekly - 3 sets - 10 reps    Patient Education  - GMI HEP Phase I

## 2025-03-11 ENCOUNTER — OFFICE VISIT (OUTPATIENT)
Age: 70
End: 2025-03-11
Payer: MEDICARE

## 2025-03-11 DIAGNOSIS — M79.18 MYOFASCIAL PAIN: ICD-10-CM

## 2025-03-11 DIAGNOSIS — G89.29 CHRONIC LEFT SHOULDER PAIN: ICD-10-CM

## 2025-03-11 DIAGNOSIS — M25.512 CHRONIC LEFT SHOULDER PAIN: ICD-10-CM

## 2025-03-11 DIAGNOSIS — R53.1 WEAKNESS GENERALIZED: Primary | ICD-10-CM

## 2025-03-11 DIAGNOSIS — Z78.9 ALTERATION IN PERFORMANCE OF ACTIVITIES OF DAILY LIVING: ICD-10-CM

## 2025-03-11 DIAGNOSIS — M25.612 SHOULDER STIFFNESS, LEFT: ICD-10-CM

## 2025-03-11 PROCEDURE — 97110 THERAPEUTIC EXERCISES: CPT | Performed by: PHYSICAL THERAPIST

## 2025-03-11 NOTE — PROGRESS NOTES
GVL PT [facility] ORTHOPAEDICS  [address] [place] [phone]  Dept: [phone]      Physical Therapy Daily Note     Referring MD: Mariam Deluna MD  Diagnosis:     ICD-10-CM    1. Weakness generalized  R53.1       2. Alteration in performance of activities of daily living  Z78.9       3. Shoulder stiffness, left  M25.612       4. Myofascial pain [M79.18]  M79.18       5. Chronic left shoulder pain  M25.512     G89.29           Therapy precautions:None  Co-morbidities affecting plan of care: Cervical DDD, CAD, DM II, H.o.H, Morbid obesity, HTN, SOB, Fibromyalgia,    Total Timed Procedure Codes:  50 min, Total Time: 53 min  Time In: 01:02 PM  Time Out: 01:55 PM     Visit: 7   Modifier needed: No  Payor: Payor: [employer] /  /  /  Billing pattern: Government- total time     Chief complaints/history of injury: Patient is a 69 y.o. female with a PMH complicated as noted above.     Date symptoms began: 8-9 months  Nature of condition: Chronic (continuous duration > 3 months)  Primary cause of current episode: Unspecified  How did symptoms start: Cassandra Arguello  presented to Dr. Deluna on 12/17/2024 with c/o 6 month h/o left shoulder pain and associated impaired mobility and decreased ROM.  Patient reports insidious onset of symptoms.  States pain originated as pain in her left cervical side with radiation to her left elbow.  Describe current symptoms:  Cassandra Arguello  presents to PT with c/o left shoulder pain.  Patient reports self-treatment with pain patches and Ibuprofen, however no relief achieved.      Pain Assessment:      Pain Characteristics   Intensity: 6/10   Location: Left cervical, superior left shoulder with radiation to elbow   Description: Sharp, dull ache   How often do you feel symptoms:  Constant (% of time)   Aggravating factors: Upward reaching, lateral reaching, left sidelying, backwards reaching   Alleviating factors: Lymp     Patient Stated Goals:

## 2025-03-14 ENCOUNTER — OFFICE VISIT (OUTPATIENT)
Age: 70
End: 2025-03-14

## 2025-03-14 DIAGNOSIS — G89.29 CHRONIC LEFT SHOULDER PAIN: ICD-10-CM

## 2025-03-14 DIAGNOSIS — M25.512 CHRONIC LEFT SHOULDER PAIN: ICD-10-CM

## 2025-03-14 DIAGNOSIS — R53.1 WEAKNESS GENERALIZED: Primary | ICD-10-CM

## 2025-03-14 DIAGNOSIS — M25.612 SHOULDER STIFFNESS, LEFT: ICD-10-CM

## 2025-03-14 DIAGNOSIS — M79.18 MYOFASCIAL PAIN: ICD-10-CM

## 2025-03-14 DIAGNOSIS — Z78.9 ALTERATION IN PERFORMANCE OF ACTIVITIES OF DAILY LIVING: ICD-10-CM

## 2025-03-14 NOTE — PROGRESS NOTES
GVL PT [facility] ORTHOPAEDICS  [address] [place] [id number]  Dept: [phone]      Physical Therapy Daily Note     Referring MD: Mariam Deluna MD  Diagnosis:     ICD-10-CM    1. Weakness generalized  R53.1       2. Alteration in performance of activities of daily living  Z78.9       3. Shoulder stiffness, left  M25.612       4. Chronic left shoulder pain  M25.512     G89.29       5. Myofascial pain [M79.18]  M79.18             Therapy precautions:None  Co-morbidities affecting plan of care: Cervical DDD, CAD, DM II, H.o.H, Morbid obesity, HTN, SOB, Fibromyalgia,    Total Timed Procedure Codes:  42 min, Total Time: 42 min  Time In: 01:04 PM  Time Out: 01:46 PM     Visit: 8   Modifier needed: No  Payor: Payor: [employer] /  /  /  Billing pattern: Government- total time     Chief complaints/history of injury: Patient is a 69 y.o. female with a PMH complicated as noted above.     Date symptoms began: 8-9 months  Nature of condition: Chronic (continuous duration > 3 months)  Primary cause of current episode: Unspecified  How did symptoms start: Cassandra Arguello  presented to Dr. Deluna on 12/17/2024 with c/o 6 month h/o left shoulder pain and associated impaired mobility and decreased ROM.  Patient reports insidious onset of symptoms.  States pain originated as pain in her left cervical side with radiation to her left elbow.  Describe current symptoms:  Cassandra Arguello  presents to PT with c/o left shoulder pain.  Patient reports self-treatment with pain patches and Ibuprofen, however no relief achieved.      Pain Assessment:  Pain Characteristics   Intensity: 6/10   Location: Left cervical, superior left shoulder with radiation to elbow   Description: Sharp, dull ache   How often do you feel symptoms:  Constant (% of time)   Aggravating factors: Upward reaching, lateral reaching, left sidelying, backwards reaching   Alleviating factors: Lymp     Patient Stated Goals:   Be

## 2025-03-18 ENCOUNTER — TREATMENT (OUTPATIENT)
Age: 70
End: 2025-03-18
Payer: MEDICARE

## 2025-03-18 DIAGNOSIS — M25.612 SHOULDER STIFFNESS, LEFT: ICD-10-CM

## 2025-03-18 DIAGNOSIS — R53.1 WEAKNESS GENERALIZED: Primary | ICD-10-CM

## 2025-03-18 DIAGNOSIS — G89.29 CHRONIC LEFT SHOULDER PAIN: ICD-10-CM

## 2025-03-18 DIAGNOSIS — Z78.9 ALTERATION IN PERFORMANCE OF ACTIVITIES OF DAILY LIVING: ICD-10-CM

## 2025-03-18 DIAGNOSIS — M25.512 CHRONIC LEFT SHOULDER PAIN: ICD-10-CM

## 2025-03-18 DIAGNOSIS — M79.18 MYOFASCIAL PAIN: ICD-10-CM

## 2025-03-18 PROCEDURE — 97140 MANUAL THERAPY 1/> REGIONS: CPT | Performed by: PHYSICAL THERAPIST

## 2025-03-18 PROCEDURE — 97110 THERAPEUTIC EXERCISES: CPT | Performed by: PHYSICAL THERAPIST

## 2025-03-18 NOTE — PROGRESS NOTES
GVL PT [facility] ORTHOPAEDICS  [address] [place] [id number]  Dept: [phone]      Physical Therapy Daily Note     Referring MD: Mariam Deluna MD  Diagnosis:     ICD-10-CM    1. Weakness generalized  R53.1       2. Alteration in performance of activities of daily living  Z78.9       3. Shoulder stiffness, left  M25.612       4. Chronic left shoulder pain  M25.512     G89.29       5. Myofascial pain [M79.18]  M79.18               Therapy precautions:None  Co-morbidities affecting plan of care: Cervical DDD, CAD, DM II, H.o.H, Morbid obesity, HTN, SOB, Fibromyalgia,    Total Timed Procedure Codes:  40 min, Total Time: 40 min    Visit: 9   Modifier needed: No  Payor: Payor: [employer] /  /  /  Billing pattern: Government- total time     Chief complaints/history of injury: Patient is a 69 y.o. female with a PMH complicated as noted above.     Date symptoms began: 8-9 months  Nature of condition: Chronic (continuous duration > 3 months)  Primary cause of current episode: Unspecified  How did symptoms start: Cassandra Arguello  presented to Dr. Deluna on 12/17/2024 with c/o 6 month h/o left shoulder pain and associated impaired mobility and decreased ROM.  Patient reports insidious onset of symptoms.  States pain originated as pain in her left cervical side with radiation to her left elbow.  Describe current symptoms:  Cassandra Arguello  presents to PT with c/o left shoulder pain.  Patient reports self-treatment with pain patches and Ibuprofen, however no relief achieved.      Pain Assessment:  Pain Characteristics   Intensity: 6/10   Location: Left cervical, superior left shoulder with radiation to elbow   Description: Sharp, dull ache   How often do you feel symptoms:  Constant (% of time)   Aggravating factors: Upward reaching, lateral reaching, left sidelying, backwards reaching   Alleviating factors: Lymp     Patient Stated Goals:   Be able to wash back  Improve ability to

## 2025-03-25 ENCOUNTER — CLINICAL DOCUMENTATION (OUTPATIENT)
Age: 70
End: 2025-03-25

## 2025-03-26 NOTE — PROGRESS NOTES
[address] [facility] [id number] [phone]   Physical Therapy Cancellation/No Show            Pt cancelled < 24 hours for their scheduled therapy appointment today.    Reason:  Back pain  Communication:  Patient called and cancelled

## 2025-03-28 ENCOUNTER — TREATMENT (OUTPATIENT)
Age: 70
End: 2025-03-28

## 2025-03-28 DIAGNOSIS — M25.512 CHRONIC LEFT SHOULDER PAIN: ICD-10-CM

## 2025-03-28 DIAGNOSIS — M25.612 SHOULDER STIFFNESS, LEFT: ICD-10-CM

## 2025-03-28 DIAGNOSIS — Z78.9 ALTERATION IN PERFORMANCE OF ACTIVITIES OF DAILY LIVING: ICD-10-CM

## 2025-03-28 DIAGNOSIS — G89.29 CHRONIC LEFT SHOULDER PAIN: ICD-10-CM

## 2025-03-28 DIAGNOSIS — R53.1 WEAKNESS GENERALIZED: Primary | ICD-10-CM

## 2025-03-28 NOTE — PROGRESS NOTES
GVL PT [facility] ORTHOPAEDICS  [address] [place] [phone]  Dept: [phone]      Physical Therapy Progress Report     Referring MD: Mariam Deluna MD  Diagnosis:     ICD-10-CM    1. Weakness generalized  R53.1       2. Alteration in performance of activities of daily living  Z78.9       3. Shoulder stiffness, left  M25.612       4. Chronic left shoulder pain  M25.512     G89.29           Therapy precautions:None  Co-morbidities affecting plan of care: Cervical DDD, CAD, DM II, H.o.H, Morbid obesity, HTN, SOB, Fibromyalgia,    Total Timed Procedure Codes: 40 min, Total Time: 40 min    Visit: 10/18   Modifier needed: No  Payor: Payor: [employer] /  /  /  Billing pattern: Government- total time     Chief complaints/history of injury: Patient is a 69 y.o. female with a PMH complicated as noted above.     Date symptoms began: 8-9 months  Nature of condition: Chronic (continuous duration > 3 months)  Primary cause of current episode: Unspecified  How did symptoms start: Cassandra Arguello  presented to Dr. Deluna on 12/17/2024 with c/o 6 month h/o left shoulder pain and associated impaired mobility and decreased ROM.  Patient reports insidious onset of symptoms.  States pain originated as pain in her left cervical side with radiation to her left elbow.  Describe current symptoms:  Cassandra Arguello  presents to PT with c/o left shoulder pain.  Patient reports self-treatment with pain patches and Ibuprofen, however no relief achieved.      Pain Assessment:      Pain Characteristics   Intensity: 6/10   Location: Left cervical, superior left shoulder with radiation to elbow   Description: Sharp, dull ache   How often do you feel symptoms:  Constant (% of time)   Aggravating factors: Upward reaching, lateral reaching, left sidelying, backwards reaching   Alleviating factors: Lymp     Patient Stated Goals:   Be able to wash back  Improve ability to fall asleep  Decrease pain when

## 2025-05-02 NOTE — PROGRESS NOTES
Name: Cassandra Arguello  YOB: 1955  Gender: female  MRN: [id number]  Date of Encounter:  5/6/2025       CHIEF COMPLAINT:     Chief Complaint   Patient presents with    Injections     L GHI        SUBJECTIVE/OBJECTIVE:      HPI:    Patient is a 69 y.o. pleasant female who presents today for an injection of the left shoulder.    Recall Hx:  She first presented 11/'24 for 6 months of increased shoulder pain associated with mobility and decreased range of motion.  She localizes pain from her upper neck down her trap all the way down to her elbow. Pain is frequently sharp and severe.  She has tried Advil in the past which was not helping as well as topical Bengay and patches.  She recently got a referral for home health physical therapy that has yet to begin.  She was seen by Jennifer Patterson recently who referred her for evaluation with Dr. Reyes or Dr. Rebolledo.  She was recently seen by bariatric medicine for weight loss.     She has advanced GH and AC arthritis, findings consistent with RTC arthropathy. Also exhibits myofascial pain to the scapula.     11/4/24: Initial eval. GHJ injection performed. Recommended formal therapy.      12/17/24: She has some mild improvement with the injection.  She continues to have generalized shoulder pain.  She is doing lymphedema therapy but not physical therapy for her shoulder. PT order was placed.      2/4/25: She returns for repeat GHJ injection today. She had about 30% pain relief with the last injection. We had discussed possible ablation before but she declined that.     5/6/25: She had quite substantial relief with the last injection around 75%.  Therefore she would like to repeat the injection today. She has no new complaints today.       ASSESSMENT/PLAN:     Encounter Diagnoses   Name Primary?    Primary osteoarthritis of left shoulder Yes    Myofascial pain         We discussed right glenohumeral joint injection today, risks and benefits of injection including

## 2025-05-06 ENCOUNTER — OFFICE VISIT (OUTPATIENT)
Dept: ORTHOPEDIC SURGERY | Age: 70
End: 2025-05-06
Payer: MEDICARE

## 2025-05-06 DIAGNOSIS — M19.012 PRIMARY OSTEOARTHRITIS OF LEFT SHOULDER: Primary | ICD-10-CM

## 2025-05-06 DIAGNOSIS — M79.18 MYOFASCIAL PAIN: ICD-10-CM

## 2025-05-06 PROCEDURE — 20611 DRAIN/INJ JOINT/BURSA W/US: CPT | Performed by: STUDENT IN AN ORGANIZED HEALTH CARE EDUCATION/TRAINING PROGRAM

## 2025-05-06 RX ORDER — METHYLPREDNISOLONE ACETATE 40 MG/ML
40 INJECTION, SUSPENSION INTRA-ARTICULAR; INTRALESIONAL; INTRAMUSCULAR; SOFT TISSUE ONCE
Status: COMPLETED | OUTPATIENT
Start: 2025-05-06 | End: 2025-05-06

## 2025-05-06 RX ADMIN — METHYLPREDNISOLONE ACETATE 40 MG: 40 INJECTION, SUSPENSION INTRA-ARTICULAR; INTRALESIONAL; INTRAMUSCULAR; SOFT TISSUE at 13:32

## 2025-05-13 ENCOUNTER — OFFICE VISIT (OUTPATIENT)
Age: 70
End: 2025-05-13
Payer: MEDICARE

## 2025-05-13 VITALS
SYSTOLIC BLOOD PRESSURE: 126 MMHG | WEIGHT: 271 LBS | HEART RATE: 86 BPM | BODY MASS INDEX: 49.57 KG/M2 | DIASTOLIC BLOOD PRESSURE: 60 MMHG

## 2025-05-13 DIAGNOSIS — R00.0 TACHYCARDIA: Primary | ICD-10-CM

## 2025-05-13 DIAGNOSIS — I10 HYPERTENSION, ESSENTIAL: ICD-10-CM

## 2025-05-13 DIAGNOSIS — R00.2 PALPITATIONS: ICD-10-CM

## 2025-05-13 DIAGNOSIS — E27.9 ADRENAL NODULE: ICD-10-CM

## 2025-05-13 DIAGNOSIS — R06.09 DOE (DYSPNEA ON EXERTION): ICD-10-CM

## 2025-05-13 DIAGNOSIS — E66.01 MORBID (SEVERE) OBESITY DUE TO EXCESS CALORIES (HCC): ICD-10-CM

## 2025-05-13 PROCEDURE — 1036F TOBACCO NON-USER: CPT | Performed by: INTERNAL MEDICINE

## 2025-05-13 PROCEDURE — 1125F AMNT PAIN NOTED PAIN PRSNT: CPT | Performed by: INTERNAL MEDICINE

## 2025-05-13 PROCEDURE — 1123F ACP DISCUSS/DSCN MKR DOCD: CPT | Performed by: INTERNAL MEDICINE

## 2025-05-13 PROCEDURE — G8399 PT W/DXA RESULTS DOCUMENT: HCPCS | Performed by: INTERNAL MEDICINE

## 2025-05-13 PROCEDURE — G8417 CALC BMI ABV UP PARAM F/U: HCPCS | Performed by: INTERNAL MEDICINE

## 2025-05-13 PROCEDURE — 3078F DIAST BP <80 MM HG: CPT | Performed by: INTERNAL MEDICINE

## 2025-05-13 PROCEDURE — 3074F SYST BP LT 130 MM HG: CPT | Performed by: INTERNAL MEDICINE

## 2025-05-13 PROCEDURE — 3017F COLORECTAL CA SCREEN DOC REV: CPT | Performed by: INTERNAL MEDICINE

## 2025-05-13 PROCEDURE — G8428 CUR MEDS NOT DOCUMENT: HCPCS | Performed by: INTERNAL MEDICINE

## 2025-05-13 PROCEDURE — 99214 OFFICE O/P EST MOD 30 MIN: CPT | Performed by: INTERNAL MEDICINE

## 2025-05-13 PROCEDURE — 1090F PRES/ABSN URINE INCON ASSESS: CPT | Performed by: INTERNAL MEDICINE

## 2025-05-13 RX ORDER — SODIUM CHLORIDE 9 MG/ML
INJECTION, SOLUTION INTRAVENOUS CONTINUOUS
Status: CANCELLED | OUTPATIENT
Start: 2025-05-13

## 2025-05-13 RX ORDER — SODIUM CHLORIDE 9 MG/ML
INJECTION, SOLUTION INTRAVENOUS PRN
Status: CANCELLED | OUTPATIENT
Start: 2025-05-13

## 2025-05-13 RX ORDER — IVABRADINE 7.5 MG/1
TABLET, FILM COATED ORAL
Qty: 180 TABLET | Refills: 3 | Status: SHIPPED | OUTPATIENT
Start: 2025-05-13

## 2025-05-13 RX ORDER — NITROGLYCERIN 0.4 MG/1
TABLET SUBLINGUAL
Qty: 25 TABLET | Refills: 3 | Status: SHIPPED | OUTPATIENT
Start: 2025-05-13

## 2025-05-13 RX ORDER — LORAZEPAM 0.5 MG/1
0.5 TABLET ORAL
Status: CANCELLED | OUTPATIENT
Start: 2025-05-13

## 2025-05-13 RX ORDER — SODIUM CHLORIDE 0.9 % (FLUSH) 0.9 %
5-40 SYRINGE (ML) INJECTION PRN
Status: CANCELLED | OUTPATIENT
Start: 2025-05-13

## 2025-05-13 RX ORDER — ASPIRIN 325 MG
325 TABLET ORAL ONCE
Status: CANCELLED | OUTPATIENT
Start: 2025-05-13 | End: 2025-05-13

## 2025-05-13 RX ORDER — METOPROLOL SUCCINATE 100 MG/1
TABLET, EXTENDED RELEASE ORAL
Qty: 180 TABLET | Refills: 3 | Status: SHIPPED | OUTPATIENT
Start: 2025-05-13

## 2025-05-13 RX ORDER — SODIUM CHLORIDE 0.9 % (FLUSH) 0.9 %
5-40 SYRINGE (ML) INJECTION EVERY 12 HOURS SCHEDULED
Status: CANCELLED | OUTPATIENT
Start: 2025-05-13

## 2025-05-13 RX ORDER — LOSARTAN POTASSIUM 100 MG/1
100 TABLET ORAL DAILY
Qty: 90 TABLET | Refills: 3 | Status: SHIPPED | OUTPATIENT
Start: 2025-05-13

## 2025-05-13 RX ORDER — MELOXICAM 15 MG/1
15 TABLET ORAL DAILY
COMMUNITY
Start: 2025-03-27

## 2025-05-13 NOTE — PROGRESS NOTES
[facility] CARDIOLOGY, [address], [address]  [place] [address]  PHONE: [phone]    SUBJECTIVE:   Cassandra Arguello is a 70 y.o. female 1955   seen for a follow up visit regarding the following:     Chief Complaint   Patient presents with    Coronary Artery Disease    Hyperlipidemia    Hypertension         History of Present Illness  The patient is a 70-year-old individual presenting with palpitations, exertional dyspnea, hypertension, and hyperglycemia.    Dyspnea: The patient reports persistent breathlessness. A nuclear stress perfusion study conducted on 04/29/2025 was normal but of poor quality. An echocardiogram performed on 04/08/2025 revealed significant left ventricular hypertrophy (LVH). Despite the inconclusive stress test, no significant abnormalities were detected. The patient has a history of severe chest pain accompanied by a pressure sensation, which has not recurred, and overall symptomatology has improved.    The patient has a history of sinus tachycardia. Pulmonary function tests (PFTs) and spirometry were performed in 2021. Blood pressure remains stable. Current medications include losartan 100 mg daily, spironolactone, hydrochlorothiazide, metoprolol succinate, and ivabradine 7.5 mg.        Interval history:  Cardiac History:     Echocardiogram 2020 with left ventricular hypertrophy 1.4 cm.      EKG - sinus rhythm, low voltage in precordial leads, abnormal.      12/2020 echocardiogram with normal left ventricular systolic function mitral annular calcification aortic sclerosis    2021 pyrophosphate scan negative for infiltrative disease Antoinette    3/23/2021 Normal spirometry.    3/17/2022 sinus rhythm, normal rate, normal GA intervals, ST wave normal, normal axis  10/17/2023 sinus rhythm low voltage  4/2020 four 24-hour urine metanephrines within normal limits 5/1/2020 four 24-hour urine cortisol within normal limits     Results  - Labs:    - 24-hour urine

## 2025-05-15 ENCOUNTER — TELEPHONE (OUTPATIENT)
Age: 70
End: 2025-05-15

## 2025-05-15 NOTE — TELEPHONE ENCOUNTER
Robert PT with [facility] called stating the patient has the following issues:      Lying prone vaguely SOB  Feels ' rattling ' in chest  10 lb weight gain

## 2025-05-16 NOTE — TELEPHONE ENCOUNTER
Spoke with patient who reports SOB when lying flat. \"Feels rattling in chest\". Denies CP, dizziness, cough. SOB with exertion- not any worse than usual. Denies edema. Reports weight up 11 lbs from hospital discharge to appointment with Dr. Weller on 5/13/25. Patient taking Spironolactone 25 mg daily. Will address with MD. ER for worsening symptoms.

## 2025-05-19 ENCOUNTER — CLINICAL DOCUMENTATION (OUTPATIENT)
Age: 70
End: 2025-05-19

## 2025-05-19 NOTE — PROGRESS NOTES
GVL PT [facility] ORTHOPAEDICS  [address] [place] [phone]  Dept: [phone]      Physical Therapy Discharge/Discontinuation Note     Referring MD: Mariam Deluna MD   Date of Initial Evaluation: 1/27/2025   Number of visits: 10      Patient has been discharged from therapy as per patient request.    Patient was  last seen for PT services on 3/28/2025.  At that time, the patient appeared to be gradually progressing with therapy treatment.  Therapy goals were partially met and patient demonstrated good recall of HEP.  Please see previous notes for objective findings and today's corresponding telephone encounter.      [x] All POC signed:  Go to Notes / Go to Scanned Items  [x] Send message to Auth team to close referral: Go to InBox   [x] Episode Resolved:  Go to Episode  [x] All remaining visits canceled:  Go to Appt Desk  Future Appointments   Date Time Provider Department Center   7/7/2025  3:15 PM Arnie Oakley, APRN - CNP [facility] AMB   8/6/2025  1:00 PM Mariam Deluna MD [facility] AMB   11/18/2025  2:00 PM Geo Weller MD [facility] AMB

## 2025-06-30 ENCOUNTER — OFFICE VISIT (OUTPATIENT)
Age: 70
End: 2025-06-30
Payer: MEDICARE

## 2025-06-30 DIAGNOSIS — M43.16 SPONDYLOLISTHESIS OF LUMBAR REGION: ICD-10-CM

## 2025-06-30 DIAGNOSIS — M48.062 SPINAL STENOSIS OF LUMBAR REGION WITH NEUROGENIC CLAUDICATION: Primary | ICD-10-CM

## 2025-06-30 PROCEDURE — 1123F ACP DISCUSS/DSCN MKR DOCD: CPT | Performed by: PHYSICIAN ASSISTANT

## 2025-06-30 PROCEDURE — 99214 OFFICE O/P EST MOD 30 MIN: CPT | Performed by: PHYSICIAN ASSISTANT

## 2025-06-30 RX ORDER — METHYLPREDNISOLONE 4 MG/1
TABLET ORAL
Qty: 1 KIT | Refills: 0 | Status: SHIPPED | OUTPATIENT
Start: 2025-06-30

## 2025-06-30 NOTE — PROGRESS NOTES
Name: Cassandra Arguello  YOB: 1955  Gender: female  MRN: [id number]    CC: Follow-up (Back and bilateral leg pain)       History of Present Illness  The patient presents for evaluation of back pain.  She has a known history of L3-4 L4-5 spondylolisthesis with severe stenosis and I last saw her 6/20/2024.  She had had L3-4 epidural steroid injection with Dr. Flores in that time had about 50% relief of symptoms.  She was not interested in surgical intervention then which would be lumbar fusion and we did refer her for eval and treat with Dr. Flores she had 1 appointment with her and did not have any further injections.  She was doing better with the back until 3 to 4 weeks ago.    She was participating in home physical therapy and her lower back pain with bilateral leg weakness was exacerbated by a physical therapy exercise 3 weeks ago. She reports bilateral leg weakness.  There is no loss of bladder or bowel function but she has urgency requiring a pad. She uses a walker for mobility. She is open to surgical options. An injection last year provided 50% relief; the current episode is more severe. She is currently taking meloxicam and gabapentin; Tylenol has been ineffective.    Her weight decreased from 289 to 260 over 2 months. There are no issues with blood sugar control; A1c readings are 5.6 and 5.2.               9/24/2024     2:27 PM   AMB PAIN ASSESSMENT   Location of Pain Neck   Severity of Pain 8   Quality of Pain Aching   Duration of Pain Persistent   Frequency of Pain Constant   Date Pain First Started 3/4/2024   Limiting Behavior Some   Result of Injury No   Work-Related Injury No   Are there other pain locations you wish to document? No              ROS/Meds/PSH/PMH/FH/SH: I personally reviewed the patient's collected intake data.  Below are the pertinents:    Allergies   Allergen Reactions    Ciprofloxacin Diarrhea and Other (See Comments)     Per pt, started her c-diff\"    Ciprofloxacin

## 2025-07-03 ENCOUNTER — TELEPHONE (OUTPATIENT)
Dept: SLEEP MEDICINE | Age: 70
End: 2025-07-03

## 2025-07-21 ENCOUNTER — OFFICE VISIT (OUTPATIENT)
Age: 70
End: 2025-07-21
Payer: MEDICARE

## 2025-07-21 DIAGNOSIS — M48.062 SPINAL STENOSIS OF LUMBAR REGION WITH NEUROGENIC CLAUDICATION: ICD-10-CM

## 2025-07-21 DIAGNOSIS — M43.16 SPONDYLOLISTHESIS OF LUMBAR REGION: Primary | ICD-10-CM

## 2025-07-21 DIAGNOSIS — M48.061 FORAMINAL STENOSIS OF LUMBAR REGION: ICD-10-CM

## 2025-07-21 PROCEDURE — 1123F ACP DISCUSS/DSCN MKR DOCD: CPT | Performed by: PHYSICIAN ASSISTANT

## 2025-07-21 PROCEDURE — 99214 OFFICE O/P EST MOD 30 MIN: CPT | Performed by: PHYSICIAN ASSISTANT

## 2025-07-21 RX ORDER — BLOOD SUGAR DIAGNOSTIC
STRIP MISCELLANEOUS
COMMUNITY
Start: 2025-06-24

## 2025-07-21 RX ORDER — SEMAGLUTIDE 1.34 MG/ML
INJECTION, SOLUTION SUBCUTANEOUS
COMMUNITY
Start: 2025-06-10

## 2025-07-21 RX ORDER — DEXTROMETHORPHAN HYDROBROMIDE AND PROMETHAZINE HYDROCHLORIDE 15; 6.25 MG/5ML; MG/5ML
5 SYRUP ORAL
COMMUNITY
Start: 2024-11-24

## 2025-07-21 NOTE — PROGRESS NOTES
Name: Cassandra Arguello  YOB: 1955  Gender: female  MRN: [id number]    CC: Results (MRI Lumbar spine)       History of Present Illness  The patient presents for evaluation of back pain.  She has a known history of L3-4 L4-5 spondylolisthesis with severe stenosis and I last saw her 6/20/2024.  She had had L3-4 epidural steroid injection with Dr. Flores in that time had about 50% relief of symptoms.  She was not interested in surgical intervention then which would be lumbar fusion and we did refer her for eval and treat with Dr. Flores she had 1 appointment with her and did not have any further injections.  She was doing better with the back until 3 to 4 weeks ago.    She was participating in home physical therapy and her lower back pain with bilateral leg weakness was exacerbated by a physical therapy exercise 3 weeks ago. She reports bilateral leg weakness.  There is no loss of bladder or bowel function but she has urgency requiring a pad. She uses a walker for mobility. She is open to surgical options. An injection last year provided 50% relief; the current episode is more severe. She is currently taking meloxicam and gabapentin; Tylenol has been ineffective.  Physical therapy exacerbated symptoms.    Her weight decreased from 289 to 260 over 2 months. There are no issues with blood sugar control; A1c readings are 5.6 and 5.2.    We ordered new MRI scan to evaluate current severity of her stenosis.               9/24/2024     2:27 PM   AMB PAIN ASSESSMENT   Location of Pain Neck   Severity of Pain 8   Quality of Pain Aching   Duration of Pain Persistent   Frequency of Pain Constant   Date Pain First Started 3/4/2024   Limiting Behavior Some   Result of Injury No   Work-Related Injury No   Are there other pain locations you wish to document? No              ROS/Meds/PSH/PMH/FH/SH: I personally reviewed the patient's collected intake data.  Below are the pertinents:    Allergies   Allergen Reactions

## 2025-08-06 ENCOUNTER — OFFICE VISIT (OUTPATIENT)
Dept: ORTHOPEDIC SURGERY | Age: 70
End: 2025-08-06

## 2025-08-06 ENCOUNTER — OFFICE VISIT (OUTPATIENT)
Dept: ORTHOPEDIC SURGERY | Age: 70
End: 2025-08-06
Payer: MEDICARE

## 2025-08-06 DIAGNOSIS — M43.16 SPONDYLOLISTHESIS OF LUMBAR REGION: Primary | ICD-10-CM

## 2025-08-06 DIAGNOSIS — M19.012 PRIMARY OSTEOARTHRITIS OF LEFT SHOULDER: Primary | ICD-10-CM

## 2025-08-06 DIAGNOSIS — M48.062 SPINAL STENOSIS OF LUMBAR REGION WITH NEUROGENIC CLAUDICATION: ICD-10-CM

## 2025-08-06 DIAGNOSIS — M48.061 FORAMINAL STENOSIS OF LUMBAR REGION: ICD-10-CM

## 2025-08-06 PROCEDURE — 64483 NJX AA&/STRD TFRM EPI L/S 1: CPT | Performed by: PHYSICAL MEDICINE & REHABILITATION

## 2025-08-06 RX ORDER — TRIAMCINOLONE ACETONIDE 40 MG/ML
40 INJECTION, SUSPENSION INTRA-ARTICULAR; INTRAMUSCULAR ONCE
Status: COMPLETED | OUTPATIENT
Start: 2025-08-06 | End: 2025-08-06

## 2025-08-06 RX ORDER — METHYLPREDNISOLONE ACETATE 40 MG/ML
40 INJECTION, SUSPENSION INTRA-ARTICULAR; INTRALESIONAL; INTRAMUSCULAR; SOFT TISSUE ONCE
Status: COMPLETED | OUTPATIENT
Start: 2025-08-06 | End: 2025-08-06

## 2025-08-06 RX ADMIN — METHYLPREDNISOLONE ACETATE 40 MG: 40 INJECTION, SUSPENSION INTRA-ARTICULAR; INTRALESIONAL; INTRAMUSCULAR; SOFT TISSUE at 13:07

## 2025-08-06 RX ADMIN — TRIAMCINOLONE ACETONIDE 40 MG: 40 INJECTION, SUSPENSION INTRA-ARTICULAR; INTRAMUSCULAR at 13:42

## 2025-08-18 ENCOUNTER — OFFICE VISIT (OUTPATIENT)
Age: 70
End: 2025-08-18
Payer: MEDICARE

## 2025-08-18 DIAGNOSIS — M54.16 LUMBAR RADICULOPATHY: ICD-10-CM

## 2025-08-18 DIAGNOSIS — M48.062 SPINAL STENOSIS OF LUMBAR REGION WITH NEUROGENIC CLAUDICATION: ICD-10-CM

## 2025-08-18 DIAGNOSIS — M43.16 SPONDYLOLISTHESIS OF LUMBAR REGION: Primary | ICD-10-CM

## 2025-08-18 PROCEDURE — 1090F PRES/ABSN URINE INCON ASSESS: CPT | Performed by: ORTHOPAEDIC SURGERY

## 2025-08-18 PROCEDURE — 3017F COLORECTAL CA SCREEN DOC REV: CPT | Performed by: ORTHOPAEDIC SURGERY

## 2025-08-18 PROCEDURE — G8399 PT W/DXA RESULTS DOCUMENT: HCPCS | Performed by: ORTHOPAEDIC SURGERY

## 2025-08-18 PROCEDURE — G8417 CALC BMI ABV UP PARAM F/U: HCPCS | Performed by: ORTHOPAEDIC SURGERY

## 2025-08-18 PROCEDURE — 1036F TOBACCO NON-USER: CPT | Performed by: ORTHOPAEDIC SURGERY

## 2025-08-18 PROCEDURE — 1123F ACP DISCUSS/DSCN MKR DOCD: CPT | Performed by: ORTHOPAEDIC SURGERY

## 2025-08-18 PROCEDURE — 99214 OFFICE O/P EST MOD 30 MIN: CPT | Performed by: ORTHOPAEDIC SURGERY

## 2025-08-18 PROCEDURE — G8428 CUR MEDS NOT DOCUMENT: HCPCS | Performed by: ORTHOPAEDIC SURGERY

## 2025-08-26 DIAGNOSIS — M54.16 LUMBAR RADICULOPATHY: ICD-10-CM

## 2025-08-26 DIAGNOSIS — M48.062 SPINAL STENOSIS OF LUMBAR REGION WITH NEUROGENIC CLAUDICATION: ICD-10-CM

## 2025-08-26 DIAGNOSIS — M43.16 SPONDYLOLISTHESIS OF LUMBAR REGION: Primary | ICD-10-CM

## 2025-08-26 DIAGNOSIS — M48.062 SPINAL STENOSIS, LUMBAR REGION, WITH NEUROGENIC CLAUDICATION: ICD-10-CM

## (undated) DEVICE — CRADLE POS 3X5X24IN RASPBERRY ARM PRONE FOAM DISP

## (undated) DEVICE — SUTURE VCRL SZ 3-0 L18IN ABSRB UD PS-2 L19MM 1/2 CIR J497G

## (undated) DEVICE — FORCEPS BX 240CM JAW 3.2MM L CAP NDL MIC MESH TTH M00513372

## (undated) DEVICE — 3M™ STERI-STRIP™ REINFORCED ADHESIVE SKIN CLOSURES, R1548, 1 IN X 5 IN (25 MM X 125 MM), 4 STRIPS/ENVELOPE: Brand: 3M™ STERI-STRIP™

## (undated) DEVICE — STANDARD HYPODERMIC NEEDLE,POLYPROPYLENE HUB: Brand: MONOJECT

## (undated) DEVICE — 2000CC GUARDIAN II: Brand: GUARDIAN

## (undated) DEVICE — 3M™ STERI-DRAPE™ INCISE DRAPE, XL 1051: Brand: STERI-DRAPE™

## (undated) DEVICE — NEEDLE SPNL 22GA L3.5IN BLK HUB S STL REG WALL FIT STYL

## (undated) DEVICE — SUTURE VCRL SZ 3-0 L27IN ABSRB UD L26MM SH 1/2 CIR J416H

## (undated) DEVICE — ELECTRODE NDL 2.8IN COAT VALLEYLAB

## (undated) DEVICE — MINOR SPLIT GENERAL: Brand: MEDLINE INDUSTRIES, INC.

## (undated) DEVICE — KENDALL SCD EXPRESS SLEEVES, KNEE LENGTH, MEDIUM: Brand: KENDALL SCD

## (undated) DEVICE — GAUZE,SPONGE,4"X4",16PLY,STRL,LF,10/TRAY: Brand: MEDLINE

## (undated) DEVICE — NEEDLE HYPO 21GA L1.5IN INTRAMUSCULAR S STL LATCH BVL UP

## (undated) DEVICE — SOLUTION IV 1000ML 0.9% SOD CHL

## (undated) DEVICE — TIP CLEANR ELECSURG 1.75X1.75 --

## (undated) DEVICE — TRAY PREP DRY W/ PREM GLV 2 APPL 6 SPNG 2 UNDPD 1 OVERWRAP

## (undated) DEVICE — DRAPE,TOP,102X53,STERILE: Brand: MEDLINE

## (undated) DEVICE — DRESSING,GAUZE,XEROFORM,CURAD,1"X8",ST: Brand: CURAD

## (undated) DEVICE — DRAPE,CHEST,FENES,15X10,STERIL: Brand: MEDLINE

## (undated) DEVICE — BANDAGE,GAUZE,BULKEE II,4.5"X4.1YD,STRL: Brand: MEDLINE

## (undated) DEVICE — Device

## (undated) DEVICE — TETRA-FLEX CF WOVEN LATEX FREE ELASTIC BANDAGE 6" X 11 YD: Brand: TETRA-FLEX™CF

## (undated) DEVICE — INTENDED FOR TISSUE SEPARATION, AND OTHER PROCEDURES THAT REQUIRE A SHARP SURGICAL BLADE TO PUNCTURE OR CUT.: Brand: BARD-PARKER SAFETY BLADES SIZE 15, STERILE

## (undated) DEVICE — DRAPE TWL SURG 16X26IN BLU ORB04] ALLCARE INC]

## (undated) DEVICE — JP PERF DRN SIL FLT 10MM FULL: Brand: CARDINAL HEALTH

## (undated) DEVICE — SNARE POLYP SM W13MMXL240CM SHTH DIA2.4MM OVL FLX DISP

## (undated) DEVICE — DRAIN SURG 3/4 W10MMXL20CM 100% SIL PERF FLAT HUBLESS

## (undated) DEVICE — SUTURE MCRYL SZ 2-0 L27IN ABSRB UD CP-1 1 L36MM 1/2 CIR REV Y266H

## (undated) DEVICE — MICRODISSECTION NEEDLE STRAIGHT SLEEVE: Brand: COLORADO

## (undated) DEVICE — SUTURE MONOCRYL SZ 2-0 L27IN ABSRB UD CP-1 1 L36MM 1/2 CIR REV Y266H

## (undated) DEVICE — SUREFIT, DUAL DISPERSIVE ELECTRODE, CONTACT QUALITY MONITOR: Brand: SUREFIT

## (undated) DEVICE — (D)PREP SKN CHLRAPRP APPL 26ML -- CONVERT TO ITEM 371833

## (undated) DEVICE — OCCLUSIVE GAUZE STRIP,3% BISMUTH TRIBROMOPHENATE IN PETROLATUM BLEND: Brand: XEROFORM

## (undated) DEVICE — SINGLE BASIN: Brand: CARDINAL HEALTH

## (undated) DEVICE — SURGICAL PROCEDURE PACK BASIC ST FRANCIS

## (undated) DEVICE — SUTURE PERMAHAND SZ 2-0 L18IN NONABSORBABLE BLK L26MM PS 1588H

## (undated) DEVICE — AMD ANTIMICROBIAL GAUZE SPONGES,12 PLY USP TYPE VII, 0.2% POLYHEXAMETHYLENE BIGUANIDE HCI (PHMB): Brand: CURITY

## (undated) DEVICE — SOLUTION IRRIG 1000ML 0.9% SOD CHL USP POUR PLAS BTL

## (undated) DEVICE — CATH IV NSAF ANGIOCATH 16G --

## (undated) DEVICE — RESERVOIR,SUCTION,100CC,SILICONE: Brand: MEDLINE

## (undated) DEVICE — 3M™ TEGADERM™ TRANSPARENT FILM DRESSING FRAME STYLE, 1626W, 4 IN X 4-3/4 IN (10 CM X 12 CM), 50/CT 4CT/CASE: Brand: 3M™ TEGADERM™

## (undated) DEVICE — KENDALL RADIOLUCENT FOAM MONITORING ELECTRODE RECTANGULAR SHAPE: Brand: KENDALL

## (undated) DEVICE — SHEET, DRAPE, SPLIT, STERILE: Brand: MEDLINE

## (undated) DEVICE — CANNULA NSL ORAL AD FOR CAPNOFLEX CO2 O2 AIRLFE

## (undated) DEVICE — SUTURE MCRYL SZ 3-0 L27IN ABSRB UD L24MM PS-1 3/8 CIR PRIM Y936H

## (undated) DEVICE — GLOVE SURG SZ 7 CRM LTX FREE POLYISOPRENE POLYMER BEAD ANTI

## (undated) DEVICE — BLOCK BITE AD 60FR W/ VELC STRP ADDRESSES MOST PT AND

## (undated) DEVICE — SUTURE MCRYL SZ 3-0 L27IN ABSRB UD L19MM PS-2 3/8 CIR PRIM Y427H

## (undated) DEVICE — SUTURE ABSRB X-1 REV CUT 1/2 CIR 22MM UD BRAID 27IN SZ 3-0 J458H

## (undated) DEVICE — CONTAINER PREFIL FRMLN 40ML --

## (undated) DEVICE — GARMENT,MEDLINE,DVT,INT,CALF,MED, GEN2: Brand: MEDLINE

## (undated) DEVICE — GOWN,REINF,POLY,ECL,PP SLV,XL: Brand: MEDLINE

## (undated) DEVICE — AMD ANTIMICROBIAL BANDAGE ROLL,6 PLY: Brand: KERLIX

## (undated) DEVICE — DRAIN KT WND 10FR RND 400ML --

## (undated) DEVICE — INTENDED FOR TISSUE SEPARATION, AND OTHER PROCEDURES THAT REQUIRE A SHARP SURGICAL BLADE TO PUNCTURE OR CUT.: Brand: BARD-PARKER SAFETY BLADES SIZE 10, STERILE

## (undated) DEVICE — FOR ASEPTIC DECANTING OF I.V. FLUIDS FROM FLEXIBLE CONTAINERS.: Brand: BAG DECANTER

## (undated) DEVICE — KENDALL SCD EXPRESS SLEEVES, KNEE LENGTH, LARGE: Brand: KENDALL SCD

## (undated) DEVICE — CONNECTOR TBNG OD5-7MM O2 END DISP

## (undated) DEVICE — GARMENT,MEDLINE,DVT,INT,CALF,LG, GEN2: Brand: MEDLINE

## (undated) DEVICE — NEEDLE,BLUNT,18GX1": Brand: MEDLINE

## (undated) DEVICE — BUTTON SWITCH PENCIL BLADE ELECTRODE, HOLSTER: Brand: EDGE

## (undated) DEVICE — NEEDLE SPNL 22GA L3.5IN BLK HUB S STL REG WALL FIT STYL W/

## (undated) DEVICE — 48" PROBE COVER W/GEL, ULTRASOUND, STERILE: Brand: SITE-RITE

## (undated) DEVICE — UNIVERSAL DRAPES: Brand: MEDLINE INDUSTRIES, INC.

## (undated) DEVICE — SPONGE LAP 18X18IN STRL -- 5/PK

## (undated) DEVICE — CATHETER IV 14GA L1.25IN PTFE ORNG FEP SFTY STR HUB RADPQ

## (undated) DEVICE — NEEDLE HYPO 18GA L1.5IN PNK S STL HUB POLYPR SHLD REG BVL

## (undated) DEVICE — DRAIN WND RND SILICONE 15FR --

## (undated) DEVICE — UTILITY MARKER,BLACK WITH LABELS: Brand: DEVON

## (undated) DEVICE — REM POLYHESIVE ADULT PATIENT RETURN ELECTRODE: Brand: VALLEYLAB

## (undated) DEVICE — SYR 3ML LL TIP 1/10ML GRAD --

## (undated) DEVICE — CONTAINER,SPECIMEN,O.R.STRL,4.5OZ: Brand: MEDLINE

## (undated) DEVICE — APPLICATOR FBR TIP L6IN COT TIP WOOD SHFT SWAB 2000 PER CA

## (undated) DEVICE — POSITIONER HD REST FOAM CMFRT TCH

## (undated) DEVICE — SYR 5ML 1/5 GRAD LL NSAF LF --

## (undated) DEVICE — NDL PRT INJ NSAF BLNT 18GX1.5 --

## (undated) DEVICE — SOLUTION ANTISEP 1OZ 3% H PEROX

## (undated) DEVICE — SYRINGE MED 5ML STD CLR PLAS LUERLOCK TIP N CTRL DISP

## (undated) DEVICE — SUTURE MONOCRYL SZ 3-0 L27IN ABSRB UD L19MM PS-2 3/8 CIR PRIM Y427H

## (undated) DEVICE — SUTURE MCRYL SZ 4-0 L27IN ABSRB UD L19MM PS-2 1/2 CIR PRIM Y426H

## (undated) DEVICE — APPLICATOR MEDICATED 26 CC SOLUTION HI LT ORNG CHLORAPREP

## (undated) DEVICE — SHEET,DRAPE,53X77,STERILE: Brand: MEDLINE

## (undated) DEVICE — SUTURE STRATAFIX SPRL SZ 4-0 L5IN ABSRB UD FS-2 L19MM 3/8 SXMP2B407

## (undated) DEVICE — SYRINGE MED 30ML STD CLR PLAS LUERLOCK TIP N CTRL DISP